# Patient Record
Sex: MALE | Race: BLACK OR AFRICAN AMERICAN | Employment: UNEMPLOYED | URBAN - METROPOLITAN AREA
[De-identification: names, ages, dates, MRNs, and addresses within clinical notes are randomized per-mention and may not be internally consistent; named-entity substitution may affect disease eponyms.]

---

## 2017-04-28 ENCOUNTER — HOSPITAL ENCOUNTER (EMERGENCY)
Age: 32
Discharge: HOME | End: 2017-04-28
Payer: MEDICARE

## 2017-04-28 VITALS
BODY MASS INDEX: 46.98 KG/M2 | SYSTOLIC BLOOD PRESSURE: 131 MMHG | HEART RATE: 102 BPM | TEMPERATURE: 98.6 F | DIASTOLIC BLOOD PRESSURE: 90 MMHG | RESPIRATION RATE: 18 BRPM | OXYGEN SATURATION: 98 % | HEIGHT: 68 IN | WEIGHT: 310 LBS

## 2017-04-28 DIAGNOSIS — G56.01 CARPAL TUNNEL SYNDROME OF RIGHT WRIST: Primary | ICD-10-CM

## 2017-04-28 PROCEDURE — 99282 EMERGENCY DEPT VISIT SF MDM: CPT

## 2017-04-28 RX ORDER — METHYLPREDNISOLONE 4 MG/1
TABLET ORAL
Qty: 1 KIT | Refills: 0 | Status: SHIPPED | OUTPATIENT
Start: 2017-04-28 | End: 2017-05-04

## 2017-04-28 ASSESSMENT — ENCOUNTER SYMPTOMS
VOMITING: 0
SHORTNESS OF BREATH: 0
BACK PAIN: 0
ABDOMINAL PAIN: 0
TROUBLE SWALLOWING: 0
SORE THROAT: 0
NAUSEA: 0
PHOTOPHOBIA: 0
COUGH: 0
DIARRHEA: 0

## 2017-06-28 ENCOUNTER — HOSPITAL ENCOUNTER (EMERGENCY)
Age: 32
Discharge: HOME | End: 2017-06-28
Payer: MEDICARE

## 2017-06-28 VITALS
SYSTOLIC BLOOD PRESSURE: 124 MMHG | RESPIRATION RATE: 18 BRPM | HEART RATE: 77 BPM | DIASTOLIC BLOOD PRESSURE: 73 MMHG | TEMPERATURE: 98.6 F | OXYGEN SATURATION: 99 %

## 2017-06-28 DIAGNOSIS — B36.9 FUNGAL SKIN INFECTION: ICD-10-CM

## 2017-06-28 DIAGNOSIS — L73.9 FOLLICULITIS: Primary | ICD-10-CM

## 2017-06-28 PROCEDURE — 99282 EMERGENCY DEPT VISIT SF MDM: CPT

## 2017-06-28 RX ORDER — CLOTRIMAZOLE 1 %
CREAM (GRAM) TOPICAL
Qty: 1 TUBE | Refills: 2 | Status: SHIPPED | OUTPATIENT
Start: 2017-06-28 | End: 2018-10-24

## 2017-06-28 RX ORDER — CLINDAMYCIN HYDROCHLORIDE 150 MG/1
150 CAPSULE ORAL 3 TIMES DAILY
Qty: 30 CAPSULE | Refills: 0 | Status: SHIPPED | OUTPATIENT
Start: 2017-06-28 | End: 2017-07-08

## 2017-06-28 ASSESSMENT — ENCOUNTER SYMPTOMS
EYES NEGATIVE: 1
ALLERGIC/IMMUNOLOGIC NEGATIVE: 1
GASTROINTESTINAL NEGATIVE: 1

## 2017-06-28 ASSESSMENT — PAIN DESCRIPTION - DESCRIPTORS: DESCRIPTORS: TIGHTNESS

## 2017-06-28 ASSESSMENT — PAIN DESCRIPTION - PAIN TYPE: TYPE: ACUTE PAIN

## 2017-06-28 ASSESSMENT — PAIN SCALES - GENERAL: PAINLEVEL_OUTOF10: 3

## 2017-06-28 ASSESSMENT — PAIN DESCRIPTION - LOCATION: LOCATION: ABDOMEN

## 2017-11-22 ENCOUNTER — HOSPITAL ENCOUNTER (INPATIENT)
Age: 32
LOS: 2 days | Discharge: HOME HEALTH | DRG: 420 | End: 2017-11-24
Attending: EMERGENCY MEDICINE | Admitting: INTERNAL MEDICINE
Payer: MEDICARE

## 2017-11-22 ENCOUNTER — APPOINTMENT (OUTPATIENT)
Dept: GENERAL RADIOLOGY | Age: 32
DRG: 420 | End: 2017-11-22
Payer: MEDICARE

## 2017-11-22 DIAGNOSIS — N48.1 BALANITIS: ICD-10-CM

## 2017-11-22 DIAGNOSIS — E11.9 DIABETES MELLITUS, NEW ONSET (HCC): Primary | ICD-10-CM

## 2017-11-22 DIAGNOSIS — R73.9 HYPERGLYCEMIA: ICD-10-CM

## 2017-11-22 LAB
ALBUMIN SERPL-MCNC: 4.8 G/DL (ref 3.9–4.9)
ALP BLD-CCNC: 92 U/L (ref 35–104)
ALT SERPL-CCNC: <5 U/L (ref 0–41)
ANION GAP SERPL CALCULATED.3IONS-SCNC: 14 MEQ/L (ref 7–13)
AST SERPL-CCNC: 22 U/L (ref 0–40)
BASOPHILS ABSOLUTE: 0 K/UL (ref 0–0.2)
BASOPHILS RELATIVE PERCENT: 0.4 %
BETA-HYDROXYBUTYRATE: 2.6 MG/DL (ref 0.2–2.8)
BILIRUB SERPL-MCNC: 0.6 MG/DL (ref 0–1.2)
BILIRUBIN URINE: NEGATIVE
BLOOD, URINE: NEGATIVE
BUN BLDV-MCNC: 19 MG/DL (ref 6–20)
CALCIUM SERPL-MCNC: 10.4 MG/DL (ref 8.6–10.2)
CHLORIDE BLD-SCNC: 88 MEQ/L (ref 98–107)
CLARITY: CLEAR
CO2: 24 MEQ/L (ref 22–29)
COLOR: YELLOW
CREAT SERPL-MCNC: 1.1 MG/DL (ref 0.7–1.2)
EOSINOPHILS ABSOLUTE: 0.1 K/UL (ref 0–0.7)
EOSINOPHILS RELATIVE PERCENT: 1.8 %
GFR AFRICAN AMERICAN: >60
GFR NON-AFRICAN AMERICAN: >60
GLOBULIN: 3.1 G/DL (ref 2.3–3.5)
GLUCOSE BLD-MCNC: 176 MG/DL (ref 60–115)
GLUCOSE BLD-MCNC: 200 MG/DL (ref 60–115)
GLUCOSE BLD-MCNC: 654 MG/DL (ref 74–109)
GLUCOSE BLD-MCNC: >600 MG/DL (ref 60–115)
GLUCOSE URINE: >=1000 MG/DL
HCT VFR BLD CALC: 43.7 % (ref 42–52)
HEMOGLOBIN: 15.8 G/DL (ref 14–18)
KETONES, URINE: NEGATIVE MG/DL
LEUKOCYTE ESTERASE, URINE: NEGATIVE
LYMPHOCYTES ABSOLUTE: 1.8 K/UL (ref 1–4.8)
LYMPHOCYTES RELATIVE PERCENT: 32.9 %
MCH RBC QN AUTO: 33.8 PG (ref 27–31.3)
MCHC RBC AUTO-ENTMCNC: 36.1 % (ref 33–37)
MCV RBC AUTO: 93.5 FL (ref 80–100)
MONOCYTES ABSOLUTE: 0.4 K/UL (ref 0.2–0.8)
MONOCYTES RELATIVE PERCENT: 6.9 %
NEUTROPHILS ABSOLUTE: 3.2 K/UL (ref 1.4–6.5)
NEUTROPHILS RELATIVE PERCENT: 58 %
NITRITE, URINE: NEGATIVE
PDW BLD-RTO: 12.6 % (ref 11.5–14.5)
PERFORMED ON: ABNORMAL
PH UA: 5.5 (ref 5–9)
PLATELET # BLD: 178 K/UL (ref 130–400)
POTASSIUM SERPL-SCNC: 5.6 MEQ/L (ref 3.5–5.1)
PROTEIN UA: NEGATIVE MG/DL
RBC # BLD: 4.67 M/UL (ref 4.7–6.1)
SODIUM BLD-SCNC: 126 MEQ/L (ref 132–144)
SPECIFIC GRAVITY UA: 1.03 (ref 1–1.03)
TOTAL PROTEIN: 7.9 G/DL (ref 6.4–8.1)
URINE REFLEX TO CULTURE: ABNORMAL
UROBILINOGEN, URINE: 0.2 E.U./DL
WBC # BLD: 5.6 K/UL (ref 4.8–10.8)

## 2017-11-22 PROCEDURE — 6360000002 HC RX W HCPCS

## 2017-11-22 PROCEDURE — 6370000000 HC RX 637 (ALT 250 FOR IP): Performed by: EMERGENCY MEDICINE

## 2017-11-22 PROCEDURE — 96375 TX/PRO/DX INJ NEW DRUG ADDON: CPT

## 2017-11-22 PROCEDURE — 36415 COLL VENOUS BLD VENIPUNCTURE: CPT

## 2017-11-22 PROCEDURE — 99285 EMERGENCY DEPT VISIT HI MDM: CPT

## 2017-11-22 PROCEDURE — 82803 BLOOD GASES ANY COMBINATION: CPT

## 2017-11-22 PROCEDURE — 82948 REAGENT STRIP/BLOOD GLUCOSE: CPT

## 2017-11-22 PROCEDURE — 82330 ASSAY OF CALCIUM: CPT

## 2017-11-22 PROCEDURE — 96374 THER/PROPH/DIAG INJ IV PUSH: CPT

## 2017-11-22 PROCEDURE — 81003 URINALYSIS AUTO W/O SCOPE: CPT

## 2017-11-22 PROCEDURE — 2580000003 HC RX 258: Performed by: NURSE PRACTITIONER

## 2017-11-22 PROCEDURE — 80053 COMPREHEN METABOLIC PANEL: CPT

## 2017-11-22 PROCEDURE — 1210000000 HC MED SURG R&B

## 2017-11-22 PROCEDURE — G0378 HOSPITAL OBSERVATION PER HR: HCPCS

## 2017-11-22 PROCEDURE — 2580000003 HC RX 258: Performed by: EMERGENCY MEDICINE

## 2017-11-22 PROCEDURE — 36600 WITHDRAWAL OF ARTERIAL BLOOD: CPT

## 2017-11-22 PROCEDURE — 82010 KETONE BODYS QUAN: CPT

## 2017-11-22 PROCEDURE — 71010 XR CHEST PORTABLE: CPT

## 2017-11-22 PROCEDURE — 6360000002 HC RX W HCPCS: Performed by: EMERGENCY MEDICINE

## 2017-11-22 PROCEDURE — 93005 ELECTROCARDIOGRAM TRACING: CPT

## 2017-11-22 PROCEDURE — 85025 COMPLETE CBC W/AUTO DIFF WBC: CPT

## 2017-11-22 PROCEDURE — 83605 ASSAY OF LACTIC ACID: CPT

## 2017-11-22 RX ORDER — ACETAMINOPHEN 325 MG/1
650 TABLET ORAL EVERY 4 HOURS PRN
Status: DISCONTINUED | OUTPATIENT
Start: 2017-11-22 | End: 2017-11-24 | Stop reason: HOSPADM

## 2017-11-22 RX ORDER — SODIUM CHLORIDE 0.9 % (FLUSH) 0.9 %
10 SYRINGE (ML) INJECTION PRN
Status: DISCONTINUED | OUTPATIENT
Start: 2017-11-22 | End: 2017-11-24 | Stop reason: HOSPADM

## 2017-11-22 RX ORDER — ONDANSETRON 2 MG/ML
4 INJECTION INTRAMUSCULAR; INTRAVENOUS ONCE
Status: COMPLETED | OUTPATIENT
Start: 2017-11-22 | End: 2017-11-22

## 2017-11-22 RX ORDER — ONDANSETRON 2 MG/ML
INJECTION INTRAMUSCULAR; INTRAVENOUS
Status: COMPLETED
Start: 2017-11-22 | End: 2017-11-22

## 2017-11-22 RX ORDER — SODIUM CHLORIDE 9 MG/ML
INJECTION, SOLUTION INTRAVENOUS CONTINUOUS
Status: DISCONTINUED | OUTPATIENT
Start: 2017-11-22 | End: 2017-11-24

## 2017-11-22 RX ORDER — NICOTINE POLACRILEX 4 MG
15 LOZENGE BUCCAL PRN
Status: DISCONTINUED | OUTPATIENT
Start: 2017-11-22 | End: 2017-11-24 | Stop reason: HOSPADM

## 2017-11-22 RX ORDER — DEXTROSE MONOHYDRATE 50 MG/ML
100 INJECTION, SOLUTION INTRAVENOUS PRN
Status: DISCONTINUED | OUTPATIENT
Start: 2017-11-22 | End: 2017-11-24 | Stop reason: HOSPADM

## 2017-11-22 RX ORDER — SODIUM CHLORIDE 0.9 % (FLUSH) 0.9 %
10 SYRINGE (ML) INJECTION EVERY 12 HOURS SCHEDULED
Status: DISCONTINUED | OUTPATIENT
Start: 2017-11-22 | End: 2017-11-24 | Stop reason: HOSPADM

## 2017-11-22 RX ORDER — ONDANSETRON 2 MG/ML
4 INJECTION INTRAMUSCULAR; INTRAVENOUS EVERY 6 HOURS PRN
Status: DISCONTINUED | OUTPATIENT
Start: 2017-11-22 | End: 2017-11-24 | Stop reason: HOSPADM

## 2017-11-22 RX ORDER — SODIUM CHLORIDE 0.9 % (FLUSH) 0.9 %
3 SYRINGE (ML) INJECTION EVERY 8 HOURS
Status: DISCONTINUED | OUTPATIENT
Start: 2017-11-22 | End: 2017-11-22 | Stop reason: ALTCHOICE

## 2017-11-22 RX ORDER — 0.9 % SODIUM CHLORIDE 0.9 %
1500 INTRAVENOUS SOLUTION INTRAVENOUS ONCE
Status: COMPLETED | OUTPATIENT
Start: 2017-11-22 | End: 2017-11-22

## 2017-11-22 RX ORDER — INSULIN GLARGINE 100 [IU]/ML
20 INJECTION, SOLUTION SUBCUTANEOUS NIGHTLY
Status: DISCONTINUED | OUTPATIENT
Start: 2017-11-22 | End: 2017-11-23

## 2017-11-22 RX ORDER — DEXTROSE MONOHYDRATE 25 G/50ML
12.5 INJECTION, SOLUTION INTRAVENOUS PRN
Status: DISCONTINUED | OUTPATIENT
Start: 2017-11-22 | End: 2017-11-24 | Stop reason: HOSPADM

## 2017-11-22 RX ADMIN — SODIUM CHLORIDE, PRESERVATIVE FREE 10 ML: 5 INJECTION INTRAVENOUS at 22:39

## 2017-11-22 RX ADMIN — SODIUM CHLORIDE 1500 ML: 9 INJECTION, SOLUTION INTRAVENOUS at 19:57

## 2017-11-22 RX ADMIN — SODIUM CHLORIDE: 9 INJECTION, SOLUTION INTRAVENOUS at 22:39

## 2017-11-22 RX ADMIN — ONDANSETRON 4 MG: 2 INJECTION INTRAMUSCULAR; INTRAVENOUS at 20:35

## 2017-11-22 RX ADMIN — ONDANSETRON: 2 INJECTION, SOLUTION INTRAMUSCULAR; INTRAVENOUS at 20:40

## 2017-11-22 RX ADMIN — HYDROMORPHONE HYDROCHLORIDE 0.5 MG: 1 INJECTION, SOLUTION INTRAMUSCULAR; INTRAVENOUS; SUBCUTANEOUS at 20:35

## 2017-11-22 RX ADMIN — WATER 1 G: 1 INJECTION INTRAMUSCULAR; INTRAVENOUS; SUBCUTANEOUS at 19:58

## 2017-11-22 RX ADMIN — SODIUM CHLORIDE 10 UNITS/HR: 9 INJECTION, SOLUTION INTRAVENOUS at 19:58

## 2017-11-22 RX ADMIN — INSULIN HUMAN 10 UNITS: 100 INJECTION, SOLUTION PARENTERAL at 19:57

## 2017-11-22 ASSESSMENT — PAIN SCALES - GENERAL: PAINLEVEL_OUTOF10: 10

## 2017-11-23 LAB
ANION GAP SERPL CALCULATED.3IONS-SCNC: 15 MEQ/L (ref 7–13)
BUN BLDV-MCNC: 17 MG/DL (ref 6–20)
CALCIUM SERPL-MCNC: 9.2 MG/DL (ref 8.6–10.2)
CHLORIDE BLD-SCNC: 93 MEQ/L (ref 98–107)
CO2: 24 MEQ/L (ref 22–29)
CREAT SERPL-MCNC: 1.05 MG/DL (ref 0.7–1.2)
GFR AFRICAN AMERICAN: >60
GFR NON-AFRICAN AMERICAN: >60
GLUCOSE BLD-MCNC: 260 MG/DL (ref 60–115)
GLUCOSE BLD-MCNC: 304 MG/DL (ref 60–115)
GLUCOSE BLD-MCNC: 305 MG/DL (ref 60–115)
GLUCOSE BLD-MCNC: 388 MG/DL (ref 74–109)
GLUCOSE BLD-MCNC: 416 MG/DL (ref 60–115)
GLUCOSE BLD-MCNC: 427 MG/DL (ref 60–115)
HBA1C MFR BLD: 12.6 % (ref 4.8–5.9)
HCT VFR BLD CALC: 43.3 % (ref 42–52)
HEMOGLOBIN: 14.7 G/DL (ref 14–18)
MCH RBC QN AUTO: 32.1 PG (ref 27–31.3)
MCHC RBC AUTO-ENTMCNC: 34.1 % (ref 33–37)
MCV RBC AUTO: 94.4 FL (ref 80–100)
PDW BLD-RTO: 13 % (ref 11.5–14.5)
PERFORMED ON: ABNORMAL
PLATELET # BLD: 177 K/UL (ref 130–400)
POTASSIUM SERPL-SCNC: 5.4 MEQ/L (ref 3.5–5.1)
RBC # BLD: 4.59 M/UL (ref 4.7–6.1)
SODIUM BLD-SCNC: 132 MEQ/L (ref 132–144)
WBC # BLD: 6.3 K/UL (ref 4.8–10.8)

## 2017-11-23 PROCEDURE — 80048 BASIC METABOLIC PNL TOTAL CA: CPT

## 2017-11-23 PROCEDURE — 85027 COMPLETE CBC AUTOMATED: CPT

## 2017-11-23 PROCEDURE — 6370000000 HC RX 637 (ALT 250 FOR IP): Performed by: NURSE PRACTITIONER

## 2017-11-23 PROCEDURE — 83036 HEMOGLOBIN GLYCOSYLATED A1C: CPT

## 2017-11-23 PROCEDURE — 6360000002 HC RX W HCPCS

## 2017-11-23 PROCEDURE — 6370000000 HC RX 637 (ALT 250 FOR IP): Performed by: INTERNAL MEDICINE

## 2017-11-23 PROCEDURE — 36415 COLL VENOUS BLD VENIPUNCTURE: CPT

## 2017-11-23 PROCEDURE — 1210000000 HC MED SURG R&B

## 2017-11-23 PROCEDURE — G0378 HOSPITAL OBSERVATION PER HR: HCPCS

## 2017-11-23 PROCEDURE — 2580000003 HC RX 258: Performed by: NURSE PRACTITIONER

## 2017-11-23 RX ORDER — INSULIN GLARGINE 100 [IU]/ML
40 INJECTION, SOLUTION SUBCUTANEOUS NIGHTLY
Status: DISCONTINUED | OUTPATIENT
Start: 2017-11-24 | End: 2017-11-24 | Stop reason: HOSPADM

## 2017-11-23 RX ADMIN — INSULIN GLARGINE 20 UNITS: 100 INJECTION, SOLUTION SUBCUTANEOUS at 00:21

## 2017-11-23 RX ADMIN — INSULIN GLARGINE 20 UNITS: 100 INJECTION, SOLUTION SUBCUTANEOUS at 21:18

## 2017-11-23 RX ADMIN — INSULIN LISPRO 6 UNITS: 100 INJECTION, SOLUTION INTRAVENOUS; SUBCUTANEOUS at 09:16

## 2017-11-23 RX ADMIN — SODIUM CHLORIDE: 9 INJECTION, SOLUTION INTRAVENOUS at 09:16

## 2017-11-23 RX ADMIN — ACETAMINOPHEN 650 MG: 325 TABLET, FILM COATED ORAL at 18:08

## 2017-11-23 RX ADMIN — HYDROMORPHONE HYDROCHLORIDE: 1 INJECTION, SOLUTION INTRAMUSCULAR; INTRAVENOUS; SUBCUTANEOUS at 00:40

## 2017-11-23 ASSESSMENT — PAIN SCALES - GENERAL: PAINLEVEL_OUTOF10: 5

## 2017-11-23 NOTE — PROGRESS NOTES
Patient assessment completed, admission complete, patient complains of no pain at this time and does not need anything else. Bed is in lowest position and call light is in reach.    Electronically signed by Anselmo Thomas RN on 11/22/2017 at 11:18 PM

## 2017-11-23 NOTE — H&P
Hospital Medicine History & Physical      PCP: No primary care provider on file. Date of Admission: 11/22/2017    Date of Service: Pt seen/examined on 11/22/17 and Admitted to Inpatient with expected LOS greater than two midnights due to medical therapy. Placed in Observation. Chief Complaint:  Blurred vision, increased thirst and urination. History Of Present Illness:      28 y.o. male who presented to [facility] with complaint of blurred vision, increased thirst and urination 3-4 weeks ago with worsening of symptoms over last 3 days. Patient denies having a history of diabetes or ever told his blood sugar was high. Patient states his father is a diabetic. He states that he feels like he can't get enough to drink, but is constantly in the bathroom. He feels fatigued even though he believes he is sleeping enough. Patient states having intermittent nausea, none at this time,but denies ever vomiting. Denies change in appetite, but states \"i know I don't eat health things\". Patient denies CP/SOB/fever/chills/N/V/D. Patient started on insulin gtt in ED and was on for 1 hour and 10 minutes after recheck glucose showed decrease from 654 down to 176. Gtt was discontinued and patient will be rechecked prior to floor transfer. Past Medical History:          Diagnosis Date    Asthma        Past Surgical History:      No past surgical history on file. Medications Prior to Admission:      Prior to Admission medications    Medication Sig Start Date End Date Taking? Authorizing Provider   clotrimazole (LOTRIMIN AF) 1 % cream Apply topically 2 times daily. 6/28/17   Aurea Whitt CNP       Allergies:  Review of patient's allergies indicates no known allergies. Social History:      The patient currently lives with father    TOBACCO:   reports that he has been smoking Cigarettes. He has been smoking about 0.50 packs per day. He does not have any smokeless tobacco history on file.   ETOH: reports that he drinks alcohol. Family History:       Reviewed in detail and negative for DM, CAD, Cancer, CVA. Positive as follows:    No family history on file. REVIEW OF SYSTEMS:   Pertinent positives as noted in the HPI. All other systems reviewed and negative. PHYSICAL EXAM:    BP (!) 126/107   Pulse 129   Temp 96.9 °F (36.1 °C) (Temporal)   Resp 20   Ht 5' 9\" (1.753 m)   Wt (!) 313 lb (142 kg)   SpO2 99%   BMI 46.22 kg/m²     General appearance:  No apparent distress, appears stated age and cooperative. HEENT:  Normal cephalic, atraumatic without obvious deformity. Pupils equal, round, and reactive to light. Extra ocular muscles intact. Conjunctivae/corneas clear. Neck: Supple, with full range of motion. No jugular venous distention. Trachea midline. Respiratory:  Normal respiratory effort. Clear to auscultation, bilaterally  Cardiovascular:  Regular rate and rhythm with normal S1/S2   Abdomen: Soft, non-tender, non-distended with normal bowel sounds. Musculoskeletal:  No clubbing, cyanosis or edema bilaterally. Full range of motion without deformity. Skin:  Diaphoretic,Skin color, texture, turgor normal.   Neurologic:  Neurovascularly intact without any focal sensory/motor deficits. Cranial nerves: II-XII intact, grossly non-focal.  Psychiatric:  Alert and oriented, thought content appropriate, normal insight  Capillary Refill: Brisk,< 3 seconds   Peripheral Pulses: +2 palpable, equal bilaterally       Labs:     Recent Labs      11/22/17 2023   WBC  5.6   HGB  15.8   HCT  43.7   PLT  178     Recent Labs      11/22/17 1930   NA  126*   K  5.6*   CL  88*   CO2  24   BUN  19   CREATININE  1.10   CALCIUM  10.4*     Recent Labs      11/22/17 1930   AST  22   ALT  <5   BILITOT  0.6   ALKPHOS  92     No results for input(s): INR in the last 72 hours. No results for input(s): Kristy Apa in the last 72 hours.     Urinalysis:      Lab Results   Component Value Date    NITRU

## 2017-11-23 NOTE — ED PROVIDER NOTES
[facility] ED  eMERGENCY dEPARTMENT eNCOUnter      Pt Name: Matilde Greco  MRN: [id number]  [place] 1985  Date of evaluation: 11/22/2017  Provider: William Chapa MD    [address] [place] [address]       Chief Complaint   Patient presents with    Loss of Vision     blurry vision started 3-4 weeks ago, rash in private area for a week, very thirsy, frequent urination         HISTORY OF PRESENT ILLNESS   (Location/Symptom, Timing/Onset, Context/Setting, Quality, Duration, Modifying Factors, Severity)  Note limiting factors. Matilde Greco is a 28 y.o. male who presents to the emergency department With polyuria polydipsia, feeling weak and blurry vision, 2 week duration and getting worse. His father is a diabetic. He thinks he may have diabetes. He also has soreness on the crown of the penis. This is underneath the foreskin. Cough, dysuria, frequency are not present. He is otherwise been fairly healthy. He is a smoker and never been diagnosed diabetic     HPI    Nursing Notes were reviewed. REVIEW OF SYSTEMS    (2-9 systems for level 4, 10 or more for level 5)     Review of Systems  Constitutional symptoms:  Positive fatigue, no fever, no chills. Skin symptoms:  Negative except as documented in HPI. ENMT symptoms:  Negative except as documented in HPI. Respiratory symptoms:  Negative except as documented in HPI. Cardiovascular symptoms:  Negative except as documented in HPI. Gastrointestinal symptoms: Negative except for documented as above in the HPI   Genitourinary symptoms:  Negative except as documented in HPI. Soreness in the corona as well  Musculoskeletal symptoms:  Negative except as documented in HPI. Neurologic symptoms:  Negative except as documented in HPI. Remainder of 10 systems, all negative except for mentioned above    Except as noted above the remainder of the review of systems was reviewed and negative.        PAST MEDICAL HISTORY     Past Medical History:   Diagnosis Date No              -Pain on palpation: No    ABD: -Distended: No           -Bruits: No           -Bowel sounds: Normal.           -Deep palpation: Non-tender           -Organomegaly palpable: No           -Abnormal masses: No    EXT: Gross appearance and use of all four extremities: Normal   Genital exam reveals foreskin intact, and soreness, redness, slight discharge underneath the foreskin. SKIN: -Good turgor warm and dry. -Apparent lesions or rashes: No    NEURO: -Patient: alert                -Oriented to: person, place and time. -Appearance and judgment: appropriate.                -Cranial Nerves: Normal.                -Speech: Normal            DIAGNOSTIC RESULTS     EKG: All EKG's are interpreted by the Emergency Department Physician who either signs or Co-signs this chart in the absence of a cardiologist.    EKG was revewed  and revealed normal sinus rhythm, rate is 70-85. no acute ST elevations,no flipped T waves , no Q waves. CT interval is normal.QRS duration is normal. Axes are normal. There are no PVCs    RADIOLOGY:   Non-plain film images such as CT, Ultrasound and MRI are read by the radiologist. Plain radiographic images are visualized and preliminarily interpreted by the emergency physician with the below findings:      Chest  x-ray fails to demonstrate acute effusion or infiltrate. There is no pneumothorax.     Interpretation per the Radiologist below, if available at the time of this note:    XR Chest Portable    (Results Pending)         ED BEDSIDE ULTRASOUND:   Performed by ED Physician - none    LABS:  Labs Reviewed   COMPREHENSIVE METABOLIC PANEL - Abnormal; Notable for the following:        Result Value    Sodium 126 (*)     Potassium 5.6 (*)     Chloride 88 (*)     Anion Gap 14 (*)     Glucose 654 (*)     Calcium 10.4 (*)     All other components within normal limits    Narrative:     CALL  [place]  [facility] tel. [phone],  Glucose results called to and read back by  Preethi, 11/22/2017 20:04, by  JUANJO   POCT GLUCOSE - Abnormal; Notable for the following:     POC Glucose >600 (*)     All other components within normal limits   URINE RT REFLEX TO CULTURE   BLOOD GAS, ARTERIAL   CBC WITH AUTO DIFFERENTIAL       All other labs were within normal range or not returned as of this dictation. EMERGENCY DEPARTMENT COURSE and DIFFERENTIAL DIAGNOSIS/MDM:   Vitals:    Vitals:    11/22/17 1902 11/22/17 1904   BP: 138/74    Pulse: 76    Resp: 20    Temp:  96.9 °F (36.1 °C)   TempSrc:  Temporal   SpO2: 99%    Weight: (!) 313 lb (142 kg)    Height: 5' 9\" (1.753 m)            MDM    CRITICAL CARE TIME   Total Critical Care time was 40  minutes, excluding separately reportable procedures. There was a high probability of clinically significant/life threatening deterioration in the patient's condition which required my urgent intervention. CONSULTS:  None    PROCEDURES:  Unless otherwise noted below, none     Procedures    FINAL IMPRESSION      1. Diabetes mellitus, new onset ([address]    2. Balanitis          DISPOSITION/PLAN   DISPOSITION Decision to Admit    PATIENT REFERRED TO:  No follow-up provider specified.     DISCHARGE MEDICATIONS:  New Prescriptions    No medications on file          (Please note that portions of this note were completed with a voice recognition program.  Efforts were made to edit the dictations but occasionally words are mis-transcribed.)    Hellen Gardner MD (electronically signed)  Attending Emergency Physician          Hellen Gardner MD  11/22/17 2036

## 2017-11-23 NOTE — FLOWSHEET NOTE
Pt awake and alert explained insulin and how the coverage scale is used. Pt verbalized understanding. States he feels better and that at home his mouth was very dry and scaly and thought he had a UTI so he was drinking a lot of water and cranberry-apple juice.

## 2017-11-23 NOTE — ED NOTES
Pt states he has been having symptoms for 3 weeks like blurry vision, extreme thirst, excessive urination, weakness.   Pt states he thinks he has diabetes and wants to be checked for it     Ba Dawson RN  11/22/17 9551

## 2017-11-23 NOTE — PLAN OF CARE
Problem: Serum Glucose Level - Abnormal:  Goal: Ability to maintain appropriate glucose levels will improve  Ability to maintain appropriate glucose levels will improve  Outcome: Ongoing

## 2017-11-23 NOTE — PROGRESS NOTES
bilaterally. Skin: Skin color, texture, turgor normal.  No rashes or lesions. Neuro: Non Focal. Intact and symmetric  Psychiatric: Alert and oriented, thought content appropriate, normal insight  Capillary Refill: Brisk,< 3 seconds   Peripheral Pulses: +2 palpable, equal bilaterally     Labs:   Recent Labs      11/22/17 2023 11/23/17   0555   WBC  5.6  6.3   HGB  15.8  14.7   HCT  43.7  43.3   PLT  178  177     Recent Labs      11/22/17 1930 11/23/17   0555   NA  126*  132   K  5.6*  5.4*   CL  88*  93*   CO2  24  24   BUN  19  17   CREATININE  1.10  1.05   CALCIUM  10.4*  9.2     Recent Labs      11/22/17 1930   AST  22   ALT  <5   BILITOT  0.6   ALKPHOS  92     No results for input(s): INR in the last 72 hours. No results for input(s): Kristy Apa in the last 72 hours. Urinalysis:    Lab Results   Component Value Date    NITRU Negative 11/22/2017    BLOODU Negative 11/22/2017    SPECGRAV 1.028 11/22/2017    GLUCOSEU >=1000 11/22/2017       Radiology:  XR Chest Portable   Final Result   NO ACUTE CHEST DISEASE        Assessment/Plan:  27 y/o M with a history of tobacco use and family history of DM and CAD who presents with hyperglycemia; found to have DMII with an HbA1c of 12.     #DMII with uncontrolled hyperglycemia     - Have started pt on 0.5U/Kg of daily insulin which is approximately 42U per day; 21U of Lantus and 7U preprandial with SSI is ordered; may need further titration by endocrinology     - Counseled pt extensively in regards to diet and lifestyle changes; discussed a low carb diet as well as potential complications of poorly controlled DM; pt grandmother had diabetic nephropathy (dialysis requiring), father with possible CAD     - Also ordered diabetic educator    #Nicotine abuse     - Counseled pt at length in regards to tobacco use and his risk of CAD from his new DM; pt states he will try to quit when he gets home; discussed methods of quitting    Active Hospital Problems Diagnosis Date Noted    Hyperglycemia [R73.9] 11/22/2017      Additional work up or/and treatment plan may be added today or then after based on clinical progression. I am managing a portion of pt care. Some medical issues are handled by other specialists. Additional work up and treatment should be done in out pt setting by pt PCP and other out pt providers. In addition to examining and evaluating pt, I spent additional time explaining care, normal and abnormal findings, and treatment plan. All of pt questions were answered. Counseling, diet and education were  provided. Case will be discussed with nursing staff when appropriate. Family will be updated if and when appropriate.       Diet: DIET CARB CONTROL; Carb Control: 3 carbs/meal (approximate 1500 kcals/day)    Code Status: Full Code      Electronically signed by Vickie Cardona MD on 11/23/2017 at 2:19 PM

## 2017-11-24 VITALS
SYSTOLIC BLOOD PRESSURE: 126 MMHG | TEMPERATURE: 97.9 F | HEART RATE: 76 BPM | DIASTOLIC BLOOD PRESSURE: 86 MMHG | WEIGHT: 313 LBS | OXYGEN SATURATION: 98 % | RESPIRATION RATE: 16 BRPM | BODY MASS INDEX: 46.36 KG/M2 | HEIGHT: 69 IN

## 2017-11-24 LAB
GLUCOSE BLD-MCNC: 321 MG/DL (ref 60–115)
GLUCOSE BLD-MCNC: 341 MG/DL (ref 60–115)
PERFORMED ON: ABNORMAL
PERFORMED ON: ABNORMAL

## 2017-11-24 PROCEDURE — 2580000003 HC RX 258: Performed by: NURSE PRACTITIONER

## 2017-11-24 PROCEDURE — 90656 IIV3 VACC NO PRSV 0.5 ML IM: CPT | Performed by: INTERNAL MEDICINE

## 2017-11-24 PROCEDURE — 84681 ASSAY OF C-PEPTIDE: CPT

## 2017-11-24 PROCEDURE — 6360000002 HC RX W HCPCS: Performed by: INTERNAL MEDICINE

## 2017-11-24 PROCEDURE — G0378 HOSPITAL OBSERVATION PER HR: HCPCS

## 2017-11-24 PROCEDURE — G0008 ADMIN INFLUENZA VIRUS VAC: HCPCS | Performed by: INTERNAL MEDICINE

## 2017-11-24 PROCEDURE — 6370000000 HC RX 637 (ALT 250 FOR IP): Performed by: PODIATRIST

## 2017-11-24 PROCEDURE — 99222 1ST HOSP IP/OBS MODERATE 55: CPT | Performed by: PHYSICIAN ASSISTANT

## 2017-11-24 RX ORDER — BLOOD-GLUCOSE METER
1 KIT MISCELLANEOUS DAILY
Qty: 1 KIT | Refills: 0 | Status: SHIPPED | OUTPATIENT
Start: 2017-11-24

## 2017-11-24 RX ORDER — PRENATAL VIT 91/IRON/FOLIC/DHA 28-975-200
COMBINATION PACKAGE (EA) ORAL 2 TIMES DAILY
Status: DISCONTINUED | OUTPATIENT
Start: 2017-11-24 | End: 2017-11-24 | Stop reason: HOSPADM

## 2017-11-24 RX ORDER — LANCETS 28 GAUGE
1 EACH MISCELLANEOUS 4 TIMES DAILY
Qty: 150 EACH | Refills: 3 | Status: SHIPPED | OUTPATIENT
Start: 2017-11-24

## 2017-11-24 RX ADMIN — TERBINAFINE HYDROCHLORIDE: 1 CREAM TOPICAL at 17:12

## 2017-11-24 RX ADMIN — A/SINGAPORE/GP1908/2015 IVR-180 (AN A/MICHIGAN/45/2015 (H1N1)PDM09-LIKE VIRUS, A/HONG KONG/4801/2014, NYMC X-263B (H3N2) (AN A/HONG KONG/4801/2014-LIKE VIRUS), AND B/BRISBANE/60/2008, WILD TYPE (A B/BRISBANE/60/2008-LIKE VIRUS) 0.5 ML: 15; 15; 15 INJECTION, SUSPENSION INTRAMUSCULAR at 17:13

## 2017-11-24 RX ADMIN — SODIUM CHLORIDE, PRESERVATIVE FREE 10 ML: 5 INJECTION INTRAVENOUS at 09:16

## 2017-11-24 ASSESSMENT — ENCOUNTER SYMPTOMS
SHORTNESS OF BREATH: 0
VOMITING: 0
BLURRED VISION: 0
SORE THROAT: 0
BACK PAIN: 0
NAUSEA: 0
DOUBLE VISION: 0
ABDOMINAL PAIN: 0
COUGH: 0
DIARRHEA: 0

## 2017-11-24 NOTE — FLOWSHEET NOTE
Patient  Up and about in room. Appetite good this morning. Instructed on how to draw and give insulin this morning with good demostration; will continue to do insulin teaching with patient. Shows positive outcome with diagnosis. Taking a shower now.

## 2017-11-24 NOTE — CONSULTS
PODIATRIC MEDICINE AND SURGERY  CONSULT HISTORY AND PHYSICAL      Consulting Service:  4w  Requesting Provider: Dr. Ramila Yuan regarding: onychomycosis/tinea pedis  Staff Doctor:  Dr. Luis Rodriguez:  Onychomycosis of nails 1,2 b/l  Tinea pedis b/l to plantar skin and interdigital      PLAN AND RECOMMENDATIONS[de-identified]  Patient examined and evaluated  Rx Lotrimin cream for tinea pedis. To be applied daily to b/l feet and interdigitally. Used until resolution of scale and signs of fungal infection  Discussed with patient that while athlete's foot is readily treatable, fungal nails can be notoriously hard to treat and will require 9+ months to see any appreciable difference if treatment is working. Patient to be discussed with staff, , who will provide final recommendations going forward. Podiatry to sign off at this time  Patient can follow up with Dr. Anais Santos if he wishes after discharge for continued fungal nail and athlete's foot followup. HPI: This very [address] old male with significant PMH of newly diagnosed diabetes seen today for fungal nails and athletes foot. Patient states that he has noticed that the nails have been getting dark and spreading from the big toes to the smaller toenails over the past few months. Patient states he used to not have such dry skin and thinks it started after the nails. Patient states that he would like to use topical treatments at this time. Past Medical History:   Diagnosis Date    Asthma        No past surgical history on file. No current facility-administered medications on file prior to encounter. Current Outpatient Prescriptions on File Prior to Encounter   Medication Sig Dispense Refill    clotrimazole (LOTRIMIN AF) 1 % cream Apply topically 2 times daily. 1 Tube 2       No Known Allergies    No family history on file.     Social History     Social History    Marital status:      Spouse name: N/A    Number of children: N/A    Years of education: N/A     Occupational History    Not on file. Social History Main Topics    Smoking status: Current Every Day Smoker     Packs/day: 0.50     Types: Cigarettes    Smokeless tobacco: Not on file    Alcohol use Yes      Comment: occasionally    Drug use: No    Sexual activity: Not on file     Other Topics Concern    Not on file     Social History Narrative    No narrative on file       Review of Systems  CONSTITUTIONAL: No fevers, chills, diaphoresis  HEENT: Denies epistaxis or tinnitus  EYES: No diplopia or blurry vision. CARDIOVASCULAR: No chest pain, dyspnea, palpitations, orthopnea, PND, ankle edema. PULM: No dyspnea, tachypnea, wheezing  GI: No dysphagia/odynophagia, constipation, diarrhea, changes in stool habits, hematochezia, melena. : No new urinary complaints, including dysuria, gross hematuria or pyuria. NEURO: No new balance problems, peripheral weakness/paresthesias or numbness of concern. MUSC-SKEL: denies pain in feet. See below  PSY: No concerns regarding depression, anxiety or panic. INTEGUMENTARY: denies open skin lesion. See below    OBJECTIVE:  /86   Pulse 76   Temp 97.9 °F (36.6 °C) (Oral)   Resp 16   Ht 5' 9\" (1.753 m)   Wt (!) 313 lb (142 kg)   SpO2 98%   BMI 46.22 kg/m²   Patient is alert and oriented x 3 in NAD.      Vascular:   Palpable Dorsalis Pedis and Palpable Posterior Tibial Pulses B/L   Capillary Fill time < 3 seconds to B/L digits  Skin temperature warm to warm tibial tuberosity to the digits B/L  Hair growth present to digits  mild edema, - varicosities     Neurological:   Epicritic sensation intact B/L  Protective sensation via monofilament testing intact B/L  Sharp/dull sensation intact to plantar foot B/L    Musculoskeletal/Orthopaedic:   Structural Deformities: decreased medial longitudinal arch   5/5 muscle strength Dorsiflexion, Plantarflexion, Inversion, Eversion B/L  ROM decreased pedal and ankle joints B/L.       Dermatological:   Skin appears well hydrated and supple with good temperature, texture, turgor dorsally, significant dryness and scale to the plantar foot b/l. Nails 1-2 B/L appear clinically mycotic with thickening and discoloration. Interspaces 1-4 B/L have minor scale and erythema  No open lesions, ulcerations, verruca appreciated B/L. LABS:   Lab Results   Component Value Date    WBC 6.3 11/23/2017    HGB 14.7 11/23/2017    HCT 43.3 11/23/2017    MCV 94.4 11/23/2017     11/23/2017     Lab Results   Component Value Date     11/23/2017    K 5.4 11/23/2017    CL 93 11/23/2017    CO2 24 11/23/2017    BUN 17 11/23/2017    CREATININE 1.05 11/23/2017    GLUCOSE 388 11/23/2017    CALCIUM 9.2 11/23/2017      Lab Results   Component Value Date    LABALBU 4.8 11/22/2017     No results found for: SEDRATE  No results found for: CRP  Lab Results   Component Value Date    LABA1C 12.6 (H) 11/23/2017     No results found for: EAG      Patient's case will be discussed with staff, who will provide final recommendations. Thank you for the consult.     Abdiaziz Hackett PGY2  Please first page Podiatry On Call, [phone]  November 24, 2017  12:52 PM

## 2017-11-24 NOTE — CARE COORDINATION
MET WITH PATIENT, FREEDOM OF CHOICE OFFERED. AGREES TO [facility], OUTPATIENT DIABETIC EDUCATION , CALLED DIETICIAN TO SEE PATIENT FOR EDUCATION AS WELL, PATIENT RECEPTIVE TO TEACHING AT THIS TIME AND WOULD APPRECIATE ALL THE HELP HE COULD GET. SPOKE WITH DEMETRIS MARTIN REQUESTING ORDERS FOR SUPPORT WHEN DC'D.  PATIENT GIVEN GLUCOMETER AND TEST STRIPS, NURSE MICHELLE AWARE OF NEED FOR EDUCATION BEFORE PATIENT IS DC'D TO HOME

## 2017-11-24 NOTE — CONSULTS
Endocrinology Consult      Nguyen Arroyo  1985  [id number]    Date of Admission:  2017  7:05 PM  Date of Consultation:  2017    Consultant: Saida Stanley PA-C  Supervising Physician: Bing Whitney MD  PCP:  No primary care provider on file. Reason for Consultation: Hyperglycemia    C/C:   Chief Complaint   Patient presents with    Loss of Vision     blurry vision started 3-4 weeks ago, rash in private area for a week, very thirsy, frequent urination       HPI  History of Present Illness: Nguyen Arroyo is a 50-year-old -American gentleman who began noticing blurred vision approximate 3-4 weeks ago, polydipsia and polyuria for the last 5-7 days, and he has had tingling and numbness in both of his big toes intermittently and weakness and fatigue for about the last 3 months. He has a strong family history of diabetes, father is type II and grandmother is  from complications of diabetes. He is obese, and does state that he does not have a healthy diet. He denies nausea, vomiting, diarrhea, abdominal pain. His glucose at admission was 654, his hemoglobin A1c is 12.6. His glycemic control has improved since admission and he states that he already feels improvement of the fatigue and weakness that he felt over the last few months. Had a long discussion and education with the patient concerning diabetes and long-term effects. Described him the basic pathophysiology, is a very well-educated hard-working man and is dedicated to making improvement lifestyle changes and managing his disease appropriately. Allergies: No Known Allergies    PMH: Patient has a past medical history of Asthma. PSH: Patient has no past surgical history on file. Social History: Patient reports that he has been smoking Cigarettes. He has been smoking about 0.50 packs per day. He does not have any smokeless tobacco history on file. He reports that he drinks alcohol.  He reports that he does not use drugs. Family History: Patientsfamily history is not on file. ROS:  Review of Systems   Constitutional: Positive for diaphoresis and malaise/fatigue. Negative for chills, fever and weight loss. HENT: Negative for congestion and sore throat. Eyes: Negative for blurred vision and double vision. Respiratory: Negative for cough and shortness of breath. Cardiovascular: Negative for chest pain and palpitations. Gastrointestinal: Negative for abdominal pain, diarrhea, nausea and vomiting. Genitourinary: Negative for dysuria, flank pain, frequency, hematuria and urgency. Musculoskeletal: Negative for back pain, myalgias and neck pain. Numbness and tingling both great toes   Skin: Negative for rash. Neurological: Positive for weakness. Negative for dizziness and headaches. Endo/Heme/Allergies: Positive for polydipsia. Negative for environmental allergies. Does not bruise/bleed easily. Polyuria   Psychiatric/Behavioral: Negative for depression.        Current Meds:   terbinafine (LAMISIL) 1 % cream BID   insulin lispro (HUMALOG) injection vial 0-12 Units TID WC   insulin lispro (HUMALOG) injection vial 0-6 Units Nightly   insulin glargine (LANTUS) injection vial 40 Units Nightly   insulin lispro (HUMALOG) injection vial 12 Units TID    sodium chloride flush 0.9 % injection 10 mL 2 times per day   sodium chloride flush 0.9 % injection 10 mL PRN   acetaminophen (TYLENOL) tablet 650 mg Q4H PRN   magnesium hydroxide (MILK OF MAGNESIA) 400 MG/5ML suspension 30 mL Daily PRN   ondansetron (ZOFRAN) injection 4 mg Q6H PRN   enoxaparin (LOVENOX) injection 40 mg Daily   glucose (GLUTOSE) 40 % oral gel 15 g PRN   dextrose 50 % solution 12.5 g PRN   glucagon (rDNA) injection 1 mg PRN   dextrose 5 % solution PRN   influenza type A&B vaccine (FLUVIRIN) injection 0.5 mL Once         Vitals:  /86   Pulse 76   Temp 97.9 °F (36.6 °C) (Oral)   Resp 16   Ht 5' 9\" (1.753 m)   Wt (!) 313 lb (142 kg)   SpO2 98%   BMI 46.22 kg/m²   I/O (24Hr): No intake or output data in the 24 hours ending 11/24/17 1408          Physical Exam   Constitutional: He is oriented to person, place, and time. He appears well-developed and well-nourished. HENT:   Head: Normocephalic and atraumatic. Eyes: Conjunctivae and EOM are normal.   Neck: Normal range of motion. Neck supple. Cardiovascular: Normal rate, regular rhythm and normal heart sounds. Pulmonary/Chest: Effort normal and breath sounds normal.   Abdominal: Soft. Bowel sounds are normal.   Musculoskeletal: Normal range of motion. Feet:    Neurological: He is alert and oriented to person, place, and time. Skin: Skin is warm and dry. Psychiatric: He has a normal mood and affect. Nursing note and vitals reviewed. Labs:  Recent Labs      11/23/17   0555   LABA1C  12.6*     Recent Labs      11/23/17   1539  11/23/17   1653  11/23/17   2108  11/24/17   0708  11/24/17   1126   POCGLU  305*  304*  260*  321*  341*     Hematology:  Recent Labs      11/23/17   0555   WBC  6.3   HGB  14.7   HCT  43.3   PLT  177     Chemistry:  Recent Labs      11/23/17   0555   NA  132   K  5.4*   CL  93*   CO2  24   GLUCOSE  388*   BUN  17   CREATININE  1.05   ANIONGAP  15*   LABGLOM  >60.0   GFRAA  >60.0   CALCIUM  9.2       Urinalysis: Recent Labs      11/22/17   1930   COLORU  Yellow   PHUR  5.5   CLARITYU  Clear   SPECGRAV  1.028   LEUKOCYTESUR  Negative   UROBILINOGEN  0.2   BILIRUBINUR  Negative   BLOODU  Negative        Urine Culture No results found for: [address] [place] [address]    Radiology: Xr Chest Portable    Result Date: 11/23/2017  EXAMINATION: XR CHEST PORTABLE CLINICAL HISTORY:  weak COMPARISONS: 8/30/2007 FINDINGS: Cardiac size and pulmonary vascularity are normal. The lungs are clear. There is no evidence of adenopathy. The bones are unremarkable. There is a polyarticular right hemidiaphragm. The ascending aorta and aortic arch have prominent contours.      NO ACUTE

## 2017-11-24 NOTE — DISCHARGE INSTR - DIET

## 2017-11-24 NOTE — FLOWSHEET NOTE
Patient had diabetic diet teaching and teaching in how to give own insulin and drawing it with good demonstration. Discharge instructions given with understanding. SL removed from left arm. Tolerated well. Ambulated out. Refused wheelchair.

## 2017-11-27 LAB
BASE EXCESS ARTERIAL: 1 (ref -3–3)
CALCIUM IONIZED: 1.16 MMOL/L (ref 1.12–1.32)
EKG ATRIAL RATE: 82 BPM
EKG P AXIS: 58 DEGREES
EKG P-R INTERVAL: 180 MS
EKG Q-T INTERVAL: 368 MS
EKG QRS DURATION: 118 MS
EKG QTC CALCULATION (BAZETT): 429 MS
EKG R AXIS: -43 DEGREES
EKG T AXIS: 26 DEGREES
EKG VENTRICULAR RATE: 82 BPM
GLUCOSE BLD-MCNC: >700 MG/DL (ref 60–115)
HCO3 ARTERIAL: 25.9 MMOL/L (ref 21–29)
LACTATE: 0.88 MMOL/L (ref 0.4–2)
O2 SAT, ARTERIAL: 91 % (ref 93–100)
PCO2 ARTERIAL: 45 MM HG (ref 35–45)
PERFORMED ON: ABNORMAL
PH ARTERIAL: 7.37 (ref 7.35–7.45)
PO2 ARTERIAL: 63 MM HG (ref 75–108)
POC SAMPLE TYPE: ABNORMAL
TCO2 ARTERIAL: 27 (ref 22–29)

## 2017-11-27 NOTE — DISCHARGE SUMMARY
UNIT/GM-% cream  Apply topically 2 times daily. Disposition:   If discharged to Home, [facility] that were indicated and/or required as been addressed and set up by Social Work. Condition at discharge: Pt was medically stable at the time of discharge. Activity: activity as tolerated    Total time taken for discharging this patient: 40 minutes. Greater than 70% of time was spent focused exclusively on this patient. Time was taken to review chart, discuss plans with consultants, reconciling medications, discussing plan answering questions with patient.      SignedKaylee Dancer  11/27/2017, 1:54 PM  ----------------------------------------------------------------------------------------------------------------------    Sarthak Oviedo

## 2017-11-28 LAB — C-PEPTIDE: 3.1 NG/ML (ref 0.8–3.5)

## 2017-11-28 RX ORDER — LANCETS 30 GAUGE
EACH MISCELLANEOUS
Qty: 150 EACH | Refills: 3 | Status: SHIPPED | OUTPATIENT
Start: 2017-11-28

## 2017-11-28 RX ORDER — GLUCOSAMINE HCL/CHONDROITIN SU 500-400 MG
CAPSULE ORAL
Qty: 150 STRIP | Refills: 3 | Status: ON HOLD | OUTPATIENT
Start: 2017-11-28 | End: 2021-12-14 | Stop reason: HOSPADM

## 2017-12-07 ENCOUNTER — OFFICE VISIT (OUTPATIENT)
Dept: SURGERY | Age: 32
End: 2017-12-07

## 2017-12-07 VITALS
BODY MASS INDEX: 45.77 KG/M2 | HEIGHT: 69 IN | DIASTOLIC BLOOD PRESSURE: 89 MMHG | SYSTOLIC BLOOD PRESSURE: 138 MMHG | HEART RATE: 77 BPM | WEIGHT: 309 LBS

## 2017-12-07 DIAGNOSIS — E66.01 MORBID OBESITY (HCC): ICD-10-CM

## 2017-12-07 DIAGNOSIS — B35.1 ONYCHOMYCOSIS: ICD-10-CM

## 2017-12-07 LAB — GLUCOSE BLD-MCNC: 184 MG/DL

## 2017-12-07 PROCEDURE — G8427 DOCREV CUR MEDS BY ELIG CLIN: HCPCS | Performed by: INTERNAL MEDICINE

## 2017-12-07 PROCEDURE — 3046F HEMOGLOBIN A1C LEVEL >9.0%: CPT | Performed by: INTERNAL MEDICINE

## 2017-12-07 PROCEDURE — G8484 FLU IMMUNIZE NO ADMIN: HCPCS | Performed by: INTERNAL MEDICINE

## 2017-12-07 PROCEDURE — 82962 GLUCOSE BLOOD TEST: CPT | Performed by: INTERNAL MEDICINE

## 2017-12-07 PROCEDURE — 4004F PT TOBACCO SCREEN RCVD TLK: CPT | Performed by: INTERNAL MEDICINE

## 2017-12-07 PROCEDURE — 99213 OFFICE O/P EST LOW 20 MIN: CPT | Performed by: INTERNAL MEDICINE

## 2017-12-07 PROCEDURE — G8417 CALC BMI ABV UP PARAM F/U: HCPCS | Performed by: INTERNAL MEDICINE

## 2017-12-07 PROCEDURE — 1111F DSCHRG MED/CURRENT MED MERGE: CPT | Performed by: INTERNAL MEDICINE

## 2017-12-08 RX ORDER — INSULIN GLARGINE 100 [IU]/ML
55 INJECTION, SOLUTION SUBCUTANEOUS NIGHTLY
Qty: 10 PEN | Refills: 3 | Status: SHIPPED | OUTPATIENT
Start: 2017-12-08 | End: 2018-10-24

## 2017-12-10 ASSESSMENT — ENCOUNTER SYMPTOMS: VISUAL CHANGE: 1

## 2017-12-10 NOTE — PROGRESS NOTES
Subjective:      Patient ID: Randi Rogers is a 28 y.o. male. Diabetes   He presents for his follow-up diabetic visit. He has type 2 diabetes mellitus. His disease course has been improving. Associated symptoms include polydipsia, polyuria and visual change. Risk factors for coronary artery disease include diabetes mellitus, obesity, male sex and sedentary lifestyle. Current diabetic treatment includes insulin injections (basaglar plus humalog ). He is compliant with treatment most of the time. He is currently taking insulin pre-breakfast, pre-lunch, pre-dinner and at bedtime. (Lab Results       Component                Value               Date                       LABA1C                   12.6 (H)            11/23/2017            Recent admission at [facility] for new onset diabetes   )     Obesity Body mass index is 45.63 kg/m². Patient Active Problem List   Diagnosis    Hyperglycemia    Uncontrolled type 2 diabetes mellitus with complication, with long-term current use of insulin ([facility] [address]     Social History     Social History    Marital status:      Spouse name: N/A    Number of children: N/A    Years of education: N/A     Occupational History    Not on file.      Social History Main Topics    Smoking status: Current Every Day Smoker     Packs/day: 0.50     Types: Cigarettes    Smokeless tobacco: Not on file    Alcohol use Yes      Comment: occasionally    Drug use: No    Sexual activity: Not on file     Other Topics Concern    Not on file     Social History Narrative    No narrative on file     No Known Allergies      Current Outpatient Prescriptions:     insulin lispro (HUMALOG KWIKPEN) 100 UNIT/ML pen, 20 units at each meals, Disp: 10 pen, Rfl: 3    Insulin Pen Needle 32G X 5 MM MISC, 4 Devices by Does not apply route 4 times daily, Disp: 150 each, Rfl: 3    Blood Glucose Monitoring Suppl MARCUS, Please give 1 meter Dx E10.65 (dispense covered brand), Disp: 1 Device, Rfl: 0    Glucose Blood (BLOOD GLUCOSE TEST STRIPS) STRP, Pt test 4x daily Dx E10.65, Disp: 150 strip, Rfl: 3    Lancets MISC, Pt test 4x daily Dx E10.65, Disp: 150 each, Rfl: 3    glucose monitoring kit (FREESTYLE) monitoring kit, 1 kit by Does not apply route daily, Disp: 1 kit, Rfl: 0    FREESTYLE LANCETS MISC, 1 each by Does not apply route 4 times daily, Disp: 150 each, Rfl: 3    glucose blood VI test strips (FREESTYLE LITE) strip, 1 each by In Vitro route 4 times daily As needed. , Disp: 150 each, Rfl: 3    nystatin-triamcinolone (MYCOLOG II) 855605-9.1 UNIT/GM-% cream, Apply topically 2 times daily. , Disp: 1 Tube, Rfl: 0    clotrimazole (LOTRIMIN AF) 1 % cream, Apply topically 2 times daily. , Disp: 1 Tube, Rfl: 2    BASAGLAR KWIKPEN 100 UNIT/ML injection pen, Inject 55 Units into the skin nightly, Disp: 10 pen, Rfl: 3      Review of Systems   Endocrine: Positive for polydipsia and polyuria. Vitals:    12/07/17 1020   BP: 138/89   Site: Right Arm   Position: Sitting   Cuff Size: Large Adult   Pulse: 77   Weight: (!) 309 lb (140.2 kg)   Height: 5' 9\" (1.753 m)       Objective:   Physical Exam   Constitutional: He appears well-developed and well-nourished. HENT:   Head: Normocephalic and atraumatic. Neck: Neck supple. Cardiovascular: Normal rate and normal heart sounds. Pulmonary/Chest: Effort normal and breath sounds normal.   Abdominal:   Obese    Musculoskeletal: Normal range of motion. Feet:    Neurological: He is alert. Skin: Skin is warm and dry. Psychiatric: He has a normal mood and affect.      Lab Results   Component Value Date     11/23/2017    K 5.4 (H) 11/23/2017    CL 93 (L) 11/23/2017    CO2 24 11/23/2017    BUN 17 11/23/2017    CREATININE 1.05 11/23/2017    GLUCOSE 184 12/07/2017    CALCIUM 9.2 11/23/2017    PROT 7.9 11/22/2017    LABALBU 4.8 11/22/2017    BILITOT 0.6 11/22/2017    ALKPHOS 92 11/22/2017    AST 22 11/22/2017    ALT <5 11/22/2017    LABGLOM >60.0 11/23/2017    GFRAA >60.0

## 2018-02-23 ENCOUNTER — HOSPITAL ENCOUNTER (EMERGENCY)
Age: 33
Discharge: HOME | End: 2018-02-23
Payer: MEDICARE

## 2018-02-23 VITALS
WEIGHT: 310 LBS | OXYGEN SATURATION: 97 % | TEMPERATURE: 97.9 F | HEIGHT: 69 IN | SYSTOLIC BLOOD PRESSURE: 148 MMHG | BODY MASS INDEX: 45.91 KG/M2 | RESPIRATION RATE: 18 BRPM | HEART RATE: 89 BPM | DIASTOLIC BLOOD PRESSURE: 101 MMHG

## 2018-02-23 DIAGNOSIS — K08.89 PAIN, DENTAL: Primary | ICD-10-CM

## 2018-02-23 PROCEDURE — 99282 EMERGENCY DEPT VISIT SF MDM: CPT

## 2018-02-23 RX ORDER — PENICILLIN V POTASSIUM 500 MG/1
500 TABLET ORAL 4 TIMES DAILY
Qty: 28 TABLET | Refills: 0 | Status: SHIPPED | OUTPATIENT
Start: 2018-02-23 | End: 2018-03-02

## 2018-02-23 RX ORDER — TRAMADOL HYDROCHLORIDE 50 MG/1
50 TABLET ORAL EVERY 8 HOURS PRN
Qty: 9 TABLET | Refills: 0 | Status: SHIPPED | OUTPATIENT
Start: 2018-02-23 | End: 2018-02-26

## 2018-02-23 ASSESSMENT — ENCOUNTER SYMPTOMS
ABDOMINAL PAIN: 0
DIARRHEA: 0
NAUSEA: 0
COUGH: 0
SHORTNESS OF BREATH: 0
VOMITING: 0

## 2018-02-23 ASSESSMENT — PAIN DESCRIPTION - PAIN TYPE: TYPE: ACUTE PAIN

## 2018-02-23 ASSESSMENT — PAIN SCALES - GENERAL: PAINLEVEL_OUTOF10: 7

## 2018-02-23 ASSESSMENT — PAIN DESCRIPTION - ORIENTATION: ORIENTATION: LEFT;UPPER

## 2018-02-23 ASSESSMENT — PAIN DESCRIPTION - DESCRIPTORS: DESCRIPTORS: ACHING;THROBBING

## 2018-02-23 ASSESSMENT — PAIN DESCRIPTION - FREQUENCY: FREQUENCY: CONTINUOUS

## 2018-02-23 ASSESSMENT — PAIN DESCRIPTION - LOCATION: LOCATION: TEETH

## 2018-02-23 NOTE — ED PROVIDER NOTES
[facility] ED  eMERGENCY dEPARTMENT eNCOUnter      Pt Name: Telly Carrera  MRN: [id number]  [place] 1985  Date of evaluation: 2/23/2018  Provider: Jenni Delarosa PA-C      HISTORY OF PRESENT ILLNESS    Telly Carrera is a 33 y.o. male who presents to the Emergency Department with Left upper dental pain. Patient states 2 days ago and he broke his tooth. Patient states pain is worse with eating and drinking. He has been taking Tylenol and Motrin with little to no relief. The pain radiates to his left ear. He denies any difficulty with swallowing or breathing and there is been no drooling. He does not have a dentist.  He denies any fevers or chills. He denies any abscess or drainage. REVIEW OF SYSTEMS       Review of Systems   Constitutional: Negative for chills, diaphoresis, fatigue and fever. HENT: Positive for dental problem. Negative for congestion. Respiratory: Negative for cough and shortness of breath. Cardiovascular: Negative for chest pain and palpitations. Gastrointestinal: Negative for abdominal pain, diarrhea, nausea and vomiting. Genitourinary: Negative for dysuria and flank pain. Skin: Negative for rash. Neurological: Negative for dizziness, light-headedness and headaches. PAST MEDICAL HISTORY     Past Medical History:   Diagnosis Date    Asthma     Uncontrolled type 2 diabetes mellitus with complication, with long-term current use of insulin ([address] 12/10/2017         SURGICAL HISTORY     History reviewed. No pertinent surgical history.       CURRENT MEDICATIONS       Discharge Medication List as of 2/23/2018  9:48 AM      CONTINUE these medications which have NOT CHANGED    Details   BASAGLAR KWIKPEN 100 UNIT/ML injection pen Inject 55 Units into the skin nightly, Disp-10 pen, R-3, DAWNormal      insulin lispro (HUMALOG KWIKPEN) 100 UNIT/ML pen 20 units at each meals, Disp-10 pen, R-3Normal      Insulin Pen Needle 32G X 5 MM MISC 4 TIMES DAILY Starting Thu 12/7/2017, Disp-150 each, R-3, Normal      Blood Glucose Monitoring Suppl MARCUS Disp-1 Device, R-0, NormalPlease give 1 meter Dx E10.65 (dispense covered brand)      !! Glucose Blood (BLOOD GLUCOSE TEST STRIPS) STRP Pt test 4x daily Dx E10.65, Disp-150 strip, R-3Normal      !! Lancets MISC Disp-150 each, R-3, NormalPt test 4x daily Dx E10.65      glucose monitoring kit (FREESTYLE) monitoring kit DAILY Starting Fri 11/24/2017, Disp-1 kit, R-0, Normal      !! FREESTYLE LANCETS MISC 4 TIMES DAILY Starting Fri 11/24/2017, Disp-150 each, R-3, Normal      !! glucose blood VI test strips (FREESTYLE LITE) strip 4 TIMES DAILY Starting Fri 11/24/2017, Disp-150 each, R-3, NormalAs needed. nystatin-triamcinolone (MYCOLOG II) 586898-3.1 UNIT/GM-% cream Apply topically 2 times daily. , Disp-1 Tube, R-0, Normal      clotrimazole (LOTRIMIN AF) 1 % cream Apply topically 2 times daily. , Disp-1 Tube, R-2, Print       !! - Potential duplicate medications found. Please discuss with provider. ALLERGIES     Review of patient's allergies indicates no known allergies. FAMILY HISTORY     History reviewed. No pertinent family history.        SOCIAL HISTORY       Social History     Social History    Marital status:      Spouse name: N/A    Number of children: N/A    Years of education: N/A     Social History Main Topics    Smoking status: Current Every Day Smoker     Packs/day: 0.50     Types: Cigarettes    Smokeless tobacco: Never Used    Alcohol use Yes      Comment: occasionally    Drug use: No    Sexual activity: Not Asked     Other Topics Concern    None     Social History Narrative    None       SCREENINGS             PHYSICAL EXAM    (up to 7 for level 4, 8 or more for level 5)     ED Triage Vitals [02/23/18 0933]   BP Temp Temp Source Pulse Resp SpO2 Height Weight   (!) 148/101 97.9 °F (36.6 °C) Oral 89 18 97 % 5' 9\" (1.753 m) (!) 310 lb (140.6 kg)       Physical Exam   Constitutional: He is oriented

## 2018-10-24 ENCOUNTER — HOSPITAL ENCOUNTER (INPATIENT)
Age: 33
LOS: 2 days | Discharge: HOME | DRG: 751 | End: 2018-10-26
Attending: EMERGENCY MEDICINE | Admitting: PSYCHIATRY & NEUROLOGY
Payer: MEDICARE

## 2018-10-24 DIAGNOSIS — F33.2 SEVERE EPISODE OF RECURRENT MAJOR DEPRESSIVE DISORDER, WITHOUT PSYCHOTIC FEATURES (HCC): Primary | ICD-10-CM

## 2018-10-24 PROBLEM — F33.9 MAJOR DEPRESSION, RECURRENT (HCC): Status: ACTIVE | Noted: 2018-10-24

## 2018-10-24 LAB
ACETAMINOPHEN LEVEL: <5 UG/ML (ref 10–30)
ALBUMIN SERPL-MCNC: 4.4 G/DL (ref 3.9–4.9)
ALP BLD-CCNC: 53 U/L (ref 35–104)
ALT SERPL-CCNC: 29 U/L (ref 0–41)
AMPHETAMINE SCREEN, URINE: ABNORMAL
ANION GAP SERPL CALCULATED.3IONS-SCNC: 11 MEQ/L (ref 7–13)
AST SERPL-CCNC: 27 U/L (ref 0–40)
BARBITURATE SCREEN URINE: ABNORMAL
BASOPHILS ABSOLUTE: 0 K/UL (ref 0–0.2)
BASOPHILS RELATIVE PERCENT: 0.6 %
BENZODIAZEPINE SCREEN, URINE: ABNORMAL
BILIRUB SERPL-MCNC: 0.5 MG/DL (ref 0–1.2)
BILIRUBIN URINE: NEGATIVE
BLOOD, URINE: NEGATIVE
BUN BLDV-MCNC: 11 MG/DL (ref 6–20)
CALCIUM SERPL-MCNC: 9.7 MG/DL (ref 8.6–10.2)
CANNABINOID SCREEN URINE: POSITIVE
CHLORIDE BLD-SCNC: 102 MEQ/L (ref 98–107)
CK MB: 5.7 NG/ML (ref 0–6.7)
CLARITY: CLEAR
CO2: 27 MEQ/L (ref 22–29)
COCAINE METABOLITE SCREEN URINE: ABNORMAL
COLOR: YELLOW
CREAT SERPL-MCNC: 0.88 MG/DL (ref 0.7–1.2)
CREATINE KINASE-MB INDEX: 0.7 % (ref 0–3.5)
EOSINOPHILS ABSOLUTE: 0.1 K/UL (ref 0–0.7)
EOSINOPHILS RELATIVE PERCENT: 2.9 %
ETHANOL PERCENT: NORMAL G/DL
ETHANOL: <10 MG/DL (ref 0–0.08)
GFR AFRICAN AMERICAN: >60
GFR NON-AFRICAN AMERICAN: >60
GLOBULIN: 3.3 G/DL (ref 2.3–3.5)
GLUCOSE BLD-MCNC: 158 MG/DL (ref 60–115)
GLUCOSE BLD-MCNC: 182 MG/DL (ref 74–109)
GLUCOSE URINE: NEGATIVE MG/DL
HCT VFR BLD CALC: 43.4 % (ref 42–52)
HEMOGLOBIN: 14.7 G/DL (ref 14–18)
KETONES, URINE: NEGATIVE MG/DL
LEUKOCYTE ESTERASE, URINE: NEGATIVE
LYMPHOCYTES ABSOLUTE: 1.5 K/UL (ref 1–4.8)
LYMPHOCYTES RELATIVE PERCENT: 37.4 %
Lab: ABNORMAL
MCH RBC QN AUTO: 32.3 PG (ref 27–31.3)
MCHC RBC AUTO-ENTMCNC: 33.8 % (ref 33–37)
MCV RBC AUTO: 95.7 FL (ref 80–100)
MONOCYTES ABSOLUTE: 0.4 K/UL (ref 0.2–0.8)
MONOCYTES RELATIVE PERCENT: 10.8 %
NEUTROPHILS ABSOLUTE: 2 K/UL (ref 1.4–6.5)
NEUTROPHILS RELATIVE PERCENT: 48.3 %
NITRITE, URINE: NEGATIVE
OPIATE SCREEN URINE: ABNORMAL
PDW BLD-RTO: 13.3 % (ref 11.5–14.5)
PERFORMED ON: ABNORMAL
PH UA: 5 (ref 5–9)
PHENCYCLIDINE SCREEN URINE: ABNORMAL
PLATELET # BLD: 235 K/UL (ref 130–400)
POTASSIUM SERPL-SCNC: 4.1 MEQ/L (ref 3.5–5.1)
PROTEIN UA: NEGATIVE MG/DL
RBC # BLD: 4.54 M/UL (ref 4.7–6.1)
SALICYLATE, SERUM: <0.3 MG/DL (ref 15–30)
SODIUM BLD-SCNC: 140 MEQ/L (ref 132–144)
SPECIFIC GRAVITY UA: 1.02 (ref 1–1.03)
TOTAL CK: 773 U/L (ref 0–190)
TOTAL PROTEIN: 7.7 G/DL (ref 6.4–8.1)
TSH SERPL DL<=0.05 MIU/L-ACNC: 1.51 UIU/ML (ref 0.27–4.2)
UROBILINOGEN, URINE: 0.2 E.U./DL
WBC # BLD: 4.1 K/UL (ref 4.8–10.8)

## 2018-10-24 PROCEDURE — 36415 COLL VENOUS BLD VENIPUNCTURE: CPT

## 2018-10-24 PROCEDURE — G0480 DRUG TEST DEF 1-7 CLASSES: HCPCS

## 2018-10-24 PROCEDURE — 6370000000 HC RX 637 (ALT 250 FOR IP): Performed by: EMERGENCY MEDICINE

## 2018-10-24 PROCEDURE — 82553 CREATINE MB FRACTION: CPT

## 2018-10-24 PROCEDURE — 84443 ASSAY THYROID STIM HORMONE: CPT

## 2018-10-24 PROCEDURE — 1240000000 HC EMOTIONAL WELLNESS R&B

## 2018-10-24 PROCEDURE — 80307 DRUG TEST PRSMV CHEM ANLYZR: CPT

## 2018-10-24 PROCEDURE — 82550 ASSAY OF CK (CPK): CPT

## 2018-10-24 PROCEDURE — 81003 URINALYSIS AUTO W/O SCOPE: CPT

## 2018-10-24 PROCEDURE — 80053 COMPREHEN METABOLIC PANEL: CPT

## 2018-10-24 PROCEDURE — 99285 EMERGENCY DEPT VISIT HI MDM: CPT

## 2018-10-24 PROCEDURE — 93005 ELECTROCARDIOGRAM TRACING: CPT

## 2018-10-24 PROCEDURE — 83036 HEMOGLOBIN GLYCOSYLATED A1C: CPT

## 2018-10-24 PROCEDURE — 6370000000 HC RX 637 (ALT 250 FOR IP): Performed by: PSYCHIATRY & NEUROLOGY

## 2018-10-24 PROCEDURE — 85025 COMPLETE CBC W/AUTO DIFF WBC: CPT

## 2018-10-24 RX ORDER — AMLODIPINE BESYLATE 10 MG/1
10 TABLET ORAL DAILY
Status: DISCONTINUED | OUTPATIENT
Start: 2018-10-24 | End: 2018-10-26 | Stop reason: HOSPADM

## 2018-10-24 RX ORDER — LORAZEPAM 1 MG/1
2 TABLET ORAL ONCE
Status: COMPLETED | OUTPATIENT
Start: 2018-10-24 | End: 2018-10-24

## 2018-10-24 RX ORDER — BENZTROPINE MESYLATE 1 MG/ML
2 INJECTION INTRAMUSCULAR; INTRAVENOUS 2 TIMES DAILY PRN
Status: DISCONTINUED | OUTPATIENT
Start: 2018-10-24 | End: 2018-10-26 | Stop reason: HOSPADM

## 2018-10-24 RX ORDER — ACETAMINOPHEN 325 MG/1
650 TABLET ORAL EVERY 4 HOURS PRN
Status: DISCONTINUED | OUTPATIENT
Start: 2018-10-24 | End: 2018-10-26 | Stop reason: HOSPADM

## 2018-10-24 RX ORDER — HYDROXYZINE PAMOATE 50 MG/1
50 CAPSULE ORAL EVERY 6 HOURS PRN
Status: DISCONTINUED | OUTPATIENT
Start: 2018-10-24 | End: 2018-10-26 | Stop reason: HOSPADM

## 2018-10-24 RX ORDER — MAGNESIUM HYDROXIDE/ALUMINUM HYDROXICE/SIMETHICONE 120; 1200; 1200 MG/30ML; MG/30ML; MG/30ML
30 SUSPENSION ORAL EVERY 6 HOURS PRN
Status: DISCONTINUED | OUTPATIENT
Start: 2018-10-24 | End: 2018-10-26 | Stop reason: HOSPADM

## 2018-10-24 RX ORDER — AMLODIPINE BESYLATE 10 MG/1
5 TABLET ORAL DAILY
Status: ON HOLD | COMMUNITY
End: 2021-12-14 | Stop reason: SDUPTHER

## 2018-10-24 RX ORDER — HYDROXYZINE HYDROCHLORIDE 50 MG/ML
50 INJECTION, SOLUTION INTRAMUSCULAR EVERY 6 HOURS PRN
Status: DISCONTINUED | OUTPATIENT
Start: 2018-10-24 | End: 2018-10-26 | Stop reason: HOSPADM

## 2018-10-24 RX ORDER — TRAZODONE HYDROCHLORIDE 50 MG/1
50 TABLET ORAL NIGHTLY PRN
Status: DISCONTINUED | OUTPATIENT
Start: 2018-10-24 | End: 2018-10-26 | Stop reason: HOSPADM

## 2018-10-24 RX ORDER — HALOPERIDOL 5 MG/ML
5 INJECTION INTRAMUSCULAR EVERY 6 HOURS PRN
Status: DISCONTINUED | OUTPATIENT
Start: 2018-10-24 | End: 2018-10-26 | Stop reason: HOSPADM

## 2018-10-24 RX ORDER — HALOPERIDOL 5 MG
5 TABLET ORAL EVERY 6 HOURS PRN
Status: DISCONTINUED | OUTPATIENT
Start: 2018-10-24 | End: 2018-10-26 | Stop reason: HOSPADM

## 2018-10-24 RX ADMIN — AMLODIPINE BESYLATE 10 MG: 10 TABLET ORAL at 20:40

## 2018-10-24 RX ADMIN — LORAZEPAM 2 MG: 1 TABLET ORAL at 16:07

## 2018-10-24 RX ADMIN — METFORMIN HYDROCHLORIDE 500 MG: 500 TABLET ORAL at 17:15

## 2018-10-24 ASSESSMENT — SLEEP AND FATIGUE QUESTIONNAIRES
DO YOU USE A SLEEP AID: NO
DIFFICULTY STAYING ASLEEP: YES
DIFFICULTY ARISING: NO
AVERAGE NUMBER OF SLEEP HOURS: 3
DO YOU HAVE DIFFICULTY SLEEPING: YES
SLEEP PATTERN: DIFFICULTY FALLING ASLEEP;DISTURBED/INTERRUPTED SLEEP;RESTLESSNESS;NIGHTMARES/TERRORS
RESTFUL SLEEP: NO
DIFFICULTY FALLING ASLEEP: YES

## 2018-10-24 ASSESSMENT — ENCOUNTER SYMPTOMS
VOMITING: 0
ABDOMINAL PAIN: 0
SHORTNESS OF BREATH: 0
FACIAL SWELLING: 0
COLOR CHANGE: 0
RHINORRHEA: 0
EYE DISCHARGE: 0

## 2018-10-24 ASSESSMENT — PATIENT HEALTH QUESTIONNAIRE - PHQ9: SUM OF ALL RESPONSES TO PHQ QUESTIONS 1-9: 14

## 2018-10-24 ASSESSMENT — PAIN SCALES - GENERAL: PAINLEVEL_OUTOF10: 3
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
## 2018-10-24 NOTE — ED PROVIDER NOTES
[facility] ED  eMERGENCY dEPARTMENT eNCOUnter      Pt Name: Emilee Russo  MRN: [id number]  [place] 1985  Date of evaluation: 10/24/2018  Provider: Hood Bar, [address] [place] [address]       Chief Complaint   Patient presents with    Mental Health Problem         HISTORY OF PRESENT ILLNESS   (Location/Symptom, Timing/Onset,Context/Setting, Quality, Duration, Modifying Factors, Severity)  Note limiting factors. Emilee Russo is a 33 y.o. male who presents to the emergency department With a chief complaint of suicidal ideas. He has plans, he has had previous attempts, and his brother was just successful with this. He feels like the world would be better off without him minutes. He is here on a pink slip from previous evaluation. HPI    NursingNotes were reviewed. REVIEW OF SYSTEMS    (2-9 systems for level 4, 10 or more for level 5)     Review of Systems   Constitutional: Negative for activity change, appetite change and fatigue. HENT: Negative for congestion, facial swelling and rhinorrhea. Eyes: Negative for discharge. Respiratory: Negative for shortness of breath. Cardiovascular: Negative for chest pain. Gastrointestinal: Negative for abdominal pain and vomiting. Endocrine: Negative for cold intolerance. Musculoskeletal: Negative for arthralgias and myalgias. Skin: Negative for color change. Allergic/Immunologic: Negative for environmental allergies. Neurological: Negative for dizziness and light-headedness. Hematological: Negative for adenopathy. Psychiatric/Behavioral: Positive for suicidal ideas. Negative for behavioral problems. Except as noted above the remainder of the review of systems was reviewed and negative.        PAST MEDICAL HISTORY     Past Medical History:   Diagnosis Date    Asthma     Hypertension     Major depression, recurrent ([facility] [address] 10/24/2018    Uncontrolled type 2 diabetes mellitus with complication, with long-term current use of HENT:   Head: Normocephalic and atraumatic. Eyes: Pupils are equal, round, and reactive to light. Conjunctivae and EOM are normal.   Neck: Normal range of motion. Neck supple. Cardiovascular: Normal rate. Pulmonary/Chest: Effort normal.   Abdominal: Soft. Bowel sounds are normal.   Musculoskeletal: Normal range of motion. Neurological: He is alert and oriented to person, place, and time. He has normal reflexes. Skin: Skin is warm and dry. DIAGNOSTIC RESULTS     EKG: All EKG's are interpreted by the Emergency Department Physician who either signs or Co-signsthis chart in the absence of a cardiologist.        RADIOLOGY:   Hugo Bolder such as CT, Ultrasound and MRI are read by the radiologist. Angela Trenton radiographic images are visualized and preliminarily interpreted by the emergency physician with the below findings:        Interpretation per the Radiologist below, if available at the time ofthis note:    No orders to display         ED BEDSIDE ULTRASOUND:   Performed by ED Physician - none    LABS:  Labs Reviewed   CBC WITH AUTO DIFFERENTIAL - Abnormal; Notable for the following:        Result Value    WBC 4.1 (*)     RBC 4.54 (*)     MCH 32.3 (*)     All other components within normal limits   CK - Abnormal; Notable for the following:      Total  (*)     All other components within normal limits   COMPREHENSIVE METABOLIC PANEL - Abnormal; Notable for the following:     Glucose 182 (*)     All other components within normal limits   URINE DRUG SCREEN - Abnormal; Notable for the following:     Cannabinoid Scrn, Ur POSITIVE (*)     All other components within normal limits   SALICYLATE LEVEL - Abnormal; Notable for the following:     Salicylate, Serum <6.2 (*)     All other components within normal limits   ACETAMINOPHEN LEVEL - Abnormal; Notable for the following:     Acetaminophen Level <5 (*)     All other components within normal limits   POCT GLUCOSE - Abnormal; Notable for the

## 2018-10-24 NOTE — ED NOTES
Perfect Serve returned from Dr. Beto Mercer & admit to [address] per Dr. Monique Jackman orders. One touch 158.      Azra Magdaleno RN  10/24/18 4759

## 2018-10-24 NOTE — ED NOTES
Dr Mason Fast to bedside for head to toe exam     Idalmis Oneil.  [facility]  10/24/18 8379

## 2018-10-25 PROBLEM — F33.2 SEVERE EPISODE OF RECURRENT MAJOR DEPRESSIVE DISORDER, WITHOUT PSYCHOTIC FEATURES (HCC): Status: ACTIVE | Noted: 2018-10-24

## 2018-10-25 LAB
GLUCOSE BLD-MCNC: 169 MG/DL (ref 60–115)
GLUCOSE BLD-MCNC: 251 MG/DL (ref 60–115)
HBA1C MFR BLD: 7.7 % (ref 4.8–5.9)
PERFORMED ON: ABNORMAL
PERFORMED ON: ABNORMAL

## 2018-10-25 PROCEDURE — 6370000000 HC RX 637 (ALT 250 FOR IP): Performed by: NURSE PRACTITIONER

## 2018-10-25 PROCEDURE — 99222 1ST HOSP IP/OBS MODERATE 55: CPT | Performed by: PSYCHIATRY & NEUROLOGY

## 2018-10-25 PROCEDURE — 1240000000 HC EMOTIONAL WELLNESS R&B

## 2018-10-25 PROCEDURE — 6370000000 HC RX 637 (ALT 250 FOR IP): Performed by: PSYCHIATRY & NEUROLOGY

## 2018-10-25 RX ORDER — NICOTINE POLACRILEX 4 MG
15 LOZENGE BUCCAL PRN
Status: DISCONTINUED | OUTPATIENT
Start: 2018-10-25 | End: 2018-10-26 | Stop reason: HOSPADM

## 2018-10-25 RX ORDER — DEXTROSE MONOHYDRATE 50 MG/ML
100 INJECTION, SOLUTION INTRAVENOUS PRN
Status: DISCONTINUED | OUTPATIENT
Start: 2018-10-25 | End: 2018-10-26 | Stop reason: HOSPADM

## 2018-10-25 RX ORDER — DEXTROSE MONOHYDRATE 25 G/50ML
12.5 INJECTION, SOLUTION INTRAVENOUS PRN
Status: DISCONTINUED | OUTPATIENT
Start: 2018-10-25 | End: 2018-10-26 | Stop reason: HOSPADM

## 2018-10-25 RX ADMIN — METFORMIN HYDROCHLORIDE 500 MG: 500 TABLET ORAL at 16:44

## 2018-10-25 RX ADMIN — INSULIN LISPRO 6 UNITS: 100 INJECTION, SOLUTION INTRAVENOUS; SUBCUTANEOUS at 17:20

## 2018-10-25 RX ADMIN — SERTRALINE HYDROCHLORIDE 50 MG: 50 TABLET ORAL at 12:26

## 2018-10-25 RX ADMIN — AMLODIPINE BESYLATE 10 MG: 10 TABLET ORAL at 08:57

## 2018-10-25 RX ADMIN — METFORMIN HYDROCHLORIDE 500 MG: 500 TABLET ORAL at 08:54

## 2018-10-25 ASSESSMENT — LIFESTYLE VARIABLES: HISTORY_ALCOHOL_USE: NO

## 2018-10-25 NOTE — PROGRESS NOTES
Pt. declined to attend the 0900 community meeting, despite staff encouragement. Electronically signed by Bruna Saunders, [address] on 10/25/2018 at 10:05 AM

## 2018-10-25 NOTE — CARE COORDINATION
Group Therapy Note    Date: 10/25/2018  Start Time: 1130  End Time:  4002  Number of Participants: 12    Type of Group: Psychotherapy    Wellness Binder Information  Module Name:  2767  Session Number:  4602    Patient's Goal:  Patient stated he was [facility]    Notes:  Patient stated that he wants to file a grievance.       Status After Intervention:  Unchanged    Participation Level: Interactive    Participation Quality: Appropriate      Speech:  normal      Thought Process/Content: Logical      Affective Functioning: Congruent      Mood: irritable      Level of consciousness:  Alert      Response to Learning: Able to verbalize current knowledge/experience      Endings: None Reported    Modes of Intervention: Support      Discipline Responsible: /Counselor   Electronically signed by EAGLE Solomon on 10/25/2018 at 2:48 PM      Signature:  Felipe Mcclure [facility]

## 2018-10-25 NOTE — PLAN OF CARE
Problem: Altered Mood, Depressive Behavior:  Goal: Able to verbalize acceptance of life and situations over which he or she has no control  Able to verbalize acceptance of life and situations over which he or she has no control   Outcome: Ongoing    Goal: Able to verbalize support systems  Able to verbalize support systems   Outcome: Ongoing    Goal: Participates in care planning  Participates in care planning   Outcome: Ongoing

## 2018-10-25 NOTE — H&P
History:  Prior Diagnosis:  Major depressive disorder; recurrent  Psychiatrist: no  Therapist:no  Hospitalization: no  Hx of Suicidal Attempts: no  Hx of violence:  no  ECT: no  Previous discontinued Psychiatric Med Trials: quit taking wellbutrin    Past Medical History:        Diagnosis Date    Asthma     Hypertension     Major depression, recurrent ([facility] [address] 10/24/2018    Uncontrolled type 2 diabetes mellitus with complication, with long-term current use of insulin ([facility] [address] 12/10/2017       Past Surgical History:    History reviewed. No pertinent surgical history. Allergies:   Shellfish-derived products    Family History  Family History   Problem Relation Age of Onset    Bipolar Disorder Mother     Bipolar Disorder Brother         comitted suicide         Social History:  Born and Raised: [place]   Describes Childhood:   foster care  Education: getting his GED  Employment: Employed part time  Relationships:   Children: 6  Current Support: romantic partner    Legal Hx: none  Access to weapons?:  No        REVIEW OF SYSTEMS:    ROS:  [x] All negative/unchanged except if checked.  Explain positive(checked items) below:  [] Constitutional  [] Eyes  [] Ear/Nose/Mouth/Throat  [] Respiratory  [] CV  [] GI  []   [] Musculoskeletal  [] Skin/Breast  [] Neurological  [] Endocrine  [] Heme/Lymph  [] Allergic/Immunologic    Explanation:       PHYSICAL EXAM:  Vitals:  BP (!) 146/95 Comment: nurse notified  Pulse 84   Temp 98 °F (36.7 °C) (Oral)   Resp 20   Ht 5' 9\" (1.753 m)   Wt (!) 315 lb (142.9 kg)   SpO2 97%   BMI 46.52 kg/m²      Neurologic Exam:   Muscle Strength & Tone: full ROM, normal  Gait: normal gait   Involuntary Movements: No    Mental Status Examination:    Level of consciousness:  within normal limits   Appearance:  ill-appearing  Behavior/Motor:  no abnormalities noted  Attitude toward examiner:  cooperative  Speech:  spontaneous and normal rate   Mood: anxious, depressed  Affect:  mood congruent  Thought processes:  linear and goal directed   Thought content:  Suicidal Ideation:  denies suicidal ideation  Delusions:  no evidence of delusions  Perceptual Disturbance:  denies any perceptual disturbance  Cognition:  oriented to person, place, and time   Concentration intact  Memory intact  Mini Mental Status 30/30  Insight fair   Judgement fair   Fund of Knowledge adequate      DIAGNOSIS:     (Axis I): Major depressive disorder; recurrent moderate episode   Generalized anxiety disorder       RISK ASSESSMENT:    SUICIDE: low   HOMICIDE: low  AGITATION/VIOLENCE: low  ELOPEMENT: low    LABS:  Recent Labs      10/24/18   1347   WBC  4.1*   HGB  14.7   PLT  235     Recent Labs      10/24/18   1346   NA  140   K  4.1   CL  102   CO2  27   BUN  11   CREATININE  0.88   GLUCOSE  182*     Recent Labs      10/24/18   1346   BILITOT  0.5   ALKPHOS  53   AST  27   ALT  29     Lab Results   Component Value Date    LABAMPH Neg 10/24/2018    BARBSCNU Neg 10/24/2018    LABBENZ Neg 10/24/2018    OPIATESCREENURINE Neg 10/24/2018    PHENCYCLIDINESCREENURINE Neg 10/24/2018    ETOH <10 10/24/2018     Lab Results   Component Value Date    TSH 1.510 10/24/2018     No results found for: LITHIUM  No results found for: VALPROATE, CBMZ  No results found for: LITHIUM, VALPROATE    Radiology  No results found. TREATMENT PLAN:    Risk Management:  close watch and suicide risk    Collateral Information:  Will obtain collateral information from the family or friends. Will obtain medical records as appropriate from out patient providers  Will consult the hospitalist for a physical exam to rule out any co-morbid physical condition. Home medication Reconcilled    New Medications started during this admission :    See orders    Prn Haldol 5mg and Vistaril 50mg q6hr for extreme agitation. Discussed with the patient risk, benefit, alternative and common side effects for the  proposed medication treatment.  Patient is

## 2018-10-26 VITALS
WEIGHT: 315 LBS | HEIGHT: 69 IN | OXYGEN SATURATION: 98 % | BODY MASS INDEX: 46.65 KG/M2 | RESPIRATION RATE: 20 BRPM | HEART RATE: 114 BPM | SYSTOLIC BLOOD PRESSURE: 167 MMHG | TEMPERATURE: 98 F | DIASTOLIC BLOOD PRESSURE: 113 MMHG

## 2018-10-26 LAB
EKG ATRIAL RATE: 73 BPM
EKG P AXIS: 63 DEGREES
EKG P-R INTERVAL: 184 MS
EKG Q-T INTERVAL: 398 MS
EKG QRS DURATION: 114 MS
EKG QTC CALCULATION (BAZETT): 438 MS
EKG R AXIS: -54 DEGREES
EKG T AXIS: 33 DEGREES
EKG VENTRICULAR RATE: 73 BPM
GLUCOSE BLD-MCNC: 206 MG/DL (ref 60–115)
PERFORMED ON: ABNORMAL

## 2018-10-26 PROCEDURE — 93010 ELECTROCARDIOGRAM REPORT: CPT | Performed by: INTERNAL MEDICINE

## 2018-10-26 PROCEDURE — 6370000000 HC RX 637 (ALT 250 FOR IP): Performed by: NURSE PRACTITIONER

## 2018-10-26 PROCEDURE — 93005 ELECTROCARDIOGRAM TRACING: CPT

## 2018-10-26 PROCEDURE — 6370000000 HC RX 637 (ALT 250 FOR IP): Performed by: PSYCHIATRY & NEUROLOGY

## 2018-10-26 PROCEDURE — 99239 HOSP IP/OBS DSCHRG MGMT >30: CPT | Performed by: PSYCHIATRY & NEUROLOGY

## 2018-10-26 RX ORDER — TRAZODONE HYDROCHLORIDE 50 MG/1
50 TABLET ORAL NIGHTLY PRN
Qty: 15 TABLET | Refills: 2 | Status: ON HOLD | OUTPATIENT
Start: 2018-10-26 | End: 2021-12-11

## 2018-10-26 RX ADMIN — METFORMIN HYDROCHLORIDE 500 MG: 500 TABLET ORAL at 08:42

## 2018-10-26 RX ADMIN — AMLODIPINE BESYLATE 10 MG: 10 TABLET ORAL at 08:42

## 2018-10-26 RX ADMIN — SERTRALINE HYDROCHLORIDE 50 MG: 50 TABLET ORAL at 08:42

## 2018-10-26 RX ADMIN — INSULIN LISPRO 4 UNITS: 100 INJECTION, SOLUTION INTRAVENOUS; SUBCUTANEOUS at 08:42

## 2018-10-26 NOTE — BH NOTE
Pt attended in [place] group at 2030. Pt had no goals.     Electronically signed by Sukhi Shaffer on 10/25/2018 at 11:16 PM

## 2018-10-26 NOTE — PROGRESS NOTES
Patient did not attend the 1000 skills group due to getting ready for discharged.  Electronically signed by Asif Rouse [address] on 10/26/2018 at 12:05 PM

## 2018-10-26 NOTE — DISCHARGE SUMMARY
the symptoms since admission. Mood better, with the score of 2/10 - bad  No AVH or paranoid thoughts  No Hopeless or worthless feeling  No active SI/HI  Appetite:  [x] Normal  [] Increased  [] Decreased    Sleep:       [x] Normal  [] Fair       [] Poor            Energy:    [x] Normal  [] Increased  [] Decreased     SI [] Present  [x] Absent  HI  []Present  [x] Absent   Aggression:  [] yes  [] no  Patient is [x] able  [] unable to CONTRACT FOR SAFETY   Medication side effects(SE):  [x] None(Psych. Meds.) [] Other      Mental Status Examination on discharge:    Level of consciousness:  within normal limits   Appearance:  well-appearing  Behavior/Motor:  no abnormalities noted  Attitude toward examiner:  attentive and good eye contact  Speech:  spontaneous, normal rate and normal volume   Mood: anxious  Affect:  mood congruent  Thought processes:  linear and goal directed   Thought content:  Suicidal Ideation:  denies suicidal ideation  Delusions:  no evidence of delusions  Perceptual Disturbance:  denies any perceptual disturbance  Cognition:  oriented to person, place, and time   Concentration intact  Memory intact  Insight good   Judgement fair   Fund of Knowledge adequate      ASSESSMENT:  Patient symptoms are:  [] Well controlled  [x] Improving  [] Worsening  [] No change      Diagnosis:  Principal Problem:    Severe episode of recurrent major depressive disorder, without psychotic features ([facility] [address]  Resolved Problems:    * No resolved hospital problems.  *      LABS:    Recent Labs      10/24/18   1347   WBC  4.1*   HGB  14.7   PLT  235     Recent Labs      10/24/18   1346   NA  140   K  4.1   CL  102   CO2  27   BUN  11   CREATININE  0.88   GLUCOSE  182*     Recent Labs      10/24/18   1346   BILITOT  0.5   ALKPHOS  53   AST  27   ALT  29     Lab Results   Component Value Date    LABAMPH Neg 10/24/2018    BARBSCNU Neg 10/24/2018    LABBENZ Neg 10/24/2018    OPIATESCREENURINE Neg 10/24/2018

## 2018-10-26 NOTE — PROGRESS NOTES
Pt. declined to attend the 0900 community meeting, despite staff encouragement.  Electronically signed by Rita Wnag on 10/26/2018 at 9:56 AM

## 2021-11-29 LAB — GLUCOSE BLD-MCNC: 349 MG/DL (ref 74–99)

## 2021-11-30 LAB
GLUCOSE BLD-MCNC: 275 MG/DL (ref 74–99)
GLUCOSE BLD-MCNC: 315 MG/DL (ref 74–99)
GLUCOSE BLD-MCNC: 357 MG/DL (ref 74–99)

## 2021-12-01 LAB
GLUCOSE BLD-MCNC: 218 MG/DL (ref 74–99)
GLUCOSE BLD-MCNC: 267 MG/DL (ref 74–99)
GLUCOSE BLD-MCNC: 343 MG/DL (ref 74–99)

## 2021-12-02 LAB
GLUCOSE BLD-MCNC: 245 MG/DL (ref 74–99)
GLUCOSE BLD-MCNC: 299 MG/DL (ref 74–99)

## 2021-12-03 LAB
GLUCOSE BLD-MCNC: 220 MG/DL (ref 74–99)
GLUCOSE BLD-MCNC: 309 MG/DL (ref 74–99)

## 2021-12-10 ENCOUNTER — HOSPITAL ENCOUNTER (INPATIENT)
Age: 36
LOS: 3 days | Discharge: HOME | DRG: 740 | End: 2021-12-14
Attending: EMERGENCY MEDICINE | Admitting: PSYCHIATRY & NEUROLOGY
Payer: MEDICARE

## 2021-12-10 DIAGNOSIS — F32.1 CURRENT MODERATE EPISODE OF MAJOR DEPRESSIVE DISORDER WITHOUT PRIOR EPISODE (HCC): Primary | ICD-10-CM

## 2021-12-10 LAB
ACETAMINOPHEN LEVEL: <5 UG/ML (ref 10–30)
ALBUMIN SERPL-MCNC: 3.9 G/DL (ref 3.5–4.6)
ALP BLD-CCNC: 63 U/L (ref 35–104)
ALT SERPL-CCNC: 30 U/L (ref 0–41)
AMPHETAMINE SCREEN, URINE: NORMAL
ANION GAP SERPL CALCULATED.3IONS-SCNC: 11 MEQ/L (ref 9–15)
AST SERPL-CCNC: 29 U/L (ref 0–40)
BARBITURATE SCREEN URINE: NORMAL
BASOPHILS ABSOLUTE: 0 K/UL (ref 0–0.2)
BASOPHILS RELATIVE PERCENT: 0.4 %
BENZODIAZEPINE SCREEN, URINE: NORMAL
BILIRUB SERPL-MCNC: 0.5 MG/DL (ref 0.2–0.7)
BILIRUBIN URINE: NEGATIVE
BLOOD, URINE: NEGATIVE
BUN BLDV-MCNC: 9 MG/DL (ref 6–20)
CALCIUM SERPL-MCNC: 8.8 MG/DL (ref 8.5–9.9)
CANNABINOID SCREEN URINE: NORMAL
CHLORIDE BLD-SCNC: 104 MEQ/L (ref 95–107)
CHOLESTEROL, TOTAL: 137 MG/DL (ref 0–199)
CK MB: 3.3 NG/ML (ref 0–6.7)
CLARITY: CLEAR
CO2: 24 MEQ/L (ref 20–31)
COCAINE METABOLITE SCREEN URINE: NORMAL
COLOR: YELLOW
CREAT SERPL-MCNC: 0.96 MG/DL (ref 0.7–1.2)
CREATINE KINASE-MB INDEX: 1 % (ref 0–3.5)
EOSINOPHILS ABSOLUTE: 0 K/UL (ref 0–0.7)
EOSINOPHILS RELATIVE PERCENT: 1.1 %
ETHANOL PERCENT: NORMAL G/DL
ETHANOL: <10 MG/DL (ref 0–0.08)
GFR AFRICAN AMERICAN: >60
GFR NON-AFRICAN AMERICAN: >60
GLOBULIN: 2.9 G/DL (ref 2.3–3.5)
GLUCOSE BLD-MCNC: 277 MG/DL (ref 70–99)
GLUCOSE URINE: >=1000 MG/DL
HCT VFR BLD CALC: 39 % (ref 42–52)
HDLC SERPL-MCNC: 30 MG/DL (ref 40–59)
HEMOGLOBIN: 12.9 G/DL (ref 14–18)
KETONES, URINE: NEGATIVE MG/DL
LDL CHOLESTEROL CALCULATED: 71 MG/DL (ref 0–129)
LEUKOCYTE ESTERASE, URINE: NEGATIVE
LYMPHOCYTES ABSOLUTE: 0.9 K/UL (ref 1–4.8)
LYMPHOCYTES RELATIVE PERCENT: 22.3 %
Lab: NORMAL
MCH RBC QN AUTO: 31.2 PG (ref 27–31.3)
MCHC RBC AUTO-ENTMCNC: 33 % (ref 33–37)
MCV RBC AUTO: 94.5 FL (ref 80–100)
METHADONE SCREEN, URINE: NORMAL
MONOCYTES ABSOLUTE: 0.3 K/UL (ref 0.2–0.8)
MONOCYTES RELATIVE PERCENT: 7.5 %
NEUTROPHILS ABSOLUTE: 2.8 K/UL (ref 1.4–6.5)
NEUTROPHILS RELATIVE PERCENT: 68.7 %
NITRITE, URINE: NEGATIVE
OPIATE SCREEN URINE: NORMAL
OXYCODONE URINE: NORMAL
PDW BLD-RTO: 13.4 % (ref 11.5–14.5)
PH UA: 5 (ref 5–9)
PHENCYCLIDINE SCREEN URINE: NORMAL
PLATELET # BLD: 221 K/UL (ref 130–400)
POTASSIUM SERPL-SCNC: 4.6 MEQ/L (ref 3.4–4.9)
PROPOXYPHENE SCREEN: NORMAL
PROTEIN UA: NEGATIVE MG/DL
RBC # BLD: 4.13 M/UL (ref 4.7–6.1)
SALICYLATE, SERUM: <0.3 MG/DL (ref 15–30)
SARS-COV-2, NAAT: NOT DETECTED
SODIUM BLD-SCNC: 139 MEQ/L (ref 135–144)
SPECIFIC GRAVITY UA: 1.01 (ref 1–1.03)
TOTAL CK: 329 U/L (ref 0–190)
TOTAL PROTEIN: 6.8 G/DL (ref 6.3–8)
TRIGL SERPL-MCNC: 182 MG/DL (ref 0–150)
TSH SERPL DL<=0.05 MIU/L-ACNC: 1.25 UIU/ML (ref 0.44–3.86)
URINE REFLEX TO CULTURE: ABNORMAL
UROBILINOGEN, URINE: 0.2 E.U./DL
WBC # BLD: 4 K/UL (ref 4.8–10.8)

## 2021-12-10 PROCEDURE — 0XQNXZZ REPAIR RIGHT INDEX FINGER, EXTERNAL APPROACH: ICD-10-PCS | Performed by: EMERGENCY MEDICINE

## 2021-12-10 PROCEDURE — 80143 DRUG ASSAY ACETAMINOPHEN: CPT

## 2021-12-10 PROCEDURE — 36415 COLL VENOUS BLD VENIPUNCTURE: CPT

## 2021-12-10 PROCEDURE — 85025 COMPLETE CBC W/AUTO DIFF WBC: CPT

## 2021-12-10 PROCEDURE — 82077 ASSAY SPEC XCP UR&BREATH IA: CPT

## 2021-12-10 PROCEDURE — 80053 COMPREHEN METABOLIC PANEL: CPT

## 2021-12-10 PROCEDURE — 82550 ASSAY OF CK (CPK): CPT

## 2021-12-10 PROCEDURE — 80307 DRUG TEST PRSMV CHEM ANLYZR: CPT

## 2021-12-10 PROCEDURE — 81003 URINALYSIS AUTO W/O SCOPE: CPT

## 2021-12-10 PROCEDURE — 87635 SARS-COV-2 COVID-19 AMP PRB: CPT

## 2021-12-10 PROCEDURE — 82553 CREATINE MB FRACTION: CPT

## 2021-12-10 PROCEDURE — 84443 ASSAY THYROID STIM HORMONE: CPT

## 2021-12-10 PROCEDURE — 99285 EMERGENCY DEPT VISIT HI MDM: CPT

## 2021-12-10 PROCEDURE — 80061 LIPID PANEL: CPT

## 2021-12-10 PROCEDURE — 80179 DRUG ASSAY SALICYLATE: CPT

## 2021-12-10 PROCEDURE — 93005 ELECTROCARDIOGRAM TRACING: CPT | Performed by: PSYCHIATRY & NEUROLOGY

## 2021-12-10 RX ORDER — VILAZODONE HYDROCHLORIDE 10 MG/1
10 TABLET ORAL DAILY
Status: ON HOLD | COMMUNITY
End: 2021-12-11

## 2021-12-10 ASSESSMENT — ENCOUNTER SYMPTOMS
BACK PAIN: 0
SORE THROAT: 0
ABDOMINAL PAIN: 0
NAUSEA: 0
DIARRHEA: 0
SHORTNESS OF BREATH: 0
COUGH: 0
VOMITING: 0

## 2021-12-10 NOTE — ED NOTES
Patient is pink sheeted by [employer] & per [facility] police has outstanding warrants in [place].      Miguelangel Cruz RN  12/10/21 2854

## 2021-12-10 NOTE — ED NOTES
Lab at bedside. Lab draw successful. Gen Aguilar Rad  12/10/21 9[address] [facility]  12/10/21 4435

## 2021-12-10 NOTE — ED PROVIDER NOTES
STRIPS) STRP    Pt test 4x daily Dx E10.65    GLUCOSE BLOOD VI TEST STRIPS (FREESTYLE LITE) STRIP    1 each by In Vitro route 4 times daily As needed. GLUCOSE MONITORING KIT (FREESTYLE) MONITORING KIT    1 kit by Does not apply route daily    INSULIN GLULISINE (APIDRA SC)    Inject 40 Units into the skin nightly     LANCETS MISC    Pt test 4x daily Dx E10.65    METFORMIN (GLUCOPHAGE) 500 MG TABLET    Take 850 mg by mouth daily (with breakfast)     SERTRALINE (ZOLOFT) 50 MG TABLET    Take 1 tablet by mouth daily    TRAZODONE (DESYREL) 50 MG TABLET    Take 1 tablet by mouth nightly as needed for Sleep    VILAZODONE HCL (VILAZODONE HCL) 10 MG TABS    Take 10 mg by mouth daily       ALLERGIES     Shellfish-derived products    FAMILY HISTORY       Family History   Problem Relation Age of Onset    Bipolar Disorder Mother     Bipolar Disorder Brother         comitted suicide          SOCIAL HISTORY       Social History     Socioeconomic History    Marital status:      Spouse name: Not on file    Number of children: Not on file    Years of education: Not on file    Highest education level: Not on file   Occupational History    Not on file   Tobacco Use    Smoking status: Current Every Day Smoker     Packs/day: 0.50     Types: Cigarettes    Smokeless tobacco: Never Used   Substance and Sexual Activity    Alcohol use: Yes     Comment: occasionally    Drug use: No    Sexual activity: Not on file   Other Topics Concern    Not on file   Social History Narrative    Not on file     Social Determinants of Health     Financial Resource Strain:     Difficulty of Paying Living Expenses: Not on file   Food Insecurity:     Worried About Running Out of Food in the Last Year: Not on file    Jayme of Food in the Last Year: Not on file   Transportation Needs:     Lack of Transportation (Medical): Not on file    Lack of Transportation (Non-Medical):  Not on file   Physical Activity:     Days of Exercise per Week: Not on file    Minutes of Exercise per Session: Not on file   Stress:     Feeling of Stress : Not on file   Social Connections:     Frequency of Communication with Friends and Family: Not on file    Frequency of Social Gatherings with Friends and Family: Not on file    Attends Scientologist Services: Not on file    Active Member of [address] [place] [address] [employer] or Organizations: Not on file    Attends Club or Organization Meetings: Not on file    Marital Status: Not on file   Intimate Partner Violence:     Fear of Current or Ex-Partner: Not on file    Emotionally Abused: Not on file    Physically Abused: Not on file    Sexually Abused: Not on file   Housing Stability:     Unable to Pay for Housing in the Last Year: Not on file    Number of [place] in the Last Year: Not on file    Unstable Housing in the Last Year: Not on file         PHYSICAL EXAM       ED Triage Vitals [12/10/21 1659]   BP Temp Temp Source Pulse Resp SpO2 Height Weight   (!) 116/91 97.9 °F (36.6 °C) Oral 98 20 96 % 5' 9\" (1.753 m) 300 lb (136.1 kg)       Physical Exam  Vitals and nursing note reviewed. Constitutional:       Appearance: He is well-developed. HENT:      Head: Normocephalic. Right Ear: External ear normal.      Left Ear: External ear normal.   Eyes:      Conjunctiva/sclera: Conjunctivae normal.      Pupils: Pupils are equal, round, and reactive to light. Cardiovascular:      Rate and Rhythm: Normal rate and regular rhythm. Heart sounds: Normal heart sounds. Pulmonary:      Effort: Pulmonary effort is normal.      Breath sounds: Normal breath sounds. Abdominal:      General: Bowel sounds are normal. There is no distension. Palpations: Abdomen is soft. Tenderness: There is no abdominal tenderness. Musculoskeletal:         General: Normal range of motion. Cervical back: Normal range of motion and neck supple.       Comments: 5 cm laceration noted on R index finger   Skin:     General: Skin is warm and dry.   Neurological:      Mental Status: He is alert and oriented to person, place, and time. Psychiatric:      Comments: Flat affect           MDM  29 yo male presents to the ED with depression, SI, possible OD. Pt is afebrile, hemodynamically stable. Sutures placed in pt's R 2nd digit. Laceration Repair Procedure Note    Indication: Laceration    Procedure: The patient was placed in the appropriate position and anesthesia around the laceration was obtained by infiltration using 1% Lidocaine without epinephrine. The area was then cleansed with betadine and draped in a sterile fashion. The laceration was closed with 4-0 Ethilon using interrupted sutures. There were no additional lacerations requiring repair. The wound area was then dressed with a sterile dressing. Total repaired wound length: 5 cm. Other Items: Suture count: 5    The patient tolerated the procedure well. Complications: None                  FINAL IMPRESSION      1.  Current moderate episode of major depressive disorder without prior episode [facility])          DISPOSITION/PLAN   DISPOSITION Decision To Admit 12/11/2021 03:04:04 AM        DISCHARGE MEDICATIONS:  [unfilled]         Cristian Ocampo MD(electronically signed)  Attending Emergency Physician            Cristian Ocampo MD  12/11/21 9529

## 2021-12-10 NOTE — ED NOTES
Patient resting on bed without s/s of distress. postioned and ready for suturing by Dr Neris Lozada. Emily Hwang [facility]  12/10/21 \[facility]\""  12/10/21 0720
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
## 2021-12-11 PROBLEM — F33.9 MAJOR DEPRESSION, RECURRENT (HCC): Status: ACTIVE | Noted: 2021-12-11

## 2021-12-11 LAB
GLUCOSE BLD-MCNC: 150 MG/DL (ref 60–115)
GLUCOSE BLD-MCNC: 196 MG/DL (ref 60–115)
GLUCOSE BLD-MCNC: 236 MG/DL (ref 60–115)
GLUCOSE BLD-MCNC: 260 MG/DL (ref 60–115)
GLUCOSE BLD-MCNC: 264 MG/DL (ref 60–115)
HBA1C MFR BLD: 14 % (ref 4.8–5.9)
PERFORMED ON: ABNORMAL

## 2021-12-11 PROCEDURE — 36415 COLL VENOUS BLD VENIPUNCTURE: CPT

## 2021-12-11 PROCEDURE — 6370000000 HC RX 637 (ALT 250 FOR IP): Performed by: NURSE PRACTITIONER

## 2021-12-11 PROCEDURE — 6370000000 HC RX 637 (ALT 250 FOR IP): Performed by: PSYCHIATRY & NEUROLOGY

## 2021-12-11 PROCEDURE — 1240000000 HC EMOTIONAL WELLNESS R&B

## 2021-12-11 PROCEDURE — 83036 HEMOGLOBIN GLYCOSYLATED A1C: CPT

## 2021-12-11 RX ORDER — ACETAMINOPHEN 325 MG/1
650 TABLET ORAL EVERY 4 HOURS PRN
Status: DISCONTINUED | OUTPATIENT
Start: 2021-12-11 | End: 2021-12-14 | Stop reason: HOSPADM

## 2021-12-11 RX ORDER — DEXTROSE MONOHYDRATE 50 MG/ML
100 INJECTION, SOLUTION INTRAVENOUS PRN
Status: DISCONTINUED | OUTPATIENT
Start: 2021-12-11 | End: 2021-12-14 | Stop reason: HOSPADM

## 2021-12-11 RX ORDER — HYDROXYZINE PAMOATE 50 MG/1
50 CAPSULE ORAL EVERY 6 HOURS PRN
Status: DISCONTINUED | OUTPATIENT
Start: 2021-12-11 | End: 2021-12-14 | Stop reason: HOSPADM

## 2021-12-11 RX ORDER — TRAZODONE HYDROCHLORIDE 50 MG/1
50 TABLET ORAL NIGHTLY PRN
Status: DISCONTINUED | OUTPATIENT
Start: 2021-12-11 | End: 2021-12-14 | Stop reason: HOSPADM

## 2021-12-11 RX ORDER — HALOPERIDOL 5 MG/ML
5 INJECTION INTRAMUSCULAR EVERY 6 HOURS PRN
Status: DISCONTINUED | OUTPATIENT
Start: 2021-12-11 | End: 2021-12-14 | Stop reason: HOSPADM

## 2021-12-11 RX ORDER — HALOPERIDOL 5 MG
5 TABLET ORAL EVERY 6 HOURS PRN
Status: DISCONTINUED | OUTPATIENT
Start: 2021-12-11 | End: 2021-12-14 | Stop reason: HOSPADM

## 2021-12-11 RX ORDER — HYDROXYZINE HYDROCHLORIDE 50 MG/ML
50 INJECTION, SOLUTION INTRAMUSCULAR EVERY 6 HOURS PRN
Status: DISCONTINUED | OUTPATIENT
Start: 2021-12-11 | End: 2021-12-14 | Stop reason: HOSPADM

## 2021-12-11 RX ORDER — INSULIN GLARGINE 100 [IU]/ML
60 INJECTION, SOLUTION SUBCUTANEOUS NIGHTLY
Status: ON HOLD | COMMUNITY
End: 2021-12-14 | Stop reason: HOSPADM

## 2021-12-11 RX ORDER — BENZTROPINE MESYLATE 1 MG/ML
2 INJECTION INTRAMUSCULAR; INTRAVENOUS 2 TIMES DAILY PRN
Status: DISCONTINUED | OUTPATIENT
Start: 2021-12-11 | End: 2021-12-14 | Stop reason: HOSPADM

## 2021-12-11 RX ORDER — INSULIN GLARGINE 100 [IU]/ML
0.25 INJECTION, SOLUTION SUBCUTANEOUS NIGHTLY
Status: DISCONTINUED | OUTPATIENT
Start: 2021-12-11 | End: 2021-12-13

## 2021-12-11 RX ORDER — DEXTROSE MONOHYDRATE 25 G/50ML
12.5 INJECTION, SOLUTION INTRAVENOUS PRN
Status: DISCONTINUED | OUTPATIENT
Start: 2021-12-11 | End: 2021-12-14 | Stop reason: HOSPADM

## 2021-12-11 RX ORDER — QUETIAPINE FUMARATE 50 MG/1
50 TABLET, FILM COATED ORAL NIGHTLY
COMMUNITY

## 2021-12-11 RX ORDER — VILAZODONE HYDROCHLORIDE 10 MG/1
10 TABLET ORAL DAILY
COMMUNITY

## 2021-12-11 RX ORDER — AMLODIPINE BESYLATE 10 MG/1
10 TABLET ORAL DAILY
Status: DISCONTINUED | OUTPATIENT
Start: 2021-12-11 | End: 2021-12-14 | Stop reason: HOSPADM

## 2021-12-11 RX ORDER — NICOTINE POLACRILEX 4 MG
15 LOZENGE BUCCAL PRN
Status: DISCONTINUED | OUTPATIENT
Start: 2021-12-11 | End: 2021-12-14 | Stop reason: HOSPADM

## 2021-12-11 RX ORDER — MAGNESIUM HYDROXIDE/ALUMINUM HYDROXICE/SIMETHICONE 120; 1200; 1200 MG/30ML; MG/30ML; MG/30ML
30 SUSPENSION ORAL PRN
Status: DISCONTINUED | OUTPATIENT
Start: 2021-12-11 | End: 2021-12-14 | Stop reason: HOSPADM

## 2021-12-11 RX ORDER — PRAZOSIN HYDROCHLORIDE 1 MG/1
1 CAPSULE ORAL NIGHTLY
COMMUNITY

## 2021-12-11 RX ADMIN — AMLODIPINE BESYLATE 10 MG: 10 TABLET ORAL at 09:18

## 2021-12-11 RX ADMIN — INSULIN LISPRO 2 UNITS: 100 INJECTION, SOLUTION INTRAVENOUS; SUBCUTANEOUS at 17:14

## 2021-12-11 RX ADMIN — INSULIN GLARGINE 34 UNITS: 100 INJECTION, SOLUTION SUBCUTANEOUS at 22:26

## 2021-12-11 RX ADMIN — INSULIN LISPRO 6 UNITS: 100 INJECTION, SOLUTION INTRAVENOUS; SUBCUTANEOUS at 12:18

## 2021-12-11 RX ADMIN — TRAZODONE HYDROCHLORIDE 50 MG: 50 TABLET ORAL at 23:52

## 2021-12-11 ASSESSMENT — SLEEP AND FATIGUE QUESTIONNAIRES
DO YOU HAVE DIFFICULTY SLEEPING: NO
DIFFICULTY FALLING ASLEEP: NO
DIFFICULTY STAYING ASLEEP: NO
DO YOU USE A SLEEP AID: YES
AVERAGE NUMBER OF SLEEP HOURS: 8
DIFFICULTY ARISING: NO
SLEEP PATTERN: NORMAL
RESTFUL SLEEP: YES

## 2021-12-11 ASSESSMENT — PATIENT HEALTH QUESTIONNAIRE - PHQ9: SUM OF ALL RESPONSES TO PHQ QUESTIONS 1-9: 9

## 2021-12-11 ASSESSMENT — LIFESTYLE VARIABLES: HISTORY_ALCOHOL_USE: NO
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
## 2021-12-11 NOTE — PROGRESS NOTES
Patient refused to complete admission assessment at this time, stating he is too tired and he will do it later.  Electronically signed by Bruna Benitez RN on 12/11/21 at 10:10 AM EST

## 2021-12-11 NOTE — PROGRESS NOTES
Pt. declined to attend the 0900 community meeting, despite staff encouragement.  Goal - \"Get my mental straight\" Electronically signed by EZRA Sewell on 12/11/2021 at 11:28 AM

## 2021-12-11 NOTE — PROGRESS NOTES
Pt to unit via w/c with staff/ security. Brief tour of unit given and oriented to assigned room. Pt cooperative with skin assessment and contraband check- complete with Jose Alfredo Means. Pt has Band-Aid on right index finger d/t self inflicted laceration with 5 sutures. No other areas of impairment noted, no contraband found. Pt provided with toiletries and PO nourishment. Given water pitcher. Pt denies further needs/ requests. Pt declines to complete admission assessment at this time. Denies current SI. Pt avoidant of eye contact. Sad/ flat affect. Denies the need to be here. Pt is calm/ cooperative/ polite. Pt quickly falls asleep.

## 2021-12-11 NOTE — PROGRESS NOTES
Patient did not attend group despite staff encouragement.   Electronically signed by Kimberly Armenta on 12/11/2021 at 5:24 PM

## 2021-12-11 NOTE — PROGRESS NOTES
Patient slept the majority of the afternoon but did get up before dinner. He is not forthcoming with much information but does state that he is not suicidal. When asked about anxiety and depression he said: I'm cool\"  Cooperative, minimal eye contact.  Calm
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
## 2021-12-11 NOTE — ED NOTES
Patient reviewed with Dr. Diana Cooney. Discussed events leading to admit, previous history, current assessment, labs, home medications and EKG. Received order admit to [facility] when patient becomes more alert.       Tracey Sidhu RN  12/10/21 5404

## 2021-12-11 NOTE — ED NOTES
Patient awake and alert. Provided a sandwich and gatorade. Reassessed at this time.       Tyshawn Marvin RN  12/11/21 9541

## 2021-12-11 NOTE — CONSULTS
Not on file   Other Topics Concern    Not on file   Social History Narrative    Not on file     Social Determinants of Health     Financial Resource Strain:     Difficulty of Paying Living Expenses: Not on file   Food Insecurity:     Worried About [address] in the Last Year: Not on file    Jayme of Food in the Last Year: Not on file   Transportation Needs:     Lack of Transportation (Medical): Not on file    Lack of Transportation (Non-Medical):  Not on file   Physical Activity:     Days of Exercise per Week: Not on file    Minutes of Exercise per Session: Not on file   Stress:     Feeling of Stress : Not on file   Social Connections:     Frequency of Communication with Friends and Family: Not on file    Frequency of Social Gatherings with Friends and Family: Not on file    Attends Advent Services: Not on file    Active Member of [address] [place] [address] or Organizations: Not on file    Attends Club or Organization Meetings: Not on file    Marital Status: Not on file   Intimate Partner Violence:     Fear of Current or Ex-Partner: Not on file    Emotionally Abused: Not on file    Physically Abused: Not on file    Sexually Abused: Not on file   Housing Stability:     Unable to Pay for Housing in the Last Year: Not on file    Number of [place] in the Last Year: Not on file    Unstable Housing in the Last Year: Not on file     MEDICATIONS:    Current Facility-Administered Medications   Medication Dose Route Frequency Provider Last Rate Last Admin    amLODIPine (NORVASC) tablet 10 mg  10 mg Oral Daily Gisel Dick MD        metFORMIN (GLUCOPHAGE) tablet 850 mg  850 mg Oral Daily with breakfast Gisel Dick MD        acetaminophen (TYLENOL) tablet 650 mg  650 mg Oral Q4H PRN Gisel Dick MD        magnesium hydroxide (MILK OF MAGNESIA) 400 MG/5ML suspension 30 mL  30 mL Oral Daily PRN Gisel Dick MD        aluminum & magnesium hydroxide-simethicone (MAALOX) 200-200-20 MG/5ML suspension 30 mL  30 mL Oral PRN Mariya Lassiter MD        haloperidol (HALDOL) tablet 5 mg  5 mg Oral Q6H PRN Caroline Ross MD        Or    haloperidol lactate (HALDOL) injection 5 mg  5 mg IntraMUSCular Q6H PRN Caroline Ross MD        benztropine mesylate (COGENTIN) injection 2 mg  2 mg IntraMUSCular BID PRN Mariya Lassiter MD        traZODone (DESYREL) tablet 50 mg  50 mg Oral Nightly PRN Caroline Ross MD        hydrOXYzine (VISTARIL) injection 50 mg  50 mg IntraMUSCular Q6H PRN Caroline Ross MD        Or    hydrOXYzine (VISTARIL) capsule 50 mg  50 mg Oral Q6H PRN Mariya Lassiter MD           ALLERGIES: Shellfish-derived products    REVIEW OF SYSTEM:   ROS as noted in HPI, 12 point ROS reviewed and otherwise negative. OBJECTIVE  PHYSICAL EXAM: /89   Pulse 86   Temp 97.9 °F (36.6 °C) (Oral)   Resp 18   Ht 5' 9\" (1.753 m)   Wt 300 lb (136.1 kg)   SpO2 97%   BMI 44.30 kg/m²   CONSTITUTIONAL:  awake, alert, cooperative, no apparent distress, and appears stated age  EYES: Pupils equal, round and reactive to light, conjunctiva normal  ENT:  Normocephalic, without obvious abnormality, atraumatic, sinuses nontender on palpation, external ears without lesions, oral pharynx with moist mucus membranes, tonsils without erythema or exudates, gums normal and good dentition. NECK:  Supple, symmetrical, trachea midline  LUNGS:  Clear to auscultation bilaterally, no crackles or wheezing  CARDIOVASCULAR: Regular rate and rhythm, normal S1 and S2  ABDOMEN:  Obese, normal bowel sounds, soft, non-distended, non-tender  MUSCULOSKELETAL:  There is no redness, warmth, or swelling of the joints. NEUROLOGIC:  Awake, alert, oriented to name, place and time. SKIN:  Warm and dry. Dressing to R 2nd digit intact  DATA:     Diagnostic tests reviewed for today's visit:    Most recent labs and imaging results reviewed.

## 2021-12-11 NOTE — PROGRESS NOTES
Pt. refused to attend the 1000 skills group, despite staff encouragement. Electronically signed by Raissa Hudson, [address] on 12/11/2021 at 11:46 AM

## 2021-12-11 NOTE — CARE COORDINATION
Brief Intervention and Referral to Treatment Summary    Patient was provided PHQ-9, AUDIT and DAST Screening:      PHQ-9 Score: 9  AUDIT Score:  0  DAST Score:  0    Patients substance use is considered     Low Risk/Healthy x  Moderate Risk  Harmful   Dependent    Patients depression is considered:     Minimal  Mild   Moderate  Moderately Severe x  Severe    Brief Education Was Provided    Patient was receptive  Patient was not receptive x      Brief Intervention Is Provided (Only for AUDIT or DAST)     Patient reports readiness to decrease and/or stop use and a plan was discussed   Patient denies readiness to decrease and/or stop use and a plan was not discussedx       Recommendations/Referrals for Brief and/or Specialized Treatment Provided to Patient   Patient did not disclose any sort of FERN tx issues and as a result there was no given information for FERN tx. Staff will follow up as needed.

## 2021-12-12 LAB
GLUCOSE BLD-MCNC: 178 MG/DL (ref 60–115)
GLUCOSE BLD-MCNC: 203 MG/DL (ref 60–115)
GLUCOSE BLD-MCNC: 280 MG/DL (ref 60–115)
GLUCOSE BLD-MCNC: 291 MG/DL (ref 60–115)
PERFORMED ON: ABNORMAL

## 2021-12-12 PROCEDURE — 6370000000 HC RX 637 (ALT 250 FOR IP): Performed by: NURSE PRACTITIONER

## 2021-12-12 PROCEDURE — 1240000000 HC EMOTIONAL WELLNESS R&B

## 2021-12-12 PROCEDURE — 6370000000 HC RX 637 (ALT 250 FOR IP): Performed by: PSYCHIATRY & NEUROLOGY

## 2021-12-12 RX ORDER — QUETIAPINE FUMARATE 50 MG/1
50 TABLET, FILM COATED ORAL NIGHTLY
Status: DISCONTINUED | OUTPATIENT
Start: 2021-12-12 | End: 2021-12-14 | Stop reason: HOSPADM

## 2021-12-12 RX ORDER — VILAZODONE HYDROCHLORIDE 10 MG/1
10 TABLET ORAL DAILY
Status: DISCONTINUED | OUTPATIENT
Start: 2021-12-12 | End: 2021-12-14 | Stop reason: HOSPADM

## 2021-12-12 RX ORDER — LISINOPRIL 10 MG/1
10 TABLET ORAL DAILY
Status: DISCONTINUED | OUTPATIENT
Start: 2021-12-12 | End: 2021-12-14 | Stop reason: HOSPADM

## 2021-12-12 RX ADMIN — AMLODIPINE BESYLATE 10 MG: 10 TABLET ORAL at 08:47

## 2021-12-12 RX ADMIN — INSULIN LISPRO 2 UNITS: 100 INJECTION, SOLUTION INTRAVENOUS; SUBCUTANEOUS at 08:47

## 2021-12-12 RX ADMIN — INSULIN GLARGINE 34 UNITS: 100 INJECTION, SOLUTION SUBCUTANEOUS at 20:32

## 2021-12-12 RX ADMIN — QUETIAPINE FUMARATE 50 MG: 50 TABLET ORAL at 22:04

## 2021-12-12 RX ADMIN — VILAZODONE HYDROCHLORIDE 10 MG: 10 TABLET ORAL at 19:03

## 2021-12-12 RX ADMIN — INSULIN LISPRO 6 UNITS: 100 INJECTION, SOLUTION INTRAVENOUS; SUBCUTANEOUS at 17:19

## 2021-12-12 RX ADMIN — LISINOPRIL 10 MG: 10 TABLET ORAL at 11:53

## 2021-12-12 RX ADMIN — INSULIN LISPRO 6 UNITS: 100 INJECTION, SOLUTION INTRAVENOUS; SUBCUTANEOUS at 12:33

## 2021-12-12 NOTE — GROUP NOTE
Group Therapy Note    Date: 12/11/2021    Group Start Time: 2050  Group End Time: 2115  Group Topic: Recreational    [facility]    Luz Wade        Group Therapy Note    Attendees: 8/19         Patient's Goal:  To play Pictionary with the group. Notes:  Patient played the game with peers. Patient was called out of group for a short while to get his blood sugar checked, but returned once this was completed.     Status After Intervention:  Unchanged    Participation Level: Interactive    Participation Quality: Appropriate and Attentive      Speech:  normal      Thought Process/Content: Logical      Affective Functioning: Flat      Mood: euthymic      Level of consciousness:  Alert and Attentive      Response to Learning: Progressing to goal      Endings: None Reported    Modes of Intervention: Activity      Discipline Responsible: [address]      Signature:  Luz Wade

## 2021-12-12 NOTE — H&P
History and physical    2021   Patient was seen and examined in person, Chart reviewed   Patient's case discussed with staff/team    Chief Complaint:/reason for admission:  Patient attempted to kill himself by holding a knife to his neck. Per wife he was talking to his  brother who  of suicide    HPI: Patient was admitted to [facility] beginning of December and was discharged a few days prior to this current hospitalization. Patient was discharged to his ex wife and his children. Per wife, he became aggressive grabbed a knife, held it to his neck, wife tried to take the knife and in the process patient cut his finger. Patients wife reports that he was talking to his  brother  He was supposed to have  Court appearance on Friday but due to the circumstances he did not show up and now he has a warrant [facility] PD to notify [id number] before discharge) he toyin endorse memories of childhood and the plight of he and his siblings  Of note: patient was very sleepy had to ask repeatedly to get the information    Previous psychiatric hx: Multiple admissions  Dx: MDD  Cocaine use disorder  Traumatic childhood      Substance use: cocaine yes  Alcohol yes  THC yes    Legal yes   Family psyche hx: yes mother depression  Brother committed suicide  Childhood social: grew up in [place]  He had 2 siblings  All three were raised in foster homes.  Finished high school  Very difficult relations with wife and they have children together and patient has 4 children from other relationships  Appetite:   [x] Normal/Unchanged  [] Increased  [] Decreased      Sleep:       [] Normal/Unchanged  [] Fair       [x] Poor              Energy:    [] Normal/Unchanged  [] Increased  [x] Decreased        SI [x] Present  [] Absent    HI  []Present  [x] Absent     Aggression:  [x] yes  [] no    Patient is [x] able  [] unable to CONTRACT FOR SAFETY     PAST MEDICAL/PSYCHIATRIC HISTORY:   Past Medical History: Diagnosis Date    Asthma     Hypertension     Major depression, recurrent ([facility] 10/24/2018    Uncontrolled type 2 diabetes mellitus with complication, with long-term current use of insulin ([facility] 12/10/2017       FAMILY/SOCIAL HISTORY:  Family History   Problem Relation Age of Onset    Bipolar Disorder Mother     Bipolar Disorder Brother         comitted suicide     Social History     Socioeconomic History    Marital status:      Spouse name: Brenda Hill    Number of children: 6    Years of education: Not on file    Highest education level: Not on file   Occupational History    Not on file   Tobacco Use    Smoking status: Current Every Day Smoker     Packs/day: 0.25     Years: 20.00     Pack years: 5.00     Types: Cigarettes    Smokeless tobacco: Never Used   Vaping Use    Vaping Use: Never used   Substance and Sexual Activity    Alcohol use: Yes     Comment: occasionally    Drug use: No     Comment: pt denies    Sexual activity: Not on file   Other Topics Concern    Not on file   Social History Narrative    Not on file     Social Determinants of Health     Financial Resource Strain:     Difficulty of Paying Living Expenses: Not on file   Food Insecurity:     Worried About Running Out of Food in the Last Year: Not on file    Jayme of Food in the Last Year: Not on file   Transportation Needs:     Lack of Transportation (Medical): Not on file    Lack of Transportation (Non-Medical):  Not on file   Physical Activity:     Days of Exercise per Week: Not on file    Minutes of Exercise per Session: Not on file   Stress:     Feeling of Stress : Not on file   Social Connections:     Frequency of Communication with Friends and Family: Not on file    Frequency of Social Gatherings with Friends and Family: Not on file    Attends Buddhist Services: Not on file    Active Member of Clubs or Organizations: Not on file    Attends Club or Organization Meetings: Not on file    Marital Status: Not on file   Intimate Partner Violence:     Fear of Current or Ex-Partner: Not on file    Emotionally Abused: Not on file    Physically Abused: Not on file    Sexually Abused: Not on file   Housing Stability:     Unable to Pay for Housing in the Last Year: Not on file    Number of [place] in the Last Year: Not on file    Unstable Housing in the Last Year: Not on file           ROS:  [x] All negative/unchanged except if checked.  Explain positive(checked items) below:  [] Constitutional  [] Eyes  [] Ear/Nose/Mouth/Throat  [] Respiratory  [] CV  [] GI  []   [] Musculoskeletal  [] Skin/Breast  [] Neurological  [] Endocrine  [] Heme/Lymph  [] Allergic/Immunologic    Explanation:     MEDICATIONS:    Current Facility-Administered Medications:     amLODIPine (NORVASC) tablet 10 mg, 10 mg, Oral, Daily, Caroline Ross MD, 10 mg at 12/11/21 0918    [Held by provider] metFORMIN (GLUCOPHAGE) tablet 850 mg, 850 mg, Oral, Daily with breakfast, Caroline Ross MD    acetaminophen (TYLENOL) tablet 650 mg, 650 mg, Oral, Q4H PRN, Luis Ross MD    magnesium hydroxide (MILK OF MAGNESIA) 400 MG/5ML suspension 30 mL, 30 mL, Oral, Daily PRN, Luis Ross MD    aluminum & magnesium hydroxide-simethicone (MAALOX) 200-200-20 MG/5ML suspension 30 mL, 30 mL, Oral, PRN, Caroline Ross MD    haloperidol (HALDOL) tablet 5 mg, 5 mg, Oral, Q6H PRN **OR** haloperidol lactate (HALDOL) injection 5 mg, 5 mg, IntraMUSCular, Q6H PRN, Luis Ross MD    benztropine mesylate (COGENTIN) injection 2 mg, 2 mg, IntraMUSCular, BID PRN, Luis Ross MD    traZODone (DESYREL) tablet 50 mg, 50 mg, Oral, Nightly PRN, Caroline Ross MD, 50 mg at 12/11/21 4582    hydrOXYzine (VISTARIL) injection 50 mg, 50 mg, IntraMUSCular, Q6H PRN **OR** hydrOXYzine (VISTARIL) capsule 50 mg, 50 mg, Oral, Q6H PRN, Caroline Ross MD    glucose (GLUTOSE) 40 % oral gel 15 g, 15 g, Oral, PRN, Kenneth McCurtain, APRN - NP    dextrose 50 % IV solution, 12.5 g, IntraVENous, PRN, Kenneth McCurtain, APRN - NP    glucagon (rDNA) injection 1 mg, 1 mg, IntraMUSCular, PRN, Kenneth McCurtain, APRN - NP    dextrose 5 % solution, 100 mL/hr, IntraVENous, PRN, Kenneth Riki, APRN - NP    insulin glargine (LANTUS) injection vial 34 Units, 0.25 Units/kg, SubCUTAneous, Nightly, Kenneth Riki, APRN - NP, 34 Units at 21 2226    insulin lispro (HUMALOG) injection vial 0-12 Units, 0-12 Units, SubCUTAneous, TID WC, Kenneth Riki, APRN - NP, 2 Units at 21 1714    insulin lispro (HUMALOG) injection vial 0-6 Units, 0-6 Units, SubCUTAneous, Nightly, Kenneth Riki, APRN - NP, 3 Units at 216      Examination:  BP (!) 134/91   Pulse 91   Temp 98.8 °F (37.1 °C)   Resp 16   Ht 5' 9\" (1.753 m)   Wt 300 lb (136.1 kg)   SpO2 98%   BMI 44.30 kg/m²   Gait - steadyyes  Medication side effects(SE): denies      . Mental Status Examination:  Level of consciousness:  Sleepy   Appearance:  ill-appearing  Behavior/Motor:  psychomotor retardation  Attitude toward examiner:  evasive, guarded and withdrawn  Speech:  slow, whispered, increased latency and low productivity  Mood:  Depressed/anxious  Affect:  mood congruent  Thought processes:  flight of ideas  Thought content:  Preoccupied with anxiety  Homocidal ideation denies  Suicidal Ideation:  active suicidal ideation  Delusions: none ellicited  Perceptual Disturbance: auditory hallucinations of  brother  Cognition:  Memory impaired immediate recall  Insight:  poor  Judgment:  poor  Impulse control poor  Protective factors: none  Stressors significant    ASSESSMENT[de-identified]  High risk of suicide when not medicated  Patient symptoms are:  [] Well controlled  [] Improving  [] Worsening  [] No change      Diagnosis: mdd recurrent severe w/psychotic features  Cocaine use disorder  PTSD  Active Problems:    Major depression, recurrent

## 2021-12-12 NOTE — PROGRESS NOTES
Behavioral Services  Medicare Certification Upon Admission    I certify that this patient's inpatient psychiatric hospital admission is medically necessary for:    [x] (1) Treatment which could reasonably be expected to improve this patient's condition,       [x] (2) Or for diagnostic study;     AND     [x](2) The inpatient psychiatric services are provided while the individual is under the care of a physician and are included in the individualized plan of care.     Estimated length of stay/service 3 day    Plan for post-hospital care [facility]    Electronically signed by Jossue tAwood MD on 12/12/2021 at 2:37 PM

## 2021-12-12 NOTE — GROUP NOTE
Group Therapy Note    Date: 12/12/2021    Group Start Time: 1000  Group End Time: 1100  Group Topic: Psychoeducation    [facility]    Enma Lopez        Group Therapy Note    Attendees: 14         Patient's Goal:  \"To go home\"    Notes:  Patient attended the 1000 skills group. Patient was calm, attentive and he work well on his project in group.     Status After Intervention:  Unchanged    Participation Level: Fairly well    Participation Quality: Appropriate      Speech:  normal      Thought Process/Content: Linear      Affective Functioning: Congruent      Mood: calm      Level of consciousness:  Alert      Response to Learning: Progressing to goal      Endings: None Reported    Modes of Intervention: Education, Socialization and Activity      Discipline Responsible: Psychoeducational Specialist      Signature:  Enma Lopez

## 2021-12-12 NOTE — PROGRESS NOTES
Pt visible on unit eating breakfast this morning. Isolative to self at table. Denies SI, HI, AVH, anxiety is 6/10, depression 3/10currently. Pt calm, cooperative, communicating with more expression but remains more flat. 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 Pt has five sutures to the R index finger. Encouraged pt to set short term as well as long term goals and have thoughts organized to discuss with Dr Shawna Wilson this morning. Pt reports having a desire to stop using elicit drugs and alcohol and hopes to become sober for his eight children. Has been attending groups and slept 6.5 hours. Currently visible in group.

## 2021-12-12 NOTE — PROGRESS NOTES
Pt out this afternoon and evening with peers. Denies SI, HI, AVH, anxiety 2/10, depression 5/10 at this time. Reports feeling better today after having a day of sleep. Encouraged pt. To attend groups. Attended all groups today. Currently in TV room with peers.

## 2021-12-12 NOTE — GROUP NOTE
Group Therapy Note    Date: 12/11/2021    Group Start Time: 2115  Group End Time: 2125  Group Topic: Wrap-Up    [facility]    Merly Leyva        Group Therapy Note    Attendees: 7/19         Patient's Goal:  \"to attend a group\"    Notes:  Patient reported meeting their goal for the day in hopes that he will be discharged tomorrow for his mother's birthday. Patient shared enjoying sleeping today, as well as being happy his blood sugar is lower than it has been \"ever. \"    Status After Intervention:  Unchanged    Participation Level: Interactive    Participation Quality: Appropriate, Attentive and Sharing      Speech:  pressured      Thought Process/Content: Logical      Affective Functioning: Flat      Mood: euthymic      Level of consciousness:  Alert and Attentive      Response to Learning: Progressing to goal      Endings: None Reported    Modes of Intervention: Support      Discipline Responsible: [employer]      Signature:  Merly Leyva

## 2021-12-12 NOTE — PROGRESS NOTES
Behavioral Services  Medicare Certification Upon Admission    I certify that this patient's inpatient psychiatric hospital admission is medically necessary for:    [x] (1) Treatment which could reasonably be expected to improve this patient's condition,       [x] (2) Or for diagnostic study;     AND     [x](2) The inpatient psychiatric services are provided while the individual is under the care of a physician and are included in the individualized plan of care.     Estimated length of stay/service 3 days    Plan for post-hospital care [facility] followup    Electronically signed by Surinder Sandoval MD on 12/12/2021 at 2:38 PM

## 2021-12-12 NOTE — PROGRESS NOTES
Morning Community Meeting Topics    Pallavi Herculeser attended the morning community meeting on 12/12/21. Topics discussed today     [x] Introduction   Day of the week and date   Mask distribution   Current mask requirements  [x]Teams   Explanation of  Green and Blue team criteria   Nurses assigned to each team for today   Explanation about green and blue paper  o Date  o Patient's Name  o Patient's Nurse  o Goals  [x] Visitation   Announce the visiting hours for the day   Announce which team is allowed to have visitors for the day   Review any updated Covid 19 requirements for visitors during visitation  o Vaccine Card or negative Covid test within 48 hours of visit  o State Identification   Patients are reminded to alert the  at least 1 hour before visitation   [x] Unit Orientation   Coffee use   Phone location and etiquette   Shower locations  [employer] and dryer location and process   Common area expectations   Staff rounds expectation  [x] Meals    Educate patient to the menu  o The patient is encouraged to fill out the menu to get preferences at mealtime  o The patient is educated that if they do not fill out the menu, they will get the standard tray  o The coffee pot is decaf, patient encouraged to order regular coffee from menu.    Educate patient to the meal process   Patient encouraged to eat snacks provided twice daily  o Snacks may stay in patient room     [x] Discharge Process   Discharge expectations   Fill out the survey after discharge   [x] Hygiene   Daily showers encouraged  o Showers availability discussed    Daily dressing encouraged  o Discussed wearing street clothing   Education provided on where to place linens and clothing  o Linens in the hamper  o personal clothing does not go into the linen hamper  [x] Group    Patient encouraged to attend group provided   Time of Group Meetings discussed   Gentle reminder that attendance is a Physician order  [x] Movement   Chair exercises completed   Stretching completed  Notes:Goal - \"To go home\"Electronically signed by EZRA Covington on 12/12/2021 at 1:59 PM

## 2021-12-12 NOTE — PROGRESS NOTES
[facility] Progress Note  Name: Jeanette Gale  Age: 30 y.o. Gender: male  CodeStatus: Full Code  Allergies: Shellfish-Derived Products    Chief Complaint:Mental Health Problem    Primary Care Provider: Keven Rainey MD  InpatientTreatment Team: Treatment Team: Attending Provider: Wen Bailey MD; Consulting Physician: Orquidea Springer APRN - NP; Registered Nurse: Екатерина Herman, RN; Consulting Physician: EDUARDA Solano; LPN: Aydee Gordon LPN; Tech: Daniela Lux; LPN: Jeremiah Singleton LPN; Registered Nurse: Sandip Thomas, RN; Registered Nurse: Cali Penaloza, RN; Registered Nurse: Poonam Ordaz RN  Admission Date: 12/10/2021      Subjective: BP elevated. Glucose improving with addition of sliding scale and lantus. Awaiting endo recommendations. Ambulatory in huff     Physical Exam  CONSTITUTIONAL:  awake, alert, cooperative, no apparent distress, and appears stated age  LUNGS:  Clear to auscultation bilaterally, no crackles or wheezing  CARDIOVASCULAR: Regular rate and rhythm, normal S1 and S2  ABDOMEN:  Obese, normal bowel sounds, soft, non-distended, non-tender  NEUROLOGIC:  Awake, alert, oriented to name, place and time. SKIN:  Warm and dry. Dressing to R 2nd digit intact    Vitals:    12/11/21 1630 12/11/21 2033 12/12/21 0836 12/12/21 0955   BP: (!) 134/91  (!) 144/100 123/89   Pulse: 91  89 78   Resp:       Temp:  98.8 °F (37.1 °C) 97.5 °F (36.4 °C) 97.3 °F (36.3 °C)   TempSrc:       SpO2: 98%  97% 96%   Weight:       Height:           Review of Systems  12 point ROS negative unless indicated below or in the subjective data  Past Medical History:   Diagnosis Date    Asthma     Hypertension     Major depression, recurrent ([facility] [address] 10/24/2018    Uncontrolled type 2 diabetes mellitus with complication, with long-term current use of insulin ([facility] [address] 12/10/2017     History reviewed. No pertinent surgical history.       Medications:  Reviewed  Prior to Admission medications    Medication Sig Start Date End Date Taking? Authorizing Provider   insulin 70-30 (HUMULIN 70/30) (70-30) 100 UNIT per ML injection vial Inject 40 Units into the skin 2 times daily (before meals)   Yes Historical Provider, MD   insulin glargine (LANTUS) 100 UNIT/ML injection vial Inject 60 Units into the skin nightly   Yes Historical Provider, MD   prazosin (MINIPRESS) 1 MG capsule Take 1 mg by mouth nightly   Yes Historical Provider, MD   QUEtiapine (SEROQUEL) 50 MG tablet Take 50 mg by mouth nightly   Yes Historical Provider, MD   vilazodone HCl (VILAZODONE HCL) 10 MG TABS Take 10 mg by mouth daily   Yes Historical Provider, MD   amLODIPine (NORVASC) 10 MG tablet Take 5 mg by mouth daily    Yes Historical Provider, MD   metFORMIN (GLUCOPHAGE) 500 MG tablet Take 850 mg by mouth daily (with breakfast)    Yes Historical Provider, MD   Glucose Blood (BLOOD GLUCOSE TEST STRIPS) STRP Pt test 4x daily Dx E10.65 11/28/17   Fernando Miller MD   Lancets MISC Pt test 4x daily Dx E10.65 11/28/17   Fernando Miller MD   glucose monitoring kit (FREESTYLE) monitoring kit 1 kit by Does not apply route daily 11/24/17   Fernando Miller MD   FREESTYLE LANCETS MISC 1 each by Does not apply route 4 times daily 11/24/17   Fernando Miller MD   glucose blood VI test strips (FREESTYLE LITE) strip 1 each by In Vitro route 4 times daily As needed.  11/24/17   Fernando Miller MD       Current Facility-Administered Medications   Medication Dose Route Frequency Provider Last Rate Last Admin    lisinopril (PRINIVIL;ZESTRIL) tablet 10 mg  10 mg Oral Daily JAY Olson - NP        amLODIPine (NORVASC) tablet 10 mg  10 mg Oral Daily Caroline Ross MD   10 mg at 12/12/21 0847    [Held by provider] metFORMIN (GLUCOPHAGE) tablet 850 mg  850 mg Oral Daily with breakfast Myriam Rajan MD        acetaminophen (TYLENOL) tablet 650 mg  650 mg Oral Q4H PRN Caroline Ross MD        magnesium hydroxide (MILK OF MAGNESIA) 400 MG/5ML suspension 30 mL  30 mL Oral Daily PRN Tomy Ross MD        aluminum & magnesium hydroxide-simethicone (MAALOX) 200-200-20 MG/5ML suspension 30 mL  30 mL Oral PRN Caroline Ross MD        haloperidol (HALDOL) tablet 5 mg  5 mg Oral Q6H PRN Caroline Ross MD        Or    haloperidol lactate (HALDOL) injection 5 mg  5 mg IntraMUSCular Q6H PRN Caroline Ross MD        benztropine mesylate (COGENTIN) injection 2 mg  2 mg IntraMUSCular BID PRN Serafin Schilder, MD        traZODone (DESYREL) tablet 50 mg  50 mg Oral Nightly PRN Serafin Schilder, MD   50 mg at 12/11/21 1052    hydrOXYzine (VISTARIL) injection 50 mg  50 mg IntraMUSCular Q6H PRN Caroline Ross MD        Or    hydrOXYzine (VISTARIL) capsule 50 mg  50 mg Oral Q6H PRN Caroline Ross MD        glucose (GLUTOSE) 40 % oral gel 15 g  15 g Oral PRN Cody Binder, APRN - NP        dextrose 50 % IV solution  12.5 g IntraVENous PRN Cody Binder, APRN - NP        glucagon (rDNA) injection 1 mg  1 mg IntraMUSCular PRN Cody Binder, APRN - NP        dextrose 5 % solution  100 mL/hr IntraVENous PRN Cody Binder, APRN - NP        insulin glargine (LANTUS) injection vial 34 Units  0.25 Units/kg SubCUTAneous Nightly Cody Binder, APRN - NP   34 Units at 12/11/21 2226    insulin lispro (HUMALOG) injection vial 0-12 Units  0-12 Units SubCUTAneous TID WC Cody Binder, APRN - NP   2 Units at 12/12/21 0847    insulin lispro (HUMALOG) injection vial 0-6 Units  0-6 Units SubCUTAneous Nightly Cody Binder, APRN - NP   3 Units at 12/11/21 2146        Infusion Medications:    dextrose       Scheduled Medications:    lisinopril  10 mg Oral Daily    amLODIPine  10 mg Oral Daily    [Held by provider] metFORMIN  850 mg Oral Daily with breakfast    insulin glargine  0.25 Units/kg SubCUTAneous Nightly    insulin lispro  0-12 Units SubCUTAneous TID WC    insulin lispro  0-6 Units SubCUTAneous Nightly PRN Meds: acetaminophen, magnesium hydroxide, aluminum & magnesium hydroxide-simethicone, haloperidol **OR** haloperidol lactate, benztropine mesylate, traZODone, hydrOXYzine **OR** hydrOXYzine, glucose, dextrose, glucagon (rDNA), dextrose    Labs:   Recent Labs     12/10/21  1821   WBC 4.0*   HGB 12.9*   HCT 39.0*        Recent Labs     12/10/21  1821      K 4.6      CO2 24   BUN 9   CREATININE 0.96   CALCIUM 8.8     Recent Labs     12/10/21  1821   AST 29   ALT 30   BILITOT 0.5   ALKPHOS 63     No results for input(s): INR in the last 72 hours. Recent Labs     12/10/21  1821   CKTOTAL 329*       Urinalysis:   Lab Results   Component Value Date    NITRU Negative 12/10/2021    BLOODU Negative 12/10/2021    SPECGRAV 1.010 12/10/2021    GLUCOSEU >=1000 12/10/2021       Radiology:   Most recent    Chest CT      WITH CONTRAST:No results found for this or any previous visit. WITHOUT CONTRAST: No results found for this or any previous visit. CXR      2-view: No results found for this or any previous visit. Portable: Results for orders placed during the hospital encounter of 11/22/17    XR Chest Portable    Narrative  EXAMINATION: XR CHEST PORTABLE    CLINICAL HISTORY:  weak    COMPARISONS: 8/30/2007    FINDINGS: Cardiac size and pulmonary vascularity are normal. The lungs are clear. There is no evidence of adenopathy. The bones are unremarkable. There is a polyarticular right hemidiaphragm. The ascending aorta and aortic arch have prominent contours. Impression  NO ACUTE CHEST DISEASE      Echo No results found for this or any previous visit.             Assessment/Plan:  30 y.o. male with PMH DM type II, HTN, asthma, depression, and anxiety presented with the following:     Major depression, recurrent ([address]  Patient admitted to behavorial health for evaluation and treatment      DMII with hyperglycemia: ISS, basal insulin, hypoglycemia protocol POCT Glucose TIDAC & QHS,

## 2021-12-12 NOTE — PROGRESS NOTES
Behavioral Services  Medicare Certification Upon Admission    I certify that this patient's inpatient psychiatric hospital admission is medically necessary for:    [x] (1) Treatment which could reasonably be expected to improve this patient's condition,       [x] (2) Or for diagnostic study;     AND     [x](2) The inpatient psychiatric services are provided while the individual is under the care of a physician and are included in the individualized plan of care.     Estimated length of stay/service 1 week    Plan for post-hospital care home    Electronically signed by Aleida Nava MD on 12/11/2021 1150pm

## 2021-12-12 NOTE — GROUP NOTE
Group Therapy Note    Date: 12/12/2021    Group Start Time: 1110  Group End Time: 1140  Group Topic: Group Therapy    [facility]    AME Briseno        Group Therapy Note    Attendees: 12         Patient's Goal:  To participate in a goal oriented group. Notes:  Patient stated his goal is to be discharged and finish his education. Status After Intervention:  Improved    Participation Level: Active Listener    Participation Quality: Appropriate      Speech:  normal      Thought Process/Content: Logical      Affective Functioning: Congruent      Mood: elevated      Level of consciousness:  Alert      Response to Learning: Able to verbalize current knowledge/experience      Endings: None Reported    Modes of Intervention: Education      Discipline Responsible: /Counselor      Signature:   AME Briseno

## 2021-12-13 ENCOUNTER — APPOINTMENT (OUTPATIENT)
Dept: ULTRASOUND IMAGING | Age: 36
DRG: 740 | End: 2021-12-13
Payer: MEDICARE

## 2021-12-13 PROBLEM — F14.90 COCAINE USE: Status: ACTIVE | Noted: 2021-12-13

## 2021-12-13 PROBLEM — F43.10 PTSD (POST-TRAUMATIC STRESS DISORDER): Status: ACTIVE | Noted: 2021-12-13

## 2021-12-13 LAB
EKG ATRIAL RATE: 72 BPM
EKG P AXIS: 46 DEGREES
EKG P-R INTERVAL: 182 MS
EKG Q-T INTERVAL: 410 MS
EKG QRS DURATION: 118 MS
EKG QTC CALCULATION (BAZETT): 426 MS
EKG R AXIS: -46 DEGREES
EKG T AXIS: -7 DEGREES
EKG VENTRICULAR RATE: 65 BPM
GLUCOSE BLD-MCNC: 164 MG/DL (ref 60–115)
GLUCOSE BLD-MCNC: 247 MG/DL (ref 60–115)
GLUCOSE BLD-MCNC: 269 MG/DL (ref 60–115)
GLUCOSE BLD-MCNC: 348 MG/DL (ref 60–115)
PERFORMED ON: ABNORMAL

## 2021-12-13 PROCEDURE — 6370000000 HC RX 637 (ALT 250 FOR IP): Performed by: NURSE PRACTITIONER

## 2021-12-13 PROCEDURE — 76536 US EXAM OF HEAD AND NECK: CPT

## 2021-12-13 PROCEDURE — 84403 ASSAY OF TOTAL TESTOSTERONE: CPT

## 2021-12-13 PROCEDURE — 1240000000 HC EMOTIONAL WELLNESS R&B

## 2021-12-13 PROCEDURE — 99222 1ST HOSP IP/OBS MODERATE 55: CPT | Performed by: INTERNAL MEDICINE

## 2021-12-13 PROCEDURE — 99232 SBSQ HOSP IP/OBS MODERATE 35: CPT | Performed by: PSYCHIATRY & NEUROLOGY

## 2021-12-13 PROCEDURE — 6370000000 HC RX 637 (ALT 250 FOR IP): Performed by: PSYCHIATRY & NEUROLOGY

## 2021-12-13 PROCEDURE — 93010 ELECTROCARDIOGRAM REPORT: CPT | Performed by: INTERNAL MEDICINE

## 2021-12-13 PROCEDURE — 84270 ASSAY OF SEX HORMONE GLOBUL: CPT

## 2021-12-13 PROCEDURE — 6370000000 HC RX 637 (ALT 250 FOR IP): Performed by: INTERNAL MEDICINE

## 2021-12-13 RX ORDER — INSULIN GLARGINE 100 [IU]/ML
40 INJECTION, SOLUTION SUBCUTANEOUS NIGHTLY
Status: DISCONTINUED | OUTPATIENT
Start: 2021-12-13 | End: 2021-12-14 | Stop reason: HOSPADM

## 2021-12-13 RX ADMIN — INSULIN LISPRO 2 UNITS: 100 INJECTION, SOLUTION INTRAVENOUS; SUBCUTANEOUS at 12:36

## 2021-12-13 RX ADMIN — QUETIAPINE FUMARATE 50 MG: 50 TABLET ORAL at 21:58

## 2021-12-13 RX ADMIN — AMLODIPINE BESYLATE 10 MG: 10 TABLET ORAL at 08:45

## 2021-12-13 RX ADMIN — INSULIN LISPRO 6 UNITS: 100 INJECTION, SOLUTION INTRAVENOUS; SUBCUTANEOUS at 08:45

## 2021-12-13 RX ADMIN — LISINOPRIL 10 MG: 10 TABLET ORAL at 08:45

## 2021-12-13 RX ADMIN — TRAZODONE HYDROCHLORIDE 50 MG: 50 TABLET ORAL at 22:34

## 2021-12-13 RX ADMIN — VILAZODONE HYDROCHLORIDE 10 MG: 10 TABLET ORAL at 08:45

## 2021-12-13 RX ADMIN — INSULIN LISPRO 4 UNITS: 100 INJECTION, SOLUTION INTRAVENOUS; SUBCUTANEOUS at 17:06

## 2021-12-13 RX ADMIN — INSULIN GLARGINE 40 UNITS: 100 INJECTION, SOLUTION SUBCUTANEOUS at 20:30

## 2021-12-13 NOTE — GROUP NOTE
Group Therapy Note    Date: 12/13/2021    Group Start Time: 1000  Group End Time: 1050  Group Topic: Psychoeducation    [facility]    CHRISTIANA Arnold LSW        Group Therapy Note    Attendees: 8         Patient's Goal:  To participate in a psychoeducational group therapy    Notes:  Patient is looking forward to receiving counseling services with Jarad Mayes after discharge     Status After Intervention:  Improved    Participation Level: Interactive    Participation Quality: Appropriate      Speech:  normal      Thought Process/Content: Logical      Affective Functioning: Congruent      Mood: calm      Level of consciousness:  Alert      Response to Learning: Able to verbalize current knowledge/experience      Endings: None Reported    Modes of Intervention: Education      Discipline Responsible: /Counselor      Signature:  CHRISTIANA Arnold LSW

## 2021-12-13 NOTE — PROGRESS NOTES
Patient did not attend wellness group despite encouragement by staff. Electronically signed by Susanne Hussein on 12/13/2021 at 2:44 PM

## 2021-12-13 NOTE — PROGRESS NOTES
Patient denies SI/HI or AVH. Patient is out social on the unit with peers. Patient polite and cooperative with care. Patient dep and anx 4/10. Patient appetite good. Patient is sleeping better. Patient has stitches on right index finger. Pt wants to remain free of illicit drugs and alcohol for his children.   Electronically signed by Li Frank LPN on 13/62/3053 at 1:11 PM

## 2021-12-13 NOTE — PROGRESS NOTES
Department of Psychiatry  Attending Progress Note 2021    CC/REASON FOR ADMIDSSION  Recent hospitalization/post discharge developed auditory hallucinations and attempted to kill himself by holding a knife to hi throat. Was saying he wanted to join his  brother    SUBJECTIVE:  Denies everything today  States wife is manipulative and toxic for him as he cannot get away from her. . States she started berating  Him in front of the children that he was a bad father and a no good  which just made him explode. Reported that he does not want to die, he has children to live for. He does admit that he needs hi depression to be under better control  Believes wife also needs help and she has not gotten it. OBJECTIVE: much more organized  eaisly conversing  Multitasking watching the game, soothing another patient and talking to me. Attending all groups today    Physical  VITALS:  /81   Pulse 72   Temp 98.6 °F (37 °C)   Resp 18   Ht 5' 9\" (1.753 m)   Wt 300 lb (136.1 kg)   SpO2 98%   BMI 44.30 kg/m²   CONSTITUTIONAL:  awake, alert, cooperative, no apparent distress, and appears stated age  . Mental Status Examination:  Level of consciousness:  within normal limits  Appearance:  ill-appearing  Behavior/Motor: normal  psychomotor retardation  Attitude toward examiner: pleasant   Speech: spontaneous  Mood: anxious  Affect:  mood congruent  Thought processes: within normal limits  Thought content:  Preoccupied with anxiety  Homocidal ideation denies  Suicidal Ideation: denies uicidal ideation  Delusions:  None elicited  Perceptual Disturbance:  denies any perceptual disturbance  Cognition:  Memory intact  Insight:  poor  Judgment:  poor  Impulse control poor  Protective factors:children  Data  Labs:    CBC with Differential:    Lab Results   Component Value Date    WBC 4.0 12/10/2021    RBC 4.13 12/10/2021    HGB 12.9 12/10/2021    HCT 39.0 12/10/2021     12/10/2021    MCV 94.5 12/10/2021 MCH 31.2 12/10/2021    MCHC 33.0 12/10/2021    RDW 13.4 12/10/2021    LYMPHOPCT 22.3 12/10/2021    MONOPCT 7.5 12/10/2021    BASOPCT 0.4 12/10/2021    MONOSABS 0.3 12/10/2021    LYMPHSABS 0.9 12/10/2021    EOSABS 0.0 12/10/2021    BASOSABS 0.0 12/10/2021     CMP:    Lab Results   Component Value Date     12/10/2021    K 4.6 12/10/2021     12/10/2021    CO2 24 12/10/2021    BUN 9 12/10/2021    CREATININE 0.96 12/10/2021    GFRAA >60.0 12/10/2021    LABGLOM >60.0 12/10/2021    GLUCOSE 277 12/10/2021    GLUCOSE 447 12/02/2021    PROT 6.8 12/10/2021    LABALBU 3.9 12/10/2021    CALCIUM 8.8 12/10/2021    BILITOT 0.5 12/10/2021    ALKPHOS 63 12/10/2021    AST 29 12/10/2021    ALT 30 12/10/2021     TSH:    Lab Results   Component Value Date    TSH 1.250 12/10/2021     VITAMIN B12: No components found for: B12  FOLATE:  No results found for: FOLATE  IRON:  No results found for: IRON    Medications  Current Facility-Administered Medications: lisinopril (PRINIVIL;ZESTRIL) tablet 10 mg, 10 mg, Oral, Daily  influenza quadrivalent split vaccine (FLUZONE;FLUARIX;FLULAVAL;AFLURIA) injection 0.5 mL, 0.5 mL, IntraMUSCular, Prior to discharge  vilazodone HCl (VIIBRYD) TABS 10 mg, 10 mg, Oral, Daily  QUEtiapine (SEROQUEL) tablet 50 mg, 50 mg, Oral, Nightly  amLODIPine (NORVASC) tablet 10 mg, 10 mg, Oral, Daily  [Held by provider] metFORMIN (GLUCOPHAGE) tablet 850 mg, 850 mg, Oral, Daily with breakfast  acetaminophen (TYLENOL) tablet 650 mg, 650 mg, Oral, Q4H PRN  magnesium hydroxide (MILK OF MAGNESIA) 400 MG/5ML suspension 30 mL, 30 mL, Oral, Daily PRN  aluminum & magnesium hydroxide-simethicone (MAALOX) 200-200-20 MG/5ML suspension 30 mL, 30 mL, Oral, PRN  haloperidol (HALDOL) tablet 5 mg, 5 mg, Oral, Q6H PRN **OR** haloperidol lactate (HALDOL) injection 5 mg, 5 mg, IntraMUSCular, Q6H PRN  benztropine mesylate (COGENTIN) injection 2 mg, 2 mg, IntraMUSCular, BID PRN  traZODone (DESYREL) tablet 50 mg, 50 mg, Oral, Nightly PRN  hydrOXYzine (VISTARIL) injection 50 mg, 50 mg, IntraMUSCular, Q6H PRN **OR** hydrOXYzine (VISTARIL) capsule 50 mg, 50 mg, Oral, Q6H PRN  glucose (GLUTOSE) 40 % oral gel 15 g, 15 g, Oral, PRN  dextrose 50 % IV solution, 12.5 g, IntraVENous, PRN  glucagon (rDNA) injection 1 mg, 1 mg, IntraMUSCular, PRN  dextrose 5 % solution, 100 mL/hr, IntraVENous, PRN  insulin glargine (LANTUS) injection vial 34 Units, 0.25 Units/kg, SubCUTAneous, Nightly  insulin lispro (HUMALOG) injection vial 0-12 Units, 0-12 Units, SubCUTAneous, TID WC  insulin lispro (HUMALOG) injection vial 0-6 Units, 0-6 Units, SubCUTAneous, Nightly    ASSESSMENT AND PLAN    Dx: MDD severe recurrent   PTSD  Cocaine use disorder    Plan: restart medications  seroquel for sleep  Collateral information  Needs drug rehab  . Geir Flores MD

## 2021-12-13 NOTE — GROUP NOTE
Group Therapy Note    Date: 12/12/2021    Group Start Time: 2130  Group End Time: 2140  Group Topic: Wrap-Up    [facility]        Group Therapy Note    Attendees: 9/22         Patient's Goal:  \"to go home\"    Notes:  Patient reported meeting their goal for the day. Patient reported enjoying his painting project this morning, and is looking forward to doing more work on it tomorrow.     Status After Intervention:  Unchanged    Participation Level: Interactive    Participation Quality: Appropriate, Attentive and Sharing      Speech:  normal      Thought Process/Content: Logical      Affective Functioning: Congruent      Mood: euthymic      Level of consciousness:  Alert and Attentive      Response to Learning: Progressing to goal      Endings: None Reported    Modes of Intervention: Support      Discipline Responsible: Soft [employer]      Signature:  Joslyn Lowery

## 2021-12-13 NOTE — PROGRESS NOTES
Patient did not attend group despite staff encouragement.   Electronically signed by Shirin Maher on 12/13/2021 at 5:35 PM

## 2021-12-13 NOTE — PROGRESS NOTES
Pt visible on unit more this afternoon. Seen eating meals and socializing with peers. Bright with peers but flat during interview. Reports that he wife lied on him. Denies every being suicidal. States, [employer] knows I have a history and that they would take me so she did this to me\". Reports understanding that he has to go to long-term after d/c. States, \"I just want to get this over with so I can start over\". Admits depression and anxiety that are \"managable\". Denies any SI, HI or AVH. Denies having any support as all his family has distanced themselves since his mothers passing. Feels conflicted because he knows he has to get away from his ex but in doing so means he wont get to see his kids \"for a while\". Plans to follow up with the [facility] after D/C and continue with medications as he believes they are helping. Currently in day room watching television with peers.

## 2021-12-13 NOTE — GROUP NOTE
Group Therapy Note    Date: 12/13/2021    Group Start Time: 0930  Group End Time: 0945  Group Topic: Community Meeting    [facility]        Group Therapy Note    Attendees: 18         Patient's Goal:  To get through the day    Notes:  Pt attended group meeting    Status After Intervention:  Unchanged    Participation Level:  Active Listener    Participation Quality: Appropriate and Sharing      Speech:  normal      Thought Process/Content: Logical      Affective Functioning: Congruent      Mood: euthymic      Level of consciousness:  Alert and Oriented x4      Response to Learning: Progressing to goal      Endings: None Reported    Modes of Intervention: Education      Discipline Responsible: [facility] [address] [facility] [employer]      Signature:  Kait Mensah

## 2021-12-13 NOTE — PROGRESS NOTES
[facility] FOLLOW-UP NOTE       12/13/2021     Patient was seen and examined in person, Chart reviewed   Patient's case discussed with staff/team    Chief Complaint: depression SI    Interim History:     Pt was arguing with his wife and he wanted to take his truck and leave  Pt was accused on threatening to take overdose  He has a cut on his finger while he grabbed the knife from her hand  She wanted to leave and he would not leave  Pt wanted to go and stay with brother  One brother committed suicide in 2018   since 2012- 6 kids. 16to 9year old  Pt is accusing his wife of sleeping with someone else     Patient has a history of cocaine abuse and was convicted recently with sentencing on 12/10 that he missed. He was scheduled to be sentenced to 1 year probation. There is an active warrant and [employer] Police to be notified upon discharge.     Appetite:   [x] Normal/Unchanged  [] Increased  [] Decreased      Sleep:       [] Normal/Unchanged  [x] Fair       [] Poor              Energy:    [] Normal/Unchanged  [] Increased  [x] Decreased        SI [] Present  [] Absent    HI  []Present  [] Absent     Aggression:  [] yes  [] no    Patient is [] able  [x] unable to CONTRACT FOR SAFETY     PAST MEDICAL/PSYCHIATRIC HISTORY:   Past Medical History:   Diagnosis Date    Asthma     Hypertension     Major depression, recurrent ([facility] 10/24/2018    Uncontrolled type 2 diabetes mellitus with complication, with long-term current use of insulin ([facility] 12/10/2017       FAMILY/SOCIAL HISTORY:  Family History   Problem Relation Age of Onset    Bipolar Disorder Mother     Bipolar Disorder Brother         comitted suicide     Social History     Socioeconomic History    Marital status:      Spouse name: Jose Ríos Number of children: 6    Years of education: Not on file    Highest education level: Not on file   Occupational History    Not on file   Tobacco Use    Smoking status: Current Every Day Smoker     Packs/day: 0.25     Years: 20.00     Pack years: 5.00     Types: Cigarettes    Smokeless tobacco: Never Used   Vaping Use    Vaping Use: Never used   Substance and Sexual Activity    Alcohol use: Yes     Comment: occasionally    Drug use: No     Comment: pt denies    Sexual activity: Not on file   Other Topics Concern    Not on file   Social History Narrative    Not on file     Social Determinants of Health     Financial Resource Strain:     Difficulty of Paying Living Expenses: Not on file   Food Insecurity:     Worried About Running Out of Food in the Last Year: Not on file    Jayme of Food in the Last Year: Not on file   Transportation Needs:     Lack of Transportation (Medical): Not on file    Lack of Transportation (Non-Medical): Not on file   Physical Activity:     Days of Exercise per Week: Not on file    Minutes of Exercise per Session: Not on file   Stress:     Feeling of Stress : Not on file   Social Connections:     Frequency of Communication with Friends and Family: Not on file    Frequency of Social Gatherings with Friends and Family: Not on file    Attends Advent Services: Not on file    Active Member of [address] [place] [address] or Organizations: Not on file    Attends Club or Organization Meetings: Not on file    Marital Status: Not on file   Intimate Partner Violence:     Fear of Current or Ex-Partner: Not on file    Emotionally Abused: Not on file    Physically Abused: Not on file    Sexually Abused: Not on file   Housing Stability:     Unable to Pay for Housing in the Last Year: Not on file    Number of [place] in the Last Year: Not on file    Unstable Housing in the Last Year: Not on file           ROS:  [x] All negative/unchanged except if checked.  Explain positive(checked items) below:  [] Constitutional  [] Eyes  [] Ear/Nose/Mouth/Throat  [] Respiratory  [] CV  [] GI  []   [] Musculoskeletal  [] Skin/Breast  [] Neurological  [] Endocrine  [] Heme/Lymph  [] Allergic/Immunologic    Explanation:     MEDICATIONS:    Current Facility-Administered Medications:     lisinopril (PRINIVIL;ZESTRIL) tablet 10 mg, 10 mg, Oral, Daily, JAY Cash NP, 10 mg at 12/13/21 0845    influenza quadrivalent split vaccine (FLUZONE;FLUARIX;FLULAVAL;AFLURIA) injection 0.5 mL, 0.5 mL, IntraMUSCular, Prior to discharge, Teodoro Morelos MD  [facility]  vilazodone HCl (VIIBRYD) TABS 10 mg, 10 mg, Oral, Daily, Michela Mckinney MD, 10 mg at 12/13/21 0845    QUEtiapine (SEROQUEL) tablet 50 mg, 50 mg, Oral, Nightly, Caroline Ross MD, 50 mg at 12/12/21 2204    amLODIPine (NORVASC) tablet 10 mg, 10 mg, Oral, Daily, Michela Mckinney MD, 10 mg at 12/13/21 0845    [Held by provider] metFORMIN (GLUCOPHAGE) tablet 850 mg, 850 mg, Oral, Daily with breakfast, Michela Mckinney MD    acetaminophen (TYLENOL) tablet 650 mg, 650 mg, Oral, Q4H PRN, Michela Mckinney MD    magnesium hydroxide (MILK OF MAGNESIA) 400 MG/5ML suspension 30 mL, 30 mL, Oral, Daily PRN, Michela Mckinney MD    aluminum & magnesium hydroxide-simethicone (MAALOX) 200-200-20 MG/5ML suspension 30 mL, 30 mL, Oral, PRN, Caroline Ross MD    haloperidol (HALDOL) tablet 5 mg, 5 mg, Oral, Q6H PRN **OR** haloperidol lactate (HALDOL) injection 5 mg, 5 mg, IntraMUSCular, Q6H PRN, Michela Mckinney MD    benztropine mesylate (COGENTIN) injection 2 mg, 2 mg, IntraMUSCular, BID PRN, Michela Mckinney MD    traZODone (DESYREL) tablet 50 mg, 50 mg, Oral, Nightly PRN, Michela Mckinney MD, 50 mg at 12/11/21 6732    hydrOXYzine (VISTARIL) injection 50 mg, 50 mg, IntraMUSCular, Q6H PRN **OR** hydrOXYzine (VISTARIL) capsule 50 mg, 50 mg, Oral, Q6H PRN, Caroline Ross MD    glucose (GLUTOSE) 40 % oral gel 15 g, 15 g, Oral, PRN, Tommy Melton, JAY - NP    dextrose 50 % IV solution, 12.5 g, IntraVENous, PRN, Tommy Melton, APRN - NP    glucagon (rDNA) injection 1 mg, 1 mg, IntraMUSCular, PRN, JAY Jerome NP    dextrose 5 % solution, 100 mL/hr, IntraVENous, PRN, JAY Jerome NP    insulin glargine (LANTUS) injection vial 34 Units, 0.25 Units/kg, SubCUTAneous, Nightly, JAY Jerome NP, 34 Units at 12/12/21 2032    insulin lispro (HUMALOG) injection vial 0-12 Units, 0-12 Units, SubCUTAneous, TID WC, JAY Jerome NP, 6 Units at 12/13/21 0845    insulin lispro (HUMALOG) injection vial 0-6 Units, 0-6 Units, SubCUTAneous, Nightly, JAY Jerome NP, 2 Units at 12/12/21 2034      Examination:  BP (!) 128/93 Comment: RN Notified  Pulse 82   Temp 97.3 °F (36.3 °C)   Resp 18   Ht 5' 9\" (1.753 m)   Wt 300 lb (136.1 kg)   SpO2 96%   BMI 44.30 kg/m²   Gait - steady  Medication side effects(SE): no    Mental Status Examination:    Level of consciousness:  within normal limits   Appearance:  fair grooming and fair hygiene  Behavior/Motor:  psychomotor retardation  Attitude toward examiner:  attentive  Speech:  slow   Mood: depressed  Affect:  mood congruent  Thought processes:  goal directed   Thought content:  Suicidal Ideation:  denies suicidal ideation  Cognition:  oriented to person, place, and time   Concentration distractible  Insight poor   Judgement poor     ASSESSMENT:   Patient symptoms are:  [] Well controlled  [] Improving  [] Worsening  [x] No change      Diagnosis:   Principal Problem:    Severe episode of recurrent major depressive disorder, without psychotic features ([facility]  Active Problems:    PTSD (post-traumatic stress disorder)    Cocaine use  Resolved Problems:    * No resolved hospital problems.  *      LABS:    Recent Labs     12/10/21  1821   WBC 4.0*   HGB 12.9*        Recent Labs     12/10/21  1821      K 4.6      CO2 24   BUN 9   CREATININE 0.96   GLUCOSE 277*     Recent Labs     12/10/21  1821   BILITOT 0.5   ALKPHOS 63   AST 29   ALT 30     Lab Results   Component Value Date    LABAMPH Neg 12/10/2021    BARBSCNU Neg 12/10/2021    LABBENZ Neg 12/10/2021    LABMETH Neg 12/10/2021    OPIATESCREENURINE Neg 12/10/2021    PHENCYCLIDINESCREENURINE Neg 12/10/2021    ETOH <10 12/10/2021     Lab Results   Component Value Date    TSH 1.250 12/10/2021     No results found for: LITHIUM  No results found for: VALPROATE, CBMZ      Treatment Plan:  Reviewed current Medications with the patient. Risks, benefits, side effects, drug-to-drug interactions and alternatives to treatment were discussed. Collateral information:   CD evaluation  Encourage patient to attend group and other milieu activities.   Discharge planning discussed with the patient and treatment team.    PSYCHOTHERAPY/COUNSELING:  [x] Therapeutic interview  [x] Supportive  [] CBT  [] Ongoing  [] Other    [x] Patient continues to need, on a daily basis, active treatment furnished directly by or requiring the supervision of inpatient psychiatric personnel      Anticipated Length of stay:            Electronically signed by Lisa Robledo MD on 12/13/2021 at 11:20 AM

## 2021-12-13 NOTE — CARE COORDINATION
Group Therapy Note    Date:12/13/2021  Start Time:1430  End Time:  1500    Number of Participants: 9    Type of Group: Cognitive Skills    Patient's Goal:  To participate in mood management group. Notes: Patient declined to attend psychoeducation group at 1430 despite encouragement by staff.      Discipline Responsible: /Counselor    [facility]

## 2021-12-13 NOTE — GROUP NOTE
Group Therapy Note    Date: 12/12/2021    Group Start Time: 2100  Group End Time: 2130  Group Topic: Recreational    [facility]W I    Patricia Swan        Group Therapy Note    Attendees: 9/22         Patient's Goal:  To play Matthew Brent Huggins with the group. Notes:  Patient played the game with peers after arriving late.     Status After Intervention:  Improved    Participation Level: Interactive    Participation Quality: Appropriate and Attentive      Speech:  normal      Thought Process/Content: Logical      Affective Functioning: Congruent      Mood: euthymic      Level of consciousness:  Alert and Attentive      Response to Learning: Progressing to goal      Endings: None Reported    Modes of Intervention: Activity      Discipline Responsible: [employer] - Biomolecular Determination      Signature:  Patricia Swan

## 2021-12-14 VITALS
HEART RATE: 90 BPM | DIASTOLIC BLOOD PRESSURE: 84 MMHG | OXYGEN SATURATION: 96 % | BODY MASS INDEX: 44.43 KG/M2 | RESPIRATION RATE: 18 BRPM | SYSTOLIC BLOOD PRESSURE: 120 MMHG | TEMPERATURE: 98.6 F | HEIGHT: 69 IN | WEIGHT: 300 LBS

## 2021-12-14 LAB
GLUCOSE BLD-MCNC: 117 MG/DL (ref 60–115)
GLUCOSE BLD-MCNC: 248 MG/DL (ref 60–115)
PERFORMED ON: ABNORMAL
PERFORMED ON: ABNORMAL

## 2021-12-14 PROCEDURE — 6370000000 HC RX 637 (ALT 250 FOR IP): Performed by: NURSE PRACTITIONER

## 2021-12-14 PROCEDURE — 6370000000 HC RX 637 (ALT 250 FOR IP): Performed by: INTERNAL MEDICINE

## 2021-12-14 PROCEDURE — 99232 SBSQ HOSP IP/OBS MODERATE 35: CPT | Performed by: INTERNAL MEDICINE

## 2021-12-14 PROCEDURE — 99239 HOSP IP/OBS DSCHRG MGMT >30: CPT | Performed by: PSYCHIATRY & NEUROLOGY

## 2021-12-14 PROCEDURE — 6370000000 HC RX 637 (ALT 250 FOR IP): Performed by: PSYCHIATRY & NEUROLOGY

## 2021-12-14 RX ORDER — LISINOPRIL 10 MG/1
10 TABLET ORAL DAILY
Qty: 30 TABLET | Refills: 3 | Status: SHIPPED | OUTPATIENT
Start: 2021-12-15

## 2021-12-14 RX ORDER — AMLODIPINE BESYLATE 10 MG/1
10 TABLET ORAL DAILY
Qty: 30 TABLET | Refills: 3 | Status: SHIPPED | OUTPATIENT
Start: 2021-12-14

## 2021-12-14 RX ORDER — INSULIN GLARGINE 100 [IU]/ML
40 INJECTION, SOLUTION SUBCUTANEOUS NIGHTLY
Qty: 10 ML | Refills: 3 | Status: SHIPPED | OUTPATIENT
Start: 2021-12-14

## 2021-12-14 RX ADMIN — VILAZODONE HYDROCHLORIDE 10 MG: 10 TABLET ORAL at 08:44

## 2021-12-14 RX ADMIN — AMLODIPINE BESYLATE 10 MG: 10 TABLET ORAL at 08:44

## 2021-12-14 RX ADMIN — METFORMIN HYDROCHLORIDE 850 MG: 850 TABLET ORAL at 08:44

## 2021-12-14 RX ADMIN — INSULIN LISPRO 4 UNITS: 100 INJECTION, SOLUTION INTRAVENOUS; SUBCUTANEOUS at 08:43

## 2021-12-14 RX ADMIN — LISINOPRIL 10 MG: 10 TABLET ORAL at 08:44

## 2021-12-14 NOTE — PROGRESS NOTES
Patient did not attend wellness group despite encouragement by staff. Electronically signed by Shira Neff on 12/14/2021 at 2:50 PM

## 2021-12-14 NOTE — DISCHARGE SUMMARY
DISCHARGE SUMMARY      Patient ID:  Lisa Christine  [id number]  26 y.o.  1985      Admit date: 12/10/2021    Discharge date and time: 2021    Admitting Physician: Gregg Welch MD     Discharge Physician: Dr Shiraz Cowart MD    Admission Diagnoses: Major depression, recurrent ([facility] [F33.9]  Current moderate episode of major depressive disorder without prior episode ([facility] [F32.1]    Admission Condition: poor    Discharged Condition: stable    Admission Circumstance:     Patient attempted to kill himself by holding a knife to his neck. Per wife he was talking to his  brother who  of suicide     HPI: Patient was admitted to [facility] beginning of December and was discharged a few days prior to this current hospitalization. Patient was discharged to his ex wife and his children. Per wife, he became aggressive grabbed a knife, held it to his neck, wife tried to take the knife and in the process patient cut his finger. Patients wife reports that he was talking to his  brother  He was supposed to have  Court appearance on Friday but due to the circumstances he did not show up and now he has a warrant [place] to notify [phone] [place] [employer] before discharge) he toyin endorse memories of childhood and the plight of he and his siblings  Of note: patient was very sleepy had to ask repeatedly to get the information     Previous psychiatric hx: Multiple admissions  Dx: MDD  Cocaine use disorder  Traumatic childhood        Substance use: cocaine yes  Alcohol yes  THC yes     Legal yes   Family psyche hx: yes mother depression  Brother committed suicide  Childhood social: grew up in [place]  He had 2 siblings  All three were raised in foster homes. Finished high school  Very difficult relations with wife and they have children together and patient has 4 children from other relationships  Appetite:   [x]? Normal/Unchanged  []? Increased  []? Decreased       Sleep:       []? Normal/Unchanged  []?  Ramesh Gu [x]? Poor                 Energy:    []? Normal/Unchanged  []? Increased  [x]? Decreased        SI [x]? Present  []? Absent     HI  []? Present  [x]? Absent      Aggression:  [x]? yes  []? no         PAST MEDICAL/PSYCHIATRIC HISTORY:   Past Medical History:   Diagnosis Date    Asthma     Hypertension     Major depression, recurrent ([facility] 10/24/2018    Uncontrolled type 2 diabetes mellitus with complication, with long-term current use of insulin ([facility] 12/10/2017       FAMILY/SOCIAL HISTORY:  Family History   Problem Relation Age of Onset    Bipolar Disorder Mother     Bipolar Disorder Brother         comitted suicide     Social History     Socioeconomic History    Marital status:      Spouse name: Zonia Corey    Number of children: 6    Years of education: Not on file    Highest education level: Not on file   Occupational History    Not on file   Tobacco Use    Smoking status: Current Every Day Smoker     Packs/day: 0.25     Years: 20.00     Pack years: 5.00     Types: Cigarettes    Smokeless tobacco: Never Used   Vaping Use    Vaping Use: Never used   Substance and Sexual Activity    Alcohol use: Yes     Comment: occasionally    Drug use: No     Comment: pt denies    Sexual activity: Not on file   Other Topics Concern    Not on file   Social History Narrative    Not on file     Social Determinants of Health     Financial Resource Strain:     Difficulty of Paying Living Expenses: Not on file   Food Insecurity:     Worried About Running Out of Food in the Last Year: Not on file    Jayme of Food in the Last Year: Not on file   Transportation Needs:     Lack of Transportation (Medical): Not on file    Lack of Transportation (Non-Medical):  Not on file   Physical Activity:     Days of Exercise per Week: Not on file    Minutes of Exercise per Session: Not on file   Stress:     Feeling of Stress : Not on file   Social Connections:     Frequency of Communication with Friends and Family: Not on file    Frequency of Social Gatherings with Friends and Family: Not on file    Attends Zoroastrian Services: Not on file    Active Member of Clubs or Organizations: Not on file    Attends Club or Organization Meetings: Not on file    Marital Status: Not on file   Intimate Partner Violence:     Fear of Current or Ex-Partner: Not on file    Emotionally Abused: Not on file    Physically Abused: Not on file    Sexually Abused: Not on file   Housing Stability:     Unable to Pay for Housing in the Last Year: Not on file    Number of [place] in the Last Year: Not on file    Unstable Housing in the Last Year: Not on file       MEDICATIONS:    Current Facility-Administered Medications:     insulin glargine (LANTUS) injection vial 40 Units, 40 Units, SubCUTAneous, Nightly, Kumar Cisneros MD, 40 Units at 12/13/21 2030    insulin lispro (HUMALOG) injection vial 10 Units, 10 Units, SubCUTAneous, TID , Kumar Cisneros MD, 10 Units at 12/14/21 0847    metFORMIN (GLUCOPHAGE) tablet 850 mg, 850 mg, Oral, BID , Drew Alcocer MD, 850 mg at 12/14/21 0844    lisinopril (PRINIVIL;ZESTRIL) tablet 10 mg, 10 mg, Oral, Daily, JAY Milton - COLTEN, 10 mg at 12/14/21 0844    influenza quadrivalent split vaccine (FLUZONE;FLUARIX;FLULAVAL;AFLURIA) injection 0.5 mL, 0.5 mL, IntraMUSCular, Prior to discharge, Stanton Peña MD    vilazodone HCl (VIIBRYD) TABS 10 mg, 10 mg, Oral, Daily, Caroline Ross MD, 10 mg at 12/14/21 0844    QUEtiapine (SEROQUEL) tablet 50 mg, 50 mg, Oral, Nightly, Caroline Ross MD, 50 mg at 12/13/21 2158    amLODIPine (NORVASC) tablet 10 mg, 10 mg, Oral, Daily, Caroline Ross MD, 10 mg at 12/14/21 0844    acetaminophen (TYLENOL) tablet 650 mg, 650 mg, Oral, Q4H PRN, Caroline Ross MD    magnesium hydroxide (MILK OF MAGNESIA) 400 MG/5ML suspension 30 mL, 30 mL, Oral, Daily PRN, Sera Ross MD    aluminum & magnesium hydroxide-simethicone (MAALOX) 660-947-98 MG/5ML suspension 30 mL, 30 mL, Oral, PRN, Serg Ross MD    haloperidol (HALDOL) tablet 5 mg, 5 mg, Oral, Q6H PRN **OR** haloperidol lactate (HALDOL) injection 5 mg, 5 mg, IntraMUSCular, Q6H PRN, Serg Ross MD    benztropine mesylate (COGENTIN) injection 2 mg, 2 mg, IntraMUSCular, BID PRN, Serg Ross MD    traZODone (DESYREL) tablet 50 mg, 50 mg, Oral, Nightly PRN, Serg Ross MD, 50 mg at 12/13/21 2234    hydrOXYzine (VISTARIL) injection 50 mg, 50 mg, IntraMUSCular, Q6H PRN **OR** hydrOXYzine (VISTARIL) capsule 50 mg, 50 mg, Oral, Q6H PRN, Caroline Ross MD    glucose (GLUTOSE) 40 % oral gel 15 g, 15 g, Oral, PRN, JAY Jerome NP    dextrose 50 % IV solution, 12.5 g, IntraVENous, PRN, JAY Jerome NP    glucagon (rDNA) injection 1 mg, 1 mg, IntraMUSCular, PRN, JAY Jerome NP    dextrose 5 % solution, 100 mL/hr, IntraVENous, PRN, JAY Jerome NP    insulin lispro (HUMALOG) injection vial 0-12 Units, 0-12 Units, SubCUTAneous, TID WC, JAY Jerome NP, 4 Units at 12/14/21 0843    insulin lispro (HUMALOG) injection vial 0-6 Units, 0-6 Units, SubCUTAneous, Nightly, JAY Jerome NP, 4 Units at 12/13/21 2029    Examination:  /84   Pulse 90   Temp 98.6 °F (37 °C)   Resp 18   Ht 5' 9\" (1.753 m)   Wt 300 lb (136.1 kg)   SpO2 96%   BMI 44.30 kg/m²   Gait - steady    HOSPITAL COURSE[de-identified]  Following admission to the hospital, patient had a complete physical exam and blood work up  Patient was monitored closely with suicide precaution  Patient was started on meds as listed below  Was encouraged to participate in group and other milieu activity  Patient started to feel better with this combination of treatment. Significant progress in the symptoms since admission.     Mood better, with the score of 2/10 - bad  No AVH or paranoid thoughts  No Hopeless or worthless feeling  No active SI/HI  Appetite:  [x] Normal  [] Increased  [] Decreased    Sleep:       [x] Normal  [] Fair       [] Poor            Energy:    [x] Normal  [] Increased  [] Decreased     SI [] Present  [x] Absent  HI  []Present  [x] Absent   Aggression:  [] yes  [] no  Patient is [x] able  [] unable to CONTRACT FOR SAFETY   Medication side effects(SE):  [x] None(Psych. Meds.) [] Other      Mental Status Examination on discharge:    Level of consciousness:  within normal limits   Appearance:  well-appearing  Behavior/Motor:  no abnormalities noted  Attitude toward examiner:  attentive and good eye contact  Speech:  spontaneous, normal rate and normal volume   Mood: euthymic  Affect:  mood congruent  Thought processes:  coherent   Thought content:  Suicidal Ideation:  denies suicidal ideation  Delusions:  no evidence of delusions  Perceptual Disturbance:  denies any perceptual disturbance  Cognition:  oriented to person, place, and time   Concentration intact  Memory intact  Insight good   Judgement fair   Fund of Knowledge adequate      ASSESSMENT:  Patient symptoms are:  [x] Well controlled  [x] Improving  [] Worsening  [] No change      Diagnosis:  Principal Problem:    Severe episode of recurrent major depressive disorder, without psychotic features (HCC)  Active Problems:    PTSD (post-traumatic stress disorder)    Cocaine use    Uncontrolled type 2 diabetes mellitus with hyperglycemia ([facility]  Resolved Problems:    * No resolved hospital problems. *      LABS:    No results for input(s): WBC, HGB, PLT in the last 72 hours. No results for input(s): NA, K, CL, CO2, BUN, CREATININE, GLUCOSE in the last 72 hours. No results for input(s): BILITOT, ALKPHOS, AST, ALT in the last 72 hours.   Lab Results   Component Value Date    LABAMPH Neg 12/10/2021    BARBSCNU Neg 12/10/2021    LABBENZ Neg 12/10/2021    LABMETH Neg 12/10/2021    OPIATESCREENURINE Neg 12/10/2021    PHENCYCLIDINESCREENURINE Neg 12/10/2021    ETOH <10 12/10/2021     Lab Results   Component Value Date    TSH 1.250 12/10/2021     No results found for: LITHIUM  No results found for: VALPROATE, CBMZ    RISK ASSESSMENT AT DISCHARGE: Low risk for suicide and homicide. Treatment Plan:  Reviewed current Medications with the patient. Education provided on the complaince with treatment. Risks, benefits, side effects, drug-to-drug interactions and alternatives to treatment were discussed. Encourage patient to attend outpatient follow up appointment and therapy. Patient was advised to call the outpatient provider, visit the nearest ED or call 911 if symptoms are not manageable. Patient's family member was contacted prior to the discharge. Medication List      CONTINUE taking these medications    * FreeStyle Lancets Misc  1 each by Does not apply route 4 times daily     * Lancets Misc  Pt test 4x daily Dx E10.65     glucose monitoring kit  1 kit by Does not apply route daily     metFORMIN 500 MG tablet  Commonly known as: GLUCOPHAGE     prazosin 1 MG capsule  Commonly known as: MINIPRESS     SEROquel 50 MG tablet  Generic drug: QUEtiapine     vilazodone hcl 10 MG Tabs  Generic drug: vilazodone HCl         * This list has 2 medication(s) that are the same as other medications prescribed for you. Read the directions carefully, and ask your doctor or other care provider to review them with you.             STOP taking these medications    blood glucose test strips     blood glucose test strips strip  Commonly known as: FREESTYLE LITE        ASK your doctor about these medications    amLODIPine 10 MG tablet  Commonly known as: NORVASC     HumuLIN 70/30 (70-30) 100 UNIT per ML injection vial  Generic drug: insulin 70-30     Lantus 100 UNIT/ML injection vial  Generic drug: insulin glargine              Reason for more than one antipsychotic:   [x] N/A  [] 3 failed monotherapy(drugs tried):  [] Cross over to a new antipsychotic  [] Taper to monotherapy from polypharmacy  [] Augmentation of Clozapine therapy due to treatment resistance to single therapy        TIME SPEND - 35 MINUTES TO COMPLETE THE EVALUATION, DISCHARGE SUMMARY, MEDICATION RECONCILIATION AND FOLLOW UP CARE     Signed:  Judy Raman MD  12/14/2021  9:34 AM

## 2021-12-14 NOTE — PROGRESS NOTES
Morning Community Meeting Topics    Warden Bar attended the morning community meeting on 12/14/21. Topics discussed today     [x] Introduction   Day of the week and date   Mask distribution   Current mask requirements  [x]Teams   Explanation of  Green and Blue team criteria   Nurses assigned to each team for today   Explanation about green and blue paper  o Date  o Patient's Name  o Patient's Nurse  o Goals  [x] Visitation   Announce the visiting hours for the day   Announce which team is allowed to have visitors for the day   Review any updated Covid 19 requirements for visitors during visitation  o Vaccine Card or negative Covid test within 48 hours of visit  o State Identification   Patients are reminded to alert the  at least 1 hour before visitation   [x] Unit Orientation   Coffee use   Phone location and etiquette   Shower locations  [employer] and dryer location and process   Common area expectations   Staff rounds expectation  [x] Meals    Educate patient to the menu  o The patient is encouraged to fill out the menu to get preferences at mealtime  o The patient is educated that if they do not fill out the menu, they will get the standard tray  o The coffee pot is decaf, patient encouraged to order regular coffee from menu.    Educate patient to the meal process   Patient encouraged to eat snacks provided twice daily  o Snacks may stay in patient room     [x] Discharge Process   Discharge expectations   Fill out the survey after discharge   [x] Hygiene   Daily showers encouraged  o Showers availability discussed    Daily dressing encouraged  o Discussed wearing street clothing   Education provided on where to place linens and clothing  o Linens in the hamper  o personal clothing does not go into the linen hamper  [x] Group    Patient encouraged to attend group provided   Time of Group Meetings discussed   Gentle reminder that attendance is a Physician order  [x] Movement   Chair exercises completed   Stretching completed  Notes:Goal - \"To have a good day\" Electronically signed by EZRA Gandara on 12/14/2021 at 12:20 PM

## 2021-12-14 NOTE — GROUP NOTE
Group Therapy Note    Date: 12/14/2021    Group Start Time: 1000  Group End Time: 1050  Group Topic: Psychoeducation    [facility]    Emile Machado        Group Therapy Note    Attendees: 16         Patient's Goal:  \"To have a good day\"    Notes:  Patient attended the 1000 skills group. Patient was attentive, had a brighter affect and he work actively on his task. Status After Intervention:  Improved    Participation Level:  Active Listener    Participation Quality: Appropriate      Speech:  normal      Thought Process/Content: Linear      Affective Functioning: Congruent      Mood: calm      Level of consciousness:  Alert      Response to Learning: Able to retain information      Endings: None Reported    Modes of Intervention: Education, Socialization and Activity      Discipline Responsible: Psychoeducational Specialist      Signature:  Emile Machado

## 2021-12-14 NOTE — PROGRESS NOTES
Pt up to nurse's station requesting trazodone. States he has a hx of seroquel not working and expressed desire to sleep tonight. Medicated with PRN trazodone per pt request. Remains in day room socializing with peers.

## 2021-12-14 NOTE — DISCHARGE INSTR - DIET

## 2021-12-14 NOTE — GROUP NOTE
Group Therapy Note    Date: 12/13/2021    Group Start Time: 1920  Group End Time: 2000  Group Topic: Recreational    [facility]    [place]        Group Therapy Note    Attendees: 13/18         Patient's Goal:  To participate in [facility]    Notes: Patient actively participated in group. Status After Intervention:  Improved    Participation Level:  Active Listener and Interactive    Participation Quality: Appropriate, Attentive and Supportive      Speech:  normal      Thought Process/Content: Logical      Affective Functioning: Congruent      Mood: elevated      Level of consciousness:  Alert and Attentive      Response to Learning: Progressing to goal      Endings: None Reported    Modes of Intervention: Activity      Discipline Responsible: [address] [facility] [employer]      Signature:  Christopher Floyd

## 2021-12-14 NOTE — CONSULTS
Meenu Hastings [address]                      [address], [address] [facility]                                  CONSULTATION    PATIENT NAME: [place] Service                        :        1985  MED REC NO:   [id number]                            ROOM:       C681  ACCOUNT NO:   [de-identified]                           ADMIT DATE: 12/10/2021  PROVIDER:     Martha Hitchcock MD    CONSULT DATE:  2021    ENDOCRINE CONSULTATION    REFERRING PROVIDER:  Alphonso Sloan NP    REASON FOR CONSULTATION:  Management of uncontrolled type 2 diabetes. CHIEF COMPLAINT AND HISTORY OF PRESENT ILLNESS:  The patient is a  73-year-old male with known history of diabetes for many years, has not  been following up with any physician for a while. Hemoglobin A1c was  14. Prior A1c is fluctuating between 7-14.6 range. Blood sugars have  been higher mostly postprandial between 2-300 range. The patient  currently on Lantus 34 at night, Humalog coverage. Also, has been on  metformin. The patient also obese, BMI of 44. Admitted to behavioral  unit for attempting to kill himself by suicidal attempt. PAST MEDICAL HISTORY:  Significant for type 2 diabetes, asthma,  hypertension, depression, obesity. PAST SURGICAL HISTORY:  Reviewed, noncontributory. FAMILY HISTORY:  Bipolar disorder. PERSONAL AND SOCIAL HISTORY:  Currently does smoke cigarettes and does  use alcohol. CURRENT MEDICATIONS:  Meds here include Lantus 34 at night, Humalog  coverage, Norvasc, metformin 850 mg daily which has been on hold,  Seroquel. ALLERGIES:  SHELLFISH. REVIEW OF SYSTEMS:  Other than suicidal ideation attempt, 14-point  review of systems was negative. PHYSICAL EXAMINATION:  GENERAL:  The patient is alert, awake, oriented x3, in no obvious  distress. VITAL SIGNS:  Blood pressure 127/91, pulse rate was 73, respiratory rate  was 18, temperature 97.3. HEENT:  Normocephalic, atraumatic.   Pupils equal, reactive to light. Oral mucosa was moist.  NECK:  Supple. Acanthosis nigricans was noted. Examination of neck  does reveal a goiter also. CHEST:  Lungs were clear to auscultation bilaterally. No wheezing or  crackles were heard. CARDIOVASCULAR:  Heart sounds were normal.  No murmurs or thrills were  present. ABDOMEN:  Soft, moderately obese. Bowel sounds were present. EXTREMITIES:  Lower extremities reveal flat feet with fungal infection,  bilateral toenails. SKIN:  Intact. MUSCULOSKELETAL:  No joint swelling. NEUROLOGIC:  Cranial nerves I through XII were intact. PSYCHIATRIC:  Depressed affect. LABORATORY DATA:  As above. ASSESSMENT:  Uncontrolled diabetes, goiter, gynecomastia, obesity,  suicidal ideation attempt, insulin resistance. PLAN:  Increase Lantus to 40 at night, Humalog 10 with each meals. Continue medium dose sliding scale. Increase metformin 850 mg twice a  day. We will also get thyroid ultrasound and testosterone level. The  patient also has history of sleep apnea. I would discuss with treatment  once he is discharged home. Continue other supportive measures as per  Psychiatry. Blood sugar goal 120-180 range. Long-term A1c goal of 7 or  lower. Total time spent was 55 minutes. Thank you for the consult.         Ebenezer Dash MD    D: 12/13/2021 23:08:08       T: 12/13/2021 23:10:39     [email or web address]  Job#: [id number]     Doc#: [id number]

## 2021-12-14 NOTE — PROGRESS NOTES
Patient did not attend group d/t being off unit.   Electronically signed by Glen Kenyon on 12/13/2021 at 10:06 PM

## 2021-12-14 NOTE — PROGRESS NOTES
Progress Note  Date:2021       [facility]  Patient Name:Cyrus Ford     YOB: 1985     Age:35 y.o. Chief complaint uncontrolled type 2 diabetes    Subjective    Subjective:  Symptoms:  Stable. Diet:  Adequate intake. Activity level: Returning to normal.       Review of Systems   HENT: Negative. Endocrine: Negative. Psychiatric/Behavioral: Positive for dysphoric mood. All other systems reviewed and are negative. Objective         Vitals Last 24 Hours:  TEMPERATURE:  Temp  Av.2 °F (36.8 °C)  Min: 97.8 °F (36.6 °C)  Max: 98.6 °F (37 °C)  RESPIRATIONS RANGE: Resp  Av  Min: 18  Max: 18  PULSE OXIMETRY RANGE: SpO2  Av.3 %  Min: 96 %  Max: 99 %  PULSE RANGE: Pulse  Av.3  Min: 73  Max: 90  BLOOD PRESSURE RANGE: Systolic (68ELK), EZJ:503 , Min:120 , VQM:883   ; Diastolic (96TTS), DCV:62, Min:84, Max:93    I/O (24Hr): No intake or output data in the 24 hours ending 21 1359  Objective:  General Appearance:  Comfortable. Vital signs: (most recent): Blood pressure 120/84, pulse 90, temperature 98.6 °F (37 °C), resp. rate 18, height 5' 9\" (1.753 m), weight 300 lb (136.1 kg), SpO2 96 %. Vital signs are normal.    HEENT: Normal HEENT exam.    Lungs:  Normal effort and normal respiratory rate. Heart: Normal rate. Abdomen: Abdomen is soft. Extremities: Normal range of motion. Neurological: Patient is alert and oriented to person, place and time. Skin:  No rash. Labs/Imaging/Diagnostics    Labs:  CBC:No results for input(s): WBC, RBC, HGB, HCT, MCV, RDW, PLT in the last 72 hours. CHEMISTRIES:No results for input(s): NA, K, CL, CO2, BUN, CREATININE, GLUCOSE, PHOS, MG in the last 72 hours. Invalid input(s): CA  PT/INR:No results for input(s): PROTIME, INR in the last 72 hours. APTT:No results for input(s): APTT in the last 72 hours.   LIVER PROFILE:No results for input(s): AST, ALT, BILIDIR, BILITOT, ALKPHOS in the last 72 hours.    Imaging Last 24 Hours:  US HEAD NECK SOFT TISSUE THYROID    Result Date: 12/14/2021  EXAMINATION: Thyroid sonogram CLINICAL HISTORY: Goiter FINDINGS: Images of the thyroid gland revealed the gland homogeneous in echotexture and normal in size and contour. No thyroid lesion identified. Right lobe measures 4.3 x 1.7 x 1.5 cm. Left lobe measures 4.6 x 1.7 x 1.5 cm. Isthmus is slightly prominent at 0.6 cm in thickness. No significant lymphadenopathy. UNREMARKABLE THYROID SONOGRAM    Assessment//Plan           Hospital Problems           Last Modified POA    * (Principal) Severe episode of recurrent major depressive disorder, without psychotic features ([facility] [address] 12/13/2021 Yes    PTSD (post-traumatic stress disorder) 12/13/2021 Yes    Cocaine use 12/13/2021 Yes    Uncontrolled type 2 diabetes mellitus with hyperglycemia ([facility] [address] 12/13/2021 Yes        Assessment:    Condition: In stable condition. Unchanged. (Uncontrolled type 2 diabetes  Morbid obesity  Insulin resistance  History of depression suicidal ideation  Substance abuse  Thyroid ultrasound was normal). Plan:   (Continue Lantus 40 units at bedtime Humalog 10 units with each meals plus Metformin 850 mg twice daily discharge patient from endocrine service follow-up in 2 weeks advised to have discussed about insulin pump therapy long-term A1c  Total time spent was 25 minutes).        Electronically signed by Manoj Delarosa MD on 12/14/21 at 1:59 PM EST

## 2021-12-17 LAB
SEX HORMONE BINDING GLOBULIN: 17 NMOL/L (ref 11–80)
TESTOSTERONE FREE PERCENT: 2.4 % (ref 1.6–2.9)
TESTOSTERONE FREE, CALC: 51 PG/ML (ref 47–244)
TESTOSTERONE TOTAL-MALE: 214 NG/DL (ref 300–1080)

## 2022-01-06 ENCOUNTER — HOSPITAL ENCOUNTER (EMERGENCY)
Age: 37
Discharge: HOME | End: 2022-01-06
Payer: MEDICARE

## 2022-01-06 VITALS
BODY MASS INDEX: 44.43 KG/M2 | DIASTOLIC BLOOD PRESSURE: 91 MMHG | TEMPERATURE: 98.2 F | OXYGEN SATURATION: 97 % | SYSTOLIC BLOOD PRESSURE: 123 MMHG | WEIGHT: 300 LBS | RESPIRATION RATE: 18 BRPM | HEART RATE: 106 BPM | HEIGHT: 69 IN

## 2022-01-06 DIAGNOSIS — L03.011 CELLULITIS OF FINGER OF RIGHT HAND: Primary | ICD-10-CM

## 2022-01-06 PROCEDURE — 96372 THER/PROPH/DIAG INJ SC/IM: CPT

## 2022-01-06 PROCEDURE — 99284 EMERGENCY DEPT VISIT MOD MDM: CPT

## 2022-01-06 PROCEDURE — 6360000002 HC RX W HCPCS: Performed by: STUDENT IN AN ORGANIZED HEALTH CARE EDUCATION/TRAINING PROGRAM

## 2022-01-06 PROCEDURE — 6370000000 HC RX 637 (ALT 250 FOR IP): Performed by: STUDENT IN AN ORGANIZED HEALTH CARE EDUCATION/TRAINING PROGRAM

## 2022-01-06 RX ORDER — AMOXICILLIN 500 MG/1
500 CAPSULE ORAL 2 TIMES DAILY
Qty: 20 CAPSULE | Refills: 0 | Status: SHIPPED | OUTPATIENT
Start: 2022-01-06 | End: 2022-01-16

## 2022-01-06 RX ORDER — DOXYCYCLINE HYCLATE 100 MG
100 TABLET ORAL 2 TIMES DAILY
Qty: 20 TABLET | Refills: 0 | Status: SHIPPED | OUTPATIENT
Start: 2022-01-06 | End: 2022-01-16

## 2022-01-06 RX ORDER — KETOROLAC TROMETHAMINE 30 MG/ML
30 INJECTION, SOLUTION INTRAMUSCULAR; INTRAVENOUS ONCE
Status: COMPLETED | OUTPATIENT
Start: 2022-01-06 | End: 2022-01-06

## 2022-01-06 RX ORDER — DOXYCYCLINE HYCLATE 100 MG/1
100 CAPSULE ORAL ONCE
Status: COMPLETED | OUTPATIENT
Start: 2022-01-06 | End: 2022-01-06

## 2022-01-06 RX ORDER — TRAMADOL HYDROCHLORIDE 50 MG/1
50 TABLET ORAL EVERY 6 HOURS PRN
Qty: 18 TABLET | Refills: 0 | Status: SHIPPED | OUTPATIENT
Start: 2022-01-06 | End: 2022-01-09

## 2022-01-06 RX ADMIN — KETOROLAC TROMETHAMINE 30 MG: 30 INJECTION, SOLUTION INTRAMUSCULAR at 13:46

## 2022-01-06 RX ADMIN — DOXYCYCLINE HYCLATE 100 MG: 100 CAPSULE ORAL at 13:46

## 2022-01-06 ASSESSMENT — PAIN DESCRIPTION - LOCATION
LOCATION: FINGER (COMMENT WHICH ONE)
LOCATION: FINGER (COMMENT WHICH ONE)

## 2022-01-06 ASSESSMENT — PAIN SCALES - GENERAL
PAINLEVEL_OUTOF10: 3
PAINLEVEL_OUTOF10: 8
PAINLEVEL_OUTOF10: 7

## 2022-01-06 ASSESSMENT — PAIN DESCRIPTION - PAIN TYPE
TYPE: ACUTE PAIN
TYPE: ACUTE PAIN

## 2022-01-06 ASSESSMENT — PAIN DESCRIPTION - PROGRESSION: CLINICAL_PROGRESSION: GRADUALLY IMPROVING

## 2022-01-06 ASSESSMENT — ENCOUNTER SYMPTOMS
SHORTNESS OF BREATH: 0
NAUSEA: 0
CHEST TIGHTNESS: 0
SORE THROAT: 0
EYE PAIN: 0
BACK PAIN: 0
DIARRHEA: 0

## 2022-01-06 ASSESSMENT — PAIN - FUNCTIONAL ASSESSMENT: PAIN_FUNCTIONAL_ASSESSMENT: 0-10

## 2022-01-06 ASSESSMENT — PAIN DESCRIPTION - FREQUENCY
FREQUENCY: CONTINUOUS
FREQUENCY: CONTINUOUS

## 2022-01-06 ASSESSMENT — PAIN DESCRIPTION - ONSET: ONSET: ON-GOING

## 2022-01-06 ASSESSMENT — PAIN DESCRIPTION - ORIENTATION
ORIENTATION: RIGHT
ORIENTATION: RIGHT

## 2022-01-06 ASSESSMENT — PAIN DESCRIPTION - DESCRIPTORS
DESCRIPTORS: THROBBING
DESCRIPTORS: THROBBING

## 2022-01-06 NOTE — Clinical Note
Natacha Vera was seen and treated in our emergency department on 1/6/2022. He may return to work on 01/07/2022. If you have any questions or concerns, please don't hesitate to call.       Denise Holloway PA-C

## 2022-01-06 NOTE — ED PROVIDER NOTES
[address] [facility] ED  eMERGENCYdEPARTMENT eNCOUnter      Pt Name: Marie Munoz  MRN: [id number]  Harvindergfjunior 1985  Date of evaluation: 1/6/2022  Provider:Herson Thayer PA-C    CHIEF COMPLAINT           HPI  Marie Munoz is a 29 y.o. male presents with right 2nd finger pain. Pt reports sudden onset, severe, sharp non radiating pain, in the pointer finger after cutting it with a knife accidentally one month ago. Pt states he received stiches and now has a finger infection. He denies fever, chills, n/v/d, CP and SOB. ROS  Review of Systems   Constitutional: Negative for chills, fatigue and fever. HENT: Negative for ear pain, hearing loss and sore throat. Eyes: Negative for pain and visual disturbance. Respiratory: Negative for chest tightness and shortness of breath. Cardiovascular: Negative for chest pain. Gastrointestinal: Negative for diarrhea and nausea. Endocrine: Negative for cold intolerance. Genitourinary: Negative for hematuria. Musculoskeletal: Negative for back pain. Skin: Positive for wound. Negative for rash. Neurological: Negative for dizziness and headaches. Psychiatric/Behavioral: Negative for behavioral problems and confusion. Except as noted above the remainder of the review of systems was reviewed and negative. PAST MEDICAL HISTORY     Past Medical History:   Diagnosis Date    Asthma     Hypertension     Major depression, recurrent ([facility] [address] 10/24/2018    Uncontrolled type 2 diabetes mellitus with complication, with long-term current use of insulin ([facility] [address] 12/10/2017         SURGICAL HISTORY     History reviewed. No pertinent surgical history.       [address]       Discharge Medication List as of 1/6/2022  1:56 PM      CONTINUE these medications which have NOT CHANGED    Details   insulin glargine (LANTUS) 100 UNIT/ML injection vial Inject 40 Units into the skin nightly, Disp-10 mL, R-3Normal      insulin lispro (HUMALOG) 100 UNIT/ML injection vial Inject 10 Units into the skin 3 times daily (with meals), Disp-10 mL, R-3Normal      metFORMIN (GLUCOPHAGE) 850 MG tablet Take 1 tablet by mouth 2 times daily (with meals), Disp-60 tablet, R-3Normal      lisinopril (PRINIVIL;ZESTRIL) 10 MG tablet Take 1 tablet by mouth daily, Disp-30 tablet, R-3Normal      amLODIPine (NORVASC) 10 MG tablet Take 1 tablet by mouth daily, Disp-30 tablet, R-3Normal      prazosin (MINIPRESS) 1 MG capsule Take 1 mg by mouth nightlyHistorical Med      QUEtiapine (SEROQUEL) 50 MG tablet Take 50 mg by mouth nightlyHistorical Med      vilazodone HCl (VILAZODONE HCL) 10 MG TABS Take 10 mg by mouth dailyHistorical Med      !! Lancets MISC Disp-150 each, R-3, NormalPt test 4x daily Dx E10.65      glucose monitoring kit (FREESTYLE) monitoring kit DAILY Starting Fri 11/24/2017, Disp-1 kit, R-0, Normal      !! FREESTYLE LANCETS MISC 4 TIMES DAILY Starting Fri 11/24/2017, Disp-150 each, R-3, Normal       !! - Potential duplicate medications found. Please discuss with provider.           ALLERGIES     Shellfish-derived products    FAMILY HISTORY       Family History   Problem Relation Age of Onset    Bipolar Disorder Mother     Bipolar Disorder Brother         comitted suicide          SOCIAL HISTORY       Social History     Socioeconomic History    Marital status:      Spouse name: Corrinne Hire    Number of children: 6    Years of education: None    Highest education level: None   Occupational History    None   Tobacco Use    Smoking status: Current Every Day Smoker     Packs/day: 0.25     Years: 20.00     Pack years: 5.00     Types: Cigarettes    Smokeless tobacco: Never Used   Vaping Use    Vaping Use: Never used   Substance and Sexual Activity    Alcohol use: Yes     Comment: occasionally    Drug use: No     Comment: pt denies    Sexual activity: None   Other Topics Concern    None   Social History Narrative    None     Social Determinants of Health Financial Resource Strain:     Difficulty of Paying Living Expenses: Not on file   Food Insecurity:     Worried About Running Out of Food in the Last Year: Not on file    Jayme of Food in the Last Year: Not on file   Transportation Needs:     Lack of Transportation (Medical): Not on file    Lack of Transportation (Non-Medical): Not on file   Physical Activity:     Days of Exercise per Week: Not on file    Minutes of Exercise per Session: Not on file   Stress:     Feeling of Stress : Not on file   Social Connections:     Frequency of Communication with Friends and Family: Not on file    Frequency of Social Gatherings with Friends and Family: Not on file    Attends Baptism Services: Not on file    Active Member of [address] [place] [address] [employer] or Organizations: Not on file    Attends Club or Organization Meetings: Not on file    Marital Status: Not on file   Intimate Partner Violence:     Fear of Current or Ex-Partner: Not on file    Emotionally Abused: Not on file    Physically Abused: Not on file    Sexually Abused: Not on file   Housing Stability:     Unable to Pay for Housing in the Last Year: Not on file    Number of [place] in the Last Year: Not on file    Unstable Housing in the Last Year: Not on file         PHYSICAL EXAM       ED Triage Vitals [01/06/22 1329]   BP Temp Temp Source Pulse Resp SpO2 Height Weight   (!) 123/91 98.2 °F (36.8 °C) Oral 106 18 97 % 5' 9\" (1.753 m) 300 lb (136.1 kg)       Physical Exam  Constitutional:       Appearance: Normal appearance. HENT:      Head: Normocephalic and atraumatic. Nose: Nose normal.      Mouth/Throat:      Mouth: Mucous membranes are moist.      Pharynx: No oropharyngeal exudate or posterior oropharyngeal erythema. Eyes:      Extraocular Movements: Extraocular movements intact. Conjunctiva/sclera: Conjunctivae normal.      Pupils: Pupils are equal, round, and reactive to light.    Cardiovascular:      Rate and Rhythm: Normal rate and regular rhythm. Heart sounds: Normal heart sounds. Pulmonary:      Effort: Pulmonary effort is normal.      Breath sounds: Normal breath sounds. No wheezing or rhonchi. Abdominal:      General: Bowel sounds are normal.      Palpations: Abdomen is soft. Tenderness: There is no abdominal tenderness. There is no guarding. Musculoskeletal:         General: No deformity. Normal range of motion. Arms:       Cervical back: Normal range of motion and neck supple. Skin:     General: Skin is warm and dry. Coloration: Skin is not jaundiced. Neurological:      General: No focal deficit present. Mental Status: He is alert and oriented to person, place, and time. Psychiatric:         Mood and Affect: Mood normal.         Behavior: Behavior normal.           MDM  This is a 29year old male presenting with finger cellulitis. Pt is afebrile and HD stable. Pt given PO doxy, IM toradol. Pt agreeable to trial of oral abx and antiinflammatories. He will follow up with PCP and return if symptoms change or worsen. Pt educated on cellulitis and given warning signs. FINAL IMPRESSION      1.  Cellulitis of finger of right hand          DISPOSITION/PLAN   DISPOSITION Decision To Discharge 01/06/2022 01:44:55 PM        DISCHARGE MEDICATIONS:  [unfilled]         Adrien Light PA-C(electronically signed)  Attending Emergency Physician          Adrien Light PA-C  01/06/22 1950

## 2022-01-06 NOTE — ED TRIAGE NOTES
Pt c/o right index finger redness, edema and pain after a recent suture removal, 2+ edema, redness, sensation and movement intact, ROM limited.

## 2023-03-01 PROBLEM — R00.8 VENTRICULAR BIGEMINY SEEN ON CARDIAC MONITOR: Status: ACTIVE | Noted: 2023-03-01

## 2023-03-01 PROBLEM — I42.8 NONISCHEMIC CARDIOMYOPATHY (MULTI): Status: ACTIVE | Noted: 2023-03-01

## 2023-03-01 PROBLEM — E66.01 MORBID OBESITY WITH BMI OF 45.0-49.9, ADULT (MULTI): Status: ACTIVE | Noted: 2023-03-01

## 2023-03-01 PROBLEM — R60.9 EDEMA: Status: ACTIVE | Noted: 2023-03-01

## 2023-03-01 PROBLEM — L02.11 NECK ABSCESS: Status: ACTIVE | Noted: 2023-03-01

## 2023-03-01 PROBLEM — F17.200 CURRENT EVERY DAY SMOKER: Status: ACTIVE | Noted: 2023-03-01

## 2023-03-01 PROBLEM — I50.20: Status: ACTIVE | Noted: 2023-03-01

## 2023-03-01 PROBLEM — I10 PRIMARY HYPERTENSION: Status: ACTIVE | Noted: 2023-03-01

## 2023-03-01 PROBLEM — I50.9 CHF (CONGESTIVE HEART FAILURE) (MULTI): Status: ACTIVE | Noted: 2023-03-01

## 2023-03-01 PROBLEM — E11.9 TYPE II DIABETES MELLITUS (MULTI): Status: ACTIVE | Noted: 2023-03-01

## 2023-03-01 PROBLEM — K61.1 PERIRECTAL ABSCESS: Status: ACTIVE | Noted: 2023-03-01

## 2023-03-01 PROBLEM — F31.9 BIPOLAR 1 DISORDER (MULTI): Status: ACTIVE | Noted: 2023-03-01

## 2023-03-01 RX ORDER — SPIRONOLACTONE 25 MG/1
1 TABLET ORAL DAILY
COMMUNITY
End: 2024-02-23 | Stop reason: HOSPADM

## 2023-03-01 RX ORDER — INSULIN LISPRO 100 [IU]/ML
8 INJECTION, SOLUTION INTRAVENOUS; SUBCUTANEOUS
COMMUNITY
End: 2024-02-23 | Stop reason: HOSPADM

## 2023-03-01 RX ORDER — DAPAGLIFLOZIN 10 MG/1
1 TABLET, FILM COATED ORAL DAILY
COMMUNITY
End: 2024-05-21 | Stop reason: SDUPTHER

## 2023-03-01 RX ORDER — TORSEMIDE 20 MG/1
1 TABLET ORAL DAILY
Status: ON HOLD | COMMUNITY
End: 2024-02-23 | Stop reason: SDUPTHER

## 2023-03-01 RX ORDER — SACUBITRIL AND VALSARTAN 24; 26 MG/1; MG/1
1 TABLET, FILM COATED ORAL 2 TIMES DAILY
Status: ON HOLD | COMMUNITY
End: 2024-02-23 | Stop reason: SDUPTHER

## 2023-03-01 RX ORDER — ATORVASTATIN CALCIUM 40 MG/1
1 TABLET, FILM COATED ORAL NIGHTLY
COMMUNITY

## 2023-03-01 RX ORDER — INSULIN GLARGINE 100 [IU]/ML
40 INJECTION, SOLUTION SUBCUTANEOUS DAILY
Status: ON HOLD | COMMUNITY
End: 2023-12-03 | Stop reason: ALTCHOICE

## 2023-03-01 RX ORDER — METFORMIN HYDROCHLORIDE 1000 MG/1
1 TABLET ORAL 2 TIMES DAILY
COMMUNITY

## 2023-03-01 RX ORDER — METOPROLOL SUCCINATE 25 MG/1
1 TABLET, EXTENDED RELEASE ORAL DAILY
COMMUNITY
Start: 2022-10-25 | End: 2024-02-23 | Stop reason: HOSPADM

## 2023-03-14 ENCOUNTER — APPOINTMENT (OUTPATIENT)
Dept: PRIMARY CARE | Facility: CLINIC | Age: 38
End: 2023-03-14
Payer: COMMERCIAL

## 2023-03-14 LAB
ALANINE AMINOTRANSFERASE (SGPT) (U/L) IN SER/PLAS: 13 U/L (ref 10–52)
ALBUMIN (G/DL) IN SER/PLAS: 4.4 G/DL (ref 3.4–5)
ALKALINE PHOSPHATASE (U/L) IN SER/PLAS: 65 U/L (ref 33–120)
ANION GAP IN SER/PLAS: 14 MMOL/L (ref 10–20)
ASPARTATE AMINOTRANSFERASE (SGOT) (U/L) IN SER/PLAS: 14 U/L (ref 9–39)
BILIRUBIN TOTAL (MG/DL) IN SER/PLAS: 1.3 MG/DL (ref 0–1.2)
CALCIUM (MG/DL) IN SER/PLAS: 9.7 MG/DL (ref 8.6–10.6)
CARBON DIOXIDE, TOTAL (MMOL/L) IN SER/PLAS: 26 MMOL/L (ref 21–32)
CHLORIDE (MMOL/L) IN SER/PLAS: 97 MMOL/L (ref 98–107)
CREATININE (MG/DL) IN SER/PLAS: 1.16 MG/DL (ref 0.5–1.3)
ERYTHROCYTE DISTRIBUTION WIDTH (RATIO) BY AUTOMATED COUNT: 11.8 % (ref 11.5–14.5)
ERYTHROCYTE MEAN CORPUSCULAR HEMOGLOBIN CONCENTRATION (G/DL) BY AUTOMATED: 33.9 G/DL (ref 32–36)
ERYTHROCYTE MEAN CORPUSCULAR VOLUME (FL) BY AUTOMATED COUNT: 94 FL (ref 80–100)
ERYTHROCYTES (10*6/UL) IN BLOOD BY AUTOMATED COUNT: 4.7 X10E12/L (ref 4.5–5.9)
FERRITIN (UG/LL) IN SER/PLAS: 394 UG/L (ref 20–300)
GFR MALE: 84 ML/MIN/1.73M2
GLUCOSE (MG/DL) IN SER/PLAS: 374 MG/DL (ref 74–99)
HEMATOCRIT (%) IN BLOOD BY AUTOMATED COUNT: 44 % (ref 41–52)
HEMOGLOBIN (G/DL) IN BLOOD: 14.9 G/DL (ref 13.5–17.5)
IRON (UG/DL) IN SER/PLAS: 97 UG/DL (ref 35–150)
IRON BINDING CAPACITY (UG/DL) IN SER/PLAS: 351 UG/DL (ref 240–445)
IRON SATURATION (%) IN SER/PLAS: 28 % (ref 25–45)
LEUKOCYTES (10*3/UL) IN BLOOD BY AUTOMATED COUNT: 6.4 X10E9/L (ref 4.4–11.3)
NATRIURETIC PEPTIDE B (PG/ML) IN SER/PLAS: 68 PG/ML (ref 0–99)
NRBC (PER 100 WBCS) BY AUTOMATED COUNT: 0 /100 WBC (ref 0–0)
PLATELETS (10*3/UL) IN BLOOD AUTOMATED COUNT: 213 X10E9/L (ref 150–450)
POTASSIUM (MMOL/L) IN SER/PLAS: 5.1 MMOL/L (ref 3.5–5.3)
PROTEIN TOTAL: 7.3 G/DL (ref 6.4–8.2)
SODIUM (MMOL/L) IN SER/PLAS: 132 MMOL/L (ref 136–145)
THYROTROPIN (MIU/L) IN SER/PLAS BY DETECTION LIMIT <= 0.05 MIU/L: 3.62 MIU/L (ref 0.44–3.98)
UREA NITROGEN (MG/DL) IN SER/PLAS: 15 MG/DL (ref 6–23)

## 2023-03-27 ENCOUNTER — NUTRITION (OUTPATIENT)
Dept: PRIMARY CARE | Facility: CLINIC | Age: 38
End: 2023-03-27
Payer: COMMERCIAL

## 2023-03-27 DIAGNOSIS — Z79.4 TYPE 2 DIABETES MELLITUS WITH DIABETIC NEUROPATHY, WITH LONG-TERM CURRENT USE OF INSULIN (MULTI): Primary | ICD-10-CM

## 2023-03-27 DIAGNOSIS — E11.40 TYPE 2 DIABETES MELLITUS WITH DIABETIC NEUROPATHY, WITH LONG-TERM CURRENT USE OF INSULIN (MULTI): Primary | ICD-10-CM

## 2023-03-27 PROCEDURE — 99211 OFF/OP EST MAY X REQ PHY/QHP: CPT | Performed by: FAMILY MEDICINE

## 2023-04-03 ENCOUNTER — OFFICE VISIT (OUTPATIENT)
Dept: PRIMARY CARE | Facility: CLINIC | Age: 38
End: 2023-04-03
Payer: COMMERCIAL

## 2023-04-03 VITALS
SYSTOLIC BLOOD PRESSURE: 119 MMHG | OXYGEN SATURATION: 97 % | WEIGHT: 303.6 LBS | HEART RATE: 105 BPM | HEIGHT: 69 IN | TEMPERATURE: 97.1 F | BODY MASS INDEX: 44.97 KG/M2 | DIASTOLIC BLOOD PRESSURE: 80 MMHG

## 2023-04-03 DIAGNOSIS — I50.20 ACC/AHA STAGE C SYSTOLIC CONGESTIVE HEART FAILURE (MULTI): ICD-10-CM

## 2023-04-03 DIAGNOSIS — F31.9 BIPOLAR 1 DISORDER (MULTI): ICD-10-CM

## 2023-04-03 DIAGNOSIS — E11.49 TYPE 2 DIABETES MELLITUS WITH OTHER NEUROLOGIC COMPLICATION, WITH LONG-TERM CURRENT USE OF INSULIN (MULTI): Primary | ICD-10-CM

## 2023-04-03 DIAGNOSIS — F17.200 CURRENT EVERY DAY SMOKER: ICD-10-CM

## 2023-04-03 DIAGNOSIS — Z79.4 TYPE 2 DIABETES MELLITUS WITH OTHER NEUROLOGIC COMPLICATION, WITH LONG-TERM CURRENT USE OF INSULIN (MULTI): Primary | ICD-10-CM

## 2023-04-03 DIAGNOSIS — E66.01 MORBID OBESITY WITH BMI OF 45.0-49.9, ADULT (MULTI): ICD-10-CM

## 2023-04-03 DIAGNOSIS — I42.8 NONISCHEMIC CARDIOMYOPATHY (MULTI): ICD-10-CM

## 2023-04-03 DIAGNOSIS — Z60.9: ICD-10-CM

## 2023-04-03 PROBLEM — F43.10 PTSD (POST-TRAUMATIC STRESS DISORDER): Status: ACTIVE | Noted: 2021-12-13

## 2023-04-03 PROBLEM — I63.512 ACUTE ISCHEMIC LEFT MCA STROKE (MULTI): Status: ACTIVE | Noted: 2023-04-03

## 2023-04-03 PROBLEM — F33.9 MAJOR DEPRESSION, RECURRENT (CMS-HCC): Status: ACTIVE | Noted: 2021-12-11

## 2023-04-03 PROBLEM — F14.90 COCAINE USE: Status: ACTIVE | Noted: 2021-12-13

## 2023-04-03 PROCEDURE — 3008F BODY MASS INDEX DOCD: CPT | Performed by: FAMILY MEDICINE

## 2023-04-03 PROCEDURE — 3074F SYST BP LT 130 MM HG: CPT | Performed by: FAMILY MEDICINE

## 2023-04-03 PROCEDURE — 99204 OFFICE O/P NEW MOD 45 MIN: CPT | Performed by: FAMILY MEDICINE

## 2023-04-03 PROCEDURE — 3079F DIAST BP 80-89 MM HG: CPT | Performed by: FAMILY MEDICINE

## 2023-04-03 RX ORDER — FLASH GLUCOSE SENSOR
KIT MISCELLANEOUS
Qty: 1 EACH | Refills: 0 | Status: SHIPPED | OUTPATIENT
Start: 2023-04-03 | End: 2024-06-10 | Stop reason: WASHOUT

## 2023-04-03 SDOH — SOCIAL STABILITY - SOCIAL INSECURITY: PROBLEM RELATED TO SOCIAL ENVIRONMENT, UNSPECIFIED: Z60.9

## 2023-04-03 ASSESSMENT — PAIN SCALES - GENERAL: PAINLEVEL: 0-NO PAIN

## 2023-04-03 NOTE — PROGRESS NOTES
"  Subjective   Chief complaint: Arthur Carranza is a 37 y.o. male who presents for Establish Care.    HPI:  Here to establish care, 37 yrs old with complex PMH including HTN, DM, systolic heart failure,recently discharged from the Hospital following a diagnosis of HF in October of 2022 and then a stroke in 12/2022.    Per discharge summary:    \"Mr Carranza is a 36 year old man with history of T2DM, newly dx HFrEF (EF 15-20%, cardiac cath with 0% blockage), HTN, depression, who was transferred from [place] due  to L MCA occlusion for possible thrombectomy. LKW 2:45PM 12/28/22. He had a nap and woke up with severe aphasia, R side weakness and R facial droop. NIHSS 7. CTA showed distal LM1/proximal M2 cut off. TNK was given 5PM and patient was transferred for  possible MT. On arrival, NIHSS was 3. Decision was made to defer MT. Patient is being admitted to [facility] for post TNK care. Patient stopped wearing a life vest for unclear reason 2 weeks ago.      By following morning, patient's NIHSS 1 for aphasia. PT and OT evaluated and recommended no needs. SLP consulted for aphasia and cognitive evaluation. Started on atorvastatin 40mg. Given moderate sliding scale insulin throughout hospitalization and BG  trended down from 400s to 200s. Consulted diabetic educator to help to increase compliance at home. Consulted heart failure team for optimization of heart failure care and recommendation on anticoagulation choice. Consulted EP due to recent discontinuation  of LifeVest and potential need for defibrillator. Otherwise uncomplicated hospital course, observed after initiating heart failure medications and discharged home in medically stable condition.       Patient reports that Blood sugar recording Run between does not check at home, has been high and has not been consistent with his medications, currently he is homeless and has been difficult for him to take his insulin.  He reports that he used to follow up in [place] but since he " "moved to [place] , he is trying to establish care here.    No recent episodes of hypoglycemia.  Medications have been well tolerated,No reportable side effects.Patient have been compliant with diet.  HTN/CHF:  stable,tolerating meds well,No side effects,Denies chest,ain,SOB,Palpitations,Headache and blurry vision.Patient also denies Leg swelling and KEY.    Ros:     Mild Shortness of breath , NO chest pain  NO Nausea vomiting and diarrhea  No fever or chills  No dysuria  No hearing or vision changes  No skin lesions.    Objective   /80 (BP Location: Left arm, Patient Position: Sitting, BP Cuff Size: Adult long)   Pulse 105   Temp 36.2 °C (97.1 °F) (Temporal)   Ht 1.753 m (5' 9\")   Wt (!) 138 kg (303 lb 9.6 oz)   SpO2 97%   BMI 44.83 kg/m²       General appearance: alert and oriented, in no acute distress  Eyes: conjunctivae/corneas clear. PERRL, EOM's intact.   Neck: no adenopathy, no carotid bruit, supple, symmetrical, trachea midline, and thyroid not enlarged, symmetric, no tenderness/mass/nodules  Lungs: clear to auscultation bilaterally  Chest wall: no tenderness  Heart: regular rate and rhythm, S1, S2 normal, no murmur, click, rub or gallop  Abdomen: soft, non-tender; bowel sounds normal; no masses, no organomegaly  Skin: Skin color, texture, turgor normal. No rashes or lesions  Neurologic: Motor and sensory Grossly normal        I have reviewed and reconciled the medication list with the patient today.   Current Outpatient Medications:     atorvastatin (Lipitor) 40 mg tablet, Take 1 tablet (40 mg) by mouth once daily at bedtime., Disp: , Rfl:     dapagliflozin (Farxiga) 10 mg, Take 1 tablet (10 mg) by mouth once daily., Disp: , Rfl:     insulin glargine (Lantus Solostar U-100 Insulin) 100 unit/mL (3 mL) pen, Inject 40 Units under the skin once daily. Take as directed per insulin instructions., Disp: , Rfl:     insulin lispro (HumaLOG) 100 unit/mL injection, Inject 8 Units under the skin in the " morning and 8 Units at noon and 8 Units in the evening. Inject before meals., Disp: , Rfl:     metFORMIN (Glucophage) 1,000 mg tablet, Take 1 tablet (1,000 mg) by mouth in the morning and 1 tablet (1,000 mg) before bedtime., Disp: , Rfl:     metoprolol succinate XL (Toprol-XL) 25 mg 24 hr tablet, Take 1 tablet (25 mg) by mouth once daily., Disp: , Rfl:     rivaroxaban (Xarelto) 20 mg tablet, Take 1 tablet (20 mg) by mouth once daily. Take with food., Disp: , Rfl:     sacubitriL-valsartan (Entresto) 24-26 mg tablet, Take 1 tablet by mouth in the morning and 1 tablet before bedtime., Disp: , Rfl:     spironolactone (Aldactone) 25 mg tablet, Take 1 tablet (25 mg) by mouth once daily., Disp: , Rfl:     torsemide (Demadex) 20 mg tablet, Take 1 tablet (20 mg) by mouth once daily., Disp: , Rfl:      Imaging:  Electrocardiogram 12 Lead    Result Date: 3/17/2023  Normal sinus rhythm Right atrial enlargement Left axis deviation Incomplete left bundle branch block Nonspecific T wave abnormality Prolonged QT interval or tu fusion, consider myocardial disease, electrolyte imbalance, or drug effects Abnormal ECG No previous ECGs available Confirmed by Jose Carlos Pena ([id number]) on 3/17/2023 11:41:12 AM       Labs reviewed:    Lab Results   Component Value Date    WBC 6.4 03/14/2023    HGB 14.9 03/14/2023    HCT 44.0 03/14/2023     03/14/2023    CHOL 162 12/28/2022    TRIG 401 (H) 12/28/2022    HDL 32.4 (A) 12/28/2022    ALT 13 03/14/2023    AST 14 03/14/2023     (L) 03/14/2023    K 5.1 03/14/2023    CL 97 (L) 03/14/2023    CREATININE 1.16 03/14/2023    BUN 15 03/14/2023    CO2 26 03/14/2023    TSH 3.62 03/14/2023    INR 1.0 12/28/2022    HGBA1C 12.9 (A) 12/28/2022         Assessment/Plan       Diagnoses and all orders for this visit:    Diagnoses and all orders for this visit:  Type 2 diabetes mellitus with other neurologic complication, with long-term current use of insulin (CMS/[facility])  -     FreeStyle Nadir sensor system  (FreeStyle Nadir 2 Sensor) kit; Use as instructed  ACC/AHA stage C systolic congestive heart failure (CMS/HCC)  Nonischemic cardiomyopathy (CMS/HCC)  Bipolar 1 disorder (CMS/HCC)  Morbid obesity with BMI of 45.0-49.9, adult (CMS/HCC)  Current every day smoker  -     Referral to Tobacco Cessation Counseling; Future  Social handicap  -     Referral to Social Work; Future      Diagnosis and Management discussed with the patient.  Patient agreeable with plan.  Patient advised to Return to clinic with new or unresolved symptoms.  Kevin Willingham MD    This note was partially generated using the Dragon Voice Recognition System and there may be some incorrect words or wording, spelling and /or spelling errors, or punctuation errors that were not corrected prior to committing the note to the medical record.

## 2023-04-07 ENCOUNTER — NUTRITION (OUTPATIENT)
Dept: PRIMARY CARE | Facility: CLINIC | Age: 38
End: 2023-04-07
Payer: COMMERCIAL

## 2023-04-07 DIAGNOSIS — E11.40 TYPE 2 DIABETES MELLITUS WITH DIABETIC NEUROPATHY, WITH LONG-TERM CURRENT USE OF INSULIN (MULTI): Primary | ICD-10-CM

## 2023-04-07 DIAGNOSIS — Z79.4 TYPE 2 DIABETES MELLITUS WITH DIABETIC NEUROPATHY, WITH LONG-TERM CURRENT USE OF INSULIN (MULTI): Primary | ICD-10-CM

## 2023-04-07 PROCEDURE — 99211 OFF/OP EST MAY X REQ PHY/QHP: CPT | Performed by: FAMILY MEDICINE

## 2023-04-10 NOTE — PROGRESS NOTES
"Assessment     Reason for Visit:  Arthur Carranza is a 37 y.o. male who presents for Nutrition Counseling (Pt reports DE/ED concern - feels worried that he uses food to cope with life/emotions and worries about over-eating often. T2DM and general weight-neutral HAES MNT also.)    Anthropometrics:            Food And Nutrient Intake:  Food and Nutrient History  Food and Nutrient History: Pt is currently applying for SNAP - at office during virtual visit. Food insecurity is large impact on eating habits, dietary variety and balance, binge eating behaviors. Pt feels like he has been over-eating since 19 yrs old. Was incarcerated at age 18, and since he was \"out on his own,\" he feels mental health has driven over-eating frequently. Used to see a counselor and found this helpful. Recently started seeing a counself q 2 weeks and is finding this helpful. Eats 2-4 meals/d per pt. Doesn't often have a breakfast, but will have lunch, dinner and snacks when able. Feels like he gets too hungry and is bored much of the day, so food fills this time/emotion.  Energy Intake: Fair 50-75 % (Depends on day and access to food)                                                              Food And Nutrient Administration:                        Factors:  Factors Affecting Access to Food and Food / Nutrition Related Supplies  Food / Nutrition Program Participation: Participation in governmnent programs, Participation in community programs                      Physical Activities:  Physical Activity  Physical Activity History: Used to go to the gym - enjoyed this. Considering resuming soon per pt.           Knowledge Beliefs Attitudes and Behavior                                       Nutrition Focused Physical Exam:           Energy Needs           Diagnosis      Nutrition Diagnosis  Patient has Nutrition Diagnosis: Yes  Nutrition Diagnosis 1: Disordered eating pattern  Related to (1): emotions, SoEDH, food insecurity  As Evidenced by (1): " restrict-binge cycle, eating to cope with emotions    Interventions/Recommendations   Nutrition Education  Nutrition Education Content: Content related nutrition education  Goals: Education on adequate and consistent eating pattern, examples of balanced meals and snacks, exploring what would be possible for him given what is available at home and what he can afford with SNAP. Discussed weight-neutral MNT, non-diet approach.  Nutrition Counseling  Strategies: Nutrition counseling based on motivational interviewing strategy, Nutrition counseling based on goal setting strategy  Goals: Explored his desire to menu and meal plan - discussed goals for BLD and 3 snacks - examples discussed and provided. Pt wants to work on eating q 3-4 hour consistently. Will explore more meal planning at next session when he has access to purchasing food through SNAP also. Discussed BGs and consistent carb intake, taking insulin consistently and correctly - will explore more at follow up.        There are no Patient Instructions on file for this visit.    Monitoring and Evaluation   Food/Nutrient Related History Monitoring  Monitoring and Evaluation Plan: Meal/snack pattern, Energy intake  Meal/Snack Pattern: Food variety  Knowledge/Beliefs/Attitudes  Monitoring and Evaluation Plan: Food and nutrition knowledge, Bingeing and purging behavior, Physical activity  Bingeing and purging behavior: Binge eating behavior  Biochemical Data, Medical Tests and Procedures  Monitoring and Evaluation Plan: Glucose/endocrine profile  Glucose/Endocrine Profile: Hemoglobin A1c (HgbA1c)        Time Spent  Time spent directly with patient, family or caregiver: 45 minutes  Documentation Time: 10 minutes        Readiness to Change : Good  Level of Understanding : Fair  Anticipated Compliant : Good

## 2023-04-17 NOTE — PROGRESS NOTES
Assessment     Reason for Visit:  Arthur Carranza is a 37 y.o. male who presents for Nutrition Counseling (T2DM, self-reported disordered eating concerns)    Anthropometrics:            Food And Nutrient Intake:  Food and Nutrient History  Food and Nutrient History: Pt reports eating 3-4 meals/d since last seen. Was approved for SNAP and waiting to get card in mail to go grocery shopping. Had never taken basal insulin before (very sporadically if he did). Recently decided to try this and has noticed significant improvement in BGs. He exercised this morning at the gym for 60 mins without eating beforehand but taking 20U of meal-time insulin - came home and BG was 72mg/dL - lowest he's seen in a long time. Treated BG with pb&j. Was taking 20U of humalog with meals and no basal insulin - now he's taking 40U basal and 8-10U meal-time insulin per prescribed. Continues to struggle with meal planning ideas - thinks of healthy eating in an extreme way - eating only chicken and salads/vegs. All/nothing thinking makes changes difficult to maintain.  Energy Intake: Good > 75 %                                                              Food And Nutrient Administration:                        Factors:                         Physical Activities:              Knowledge Beliefs Attitudes and Behavior                                       Nutrition Focused Physical Exam:           Energy Needs           Diagnosis      Nutrition Diagnosis  Patient has Nutrition Diagnosis: Yes  Nutrition Diagnosis 1: Food and nutrition related knowledge deficit  Related to (1): nutrition misinformation, DM misinformation  As Evidenced by (1): taking insulin without eating, skipping basal insulin in hx    Interventions/Recommendations   Nutrition Education  Nutrition Education Content: Content related nutrition education  Goals: Education on basal and meal-time insulin, how and why these are different, taking as prescribed. Discussed hypoglycemia s/s,  tx, prevention - agrees to use 4 oz regular juice when BG is low.  Nutrition Counseling  Strategies: Nutrition counseling based on motivational interviewing strategy, Nutrition counseling based on goal setting strategy  Goals: Explored his motivation to meal plan - discussed balanced meal ideas, breakfast options, dinner ideas. Discussed his concerns around stress eating - explored talking to counselor about other tools besides food. Validated emotional eating in setting of stress and food insecurity. Explored using a stress ball, fidget and breathing techniques that he's used in the past when he wants to eat out of stress/emotions.        There are no Patient Instructions on file for this visit.    Monitoring and Evaluation   Food/Nutrient Related History Monitoring  Monitoring and Evaluation Plan: Meal/snack pattern  Knowledge/Beliefs/Attitudes  Monitoring and Evaluation Plan: Beliefs and attitudes, Food and nutrition knowledge, Physical activity        Time Spent  Time spent directly with patient, family or caregiver: 45 minutes  Documentation Time: 10 minutes        Readiness to Change : Good  Level of Understanding : Good  Anticipated Compliant : Good

## 2023-04-20 ENCOUNTER — APPOINTMENT (OUTPATIENT)
Dept: LAB | Facility: LAB | Age: 38
End: 2023-04-20
Payer: COMMERCIAL

## 2023-04-20 LAB
CHOLESTEROL (MG/DL) IN SER/PLAS: 122 MG/DL (ref 0–199)
CHOLESTEROL IN HDL (MG/DL) IN SER/PLAS: 31.4 MG/DL
CHOLESTEROL/HDL RATIO: 3.9
ESTIMATED AVERAGE GLUCOSE FOR HBA1C: 283 MG/DL
HEMOGLOBIN A1C/HEMOGLOBIN TOTAL IN BLOOD: 11.5 %
LDL: 65 MG/DL (ref 0–99)
TRIGLYCERIDE (MG/DL) IN SER/PLAS: 128 MG/DL (ref 0–149)
VLDL: 26 MG/DL (ref 0–40)

## 2023-05-26 ENCOUNTER — NUTRITION (OUTPATIENT)
Dept: PRIMARY CARE | Facility: CLINIC | Age: 38
End: 2023-05-26
Payer: COMMERCIAL

## 2023-05-26 DIAGNOSIS — Z79.4 TYPE 2 DIABETES MELLITUS WITH DIABETIC NEUROPATHY, WITH LONG-TERM CURRENT USE OF INSULIN (MULTI): Primary | ICD-10-CM

## 2023-05-26 DIAGNOSIS — E11.40 TYPE 2 DIABETES MELLITUS WITH DIABETIC NEUROPATHY, WITH LONG-TERM CURRENT USE OF INSULIN (MULTI): Primary | ICD-10-CM

## 2023-05-26 PROCEDURE — 99211 OFF/OP EST MAY X REQ PHY/QHP: CPT | Performed by: FAMILY MEDICINE

## 2023-05-30 NOTE — PROGRESS NOTES
"Assessment     Reason for Visit:  Arthur Carranza is a 37 y.o. male who presents for Nutrition Counseling (T2DM, CHF, DE per pt, general healthy eating MNT)    Anthropometrics:            Food And Nutrient Intake:  Food and Nutrient History  Food and Nutrient History: Pt has been under significant stress recently. Notices that his anxiety is higher and this can impact \"emotional eating.\" Reports an increase in CHF symptoms: nausea, fatigue, swelling in legs, SOB, drowning feeling when trying to sleep laying down, sleeping in chair most nights, \"hard/distended\" belly. Utilizing SNAP for $281.00 per month and food bank. Looking into [employer] resource for additional meal delivery service. Applied this week. He struggles with preparing food ahead of time because he feels like he eats \"too much then.\" But if he doesn't make or prepare some part of meal ahead of time, he finds himself skipping meals and then gets too hungry when he does eat. Stopped taking basal insulin - when he was taking 40U nightly along with Trulicity weekly, he felt BGs were too low in  range and some <70mg/dL. Felt dizzy and fatigued more at that time. Is not SMBG daily right now.  Energy Intake: Fair 50-75 %  GI Symptoms: nausea  Sleep Duration/Quality: 5-6 hrs disrupted                                                              Food And Nutrient Administration:                        Factors:  Factors Affecting Access to Food and Food / Nutrition Related Supplies  Food / Nutrition Program Participation: Participation in governmnent programs, Participation in community programs                      Physical Activities:  Physical Activity  Physical Activity History: Tries to go to gym 2x/wk when he feels able.           Knowledge Beliefs Attitudes and Behavior                                       Nutrition Focused Physical Exam:           Energy Needs           Diagnosis      Nutrition Diagnosis  Patient has Nutrition Diagnosis: " "Yes  Nutrition Diagnosis 1: Disordered eating pattern  Related to (1): food accessibility and DE thoughts around \"having too much around\"  As Evidenced by (1): \"emotional eating,\" SoDoH as barrier to RAVES    Interventions/Recommendations   Nutrition Counseling  Strategies: Nutrition counseling based on motivational interviewing strategy, Nutrition counseling based on goal setting strategy  Goals: Discussed preparing chicken in crockpot ahead of time to make meals easier. Explored his hesitation to having more food made - worried about over-eating, but explored how eating consistently is better for BGs compared to going hours between meals, getting too hungry and feeling out of control when eating 2/2 ravenous hunger. Discussed low sodium turkey sandwich as an option for a meal that feels manageable with SNAP, food access, balancing BGs and lower sodium MNT for CHF.  Coordination of Nutrition Care by a Nutrition Professional  Goals: RDN communicated CHF symptoms to cardiologist and pt report of stopping basal insulin to endocrinologist        There are no Patient Instructions on file for this visit.    Monitoring and Evaluation   Knowledge/Beliefs/Attitudes  Monitoring and Evaluation Plan: Food and nutrition knowledge, Beliefs and attitudes, Bingeing and purging behavior, Physical activity  Bingeing and purging behavior: Binge eating behavior (subjective \"emotional eating\" to pt)  Biochemical Data, Medical Tests and Procedures  Monitoring and Evaluation Plan: Glucose/endocrine profile  Criteria: and CHF symptoms        Time Spent  Prep time on day of patient encounter: 5 minutes  Time spent directly with patient, family or caregiver: 40 minutes  Additional Time Spent on Patient Care Activities: 10 minutes  Documentation Time: 10 minutes  Other Time Spent: 0 minutes  Total: 65 minutes                          "

## 2023-10-12 DIAGNOSIS — Z00.6 RESEARCH STUDY PATIENT: Primary | ICD-10-CM

## 2023-12-02 ENCOUNTER — HOSPITAL ENCOUNTER (OUTPATIENT)
Facility: HOSPITAL | Age: 38
Setting detail: OBSERVATION
Discharge: HOME | End: 2023-12-03
Attending: EMERGENCY MEDICINE | Admitting: INTERNAL MEDICINE
Payer: COMMERCIAL

## 2023-12-02 DIAGNOSIS — R79.89 ELEVATED TROPONIN: ICD-10-CM

## 2023-12-02 DIAGNOSIS — I50.43 ACUTE ON CHRONIC COMBINED SYSTOLIC AND DIASTOLIC CHF (CONGESTIVE HEART FAILURE) (MULTI): ICD-10-CM

## 2023-12-02 DIAGNOSIS — I50.9 ACUTE ON CHRONIC CONGESTIVE HEART FAILURE, UNSPECIFIED HEART FAILURE TYPE (MULTI): Primary | ICD-10-CM

## 2023-12-02 LAB
BASOPHILS # BLD AUTO: 0.01 X10*3/UL (ref 0–0.1)
BASOPHILS NFR BLD AUTO: 0.2 %
EOSINOPHIL # BLD AUTO: 0.11 X10*3/UL (ref 0–0.7)
EOSINOPHIL NFR BLD AUTO: 1.9 %
ERYTHROCYTE [DISTWIDTH] IN BLOOD BY AUTOMATED COUNT: 12.8 % (ref 11.5–14.5)
HCT VFR BLD AUTO: 38.5 % (ref 41–52)
HGB BLD-MCNC: 13.1 G/DL (ref 13.5–17.5)
IMM GRANULOCYTES # BLD AUTO: 0.01 X10*3/UL (ref 0–0.7)
IMM GRANULOCYTES NFR BLD AUTO: 0.2 % (ref 0–0.9)
INR PPP: 1.1 (ref 0.9–1.1)
LYMPHOCYTES # BLD AUTO: 1.37 X10*3/UL (ref 1.2–4.8)
LYMPHOCYTES NFR BLD AUTO: 23.7 %
MCH RBC QN AUTO: 32 PG (ref 26–34)
MCHC RBC AUTO-ENTMCNC: 34 G/DL (ref 32–36)
MCV RBC AUTO: 94 FL (ref 80–100)
MONOCYTES # BLD AUTO: 0.57 X10*3/UL (ref 0.1–1)
MONOCYTES NFR BLD AUTO: 9.9 %
NEUTROPHILS # BLD AUTO: 3.71 X10*3/UL (ref 1.2–7.7)
NEUTROPHILS NFR BLD AUTO: 64.1 %
NRBC BLD-RTO: 0 /100 WBCS (ref 0–0)
PLATELET # BLD AUTO: 235 X10*3/UL (ref 150–450)
PROTHROMBIN TIME: 12.5 SECONDS (ref 9.8–12.8)
RBC # BLD AUTO: 4.09 X10*6/UL (ref 4.5–5.9)
WBC # BLD AUTO: 5.8 X10*3/UL (ref 4.4–11.3)

## 2023-12-02 PROCEDURE — 83880 ASSAY OF NATRIURETIC PEPTIDE: CPT | Performed by: PHYSICIAN ASSISTANT

## 2023-12-02 PROCEDURE — 84484 ASSAY OF TROPONIN QUANT: CPT | Performed by: PHYSICIAN ASSISTANT

## 2023-12-02 PROCEDURE — 99285 EMERGENCY DEPT VISIT HI MDM: CPT | Performed by: EMERGENCY MEDICINE

## 2023-12-02 PROCEDURE — 85025 COMPLETE CBC W/AUTO DIFF WBC: CPT | Performed by: PHYSICIAN ASSISTANT

## 2023-12-02 PROCEDURE — 93010 ELECTROCARDIOGRAM REPORT: CPT | Performed by: INTERNAL MEDICINE

## 2023-12-02 PROCEDURE — 36415 COLL VENOUS BLD VENIPUNCTURE: CPT | Performed by: PHYSICIAN ASSISTANT

## 2023-12-02 PROCEDURE — 80053 COMPREHEN METABOLIC PANEL: CPT | Performed by: PHYSICIAN ASSISTANT

## 2023-12-02 PROCEDURE — 80048 BASIC METABOLIC PNL TOTAL CA: CPT | Performed by: PHYSICIAN ASSISTANT

## 2023-12-02 PROCEDURE — 85610 PROTHROMBIN TIME: CPT | Performed by: PHYSICIAN ASSISTANT

## 2023-12-02 ASSESSMENT — COLUMBIA-SUICIDE SEVERITY RATING SCALE - C-SSRS
6. HAVE YOU EVER DONE ANYTHING, STARTED TO DO ANYTHING, OR PREPARED TO DO ANYTHING TO END YOUR LIFE?: NO
1. IN THE PAST MONTH, HAVE YOU WISHED YOU WERE DEAD OR WISHED YOU COULD GO TO SLEEP AND NOT WAKE UP?: NO
2. HAVE YOU ACTUALLY HAD ANY THOUGHTS OF KILLING YOURSELF?: NO

## 2023-12-02 ASSESSMENT — PAIN SCALES - GENERAL
PAINLEVEL_OUTOF10: 0 - NO PAIN
PAINLEVEL_OUTOF10: 0 - NO PAIN

## 2023-12-02 ASSESSMENT — LIFESTYLE VARIABLES
HAVE YOU EVER FELT YOU SHOULD CUT DOWN ON YOUR DRINKING: NO
HAVE PEOPLE ANNOYED YOU BY CRITICIZING YOUR DRINKING: NO
EVER HAD A DRINK FIRST THING IN THE MORNING TO STEADY YOUR NERVES TO GET RID OF A HANGOVER: NO
EVER FELT BAD OR GUILTY ABOUT YOUR DRINKING: NO

## 2023-12-02 ASSESSMENT — PAIN - FUNCTIONAL ASSESSMENT: PAIN_FUNCTIONAL_ASSESSMENT: 0-10

## 2023-12-03 ENCOUNTER — APPOINTMENT (OUTPATIENT)
Dept: RADIOLOGY | Facility: HOSPITAL | Age: 38
End: 2023-12-03
Payer: COMMERCIAL

## 2023-12-03 ENCOUNTER — APPOINTMENT (OUTPATIENT)
Dept: CARDIOLOGY | Facility: HOSPITAL | Age: 38
End: 2023-12-03
Payer: COMMERCIAL

## 2023-12-03 VITALS
HEART RATE: 94 BPM | OXYGEN SATURATION: 95 % | WEIGHT: 303.79 LBS | HEIGHT: 69 IN | DIASTOLIC BLOOD PRESSURE: 88 MMHG | RESPIRATION RATE: 16 BRPM | BODY MASS INDEX: 45 KG/M2 | SYSTOLIC BLOOD PRESSURE: 129 MMHG | TEMPERATURE: 98.2 F

## 2023-12-03 PROBLEM — I50.43 ACUTE ON CHRONIC COMBINED SYSTOLIC AND DIASTOLIC CHF (CONGESTIVE HEART FAILURE) (MULTI): Status: ACTIVE | Noted: 2023-12-03

## 2023-12-03 LAB
ALBUMIN SERPL BCP-MCNC: 4 G/DL (ref 3.4–5)
ALP SERPL-CCNC: 50 U/L (ref 33–120)
ALT SERPL W P-5'-P-CCNC: 48 U/L (ref 10–52)
ANION GAP SERPL CALC-SCNC: 14 MMOL/L (ref 10–20)
AST SERPL W P-5'-P-CCNC: 34 U/L (ref 9–39)
ATRIAL RATE: 104 BPM
BILIRUB DIRECT SERPL-MCNC: 0.2 MG/DL (ref 0–0.3)
BILIRUB SERPL-MCNC: 0.8 MG/DL (ref 0–1.2)
BNP SERPL-MCNC: 375 PG/ML (ref 0–99)
BUN SERPL-MCNC: 15 MG/DL (ref 6–23)
CALCIUM SERPL-MCNC: 8.9 MG/DL (ref 8.6–10.3)
CARDIAC TROPONIN I PNL SERPL HS: 40 NG/L (ref 0–20)
CARDIAC TROPONIN I PNL SERPL HS: 54 NG/L (ref 0–20)
CARDIAC TROPONIN I PNL SERPL HS: 55 NG/L (ref 0–20)
CHLORIDE SERPL-SCNC: 101 MMOL/L (ref 98–107)
CO2 SERPL-SCNC: 27 MMOL/L (ref 21–32)
CREAT SERPL-MCNC: 1.31 MG/DL (ref 0.5–1.3)
GFR SERPL CREATININE-BSD FRML MDRD: 72 ML/MIN/1.73M*2
GLUCOSE BLD MANUAL STRIP-MCNC: 213 MG/DL (ref 74–99)
GLUCOSE BLD MANUAL STRIP-MCNC: 219 MG/DL (ref 74–99)
GLUCOSE BLD MANUAL STRIP-MCNC: 260 MG/DL (ref 74–99)
GLUCOSE SERPL-MCNC: 207 MG/DL (ref 74–99)
HOLD SPECIMEN: NORMAL
HOLD SPECIMEN: NORMAL
P AXIS: 57 DEGREES
P OFFSET: 178 MS
P ONSET: 129 MS
POTASSIUM SERPL-SCNC: 4.1 MMOL/L (ref 3.5–5.3)
PR INTERVAL: 168 MS
PROT SERPL-MCNC: 6.7 G/DL (ref 6.4–8.2)
Q ONSET: 213 MS
QRS COUNT: 17 BEATS
QRS DURATION: 128 MS
QT INTERVAL: 400 MS
QTC CALCULATION(BAZETT): 526 MS
QTC FREDERICIA: 480 MS
R AXIS: -57 DEGREES
SODIUM SERPL-SCNC: 138 MMOL/L (ref 136–145)
T AXIS: 105 DEGREES
T OFFSET: 413 MS
VENTRICULAR RATE: 104 BPM

## 2023-12-03 PROCEDURE — 36415 COLL VENOUS BLD VENIPUNCTURE: CPT | Performed by: EMERGENCY MEDICINE

## 2023-12-03 PROCEDURE — 36415 COLL VENOUS BLD VENIPUNCTURE: CPT | Performed by: INTERNAL MEDICINE

## 2023-12-03 PROCEDURE — 2500000004 HC RX 250 GENERAL PHARMACY W/ HCPCS (ALT 636 FOR OP/ED): Performed by: INTERNAL MEDICINE

## 2023-12-03 PROCEDURE — G0378 HOSPITAL OBSERVATION PER HR: HCPCS

## 2023-12-03 PROCEDURE — 96376 TX/PRO/DX INJ SAME DRUG ADON: CPT

## 2023-12-03 PROCEDURE — 2500000001 HC RX 250 WO HCPCS SELF ADMINISTERED DRUGS (ALT 637 FOR MEDICARE OP): Performed by: PHYSICIAN ASSISTANT

## 2023-12-03 PROCEDURE — 2500000001 HC RX 250 WO HCPCS SELF ADMINISTERED DRUGS (ALT 637 FOR MEDICARE OP): Performed by: INTERNAL MEDICINE

## 2023-12-03 PROCEDURE — 99239 HOSP IP/OBS DSCHRG MGMT >30: CPT | Performed by: INTERNAL MEDICINE

## 2023-12-03 PROCEDURE — 99223 1ST HOSP IP/OBS HIGH 75: CPT | Performed by: INTERNAL MEDICINE

## 2023-12-03 PROCEDURE — 82947 ASSAY GLUCOSE BLOOD QUANT: CPT

## 2023-12-03 PROCEDURE — 2500000002 HC RX 250 W HCPCS SELF ADMINISTERED DRUGS (ALT 637 FOR MEDICARE OP, ALT 636 FOR OP/ED): Performed by: INTERNAL MEDICINE

## 2023-12-03 PROCEDURE — 71045 X-RAY EXAM CHEST 1 VIEW: CPT

## 2023-12-03 PROCEDURE — 84484 ASSAY OF TROPONIN QUANT: CPT | Performed by: INTERNAL MEDICINE

## 2023-12-03 PROCEDURE — 93005 ELECTROCARDIOGRAM TRACING: CPT

## 2023-12-03 PROCEDURE — 71045 X-RAY EXAM CHEST 1 VIEW: CPT | Performed by: RADIOLOGY

## 2023-12-03 PROCEDURE — 84484 ASSAY OF TROPONIN QUANT: CPT | Performed by: EMERGENCY MEDICINE

## 2023-12-03 PROCEDURE — 96374 THER/PROPH/DIAG INJ IV PUSH: CPT

## 2023-12-03 PROCEDURE — 2500000004 HC RX 250 GENERAL PHARMACY W/ HCPCS (ALT 636 FOR OP/ED): Performed by: PHYSICIAN ASSISTANT

## 2023-12-03 RX ORDER — FUROSEMIDE 10 MG/ML
40 INJECTION INTRAMUSCULAR; INTRAVENOUS EVERY 12 HOURS
Status: DISCONTINUED | OUTPATIENT
Start: 2023-12-03 | End: 2023-12-03 | Stop reason: HOSPADM

## 2023-12-03 RX ORDER — NAPROXEN SODIUM 220 MG/1
324 TABLET, FILM COATED ORAL ONCE
Status: COMPLETED | OUTPATIENT
Start: 2023-12-03 | End: 2023-12-03

## 2023-12-03 RX ORDER — PANTOPRAZOLE SODIUM 40 MG/1
40 TABLET, DELAYED RELEASE ORAL
Status: DISCONTINUED | OUTPATIENT
Start: 2023-12-03 | End: 2023-12-03 | Stop reason: HOSPADM

## 2023-12-03 RX ORDER — METOPROLOL SUCCINATE 25 MG/1
25 TABLET, EXTENDED RELEASE ORAL DAILY
Status: DISCONTINUED | OUTPATIENT
Start: 2023-12-03 | End: 2023-12-03 | Stop reason: HOSPADM

## 2023-12-03 RX ORDER — METFORMIN HYDROCHLORIDE 500 MG/1
1000 TABLET ORAL 2 TIMES DAILY
Status: DISCONTINUED | OUTPATIENT
Start: 2023-12-03 | End: 2023-12-03 | Stop reason: HOSPADM

## 2023-12-03 RX ORDER — TALC
3 POWDER (GRAM) TOPICAL NIGHTLY PRN
Status: DISCONTINUED | OUTPATIENT
Start: 2023-12-03 | End: 2023-12-03 | Stop reason: HOSPADM

## 2023-12-03 RX ORDER — POLYETHYLENE GLYCOL 3350 17 G/17G
17 POWDER, FOR SOLUTION ORAL DAILY PRN
Status: DISCONTINUED | OUTPATIENT
Start: 2023-12-03 | End: 2023-12-03 | Stop reason: HOSPADM

## 2023-12-03 RX ORDER — ACETAMINOPHEN 325 MG/1
650 TABLET ORAL EVERY 4 HOURS PRN
Status: DISCONTINUED | OUTPATIENT
Start: 2023-12-03 | End: 2023-12-03 | Stop reason: HOSPADM

## 2023-12-03 RX ORDER — ATORVASTATIN CALCIUM 20 MG/1
40 TABLET, FILM COATED ORAL NIGHTLY
Status: DISCONTINUED | OUTPATIENT
Start: 2023-12-03 | End: 2023-12-03 | Stop reason: HOSPADM

## 2023-12-03 RX ORDER — ONDANSETRON HYDROCHLORIDE 2 MG/ML
4 INJECTION, SOLUTION INTRAVENOUS EVERY 8 HOURS PRN
Status: DISCONTINUED | OUTPATIENT
Start: 2023-12-03 | End: 2023-12-03 | Stop reason: HOSPADM

## 2023-12-03 RX ORDER — SPIRONOLACTONE 25 MG/1
25 TABLET ORAL DAILY
Status: DISCONTINUED | OUTPATIENT
Start: 2023-12-03 | End: 2023-12-03 | Stop reason: HOSPADM

## 2023-12-03 RX ORDER — ACETAMINOPHEN 650 MG/1
650 SUPPOSITORY RECTAL EVERY 4 HOURS PRN
Status: DISCONTINUED | OUTPATIENT
Start: 2023-12-03 | End: 2023-12-03 | Stop reason: HOSPADM

## 2023-12-03 RX ORDER — DEXTROSE MONOHYDRATE 100 MG/ML
0.3 INJECTION, SOLUTION INTRAVENOUS ONCE AS NEEDED
Status: DISCONTINUED | OUTPATIENT
Start: 2023-12-03 | End: 2023-12-03 | Stop reason: HOSPADM

## 2023-12-03 RX ORDER — ACETAMINOPHEN 160 MG/5ML
650 SOLUTION ORAL EVERY 4 HOURS PRN
Status: DISCONTINUED | OUTPATIENT
Start: 2023-12-03 | End: 2023-12-03 | Stop reason: HOSPADM

## 2023-12-03 RX ORDER — FUROSEMIDE 10 MG/ML
40 INJECTION INTRAMUSCULAR; INTRAVENOUS ONCE
Status: COMPLETED | OUTPATIENT
Start: 2023-12-03 | End: 2023-12-03

## 2023-12-03 RX ORDER — ONDANSETRON 4 MG/1
4 TABLET, FILM COATED ORAL EVERY 8 HOURS PRN
Status: DISCONTINUED | OUTPATIENT
Start: 2023-12-03 | End: 2023-12-03 | Stop reason: HOSPADM

## 2023-12-03 RX ORDER — DEXTROSE 50 % IN WATER (D50W) INTRAVENOUS SYRINGE
25
Status: DISCONTINUED | OUTPATIENT
Start: 2023-12-03 | End: 2023-12-03 | Stop reason: HOSPADM

## 2023-12-03 RX ORDER — INSULIN LISPRO 100 [IU]/ML
0-15 INJECTION, SOLUTION INTRAVENOUS; SUBCUTANEOUS
Status: DISCONTINUED | OUTPATIENT
Start: 2023-12-03 | End: 2023-12-03 | Stop reason: HOSPADM

## 2023-12-03 RX ORDER — PANTOPRAZOLE SODIUM 40 MG/10ML
40 INJECTION, POWDER, LYOPHILIZED, FOR SOLUTION INTRAVENOUS
Status: DISCONTINUED | OUTPATIENT
Start: 2023-12-03 | End: 2023-12-03 | Stop reason: HOSPADM

## 2023-12-03 RX ADMIN — SACUBITRIL AND VALSARTAN 1 TABLET: 24; 26 TABLET, FILM COATED ORAL at 10:06

## 2023-12-03 RX ADMIN — METOPROLOL SUCCINATE 25 MG: 25 TABLET, EXTENDED RELEASE ORAL at 10:06

## 2023-12-03 RX ADMIN — FUROSEMIDE 40 MG: 10 INJECTION, SOLUTION INTRAMUSCULAR; INTRAVENOUS at 10:06

## 2023-12-03 RX ADMIN — ASPIRIN 324 MG: 81 TABLET, CHEWABLE ORAL at 01:18

## 2023-12-03 RX ADMIN — INSULIN LISPRO 9 UNITS: 100 INJECTION, SOLUTION INTRAVENOUS; SUBCUTANEOUS at 11:31

## 2023-12-03 RX ADMIN — ATORVASTATIN CALCIUM 40 MG: 20 TABLET, FILM COATED ORAL at 04:28

## 2023-12-03 RX ADMIN — PANTOPRAZOLE SODIUM 40 MG: 40 TABLET, DELAYED RELEASE ORAL at 10:06

## 2023-12-03 RX ADMIN — SPIRONOLACTONE 25 MG: 25 TABLET ORAL at 10:06

## 2023-12-03 RX ADMIN — METFORMIN HYDROCHLORIDE 1000 MG: 500 TABLET, FILM COATED ORAL at 10:06

## 2023-12-03 RX ADMIN — FUROSEMIDE 40 MG: 10 INJECTION, SOLUTION INTRAMUSCULAR; INTRAVENOUS at 01:18

## 2023-12-03 RX ADMIN — INSULIN LISPRO 6 UNITS: 100 INJECTION, SOLUTION INTRAVENOUS; SUBCUTANEOUS at 07:00

## 2023-12-03 SDOH — SOCIAL STABILITY: SOCIAL INSECURITY: DO YOU FEEL ANYONE HAS EXPLOITED OR TAKEN ADVANTAGE OF YOU FINANCIALLY OR OF YOUR PERSONAL PROPERTY?: NO

## 2023-12-03 SDOH — SOCIAL STABILITY: SOCIAL INSECURITY: DO YOU FEEL UNSAFE GOING BACK TO THE PLACE WHERE YOU ARE LIVING?: NO

## 2023-12-03 SDOH — SOCIAL STABILITY: SOCIAL INSECURITY: ARE YOU OR HAVE YOU BEEN THREATENED OR ABUSED PHYSICALLY, EMOTIONALLY, OR SEXUALLY BY ANYONE?: NO

## 2023-12-03 SDOH — SOCIAL STABILITY: SOCIAL INSECURITY: HAVE YOU HAD THOUGHTS OF HARMING ANYONE ELSE?: NO

## 2023-12-03 SDOH — SOCIAL STABILITY: SOCIAL INSECURITY: HAS ANYONE EVER THREATENED TO HURT YOUR FAMILY OR YOUR PETS?: NO

## 2023-12-03 SDOH — SOCIAL STABILITY: SOCIAL INSECURITY: DOES ANYONE TRY TO KEEP YOU FROM HAVING/CONTACTING OTHER FRIENDS OR DOING THINGS OUTSIDE YOUR HOME?: NO

## 2023-12-03 SDOH — SOCIAL STABILITY: SOCIAL INSECURITY: WERE YOU ABLE TO COMPLETE ALL THE BEHAVIORAL HEALTH SCREENINGS?: YES

## 2023-12-03 SDOH — SOCIAL STABILITY: SOCIAL INSECURITY: ARE THERE ANY APPARENT SIGNS OF INJURIES/BEHAVIORS THAT COULD BE RELATED TO ABUSE/NEGLECT?: NO

## 2023-12-03 SDOH — SOCIAL STABILITY: SOCIAL INSECURITY: ABUSE: ADULT

## 2023-12-03 ASSESSMENT — ACTIVITIES OF DAILY LIVING (ADL)
HEARING - RIGHT EAR: FUNCTIONAL
LACK_OF_TRANSPORTATION: NO
PATIENT'S MEMORY ADEQUATE TO SAFELY COMPLETE DAILY ACTIVITIES?: YES
LACK_OF_TRANSPORTATION: NO
WALKS IN HOME: INDEPENDENT
BATHING: INDEPENDENT
FEEDING YOURSELF: INDEPENDENT
TOILETING: INDEPENDENT
GROOMING: INDEPENDENT
DRESSING YOURSELF: INDEPENDENT
HEARING - LEFT EAR: FUNCTIONAL
JUDGMENT_ADEQUATE_SAFELY_COMPLETE_DAILY_ACTIVITIES: YES
ADEQUATE_TO_COMPLETE_ADL: YES

## 2023-12-03 ASSESSMENT — COGNITIVE AND FUNCTIONAL STATUS - GENERAL
DAILY ACTIVITIY SCORE: 24
MOBILITY SCORE: 24
DAILY ACTIVITIY SCORE: 24
MOBILITY SCORE: 24
PATIENT BASELINE BEDBOUND: NO

## 2023-12-03 ASSESSMENT — PAIN - FUNCTIONAL ASSESSMENT: PAIN_FUNCTIONAL_ASSESSMENT: 0-10

## 2023-12-03 ASSESSMENT — PATIENT HEALTH QUESTIONNAIRE - PHQ9
2. FEELING DOWN, DEPRESSED OR HOPELESS: NOT AT ALL
SUM OF ALL RESPONSES TO PHQ9 QUESTIONS 1 & 2: 0
1. LITTLE INTEREST OR PLEASURE IN DOING THINGS: NOT AT ALL

## 2023-12-03 ASSESSMENT — LIFESTYLE VARIABLES
HOW OFTEN DO YOU HAVE A DRINK CONTAINING ALCOHOL: NEVER
AUDIT-C TOTAL SCORE: 0
SKIP TO QUESTIONS 9-10: 1
AUDIT-C TOTAL SCORE: 0
HOW OFTEN DO YOU HAVE 6 OR MORE DRINKS ON ONE OCCASION: NEVER
HOW MANY STANDARD DRINKS CONTAINING ALCOHOL DO YOU HAVE ON A TYPICAL DAY: PATIENT DOES NOT DRINK

## 2023-12-03 ASSESSMENT — PAIN SCALES - GENERAL: PAINLEVEL_OUTOF10: 0 - NO PAIN

## 2023-12-03 NOTE — ED PROVIDER NOTES
HPI   Chief Complaint   Patient presents with    Shortness of Breath       This is a 37-year-old male who presents to the emergency room with chief complaint of shortness of breath that started the day before Thanksgiving.  The patient has a history of heart failure and states that he is visiting from [place] and has not had his torsemide since he has been out here.  He reports worsening dyspnea with minimal exertion.  He denies any fevers or chills, night sweats, hemoptysis, chest pain, abdominal pain, nausea vomiting or diarrhea.  He denies any other complaints.  I did review the patient's EMR which shows he has been hospitalized in the past for CHF.  He has a past medical history of CHF, morbid obesity, type 2 diabetes, nonischemic cardiomyopathy combined diastolic/systolic CHF previous CVA with no residual deficits, status post left MCA thrombectomy, multiple skin abscesses, he does smoke half pack of cigarettes a day, rarely drinks alcohol denies any drug use.  His last echocardiogram was done on 12/9/2022 which shows a left ventricular systolic function severely decreased with a 5 to 10% estimated EF, abnormal pattern for left ventricular diastolic filling, severe eccentric left ventricular hypertrophy, left atrium is mildly dilated, left ventricular cavity size is severely dilated and there was global hypokinesis of the left ventricle with minor regional variations      History provided by:  Patient and medical records   used: No                        [place] Coma Scale Score: 15                  Patient History   No past medical history on file.  Past Surgical History:   Procedure Laterality Date    MR HEAD ANGIO WO IV CONTRAST  12/29/2022    MR HEAD ANGIO WO IV CONTRAST 12/29/2022 DOCTOR OFFICE LEGACY    OTHER SURGICAL HISTORY  07/21/2022    Cyst excision    OTHER SURGICAL HISTORY  10/25/2022    Cardiac catheterization     Family History   Adopted: Yes     Social History     Tobacco  Use    Smoking status: Not on file    Smokeless tobacco: Not on file   Substance Use Topics    Alcohol use: Not on file    Drug use: Not on file       Physical Exam   ED Triage Vitals   Temp Heart Rate Resp BP   12/02/23 2325 12/02/23 2325 12/02/23 2325 12/02/23 2327   36.6 °C (97.9 °F) 102 20 141/89      SpO2 Temp Source Heart Rate Source Patient Position   12/02/23 2325 12/02/23 2325 -- --   97 % Temporal        BP Location FiO2 (%)     -- --             Physical Exam  Vitals reviewed.   Constitutional:       Appearance: Normal appearance. He is obese. He is ill-appearing.   HENT:      Head: Normocephalic and atraumatic.      Right Ear: Tympanic membrane, ear canal and external ear normal.      Left Ear: Tympanic membrane, ear canal and external ear normal.      Nose: Nose normal.      Mouth/Throat:      Mouth: Mucous membranes are moist.      Pharynx: Oropharynx is clear.   Eyes:      Extraocular Movements: Extraocular movements intact.      Conjunctiva/sclera: Conjunctivae normal.      Pupils: Pupils are equal, round, and reactive to light.   Cardiovascular:      Rate and Rhythm: Normal rate and regular rhythm.      Pulses: Normal pulses.      Heart sounds: Normal heart sounds.   Pulmonary:      Effort: Pulmonary effort is normal.      Breath sounds: Examination of the right-upper field reveals decreased breath sounds. Examination of the left-upper field reveals decreased breath sounds. Examination of the right-middle field reveals decreased breath sounds. Examination of the left-middle field reveals decreased breath sounds. Examination of the right-lower field reveals decreased breath sounds. Examination of the left-lower field reveals decreased breath sounds. Decreased breath sounds present. No wheezing, rhonchi or rales.   Abdominal:      General: Abdomen is flat. Bowel sounds are normal.      Palpations: Abdomen is soft. There is no mass.      Tenderness: There is no abdominal tenderness. There is no  guarding.   Musculoskeletal:         General: No swelling or tenderness. Normal range of motion.      Cervical back: Normal range of motion and neck supple.   Skin:     General: Skin is warm and dry.      Capillary Refill: Capillary refill takes less than 2 seconds.      Findings: No rash.   Neurological:      General: No focal deficit present.      Mental Status: He is alert and oriented to person, place, and time. Mental status is at baseline.   Psychiatric:         Mood and Affect: Mood normal.         Behavior: Behavior normal.         Thought Content: Thought content normal.         Judgment: Judgment normal.         ED Course & MDM   ED Course as of 12/03/23 0123   Sun Dec 03, 2023   0117 EKG interpreted by me at 2352 hrs. sinus tachycardia rate 104, left axis deviation with left atrial enlargement rate 104  QRS was 128  QTc was 526.  The patient CBC white count 5.8 hemoglobin 13.1 hematocrit 38.5, platelet count 235  PT/INR within normal limits, troponin was 40 metabolic glucose 207 creatinine 1.3 with a normal GFR,, hepatic function panel within normal limits.  Chest x-ray shows evidence of CHF and cardiomegaly with with interstitial edema consistent with CHF.  Patient was given 40 mg of Lasix IV push and 324 mg of aspirin p.o. [RS]      ED Course User Index  [RS] Delroy Mccray PA-C         Diagnoses as of 12/03/23 0123   Acute on chronic congestive heart failure, unspecified heart failure type (CMS/HCC)   Elevated troponin       Medical Decision Making  Temperature 36.6 pulse 102 respiration 20, blood pressure 141/89, pulse ox was 97% on room air    EKG interpreted by me at 2352 hrs. sinus tachycardia rate 104, left axis deviation with left atrial enlargement rate 104  QRS was 128  QTc was 526.  The patient CBC white count 5.8 hemoglobin 13.1 hematocrit 38.5, platelet count 235  PT/INR within normal limits, troponin was 40 metabolic glucose 207 creatinine 1.3 with  a normal GFR,, hepatic function panel within normal limits.  Chest x-ray shows evidence of CHF and cardiomegaly with with interstitial edema consistent with CHF.  Patient was given 40 mg of Lasix IV push and 324 mg of aspirin p.o.  Admitted to Dr. Malhotra        Procedure  Procedures     Delroy Mccray PA-C  12/03/23 0124

## 2023-12-03 NOTE — DISCHARGE SUMMARY
"  DISCHARGE DIAGNOSIS     Acute on chronic HFrEF  NICM  DVT on Xarelto    HOSPITAL COURSE AND DETAILS     37M with PMH of HFrEF (EF 5-10%), DM2, HTN, DVT on Xarelto, CKD, tobacco use disorder who presented with SOB, KEY, orthopnea worsening over the past week. He was visiting [place], and got stuck here for the past week without his home cardiac medications. His ordaz in ED showed acute on chronic CHF. He received IV diuresis with significant improvement. He was seen by cardiology here, no need for repeat TTE. Had mildly elevated, flat troponin elevation that was not suspicious for ACS, rather likely elevated d/t ADHF. He has all of his home cardiac medications at his home in [place], and will resume them as prescribed. No changes to meds were made. He was encouraged to quit smoking. He improved dramatically with few doses of IV diuretic. He was euvolemic appearing at discharge, on room air, HDS. He will follow up closely with his cardiologist, has upcoming outpt appt. Total time spent on discharge services 31 minutes.     DISCHARGE PHYSICAL EXAM     Last Recorded Vitals:  Vitals:    12/03/23 0343 12/03/23 0600 12/03/23 0750 12/03/23 1008   BP: (!) 144/97  129/88    Pulse: 100  94    Resp: 17  16    Temp: 36.5 °C (97.7 °F)  36.8 °C (98.2 °F)    TempSrc:       SpO2: 94%  (!) 89% 95%   Weight: 140 kg (308 lb) 138 kg (303 lb 12.7 oz)     Height: 1.753 m (5' 9.02\")          Physical Exam:  GEN: healthy appearing, appears stated age, NAD  HEENT: NCAT  NECK: supple, no masses  CV: RRR, no m/r/g, no LE edema  LUNGS: CTAB, no w/r/c  ABD: soft, NT, ND, morbidly obese  NEURO: A+Ox3, no FND  PSYCH: appropriate mood, affect      PERTINENT LABS AND IMAGING     Results for orders placed or performed during the hospital encounter of 12/02/23 (from the past 96 hour(s))   B-Type Natriuretic Peptide   Result Value Ref Range     (H) 0 - 99 pg/mL   Protime-INR   Result Value Ref Range    Protime 12.5 9.8 - 12.8 seconds    INR 1.1 " 0.9 - 1.1   CBC and Auto Differential   Result Value Ref Range    WBC 5.8 4.4 - 11.3 x10*3/uL    nRBC 0.0 0.0 - 0.0 /100 WBCs    RBC 4.09 (L) 4.50 - 5.90 x10*6/uL    Hemoglobin 13.1 (L) 13.5 - 17.5 g/dL    Hematocrit 38.5 (L) 41.0 - 52.0 %    MCV 94 80 - 100 fL    MCH 32.0 26.0 - 34.0 pg    MCHC 34.0 32.0 - 36.0 g/dL    RDW 12.8 11.5 - 14.5 %    Platelets 235 150 - 450 x10*3/uL    Neutrophils % 64.1 40.0 - 80.0 %    Immature Granulocytes %, Automated 0.2 0.0 - 0.9 %    Lymphocytes % 23.7 13.0 - 44.0 %    Monocytes % 9.9 2.0 - 10.0 %    Eosinophils % 1.9 0.0 - 6.0 %    Basophils % 0.2 0.0 - 2.0 %    Neutrophils Absolute 3.71 1.20 - 7.70 x10*3/uL    Immature Granulocytes Absolute, Automated 0.01 0.00 - 0.70 x10*3/uL    Lymphocytes Absolute 1.37 1.20 - 4.80 x10*3/uL    Monocytes Absolute 0.57 0.10 - 1.00 x10*3/uL    Eosinophils Absolute 0.11 0.00 - 0.70 x10*3/uL    Basophils Absolute 0.01 0.00 - 0.10 x10*3/uL   Basic metabolic panel   Result Value Ref Range    Glucose 207 (H) 74 - 99 mg/dL    Sodium 138 136 - 145 mmol/L    Potassium 4.1 3.5 - 5.3 mmol/L    Chloride 101 98 - 107 mmol/L    Bicarbonate 27 21 - 32 mmol/L    Anion Gap 14 10 - 20 mmol/L    Urea Nitrogen 15 6 - 23 mg/dL    Creatinine 1.31 (H) 0.50 - 1.30 mg/dL    eGFR 72 >60 mL/min/1.73m*2    Calcium 8.9 8.6 - 10.3 mg/dL   Hepatic function panel   Result Value Ref Range    Albumin 4.0 3.4 - 5.0 g/dL    Bilirubin, Total 0.8 0.0 - 1.2 mg/dL    Bilirubin, Direct 0.2 0.0 - 0.3 mg/dL    Alkaline Phosphatase 50 33 - 120 U/L    ALT 48 10 - 52 U/L    AST 34 9 - 39 U/L    Total Protein 6.7 6.4 - 8.2 g/dL   Troponin I, High Sensitivity   Result Value Ref Range    Troponin I, High Sensitivity 40 (H) 0 - 20 ng/L   Troponin I, High Sensitivity   Result Value Ref Range    Troponin I, High Sensitivity 54 (HH) 0 - 20 ng/L   POCT GLUCOSE   Result Value Ref Range    POCT Glucose 213 (H) 74 - 99 mg/dL   ECG 12 lead   Result Value Ref Range    Ventricular Rate 104 BPM    Atrial  Rate 104 BPM    MD Interval 168 ms    QRS Duration 128 ms    QT Interval 400 ms    QTC Calculation(Bazett) 526 ms    P Axis 57 degrees    R Axis -57 degrees    T Axis 105 degrees    QRS Count 17 beats    Q Onset 213 ms    P Onset 129 ms    P Offset 178 ms    T Offset 413 ms    QTC Fredericia 480 ms   POCT GLUCOSE   Result Value Ref Range    POCT Glucose 219 (H) 74 - 99 mg/dL   Troponin I, High Sensitivity   Result Value Ref Range    Troponin I, High Sensitivity 55 (HH) 0 - 20 ng/L   Lavender Top   Result Value Ref Range    Extra Tube Hold for add-ons.    SST TOP   Result Value Ref Range    Extra Tube Hold for add-ons.    POCT GLUCOSE   Result Value Ref Range    POCT Glucose 260 (H) 74 - 99 mg/dL        XR chest 1 view   Final Result   Cardiomegaly with interstitial edema. Please correlate for CHF.        MACRO:   None        Signed by: Lima Kern 12/3/2023 12:36 AM   Dictation workstation:   [id number]          No echocardiogram results found for the past 14 days    DISCHARGE MEDICATIONS        Your medication list        CONTINUE taking these medications        Instructions Last Dose Given Next Dose Due   atorvastatin 40 mg tablet  Commonly known as: Lipitor           dulaglutide 3 mg/0.5 mL pen injector           Entresto 24-26 mg tablet  Generic drug: sacubitriL-valsartan           Farxiga 10 mg  Generic drug: dapagliflozin propanediol           FreeStyle Nadir 2 Sensor kit  Generic drug: FreeStyle Nadir sensor system      Use as instructed       insulin lispro 100 unit/mL injection  Commonly known as: HumaLOG           metFORMIN 1,000 mg tablet  Commonly known as: Glucophage           metoprolol succinate XL 25 mg 24 hr tablet  Commonly known as: Toprol-XL           spironolactone 25 mg tablet  Commonly known as: Aldactone           torsemide 20 mg tablet  Commonly known as: Demadex           Xarelto 20 mg tablet  Generic drug: rivaroxaban                    OUTPATIENT FOLLOW-UP     Future Appointments    Date Time Provider Department Center   12/5/2023 10:30 AM Delroy Jurado MD [id number]

## 2023-12-03 NOTE — CONSULTS
Cardiology Consult Note      Date:   12/3/2023  Patient name:  Arthur Carranza  Date of admission:  12/2/2023 11:23 PM  MRN:   [id number]  YOB: 1985  Time of Consult:  10:41 AM    Consulting Cardiologist: Dr. Yon Yuan      Patient follows with Dr. Delroy Jurado  cardiology and Dr. Andrews        Admission Diagnosis:     Acute on chronic combined systolic and diastolic CHF (congestive heart failure) (CMS/HCC)      History of Present Illness:      Arthur Carranza is a 37 y.o. -American male with past medical history significant for dilated nonischemic cardiomyopathy ejection fraction 13%, chronic systolic congestive heart failure, centric left ventricular hypertrophy, cerebrovascular accident, hypertension, diabetes mellitus, anxiety, bipolar disorder, morbid obesity who presents with complaints of dyspnea.  Patient has been out of his cardiac medications for 1 week.  Over the past week he has had increasing dyspnea exertion and abdominal girth.  Denies lower extremity edema.  Denies chest pain, palpitations, nausea, vomiting, dizziness, near-syncope, [place] syncope.    Past surgical history:  Perirectal cyst resection    Social history:  Active tobacco use  Rare alcohol use    Family history:  Adopted    I have personally reviewed EKG and chest x-ray:  December 2 EKG: Sinus tachycardia, LVH with QRS widening and repolarization abnormality  December 3 chest x-ray: Cardiomegaly, pulmonary edema    April 20, 2023 cardiac MRI: Severely dilated left ventricle with severely depressed systolic function.  Ejection fraction 13%.  Diffuse fibrosis on delayed enhancement.  Severe eccentric left ventricular hypertrophy.    October 21, 2022 cardiac cath: Normal coronary arteries.    Assessment:  Acute on chronic systolic congestive heart failure  Medical noncompliance with medical therapy 1 week prior to hospitalization  Dilated nonischemic cardiomyopathy EF 13%  Eccentric left ventricular  hypertrophy  Hypertension  Diabetes mellitus  History of CVA  Bipolar  Anxiety  Morbid obesity    Recommendations:  No need for repeat echocardiogram.  IV Lasix  Strict I's and O's  Daily weights  Daily BMP  Advise compliance with medical therapy.    Continue:  atorvastatin, 40 mg, oral, Nightly  metoprolol succinate XL, 25 mg, oral, Daily  rivaroxaban, 20 mg, oral, Daily with evening meal  sacubitriL-valsartan, 1 tablet, oral, BID  spironolactone, 25 mg, oral, Daily    Ultimately patient should have ICD.  It does not appear that he had follow-up with either of his cardiologist since his cardiac MRI.              Allergies:     Allergies   Allergen Reactions    Shellfish Containing Products Unknown         Past Medical History:     No past medical history on file.    Past Surgical History:     Past Surgical History:   Procedure Laterality Date    MR HEAD ANGIO WO IV CONTRAST  12/29/2022    MR HEAD ANGIO WO IV CONTRAST 12/29/2022 DOCTOR OFF[facility]    OTHER SURGICAL HISTORY  07/21/2022    Cyst excision    OTHER SURGICAL HISTORY  10/25/2022    Cardiac catheterization       Family History:     Family History   Adopted: Yes       Social History:          CURRENT HOSPITAL MEDICATIONS    atorvastatin, 40 mg, oral, Nightly  furosemide, 40 mg, intravenous, q12h  insulin lispro, 0-15 Units, subcutaneous, TID with meals  metFORMIN, 1,000 mg, oral, BID  metoprolol succinate XL, 25 mg, oral, Daily  pantoprazole, 40 mg, oral, Daily before breakfast   Or  pantoprazole, 40 mg, intravenous, Daily before breakfast  rivaroxaban, 20 mg, oral, Daily with evening meal  sacubitriL-valsartan, 1 tablet, oral, BID  spironolactone, 25 mg, oral, Daily         Current Outpatient Medications   Medication Instructions    atorvastatin (Lipitor) 40 mg tablet 1 tablet, oral, Nightly    dapagliflozin (Farxiga) 10 mg 1 tablet, oral, Daily    dulaglutide 3 mg/0.5 mL pen injector 1 Application, subcutaneous, Weekly    FreeStyle Nadir sensor system  "(FreeStyle Nadir 2 Sensor) kit Use as instructed    insulin lispro (HUMALOG) 8 Units, subcutaneous, 3 times daily before meals    metFORMIN (Glucophage) 1,000 mg tablet 1 tablet, oral, 2 times daily    metoprolol succinate XL (Toprol-XL) 25 mg 24 hr tablet 1 tablet, oral, Daily    rivaroxaban (Xarelto) 20 mg tablet 1 tablet, oral, Daily, Take with food.    sacubitriL-valsartan (Entresto) 24-26 mg tablet 1 tablet, oral, 2 times daily    spironolactone (Aldactone) 25 mg tablet 1 tablet, oral, Daily    torsemide (Demadex) 20 mg tablet 1 tablet, oral, Daily        Review of Systems:      12 point review of systems was obtained in detail and is negative other than that detailed above.      Vital Signs:     Vitals:    12/03/23 0343 12/03/23 0600 12/03/23 0750 12/03/23 1008   BP: (!) 144/97  129/88    Pulse: 100  94    Resp: 17  16    Temp: 36.5 °C (97.7 °F)  36.8 °C (98.2 °F)    TempSrc:       SpO2: 94%  (!) 89% 95%   Weight: 140 kg (308 lb) 138 kg (303 lb 12.7 oz)     Height: 1.753 m (5' 9.02\")          Intake/Output Summary (Last 24 hours) at 12/3/2023 1041  Last data filed at 12/3/2023 0500  Gross per 24 hour   Intake --   Output 750 ml   Net -750 ml       Wt Readings from Last 4 Encounters:   12/03/23 138 kg (303 lb 12.7 oz)   07/20/23 138 kg (303 lb 5 oz)   07/19/23 137 kg (302 lb)   06/02/23 143 kg (315 lb)       Physical Examination:     GENERAL APPEARANCE: Well developed, well nourished, in no acute distress.  CHEST: Symmetric and non-tender.  INTEGUMENT: Skin warm and dry, without gross excoriationis or lesions.  HEENT: No gross abnormalities of conjunctiva, teeth, gums, oral mucosa  NECK: Supple, no JVD, no bruit. Thyroid not palpable. Carotid upstrokes normal.  NEURO/PSHCY: Alert and oriented x3; appropriate behavior and responses and responses, grossly normal cerebellar function with normal balance and coordination  LUNGS: Decreased breath sounds bilateral  HEART: Rate and rhythm regular with no evident " murmur; no gallop appreciated. There are no rubs, clicks or heaves. PMI nondisplaced.  ABDOMEN: Soft, nontender, no palpable hepatosplenomegaly, no mases, no bruits. Abdominal aorta not noted to be enlarged.  MUSCULOSKELETAL: Ambulatory with normal tandem gait.  EXTREMITIES: Warm with good color, no clubbing or cyanois. There is no edema noted.  PERIPHERAL VASCULAR: Pulses present and equally palpable; 2+ throughout. No femoral bruits.      Lab:     CBC:   Lab Results   Component Value Date    WBC 5.8 12/02/2023    RBC 4.09 (L) 12/02/2023    HGB 13.1 (L) 12/02/2023    HCT 38.5 (L) 12/02/2023     12/02/2023        CMP:    Lab Results   Component Value Date     12/02/2023    K 4.1 12/02/2023     12/02/2023    CO2 27 12/02/2023    BUN 15 12/02/2023    CREATININE 1.31 (H) 12/02/2023    GLUCOSE 207 (H) 12/02/2023    CALCIUM 8.9 12/02/2023         BNP:   Lab Results   Component Value Date     (H) 12/02/2023        PT/INR:    Lab Results   Component Value Date    PROTIME 12.5 12/02/2023    INR 1.1 12/02/2023       HgBA1c:    Lab Results   Component Value Date    HGBA1C 11.8 (A) 08/07/2023       BMP:  Lab Results   Component Value Date     12/02/2023    K 4.1 12/02/2023     12/02/2023    CO2 27 12/02/2023    BUN 15 12/02/2023    CREATININE 1.31 (H) 12/02/2023       CBC:  Lab Results   Component Value Date    WBC 5.8 12/02/2023    RBC 4.09 (L) 12/02/2023    HGB 13.1 (L) 12/02/2023    HCT 38.5 (L) 12/02/2023    MCV 94 12/02/2023    MCH 32.0 12/02/2023    MCHC 34.0 12/02/2023    RDW 12.8 12/02/2023     12/02/2023       Cardiac Enzymes:    Lab Results   Component Value Date    TROPHS 55 (HH) 12/03/2023    TROPHS 54 (HH) 12/03/2023    TROPHS 40 (H) 12/02/2023       Hepatic Function Panel:    Lab Results   Component Value Date    ALKPHOS 50 12/02/2023    ALT 48 12/02/2023    AST 34 12/02/2023    PROT 6.7 12/02/2023    BILITOT 0.8 12/02/2023    BILIDIR 0.2 12/02/2023       Diagnostic  Studies:     ECG 12 lead    Result Date: 12/3/2023  Sinus tachycardia Possible Left atrial enlargement Left axis deviation Left ventricular hypertrophy with QRS widening and repolarization abnormality Abnormal ECG When compared with ECG of 06-AUG-2023 20:41, No significant change was found Confirmed by Yon Yuan ([id number]) on 12/3/2023 8:43:46 AM    XR chest 1 view    Result Date: 12/3/2023  Interpreted By:  Lima Kern, STUDY: XR CHEST 1 VIEW;  12/3/2023 12:01 am   INDICATION: Signs/Symptoms:chf.   COMPARISON: 12/28/2022   ACCESSION NUMBER(S): [id number]   ORDERING CLINICIAN: MANI HE   FINDINGS:     CARDIOMEDIASTINAL SILHOUETTE: There is cardiomegaly. The pulmonary vessels are enlarged and indistinct. Findings of interstitial edema noted.   LUNGS: No pulmonary consolidation, significant pleural effusion or pneumothorax.   ABDOMEN: No remarkable upper abdominal findings.   BONES: No acute osseous abnormality.       Cardiomegaly with interstitial edema. Please correlate for CHF.   MACRO: None   Signed by: Lima Kern 12/3/2023 12:36 AM Dictation workstation:   [id number]      Radiology:     XR chest 1 view   Final Result   Cardiomegaly with interstitial edema. Please correlate for CHF.        MACRO:   None        Signed by: Lima Kern 12/3/2023 12:36 AM   Dictation workstation:   [id number]      Transthoracic Echo (TTE) Complete    (Results Pending)       Problem List:     Patient Active Problem List   Diagnosis    ACC/AHA stage C systolic congestive heart failure (CMS/HCC)    CHF (congestive heart failure) (CMS/HCC)    Current every day smoker    Type 2 diabetes mellitus with neurologic complication, with long-term current use of insulin (CMS/HCC)    Edema    Neck abscess    Nonischemic cardiomyopathy (CMS/HCC)    Perirectal abscess    Primary hypertension    Ventricular bigeminy seen on cardiac monitor    Bipolar 1 disorder (CMS/HCC)    Morbid obesity with BMI of 45.0-49.9, adult (CMS/HCC)     Acute ischemic left MCA stroke (CMS/HCC)    Cocaine use    Major depression, recurrent (CMS/HCC)    PTSD (post-traumatic stress disorder)    Acute on chronic combined systolic and diastolic CHF (congestive heart failure) (CMS/[facility])           Thank you for the opportunity to participate in the care of your patient.  Please do not hesitate to call if you have any questions.    Electronically signed by Yon Yuan DO, on 12/3/2023 at 10:41 AM

## 2023-12-03 NOTE — H&P
History Of Present Illness  Arthur Carranza is a 37 y.o. male presenting with shortness of breath that was worsening over the last week.  He reported that he is from [facility] but visiting his family in town and did not bring his medication with him.  He denied chest pain, fever, nausea, vomiting or diarrhea.  He feels his leg is getting more swelling, and now he is not able to lay flat due to shortness of breath, and has significant dyspnea with any exertion.  ER course: Patient was awake, alert, oriented, in no acute distress.  He was tachycardic up to 102, and mildly tachypneic.  He did not require oxygen in the ER.  His labs showed elevated proBNP 375, and marginally elevated troponin 40 that went up to 54 on the repeat.  There is no leukocytosis.  Chest x-ray reported cardiomegaly with interstitial edema correlate for CHF.  Patient was given Lasix in the ER and admitted to observation for further diuresis, and to secure medication before discharge.      Past Medical History  History of CHF ejection fraction about 10 to 15%  Diabetes mellitus  Hypertension  CKD  History of DVT on Xarelto  Prior stroke      Surgical History  He has a past surgical history that includes Other surgical history (07/21/2022); Other surgical history (10/25/2022); and MR angio head wo IV contrast (12/29/2022).     Social History  Active tobacco smoker, denies abnormal alcohol use or illicit drug use at this time.  However he has history of cocaine abuse and alcohol abuse    Family History  Family History   Adopted: Yes        Allergies  Shellfish containing products    Review of Systems  10/10 points review of system were conducted, negative except as above    Physical Exam  Constitutional:       Appearance: He is obese. He is ill-appearing. He is not diaphoretic.   HENT:      Head: Normocephalic and atraumatic.      Nose: Nose normal. No congestion or rhinorrhea.      Mouth/Throat:      Mouth: Mucous membranes are moist.   Eyes:     "  General: No scleral icterus.     Extraocular Movements: Extraocular movements intact.      Pupils: Pupils are equal, round, and reactive to light.   Cardiovascular:      Rate and Rhythm: Regular rhythm. Tachycardia present.      Pulses: Normal pulses.      Heart sounds: Normal heart sounds. No murmur heard.     No gallop.   Pulmonary:      Breath sounds: Rhonchi and rales present.   Abdominal:      General: Abdomen is flat. There is distension.      Palpations: Abdomen is soft.      Tenderness: There is no abdominal tenderness. There is no right CVA tenderness or left CVA tenderness.   Musculoskeletal:         General: Normal range of motion.      Cervical back: Normal range of motion and neck supple. No tenderness.      Right lower leg: Edema present.      Left lower leg: Edema present.   Skin:     General: Skin is warm and dry.      Findings: No rash.   Neurological:      General: No focal deficit present.      Mental Status: He is alert and oriented to person, place, and time. Mental status is at baseline.   Psychiatric:         Mood and Affect: Mood normal.          Last Recorded Vitals  Blood pressure (!) 137/93, pulse 100, temperature 36.6 °C (97.9 °F), temperature source Temporal, resp. rate 18, height 1.753 m (5' 9\"), weight 140 kg (308 lb), SpO2 100 %.    Relevant Results    Scheduled medications  atorvastatin, 40 mg, oral, Nightly  furosemide, 40 mg, intravenous, q12h  insulin lispro, 0-15 Units, subcutaneous, TID with meals  metFORMIN, 1,000 mg, oral, BID  metoprolol succinate XL, 25 mg, oral, Daily  pantoprazole, 40 mg, oral, Daily before breakfast   Or  pantoprazole, 40 mg, intravenous, Daily before breakfast  rivaroxaban, 20 mg, oral, Daily with evening meal  sacubitriL-valsartan, 1 tablet, oral, BID  spironolactone, 25 mg, oral, Daily      Continuous medications     PRN medications  PRN medications: acetaminophen **OR** acetaminophen **OR** acetaminophen, dextrose 10 % in water (D10W), dextrose, " glucagon, melatonin, ondansetron **OR** ondansetron, polyethylene glycol     No current facility-administered medications on file prior to encounter.     Current Outpatient Medications on File Prior to Encounter   Medication Sig Dispense Refill    atorvastatin (Lipitor) 40 mg tablet Take 1 tablet (40 mg) by mouth once daily at bedtime.      dapagliflozin (Farxiga) 10 mg Take 1 tablet (10 mg) by mouth once daily.      FreeStyle Nadir sensor system (FreeStyle Nadir 2 Sensor) kit Use as instructed 1 each 0    insulin glargine (Lantus Solostar U-100 Insulin) 100 unit/mL (3 mL) pen Inject 40 Units under the skin once daily. Take as directed per insulin instructions.      insulin lispro (HumaLOG) 100 unit/mL injection Inject 8 Units under the skin in the morning and 8 Units at noon and 8 Units in the evening. Inject before meals.      metFORMIN (Glucophage) 1,000 mg tablet Take 1 tablet (1,000 mg) by mouth in the morning and 1 tablet (1,000 mg) before bedtime.      metoprolol succinate XL (Toprol-XL) 25 mg 24 hr tablet Take 1 tablet (25 mg) by mouth once daily.      rivaroxaban (Xarelto) 20 mg tablet Take 1 tablet (20 mg) by mouth once daily. Take with food.      sacubitriL-valsartan (Entresto) 24-26 mg tablet Take 1 tablet by mouth in the morning and 1 tablet before bedtime.      spironolactone (Aldactone) 25 mg tablet Take 1 tablet (25 mg) by mouth once daily.      torsemide (Demadex) 20 mg tablet Take 1 tablet (20 mg) by mouth once daily.        Results for orders placed or performed during the hospital encounter of 12/02/23 (from the past 24 hour(s))   B-Type Natriuretic Peptide   Result Value Ref Range     (H) 0 - 99 pg/mL   Protime-INR   Result Value Ref Range    Protime 12.5 9.8 - 12.8 seconds    INR 1.1 0.9 - 1.1   CBC and Auto Differential   Result Value Ref Range    WBC 5.8 4.4 - 11.3 x10*3/uL    nRBC 0.0 0.0 - 0.0 /100 WBCs    RBC 4.09 (L) 4.50 - 5.90 x10*6/uL    Hemoglobin 13.1 (L) 13.5 - 17.5 g/dL     Hematocrit 38.5 (L) 41.0 - 52.0 %    MCV 94 80 - 100 fL    MCH 32.0 26.0 - 34.0 pg    MCHC 34.0 32.0 - 36.0 g/dL    RDW 12.8 11.5 - 14.5 %    Platelets 235 150 - 450 x10*3/uL    Neutrophils % 64.1 40.0 - 80.0 %    Immature Granulocytes %, Automated 0.2 0.0 - 0.9 %    Lymphocytes % 23.7 13.0 - 44.0 %    Monocytes % 9.9 2.0 - 10.0 %    Eosinophils % 1.9 0.0 - 6.0 %    Basophils % 0.2 0.0 - 2.0 %    Neutrophils Absolute 3.71 1.20 - 7.70 x10*3/uL    Immature Granulocytes Absolute, Automated 0.01 0.00 - 0.70 x10*3/uL    Lymphocytes Absolute 1.37 1.20 - 4.80 x10*3/uL    Monocytes Absolute 0.57 0.10 - 1.00 x10*3/uL    Eosinophils Absolute 0.11 0.00 - 0.70 x10*3/uL    Basophils Absolute 0.01 0.00 - 0.10 x10*3/uL   Basic metabolic panel   Result Value Ref Range    Glucose 207 (H) 74 - 99 mg/dL    Sodium 138 136 - 145 mmol/L    Potassium 4.1 3.5 - 5.3 mmol/L    Chloride 101 98 - 107 mmol/L    Bicarbonate 27 21 - 32 mmol/L    Anion Gap 14 10 - 20 mmol/L    Urea Nitrogen 15 6 - 23 mg/dL    Creatinine 1.31 (H) 0.50 - 1.30 mg/dL    eGFR 72 >60 mL/min/1.73m*2    Calcium 8.9 8.6 - 10.3 mg/dL   Hepatic function panel   Result Value Ref Range    Albumin 4.0 3.4 - 5.0 g/dL    Bilirubin, Total 0.8 0.0 - 1.2 mg/dL    Bilirubin, Direct 0.2 0.0 - 0.3 mg/dL    Alkaline Phosphatase 50 33 - 120 U/L    ALT 48 10 - 52 U/L    AST 34 9 - 39 U/L    Total Protein 6.7 6.4 - 8.2 g/dL   Troponin I, High Sensitivity   Result Value Ref Range    Troponin I, High Sensitivity 40 (H) 0 - 20 ng/L   Troponin I, High Sensitivity   Result Value Ref Range    Troponin I, High Sensitivity 54 (HH) 0 - 20 ng/L       XR chest 1 view    Result Date: 12/3/2023  Interpreted By:  Lima Kern, STUDY: XR CHEST 1 VIEW;  12/3/2023 12:01 am   INDICATION: Signs/Symptoms:chf.   COMPARISON: 12/28/2022   ACCESSION NUMBER(S): [id number]   ORDERING CLINICIAN: MANI HE   FINDINGS:     CARDIOMEDIASTINAL SILHOUETTE: There is cardiomegaly. The pulmonary vessels are  enlarged and indistinct. Findings of interstitial edema noted.   LUNGS: No pulmonary consolidation, significant pleural effusion or pneumothorax.   ABDOMEN: No remarkable upper abdominal findings.   BONES: No acute osseous abnormality.       Cardiomegaly with interstitial edema. Please correlate for CHF.   MACRO: None   Signed by: Lima Kern 12/3/2023 12:36 AM Dictation workstation:   [id number]         Assessment/Plan   Principal Problem:    Acute on chronic combined systolic and diastolic CHF (congestive heart failure) (CMS/[facility])  Active Problems:    Type 2 diabetes mellitus with neurologic complication, with long-term current use of insulin (CMS/[facility])    Nonischemic cardiomyopathy (CMS/[facility])    Primary hypertension    -CHF exacerbation secondary to medication noncompliance.  -Start patient on Lasix 40 mg twice daily.  -Fluid restriction to 1.5 to 2 L/day.  -Monitor serum electrolytes and kidney function while on diuretics  -Daily weight.  -Currently not on oxygen.  He does not use oxygen at home.  -Sliding scale coverage for diabetes  -Resume Xarelto  -Resume other home medication  -Patient is active tobacco smoker however he did not request nicotine patch      Carolyn Malhotra MD

## 2023-12-03 NOTE — CARE PLAN
The patient's goals for the shift include  can breathe better.    The clinical goals for the shift include ensure patient safety and safe and effective patient centered care    Over the shift, the patient did not make progress toward the following goals. Barriers to progression include the patient is a current smoker. Recommendations to address these barriers include Smoking cessation information.

## 2023-12-04 ENCOUNTER — PATIENT OUTREACH (OUTPATIENT)
Dept: CARDIOLOGY | Facility: CLINIC | Age: 38
End: 2023-12-04
Payer: COMMERCIAL

## 2023-12-04 PROBLEM — I69.920 APHASIA DUE TO LATE EFFECTS OF CEREBROVASCULAR DISEASE: Status: ACTIVE | Noted: 2023-12-04

## 2023-12-04 PROBLEM — K65.1 ABSCESS OF ABDOMINAL CAVITY (MULTI): Status: ACTIVE | Noted: 2023-08-07

## 2023-12-04 PROBLEM — E11.65 TYPE 2 DIABETES MELLITUS WITH HYPERGLYCEMIA (MULTI): Status: ACTIVE | Noted: 2023-12-04

## 2023-12-04 PROBLEM — F33.2 SEVERE EPISODE OF RECURRENT MAJOR DEPRESSIVE DISORDER, WITHOUT PSYCHOTIC FEATURES (MULTI): Status: ACTIVE | Noted: 2018-10-24

## 2023-12-04 RX ORDER — BUPROPION HYDROCHLORIDE 150 MG/1
150 TABLET ORAL DAILY
COMMUNITY
Start: 2023-10-24

## 2023-12-04 RX ORDER — SERTRALINE HYDROCHLORIDE 100 MG/1
100 TABLET, FILM COATED ORAL DAILY
COMMUNITY
Start: 2023-10-24

## 2023-12-04 RX ORDER — LANCETS 30 GAUGE
EACH MISCELLANEOUS
COMMUNITY
Start: 2023-04-19

## 2023-12-04 RX ORDER — INSULIN GLARGINE 100 [IU]/ML
INJECTION, SOLUTION SUBCUTANEOUS
Status: ON HOLD | COMMUNITY
End: 2024-02-01 | Stop reason: SDUPTHER

## 2023-12-04 RX ORDER — IBUPROFEN 200 MG
1 TABLET ORAL EVERY 24 HOURS
Status: ON HOLD | COMMUNITY
Start: 2023-04-25 | End: 2024-02-01 | Stop reason: ALTCHOICE

## 2023-12-04 RX ORDER — TRAZODONE HYDROCHLORIDE 50 MG/1
50 TABLET ORAL NIGHTLY
COMMUNITY
Start: 2023-10-24

## 2023-12-04 RX ORDER — CALCIUM CITRATE/VITAMIN D3 200MG-6.25
TABLET ORAL
COMMUNITY
Start: 2023-04-19

## 2023-12-04 RX ORDER — NICOTINE POLACRILEX 4 MG/1
LOZENGE ORAL
Status: ON HOLD | COMMUNITY
Start: 2023-04-27 | End: 2024-02-01 | Stop reason: ALTCHOICE

## 2023-12-04 RX ORDER — DULAGLUTIDE 4.5 MG/.5ML
4.5 INJECTION, SOLUTION SUBCUTANEOUS
Status: ON HOLD | COMMUNITY
Start: 2023-10-24 | End: 2024-02-23 | Stop reason: SDUPTHER

## 2023-12-04 NOTE — PROGRESS NOTES
Primary Care Physician: Kevin Willingham MD  Patient's Location: [facility] [id number]    Date of Visit: 12/05/2023 10:30 AM EST  Location of visit: [facility]   Type of Visit: Established    HPI / Summary:   Arthur Carranza is a very pleasant 37 y.o. male presenting for management of      Initial CV History:   Mr Carranza is a 37 year old man from [place], [place] who presents for management of NICM/HFrEF (EF 5-10%), initially diagnosed 10/2022. 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      Mr. Carranza was initially diagnosed with NICM during hospitalization at [facility] in October 2022 after presentation with SOB, weight gain. During hospitalization he had elevated BNP and echo with EF 15-20%. Coronary angiogram showed no CAD. He was started on BB, ARB and discharged with LifeVest. He followed up with Dr. Andrews as outpatient where he had not started his medications. He had multiple no-shows with Dr. Andrews and EP team. In December he was admitted with R sided weakness related to Cardioembolic stroke. He was given TNK with improvement and discharged on rivaroxaban. He followed up with Eugenia Lyman for post hospital visit and was referred for tobacco cessation, cardiac rehab, primary care, comprehensive weight loss, sleep clinic but these have not been scheduled.      Interval Hx: Denies fatigue, chest pain. No edema noted in BLE.   Denies headaches, dizziness, falls.  Patient reports palpitations; he is having anxiety and feels this leads to the palpitations. He has SOB, KEY, orthopnea, PND.      Hospitalizations/ED visits: 12/28/22-12/30/22 acute ischemic L MCA stroke. 10/20/22-10/22/22 CHF. 6/14/22/6/16/22 Perirectal abscess.      PMHx: DM II, HTN, HLD, Obesity, ?PHOEBE, Bipolar I  Disorder (Previous SI/HI 2021), Perirectal Abscess  PSHx: Denies.   Family Hx: Foster care, family hx unknown.   Social Hx: Current smoker. Hx of marijuana/cocaine- denies current use. Denies EtOH. 8 kids, 12 year old daughter w/?arrhythmias.        Interval history:       Today:  He is accompanied by:    He denies: dyspnea at rest, LE swelling, syncope, PND, orthopnea, chest pain, palpitation.    ROS: Full 10 pt review of symptoms of negative unless discussed above.     Medical Hx:    Surgical Hx:     Social Hx:     Family Hx:        Objective Problems:   Patient Active Problem List   Diagnosis    ACC/AHA stage C systolic congestive heart failure (CMS/HCC)    CHF (congestive heart failure) (CMS/HCC)    Current every day smoker    Type 2 diabetes mellitus with neurologic complication, with long-term current use of insulin (CMS/HCC)    Edema    Neck abscess    Nonischemic cardiomyopathy (CMS/HCC)    Perirectal abscess    Primary hypertension    Ventricular bigeminy seen on cardiac monitor    Bipolar 1 disorder (CMS/HCC)    Morbid obesity with BMI of 45.0-49.9, adult (CMS/HCC)    Acute ischemic left MCA stroke (CMS/HCC)    Cocaine use    Major depression, recurrent (CMS/HCC)    PTSD (post-traumatic stress disorder)    Acute on chronic combined systolic and diastolic CHF (congestive heart failure) (CMS/HCC)     Medical History:   No past medical history on file.  Surgical Hx:   Past Surgical History:   Procedure Laterality Date    MR HEAD ANGIO WO IV CONTRAST  12/29/2022    MR HEAD ANGIO WO IV CONTRAST 12/29/2022 DOCTOR OFFICE LEGACY    OTHER SURGICAL HISTORY  07/21/2022    Cyst excision    OTHER SURGICAL HISTORY  10/25/2022    Cardiac catheterization      Social Hx:      Family Hx:   Family History   Adopted: Yes     Allergies:   Allergies   Allergen Reactions    Shellfish Containing Products Unknown     Exam:       12/3/2023     7:50 AM 12/3/2023     6:00 AM 12/3/2023     3:43 AM 12/3/2023     3:07 AM 12/3/2023      "2:55 AM 12/3/2023     2:30 AM   Vitals   Systolic 129  144  137 170   Diastolic 88  97  93 116   Heart Rate 94  100  100 100   Temp 36.8 °C (98.2 °F)  36.5 °C (97.7 °F)      Resp 16  17  18 18   Height (in)   1.753 m (5' 9.02\")      Weight (lb)  303.79 308 303.79     BMI  44.84 kg/m2 45.46 kg/m2 44.86 kg/m2     BSA (m2)  2.59 m2 2.61 m2 2.59 m2       Wt Readings from Last 5 Encounters:   12/03/23 138 kg (303 lb 12.7 oz)   07/20/23 138 kg (303 lb 5 oz)   07/19/23 137 kg (302 lb)   06/02/23 143 kg (315 lb)   04/19/23 141 kg (311 lb)     GEN: Pleasant, well-appearing, no acute distress.  HEENT: JVP not elevated, no icterus. No HJR  CHEST: No wheeze, good air movement.  CV: Normal rate, regular rhythm. Normal S1, inspiratory split S2, no m/r/g  ABD: Soft, ND, NT  EXT: Warm, well perfused, No LE edema.   NEURO: Pleasant, Oriented to plan    Labs:   CMP:  Recent Labs     12/02/23  2342 08/08/23  0521 08/07/23  0114 07/09/23  0939 07/08/23  0620 10/22/22  0549 10/21/22  0528 10/20/22  1211 06/16/22  0706 06/15/22  0756    136 135* 136 136   < > 131* 135*   < > 140   K 4.1 3.9 3.7 3.9 4.1   < > 4.4 4.3   < > 3.9    101 105 101 102   < > 96* 101   < > 105   CO2 27 25 23 26 26   < > 25 24   < > 26   ANIONGAP 14 14 11 13 12   < > 14 14   < > 13   BUN 15 14 14 17 15   < > 22 14   < > 14   CREATININE 1.31* 1.44* 1.14 1.21 1.02   < > 1.34* 1.17   < > 1.18   EGFR 72  --   --   --   --   --   --   --   --   --    MG  --   --   --   --   --   --  2.20 1.50*  --  1.80    < > = values in this interval not displayed.     Recent Labs     12/02/23  2342 08/07/23  0114 07/09/23  0939 07/07/23  1648 03/14/23  1056 11/29/21  0443 11/07/20  1338   ALBUMIN 4.0 3.9 3.7 4.4 4.4   < > 4.6   ALKPHOS 50 51 56 70 65   < > 54   ALT 48 15 13 15 13   < > 33   AST 34 17 18 13 14   < > 34   BILITOT 0.8 0.7 0.7 0.8 1.3*   < > 0.7   LIPASE  --   --   --   --   --   --  29    < > = values in this interval not displayed.     CBC:  Recent Labs    " " 12/02/23  2342 08/08/23  0521 08/07/23  0114 07/08/23  0620 07/07/23  1648   WBC 5.8 4.8 5.9 5.7 6.9   HGB 13.1* 13.3* 12.7* 13.1* 14.3   HCT 38.5* 38.9* 37.8* 38.9* 41.6    211 212 211 234   MCV 94 93 95 95 94     COAG:   Recent Labs     12/02/23  2341 12/28/22  1755 12/28/22  1533 10/20/22  1211   INR 1.1 1.0 1.0 1.0   DDIMERVTE  --   --   --  696*     ABO: No results for input(s): \"ABO\" in the last 10098 hours.  HEME/ENDO:  Recent Labs     08/07/23  0114 04/20/23  1109 03/14/23  1056 12/28/22  1937 10/21/22  0528 06/15/22  0756 07/30/20  0754 07/29/20  1824   FERRITIN  --   --  394*  --   --   --   --   --    IRONSAT  --   --  28  --   --   --   --   --    TSH  --   --  3.62 3.69  --  3.96  --  1.06   HGBA1C 11.8* 11.5*  --  12.9* 13.0* 12.2*   < >  --     < > = values in this interval not displayed.      CARDIAC:   Recent Labs     12/03/23  0823 12/03/23  0128 12/02/23  2342 12/02/23  2341 03/14/23  1056 12/28/22  1937 12/28/22  1755 10/20/22  1319 10/20/22  1211   TROPHS 55* 54* 40*  --   --  62* 29   < > 32*   BNP  --   --   --  375* 68 213*  --   --  329*    < > = values in this interval not displayed.     Recent Labs     04/20/23  1109 12/28/22  1937 10/21/22  0528   CHOL 122 162 166   LDLF 65 - 85   HDL 31.4* 32.4* 40.0   TRIG 128 401* 207*     MICRO:   Recent Labs     06/15/22  0756 12/30/21  1241   CRP 6.52* 0.49     No results found for the last 90 days.    Notable Studies:   EKG:  Encounter Date: 12/02/23   ECG 12 lead   Result Value    Ventricular Rate 104    Atrial Rate 104    UT Interval 168    QRS Duration 128    QT Interval 400    QTC Calculation(Bazett) 526    P Axis 57    R Axis -57    T Axis 105    QRS Count 17    Q Onset 213    P Onset 129    P Offset 178    T Offset 413    QTC Fredericia 480    Narrative    Sinus tachycardia  Possible Left atrial enlargement  Left axis deviation  Left ventricular hypertrophy with QRS widening and repolarization abnormality  Abnormal ECG  When compared " with ECG of 06-AUG-2023 20:41,  No significant change was found  Confirmed by Yon Yuan ([id number]) on 12/3/2023 8:43:46 AM     Echocardiogram  12/29/2022   1. Left ventricular systolic function is severely decreased with a 5-10% estimated ejection fraction.  2. Spectral Doppler shows an abnormal pattern of left ventricular diastolic filling.  3. There is severe eccentric left ventricular hypertrophy.  4. The left atrium is moderately dilated.  5. Left ventricular cavity size is severely dilated.  6. There is global hypokinesis of the left ventricle with minor regional variations.  LVIDd:         6.70 cm    (3.9-5.9cm)      Adult Cath   10/21/2022     1. The entire Left Main: 0% stenosis.  2. Entire LAD Lesion: The percent stenosis is 0%.  3. Entire CX Lesion: The percent stenosis is 0%.  4. The entire RCA Lesion: The percent stenosis is 0%.    Cardiac MRI 4/20/2023  NICMP. Severe LV dilation and remodeling.  Diffuse LV fibrosis with elevated extracellular volume (ECV of 40%).   No evidence of  No evidence of amyloidosis or hemachromatosis.  No LV thrombus.     LEFT VENTRICLE: Quantitative LVEF 13 %. LV wall thickness is normal.   LV cavity is severely enlarged. LV systolic function  is severely decreased globally. There is no LV mass/thrombus.     VIABILITY: LV scar size is 5 %.     RIGHT VENTRICLE: RV cavity size is normal. RV systolic function is   normal. There is no RV mass/thrombus. There is no RV  fibro-fatty infiltration.    Current Outpatient Medications   Medication Instructions    atorvastatin (Lipitor) 40 mg tablet 1 tablet, oral, Nightly    dapagliflozin (Farxiga) 10 mg 1 tablet, oral, Daily    dulaglutide 3 mg/0.5 mL pen injector 1 Application, subcutaneous, Weekly    FreeStyle Nadir sensor system (FreeStyle Nadir 2 Sensor) kit Use as instructed    insulin lispro (HUMALOG) 8 Units, subcutaneous, 3 times daily before meals    metFORMIN (Glucophage) 1,000 mg tablet 1 tablet, oral, 2 times daily     metoprolol succinate XL (Toprol-XL) 25 mg 24 hr tablet 1 tablet, oral, Daily    rivaroxaban (Xarelto) 20 mg tablet 1 tablet, oral, Daily, Take with food.    sacubitriL-valsartan (Entresto) 24-26 mg tablet 1 tablet, oral, 2 times daily    spironolactone (Aldactone) 25 mg tablet 1 tablet, oral, Daily    torsemide (Demadex) 20 mg tablet 1 tablet, oral, Daily      Notable Therapies   Class  Agent    ARNI / ACE / ARB  sacubitriL-valsartan 24-26mg BID    Beta-Blocker  metoprolol succinate 25mg daily    MRA  spironolactone 25mg daily    SGLT2  dapagliflozin 10mg daily    Diuretic  torsemide 20mg daily    Hydralazine/ISDN      Anticoagulation  rivaroxaban 20mg daily    Anti-arrhythmic      Antiplatelet      Lipid-Lowering  atorvastatin 40mg    Other       Device(s)      Cardiac Rehab, if EF <35/MI/OHS         Problems Addressed:   # HFrEF / Chronic Systolic Heart Failure, last EF 13%%, dx'd 10/2022 with EF 5-10%,   # Non-ischemic Cardiomyopathy, ACC/AHA Stage C, NYHA II, Warm, Euvolemic. No ICD in place. Suspect underlying genetic origin given CM in daughter. He does not know family history.   # Hypertension  # Severe Obesity  # Tobacco use  # Type II DM  # Cardioembolic stroke s/p TNK 12/2022  - continue current medications, may be able to uptitrate Entresto based on BP today but he is not taking all of his medications.     - EP referral for ICD (discussed rationale and he is interested in ICD if indicated  - CV genetics evaluation  - Referral to Cardiac Rehab.   - Sleep medicine referral  - advised tobacco cessation  - counselled to avoid alcohol and cocaine (denies recent use)  - Followup with PCP, Endocrine, Stroke. Comprehensive weight loss     # Hx of Bipolar I Disorder: Out of Seroquel. Referral to Adult Psychiatry.      CV Risk Factors:  # Blood pressure: Last BP:  .  # Lipids: Last Tchol 122 / LDL 65 or No results found for requested labs within last 365 days. / HDL 31.4 / TRIG 128 (4/20/2023: 11:09 AM).  #  Gylcemia: Last A1c 11.8 (8/7/2023:  1:14 AM).  # Weight: Last BMI:  .  # Renal function: Last BUN/Cr (GFR): 15/1.31 (72), 12/2/2023: 11:42 PM.    Patient Instructions   If you have any questions or need cardiac medication refills, please call the Heart Failure office at [phone], option 6.   You may also contact the  Heart Failure Nursing team via email at [email or web address] (Please include your name and date of birth).      To reach Dr. Jurado's office please call [phone] / Fax: [phone].  To schedule an appointment call [phone].    - Continue current medications with the exception of:   --   - Labwork:   - Imaging/Procedures:   - Results: I will send a [email or web address] message with any notable results. You may also call the HF nursing team (contact details above) for your results.   - Referrals:   - Followup:      Orders:  No orders of the defined types were placed in this encounter.     Followup Appts:  Future Appointments   Date Time Provider Department Center   12/5/2023 10:30 AM Delroy Jurado MD [id number] Academic       ____________________________________________________________  Delroy Jurado MD  Section of Advanced Heart Failure and Cardiac Transplantation  Division of Cardiovascular Medicine  [facility]  [facility]

## 2023-12-05 ENCOUNTER — APPOINTMENT (OUTPATIENT)
Dept: CARDIOLOGY | Facility: HOSPITAL | Age: 38
End: 2023-12-05
Payer: COMMERCIAL

## 2023-12-12 ENCOUNTER — LAB (OUTPATIENT)
Dept: LAB | Facility: LAB | Age: 38
End: 2023-12-12
Payer: COMMERCIAL

## 2023-12-12 DIAGNOSIS — Z00.6 RESEARCH STUDY PATIENT: ICD-10-CM

## 2023-12-12 LAB
CHOLEST SERPL-MCNC: 125 MG/DL (ref 0–199)
CHOLESTEROL/HDL RATIO: 4.7
EST. AVERAGE GLUCOSE BLD GHB EST-MCNC: 263 MG/DL
HBA1C MFR BLD: 10.8 %
HDLC SERPL-MCNC: 26.6 MG/DL
LDLC SERPL CALC-MCNC: 50 MG/DL
NON HDL CHOLESTEROL: 98 MG/DL (ref 0–149)
TRIGL SERPL-MCNC: 241 MG/DL (ref 0–149)
VLDL: 48 MG/DL (ref 0–40)

## 2023-12-12 PROCEDURE — 36415 COLL VENOUS BLD VENIPUNCTURE: CPT

## 2023-12-12 PROCEDURE — 80061 LIPID PANEL: CPT

## 2023-12-12 PROCEDURE — 83036 HEMOGLOBIN GLYCOSYLATED A1C: CPT

## 2024-01-05 NOTE — PROGRESS NOTES
"Primary Care Physician: Kevin Willingham MD  Patient's Location: [facility] [address]    Date of Visit: 01/09/2024 10:30 AM EST  Location of visit: [facility]   Type of Visit: {Type of visit:04847::\"New\"}    HPI / Summary:   Arthur Carranza is a very pleasant 38 y.o. male from [place], [place] who presents for management of NICM/HFrEF (EF 5-10%), initially diagnosed 10/2022. 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        Initial CV History:   Mr. Carranza was initially diagnosed with NICM during hospitalization at [facility] in October 2022 after presentation with SOB, weight gain. During hospitalization he had elevated BNP and echo with EF 15-20%. Coronary angiogram showed no CAD. He was started on BB, ARB and discharged with LifeVest. He followed up with Dr. Andrews as outpatient where he had not started his medications. He had multiple no-shows with Dr. Andrews and EP team. In December he was admitted with R sided weakness related to Cardioembolic stroke. He was given TNK with improvement and discharged on rivaroxaban. He followed up with Eugenia Lyman for post hospital visit and was referred for tobacco cessation, cardiac rehab, primary care, comprehensive weight loss, sleep clinic but these have not been scheduled.       Hospitalizations/ED visits: 12/28/22-12/30/22 acute ischemic L MCA stroke. 10/20/22-10/22/22 CHF. 6/14/22/6/16/22 Perirectal abscess.     Interval history:       Today:  He is accompanied by:    He denies: dyspnea at rest, LE swelling, syncope, PND, orthopnea, chest pain, palpitation.    ROS: Full 10 pt review of symptoms of negative unless discussed above.     PMHx: DM II, HTN, HLD, Obesity, ?PHOEBE, Bipolar I Disorder (Previous SI/HI 2021), Perirectal " Abscess  PSHx: Denies.   Family Hx: Foster care, family hx unknown.   Social Hx: Current smoker. Hx of marijuana/cocaine- denies current use. Denies EtOH. 8 kids, 12 year old daughter w/?arrhythmias.        Objective Problems:   Patient Active Problem List   Diagnosis    ACC/AHA stage C systolic congestive heart failure (CMS/HCC)    CHF (congestive heart failure) (CMS/HCC)    Current every day smoker    Type 2 diabetes mellitus with neurologic complication, with long-term current use of insulin (CMS/HCC)    Edema    Neck abscess    Nonischemic cardiomyopathy (CMS/HCC)    Perirectal abscess    Primary hypertension    Ventricular bigeminy seen on cardiac monitor    Bipolar 1 disorder (CMS/HCC)    Morbid obesity with BMI of 45.0-49.9, adult (CMS/[facility])    Acute ischemic left MCA stroke (CMS/HCC)    Cocaine use    Major depression, recurrent (CMS/[facility])    PTSD (post-traumatic stress disorder)    Acute on chronic combined systolic and diastolic CHF (congestive heart failure) (CMS/HCC)    Type 2 diabetes mellitus with hyperglycemia (CMS/HCC)    Severe episode of recurrent major depressive disorder, without psychotic features (CMS/[facility])    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  Surgical Hx:   Past Surgical History:   Procedure Laterality Date    MR HEAD ANGIO WO IV CONTRAST  12/29/2022    MR HEAD ANGIO WO IV CONTRAST 12/29/2022 DOCTOR OFFICE LEGACY    OTHER SURGICAL HISTORY  07/21/2022    Cyst excision    OTHER SURGICAL HISTORY  10/25/2022    Cardiac catheterization      Social Hx:      Family Hx:   Family History   Adopted: Yes     Allergies:   Allergies   Allergen Reactions    Shellfish Containing Products Unknown     Exam:       12/3/2023     7:50 AM 12/3/2023     6:00 AM 12/3/2023     3:43 AM 12/3/2023     3:07 AM 12/3/2023     2:55 AM 12/3/2023     2:30 AM   Vitals   Systolic 129  144  137 170   Diastolic 88   "97  93 116   Heart Rate 94  100  100 100   Temp 36.8 °C (98.2 °F)  36.5 °C (97.7 °F)      Resp 16  17  18 18   Height (in)   1.753 m (5' 9.02\")      Weight (lb)  303.79 308 303.79     BMI  44.84 kg/m2 45.46 kg/m2 44.86 kg/m2     BSA (m2)  2.59 m2 2.61 m2 2.59 m2       Wt Readings from Last 5 Encounters:   12/03/23 138 kg (303 lb 12.7 oz)   07/20/23 138 kg (303 lb 5 oz)   07/19/23 137 kg (302 lb)   06/02/23 143 kg (315 lb)   04/19/23 141 kg (311 lb)     GEN: Pleasant, well-appearing, no acute distress.  HEENT: JVP not elevated, no icterus. No HJR  CHEST: No wheeze, good air movement.  CV: Normal rate, regular rhythm. Normal S1, inspiratory split S2, no m/r/g  ABD: Soft, ND, NT  EXT: Warm, well perfused, No LE edema.   NEURO: Pleasant, Oriented to plan    Labs:   CMP:  Recent Labs     12/02/23  2342 08/08/23  0521 08/07/23  0114 07/09/23  0939 07/08/23  0620 10/22/22  0549 10/21/22  0528 10/20/22  1211 06/16/22  0706 06/15/22  0756    136 135* 136 136   < > 131* 135*   < > 140   K 4.1 3.9 3.7 3.9 4.1   < > 4.4 4.3   < > 3.9    101 105 101 102   < > 96* 101   < > 105   CO2 27 25 23 26 26   < > 25 24   < > 26   ANIONGAP 14 14 11 13 12   < > 14 14   < > 13   BUN 15 14 14 17 15   < > 22 14   < > 14   CREATININE 1.31* 1.44* 1.14 1.21 1.02   < > 1.34* 1.17   < > 1.18   EGFR 72  --   --   --   --   --   --   --   --   --    MG  --   --   --   --   --   --  2.20 1.50*  --  1.80    < > = values in this interval not displayed.     Recent Labs     12/02/23  2342 08/07/23  0114 07/09/23  0939 07/07/23  1648 03/14/23  1056 11/29/21  0443 11/07/20  1338   ALBUMIN 4.0 3.9 3.7 4.4 4.4   < > 4.6   ALKPHOS 50 51 56 70 65   < > 54   ALT 48 15 13 15 13   < > 33   AST 34 17 18 13 14   < > 34   BILITOT 0.8 0.7 0.7 0.8 1.3*   < > 0.7   LIPASE  --   --   --   --   --   --  29    < > = values in this interval not displayed.     CBC:  Recent Labs     12/02/23  2342 08/08/23  0521 08/07/23  0114 07/08/23  0620 07/07/23  1648   WBC " "5.8 4.8 5.9 5.7 6.9   HGB 13.1* 13.3* 12.7* 13.1* 14.3   HCT 38.5* 38.9* 37.8* 38.9* 41.6    211 212 211 234   MCV 94 93 95 95 94     COAG:   Recent Labs     12/02/23  2341 12/28/22  1755 12/28/22  1533 10/20/22  1211   INR 1.1 1.0 1.0 1.0   DDIMERVTE  --   --   --  696*     ABO: No results for input(s): \"ABO\" in the last 07117 hours.  HEME/ENDO:  Recent Labs     12/12/23  1112 08/07/23  0114 04/20/23  1109 03/14/23  1056 12/28/22  1937 10/21/22  0528 06/15/22  0756 07/30/20  0754 07/29/20  1824   FERRITIN  --   --   --  394*  --   --   --   --   --    IRONSAT  --   --   --  28  --   --   --   --   --    TSH  --   --   --  3.62 3.69  --  3.96  --  1.06   HGBA1C 10.8* 11.8* 11.5*  --  12.9*   < > 12.2*   < >  --     < > = values in this interval not displayed.      CARDIAC:   Recent Labs     12/03/23  0823 12/03/23  0128 12/02/23  2342 12/02/23  2341 03/14/23  1056 12/28/22  1937 12/28/22  1755 10/20/22  1319 10/20/22  1211   TROPHS 55* 54* 40*  --   --  62* 29   < > 32*   BNP  --   --   --  375* 68 213*  --   --  329*    < > = values in this interval not displayed.     Recent Labs     12/12/23  1112 04/20/23  1109 12/28/22  1937 10/21/22  0528   CHOL 125 122 162 166   LDLF  --  65 - 85   LDLCALC 50  --   --   --    HDL 26.6 31.4* 32.4* 40.0   TRIG 241* 128 401* 207*     MICRO:   Recent Labs     06/15/22  0756 12/30/21  1241   CRP 6.52* 0.49     No results found for the last 90 days.    Notable Studies:   EKG:  Encounter Date: 12/02/23   ECG 12 lead   Result Value    Ventricular Rate 104    Atrial Rate 104    MT Interval 168    QRS Duration 128    QT Interval 400    QTC Calculation(Bazett) 526    P Axis 57    R Axis -57    T Axis 105    QRS Count 17    Q Onset 213    P Onset 129    P Offset 178    T Offset 413    QTC Fredericia 480    Narrative    Sinus tachycardia  Possible Left atrial enlargement  Left axis deviation  Left ventricular hypertrophy with QRS widening and repolarization abnormality  Abnormal " ECG  When compared with ECG of 06-AUG-2023 20:41,  No significant change was found  Confirmed by Yon Yuan ([id number]) on 12/3/2023 8:43:46 AM     Echocardiogram:   Stress Testing:   Cardiac Catheterization:   Cardiac Scoring:   AAA :   OTHER:     - EKG (12/22): ST LAD, PRWP, WRS 118ms.   - Echo (12/22): EF 5-10%, LVIDD 6.7, IVS/PW 1.0/1.4, RV normal dimenion, unable to see funciton. E' 4.   - LHC (10/22): Normal coronaries.     - Cardiac MRI 4/20/2023:   NICMP. Severe LV dilation and remodeling.  Diffuse LV fibrosis with elevated extracellular volume (ECV of 40%).   No evidence of  No evidence of amyloidosis or hemachromatosis.  No LV thrombus.     LEFT VENTRICLE: Quantitative LVEF 13 %. LV wall thickness is normal.   LV cavity is severely enlarged. LV systolic function  is severely decreased globally. There is no LV mass/thrombus.     VIABILITY: LV scar size is 5 %.     RIGHT VENTRICLE: RV cavity size is normal. RV systolic function is   normal. There is no RV mass/thrombus. There is no RV  fibro-fatty infiltration.     LV/RV SEPTUM: The ventricular septum is intact.     LA/RA SEPTUM: The atrial septum is intact.     LEFT ATRIUM: LA is mildly enlarged.     RIGHT ATRIUM: RA cavity size is normal.     PERICARDIUM: Pericardium is normal. There is a trivial pericardial   effusion.     PLEURAL EFFUSION: There is no pleural effusion.     AORTIC VALVE: Peak aortic valve velocity -130 cm/sec. Aortic   regurgitant volume 1 ml. Aortic regurgitant fraction 1 %.     MITRAL VALVE: Mitral valve leaflets are normal. There is no mitral   valve mass, thrombus, or vegetation. There is mild  mitral regurgitation.     TRICUSPID VALVE: Tricuspid valve leaflets are normal. The tricuspid   valve annulus is normal in size.     AORTIC ROOT: The aortic root is mildly dilated.       Echocardiogram     Narrative  [facility], [address] [place] [address]  Tel [phone] and Fax [phone]    TRANSTHORACIC  ECHOCARDIOGRAM REPORT      Patient Name:     JAZZ LEWIS      Reading Physician:   [id number] Prakash Mae MD  Study Date:       12/29/2022         Referring Physician: LAM DUPONT  MRN/PID:          [id number]           PCP:  Accession/Order#: [id number]          Department Location[facility]  YOB: 1985         Fellow:  Gender:           M                  Nurse:  Admit Date:       12/28/2022         Sonographer:         Susana Carlos [facility]  Admission Status: Inpatient -        Additional Staff:  Routine  Height:           177.80 cm          CC Report to:        Replaced by [facility]  Weight:           138.80 kg          Study Type:          Echocardiogram  BSA:              2.50 m2  Blood Pressure: 122 /80 mmHg    Diagnosis/ICD: I63.39-Cerebral infarction due to thrombosis of other cerebral  artery; I63.89-Other cerebral infarction  Indication:    Stroke  Procedure/CPT: Echo Complete w Full Doppler-00602    Patient History:  Diabetes:          Yes  Pertinent History: Dilated cardiomyopathy with severe LV enlargement and severe  LV dysfunction, 15-20% EF from previous echo.    Study Detail: The following Echo studies were performed: 2D, M-Mode, Doppler and  color flow. Technically challenging study due to body habitus.  Definity used as a contrast agent for endocardial border  definition and agitated saline used as a contrast agent for  intraseptal flow evaluation. Total contrast used for this  procedure was 2 mL via IV push.      PHYSICIAN INTERPRETATION:  Left Ventricle: The left ventricular systolic function is severely decreased, with an estimated ejection fraction of 5-10%. There is global hypokinesis of the left ventricle with minor regional variations. The left ventricular cavity size is severely dilated. There is severe eccentric left ventricular hypertrophy. Spectral Doppler shows an abnormal pattern of left ventricular diastolic filling. No thrombus seen.  Left Atrium: The left atrium  is moderately dilated.  Right Ventricle: The right ventricle is normal in size. Unable to determine right ventricular systolic function.  Right Atrium: The right atrium was not well visualized.  Aortic Valve: The aortic valve is trileaflet. There is trivial aortic valve regurgitation. The peak instantaneous gradient of the aortic valve is 3.0 mmHg.  Mitral Valve: The mitral valve is normal in structure. There is trace mitral valve regurgitation.  Tricuspid Valve: The tricuspid valve is structurally normal. There is trace tricuspid regurgitation.  Pulmonic Valve: The pulmonic valve is not well visualized. There is physiologic pulmonic valve regurgitation.  Pericardium: There is a trivial pericardial effusion.  Aorta: The aortic root is normal.  Systemic Veins: The inferior vena cava was not well visualized.  In comparison to the previous echocardiogram(s):      CONCLUSIONS:  1. Left ventricular systolic function is severely decreased with a 5-10% estimated ejection fraction.  2. Spectral Doppler shows an abnormal pattern of left ventricular diastolic filling.  3. There is severe eccentric left ventricular hypertrophy.  4. The left atrium is moderately dilated.  5. Left ventricular cavity size is severely dilated.  6. There is global hypokinesis of the left ventricle with minor regional variations.    QUANTITATIVE DATA SUMMARY:  2D MEASUREMENTS:  Normal Ranges:  Ao Root d:     2.65 cm    (2.0-3.7cm)  LAs:           3.90 cm    (2.7-4.0cm)  IVSd:          1.00 cm    (0.6-1.1cm)  LVPWd:         1.40 cm    (0.6-1.1cm)  LVIDd:         6.70 cm    (3.9-5.9cm)  LVIDs:         6.20 cm  LV Mass Index: 150.9 g/m2  LV % FS        7.5 %    LA VOLUME:  Normal Ranges:  LA Vol A4C:        63.0 ml    (22+/-6mL/m2)  LA Vol A2C:        115.4 ml  LA Vol BP:         87.6 ml  LA Vol Index A4C:  25.2ml/m2  LA Vol Index A2C:  46.1 ml/m2  LA Vol Index BP:   35.0 ml/m2  LA Area A4C:       21.0 cm2  LA Area A2C:       29.2 cm2  LA Major Axis  A4C: 6.0 cm  LA Major Axis A2C: 6.3 cm  LA Volume Index:   35.0 ml/m2    AORTA MEASUREMENTS:  Normal Ranges:  Ao Sinus, d: 3.56 cm (2.1-3.5cm)  Ao Arch:     3.17 cm (2.0-3.6cm)    LV SYSTOLIC FUNCTION BY 2D PLANIMETRY (MOD):  Normal Ranges:  EF-A4C View: 5.7 %  (>=55%)  EF-A2C View: 16.0 %  EF-Biplane:  6.9 %    LV DIASTOLIC FUNCTION:  Normal Ranges:  MV e'         0.04 m/s (>8.0)  MV lateral e' 0.03 m/s  MV medial e'  0.06 m/s    AORTIC VALVE:  Normal Ranges:  AoV Vmax:      0.86 m/s (<=1.7m/s)  AoV Peak PG:   3.0 mmHg (<20mmHg)  LVOT Max Jean Claude:  0.82 m/s (<=1.1m/s)  LVOT VTI:      11.80 cm  LVOT Diameter: 2.50 cm  (1.8-2.4cm)  AoV Area,Vmax: 4.66 cm2 (2.5-4.5cm2)    RIGHT VENTRICLE:  TAPSE: 25.0 mm    PULMONIC VALVE:  Normal Ranges:  PV Accel Time: 141 msec (>120ms)  PV Max Jean Claude:    0.8 m/s  (0.6-0.9m/s)  PV Max P.7 mmHg      [id number] Prakash Mae MD  Electronically signed on 2022 at 4:57:24 PM        *** Final ***    No results found for this or any previous visit from the past 1825 days.    Adult Cath     Narrative  [facility], Cath Lab  [address] [place] [address]    Cardiovascular Catheterization Report    Patient Name:     JAZZ Elaine          [id number] Manan LEWIS       Physician:          MD  Study Date:       10/21/2022   Verifying            Manan Anand  Physician:          MD  MRN/PID:          [id number]     Cardiologist:  Accession/Order#: [id number]    Referring           no family doctor  Physician:  YOB: 1985   Referring  Physician:  Gender:           M            Referring           [id number] Manan Anand  Physician:          MD      Study: Left Heart Catheterization      Indications:  JAZZ LEWIS is a 37 year old male who presents with hypertension, diabetes and tobacco Use - current. Cardiomyopathy and left ventricular dysfunction, with an asymptomatic chest pain assessment. Study performed as an  urgent cath procedure.    Medical History:  Stress test performed: No. CTA performed: NoBrittany Linh accessed: No. LVEF Assessed: No.    Procedure Description:  After infiltration with 2% Lidocaine, the right femoral artery was cannulated with a modified Seldinger technique. Subsequently a 5 Bulgarian sheath was placed in the right femoral artery. Selective coronary catheterization was performed using a 5 Fr catheter(s) exchanged over a guide wire to cannulate the coronary arteries. A JL 4 tip catheter was used for left coronary injections. A 3DRC tip catheter was used for right coronary injections.  Multiple injections of contrast were made into the left and right coronary arteries with angiograms recorded in multiple projections. After completion of the procedure, femoral artery angiography was performed. This demonstrated a common femoral artery puncture appropriate for closure. A Vascade 5F vascular closure Device was placed per protocol.    Coronary Angiography:  The coronary circulation is right dominant.    Left Main Coronary Artery:  There is 0% stenosis in the entire left main coronary artery.    Left Anterior Descending Coronary Artery Distribution:  There is 0stenosis in the entire left anterior descending artery.    Circumflex Coronary Artery Distribution:  There is 0stenosis in the the entire Circumflex artery.    Right Coronary Artery Distribution:    There is 0stenosis in the the entire Right Coronary Artery.    Hemo Personnel:  +--------------------------+-------------+  Name                      Duty           +--------------------------+-------------+  Manan Bustos MD    PROC MD 1  +--------------------------+-------------+  Rocco Camarena RN            PROC SCRUB 1  +--------------------------+-------------+  Gabriel Major         PROC SCRUB 2  +--------------------------+-------------+  Bianka Pedraza RN         PROC CIRC  1  +--------------------------+-------------+  Vicenta Estrada RN            PROC CIRC 2  +--------------------------+-------------+  Kaitlynn Haq RN        PROC RECORD 1  +--------------------------+-------------+      Sedation Time:  +------------------------+----------------------------------------+  Sedation Start/End TimesTime                                      +------------------------+----------------------------------------+  Start                   10/21/2022 15:10:28                       +------------------------+----------------------------------------+  Drugs                   Versed 1 mg IV per physician for sedatio  +------------------------+----------------------------------------+  End                     10/21/2022 15:24:43                       +------------------------+----------------------------------------+      Equipment Used:  +---------------------+--------------------------------------------------------+              Date/Time                                             Description  +---------------------+--------------------------------------------------------+  10/21/2022 2:34:47 PM  {Sheath} - 5F Alva Sheath w/ Wire - Qty: 1 Part #:                                                                           [id number]  +---------------------+--------------------------------------------------------+  10/21/2022 2:34:47 PM   {5 Fr Catheter} - 5F JL4 [employer] - Qty: 1 Part #: 35  +---------------------+--------------------------------------------------------+  10/21/2022 2:34:47 PM           {5 Fr Catheter} - 5F 3DRC - Qty: 1 Part #: 24  +---------------------+--------------------------------------------------------+  10/21/2022 2:34:54 PM                            {Not in Inventory} quickclot  +---------------------+--------------------------------------------------------+  10/21/2022 2:34:59 PM   {Closure Device} - 5F Vascade Closure Device - Qty:  1                                                               Each Part #: [id number]  +---------------------+--------------------------------------------------------+      Fluoroscopy Time:  +--------------------------+--------+  X-Ray Summary Fluoro Time:1.00 min  +--------------------------+--------+      +----------+--------+  Contrast: Dose:     +----------+--------+  Omnipaque:40.00 ml  +----------+--------+      Hemodynamic Pressures:    +----+---------------------+----------+-------------+--------------+---------+  Site      Date Time      Phase NameSystolic mmHgDiastolic mmHgMean mmHg  +----+---------------------+----------+-------------+--------------+---------+    AO10/21/2022 3:20:12 PM  AIR REST          106            91      100  +----+---------------------+----------+-------------+--------------+---------+    AO10/21/2022 3:23:06 PM  AIR REST          110            91       99  +----+---------------------+----------+-------------+--------------+---------+        Oxygen Saturation %:  +-----------+------------+  Sample SiteHB (g/100ml)  +-----------+------------+      SYS ART        14.3  +-----------+------------+      SYS WILLY        14.3  +-----------+------------+      PUL ART        14.3  +-----------+------------+      PUL WILLY        14.3  +-----------+------------+      Cardiac Cath Transition of Care Summary:  Post Procedure Diagnosis: Normal coronary arteries.  Findings:                 Severe LV dysfunction.  Blood Loss:               Estimated blood loss during the procedure was 0 mls.  Specimens Removed:        Number of specimen(s) removed: none.    ____________________________________________________________________________________  CONCLUSIONS:  1. The entire Left Main: 0% stenosis.  2. Entire LAD Lesion: The percent stenosis is 0%.  3. Entire CX Lesion: The percent stenosis is 0%.  4. The entire RCA Lesion: The percent stenosis is  0%.    ____________________________________________________________________________________  CPT Codes:  Coronary Angiography S&I only ([facility])([facility])-96853; Moderate Sedation Services initial 15 minutes patient >5 years-32550    ICD 10 Codes:  I42.0-Dilated cardiomyopathy; I50.21-Acute systolic (congestive) heart failure    [id number] Manan Anand MD  Performing Physician  Electronically signed by [id number] Anand MD on 10/21/2022 at  3:40:47 PM      cc Report to: no family doctor    cc Report to: [id number] Anand MD          *** Final ***    No results found for this or any previous visit from the past 3650 days.     No results found for this or any previous visit from the past 1825 days.    No results found for this or any previous visit from the past 1825 days.    No results found for this or any previous visit from the past 1825 days.      ASCVD Risk: The ASCVD Risk score (Joe DK, et al., 2019) failed to calculate for the following reasons:    The 2019 ASCVD risk score is only valid for ages 40 to 79    The patient has a prior MI or stroke diagnosis    Current Outpatient Medications   Medication Instructions    atorvastatin (Lipitor) 40 mg tablet 1 tablet, oral, Nightly    buPROPion XL (WELLBUTRIN XL) 150 mg, oral, Daily    dapagliflozin (Farxiga) 10 mg 1 tablet, oral, Daily    dulaglutide 3 mg/0.5 mL pen injector 1 Application, subcutaneous, Weekly    FreeStyle Nadir sensor system (FreeStyle Nadir 2 Sensor) kit Use as instructed    insulin glargine (Lantus Solostar U-100 Insulin) 100 unit/mL (3 mL) pen subcutaneous    insulin lispro (HUMALOG) 8 Units, subcutaneous, 3 times daily before meals    metFORMIN (Glucophage) 1,000 mg tablet 1 tablet, oral, 2 times daily    metoprolol succinate XL (Toprol-XL) 25 mg 24 hr tablet 1 tablet, oral, Daily    nicotine (Nicoderm CQ) 21 mg/24 hr patch 1 patch, transdermal, Every 24 hours    nicotine polacrilex (Commit) 4 mg lozenge     rivaroxaban  (Xarelto) 20 mg tablet 1 tablet, oral, Daily, Take with food.    sacubitriL-valsartan (Entresto) 24-26 mg tablet 1 tablet, oral, 2 times daily    sertraline (ZOLOFT) 100 mg, oral, Daily    spironolactone (Aldactone) 25 mg tablet 1 tablet, oral, Daily    torsemide (Demadex) 20 mg tablet 1 tablet, oral, Daily    traZODone (DESYREL) 50 mg, oral, Nightly    True Metrix Glucose Test Strip strip USE TO CHECK BLOOD SUGAR FOUR TIMES DAILY    Trulicity 4.5 mg, subcutaneous, Weekly    Ultra Thin Lancets 30 gauge misc TEST BLOOD SUGAR FOUR TIMES DAILY AS DIRECTED      Notable Therapies   Class  Agent    ARNI / ACE / ARB      Beta-Blocker      MRA      SGLT2      Diuretic      Hydralazine/ISDN      Anticoagulation      Anti-arrhythmic      Antiplatelet      Lipid-Lowering      Other       Device(s)      Cardiac Rehab, if EF <35/MI/OHS       Problems Addressed:   # HFrEF / Chronic Systolic Heart Failure, last EF %, dx'd ,   # Non-ischemic Cardiomyopathy, ACC/AHA Stage C, NYHA II, Warm, Euvolemic.     Notable Medications/Devices: Given 30 day supply 90 days ago  --- ACE/ARB/ARNI: Entresto 24-26mg BID  --- Beta-Blocker: Metoprolol XL 25mg daily  --- MRA: Beni 25mg daily  --- SGLT2: Farxiga 10  --- H/ISDN:   --- Diuretic: Torsemide 20   --- Anticoagulation: Rivaroxaban  --- Antiplatelet:  --- Statin: Atorvastatin 40  --- Device: none  --- Insulin, metformin     Problems:   # HFrEF / Chronic systolic heart failure, EF 5-10%, Diagnosed 10/2022  # Non-ischemic cardiomyopathy, Stage C, NYHA II, No ICD in place. Suspect underlying genetic origin given CM in daughter. He does not know family history.   # Hypertension  # Severe Obesity  # Tobacco use  # Type II DM  # Cardioembolic stroke s/p TNK 12/2022  - continue current medications, may be able to uptitrate Entresto based on BP today but he is not taking all of his medications.   - Plan Cardiac MRI  - If EF less than 35%, EP referral for ICD (discussed rationale and he is interested  in ICD if indicated  - CV genetics evaluation  - Referral to Cardiac Rehab.   - Sleep medicine referral  - advised tobacco cessation  - counselled to avoid alcohol and cocaine (denies recent use)  - Followup with PCP, Endocrine, Stroke. Comprehensive weight loss     # Hx of Bipolar I Disorder: Out of Seroquel. Referral to Adult Psychiatry.        CV Risk Factors:  # Blood pressure: Last BP:  .  # Lipids: Last Tchol 125 / LDL 65 or 50 / HDL 26.6 / TRIG 241 (12/12/2023: 11:12 AM).  # Gylcemia: Last A1c 10.8 (12/12/2023: 11:12 AM).  # Weight: Last BMI:  .  # Renal function: Last BUN/Cr (GFR): 15/1.31 (72), 12/2/2023: 11:42 PM.    Patient Instructions   If you have any questions or need cardiac medication refills, please call the Heart Failure office at [phone], option [phone].   You may also contact the  Heart Failure Nursing team via email at [email or web address] (Please include your name and date of birth).      To reach Dr. Jurado's office please call [phone] / Fax: [phone].  To schedule an appointment call [phone].    - Continue current medications with the exception of:   --   - Labwork:   - Imaging/Procedures:   - Results: I will send a yoonew message with any notable results. You may also call the HF nursing team (contact details above) for your results.   - Referrals:   - Followup:      Orders:  No orders of the defined types were placed in this encounter.     Followup Appts:  Future Appointments   Date Time Provider Department Center   1/9/2024 10:30 AM Delroy Jurado MD [id number] Academic       ____________________________________________________________  Delroy Jruado MD  Section of Advanced Heart Failure and Cardiac Transplantation  Division of Cardiovascular Medicine  [facility]  [facility]

## 2024-01-09 ENCOUNTER — APPOINTMENT (OUTPATIENT)
Dept: CARDIOLOGY | Facility: HOSPITAL | Age: 39
End: 2024-01-09
Payer: COMMERCIAL

## 2024-01-29 ENCOUNTER — APPOINTMENT (OUTPATIENT)
Dept: CARDIOLOGY | Facility: HOSPITAL | Age: 39
End: 2024-01-29
Payer: COMMERCIAL

## 2024-01-31 ENCOUNTER — APPOINTMENT (OUTPATIENT)
Dept: CARDIOLOGY | Facility: HOSPITAL | Age: 39
End: 2024-01-31
Payer: COMMERCIAL

## 2024-01-31 ENCOUNTER — APPOINTMENT (OUTPATIENT)
Dept: RADIOLOGY | Facility: HOSPITAL | Age: 39
End: 2024-01-31
Payer: COMMERCIAL

## 2024-01-31 ENCOUNTER — HOSPITAL ENCOUNTER (OUTPATIENT)
Facility: HOSPITAL | Age: 39
Setting detail: OBSERVATION
Discharge: HOME | End: 2024-02-01
Attending: STUDENT IN AN ORGANIZED HEALTH CARE EDUCATION/TRAINING PROGRAM | Admitting: STUDENT IN AN ORGANIZED HEALTH CARE EDUCATION/TRAINING PROGRAM
Payer: COMMERCIAL

## 2024-01-31 DIAGNOSIS — R41.0 CONFUSION: ICD-10-CM

## 2024-01-31 DIAGNOSIS — R73.9 HYPERGLYCEMIA: Primary | ICD-10-CM

## 2024-01-31 DIAGNOSIS — H53.8 BLURRED VISION: ICD-10-CM

## 2024-01-31 DIAGNOSIS — F19.10 SUBSTANCE ABUSE (MULTI): ICD-10-CM

## 2024-01-31 PROBLEM — E23.2: Status: ACTIVE | Noted: 2024-01-31

## 2024-01-31 PROBLEM — H91.90: Status: ACTIVE | Noted: 2024-01-31

## 2024-01-31 PROBLEM — E11.69: Status: ACTIVE | Noted: 2024-01-31

## 2024-01-31 PROBLEM — E11.39: Status: ACTIVE | Noted: 2024-01-31

## 2024-01-31 PROBLEM — H47.20: Status: ACTIVE | Noted: 2024-01-31

## 2024-01-31 PROBLEM — E11.65 HYPERGLYCEMIA DUE TO DIABETES MELLITUS (MULTI): Status: ACTIVE | Noted: 2024-01-31

## 2024-01-31 PROBLEM — I50.43 ACUTE ON CHRONIC COMBINED SYSTOLIC AND DIASTOLIC CHF (CONGESTIVE HEART FAILURE) (MULTI): Chronic | Status: ACTIVE | Noted: 2023-12-03

## 2024-01-31 LAB
ALBUMIN SERPL BCP-MCNC: 4 G/DL (ref 3.4–5)
ALP SERPL-CCNC: 83 U/L (ref 33–120)
ALT SERPL W P-5'-P-CCNC: 13 U/L (ref 10–52)
AMPHETAMINES UR QL SCN: ABNORMAL
ANION GAP BLDV CALCULATED.4IONS-SCNC: 7 MMOL/L (ref 10–25)
ANION GAP SERPL CALC-SCNC: 19 MMOL/L (ref 10–20)
APPEARANCE UR: CLEAR
AST SERPL W P-5'-P-CCNC: 18 U/L (ref 9–39)
B-OH-BUTYR SERPL-SCNC: 0.17 MMOL/L (ref 0.02–0.27)
BARBITURATES UR QL SCN: ABNORMAL
BASE EXCESS BLDV CALC-SCNC: 3.9 MMOL/L (ref -2–3)
BASOPHILS # BLD AUTO: 0.02 X10*3/UL (ref 0–0.1)
BASOPHILS NFR BLD AUTO: 0.4 %
BENZODIAZ UR QL SCN: ABNORMAL
BILIRUB SERPL-MCNC: 0.3 MG/DL (ref 0–1.2)
BILIRUB UR STRIP.AUTO-MCNC: NEGATIVE MG/DL
BNP SERPL-MCNC: 314 PG/ML (ref 0–99)
BODY TEMPERATURE: ABNORMAL
BUN SERPL-MCNC: 16 MG/DL (ref 6–23)
BZE UR QL SCN: ABNORMAL
CA-I BLDV-SCNC: 1.07 MMOL/L (ref 1.1–1.33)
CALCIUM SERPL-MCNC: 8.7 MG/DL (ref 8.6–10.3)
CANNABINOIDS UR QL SCN: ABNORMAL
CARDIAC TROPONIN I PNL SERPL HS: 99 NG/L (ref 0–20)
CHLORIDE BLDV-SCNC: 89 MMOL/L (ref 98–107)
CHLORIDE SERPL-SCNC: 88 MMOL/L (ref 98–107)
CO2 SERPL-SCNC: 20 MMOL/L (ref 21–32)
COLOR UR: COLORLESS
CREAT SERPL-MCNC: 1.3 MG/DL (ref 0.5–1.3)
CRITICAL CALL TIME: 2059
CRITICAL CALLED BY: ABNORMAL
CRITICAL CALLED TO: ABNORMAL
CRITICAL READ BACK: ABNORMAL
EGFRCR SERPLBLD CKD-EPI 2021: 73 ML/MIN/1.73M*2
EOSINOPHIL # BLD AUTO: 0.07 X10*3/UL (ref 0–0.7)
EOSINOPHIL NFR BLD AUTO: 1.4 %
ERYTHROCYTE [DISTWIDTH] IN BLOOD BY AUTOMATED COUNT: 12.2 % (ref 11.5–14.5)
ETHANOL SERPL-MCNC: <10 MG/DL
FENTANYL+NORFENTANYL UR QL SCN: ABNORMAL
GLUCOSE BLD MANUAL STRIP-MCNC: 424 MG/DL (ref 74–99)
GLUCOSE BLD MANUAL STRIP-MCNC: 450 MG/DL (ref 74–99)
GLUCOSE BLD MANUAL STRIP-MCNC: >600 MG/DL (ref 74–99)
GLUCOSE BLD MANUAL STRIP-MCNC: >600 MG/DL (ref 74–99)
GLUCOSE BLDV-MCNC: >685 MG/DL (ref 74–99)
GLUCOSE SERPL-MCNC: 833 MG/DL (ref 74–99)
GLUCOSE UR STRIP.AUTO-MCNC: ABNORMAL MG/DL
HCO3 BLDV-SCNC: 30.2 MMOL/L (ref 22–26)
HCT VFR BLD AUTO: 44.1 % (ref 41–52)
HCT VFR BLD EST: 42 % (ref 41–52)
HGB BLD-MCNC: 14.6 G/DL (ref 13.5–17.5)
HGB BLDV-MCNC: 14.1 G/DL (ref 13.5–17.5)
IMM GRANULOCYTES # BLD AUTO: 0.01 X10*3/UL (ref 0–0.7)
IMM GRANULOCYTES NFR BLD AUTO: 0.2 % (ref 0–0.9)
INHALED O2 CONCENTRATION: 21 %
KETONES UR STRIP.AUTO-MCNC: NEGATIVE MG/DL
LACTATE BLDV-SCNC: 2.9 MMOL/L (ref 0.4–2)
LEUKOCYTE ESTERASE UR QL STRIP.AUTO: NEGATIVE
LYMPHOCYTES # BLD AUTO: 1.4 X10*3/UL (ref 1.2–4.8)
LYMPHOCYTES NFR BLD AUTO: 28.4 %
MAGNESIUM SERPL-MCNC: 1.86 MG/DL (ref 1.6–2.4)
MCH RBC QN AUTO: 31.8 PG (ref 26–34)
MCHC RBC AUTO-ENTMCNC: 33.1 G/DL (ref 32–36)
MCV RBC AUTO: 96 FL (ref 80–100)
MONOCYTES # BLD AUTO: 0.41 X10*3/UL (ref 0.1–1)
MONOCYTES NFR BLD AUTO: 8.3 %
NEUTROPHILS # BLD AUTO: 3.02 X10*3/UL (ref 1.2–7.7)
NEUTROPHILS NFR BLD AUTO: 61.3 %
NITRITE UR QL STRIP.AUTO: NEGATIVE
NRBC BLD-RTO: 0 /100 WBCS (ref 0–0)
OPIATES UR QL SCN: ABNORMAL
OXYCODONE+OXYMORPHONE UR QL SCN: ABNORMAL
OXYHGB MFR BLDV: 59.2 % (ref 45–75)
PCO2 BLDV: 51 MM HG (ref 41–51)
PCP UR QL SCN: ABNORMAL
PH BLDV: 7.38 PH (ref 7.33–7.43)
PH UR STRIP.AUTO: 6 [PH]
PLATELET # BLD AUTO: 278 X10*3/UL (ref 150–450)
PO2 BLDV: 37 MM HG (ref 35–45)
POTASSIUM BLDV-SCNC: 5.3 MMOL/L (ref 3.5–5.3)
POTASSIUM SERPL-SCNC: 4.5 MMOL/L (ref 3.5–5.3)
PROT SERPL-MCNC: 7.7 G/DL (ref 6.4–8.2)
PROT UR STRIP.AUTO-MCNC: NEGATIVE MG/DL
RBC # BLD AUTO: 4.59 X10*6/UL (ref 4.5–5.9)
RBC # UR STRIP.AUTO: NEGATIVE /UL
SAO2 % BLDV: 60 % (ref 45–75)
SODIUM BLDV-SCNC: 121 MMOL/L (ref 136–145)
SODIUM SERPL-SCNC: 122 MMOL/L (ref 136–145)
SP GR UR STRIP.AUTO: 1.02
UROBILINOGEN UR STRIP.AUTO-MCNC: <2 MG/DL
WBC # BLD AUTO: 4.9 X10*3/UL (ref 4.4–11.3)

## 2024-01-31 PROCEDURE — 36415 COLL VENOUS BLD VENIPUNCTURE: CPT

## 2024-01-31 PROCEDURE — 85025 COMPLETE CBC W/AUTO DIFF WBC: CPT

## 2024-01-31 PROCEDURE — 99285 EMERGENCY DEPT VISIT HI MDM: CPT | Performed by: STUDENT IN AN ORGANIZED HEALTH CARE EDUCATION/TRAINING PROGRAM

## 2024-01-31 PROCEDURE — G0378 HOSPITAL OBSERVATION PER HR: HCPCS

## 2024-01-31 PROCEDURE — 96375 TX/PRO/DX INJ NEW DRUG ADDON: CPT | Mod: 59

## 2024-01-31 PROCEDURE — 81003 URINALYSIS AUTO W/O SCOPE: CPT

## 2024-01-31 PROCEDURE — 82010 KETONE BODYS QUAN: CPT

## 2024-01-31 PROCEDURE — 82947 ASSAY GLUCOSE BLOOD QUANT: CPT

## 2024-01-31 PROCEDURE — 71045 X-RAY EXAM CHEST 1 VIEW: CPT | Performed by: RADIOLOGY

## 2024-01-31 PROCEDURE — 83735 ASSAY OF MAGNESIUM: CPT

## 2024-01-31 PROCEDURE — 84132 ASSAY OF SERUM POTASSIUM: CPT

## 2024-01-31 PROCEDURE — 99223 1ST HOSP IP/OBS HIGH 75: CPT | Performed by: NURSE PRACTITIONER

## 2024-01-31 PROCEDURE — 96376 TX/PRO/DX INJ SAME DRUG ADON: CPT | Mod: 59

## 2024-01-31 PROCEDURE — 84484 ASSAY OF TROPONIN QUANT: CPT

## 2024-01-31 PROCEDURE — 83880 ASSAY OF NATRIURETIC PEPTIDE: CPT

## 2024-01-31 PROCEDURE — 96361 HYDRATE IV INFUSION ADD-ON: CPT | Mod: 59

## 2024-01-31 PROCEDURE — 71045 X-RAY EXAM CHEST 1 VIEW: CPT

## 2024-01-31 PROCEDURE — 70450 CT HEAD/BRAIN W/O DYE: CPT | Performed by: RADIOLOGY

## 2024-01-31 PROCEDURE — 80307 DRUG TEST PRSMV CHEM ANLYZR: CPT

## 2024-01-31 PROCEDURE — 85018 HEMOGLOBIN: CPT | Mod: 59

## 2024-01-31 PROCEDURE — 70450 CT HEAD/BRAIN W/O DYE: CPT

## 2024-01-31 PROCEDURE — 93005 ELECTROCARDIOGRAM TRACING: CPT

## 2024-01-31 PROCEDURE — 2500000004 HC RX 250 GENERAL PHARMACY W/ HCPCS (ALT 636 FOR OP/ED)

## 2024-01-31 PROCEDURE — 96374 THER/PROPH/DIAG INJ IV PUSH: CPT | Mod: 59

## 2024-01-31 PROCEDURE — 82077 ASSAY SPEC XCP UR&BREATH IA: CPT

## 2024-01-31 PROCEDURE — 2500000002 HC RX 250 W HCPCS SELF ADMINISTERED DRUGS (ALT 637 FOR MEDICARE OP, ALT 636 FOR OP/ED): Mod: MUE

## 2024-01-31 RX ORDER — ACETAMINOPHEN 160 MG/5ML
650 SOLUTION ORAL EVERY 4 HOURS PRN
Status: DISCONTINUED | OUTPATIENT
Start: 2024-01-31 | End: 2024-02-01 | Stop reason: HOSPADM

## 2024-01-31 RX ORDER — SODIUM CHLORIDE, SODIUM LACTATE, POTASSIUM CHLORIDE, CALCIUM CHLORIDE 600; 310; 30; 20 MG/100ML; MG/100ML; MG/100ML; MG/100ML
75 INJECTION, SOLUTION INTRAVENOUS CONTINUOUS
Status: DISCONTINUED | OUTPATIENT
Start: 2024-02-01 | End: 2024-02-01

## 2024-01-31 RX ORDER — DEXTROSE 50 % IN WATER (D50W) INTRAVENOUS SYRINGE
25
Status: DISCONTINUED | OUTPATIENT
Start: 2024-01-31 | End: 2024-02-01 | Stop reason: HOSPADM

## 2024-01-31 RX ORDER — IBUPROFEN 200 MG
1 TABLET ORAL DAILY
Status: DISCONTINUED | OUTPATIENT
Start: 2024-02-01 | End: 2024-02-01 | Stop reason: HOSPADM

## 2024-01-31 RX ORDER — PANTOPRAZOLE SODIUM 40 MG/10ML
40 INJECTION, POWDER, LYOPHILIZED, FOR SOLUTION INTRAVENOUS DAILY
Status: DISCONTINUED | OUTPATIENT
Start: 2024-02-01 | End: 2024-02-01 | Stop reason: HOSPADM

## 2024-01-31 RX ORDER — ONDANSETRON 4 MG/1
4 TABLET, FILM COATED ORAL EVERY 8 HOURS PRN
Status: DISCONTINUED | OUTPATIENT
Start: 2024-01-31 | End: 2024-02-01 | Stop reason: HOSPADM

## 2024-01-31 RX ORDER — FUROSEMIDE 10 MG/ML
20 INJECTION INTRAMUSCULAR; INTRAVENOUS ONCE
Status: COMPLETED | OUTPATIENT
Start: 2024-01-31 | End: 2024-01-31

## 2024-01-31 RX ORDER — TALC
3 POWDER (GRAM) TOPICAL DAILY PRN
Status: DISCONTINUED | OUTPATIENT
Start: 2024-01-31 | End: 2024-02-01 | Stop reason: HOSPADM

## 2024-01-31 RX ORDER — ATORVASTATIN CALCIUM 20 MG/1
40 TABLET, FILM COATED ORAL NIGHTLY
Status: DISCONTINUED | OUTPATIENT
Start: 2024-02-01 | End: 2024-02-01 | Stop reason: HOSPADM

## 2024-01-31 RX ORDER — ACETAMINOPHEN 325 MG/1
650 TABLET ORAL EVERY 4 HOURS PRN
Status: DISCONTINUED | OUTPATIENT
Start: 2024-01-31 | End: 2024-02-01 | Stop reason: HOSPADM

## 2024-01-31 RX ORDER — PANTOPRAZOLE SODIUM 40 MG/1
40 TABLET, DELAYED RELEASE ORAL DAILY
Status: DISCONTINUED | OUTPATIENT
Start: 2024-02-01 | End: 2024-02-01 | Stop reason: HOSPADM

## 2024-01-31 RX ORDER — INSULIN LISPRO 100 [IU]/ML
0-5 INJECTION, SOLUTION INTRAVENOUS; SUBCUTANEOUS
Status: DISCONTINUED | OUTPATIENT
Start: 2024-02-01 | End: 2024-02-01

## 2024-01-31 RX ORDER — ONDANSETRON HYDROCHLORIDE 2 MG/ML
4 INJECTION, SOLUTION INTRAVENOUS EVERY 8 HOURS PRN
Status: DISCONTINUED | OUTPATIENT
Start: 2024-01-31 | End: 2024-02-01 | Stop reason: HOSPADM

## 2024-01-31 RX ORDER — GUAIFENESIN 600 MG/1
600 TABLET, EXTENDED RELEASE ORAL EVERY 12 HOURS PRN
Status: DISCONTINUED | OUTPATIENT
Start: 2024-01-31 | End: 2024-02-01 | Stop reason: HOSPADM

## 2024-01-31 RX ORDER — ACETAMINOPHEN 650 MG/1
650 SUPPOSITORY RECTAL EVERY 4 HOURS PRN
Status: DISCONTINUED | OUTPATIENT
Start: 2024-01-31 | End: 2024-02-01 | Stop reason: HOSPADM

## 2024-01-31 RX ORDER — METOPROLOL SUCCINATE 25 MG/1
25 TABLET, EXTENDED RELEASE ORAL DAILY
Status: DISCONTINUED | OUTPATIENT
Start: 2024-02-01 | End: 2024-02-01 | Stop reason: HOSPADM

## 2024-01-31 RX ORDER — DEXTROSE MONOHYDRATE 100 MG/ML
0.3 INJECTION, SOLUTION INTRAVENOUS ONCE AS NEEDED
Status: DISCONTINUED | OUTPATIENT
Start: 2024-01-31 | End: 2024-02-01 | Stop reason: HOSPADM

## 2024-01-31 RX ADMIN — SODIUM CHLORIDE 1000 ML: 9 INJECTION, SOLUTION INTRAVENOUS at 20:09

## 2024-01-31 RX ADMIN — INSULIN HUMAN 5 UNITS: 100 INJECTION, SOLUTION PARENTERAL at 21:58

## 2024-01-31 RX ADMIN — INSULIN HUMAN 5 UNITS: 100 INJECTION, SOLUTION PARENTERAL at 21:22

## 2024-01-31 RX ADMIN — FUROSEMIDE 20 MG: 10 INJECTION, SOLUTION INTRAMUSCULAR; INTRAVENOUS at 21:57

## 2024-01-31 RX ADMIN — SODIUM CHLORIDE 1000 ML: 9 INJECTION, SOLUTION INTRAVENOUS at 21:59

## 2024-01-31 ASSESSMENT — PAIN - FUNCTIONAL ASSESSMENT: PAIN_FUNCTIONAL_ASSESSMENT: 0-10

## 2024-01-31 ASSESSMENT — COLUMBIA-SUICIDE SEVERITY RATING SCALE - C-SSRS
1. IN THE PAST MONTH, HAVE YOU WISHED YOU WERE DEAD OR WISHED YOU COULD GO TO SLEEP AND NOT WAKE UP?: NO
2. HAVE YOU ACTUALLY HAD ANY THOUGHTS OF KILLING YOURSELF?: NO
6. HAVE YOU EVER DONE ANYTHING, STARTED TO DO ANYTHING, OR PREPARED TO DO ANYTHING TO END YOUR LIFE?: NO

## 2024-01-31 ASSESSMENT — PAIN SCALES - GENERAL: PAINLEVEL_OUTOF10: 5 - MODERATE PAIN

## 2024-01-31 ASSESSMENT — LIFESTYLE VARIABLES
HAVE YOU EVER FELT YOU SHOULD CUT DOWN ON YOUR DRINKING: NO
EVER HAD A DRINK FIRST THING IN THE MORNING TO STEADY YOUR NERVES TO GET RID OF A HANGOVER: NO
EVER FELT BAD OR GUILTY ABOUT YOUR DRINKING: NO
HAVE PEOPLE ANNOYED YOU BY CRITICIZING YOUR DRINKING: NO

## 2024-01-31 ASSESSMENT — PAIN DESCRIPTION - LOCATION: LOCATION: LEG

## 2024-01-31 ASSESSMENT — PAIN DESCRIPTION - PAIN TYPE: TYPE: ACUTE PAIN

## 2024-02-01 ENCOUNTER — APPOINTMENT (OUTPATIENT)
Dept: CARDIOLOGY | Facility: HOSPITAL | Age: 39
End: 2024-02-01
Payer: COMMERCIAL

## 2024-02-01 VITALS
DIASTOLIC BLOOD PRESSURE: 59 MMHG | WEIGHT: 296.3 LBS | RESPIRATION RATE: 18 BRPM | TEMPERATURE: 98.1 F | OXYGEN SATURATION: 95 % | SYSTOLIC BLOOD PRESSURE: 102 MMHG | HEART RATE: 89 BPM | BODY MASS INDEX: 43.89 KG/M2 | HEIGHT: 69 IN

## 2024-02-01 LAB
ALBUMIN SERPL BCP-MCNC: 3.9 G/DL (ref 3.4–5)
ALP SERPL-CCNC: 71 U/L (ref 33–120)
ALT SERPL W P-5'-P-CCNC: 12 U/L (ref 10–52)
ANION GAP SERPL CALC-SCNC: 12 MMOL/L (ref 10–20)
ANION GAP SERPL CALC-SCNC: 14 MMOL/L (ref 10–20)
AST SERPL W P-5'-P-CCNC: 16 U/L (ref 9–39)
ATRIAL RATE: 86 BPM
ATRIAL RATE: 94 BPM
BASOPHILS # BLD AUTO: 0.01 X10*3/UL (ref 0–0.1)
BASOPHILS NFR BLD AUTO: 0.2 %
BILIRUB SERPL-MCNC: 0.3 MG/DL (ref 0–1.2)
BUN SERPL-MCNC: 14 MG/DL (ref 6–23)
BUN SERPL-MCNC: 16 MG/DL (ref 6–23)
CALCIUM SERPL-MCNC: 8.4 MG/DL (ref 8.6–10.3)
CALCIUM SERPL-MCNC: 9 MG/DL (ref 8.6–10.3)
CARDIAC TROPONIN I PNL SERPL HS: 177 NG/L (ref 0–20)
CHLORIDE SERPL-SCNC: 95 MMOL/L (ref 98–107)
CHLORIDE SERPL-SCNC: 96 MMOL/L (ref 98–107)
CO2 SERPL-SCNC: 25 MMOL/L (ref 21–32)
CO2 SERPL-SCNC: 26 MMOL/L (ref 21–32)
CREAT SERPL-MCNC: 1.05 MG/DL (ref 0.5–1.3)
CREAT SERPL-MCNC: 1.11 MG/DL (ref 0.5–1.3)
CRP SERPL-MCNC: 0.5 MG/DL
EGFRCR SERPLBLD CKD-EPI 2021: 88 ML/MIN/1.73M*2
EGFRCR SERPLBLD CKD-EPI 2021: >90 ML/MIN/1.73M*2
EOSINOPHIL # BLD AUTO: 0.08 X10*3/UL (ref 0–0.7)
EOSINOPHIL NFR BLD AUTO: 1.8 %
ERYTHROCYTE [DISTWIDTH] IN BLOOD BY AUTOMATED COUNT: 11.9 % (ref 11.5–14.5)
EST. AVERAGE GLUCOSE BLD GHB EST-MCNC: 392 MG/DL
GLUCOSE BLD MANUAL STRIP-MCNC: 291 MG/DL (ref 74–99)
GLUCOSE BLD MANUAL STRIP-MCNC: 361 MG/DL (ref 74–99)
GLUCOSE BLD MANUAL STRIP-MCNC: 371 MG/DL (ref 74–99)
GLUCOSE BLD MANUAL STRIP-MCNC: 381 MG/DL (ref 74–99)
GLUCOSE BLD MANUAL STRIP-MCNC: 390 MG/DL (ref 74–99)
GLUCOSE BLD MANUAL STRIP-MCNC: 424 MG/DL (ref 74–99)
GLUCOSE BLD MANUAL STRIP-MCNC: 492 MG/DL (ref 74–99)
GLUCOSE BLD MANUAL STRIP-MCNC: 553 MG/DL (ref 74–99)
GLUCOSE SERPL-MCNC: 372 MG/DL (ref 74–99)
GLUCOSE SERPL-MCNC: 516 MG/DL (ref 74–99)
HBA1C MFR BLD: 15.3 %
HCT VFR BLD AUTO: 40.6 % (ref 41–52)
HGB BLD-MCNC: 14 G/DL (ref 13.5–17.5)
HOLD SPECIMEN: NORMAL
IMM GRANULOCYTES # BLD AUTO: 0.01 X10*3/UL (ref 0–0.7)
IMM GRANULOCYTES NFR BLD AUTO: 0.2 % (ref 0–0.9)
LACTATE SERPL-SCNC: 1 MMOL/L (ref 0.4–2)
LYMPHOCYTES # BLD AUTO: 1.66 X10*3/UL (ref 1.2–4.8)
LYMPHOCYTES NFR BLD AUTO: 37.3 %
MAGNESIUM SERPL-MCNC: 1.75 MG/DL (ref 1.6–2.4)
MCH RBC QN AUTO: 31.7 PG (ref 26–34)
MCHC RBC AUTO-ENTMCNC: 34.5 G/DL (ref 32–36)
MCV RBC AUTO: 92 FL (ref 80–100)
MONOCYTES # BLD AUTO: 0.42 X10*3/UL (ref 0.1–1)
MONOCYTES NFR BLD AUTO: 9.4 %
NEUTROPHILS # BLD AUTO: 2.27 X10*3/UL (ref 1.2–7.7)
NEUTROPHILS NFR BLD AUTO: 51.1 %
NRBC BLD-RTO: 0 /100 WBCS (ref 0–0)
OSMOLALITY SERPL: 297 MOSM/KG (ref 280–300)
P AXIS: 59 DEGREES
P AXIS: 62 DEGREES
P OFFSET: 170 MS
P OFFSET: 175 MS
P ONSET: 117 MS
P ONSET: 124 MS
PLATELET # BLD AUTO: 304 X10*3/UL (ref 150–450)
POTASSIUM SERPL-SCNC: 4.1 MMOL/L (ref 3.5–5.3)
POTASSIUM SERPL-SCNC: 4.2 MMOL/L (ref 3.5–5.3)
POTASSIUM SERPL-SCNC: 4.7 MMOL/L (ref 3.5–5.3)
PR INTERVAL: 180 MS
PR INTERVAL: 194 MS
PROT SERPL-MCNC: 7.2 G/DL (ref 6.4–8.2)
Q ONSET: 214 MS
Q ONSET: 214 MS
QRS COUNT: 14 BEATS
QRS COUNT: 16 BEATS
QRS DURATION: 128 MS
QRS DURATION: 142 MS
QT INTERVAL: 420 MS
QT INTERVAL: 426 MS
QTC CALCULATION(BAZETT): 509 MS
QTC CALCULATION(BAZETT): 525 MS
QTC FREDERICIA: 480 MS
QTC FREDERICIA: 487 MS
R AXIS: -54 DEGREES
R AXIS: -57 DEGREES
RBC # BLD AUTO: 4.41 X10*6/UL (ref 4.5–5.9)
SODIUM SERPL-SCNC: 130 MMOL/L (ref 136–145)
SODIUM SERPL-SCNC: 130 MMOL/L (ref 136–145)
T AXIS: 101 DEGREES
T AXIS: 105 DEGREES
T OFFSET: 424 MS
T OFFSET: 427 MS
VENTRICULAR RATE: 86 BPM
VENTRICULAR RATE: 94 BPM
WBC # BLD AUTO: 4.5 X10*3/UL (ref 4.4–11.3)

## 2024-02-01 PROCEDURE — 2500000001 HC RX 250 WO HCPCS SELF ADMINISTERED DRUGS (ALT 637 FOR MEDICARE OP): Performed by: STUDENT IN AN ORGANIZED HEALTH CARE EDUCATION/TRAINING PROGRAM

## 2024-02-01 PROCEDURE — 2500000004 HC RX 250 GENERAL PHARMACY W/ HCPCS (ALT 636 FOR OP/ED): Performed by: STUDENT IN AN ORGANIZED HEALTH CARE EDUCATION/TRAINING PROGRAM

## 2024-02-01 PROCEDURE — 80048 BASIC METABOLIC PNL TOTAL CA: CPT | Mod: CCI | Performed by: NURSE PRACTITIONER

## 2024-02-01 PROCEDURE — 83735 ASSAY OF MAGNESIUM: CPT | Performed by: NURSE PRACTITIONER

## 2024-02-01 PROCEDURE — 2580000001 HC RX 258 IV SOLUTIONS: Performed by: NURSE PRACTITIONER

## 2024-02-01 PROCEDURE — 36415 COLL VENOUS BLD VENIPUNCTURE: CPT | Performed by: NURSE PRACTITIONER

## 2024-02-01 PROCEDURE — 83930 ASSAY OF BLOOD OSMOLALITY: CPT | Mod: ELYLAB | Performed by: NURSE PRACTITIONER

## 2024-02-01 PROCEDURE — 83036 HEMOGLOBIN GLYCOSYLATED A1C: CPT | Mod: ELYLAB | Performed by: NURSE PRACTITIONER

## 2024-02-01 PROCEDURE — 84484 ASSAY OF TROPONIN QUANT: CPT | Performed by: NURSE PRACTITIONER

## 2024-02-01 PROCEDURE — 84132 ASSAY OF SERUM POTASSIUM: CPT | Performed by: NURSE PRACTITIONER

## 2024-02-01 PROCEDURE — 93005 ELECTROCARDIOGRAM TRACING: CPT

## 2024-02-01 PROCEDURE — 99239 HOSP IP/OBS DSCHRG MGMT >30: CPT | Performed by: STUDENT IN AN ORGANIZED HEALTH CARE EDUCATION/TRAINING PROGRAM

## 2024-02-01 PROCEDURE — 82947 ASSAY GLUCOSE BLOOD QUANT: CPT

## 2024-02-01 PROCEDURE — 83605 ASSAY OF LACTIC ACID: CPT | Performed by: NURSE PRACTITIONER

## 2024-02-01 PROCEDURE — 2500000002 HC RX 250 W HCPCS SELF ADMINISTERED DRUGS (ALT 637 FOR MEDICARE OP, ALT 636 FOR OP/ED): Performed by: NURSE PRACTITIONER

## 2024-02-01 PROCEDURE — G0378 HOSPITAL OBSERVATION PER HR: HCPCS

## 2024-02-01 PROCEDURE — 93010 ELECTROCARDIOGRAM REPORT: CPT | Performed by: INTERNAL MEDICINE

## 2024-02-01 PROCEDURE — 2500000002 HC RX 250 W HCPCS SELF ADMINISTERED DRUGS (ALT 637 FOR MEDICARE OP, ALT 636 FOR OP/ED): Performed by: STUDENT IN AN ORGANIZED HEALTH CARE EDUCATION/TRAINING PROGRAM

## 2024-02-01 PROCEDURE — 86140 C-REACTIVE PROTEIN: CPT | Performed by: NURSE PRACTITIONER

## 2024-02-01 PROCEDURE — 2500000001 HC RX 250 WO HCPCS SELF ADMINISTERED DRUGS (ALT 637 FOR MEDICARE OP): Performed by: NURSE PRACTITIONER

## 2024-02-01 PROCEDURE — 85025 COMPLETE CBC W/AUTO DIFF WBC: CPT | Performed by: NURSE PRACTITIONER

## 2024-02-01 PROCEDURE — 2500000004 HC RX 250 GENERAL PHARMACY W/ HCPCS (ALT 636 FOR OP/ED): Performed by: NURSE PRACTITIONER

## 2024-02-01 RX ORDER — SODIUM CHLORIDE 450 MG/100ML
500 INJECTION, SOLUTION INTRAVENOUS ONCE
Status: COMPLETED | OUTPATIENT
Start: 2024-02-01 | End: 2024-02-01

## 2024-02-01 RX ORDER — INSULIN LISPRO 100 [IU]/ML
8 INJECTION, SOLUTION INTRAVENOUS; SUBCUTANEOUS ONCE
Status: COMPLETED | OUTPATIENT
Start: 2024-02-01 | End: 2024-02-01

## 2024-02-01 RX ORDER — INSULIN GLARGINE 100 [IU]/ML
30 INJECTION, SOLUTION SUBCUTANEOUS NIGHTLY
Status: DISCONTINUED | OUTPATIENT
Start: 2024-02-01 | End: 2024-02-01 | Stop reason: HOSPADM

## 2024-02-01 RX ORDER — SERTRALINE HYDROCHLORIDE 50 MG/1
100 TABLET, FILM COATED ORAL DAILY
Status: DISCONTINUED | OUTPATIENT
Start: 2024-02-01 | End: 2024-02-01 | Stop reason: HOSPADM

## 2024-02-01 RX ORDER — TRAZODONE HYDROCHLORIDE 50 MG/1
50 TABLET ORAL NIGHTLY
Status: DISCONTINUED | OUTPATIENT
Start: 2024-02-01 | End: 2024-02-01 | Stop reason: HOSPADM

## 2024-02-01 RX ORDER — INSULIN GLARGINE 100 [IU]/ML
20 INJECTION, SOLUTION SUBCUTANEOUS ONCE
Status: COMPLETED | OUTPATIENT
Start: 2024-02-01 | End: 2024-02-01

## 2024-02-01 RX ORDER — INSULIN LISPRO 100 [IU]/ML
8 INJECTION, SOLUTION INTRAVENOUS; SUBCUTANEOUS
Status: DISCONTINUED | OUTPATIENT
Start: 2024-02-01 | End: 2024-02-01 | Stop reason: HOSPADM

## 2024-02-01 RX ORDER — SODIUM CHLORIDE 9 MG/ML
75 INJECTION, SOLUTION INTRAVENOUS CONTINUOUS
Status: DISCONTINUED | OUTPATIENT
Start: 2024-02-01 | End: 2024-02-01 | Stop reason: HOSPADM

## 2024-02-01 RX ORDER — BUPROPION HYDROCHLORIDE 150 MG/1
150 TABLET ORAL DAILY
Status: DISCONTINUED | OUTPATIENT
Start: 2024-02-01 | End: 2024-02-01 | Stop reason: HOSPADM

## 2024-02-01 RX ORDER — INSULIN LISPRO 100 [IU]/ML
0-20 INJECTION, SOLUTION INTRAVENOUS; SUBCUTANEOUS
Status: DISCONTINUED | OUTPATIENT
Start: 2024-02-01 | End: 2024-02-01 | Stop reason: HOSPADM

## 2024-02-01 RX ORDER — INSULIN LISPRO 100 [IU]/ML
10 INJECTION, SOLUTION INTRAVENOUS; SUBCUTANEOUS ONCE
Status: DISCONTINUED | OUTPATIENT
Start: 2024-02-01 | End: 2024-02-01 | Stop reason: HOSPADM

## 2024-02-01 RX ADMIN — SERTRALINE HYDROCHLORIDE 100 MG: 50 TABLET, FILM COATED ORAL at 11:07

## 2024-02-01 RX ADMIN — PANTOPRAZOLE SODIUM 40 MG: 40 TABLET, DELAYED RELEASE ORAL at 06:47

## 2024-02-01 RX ADMIN — INSULIN GLARGINE 20 UNITS: 100 INJECTION, SOLUTION SUBCUTANEOUS at 02:22

## 2024-02-01 RX ADMIN — INSULIN LISPRO 8 UNITS: 100 INJECTION, SOLUTION INTRAVENOUS; SUBCUTANEOUS at 08:21

## 2024-02-01 RX ADMIN — SODIUM CHLORIDE 500 ML: 4.5 INJECTION, SOLUTION INTRAVENOUS at 01:51

## 2024-02-01 RX ADMIN — BUPROPION HYDROCHLORIDE 150 MG: 150 TABLET, EXTENDED RELEASE ORAL at 11:07

## 2024-02-01 RX ADMIN — SODIUM CHLORIDE, POTASSIUM CHLORIDE, SODIUM LACTATE AND CALCIUM CHLORIDE 75 ML/HR: 600; 310; 30; 20 INJECTION, SOLUTION INTRAVENOUS at 01:32

## 2024-02-01 RX ADMIN — ATORVASTATIN CALCIUM 40 MG: 20 TABLET, FILM COATED ORAL at 01:26

## 2024-02-01 RX ADMIN — INSULIN LISPRO 5 UNITS: 100 INJECTION, SOLUTION INTRAVENOUS; SUBCUTANEOUS at 06:47

## 2024-02-01 RX ADMIN — METOPROLOL SUCCINATE 25 MG: 25 TABLET, EXTENDED RELEASE ORAL at 08:28

## 2024-02-01 RX ADMIN — INSULIN LISPRO 8 UNITS: 100 INJECTION, SOLUTION INTRAVENOUS; SUBCUTANEOUS at 01:25

## 2024-02-01 RX ADMIN — INSULIN LISPRO 12 UNITS: 100 INJECTION, SOLUTION INTRAVENOUS; SUBCUTANEOUS at 11:10

## 2024-02-01 RX ADMIN — SACUBITRIL AND VALSARTAN 1 TABLET: 24; 26 TABLET, FILM COATED ORAL at 08:28

## 2024-02-01 RX ADMIN — INSULIN LISPRO 8 UNITS: 100 INJECTION, SOLUTION INTRAVENOUS; SUBCUTANEOUS at 11:07

## 2024-02-01 RX ADMIN — SACUBITRIL AND VALSARTAN 1 TABLET: 24; 26 TABLET, FILM COATED ORAL at 01:27

## 2024-02-01 RX ADMIN — INSULIN LISPRO 20 UNITS: 100 INJECTION, SOLUTION INTRAVENOUS; SUBCUTANEOUS at 10:01

## 2024-02-01 RX ADMIN — SODIUM CHLORIDE 75 ML/HR: 9 INJECTION, SOLUTION INTRAVENOUS at 03:03

## 2024-02-01 SDOH — SOCIAL STABILITY: SOCIAL INSECURITY: DO YOU FEEL ANYONE HAS EXPLOITED OR TAKEN ADVANTAGE OF YOU FINANCIALLY OR OF YOUR PERSONAL PROPERTY?: NO

## 2024-02-01 SDOH — SOCIAL STABILITY: SOCIAL INSECURITY: ARE THERE ANY APPARENT SIGNS OF INJURIES/BEHAVIORS THAT COULD BE RELATED TO ABUSE/NEGLECT?: NO

## 2024-02-01 SDOH — SOCIAL STABILITY: SOCIAL INSECURITY: HAS ANYONE EVER THREATENED TO HURT YOUR FAMILY OR YOUR PETS?: NO

## 2024-02-01 SDOH — SOCIAL STABILITY: SOCIAL INSECURITY: ARE YOU OR HAVE YOU BEEN THREATENED OR ABUSED PHYSICALLY, EMOTIONALLY, OR SEXUALLY BY ANYONE?: NO

## 2024-02-01 SDOH — SOCIAL STABILITY: SOCIAL INSECURITY: ABUSE: ADULT

## 2024-02-01 SDOH — SOCIAL STABILITY: SOCIAL INSECURITY: HAVE YOU HAD THOUGHTS OF HARMING ANYONE ELSE?: NO

## 2024-02-01 SDOH — SOCIAL STABILITY: SOCIAL INSECURITY: DO YOU FEEL UNSAFE GOING BACK TO THE PLACE WHERE YOU ARE LIVING?: NO

## 2024-02-01 SDOH — SOCIAL STABILITY: SOCIAL INSECURITY: DOES ANYONE TRY TO KEEP YOU FROM HAVING/CONTACTING OTHER FRIENDS OR DOING THINGS OUTSIDE YOUR HOME?: NO

## 2024-02-01 ASSESSMENT — PAIN SCALES - GENERAL: PAINLEVEL_OUTOF10: 0 - NO PAIN

## 2024-02-01 ASSESSMENT — COGNITIVE AND FUNCTIONAL STATUS - GENERAL
MOBILITY SCORE: 24
DAILY ACTIVITIY SCORE: 24

## 2024-02-01 ASSESSMENT — LIFESTYLE VARIABLES
HOW OFTEN DO YOU HAVE 6 OR MORE DRINKS ON ONE OCCASION: NEVER
HOW OFTEN DO YOU HAVE A DRINK CONTAINING ALCOHOL: 2-4 TIMES A MONTH
AUDIT-C TOTAL SCORE: 3
SKIP TO QUESTIONS 9-10: 0
HOW MANY STANDARD DRINKS CONTAINING ALCOHOL DO YOU HAVE ON A TYPICAL DAY: 3 OR 4
AUDIT-C TOTAL SCORE: 3

## 2024-02-01 ASSESSMENT — ACTIVITIES OF DAILY LIVING (ADL): LACK_OF_TRANSPORTATION: YES

## 2024-02-01 ASSESSMENT — PATIENT HEALTH QUESTIONNAIRE - PHQ9
2. FEELING DOWN, DEPRESSED OR HOPELESS: NEARLY EVERY DAY
1. LITTLE INTEREST OR PLEASURE IN DOING THINGS: NEARLY EVERY DAY
SUM OF ALL RESPONSES TO PHQ9 QUESTIONS 1 & 2: 6

## 2024-02-01 NOTE — CARE PLAN
The patient's goals for the shift include Wants to get some sleep    The clinical goals for the shift include Pt will stay safe and not fall during shift and pt's BG will stabilize    Over the shift, the patient did not make progress toward the following goals. Barriers to progression include continued high blood glucose. Recommendations to address these barriers include frequent accuchecks and insulin as needed.

## 2024-02-01 NOTE — ED PROVIDER NOTES
"HPI   Chief Complaint   Patient presents with    Hyperglycemia     Pt states hasn't been checking sugar or taking insulin since 1st week of January. Pt reading \"HI\" in triage. Blurred vision, fatigue       History provided by: Patient    Limitations to history: Patient is a poor historian    CC: Hyperglycemia    HPI: 38-year-old male presents emergency department to be hyperglycemia.  Patient is a type I diabetic and states he has not taken any of his medications in over a month.  States that his medications are on the other side of [place] he has been staying here over the last month.  He is not exactly sure what his regimen was however he does know that he used to take Trulicity once a month however the Trulicity dose was increased and he says that it made him feel very nauseous and sick.   he is not sure what other medications he was taking.  I checked his medication list which did show metformin 1000 mg twice a day, lispro 8 units 3 x 4 every meal, glargine potentially as needed, and dulaglutide  mg once a week.  He has no specific reason for coming into the emergency department today however he states that he feels weak and fatigued.  Also reports bilateral blurred vision that has had for several months now.  He denies total blackout of his vision and states it involves the entire eye and still can see somewhat through it.  He denies eye injury or pain with extraocular movements.  Also states he has been having more confusion especially with short-term memory.  He states \"I will member things that it just happened \".  He said that this been going on for the last few months as well.  Denies any head injury or syncopal episodes.  Denies numbness or tingling in extremities.  Denies chest pain, shortness of breath, cough, mopped assist.  He has a history of congestive heart failure and has not been taking his Lasix but he denies any worsening swelling in his extremities.  Denies history of arrhythmias, CAD, PE or " DVT.  Denies all GI and  symptoms.  Denies all other systemic symptoms.    ROS: Negative unless mentioned in HPI    Social Hx: Denies tobacco, alcohol or drug use    Medical Hx: Medical history sent in for congestive heart failure, diabetes.  Allergy to shellfish containing products.  Immunizations up-to-date.    Physical exam:    Constitutional: Patient is  obese and well-developed.  Sitting comfortably in the room and in no distress.  Oriented to person, place, time, and situation.    HEENT: Head is normocephalic, atraumatic. Patient's airway is patent.  Tympanic membranes are clear bilaterally.  Nasal mucosa clear.  Mouth with normal mucosa.  Throat is not erythematous and there are no oropharyngeal exudates, uvula is midline.  No obvious facial deformities.    Eyes: Clear bilaterally.  Pupils are equal round and reactive to light and accommodation.  Extraocular movements intact.      Cardiac: Regular rate, regular rhythm.  Heart sounds S1, S2.  No murmurs, rubs, or gallops.  PMI nondisplaced.  No JVD.    Respiratory: Regular respiratory rate and effort.  Breath sounds are clear and equal bilaterally, no adventitious lung sounds.  Patient is speaking in full sentences and is in no apparent respiratory distress. No use of accessory muscles.      Gastrointestinal: Abdomen is soft, nondistended, and nontender.  There are no obvious deformities.  No rebound tenderness or guarding.  Bowel sounds are normal active.    Genitourinary: No CVA or flank tenderness.    Musculoskeletal: No reproducible tenderness.  No obvious skin or bony deformities.  Patient has equal range of motion in all extremities and no strength deficiencies.  No muscle or joint tenderness. No back or neck tenderness.  Capillary refill less than 3 seconds.  Strong peripheral pulses.  No sensory deficits.    Neurological: Patient is alert and oriented.  No focal deficits.  5/5 strength in all extremities.  Cranial nerves II through XII intact. GCS15.      Skin: Skin is normal color for race and is warm, dry, and intact.  No evidence of trauma.  No lesions, rashes, bruising, jaundice, or masses.    Psych: Appropriate mood and affect.  No apparent risk to self or others.    Heme/lymph: No adenopathy, lymphadenopathy, or splenomegaly    Physical exam is otherwise negative unless stated above or in history of present illness.                            Ward Coma Scale Score: 15                  Patient History   No past medical history on file.  Past Surgical History:   Procedure Laterality Date    MR HEAD ANGIO WO IV CONTRAST  12/29/2022    MR HEAD ANGIO WO IV CONTRAST 12/29/2022 DOCTOR OFFICE LEGACY    OTHER SURGICAL HISTORY  07/21/2022    Cyst excision    OTHER SURGICAL HISTORY  10/25/2022    Cardiac catheterization     Family History   Adopted: Yes     Social History     Tobacco Use    Smoking status: Not on file    Smokeless tobacco: Not on file   Substance Use Topics    Alcohol use: Not on file    Drug use: Not on file       Physical Exam   ED Triage Vitals [01/31/24 1945]   Temperature Heart Rate Respirations BP   35.9 °C (96.6 °F) 89 18 144/81      Pulse Ox Temp Source Heart Rate Source Patient Position   100 % Temporal Monitor --      BP Location FiO2 (%)     -- --       Physical Exam    ED Course & MDM   Diagnoses as of 01/31/24 2247   Hyperglycemia   Confusion   Blurred vision   Substance abuse (CMS/HCC)     Patient updated on plan for lab testing, IV insertion, radiology imaging, and medications to be administered while in the ER (if indicated). Patient updated on expected wait times for testing and results. Patient provided my name and told to ask any staff member for questions or concerns if they should arise. Electronic medical record reviewed.     MDM    Upon initial assessment, patient was obese non-toxic appearing and in no apparent distress.     Patient presented to the emergency department with the chief complaint hyperglycemia, weakness and  fatigue, bilateral blurred vision, and confusion.  Patient's abdomen is soft, nontender, nondistended.  No neurological deficits or findings that would suggest the patient is experiencing stroke.  No muffled heart sound, JVD, murmur.  On arrival to the emergency department, vital signs were within normal limits    Will give the patient IV normal saline and get basic blood work, EKG and troponin, chest x-ray, head CT, magnesium, VBG, beta hydroxybutyrate, urinalysis, toxicology screen.    Patient's EKG is performed at 1953 interpreted by me.  Normal sinus rhythm with sinus arrhythmia 86 beats minute.  Patient has T wave inversion in lead aVL.  There is no ST elevation or depression.  No prolonged QT.  Patient's T wave version was seen in his previous EKGs as well as his left bundle branch block.    Patient's drug screen was positive for cocaine and fentanyl.  Urinalysis reveals glucose but no urinary tract infection.  CMP shows hyperglycemia 833 with pseudohyponatremia.  Bicarb is 20 and anion gap is 19, no findings on this with a VBG that would suggest DKA.  Ethanol level is within normal limits.  BNP is 314 however chest x-ray feels no pulmonary edema, pleural effusion, pneumonia.  Magnesium and CMP are within normal limits.  CT of the head reveals an old left frontal lobe infarct.  I spoke to the hospice nurse practitioner who is agreeable to admit the patient however would like his blood sugar closer to 400-500 before he is admitted to the floor. Troponin 99, will get a repeat.  Low suspicion for ACS, could be related to the patient's cocaine use.  Recommend giving the patient additional liter of fluids as well as IV Lasix in order to prevent him from becoming fluid overloaded given his ejection fraction from his congestive heart failure.  Recommended adding another 5 units of insulin added to the original 5 units.  Patient's blood sugar has responded well to this and his most recent is 450.    Hospitalist nurse  practitioner is agreeable to admit the patient to have endocrine evaluate the patient and get him started back on his regimen.  Patient is agreeable with this plan and he remained hemodynamically stable in the ER.    This note was dictated using a speech recognition program.  While an attempt was made at proof-reading to minimize errors, minor errors in transcription may be present    Medical Decision Making      Procedure  Procedures     Peter Roblero PA-C  01/31/24 1523       Peter Roblero PA-C  01/31/24 5060

## 2024-02-01 NOTE — H&P
Medical Group History and Physical    ASSESSMENT & PLAN:     Hyperglycemia 800s without evidence of HHNK/DKA  IDDM2  Pseudohyponatremia  Presenting complaint blurry vision x 1 month with generalized fatigue, last admission in December for acute on chronic heart failure with a EF of 15%, and medication noncompliance?  - compliance with insulin therapy unclear  - Corrected Na [140]  - Last A1c 10% in Dec.; recheck this admission  - IVF hydration = total given 2L  - total insulin given 10 units Regular   - BHB negative, no acidosis, anion gap closed  - Accu checks and SSI - lispro  - Diabetic diet/hypoglycemia protocol  - TCC consult to set up pt follow-up appointments and meds at time of DC.    HFrEF [15%]  Non-Ischemic cardiomyopathy  HTN  - resume home meds once reconciled; compliance is unclear  - Assess volume status daily, intermittent dosing of IV Lasix in addition to home meds.  - Home meds: Torsemide 20 daily, spironolactone 25 daily  -  on admission  - trend labs in AM  - Strict I/Os and daily weight    Polysubstance drug abuse  -Tox screen positive for fentanyl and cocaine    Hx CVA/DVT  - Xarelto cont    Tobacco Use Disorder  -Encourage smoking cessation  -Offer replacement    VTE Prophylaxis: defer- cont home xarelto    Dayanara Jmaes, APRN-CNP    Addendum:    glucose rising after arrival to nursing floor  stat lactate, K, trop, and osmo   check POC glucose q1hr - give 8 units Lispro now  bolus 0.45% NS 500mL; cont NS IVF infusion at 75mL/hr    HISTORY OF PRESENT ILLNESS:   Chief Complaint: Blurry vision x 1 month generalized fatigue    History Of Present Illness:    Arthur Carranza is a 38 y.o. male with a significant past medical history of NICM, HFrEF 15%, DM2, HTN, DVT on Xarelto, CKD, tobacco use, and noncompliance.  Presenting to [facility] with generalized complaints of blurry vision x 1 month and generalized fatigue.  Last discharged from hospital on 12-3-2023 for acute on chronic heart  "failure management and noncompliance.  Patient was diuresed with good response.  Patient is provided the same story again today as his prior admission \"I am from [place], staying here for now without access to home meds, does not know home medication regimen\" patient denies any GI/ complaints has no real issues just \"feels tired all the time \"no chest pain, dizziness, no shortness of breath, no fevers or chills.    Lab work significant for hyperglycemia glucose greater than 800 without evidence of acidosis, or HHNK. Normal BHB, IVF hydration required but cautiously due to HF. May need additional daily dose of diuretics while hospitalized. CXR negative for acute cardiopulmonary process, or pulmonary edema.  , no evidence of acute infection WBCs normal, UA negative.    VSS patient is now ready for admission under general medicine for the management of hyperglycemia with DM2    Review of systems: 10 point review of systems is otherwise negative except as mentioned above.    PAST HISTORIES:       Past Medical History:  Medical Problems       Problem List       * (Principal) 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 (CMS/[facility])    ACC/AHA stage C systolic congestive heart failure (CMS/HCC)    CHF (congestive heart failure) (CMS/[facility])    Current every day smoker    Type 2 diabetes mellitus with neurologic complication, with long-term current use of insulin (CMS/[facility])    Nonischemic cardiomyopathy (CMS/[facility])    Primary hypertension    Bipolar 1 disorder (CMS/[facility])    Morbid obesity with BMI of 45.0-49.9, adult (CMS/[facility])    Acute ischemic left MCA stroke (CMS/[facility])    Cocaine use    Major depression, recurrent (CMS/[facility])    PTSD (post-traumatic stress disorder)    Acute on chronic combined systolic and diastolic CHF (congestive heart failure) (CMS/[facility])    Type 2 diabetes mellitus with hyperglycemia (CMS/[facility])    Severe episode of recurrent major depressive disorder, without psychotic features " (CMS/HCC)    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           Past Surgical History:  Past Surgical History:   Procedure Laterality Date    MR HEAD ANGIO WO IV CONTRAST  12/29/2022    MR HEAD ANGIO WO IV CONTRAST 12/29/2022 DOCTOR OFFICE LEGACY    OTHER SURGICAL HISTORY  07/21/2022    Cyst excision    OTHER SURGICAL HISTORY  10/25/2022    Cardiac catheterization          Social History:  He reports that he has been smoking cigarettes. He does not have any smokeless tobacco history on file. No history on file for alcohol use and drug use.    Family History:  Family History   Adopted: Yes        Allergies:  Shellfish containing products    OBJECTIVE:       Last Recorded Vitals:  Vitals:    01/31/24 2032 01/31/24 2145 01/31/24 2206 01/31/24 2230   BP: 139/83 148/79 112/76 (!) 134/99   BP Location: Right arm      Patient Position: Lying      Pulse: 88 88 86 94   Resp: 18 18 18 18   Temp:       TempSrc:       SpO2: 98% 98% 99% 99%   Weight:       Height:           Last I/O:  No intake/output data recorded.    Physical Exam  Constitutional:       Appearance: Normal appearance. He is obese.   HENT:      Head: Normocephalic and atraumatic.      Nose: Nose normal.      Mouth/Throat:      Mouth: Mucous membranes are dry.      Pharynx: Oropharynx is clear.   Eyes:      Extraocular Movements: Extraocular movements intact.      Conjunctiva/sclera: Conjunctivae normal.      Pupils: Pupils are equal, round, and reactive to light.   Neck:      Comments: No JVD    Cardiovascular:      Rate and Rhythm: Normal rate and regular rhythm.      Pulses: Normal pulses.      Heart sounds: Normal heart sounds.   Pulmonary:      Effort: Pulmonary effort is normal.      Breath sounds: Normal breath sounds.   Abdominal:      General: Abdomen is flat. Bowel sounds are normal.      Palpations: Abdomen is soft.   Musculoskeletal:         General: Normal range of motion.      Cervical back: Normal range of motion and neck supple.    Skin:     General: Skin is warm and dry.      Capillary Refill: Capillary refill takes 2 to 3 seconds.   Neurological:      Mental Status: He is alert and oriented to person, place, and time. Mental status is at baseline.      Motor: Weakness present.   Psychiatric:         Mood and Affect: Mood normal.         Behavior: Behavior normal.         Thought Content: Thought content normal.         Judgment: Judgment normal.      Comments: BiPolar, PTSD, no in acute distress             Scheduled Medications    PRN Medications    Continuous Medications      Outpatient Medications:  Prior to Admission medications    Medication Sig Start Date End Date Taking? Authorizing Provider   atorvastatin (Lipitor) 40 mg tablet Take 1 tablet (40 mg) by mouth once daily at bedtime.    Historical Provider, MD   buPROPion XL (Wellbutrin XL) 150 mg 24 hr tablet Take 1 tablet (150 mg) by mouth once daily. 10/24/23   Historical Provider, MD   dapagliflozin (Farxiga) 10 mg Take 1 tablet (10 mg) by mouth once daily.    Historical Provider, MD   dulaglutide 3 mg/0.5 mL pen injector Inject 1 Application under the skin 1 (one) time per week.    Historical Provider, MD   FreeStyle Nadir sensor system (FreeStyle Nadir 2 Sensor) kit Use as instructed 4/3/23   Kevin Willingham MD   insulin glargine (Lantus Solostar U-100 Insulin) 100 unit/mL (3 mL) pen Inject under the skin.    Historical Provider, MD   insulin lispro (HumaLOG) 100 unit/mL injection Inject 0.08 mL (8 Units) under the skin 3 times a day before meals.    Historical Provider, MD   metFORMIN (Glucophage) 1,000 mg tablet Take 1 tablet (1,000 mg) by mouth 2 times a day.    Historical Provider, MD   metoprolol succinate XL (Toprol-XL) 25 mg 24 hr tablet Take 1 tablet (25 mg) by mouth once daily. 10/25/22   Historical Provider, MD   nicotine (Nicoderm CQ) 21 mg/24 hr patch Place 1 patch on the skin once every 24 hours. 4/25/23   Historical Provider, MD   nicotine polacrilex (Commit) 4 mg  lozenge  4/27/23   Historical Provider, MD   rivaroxaban (Xarelto) 20 mg tablet Take 1 tablet (20 mg) by mouth once daily. Take with food.    Historical Provider, MD   sacubitriL-valsartan (Entresto) 24-26 mg tablet Take 1 tablet by mouth 2 times a day.    Historical Provider, MD   sertraline (Zoloft) 100 mg tablet Take 1 tablet (100 mg) by mouth once daily. 10/24/23   Historical Provider, MD   spironolactone (Aldactone) 25 mg tablet Take 1 tablet (25 mg) by mouth once daily.    Historical Provider, MD   torsemide (Demadex) 20 mg tablet Take 1 tablet (20 mg) by mouth once daily.    Historical Provider, MD   traZODone (Desyrel) 50 mg tablet Take 1 tablet (50 mg) by mouth once daily at bedtime. 10/24/23   Historical Provider, MD   True Metrix Glucose Test Strip strip USE TO CHECK BLOOD SUGAR FOUR TIMES DAILY 4/19/23   Historical Provider, MD   Trulicity 4.5 mg/0.5 mL pen injector Inject 4.5 mg under the skin 1 (one) time per week. 10/24/23   Historical Provider, MD   Ultra Thin Lancets 30 gauge misc TEST BLOOD SUGAR FOUR TIMES DAILY AS DIRECTED 4/19/23   Historical Provider, MD       LABS AND IMAGING:     Labs:  Results for orders placed or performed during the hospital encounter of 01/31/24 (from the past 24 hour(s))   POCT GLUCOSE   Result Value Ref Range    POCT Glucose >600 (H) 74 - 99 mg/dL   CBC and Auto Differential   Result Value Ref Range    WBC 4.9 4.4 - 11.3 x10*3/uL    nRBC 0.0 0.0 - 0.0 /100 WBCs    RBC 4.59 4.50 - 5.90 x10*6/uL    Hemoglobin 14.6 13.5 - 17.5 g/dL    Hematocrit 44.1 41.0 - 52.0 %    MCV 96 80 - 100 fL    MCH 31.8 26.0 - 34.0 pg    MCHC 33.1 32.0 - 36.0 g/dL    RDW 12.2 11.5 - 14.5 %    Platelets 278 150 - 450 x10*3/uL    Neutrophils % 61.3 40.0 - 80.0 %    Immature Granulocytes %, Automated 0.2 0.0 - 0.9 %    Lymphocytes % 28.4 13.0 - 44.0 %    Monocytes % 8.3 2.0 - 10.0 %    Eosinophils % 1.4 0.0 - 6.0 %    Basophils % 0.4 0.0 - 2.0 %    Neutrophils Absolute 3.02 1.20 - 7.70 x10*3/uL     Immature Granulocytes Absolute, Automated 0.01 0.00 - 0.70 x10*3/uL    Lymphocytes Absolute 1.40 1.20 - 4.80 x10*3/uL    Monocytes Absolute 0.41 0.10 - 1.00 x10*3/uL    Eosinophils Absolute 0.07 0.00 - 0.70 x10*3/uL    Basophils Absolute 0.02 0.00 - 0.10 x10*3/uL   Magnesium   Result Value Ref Range    Magnesium 1.86 1.60 - 2.40 mg/dL   Comprehensive metabolic panel   Result Value Ref Range    Glucose 833 (HH) 74 - 99 mg/dL    Sodium 122 (L) 136 - 145 mmol/L    Potassium 4.5 3.5 - 5.3 mmol/L    Chloride 88 (L) 98 - 107 mmol/L    Bicarbonate 20 (L) 21 - 32 mmol/L    Anion Gap 19 10 - 20 mmol/L    Urea Nitrogen 16 6 - 23 mg/dL    Creatinine 1.30 0.50 - 1.30 mg/dL    eGFR 73 >60 mL/min/1.73m*2    Calcium 8.7 8.6 - 10.3 mg/dL    Albumin 4.0 3.4 - 5.0 g/dL    Alkaline Phosphatase 83 33 - 120 U/L    Total Protein 7.7 6.4 - 8.2 g/dL    AST 18 9 - 39 U/L    Bilirubin, Total 0.3 0.0 - 1.2 mg/dL    ALT 13 10 - 52 U/L   Troponin I, High Sensitivity   Result Value Ref Range    Troponin I, High Sensitivity 99 (HH) 0 - 20 ng/L   B-Type Natriuretic Peptide   Result Value Ref Range     (H) 0 - 99 pg/mL   Beta Hydroxybutyrate   Result Value Ref Range    Beta-Hydroxybutyrate 0.17 0.02 - 0.27 mmol/L   Ethanol   Result Value Ref Range    Alcohol <10 <=10 mg/dL   Blood Gas Venous Full Panel   Result Value Ref Range    POCT pH, Venous 7.38 7.33 - 7.43 pH    POCT pCO2, Venous 51 41 - 51 mm Hg    POCT pO2, Venous 37 35 - 45 mm Hg    POCT SO2, Venous 60 45 - 75 %    POCT Oxy Hemoglobin, Venous 59.2 45.0 - 75.0 %    POCT Hematocrit Calculated, Venous 42.0 41.0 - 52.0 %    POCT Sodium, Venous 121 (L) 136 - 145 mmol/L    POCT Potassium, Venous 5.3 3.5 - 5.3 mmol/L    POCT Chloride, Venous 89 (L) 98 - 107 mmol/L    POCT Ionized Calicum, Venous 1.07 (L) 1.10 - 1.33 mmol/L    POCT Glucose, Venous >685 (HH) 74 - 99 mg/dL    POCT Lactate, Venous 2.9 (H) 0.4 - 2.0 mmol/L    POCT Base Excess, Venous 3.9 (H) -2.0 - 3.0 mmol/L    POCT HCO3  Calculated, Venous 30.2 (H) 22.0 - 26.0 mmol/L    POCT Hemoglobin, Venous 14.1 13.5 - 17.5 g/dL    POCT Anion Gap, Venous 7.0 (L) 10.0 - 25.0 mmol/L    Patient Temperature      FiO2 21 %    Critical Called By SERGIO WALLS     Critical Called To DR SÁNCHEZ     Critical Call Time 2059.0000     Critical Read Back Y    Drug Screen, Urine   Result Value Ref Range    Amphetamine Screen, Urine Presumptive Negative Presumptive Negative    Barbiturate Screen, Urine Presumptive Negative Presumptive Negative    Benzodiazepines Screen, Urine Presumptive Negative Presumptive Negative    Cannabinoid Screen, Urine Presumptive Negative Presumptive Negative    Cocaine Metabolite Screen, Urine Presumptive Positive (A) Presumptive Negative    Fentanyl Screen, Urine Presumptive Positive (A) Presumptive Negative    Opiate Screen, Urine Presumptive Negative Presumptive Negative    Oxycodone Screen, Urine Presumptive Negative Presumptive Negative    PCP Screen, Urine Presumptive Negative Presumptive Negative   Urinalysis with Reflex Culture and Microscopic   Result Value Ref Range    Color, Urine Colorless (N) Straw, Yellow    Appearance, Urine Clear Clear    Specific Gravity, Urine 1.024 1.005 - 1.035    pH, Urine 6.0 5.0, 5.5, 6.0, 6.5, 7.0, 7.5, 8.0    Protein, Urine NEGATIVE NEGATIVE mg/dL    Glucose, Urine >=500 (3+) (A) NEGATIVE mg/dL    Blood, Urine NEGATIVE NEGATIVE    Ketones, Urine NEGATIVE NEGATIVE mg/dL    Bilirubin, Urine NEGATIVE NEGATIVE    Urobilinogen, Urine <2.0 <2.0 mg/dL    Nitrite, Urine NEGATIVE NEGATIVE    Leukocyte Esterase, Urine NEGATIVE NEGATIVE   POCT GLUCOSE   Result Value Ref Range    POCT Glucose >600 (H) 74 - 99 mg/dL   POCT GLUCOSE   Result Value Ref Range    POCT Glucose 450 (H) 74 - 99 mg/dL        Imaging:  XR chest 1 view   Final Result   No acute cardiopulmonary process.        MACRO:   None        Signed by: Lima Kern 1/31/2024 8:48 PM   Dictation workstation:   [id number]      CT head wo IV  contrast   Final Result   No acute intracranial abnormality.        Old left frontal lobe infarct.        MACRO:   None        Signed by: Lima Kern 1/31/2024 8:47 PM   Dictation workstation:   [id number]

## 2024-02-01 NOTE — DISCHARGE SUMMARY
[facility] Discharge Summary  DISCHARGE DIAGNOSIS     Uncontrolled type II IDDM with hyperglycemia  Nonischemic cardiomyopathy  Chronic systolic CHF, compensated  Hypertonic hyponatremia  Morbid obesity    HOSPITAL COURSE AND DETAILS     This is a 71 male with a significant medical history of uncontrolled type II IDDM, nonischemic with a EF of 15%, hypertension, personal history of CVA and DVT that presented from home with complaints of generalized fatigue and blurry vision.  Patient initially found to have a blood sugar in the 800s and was resuscitated with IV fluids and insulin which improved with blood sugars in the 400s.    On further examination, patient states that he has not taken his medications in approximately 1 month as he has been visiting on the side of [facility] and not been back to his home to  his medications due to transportation.  With an aggressive insulin regimen, patient's blood sugars came down to the 200s.  He remained compensated from a heart failure standpoint and did not receive diuresis.    Patient is being discharged home today and discussed at great length the importance of taking his medications.  No changes made to home medications at this time.  Hemoglobin A1c resulted at 15.3%.  He will need further changes to his home diabetes regimen once he sees PCP.    Total time spent on discharge: >30min      ---Of note, this documentation is completed using the Dragon Dictation system (voice recognition software). There may be spelling and/or grammatical errors that were not corrected prior to final submission.---    Michael Maier MD    DISCHARGE PHYSICAL EXAM     Last Recorded Vitals:  Vitals:    01/31/24 2354 02/01/24 0006 02/01/24 0741 02/01/24 1156   BP:  (!) 136/92 101/57 102/59   BP Location:   Right arm    Patient Position:   Lying    Pulse:  89 90 89   Resp:  18 18    Temp:  36 °C (96.8 °F) 36.5 °C (97.7 °F) 36.7 °C (98.1 °F)   TempSrc:   Temporal    SpO2:  98% 97% 95%  "  Weight: 134 kg (296 lb 4.8 oz)      Height: 1.753 m (5' 9.02\")        Physical Exam:  - General: Alert and oriented x3. No acute distress. Well-nourished  - HEENT: Normocephalic atraumatic.  Neck supple/range of motion intact.  EOMI  - Respiratory: No acute respiratory distress. Breath sounds clear to auscultation bilaterally.  - Cardiovascular: Regular rate and rhythm, normal S1/S2.  No murmurs  - Abdomen: Abdomen soft. Bowel sounds present. Nontender to deep palpation.    - Extremities: Bilateral upper and lower extremities with range of motion intact, strength 5/5.  - Psychiatric: Appropriate mood and behavior    DISCHARGE MEDICATIONS        Your medication list        CONTINUE taking these medications        Instructions Last Dose Given Next Dose Due   atorvastatin 40 mg tablet  Commonly known as: Lipitor           buPROPion  mg 24 hr tablet  Commonly known as: Wellbutrin XL           dulaglutide 3 mg/0.5 mL pen injector           Trulicity 4.5 mg/0.5 mL pen injector  Generic drug: dulaglutide           Entresto 24-26 mg tablet  Generic drug: sacubitriL-valsartan           Farxiga 10 mg  Generic drug: dapagliflozin propanediol           FreeStyle Nadir 2 Sensor kit  Generic drug: FreeStyle Nadir sensor system      Use as instructed       insulin lispro 100 unit/mL injection  Commonly known as: HumaLOG           insulin NPH (Isophane) 100 unit/mL (3 mL) injection  Commonly known as: HumuLIN N,NovoLIN N           metFORMIN 1,000 mg tablet  Commonly known as: Glucophage           metoprolol succinate XL 25 mg 24 hr tablet  Commonly known as: Toprol-XL           sertraline 100 mg tablet  Commonly known as: Zoloft           spironolactone 25 mg tablet  Commonly known as: Aldactone           torsemide 20 mg tablet  Commonly known as: Demadex           traZODone 50 mg tablet  Commonly known as: Desyrel           True Metrix Glucose Test Strip strip  Generic drug: blood sugar diagnostic           Ultra Thin " Lancets 30 gauge misc  Generic drug: lancets           Xarelto 20 mg tablet  Generic drug: rivaroxaban                  OUTPATIENT FOLLOW-UP     No future appointments.

## 2024-02-20 ENCOUNTER — HOSPITAL ENCOUNTER (INPATIENT)
Facility: HOSPITAL | Age: 39
LOS: 3 days | Discharge: HOME | End: 2024-02-23
Attending: EMERGENCY MEDICINE | Admitting: STUDENT IN AN ORGANIZED HEALTH CARE EDUCATION/TRAINING PROGRAM
Payer: COMMERCIAL

## 2024-02-20 ENCOUNTER — APPOINTMENT (OUTPATIENT)
Dept: CARDIOLOGY | Facility: HOSPITAL | Age: 39
End: 2024-02-20
Payer: COMMERCIAL

## 2024-02-20 ENCOUNTER — APPOINTMENT (OUTPATIENT)
Dept: RADIOLOGY | Facility: HOSPITAL | Age: 39
End: 2024-02-20
Payer: COMMERCIAL

## 2024-02-20 DIAGNOSIS — E11.39: ICD-10-CM

## 2024-02-20 DIAGNOSIS — E11.65 HYPERGLYCEMIA DUE TO DIABETES MELLITUS (MULTI): ICD-10-CM

## 2024-02-20 DIAGNOSIS — H91.90: ICD-10-CM

## 2024-02-20 DIAGNOSIS — E23.2: ICD-10-CM

## 2024-02-20 DIAGNOSIS — I50.43 ACUTE ON CHRONIC COMBINED SYSTOLIC AND DIASTOLIC CONGESTIVE HEART FAILURE (MULTI): ICD-10-CM

## 2024-02-20 DIAGNOSIS — I63.512 ACUTE ISCHEMIC LEFT MCA STROKE (MULTI): ICD-10-CM

## 2024-02-20 DIAGNOSIS — E11.69 TYPE 2 DIABETES MELLITUS WITH OTHER SPECIFIED COMPLICATION, WITHOUT LONG-TERM CURRENT USE OF INSULIN (MULTI): ICD-10-CM

## 2024-02-20 DIAGNOSIS — Z79.4 TYPE 2 DIABETES MELLITUS WITH HYPERGLYCEMIA, WITH LONG-TERM CURRENT USE OF INSULIN (MULTI): ICD-10-CM

## 2024-02-20 DIAGNOSIS — E11.69: ICD-10-CM

## 2024-02-20 DIAGNOSIS — I50.9 CONGESTIVE HEART FAILURE, UNSPECIFIED HF CHRONICITY, UNSPECIFIED HEART FAILURE TYPE (MULTI): Primary | ICD-10-CM

## 2024-02-20 DIAGNOSIS — E11.65 TYPE 2 DIABETES MELLITUS WITH HYPERGLYCEMIA, WITH LONG-TERM CURRENT USE OF INSULIN (MULTI): ICD-10-CM

## 2024-02-20 DIAGNOSIS — H47.20: ICD-10-CM

## 2024-02-20 LAB
ALBUMIN SERPL BCP-MCNC: 4.5 G/DL (ref 3.4–5)
ALP SERPL-CCNC: 71 U/L (ref 33–120)
ALT SERPL W P-5'-P-CCNC: 15 U/L (ref 10–52)
AMPHETAMINES UR QL SCN: ABNORMAL
ANION GAP SERPL CALC-SCNC: 16 MMOL/L (ref 10–20)
ANION GAP SERPL CALC-SCNC: 20 MMOL/L (ref 10–20)
AST SERPL W P-5'-P-CCNC: 17 U/L (ref 9–39)
BARBITURATES UR QL SCN: ABNORMAL
BASOPHILS # BLD AUTO: 0.02 X10*3/UL (ref 0–0.1)
BASOPHILS NFR BLD AUTO: 0.2 %
BENZODIAZ UR QL SCN: ABNORMAL
BILIRUB SERPL-MCNC: 0.9 MG/DL (ref 0–1.2)
BNP SERPL-MCNC: 259 PG/ML (ref 0–99)
BUN SERPL-MCNC: 25 MG/DL (ref 6–23)
BUN SERPL-MCNC: 26 MG/DL (ref 6–23)
BZE UR QL SCN: ABNORMAL
CALCIUM SERPL-MCNC: 9.3 MG/DL (ref 8.6–10.3)
CALCIUM SERPL-MCNC: 9.6 MG/DL (ref 8.6–10.3)
CANNABINOIDS UR QL SCN: ABNORMAL
CARDIAC TROPONIN I PNL SERPL HS: 65 NG/L (ref 0–20)
CHLORIDE SERPL-SCNC: 90 MMOL/L (ref 98–107)
CHLORIDE SERPL-SCNC: 93 MMOL/L (ref 98–107)
CO2 SERPL-SCNC: 21 MMOL/L (ref 21–32)
CO2 SERPL-SCNC: 28 MMOL/L (ref 21–32)
CREAT SERPL-MCNC: 1.47 MG/DL (ref 0.5–1.3)
CREAT SERPL-MCNC: 1.54 MG/DL (ref 0.5–1.3)
EGFRCR SERPLBLD CKD-EPI 2021: 59 ML/MIN/1.73M*2
EGFRCR SERPLBLD CKD-EPI 2021: 63 ML/MIN/1.73M*2
EOSINOPHIL # BLD AUTO: 0.04 X10*3/UL (ref 0–0.7)
EOSINOPHIL NFR BLD AUTO: 0.5 %
ERYTHROCYTE [DISTWIDTH] IN BLOOD BY AUTOMATED COUNT: 12.4 % (ref 11.5–14.5)
ERYTHROCYTE [DISTWIDTH] IN BLOOD BY AUTOMATED COUNT: 12.5 % (ref 11.5–14.5)
FENTANYL+NORFENTANYL UR QL SCN: ABNORMAL
GLUCOSE BLD MANUAL STRIP-MCNC: 203 MG/DL (ref 74–99)
GLUCOSE BLD MANUAL STRIP-MCNC: 300 MG/DL (ref 74–99)
GLUCOSE BLD MANUAL STRIP-MCNC: 327 MG/DL (ref 74–99)
GLUCOSE BLD MANUAL STRIP-MCNC: 335 MG/DL (ref 74–99)
GLUCOSE BLD MANUAL STRIP-MCNC: 381 MG/DL (ref 74–99)
GLUCOSE BLD MANUAL STRIP-MCNC: 402 MG/DL (ref 74–99)
GLUCOSE BLD MANUAL STRIP-MCNC: 409 MG/DL (ref 74–99)
GLUCOSE BLD MANUAL STRIP-MCNC: 441 MG/DL (ref 74–99)
GLUCOSE SERPL-MCNC: 409 MG/DL (ref 74–99)
GLUCOSE SERPL-MCNC: 412 MG/DL (ref 74–99)
HCT VFR BLD AUTO: 42.7 % (ref 41–52)
HCT VFR BLD AUTO: 44.3 % (ref 41–52)
HGB BLD-MCNC: 14.8 G/DL (ref 13.5–17.5)
HGB BLD-MCNC: 15.7 G/DL (ref 13.5–17.5)
IMM GRANULOCYTES # BLD AUTO: 0.02 X10*3/UL (ref 0–0.7)
IMM GRANULOCYTES NFR BLD AUTO: 0.2 % (ref 0–0.9)
LYMPHOCYTES # BLD AUTO: 1.33 X10*3/UL (ref 1.2–4.8)
LYMPHOCYTES NFR BLD AUTO: 15.9 %
MAGNESIUM SERPL-MCNC: 1.59 MG/DL (ref 1.6–2.4)
MCH RBC QN AUTO: 31.9 PG (ref 26–34)
MCH RBC QN AUTO: 32 PG (ref 26–34)
MCHC RBC AUTO-ENTMCNC: 34.7 G/DL (ref 32–36)
MCHC RBC AUTO-ENTMCNC: 35.4 G/DL (ref 32–36)
MCV RBC AUTO: 90 FL (ref 80–100)
MCV RBC AUTO: 92 FL (ref 80–100)
MONOCYTES # BLD AUTO: 0.8 X10*3/UL (ref 0.1–1)
MONOCYTES NFR BLD AUTO: 9.6 %
NEUTROPHILS # BLD AUTO: 6.14 X10*3/UL (ref 1.2–7.7)
NEUTROPHILS NFR BLD AUTO: 73.6 %
NRBC BLD-RTO: 0 /100 WBCS (ref 0–0)
NRBC BLD-RTO: 0 /100 WBCS (ref 0–0)
OPIATES UR QL SCN: ABNORMAL
OXYCODONE+OXYMORPHONE UR QL SCN: ABNORMAL
PCP UR QL SCN: ABNORMAL
PHOSPHATE SERPL-MCNC: 4 MG/DL (ref 2.5–4.9)
PLATELET # BLD AUTO: 218 X10*3/UL (ref 150–450)
PLATELET # BLD AUTO: 228 X10*3/UL (ref 150–450)
POTASSIUM SERPL-SCNC: 4.4 MMOL/L (ref 3.5–5.3)
POTASSIUM SERPL-SCNC: 4.5 MMOL/L (ref 3.5–5.3)
PROT SERPL-MCNC: 7.9 G/DL (ref 6.4–8.2)
RBC # BLD AUTO: 4.64 X10*6/UL (ref 4.5–5.9)
RBC # BLD AUTO: 4.91 X10*6/UL (ref 4.5–5.9)
SODIUM SERPL-SCNC: 129 MMOL/L (ref 136–145)
SODIUM SERPL-SCNC: 130 MMOL/L (ref 136–145)
WBC # BLD AUTO: 8.4 X10*3/UL (ref 4.4–11.3)
WBC # BLD AUTO: 9.8 X10*3/UL (ref 4.4–11.3)

## 2024-02-20 PROCEDURE — 96374 THER/PROPH/DIAG INJ IV PUSH: CPT

## 2024-02-20 PROCEDURE — 71045 X-RAY EXAM CHEST 1 VIEW: CPT | Performed by: STUDENT IN AN ORGANIZED HEALTH CARE EDUCATION/TRAINING PROGRAM

## 2024-02-20 PROCEDURE — 1200000002 HC GENERAL ROOM WITH TELEMETRY DAILY

## 2024-02-20 PROCEDURE — 2500000004 HC RX 250 GENERAL PHARMACY W/ HCPCS (ALT 636 FOR OP/ED): Performed by: INTERNAL MEDICINE

## 2024-02-20 PROCEDURE — 82947 ASSAY GLUCOSE BLOOD QUANT: CPT

## 2024-02-20 PROCEDURE — 80307 DRUG TEST PRSMV CHEM ANLYZR: CPT | Performed by: INTERNAL MEDICINE

## 2024-02-20 PROCEDURE — 99285 EMERGENCY DEPT VISIT HI MDM: CPT | Mod: 25

## 2024-02-20 PROCEDURE — 2500000004 HC RX 250 GENERAL PHARMACY W/ HCPCS (ALT 636 FOR OP/ED): Performed by: NURSE PRACTITIONER

## 2024-02-20 PROCEDURE — C9113 INJ PANTOPRAZOLE SODIUM, VIA: HCPCS | Performed by: INTERNAL MEDICINE

## 2024-02-20 PROCEDURE — 96375 TX/PRO/DX INJ NEW DRUG ADDON: CPT

## 2024-02-20 PROCEDURE — 99223 1ST HOSP IP/OBS HIGH 75: CPT | Performed by: INTERNAL MEDICINE

## 2024-02-20 PROCEDURE — 36415 COLL VENOUS BLD VENIPUNCTURE: CPT | Performed by: EMERGENCY MEDICINE

## 2024-02-20 PROCEDURE — 2500000004 HC RX 250 GENERAL PHARMACY W/ HCPCS (ALT 636 FOR OP/ED): Performed by: EMERGENCY MEDICINE

## 2024-02-20 PROCEDURE — 71045 X-RAY EXAM CHEST 1 VIEW: CPT

## 2024-02-20 PROCEDURE — 82374 ASSAY BLOOD CARBON DIOXIDE: CPT | Performed by: EMERGENCY MEDICINE

## 2024-02-20 PROCEDURE — 85027 COMPLETE CBC AUTOMATED: CPT | Performed by: INTERNAL MEDICINE

## 2024-02-20 PROCEDURE — 80048 BASIC METABOLIC PNL TOTAL CA: CPT | Mod: CCI | Performed by: INTERNAL MEDICINE

## 2024-02-20 PROCEDURE — 93010 ELECTROCARDIOGRAM REPORT: CPT | Performed by: INTERNAL MEDICINE

## 2024-02-20 PROCEDURE — 83880 ASSAY OF NATRIURETIC PEPTIDE: CPT | Performed by: EMERGENCY MEDICINE

## 2024-02-20 PROCEDURE — 85025 COMPLETE CBC W/AUTO DIFF WBC: CPT | Performed by: EMERGENCY MEDICINE

## 2024-02-20 PROCEDURE — 2500000001 HC RX 250 WO HCPCS SELF ADMINISTERED DRUGS (ALT 637 FOR MEDICARE OP): Performed by: INTERNAL MEDICINE

## 2024-02-20 PROCEDURE — 36415 COLL VENOUS BLD VENIPUNCTURE: CPT | Performed by: INTERNAL MEDICINE

## 2024-02-20 PROCEDURE — 93005 ELECTROCARDIOGRAM TRACING: CPT

## 2024-02-20 PROCEDURE — 2500000002 HC RX 250 W HCPCS SELF ADMINISTERED DRUGS (ALT 637 FOR MEDICARE OP, ALT 636 FOR OP/ED): Performed by: EMERGENCY MEDICINE

## 2024-02-20 PROCEDURE — 83735 ASSAY OF MAGNESIUM: CPT | Performed by: EMERGENCY MEDICINE

## 2024-02-20 PROCEDURE — 2500000002 HC RX 250 W HCPCS SELF ADMINISTERED DRUGS (ALT 637 FOR MEDICARE OP, ALT 636 FOR OP/ED): Performed by: STUDENT IN AN ORGANIZED HEALTH CARE EDUCATION/TRAINING PROGRAM

## 2024-02-20 PROCEDURE — 2500000002 HC RX 250 W HCPCS SELF ADMINISTERED DRUGS (ALT 637 FOR MEDICARE OP, ALT 636 FOR OP/ED): Performed by: INTERNAL MEDICINE

## 2024-02-20 PROCEDURE — 2500000001 HC RX 250 WO HCPCS SELF ADMINISTERED DRUGS (ALT 637 FOR MEDICARE OP): Performed by: NURSE PRACTITIONER

## 2024-02-20 PROCEDURE — 84100 ASSAY OF PHOSPHORUS: CPT | Performed by: EMERGENCY MEDICINE

## 2024-02-20 PROCEDURE — 84484 ASSAY OF TROPONIN QUANT: CPT | Performed by: INTERNAL MEDICINE

## 2024-02-20 RX ORDER — ENOXAPARIN SODIUM 100 MG/ML
40 INJECTION SUBCUTANEOUS EVERY 12 HOURS SCHEDULED
Status: DISCONTINUED | OUTPATIENT
Start: 2024-02-20 | End: 2024-02-20

## 2024-02-20 RX ORDER — POLYETHYLENE GLYCOL 3350 17 G/17G
17 POWDER, FOR SOLUTION ORAL DAILY PRN
Status: DISCONTINUED | OUTPATIENT
Start: 2024-02-20 | End: 2024-02-23 | Stop reason: HOSPADM

## 2024-02-20 RX ORDER — INSULIN GLARGINE 100 [IU]/ML
25 INJECTION, SOLUTION SUBCUTANEOUS NIGHTLY
Status: DISCONTINUED | OUTPATIENT
Start: 2024-02-20 | End: 2024-02-21

## 2024-02-20 RX ORDER — DEXTROSE 50 % IN WATER (D50W) INTRAVENOUS SYRINGE
25
Status: DISCONTINUED | OUTPATIENT
Start: 2024-02-20 | End: 2024-02-20 | Stop reason: SDUPTHER

## 2024-02-20 RX ORDER — ATORVASTATIN CALCIUM 20 MG/1
40 TABLET, FILM COATED ORAL NIGHTLY
Status: DISCONTINUED | OUTPATIENT
Start: 2024-02-20 | End: 2024-02-23 | Stop reason: HOSPADM

## 2024-02-20 RX ORDER — PANTOPRAZOLE SODIUM 40 MG/1
40 TABLET, DELAYED RELEASE ORAL DAILY
Status: DISCONTINUED | OUTPATIENT
Start: 2024-02-20 | End: 2024-02-23 | Stop reason: HOSPADM

## 2024-02-20 RX ORDER — ACETAMINOPHEN 650 MG/1
650 SUPPOSITORY RECTAL EVERY 4 HOURS PRN
Status: DISCONTINUED | OUTPATIENT
Start: 2024-02-20 | End: 2024-02-23 | Stop reason: HOSPADM

## 2024-02-20 RX ORDER — DEXTROSE MONOHYDRATE 100 MG/ML
0.3 INJECTION, SOLUTION INTRAVENOUS ONCE AS NEEDED
Status: DISCONTINUED | OUTPATIENT
Start: 2024-02-20 | End: 2024-02-20 | Stop reason: SDUPTHER

## 2024-02-20 RX ORDER — TALC
6 POWDER (GRAM) TOPICAL NIGHTLY PRN
Status: DISCONTINUED | OUTPATIENT
Start: 2024-02-20 | End: 2024-02-23 | Stop reason: HOSPADM

## 2024-02-20 RX ORDER — ACETAMINOPHEN 160 MG/5ML
650 SOLUTION ORAL EVERY 4 HOURS PRN
Status: DISCONTINUED | OUTPATIENT
Start: 2024-02-20 | End: 2024-02-23 | Stop reason: HOSPADM

## 2024-02-20 RX ORDER — INSULIN LISPRO 100 [IU]/ML
8 INJECTION, SOLUTION INTRAVENOUS; SUBCUTANEOUS
Status: DISCONTINUED | OUTPATIENT
Start: 2024-02-20 | End: 2024-02-22

## 2024-02-20 RX ORDER — DEXTROSE 50 % IN WATER (D50W) INTRAVENOUS SYRINGE
25
Status: DISCONTINUED | OUTPATIENT
Start: 2024-02-20 | End: 2024-02-23 | Stop reason: HOSPADM

## 2024-02-20 RX ORDER — METOLAZONE 5 MG/1
5 TABLET ORAL DAILY
Status: DISCONTINUED | OUTPATIENT
Start: 2024-02-20 | End: 2024-02-23 | Stop reason: HOSPADM

## 2024-02-20 RX ORDER — TRAZODONE HYDROCHLORIDE 50 MG/1
50 TABLET ORAL NIGHTLY
Status: DISCONTINUED | OUTPATIENT
Start: 2024-02-20 | End: 2024-02-23 | Stop reason: HOSPADM

## 2024-02-20 RX ORDER — MAGNESIUM SULFATE HEPTAHYDRATE 40 MG/ML
4 INJECTION, SOLUTION INTRAVENOUS ONCE
Status: COMPLETED | OUTPATIENT
Start: 2024-02-20 | End: 2024-02-20

## 2024-02-20 RX ORDER — FUROSEMIDE 10 MG/ML
40 INJECTION INTRAMUSCULAR; INTRAVENOUS EVERY 12 HOURS
Status: DISCONTINUED | OUTPATIENT
Start: 2024-02-20 | End: 2024-02-22

## 2024-02-20 RX ORDER — BUPROPION HYDROCHLORIDE 150 MG/1
150 TABLET ORAL DAILY
Status: DISCONTINUED | OUTPATIENT
Start: 2024-02-20 | End: 2024-02-23 | Stop reason: HOSPADM

## 2024-02-20 RX ORDER — CARVEDILOL 6.25 MG/1
6.25 TABLET ORAL 2 TIMES DAILY
Status: DISCONTINUED | OUTPATIENT
Start: 2024-02-20 | End: 2024-02-23 | Stop reason: HOSPADM

## 2024-02-20 RX ORDER — DEXTROSE MONOHYDRATE 100 MG/ML
0.3 INJECTION, SOLUTION INTRAVENOUS ONCE AS NEEDED
Status: DISCONTINUED | OUTPATIENT
Start: 2024-02-20 | End: 2024-02-23 | Stop reason: HOSPADM

## 2024-02-20 RX ORDER — INSULIN LISPRO 100 [IU]/ML
15 INJECTION, SOLUTION INTRAVENOUS; SUBCUTANEOUS ONCE
Status: COMPLETED | OUTPATIENT
Start: 2024-02-20 | End: 2024-02-20

## 2024-02-20 RX ORDER — FUROSEMIDE 10 MG/ML
40 INJECTION INTRAMUSCULAR; INTRAVENOUS ONCE
Status: COMPLETED | OUTPATIENT
Start: 2024-02-20 | End: 2024-02-20

## 2024-02-20 RX ORDER — SPIRONOLACTONE 25 MG/1
25 TABLET ORAL DAILY
Status: DISCONTINUED | OUTPATIENT
Start: 2024-02-20 | End: 2024-02-22

## 2024-02-20 RX ORDER — SERTRALINE HYDROCHLORIDE 50 MG/1
100 TABLET, FILM COATED ORAL DAILY
Status: DISCONTINUED | OUTPATIENT
Start: 2024-02-20 | End: 2024-02-23 | Stop reason: HOSPADM

## 2024-02-20 RX ORDER — INSULIN GLARGINE 100 [IU]/ML
15 INJECTION, SOLUTION SUBCUTANEOUS NIGHTLY
Status: DISCONTINUED | OUTPATIENT
Start: 2024-02-20 | End: 2024-02-20

## 2024-02-20 RX ORDER — ONDANSETRON HYDROCHLORIDE 2 MG/ML
4 INJECTION, SOLUTION INTRAVENOUS ONCE
Status: COMPLETED | OUTPATIENT
Start: 2024-02-20 | End: 2024-02-20

## 2024-02-20 RX ORDER — ONDANSETRON HYDROCHLORIDE 2 MG/ML
4 INJECTION, SOLUTION INTRAVENOUS EVERY 8 HOURS PRN
Status: DISCONTINUED | OUTPATIENT
Start: 2024-02-20 | End: 2024-02-23 | Stop reason: HOSPADM

## 2024-02-20 RX ORDER — ACETAMINOPHEN 325 MG/1
650 TABLET ORAL EVERY 4 HOURS PRN
Status: DISCONTINUED | OUTPATIENT
Start: 2024-02-20 | End: 2024-02-23 | Stop reason: HOSPADM

## 2024-02-20 RX ORDER — METOPROLOL SUCCINATE 25 MG/1
25 TABLET, EXTENDED RELEASE ORAL DAILY
Status: DISCONTINUED | OUTPATIENT
Start: 2024-02-20 | End: 2024-02-20

## 2024-02-20 RX ORDER — INSULIN LISPRO 100 [IU]/ML
0-15 INJECTION, SOLUTION INTRAVENOUS; SUBCUTANEOUS
Status: DISCONTINUED | OUTPATIENT
Start: 2024-02-20 | End: 2024-02-21

## 2024-02-20 RX ORDER — ONDANSETRON 4 MG/1
4 TABLET, FILM COATED ORAL EVERY 8 HOURS PRN
Status: DISCONTINUED | OUTPATIENT
Start: 2024-02-20 | End: 2024-02-23 | Stop reason: HOSPADM

## 2024-02-20 RX ORDER — PANTOPRAZOLE SODIUM 40 MG/10ML
40 INJECTION, POWDER, LYOPHILIZED, FOR SOLUTION INTRAVENOUS DAILY
Status: DISCONTINUED | OUTPATIENT
Start: 2024-02-20 | End: 2024-02-23 | Stop reason: HOSPADM

## 2024-02-20 RX ADMIN — METOPROLOL SUCCINATE 25 MG: 25 TABLET, EXTENDED RELEASE ORAL at 09:40

## 2024-02-20 RX ADMIN — FUROSEMIDE 40 MG: 10 INJECTION, SOLUTION INTRAMUSCULAR; INTRAVENOUS at 03:37

## 2024-02-20 RX ADMIN — INSULIN LISPRO 6 UNITS: 100 INJECTION, SOLUTION INTRAVENOUS; SUBCUTANEOUS at 16:47

## 2024-02-20 RX ADMIN — MAGNESIUM SULFATE HEPTAHYDRATE 4 G: 40 INJECTION, SOLUTION INTRAVENOUS at 16:47

## 2024-02-20 RX ADMIN — INSULIN LISPRO 15 UNITS: 100 INJECTION, SOLUTION INTRAVENOUS; SUBCUTANEOUS at 22:20

## 2024-02-20 RX ADMIN — INSULIN HUMAN 5 UNITS: 100 INJECTION, SOLUTION PARENTERAL at 01:52

## 2024-02-20 RX ADMIN — INSULIN GLARGINE 25 UNITS: 100 INJECTION, SOLUTION SUBCUTANEOUS at 21:53

## 2024-02-20 RX ADMIN — SACUBITRIL AND VALSARTAN 1 TABLET: 24; 26 TABLET, FILM COATED ORAL at 09:40

## 2024-02-20 RX ADMIN — INSULIN LISPRO 8 UNITS: 100 INJECTION, SOLUTION INTRAVENOUS; SUBCUTANEOUS at 12:57

## 2024-02-20 RX ADMIN — INSULIN LISPRO 9 UNITS: 100 INJECTION, SOLUTION INTRAVENOUS; SUBCUTANEOUS at 11:26

## 2024-02-20 RX ADMIN — FUROSEMIDE 40 MG: 10 INJECTION, SOLUTION INTRAMUSCULAR; INTRAVENOUS at 22:03

## 2024-02-20 RX ADMIN — INSULIN LISPRO 15 UNITS: 100 INJECTION, SOLUTION INTRAVENOUS; SUBCUTANEOUS at 07:28

## 2024-02-20 RX ADMIN — RIVAROXABAN 20 MG: 20 TABLET, FILM COATED ORAL at 22:03

## 2024-02-20 RX ADMIN — ATORVASTATIN CALCIUM 40 MG: 20 TABLET, FILM COATED ORAL at 22:04

## 2024-02-20 RX ADMIN — SERTRALINE HYDROCHLORIDE 100 MG: 50 TABLET, FILM COATED ORAL at 09:41

## 2024-02-20 RX ADMIN — CARVEDILOL 6.25 MG: 6.25 TABLET, FILM COATED ORAL at 22:04

## 2024-02-20 RX ADMIN — INSULIN GLARGINE 15 UNITS: 100 INJECTION, SOLUTION SUBCUTANEOUS at 05:58

## 2024-02-20 RX ADMIN — METOLAZONE 5 MG: 5 TABLET ORAL at 09:40

## 2024-02-20 RX ADMIN — BUPROPION HYDROCHLORIDE 150 MG: 150 TABLET, EXTENDED RELEASE ORAL at 09:40

## 2024-02-20 RX ADMIN — SPIRONOLACTONE 25 MG: 25 TABLET ORAL at 09:41

## 2024-02-20 RX ADMIN — PANTOPRAZOLE SODIUM 40 MG: 40 INJECTION, POWDER, FOR SOLUTION INTRAVENOUS at 05:58

## 2024-02-20 RX ADMIN — ACETAMINOPHEN 650 MG: 325 TABLET ORAL at 09:40

## 2024-02-20 RX ADMIN — FUROSEMIDE 40 MG: 10 INJECTION, SOLUTION INTRAMUSCULAR; INTRAVENOUS at 09:41

## 2024-02-20 RX ADMIN — ONDANSETRON 4 MG: 2 INJECTION INTRAMUSCULAR; INTRAVENOUS at 01:31

## 2024-02-20 RX ADMIN — INSULIN LISPRO 8 UNITS: 100 INJECTION, SOLUTION INTRAVENOUS; SUBCUTANEOUS at 16:47

## 2024-02-20 RX ADMIN — TRAZODONE HYDROCHLORIDE 50 MG: 50 TABLET ORAL at 22:03

## 2024-02-20 RX ADMIN — SACUBITRIL AND VALSARTAN 1 TABLET: 24; 26 TABLET, FILM COATED ORAL at 22:04

## 2024-02-20 SDOH — SOCIAL STABILITY: SOCIAL INSECURITY: DOES ANYONE TRY TO KEEP YOU FROM HAVING/CONTACTING OTHER FRIENDS OR DOING THINGS OUTSIDE YOUR HOME?: NO

## 2024-02-20 SDOH — SOCIAL STABILITY: SOCIAL INSECURITY: HAS ANYONE EVER THREATENED TO HURT YOUR FAMILY OR YOUR PETS?: NO

## 2024-02-20 SDOH — SOCIAL STABILITY: SOCIAL INSECURITY: DO YOU FEEL UNSAFE GOING BACK TO THE PLACE WHERE YOU ARE LIVING?: NO

## 2024-02-20 SDOH — SOCIAL STABILITY: SOCIAL INSECURITY: HAVE YOU HAD THOUGHTS OF HARMING ANYONE ELSE?: NO

## 2024-02-20 SDOH — SOCIAL STABILITY: SOCIAL INSECURITY: ARE YOU OR HAVE YOU BEEN THREATENED OR ABUSED PHYSICALLY, EMOTIONALLY, OR SEXUALLY BY ANYONE?: NO

## 2024-02-20 SDOH — SOCIAL STABILITY: SOCIAL INSECURITY: WERE YOU ABLE TO COMPLETE ALL THE BEHAVIORAL HEALTH SCREENINGS?: YES

## 2024-02-20 SDOH — SOCIAL STABILITY: SOCIAL INSECURITY: DO YOU FEEL ANYONE HAS EXPLOITED OR TAKEN ADVANTAGE OF YOU FINANCIALLY OR OF YOUR PERSONAL PROPERTY?: NO

## 2024-02-20 SDOH — SOCIAL STABILITY: SOCIAL INSECURITY: ARE THERE ANY APPARENT SIGNS OF INJURIES/BEHAVIORS THAT COULD BE RELATED TO ABUSE/NEGLECT?: NO

## 2024-02-20 SDOH — SOCIAL STABILITY: SOCIAL INSECURITY: ABUSE: ADULT

## 2024-02-20 ASSESSMENT — PAIN SCALES - GENERAL
PAINLEVEL_OUTOF10: 5 - MODERATE PAIN
PAINLEVEL_OUTOF10: 0 - NO PAIN
PAINLEVEL_OUTOF10: 7

## 2024-02-20 ASSESSMENT — ACTIVITIES OF DAILY LIVING (ADL)
LACK_OF_TRANSPORTATION: YES
HEARING - RIGHT EAR: FUNCTIONAL
WALKS IN HOME: INDEPENDENT
JUDGMENT_ADEQUATE_SAFELY_COMPLETE_DAILY_ACTIVITIES: YES
GROOMING: INDEPENDENT
HEARING - LEFT EAR: FUNCTIONAL
ADEQUATE_TO_COMPLETE_ADL: YES
FEEDING YOURSELF: INDEPENDENT
TOILETING: INDEPENDENT
PATIENT'S MEMORY ADEQUATE TO SAFELY COMPLETE DAILY ACTIVITIES?: YES
BATHING: INDEPENDENT
DRESSING YOURSELF: INDEPENDENT

## 2024-02-20 ASSESSMENT — COGNITIVE AND FUNCTIONAL STATUS - GENERAL
DAILY ACTIVITIY SCORE: 24
MOBILITY SCORE: 24
PATIENT BASELINE BEDBOUND: NO
DAILY ACTIVITIY SCORE: 24
MOBILITY SCORE: 24

## 2024-02-20 ASSESSMENT — PATIENT HEALTH QUESTIONNAIRE - PHQ9
SUM OF ALL RESPONSES TO PHQ9 QUESTIONS 1 & 2: 6
1. LITTLE INTEREST OR PLEASURE IN DOING THINGS: NEARLY EVERY DAY
2. FEELING DOWN, DEPRESSED OR HOPELESS: NEARLY EVERY DAY

## 2024-02-20 ASSESSMENT — PAIN DESCRIPTION - DESCRIPTORS: DESCRIPTORS: TIGHTNESS

## 2024-02-20 ASSESSMENT — LIFESTYLE VARIABLES
AUDIT-C TOTAL SCORE: 3
EVER HAD A DRINK FIRST THING IN THE MORNING TO STEADY YOUR NERVES TO GET RID OF A HANGOVER: NO
HOW OFTEN DO YOU HAVE 6 OR MORE DRINKS ON ONE OCCASION: NEVER
HAVE PEOPLE ANNOYED YOU BY CRITICIZING YOUR DRINKING: NO
HAVE YOU EVER FELT YOU SHOULD CUT DOWN ON YOUR DRINKING: NO
HOW MANY STANDARD DRINKS CONTAINING ALCOHOL DO YOU HAVE ON A TYPICAL DAY: 3 OR 4
HOW OFTEN DO YOU HAVE A DRINK CONTAINING ALCOHOL: 2-4 TIMES A MONTH
SKIP TO QUESTIONS 9-10: 0
EVER FELT BAD OR GUILTY ABOUT YOUR DRINKING: NO
AUDIT-C TOTAL SCORE: 3

## 2024-02-20 ASSESSMENT — PAIN DESCRIPTION - LOCATION: LOCATION: CHEST

## 2024-02-20 ASSESSMENT — PAIN - FUNCTIONAL ASSESSMENT
PAIN_FUNCTIONAL_ASSESSMENT: 0-10
PAIN_FUNCTIONAL_ASSESSMENT: 0-10

## 2024-02-20 ASSESSMENT — PAIN DESCRIPTION - PAIN TYPE: TYPE: ACUTE PAIN

## 2024-02-20 NOTE — PROGRESS NOTES
02/20/24 1622   Discharge Planning   Living Arrangements Family members   Support Systems Family members   Type of Residence Private residence   Who is requesting discharge planning? Provider   Patient expects to be discharged to: Home   Does the patient need discharge transport arranged? No     Met with pt who denies discharge needs. Reports he was visiting his children but still lives in [place]. Reports he has medications at home & denies problem with obtaining meds.

## 2024-02-20 NOTE — CARE PLAN
"The patient's goals for the shift include  \" breathe better    The clinical goals for the shift include  Pt will show minimal signs of swellng    Over the shift, the patient did not make progress toward the following goals. Barriers to progression include CHF. Recommendations to address these barriers include initiate poc.    "

## 2024-02-20 NOTE — SIGNIFICANT EVENT
38-year-old male patient with history of nonischemic cardiomyopathy, uncontrolled diabetes, history of cigarette smoking, history of cocaine use who presented with worsening shortness of breath  Mildly elevated proBNP, patient reports that he has been compliant with his meds  Insulin dose adjusted for better glycemic control  Cardiology consulted  Patient does not clinically look like he is fluid overloaded  Continue Lasix  Continue monitoring intake output  Monitor electrolytes and replace  Possible discharge over the next 24 hours after cardiology evaluation  DVT prophylaxis Xarelto

## 2024-02-20 NOTE — H&P
History Of Present Illness  Arthur Carranza is a 38 y.o. male presenting with shortness of breath.  Patient described dyspnea with exertion, orthopnea and paroxysmal nocturnal dyspnea.  Patient also reported was not able to urinate like usual despite using diuretics.  He feels chest congestion.  Denies runny nose, sore throat, sick contact, diarrhea.  He also described dry heaves.  Patient has history of congestive heart failure with severely reduced ejection fraction.  He does not have ICD or LifeVest.    ER course: Patient was awake, alert, oriented, looked ill and restless.  Patient was tachycardic , mildly tachypneic 21.  He was maintaining saturation on room air.  His labs shows mildly elevated proBNP 259 mild hypomagnesemia 1.5.  Patient does have mild acute kidney injury with creatinine 1.4 from baseline normal.  There is no leukocytosis.  Blood glucose level was 409 but does not meet any criteria for DKA.  Chest x-ray shows cardiomegaly but no clear evidence of pulmonary edema.  Patient was sitting in bed trying to avoid lying down to avoid worsening shortness of breath.  Patient is admitted for further workup and management.  Past Medical History  He has a past medical history of CHF (congestive heart failure) (CMS/[facility]ly [facility]), Diabetes mellitus (CMS/[facility]ly [facility]), Heart disease, Hypertension, and Substance abuse (CMS/[facility]ly [facility]).    Surgical History  He has a past surgical history that includes Other surgical history (07/21/2022); Other surgical history (10/25/2022); and MR angio head wo IV contrast (12/29/2022).     Social History  He reports that he has quit smoking. His smoking use included cigarettes. He does not have any smokeless tobacco history on file. He reports current alcohol use. Drug use questions deferred to the physician.    Family History  Family History   Adopted: Yes        Allergies  Shellfish containing products    Review of Systems  10/10 points review of system were conducted, negative except as  "above    Physical Exam  Constitutional:       General: He is not in acute distress.     Appearance: He is obese. He is ill-appearing. He is not toxic-appearing or diaphoretic.   HENT:      Head: Normocephalic and atraumatic.      Nose: Nose normal. No congestion or rhinorrhea.      Mouth/Throat:      Mouth: Mucous membranes are moist.      Pharynx: No oropharyngeal exudate or posterior oropharyngeal erythema.   Eyes:      General: No scleral icterus.     Extraocular Movements: Extraocular movements intact.      Pupils: Pupils are equal, round, and reactive to light.   Neck:      Vascular: No carotid bruit.   Cardiovascular:      Rate and Rhythm: Regular rhythm. Tachycardia present.      Heart sounds: No murmur heard.     No gallop.   Pulmonary:      Breath sounds: No stridor. Rhonchi and rales present. No wheezing.   Chest:      Chest wall: No tenderness.   Abdominal:      Tenderness: There is no abdominal tenderness. There is no right CVA tenderness, left CVA tenderness, guarding or rebound.      Hernia: No hernia is present.      Comments: Obese abdomen     Musculoskeletal:         General: No tenderness. Normal range of motion.      Cervical back: Normal range of motion. No rigidity or tenderness.      Right lower leg: Edema present.      Left lower leg: Edema present.      Comments: Trace bilateral leg edema   Lymphadenopathy:      Cervical: No cervical adenopathy.   Skin:     Findings: No bruising, erythema, lesion or rash.   Neurological:      General: No focal deficit present.      Mental Status: He is alert and oriented to person, place, and time. Mental status is at baseline.   Psychiatric:         Mood and Affect: Mood normal.         Behavior: Behavior normal.          Last Recorded Vitals  Blood pressure 137/84, pulse (!) 104, temperature 36.8 °C (98.2 °F), temperature source Temporal, resp. rate 18, height 1.753 m (5' 9\"), weight 136 kg (300 lb), SpO2 97 %.    Relevant Results    Scheduled " medications  atorvastatin, 40 mg, oral, Nightly  buPROPion XL, 150 mg, oral, Daily  furosemide, 40 mg, intravenous, q12h  insulin glargine, 15 Units, subcutaneous, Nightly  insulin lispro, 0-15 Units, subcutaneous, TID with meals  metOLazone, 5 mg, oral, Daily  metoprolol succinate XL, 25 mg, oral, Daily  pantoprazole, 40 mg, oral, Daily before breakfast   Or  pantoprazole, 40 mg, intravenous, Daily before breakfast  rivaroxaban, 20 mg, oral, Nightly  sacubitriL-valsartan, 1 tablet, oral, BID  sertraline, 100 mg, oral, Daily  spironolactone, 25 mg, oral, Daily  traZODone, 50 mg, oral, Nightly      Continuous medications     PRN medications  PRN medications: acetaminophen **OR** acetaminophen **OR** acetaminophen, dextrose 10 % in water (D10W), dextrose 10 % in water (D10W), dextrose 10 % in water (D10W), dextrose, dextrose, dextrose, glucagon, glucagon, glucagon, melatonin, ondansetron **OR** ondansetron, polyethylene glycol      Results for orders placed or performed during the hospital encounter of 02/20/24 (from the past 24 hour(s))   POCT GLUCOSE   Result Value Ref Range    POCT Glucose 409 (H) 74 - 99 mg/dL   CBC and Auto Differential   Result Value Ref Range    WBC 8.4 4.4 - 11.3 x10*3/uL    nRBC 0.0 0.0 - 0.0 /100 WBCs    RBC 4.91 4.50 - 5.90 x10*6/uL    Hemoglobin 15.7 13.5 - 17.5 g/dL    Hematocrit 44.3 41.0 - 52.0 %    MCV 90 80 - 100 fL    MCH 32.0 26.0 - 34.0 pg    MCHC 35.4 32.0 - 36.0 g/dL    RDW 12.4 11.5 - 14.5 %    Platelets 228 150 - 450 x10*3/uL    Neutrophils % 73.6 40.0 - 80.0 %    Immature Granulocytes %, Automated 0.2 0.0 - 0.9 %    Lymphocytes % 15.9 13.0 - 44.0 %    Monocytes % 9.6 2.0 - 10.0 %    Eosinophils % 0.5 0.0 - 6.0 %    Basophils % 0.2 0.0 - 2.0 %    Neutrophils Absolute 6.14 1.20 - 7.70 x10*3/uL    Immature Granulocytes Absolute, Automated 0.02 0.00 - 0.70 x10*3/uL    Lymphocytes Absolute 1.33 1.20 - 4.80 x10*3/uL    Monocytes Absolute 0.80 0.10 - 1.00 x10*3/uL    Eosinophils  Absolute 0.04 0.00 - 0.70 x10*3/uL    Basophils Absolute 0.02 0.00 - 0.10 x10*3/uL   Basic metabolic panel   Result Value Ref Range    Glucose 409 (H) 74 - 99 mg/dL    Sodium 129 (L) 136 - 145 mmol/L    Potassium 4.5 3.5 - 5.3 mmol/L    Chloride 93 (L) 98 - 107 mmol/L    Bicarbonate 21 21 - 32 mmol/L    Anion Gap 20 10 - 20 mmol/L    Urea Nitrogen 25 (H) 6 - 23 mg/dL    Creatinine 1.47 (H) 0.50 - 1.30 mg/dL    eGFR 63 >60 mL/min/1.73m*2    Calcium 9.6 8.6 - 10.3 mg/dL   Phosphorus   Result Value Ref Range    Phosphorus 4.0 2.5 - 4.9 mg/dL   Magnesium   Result Value Ref Range    Magnesium 1.59 (L) 1.60 - 2.40 mg/dL   B-Type Natriuretic Peptide   Result Value Ref Range     (H) 0 - 99 pg/mL   POCT GLUCOSE   Result Value Ref Range    POCT Glucose 327 (H) 74 - 99 mg/dL         XR chest 1 view    Result Date: 2/20/2024  Interpreted By:  Christian Villagomez, STUDY: XR CHEST 1 VIEW;  2/20/2024 1:40 am   INDICATION: Signs/Symptoms:sob.   COMPARISON: 01/31/2024   ACCESSION NUMBER(S): [id number]   ORDERING CLINICIAN: WADE ELIZABETH   FINDINGS:     Cardiomegaly. The pulmonary vasculature is within normal limits. No consolidation, pleural effusion or pneumothorax.         Cardiomegaly without evidence of acute disease in the chest.     MACRO: None.   Signed by: Christian Villagomez 2/20/2024 1:52 AM Dictation workstation:   [id number]    ECG 12 lead    Result Date: 2/1/2024  Normal sinus rhythm Possible Left atrial enlargement Left axis deviation Left ventricular hypertrophy with QRS widening and repolarization abnormality Abnormal ECG When compared with ECG of 31-JAN-2024 19:53, (unconfirmed) Left bundle branch block is no longer Present Confirmed by Ad Sheridan ([id number]) on 2/1/2024 11:05:19 AM    ECG 12 lead    Result Date: 2/1/2024  Normal sinus rhythm with sinus arrhythmia Possible Left atrial enlargement Left axis deviation Left bundle branch block Abnormal ECG When compared with ECG of 02-DEC-2023 23:25, Left  bundle branch block is now Present See ED provider note for full interpretation and clinical correlation Confirmed by Kari Ku ([id number]) on 2/1/2024 10:29:45 AM    XR chest 1 view    Result Date: 1/31/2024  Interpreted By:  Lima Kern, STUDY: XR CHEST 1 VIEW;  1/31/2024 8:26 pm   INDICATION: Signs/Symptoms:weakness.   COMPARISON: 12/02/2023   ACCESSION NUMBER(S): [id number]   ORDERING CLINICIAN: ALIYA PIERRE   FINDINGS:     CARDIOMEDIASTINAL SILHOUETTE: Cardiomediastinal silhouette is normal in size and configuration.   LUNGS: No pulmonary consolidation, pleural effusion or pneumothorax.   ABDOMEN: No remarkable upper abdominal findings.   BONES: No acute osseous abnormality.       No acute cardiopulmonary process.   MACRO: None   Signed by: Lima Kern 1/31/2024 8:48 PM Dictation workstation:   [id number]    CT head wo IV contrast    Result Date: 1/31/2024  Interpreted By:  Lima Kern, STUDY: CT HEAD WO IV CONTRAST;  1/31/2024 8:20 pm   INDICATION: Signs/Symptoms:confusion.   COMPARISON: 12/28/202 MRI brain 12/29/2022   ACCESSION NUMBER(S): [id number]   ORDERING CLINICIAN: ALIYA PIERRE   TECHNIQUE: Axial noncontrast CT images of the head.   FINDINGS: BRAIN PARENCHYMA: Encephalomalacia in the left frontal lobe, consistent with chronic infarct. Gray-white matter interfaces are otherwisepreserved. No mass effect or midline shift.   HEMORRHAGE: No acute intracranial hemorrhage. VENTRICLES and EXTRA-AXIAL SPACES: The ventricles and sulci are within normal limits in size for brain volume. No abnormal extraaxial fluid collection. EXTRACRANIAL SOFT TISSUES: Within normal limits. PARANASAL SINUSES/MASTOIDS: Small polyps or mucous retention cyst in the sphenoid sinuses. The visualized paranasal sinuses and mastoid air cells are aerated. CALVARIUM: No depressed skull fracture. No destructive osseous lesion.   OTHER FINDINGS: None.       No acute intracranial abnormality.   Old left frontal lobe  infarct.   MACRO: None   Signed by: Lima Kern 1/31/2024 8:47 PM Dictation workstation:   [id number]       Assessment/Plan   Principal Problem:    Congestive heart failure, unspecified HF chronicity, unspecified heart failure type (CMS/HCC)  Active Problems:    Nonischemic cardiomyopathy (CMS/HCC)    Major depression, recurrent (CMS/HCC)    Type 2 diabetes mellitus with hyperglycemia (CMS/HCC)    This patient is a 38-year-old male with severely reduced ejection fraction 15% nonischemic cardiomyopathy due to prior cocaine abuse.  He is former smoker recently quit.  He is diabetic with history of noncompliance.  Patient does not have ICD or LifeVest  Presented with orthopnea, dyspnea with any exertion, and paroxysmal nocturnal dyspnea  Has trace bilateral leg edema.  He is not following fluid restriction at home.  Patient reported poor urine output    -He is tachycardic in the ER.  -Will check drug screen.  -Will give Lasix with metolazone.  Had poor response to initial Lasix  -Cardiology consult for further recommendation.  -Monitor serum electrolytes and correct as indicated.  -Daily weight.  -Elevate head of bed to 35 degree as tolerated  -Sliding scale coverage with long-acting insulin for diabetes with hyperglycemia  -Check troponin.  -Continuous telemonitoring.  -Resume Xarelto.    Carolyn Malhotra MD

## 2024-02-20 NOTE — CONSULTS
Inpatient consult to Cardiology  Consult performed by: ARIAN Galaviz-CNP  Consult ordered by: Carolyn Malhotra MD  Reason for consult: CHF      Cardiology Consult Note      Date:   2/20/2024  Patient name:  Arthur Carranza  Date of admission:  2/20/2024  1:05 AM  MRN:   [id number]  YOB: 1985  Time of Consult:  3:01 PM    Consulting Cardiologist: ARIAN Lagunas, CNP  Primary Cardiologist:   DR Jurado     Referring Provider: Dr Chaney      Admission Diagnosis:     Congestive heart failure, unspecified HF chronicity, unspecified heart failure type (CMS/HCC)      History of Present Illness:      Arthur Carranza is a 38 y.o.  male patient who is being at the request of Dr. Hensley for inpatient consultation of CHF. He was admitted on 2/20/2024.  Previous [facility] and [facility] records have been reviewed in detail.    Patient with a history of nonischemic cardiomyopathy, chronic systolic heart failure, CVA, hypertension, diabetes, anxiety, bipolar, morbid obesity came into the emergency department complaining of shortness of breath that has progressively worsened over the past 1 week.  He states usually when he started to feel shortness of breath he takes a dose of torsemide start to urinate then he gets better but this time he was not able to urinate he is usually in [place] but he is out this area due to family reasons he states he came in through the emergency department they admitted him to the 11th floor and kindly asked us to get involved with his case.  So he is positive for shortness of breath, PND, fatigue.    Denies chest pain, fever, chills, orthopnea, claudication  Patient states that he is well aware of his low ejection fraction that he has been spoke to many times and he is refused the AICD    EKG sinus tachycardia no acute changes    Cardiac history  10/21/22  1. The entire Left Main: 0% stenosis.   2. Entire LAD Lesion: The percent stenosis is  0%.   3. Entire CX Lesion: The percent stenosis is 0%.   4. The entire RCA Lesion: The percent stenosis is 0%.      Allergies:     Allergies   Allergen Reactions    Shellfish Containing Products Unknown       Past Medical History:     Past Medical History:   Diagnosis Date    CHF (congestive heart failure) (CMS/[facility])     Diabetes mellitus (CMS/[facility])     Heart disease     Hypertension     Substance abuse (CMS/[facility])        Past Surgical History:     Past Surgical History:   Procedure Laterality Date    MR HEAD ANGIO WO IV CONTRAST  12/29/2022    MR HEAD ANGIO WO IV CONTRAST 12/29/2022 DOCTOR OFFICE LEGACY    OTHER SURGICAL HISTORY  07/21/2022    Cyst excision    OTHER SURGICAL HISTORY  10/25/2022    Cardiac catheterization       Family History:     Family History   Adopted: Yes       Social History:     Social History     Tobacco Use    Smoking status: Former     Types: Cigarettes   Vaping Use    Vaping Use: Unknown   Substance Use Topics    Alcohol use: Yes     Comment: occasionaly    Drug use: Defer       CURRENT INPATIENT MEDICATIONS    atorvastatin, 40 mg, oral, Nightly  buPROPion XL, 150 mg, oral, Daily  carvedilol, 6.25 mg, oral, BID  furosemide, 40 mg, intravenous, q12h  insulin glargine, 25 Units, subcutaneous, Nightly  insulin lispro, 0-15 Units, subcutaneous, TID with meals  insulin lispro, 8 Units, subcutaneous, TID with meals  metOLazone, 5 mg, oral, Daily  pantoprazole, 40 mg, oral, Daily   Or  pantoprazole, 40 mg, intravenous, Daily  rivaroxaban, 20 mg, oral, Nightly  sacubitriL-valsartan, 1 tablet, oral, BID  sertraline, 100 mg, oral, Daily  spironolactone, 25 mg, oral, Daily  traZODone, 50 mg, oral, Nightly         Current Outpatient Medications   Medication Instructions    atorvastatin (Lipitor) 40 mg tablet 1 tablet, oral, Nightly    buPROPion XL (WELLBUTRIN XL) 150 mg, oral, Daily    dapagliflozin (Farxiga) 10 mg 1 tablet, oral, Daily    dulaglutide 3 mg/0.5 mL pen injector 1 Application, subcutaneous,  Weekly    FreeStyle Nadir sensor system (FreeStyle Nadir 2 Sensor) kit Use as instructed    insulin lispro (HUMALOG) 8 Units, subcutaneous, 3 times daily before meals    insulin NPH (Isophane) (HUMULIN N,NOVOLIN N) 8 Units, subcutaneous, 3 times daily before meals    metFORMIN (Glucophage) 1,000 mg tablet 1 tablet, oral, 2 times daily    metoprolol succinate XL (Toprol-XL) 25 mg 24 hr tablet 1 tablet, oral, Daily    rivaroxaban (Xarelto) 20 mg tablet 1 tablet, oral, Daily, Take with food.    sacubitriL-valsartan (Entresto) 24-26 mg tablet 1 tablet, oral, 2 times daily    sertraline (ZOLOFT) 100 mg, oral, Daily    spironolactone (Aldactone) 25 mg tablet 1 tablet, oral, Daily    torsemide (Demadex) 20 mg tablet 1 tablet, oral, Daily    traZODone (DESYREL) 50 mg, oral, Nightly    True Metrix Glucose Test Strip strip USE TO CHECK BLOOD SUGAR FOUR TIMES DAILY    Trulicity 4.5 mg, subcutaneous, Weekly    Ultra Thin Lancets 30 gauge misc TEST BLOOD SUGAR FOUR TIMES DAILY AS DIRECTED        Review of Systems:      12 point review of systems was obtained in detail and is negative other than that detailed above.      Vital Signs:     Vitals:    02/20/24 0606 02/20/24 0703 02/20/24 0835 02/20/24 1154   BP: 102/66 100/75 124/82 (!) 128/94   BP Location: Right arm Right arm     Patient Position: Lying Lying  Sitting   Pulse: 100 (!) 101 99 98   Resp: 18 18 18 18   Temp:   36.1 °C (97 °F) 36.4 °C (97.5 °F)   TempSrc:       SpO2: 97% 96% 93% 96%   Weight:       Height:           Intake/Output Summary (Last 24 hours) at 2/20/2024 1501  Last data filed at 2/20/2024 0900  Gross per 24 hour   Intake --   Output 400 ml   Net -400 ml       Wt Readings from Last 4 Encounters:   02/20/24 136 kg (300 lb)   01/31/24 134 kg (296 lb 4.8 oz)   12/03/23 138 kg (303 lb 12.7 oz)   07/20/23 138 kg (303 lb 5 oz)       Physical Examination:     GENERAL APPEARANCE: Well developed, well nourished, in no acute distress.  CHEST: Symmetric and  non-tender.  INTEGUMENT: Skin warm and dry, without gross excoriationis or lesions.  HEENT: No gross abnormalities of conjunctiva, teeth, gums, oral mucosa  NECK: Supple, no JVD, no bruit. Thyroid not palpable. Carotid upstrokes normal.  NEURO/PSHCY: Alert and oriented x3; appropriate behavior and responses and responses, grossly normal cerebellar function with normal balance and coordination  LUNGS: scattered rales  HEART: Rate and rhythm regular with no evident murmur; no gallop appreciated. There are no rubs, clicks or heaves. PMI nondisplaced.  ABDOMEN: Soft, nontender, no palpable hepatosplenomegaly, no mases, no bruits. Abdominal aorta not noted to be enlarged.  MUSCULOSKELETAL: Ambulatory with normal tandem gait.  EXTREMITIES: Warm with good color, no clubbing or cyanois. Trace edema  PERIPHERAL VASCULAR: Pulses present and equally palpable; 2+ throughout. No femoral bruits.      Lab:     CBC:   Results from last 7 days   Lab Units 02/20/24  0528 02/20/24  0126   WBC AUTO x10*3/uL 9.8 8.4   RBC AUTO x10*6/uL 4.64 4.91   HEMOGLOBIN g/dL 14.8 15.7   HEMATOCRIT % 42.7 44.3   MCV fL 92 90   MCH pg 31.9 32.0   MCHC g/dL 34.7 35.4   RDW % 12.5 12.4   PLATELETS AUTO x10*3/uL 218 228     CMP:    Results from last 7 days   Lab Units 02/20/24  0528 02/20/24  0126   SODIUM mmol/L 130* 129*   POTASSIUM mmol/L 4.4 4.5   CHLORIDE mmol/L 90* 93*   CO2 mmol/L 28 21   BUN mg/dL 26* 25*   CREATININE mg/dL 1.54* 1.47*   GLUCOSE mg/dL 412* 409*   PROTEIN TOTAL g/dL 7.9  --    CALCIUM mg/dL 9.3 9.6   BILIRUBIN TOTAL mg/dL 0.9  --    ALK PHOS U/L 71  --    AST U/L 17  --    ALT U/L 15  --      BMP:    Results from last 7 days   Lab Units 02/20/24  0528 02/20/24  0126   SODIUM mmol/L 130* 129*   POTASSIUM mmol/L 4.4 4.5   CHLORIDE mmol/L 90* 93*   CO2 mmol/L 28 21   BUN mg/dL 26* 25*   CREATININE mg/dL 1.54* 1.47*   CALCIUM mg/dL 9.3 9.6   GLUCOSE mg/dL 412* 409*     Magnesium:  Results from last 7 days   Lab Units 02/20/24  0127    MAGNESIUM mg/dL 1.59*     Troponin:    Results from last 7 days   Lab Units 02/20/24  0126   TROPHS ng/L 65*     BNP:   Results from last 7 days   Lab Units 02/20/24  0126   BNP pg/mL 259*       Diagnostic Studies:     ECG 12 lead  Result Date: 2/20/2024    Sinus tachycardia Possible Left atrial enlargement Left axis deviation Left ventricular hypertrophy with QRS widening and repolarization abnormality Abnormal ECG When compared with ECG of 01-FEB-2024 06:43, T wave amplitude has increased in Inferior leads T wave inversion less evident in Lateral leads      Radiology:     XR chest 1 view   Final Result   Cardiomegaly without evidence of acute disease in the chest.             MACRO:   None.        Signed by: Christian Villagomez 2/20/2024 1:52 AM   Dictation workstation:   [id number]      Transthoracic Echo (TTE) Complete    (Results Pending)       Problem List:     Patient Active Problem List   Diagnosis    ACC/AHA stage C systolic congestive heart failure (CMS/HCC)    CHF (congestive heart failure) (CMS/HCC)    Current every day smoker    Type 2 diabetes mellitus with neurologic complication, with long-term current use of insulin (CMS/HCC)    Edema    Neck abscess    Nonischemic cardiomyopathy (CMS/HCC)    Perirectal abscess    Primary hypertension    Ventricular bigeminy seen on cardiac monitor    Bipolar 1 disorder (CMS/HCC)    Morbid obesity with BMI of 45.0-49.9, adult (CMS/HCC)    Acute ischemic left MCA stroke (CMS/HCC)    Cocaine use    Major depression, recurrent (CMS/HCC)    PTSD (post-traumatic stress disorder)    Acute on chronic combined systolic and diastolic CHF (congestive heart failure) (CMS/HCC)    Type 2 diabetes mellitus with hyperglycemia (CMS/HCC)    Severe episode of recurrent major depressive disorder, without psychotic features (CMS/HCC)    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    Abscess of abdominal cavity (CMS/HCC)    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 Darryl Orozco CNP  [facility]    Of note, this documentation is completed using the Dragon Dictation system (voice recognition software). There may be spelling and/or grammatical errors that were not corrected prior to final submission.    Electronically signed by ARIAN Galaviz-CNP, on 2/20/2024 at 3:01 PM     I have personally interviewed and examined the patient.   I have personally and independently reviewed labs and diagnostic testing.  I have personally verified the elements of the history and physical listed above and changes, if any, are noted.   I have personally reviewed the assessment and plan as documented by ANDRZEJ Caban, CNP and pradeep.    In summary, Mr. Arthur Carranza has a history of nonischemic cardiomyopathy.  He was admitted in October 2022 with worsening shortness of breath, weight gain, and abdominal distention. Chest x-ray showed mild pulmonary vascular congestion. An echocardiogram on September 21, 2022 showed severe LV dysfunction with an estimated LV ejection fraction 15 to 20%. There was trivial to 1+ mitral and tricuspid regurgitation. Estimated RVSP 21 mmHg. Cardiac catheterization completed on the same day showed normal coronary arteries. He was noted to have ventricular bigeminy on the monitor. He was placed on Toprol-XL 25 mg daily and valsartan 80 mg daily,  however he initially did not get these prescriptions filled.. He was seen by Dr. Duarte and placed on a LifeVest.  He was seen in the office October 25, 2022 and restarted on Toprol and valsartan.  He stopped wearing the LifeVest several weeks later.      He was lost to follow-up and presented with an acute embolic stroke on December 28, 2022.  He was treated with thrombolytic therapy and transferred from [facility] to [facility].  Echocardiogram December 29, 2022 showed an estimated LV ejection fraction 5 to 10% and severe eccentric LVH.  He was discharged on Toprol-XL, Entresto, Farxiga, spironolactone, torsemide, and Xarelto.  He was seen in follow-up by Dr. Jurado March 14, 2023 and scheduled for a cardiac MRI.  This was completed on April 20, 2023 and showed a calculated LV ejection fraction 13%.  No evidence of amyloidosis or hemochromatosis.  No LV thrombus.  RV ejection fraction was normal.  He was not compliant with follow-up and ran out of his medications.  He was seen in consultation by Dr. Yuan December 3, 2023 for HFrEF and restarted on GDMT.  Titration of medications has been difficult due to some issues with hypotension and bradycardia.  He has been traveling back and forth from [place] to [place] and sometimes does not have his medications with him.  He was hospitalized a few times earlier this year for uncontrolled diabetes mellitus.     He presented to the emergency department today with complaints of dry heaves and mild worsening of shortness of breath.  He noted some orthopnea and abdominal swelling.  Recently quit smoking.  He noted he did use cocaine last week.  He was noted to have stable vital signs with exception of sinus tachycardia at 114 bpm.  In light of orthopnea and elevated BNP he was admitted to telemetry.  He was given intravenous furosemide and metolazone.  The patient reports that he has been compliant with his medications of late.  He currently is lying in bed almost flat  and resting more comfortably.  He denies any chest discomfort.  Continue IV furosemide along with carvedilol, Entresto, and spironolactone.  He has insulin-dependent type 2 diabetes mellitus.  He was treated with Farxiga but only for short period of time.  Patient be monitored overnight.  Consult EP service to assess for ICD implant for primary prevention of sudden cardiac death.  Further recommendations to follow.

## 2024-02-20 NOTE — ED PROVIDER NOTES
HPI   Chief Complaint   Patient presents with    Shortness of Breath     SOB, vomiting, headache. Pt was seen a few weeks ago for high blood sugar and has CHF. Pt has been out of short acting insulin for two weeks.        Chief complaint shortness of breath elevated blood sugars  History of present illness 38-year-old -American male who has a history of congestive heart failure EF of 10% diabetes hypertension history of substance abuse.  He just came here from [place] to [place] and since last week has had dry heaves shortness of breath.  He also used cocaine this last week.  He is having abdominal swelling leg swelling is here with a blood pressure 116/90 temp 36 8 respirate 20 pulse 114 O2 saturation 98%                          Santiago Coma Scale Score: 15                     Patient History   Past Medical History:   Diagnosis Date    CHF (congestive heart failure) (CMS/[facility])     Diabetes mellitus (CMS/[facility])     Heart disease     Hypertension     Substance abuse (CMS/[facility])      Past Surgical History:   Procedure Laterality Date    MR HEAD ANGIO WO IV CONTRAST  12/29/2022    MR HEAD ANGIO WO IV CONTRAST 12/29/2022 DOCTOR OFFICE LEGACY    OTHER SURGICAL HISTORY  07/21/2022    Cyst excision    OTHER SURGICAL HISTORY  10/25/2022    Cardiac catheterization     Family History   Adopted: Yes     Social History     Tobacco Use    Smoking status: Former     Types: Cigarettes    Smokeless tobacco: Not on file   Vaping Use    Vaping Use: Unknown   Substance Use Topics    Alcohol use: Yes     Comment: occasionaly    Drug use: Defer       Physical Exam   ED Triage Vitals   Temp Pulse Resp BP   -- -- -- --      SpO2 Temp src Heart Rate Source Patient Position   -- -- -- --      BP Location FiO2 (%)     -- --       Physical Exam  Vitals and nursing note reviewed.   Constitutional:       General: He is in acute distress.      Appearance: He is obese. He is ill-appearing.   HENT:      Head: Normocephalic and atraumatic.       Mouth/Throat:      Mouth: Mucous membranes are moist.      Pharynx: Oropharynx is clear.   Eyes:      Extraocular Movements: Extraocular movements intact.      Pupils: Pupils are equal, round, and reactive to light.   Cardiovascular:      Rate and Rhythm: Normal rate and regular rhythm.   Pulmonary:      Effort: No respiratory distress.      Breath sounds: Examination of the right-lower field reveals decreased breath sounds and wheezing. Examination of the left-lower field reveals decreased breath sounds and wheezing. Decreased breath sounds and wheezing present.   Abdominal:      Palpations: Abdomen is soft.      Comments: Distended abdominal appearance   Musculoskeletal:         General: Normal range of motion.      Cervical back: Normal range of motion and neck supple.   Skin:     General: Skin is dry.   Neurological:      Mental Status: He is alert.         ED Course & MDM   Diagnoses as of 02/20/24 0430   Congestive heart failure, unspecified HF chronicity, unspecified heart failure type (CMS/HCC)   Type 2 diabetes mellitus with other specified complication, without long-term current use of insulin (CMS/[facility])       Medical Decision Making  Differential diagnosis  #1 CHF  #2 cocaine abuse with elevated heart rate  #3 esophageal reflux  #4 uncontrolled diabetes  #5 arrhythmia   #6 acute coronary syndrome  Considering the above differential diagnoses the following tests and treatments were considered in order with shared decision making chest x-ray cardiac monitor IV EKG drug screen CBC electrolytes BMP IV insulin IV Lasix      Amount and/or Complexity of Data Reviewed  Labs: ordered. Decision-making details documented in ED Course.     Details: BMP was 300 blood sugar was 400 troponin, magnesium 1 point 5 repeat 327 white count 8, sodium 129 potassium 4 5 chloride 93 bicarb 21 BUN 25 creatinine 1.4  Radiology: ordered and independent interpretation performed.     Details: Chest x-ray revealed slight pulmonary  congestion  ECG/medicine tests: ordered and independent interpretation performed.     Details: EKG revealed normal sinus rhythm with a rate of 113  Discussion of management or test interpretation with external provider(s): Case discussed with Dr. Malhotra    Risk  Decision regarding hospitalization.      Labs Reviewed   BASIC METABOLIC PANEL - Abnormal       Result Value    Glucose 409 (*)     Sodium 129 (*)     Potassium 4.5      Chloride 93 (*)     Bicarbonate 21      Anion Gap 20      Urea Nitrogen 25 (*)     Creatinine 1.47 (*)     eGFR 63      Calcium 9.6     MAGNESIUM - Abnormal    Magnesium 1.59 (*)    B-TYPE NATRIURETIC PEPTIDE - Abnormal     (*)     Narrative:        <100 pg/mL - Heart failure unlikely  100-299 pg/mL - Intermediate probability of acute heart                  failure exacerbation. Correlate with clinical                  context and patient history.    >=300 pg/mL - Heart Failure likely. Correlate with clinical                  context and patient history.    BNP testing is performed using different testing methodology at [facility] than at other [facility]. Direct result comparisons should only be made within the same method.      POCT GLUCOSE - Abnormal    POCT Glucose 409 (*)    POCT GLUCOSE - Abnormal    POCT Glucose 327 (*)    PHOSPHORUS - Normal    Phosphorus 4.0     CBC WITH AUTO DIFFERENTIAL    WBC 8.4      nRBC 0.0      RBC 4.91      Hemoglobin 15.7      Hematocrit 44.3      MCV 90      MCH 32.0      MCHC 35.4      RDW 12.4      Platelets 228      Neutrophils % 73.6      Immature Granulocytes %, Automated 0.2      Lymphocytes % 15.9      Monocytes % 9.6      Eosinophils % 0.5      Basophils % 0.2      Neutrophils Absolute 6.14      Immature Granulocytes Absolute, Automated 0.02      Lymphocytes Absolute 1.33      Monocytes Absolute 0.80      Eosinophils Absolute 0.04      Basophils Absolute 0.02          XR chest 1 view   Final Result   Cardiomegaly without  evidence of acute disease in the chest.             MACRO:   None.        Signed by: Christian Villagomez 2/20/2024 1:52 AM   Dictation workstation:   [id number]             Procedure  ECG 12 lead    Performed by: Lolita Johnson MD  Authorized by: Lolita Johnson MD    ECG interpreted by ED Physician in the absence of a cardiologist: no    Interpretation:     Interpretation: normal    Rate:     ECG rate:  113    ECG rate assessment: tachycardic    Rhythm:     Rhythm: sinus tachycardia    Ectopy:     Ectopy: none    QRS:     QRS axis:  Normal  ST segments:     ST segments:  Normal  T waves:     T waves: normal         Lolita Johnson MD  02/20/24 0430

## 2024-02-21 ENCOUNTER — APPOINTMENT (OUTPATIENT)
Dept: CARDIOLOGY | Facility: HOSPITAL | Age: 39
End: 2024-02-21
Payer: COMMERCIAL

## 2024-02-21 ENCOUNTER — APPOINTMENT (OUTPATIENT)
Dept: RADIOLOGY | Facility: HOSPITAL | Age: 39
End: 2024-02-21
Payer: COMMERCIAL

## 2024-02-21 LAB
ALBUMIN SERPL BCP-MCNC: 4.2 G/DL (ref 3.4–5)
ALP SERPL-CCNC: 74 U/L (ref 33–120)
ALT SERPL W P-5'-P-CCNC: 15 U/L (ref 10–52)
ANION GAP SERPL CALC-SCNC: 12 MMOL/L (ref 10–20)
ANION GAP SERPL CALC-SCNC: 13 MMOL/L (ref 10–20)
ANION GAP SERPL CALC-SCNC: 17 MMOL/L (ref 10–20)
AST SERPL W P-5'-P-CCNC: 18 U/L (ref 9–39)
BILIRUB SERPL-MCNC: 1.1 MG/DL (ref 0–1.2)
BUN SERPL-MCNC: 27 MG/DL (ref 6–23)
BUN SERPL-MCNC: 29 MG/DL (ref 6–23)
BUN SERPL-MCNC: 32 MG/DL (ref 6–23)
CALCIUM SERPL-MCNC: 8.9 MG/DL (ref 8.6–10.3)
CALCIUM SERPL-MCNC: 9.4 MG/DL (ref 8.6–10.3)
CALCIUM SERPL-MCNC: 9.6 MG/DL (ref 8.6–10.3)
CARDIAC TROPONIN I PNL SERPL HS: 67 NG/L (ref 0–20)
CHLORIDE SERPL-SCNC: 87 MMOL/L (ref 98–107)
CHLORIDE SERPL-SCNC: 91 MMOL/L (ref 98–107)
CHLORIDE SERPL-SCNC: 92 MMOL/L (ref 98–107)
CO2 SERPL-SCNC: 24 MMOL/L (ref 21–32)
CO2 SERPL-SCNC: 28 MMOL/L (ref 21–32)
CO2 SERPL-SCNC: 32 MMOL/L (ref 21–32)
CREAT SERPL-MCNC: 1.56 MG/DL (ref 0.5–1.3)
CREAT SERPL-MCNC: 1.75 MG/DL (ref 0.5–1.3)
CREAT SERPL-MCNC: 1.78 MG/DL (ref 0.5–1.3)
EGFRCR SERPLBLD CKD-EPI 2021: 50 ML/MIN/1.73M*2
EGFRCR SERPLBLD CKD-EPI 2021: 51 ML/MIN/1.73M*2
EGFRCR SERPLBLD CKD-EPI 2021: 58 ML/MIN/1.73M*2
ERYTHROCYTE [DISTWIDTH] IN BLOOD BY AUTOMATED COUNT: 12.4 % (ref 11.5–14.5)
ERYTHROCYTE [DISTWIDTH] IN BLOOD BY AUTOMATED COUNT: 12.4 % (ref 11.5–14.5)
GLUCOSE BLD MANUAL STRIP-MCNC: 129 MG/DL (ref 74–99)
GLUCOSE BLD MANUAL STRIP-MCNC: 131 MG/DL (ref 74–99)
GLUCOSE BLD MANUAL STRIP-MCNC: 276 MG/DL (ref 74–99)
GLUCOSE BLD MANUAL STRIP-MCNC: 323 MG/DL (ref 74–99)
GLUCOSE BLD MANUAL STRIP-MCNC: 433 MG/DL (ref 74–99)
GLUCOSE BLD MANUAL STRIP-MCNC: 439 MG/DL (ref 74–99)
GLUCOSE SERPL-MCNC: 312 MG/DL (ref 74–99)
GLUCOSE SERPL-MCNC: 471 MG/DL (ref 74–99)
GLUCOSE SERPL-MCNC: 92 MG/DL (ref 74–99)
HCT VFR BLD AUTO: 45.6 % (ref 41–52)
HCT VFR BLD AUTO: 46.1 % (ref 41–52)
HGB BLD-MCNC: 15.6 G/DL (ref 13.5–17.5)
HGB BLD-MCNC: 16.1 G/DL (ref 13.5–17.5)
HOLD SPECIMEN: NORMAL
MAGNESIUM SERPL-MCNC: 2.51 MG/DL (ref 1.6–2.4)
MCH RBC QN AUTO: 31.7 PG (ref 26–34)
MCH RBC QN AUTO: 31.8 PG (ref 26–34)
MCHC RBC AUTO-ENTMCNC: 34.2 G/DL (ref 32–36)
MCHC RBC AUTO-ENTMCNC: 34.9 G/DL (ref 32–36)
MCV RBC AUTO: 91 FL (ref 80–100)
MCV RBC AUTO: 93 FL (ref 80–100)
NRBC BLD-RTO: 0 /100 WBCS (ref 0–0)
NRBC BLD-RTO: 0 /100 WBCS (ref 0–0)
PLATELET # BLD AUTO: 246 X10*3/UL (ref 150–450)
PLATELET # BLD AUTO: 269 X10*3/UL (ref 150–450)
POTASSIUM SERPL-SCNC: 3.6 MMOL/L (ref 3.5–5.3)
POTASSIUM SERPL-SCNC: 4.6 MMOL/L (ref 3.5–5.3)
POTASSIUM SERPL-SCNC: 6.8 MMOL/L (ref 3.5–5.3)
PROT SERPL-MCNC: 7.8 G/DL (ref 6.4–8.2)
RBC # BLD AUTO: 4.92 X10*6/UL (ref 4.5–5.9)
RBC # BLD AUTO: 5.07 X10*6/UL (ref 4.5–5.9)
SODIUM SERPL-SCNC: 121 MMOL/L (ref 136–145)
SODIUM SERPL-SCNC: 126 MMOL/L (ref 136–145)
SODIUM SERPL-SCNC: 133 MMOL/L (ref 136–145)
WBC # BLD AUTO: 5.2 X10*3/UL (ref 4.4–11.3)
WBC # BLD AUTO: 6.7 X10*3/UL (ref 4.4–11.3)

## 2024-02-21 PROCEDURE — 99233 SBSQ HOSP IP/OBS HIGH 50: CPT | Performed by: INTERNAL MEDICINE

## 2024-02-21 PROCEDURE — 2500000002 HC RX 250 W HCPCS SELF ADMINISTERED DRUGS (ALT 637 FOR MEDICARE OP, ALT 636 FOR OP/ED): Performed by: STUDENT IN AN ORGANIZED HEALTH CARE EDUCATION/TRAINING PROGRAM

## 2024-02-21 PROCEDURE — 85027 COMPLETE CBC AUTOMATED: CPT | Performed by: STUDENT IN AN ORGANIZED HEALTH CARE EDUCATION/TRAINING PROGRAM

## 2024-02-21 PROCEDURE — 84484 ASSAY OF TROPONIN QUANT: CPT | Performed by: STUDENT IN AN ORGANIZED HEALTH CARE EDUCATION/TRAINING PROGRAM

## 2024-02-21 PROCEDURE — 1200000002 HC GENERAL ROOM WITH TELEMETRY DAILY

## 2024-02-21 PROCEDURE — 85027 COMPLETE CBC AUTOMATED: CPT | Performed by: INTERNAL MEDICINE

## 2024-02-21 PROCEDURE — 93005 ELECTROCARDIOGRAM TRACING: CPT

## 2024-02-21 PROCEDURE — 2500000004 HC RX 250 GENERAL PHARMACY W/ HCPCS (ALT 636 FOR OP/ED): Mod: JZ | Performed by: STUDENT IN AN ORGANIZED HEALTH CARE EDUCATION/TRAINING PROGRAM

## 2024-02-21 PROCEDURE — 93010 ELECTROCARDIOGRAM REPORT: CPT | Performed by: INTERNAL MEDICINE

## 2024-02-21 PROCEDURE — 83735 ASSAY OF MAGNESIUM: CPT | Performed by: INTERNAL MEDICINE

## 2024-02-21 PROCEDURE — 36415 COLL VENOUS BLD VENIPUNCTURE: CPT | Performed by: INTERNAL MEDICINE

## 2024-02-21 PROCEDURE — 93306 TTE W/DOPPLER COMPLETE: CPT | Performed by: INTERNAL MEDICINE

## 2024-02-21 PROCEDURE — 99232 SBSQ HOSP IP/OBS MODERATE 35: CPT | Performed by: STUDENT IN AN ORGANIZED HEALTH CARE EDUCATION/TRAINING PROGRAM

## 2024-02-21 PROCEDURE — 2500000001 HC RX 250 WO HCPCS SELF ADMINISTERED DRUGS (ALT 637 FOR MEDICARE OP): Performed by: INTERNAL MEDICINE

## 2024-02-21 PROCEDURE — 36415 COLL VENOUS BLD VENIPUNCTURE: CPT | Performed by: STUDENT IN AN ORGANIZED HEALTH CARE EDUCATION/TRAINING PROGRAM

## 2024-02-21 PROCEDURE — 2500000002 HC RX 250 W HCPCS SELF ADMINISTERED DRUGS (ALT 637 FOR MEDICARE OP, ALT 636 FOR OP/ED): Performed by: INTERNAL MEDICINE

## 2024-02-21 PROCEDURE — 93306 TTE W/DOPPLER COMPLETE: CPT

## 2024-02-21 PROCEDURE — 82947 ASSAY GLUCOSE BLOOD QUANT: CPT

## 2024-02-21 PROCEDURE — 71045 X-RAY EXAM CHEST 1 VIEW: CPT

## 2024-02-21 PROCEDURE — 84520 ASSAY OF UREA NITROGEN: CPT | Performed by: STUDENT IN AN ORGANIZED HEALTH CARE EDUCATION/TRAINING PROGRAM

## 2024-02-21 PROCEDURE — 71045 X-RAY EXAM CHEST 1 VIEW: CPT | Performed by: RADIOLOGY

## 2024-02-21 PROCEDURE — 2500000004 HC RX 250 GENERAL PHARMACY W/ HCPCS (ALT 636 FOR OP/ED): Performed by: INTERNAL MEDICINE

## 2024-02-21 PROCEDURE — 2500000004 HC RX 250 GENERAL PHARMACY W/ HCPCS (ALT 636 FOR OP/ED): Performed by: NURSE PRACTITIONER

## 2024-02-21 PROCEDURE — 84075 ASSAY ALKALINE PHOSPHATASE: CPT | Performed by: INTERNAL MEDICINE

## 2024-02-21 RX ORDER — INSULIN LISPRO 100 [IU]/ML
0-10 INJECTION, SOLUTION INTRAVENOUS; SUBCUTANEOUS
Status: DISCONTINUED | OUTPATIENT
Start: 2024-02-21 | End: 2024-02-22

## 2024-02-21 RX ORDER — INSULIN GLARGINE 100 [IU]/ML
20 INJECTION, SOLUTION SUBCUTANEOUS NIGHTLY
Status: DISCONTINUED | OUTPATIENT
Start: 2024-02-21 | End: 2024-02-22

## 2024-02-21 RX ORDER — FAMOTIDINE 10 MG/ML
20 INJECTION INTRAVENOUS ONCE
Status: COMPLETED | OUTPATIENT
Start: 2024-02-21 | End: 2024-02-21

## 2024-02-21 RX ORDER — DEXTROSE MONOHYDRATE 100 MG/ML
0.3 INJECTION, SOLUTION INTRAVENOUS ONCE AS NEEDED
Status: DISCONTINUED | OUTPATIENT
Start: 2024-02-21 | End: 2024-02-21 | Stop reason: SDUPTHER

## 2024-02-21 RX ORDER — MORPHINE SULFATE 2 MG/ML
2 INJECTION, SOLUTION INTRAMUSCULAR; INTRAVENOUS ONCE
Status: DISCONTINUED | OUTPATIENT
Start: 2024-02-21 | End: 2024-02-22

## 2024-02-21 RX ORDER — INSULIN LISPRO 100 [IU]/ML
10 INJECTION, SOLUTION INTRAVENOUS; SUBCUTANEOUS ONCE
Status: COMPLETED | OUTPATIENT
Start: 2024-02-21 | End: 2024-02-21

## 2024-02-21 RX ORDER — DEXTROSE 50 % IN WATER (D50W) INTRAVENOUS SYRINGE
25
Status: DISCONTINUED | OUTPATIENT
Start: 2024-02-21 | End: 2024-02-21 | Stop reason: SDUPTHER

## 2024-02-21 RX ORDER — MORPHINE SULFATE 2 MG/ML
INJECTION, SOLUTION INTRAMUSCULAR; INTRAVENOUS
Status: DISPENSED
Start: 2024-02-21 | End: 2024-02-21

## 2024-02-21 RX ORDER — CALCIUM GLUCONATE 20 MG/ML
2 INJECTION, SOLUTION INTRAVENOUS ONCE
Status: COMPLETED | OUTPATIENT
Start: 2024-02-21 | End: 2024-02-21

## 2024-02-21 RX ORDER — INSULIN LISPRO 100 [IU]/ML
10 INJECTION, SOLUTION INTRAVENOUS; SUBCUTANEOUS ONCE
Status: DISCONTINUED | OUTPATIENT
Start: 2024-02-21 | End: 2024-02-21

## 2024-02-21 RX ADMIN — SODIUM ZIRCONIUM CYCLOSILICATE 10 G: 10 POWDER, FOR SUSPENSION ORAL at 20:35

## 2024-02-21 RX ADMIN — FAMOTIDINE 20 MG: 10 INJECTION, SOLUTION INTRAVENOUS at 02:03

## 2024-02-21 RX ADMIN — CALCIUM GLUCONATE 2 G: 20 INJECTION, SOLUTION INTRAVENOUS at 11:45

## 2024-02-21 RX ADMIN — ATORVASTATIN CALCIUM 40 MG: 20 TABLET, FILM COATED ORAL at 20:35

## 2024-02-21 RX ADMIN — INSULIN LISPRO 6 UNITS: 100 INJECTION, SOLUTION INTRAVENOUS; SUBCUTANEOUS at 17:22

## 2024-02-21 RX ADMIN — ONDANSETRON 4 MG: 2 INJECTION INTRAMUSCULAR; INTRAVENOUS at 10:46

## 2024-02-21 RX ADMIN — RIVAROXABAN 20 MG: 20 TABLET, FILM COATED ORAL at 20:35

## 2024-02-21 RX ADMIN — ONDANSETRON 4 MG: 2 INJECTION INTRAMUSCULAR; INTRAVENOUS at 01:23

## 2024-02-21 RX ADMIN — SODIUM CHLORIDE 500 ML: 9 INJECTION, SOLUTION INTRAVENOUS at 02:04

## 2024-02-21 RX ADMIN — INSULIN GLARGINE 20 UNITS: 100 INJECTION, SOLUTION SUBCUTANEOUS at 20:34

## 2024-02-21 RX ADMIN — TRAZODONE HYDROCHLORIDE 50 MG: 50 TABLET ORAL at 21:00

## 2024-02-21 RX ADMIN — PANTOPRAZOLE SODIUM 40 MG: 40 TABLET, DELAYED RELEASE ORAL at 06:14

## 2024-02-21 RX ADMIN — INSULIN HUMAN 5 UNITS: 100 INJECTION, SOLUTION PARENTERAL at 11:48

## 2024-02-21 RX ADMIN — SODIUM ZIRCONIUM CYCLOSILICATE 10 G: 10 POWDER, FOR SUSPENSION ORAL at 12:01

## 2024-02-21 RX ADMIN — INSULIN LISPRO 10 UNITS: 100 INJECTION, SOLUTION INTRAVENOUS; SUBCUTANEOUS at 09:21

## 2024-02-21 RX ADMIN — PERFLUTREN 1.5 ML OF DILUTION: 6.52 INJECTION, SUSPENSION INTRAVENOUS at 14:56

## 2024-02-21 ASSESSMENT — COGNITIVE AND FUNCTIONAL STATUS - GENERAL
STANDING UP FROM CHAIR USING ARMS: A LITTLE
DRESSING REGULAR LOWER BODY CLOTHING: A LITTLE
CLIMB 3 TO 5 STEPS WITH RAILING: A LITTLE
HELP NEEDED FOR BATHING: A LITTLE
MOBILITY SCORE: 22
DRESSING REGULAR UPPER BODY CLOTHING: A LITTLE
MOBILITY SCORE: 24
DAILY ACTIVITIY SCORE: 24
DAILY ACTIVITIY SCORE: 21

## 2024-02-21 ASSESSMENT — PAIN SCALES - GENERAL
PAINLEVEL_OUTOF10: 2
PAINLEVEL_OUTOF10: 0 - NO PAIN
PAINLEVEL_OUTOF10: 0 - NO PAIN

## 2024-02-21 ASSESSMENT — PAIN - FUNCTIONAL ASSESSMENT
PAIN_FUNCTIONAL_ASSESSMENT: 0-10

## 2024-02-21 ASSESSMENT — ENCOUNTER SYMPTOMS
SHORTNESS OF BREATH: 1
NAUSEA: 1
WEAKNESS: 1
FATIGUE: 1

## 2024-02-21 NOTE — PROGRESS NOTES
"Daily progress note    Arthur Carranza is 38 y.o. male with past medical history of nonischemic cardiomyopathy, EF of 15%, chronic systolic heart failure, type 2 diabetes, uncontrolled, hypertension, substance use disorder, history of cocaine abuse presented to the ED with dyspnea.  Patient had mildly elevated proBNP was tachycardic was admitted for possible CHF exacerbation  Cardiology and electrophysiology consulted    Overnight patient had a vasovagal episode where he was hypotensive with systolic blood pressure in the 50s as well as blood glucose within the 90s for the patient was having symptoms of hypoglycemia  Hypoglycemia resolved and today hyperglycemic  Today rapid response was called as the patient was complaining of some chest pain chest tightness and blood pressure was borderline low    Subjective  Patient received morphine for chest pain which improved his symptoms  Reported feeling days even no nausea or vomiting  No syncope        Vital signs in last 24 hours:  Temp:  [35.8 °C (96.4 °F)-36.3 °C (97.3 °F)] 35.8 °C (96.4 °F)  Heart Rate:  [77-97] 97  Resp:  [18] 18  BP: ()/(32-76) 95/58  BP 95/58 (BP Location: Left arm)   Pulse 97   Temp 35.8 °C (96.4 °F) (Temporal)   Resp 18   Ht 1.753 m (5' 9\")   Wt 136 kg (300 lb)   SpO2 98%   BMI 44.30 kg/m²    Intake/Output last 3 shifts:  I/O last 3 completed shifts:  In: - (0 mL/kg)   Out: 400 (2.9 mL/kg) [Urine:400 (0.1 mL/kg/hr)]  Weight: 136.1 kg   Intake/Output this shift:  I/O this shift:  In: 100 [I.V.:100]  Out: -     Physical Exam  Constitutional:       General: He is not in acute distress.     Appearance: Normal appearance. He is not ill-appearing, toxic-appearing or diaphoretic.   HENT:      Head: Normocephalic and atraumatic.      Mouth/Throat:      Mouth: Mucous membranes are moist.      Pharynx: No oropharyngeal exudate.   Eyes:      Extraocular Movements: Extraocular movements intact.      Pupils: Pupils are equal, round, and reactive to " light.   Cardiovascular:      Rate and Rhythm: Normal rate and regular rhythm.      Heart sounds: No murmur heard.  Pulmonary:      Effort: Pulmonary effort is normal. No respiratory distress.      Breath sounds: Normal breath sounds. No wheezing.   Abdominal:      General: Abdomen is flat. Bowel sounds are normal. There is no distension.      Tenderness: There is no abdominal tenderness. There is no guarding or rebound.   Musculoskeletal:         General: No swelling.      Right lower leg: No edema.      Left lower leg: No edema.   Skin:     Findings: No lesion or rash.   Neurological:      General: No focal deficit present.      Mental Status: He is alert and oriented to person, place, and time.      Cranial Nerves: No cranial nerve deficit.      Motor: No weakness.   Psychiatric:         Mood and Affect: Mood normal.         Behavior: Behavior normal.         Current medication   Current Facility-Administered Medications   Medication Dose Route Frequency Provider Last Rate Last Admin    acetaminophen (Tylenol) tablet 650 mg  650 mg oral q4h PRN Carolyn Malhotra MD   650 mg at 02/20/24 0940    Or    acetaminophen (Tylenol) oral liquid 650 mg  650 mg nasogastric tube q4h PRN Carolyn Malhotra MD        Or    acetaminophen (Tylenol) suppository 650 mg  650 mg rectal q4h PRN Carolyn Malhotra MD        atorvastatin (Lipitor) tablet 40 mg  40 mg oral Nightly Carolyn Malhotra MD   40 mg at 02/20/24 2204    buPROPion XL (Wellbutrin XL) 24 hr tablet 150 mg  150 mg oral Daily Carolyn Malhotra MD   150 mg at 02/20/24 0940    [Held by provider] carvedilol (Coreg) tablet 6.25 mg  6.25 mg oral BID ARIAN Galaviz-CNP   6.25 mg at 02/20/24 2204    dextrose 10 % in water (D10W) infusion  0.3 g/kg/hr intravenous Once PRN Merry Chaney MD        dextrose 50 % injection 25 g  25 g intravenous q15 min PRN Merry Chaney MD        [Held by provider] furosemide (Lasix) injection 40 mg  40 mg intravenous q12h Carolyn Malhotra MD    40 mg at 02/20/24 2203    glucagon (Glucagen) injection 1 mg  1 mg intramuscular q15 min PRN Merry Chaney MD        insulin glargine (Lantus) injection 20 Units  20 Units subcutaneous Nightly Carolyn Malhotra MD        insulin lispro (HumaLOG) injection 0-10 Units  0-10 Units subcutaneous TID with meals Merry Chaney MD        [Held by provider] insulin lispro (HumaLOG) injection 8 Units  8 Units subcutaneous TID with meals Merry Chaney MD   8 Units at 02/20/24 1647    melatonin tablet 6 mg  6 mg oral Nightly PRN Carolyn Malhotra MD        [Held by provider] metOLazone (Zaroxolyn) tablet 5 mg  5 mg oral Daily Carolyn Malhotra MD   5 mg at 02/20/24 0940    morphine injection  - [employer] Override Pull             morphine injection 2 mg  2 mg intravenous Once Merry Chaney MD        ondansetron (Zofran) tablet 4 mg  4 mg oral q8h PRN Carolyn Malhotra MD        Or    ondansetron (Zofran) injection 4 mg  4 mg intravenous q8h PRN Carolyn Malhotra MD   4 mg at 02/21/24 1046    pantoprazole (ProtoNix) EC tablet 40 mg  40 mg oral Daily Carolyn Malhotra MD   40 mg at 02/21/24 0614    Or    pantoprazole (ProtoNix) injection 40 mg  40 mg intravenous Daily Carolyn Malhotra MD   40 mg at 02/20/24 0558    polyethylene glycol (Glycolax, Miralax) packet 17 g  17 g oral Daily PRN Carolyn Malhotra MD        rivaroxaban (Xarelto) tablet 20 mg  20 mg oral Nightly Carolyn Malhotra MD   20 mg at 02/20/24 2203    [Held by provider] sacubitriL-valsartan (Entresto) 24-26 mg per tablet 1 tablet  1 tablet oral BID Carolyn Malhotra MD   1 tablet at 02/20/24 2204    sertraline (Zoloft) tablet 100 mg  100 mg oral Daily Carolyn Malhotra MD   100 mg at 02/20/24 0941    sodium zirconium cyclosilicate (Lokelma) packet 10 g  10 g oral q8h Merry Chaney MD   10 g at 02/21/24 1201    [Held by provider] spironolactone (Aldactone) tablet 25 mg  25 mg oral Daily Carolyn Malhotra MD   25 mg at 02/20/24 0934    traZODone (Desyrel) tablet 50 mg  50 mg  oral Nightly Carolyn Malhotra MD   50 mg at 02/20/24 2203        Labs  Lab Results   Component Value Date    WBC 6.7 02/21/2024    HGB 16.1 02/21/2024    HCT 46.1 02/21/2024    MCV 91 02/21/2024     02/21/2024     Lab Results   Component Value Date    HGBA1C 15.3 (H) 02/01/2024     Lab Results   Component Value Date    GLUCOSE 471 (HH) 02/21/2024    CALCIUM 8.9 02/21/2024     (L) 02/21/2024    K 6.8 (HH) 02/21/2024    CO2 24 02/21/2024    CL 87 (L) 02/21/2024    BUN 32 (H) 02/21/2024    CREATININE 1.75 (H) 02/21/2024           Principal Problem:    Congestive heart failure, unspecified HF chronicity, unspecified heart failure type (CMS/[facility])  Active Problems:    Nonischemic cardiomyopathy (CMS/[facility])    Major depression, recurrent (CMS/[facility])    Type 2 diabetes mellitus with hyperglycemia (CMS/[facility])      Assessment and Plan   #Nonischemic cardiomyopathy EF 15%  #History of polysubstance abuse  #History of cocaine use last use about a week ago  #Former smoker  #Acute on chronic systolic heart failure  #MICHAELA  #Hyperkalemia  #Borderline low blood pressure    Continue telemetry monitoring  Hold off on Entresto, Aldactone, Lasix and sepsis time since blood pressure is borderline low also hyperkalemic  Will reinitiate medications as tolerated once blood pressure is better  Cardiology is on board  EP consulted      #Type 2 diabetes  Uncontrolled, hemoglobin A1c 15  Continue Lantus and lispro sliding scale  Blood glucose to 120 , patient was having hypoglycemic symptoms      #History of embolic stroke on Xarelto  #History of hypertension    Continue Xarelto  Hold off on antihypertensives      DVT prophylaxis on Xarelto     LOS: 1 day

## 2024-02-21 NOTE — SIGNIFICANT EVENT
Rapid response was called as the patient was noted to be dizzy, diaphoretic, reported chest t tightness, received morphine with improvement  Blood pressure borderline low, hyperglycemic, received insulin, medications held and diuretics were held  Troponin mildly elevated    Patient has nonischemic cardiomyopathy  Patient noted to have hyperkalemia with slight worsening of creatinine, worsening creatinine  Entresto and Aldactone on hold  Received calcium gluconate insulin and Bin  Will get a repeat later on  Continue telemetry monitoring  Continue insulin sliding scale coverage with long-acting insulin for hyperglycemia

## 2024-02-21 NOTE — NURSING NOTE
Rapid response code activated around 0900. Pt complained of chest pain while lying in bed. BP was 92/52, HR was 98, SpO2 was 99%.  Blood sugar was 433 at 0838 a one time dose of insulin lispro of 10 units was given per Dr. Chaney's order. An EKG was done. Pt. Was given 2mg of morphine slowly, to prevent a head rush that patient stated is common when he receives morphine, to help with the chest pain. Pt also complained of numbness and tingling in hands and feet. Pt was diaphoretic and a cool wash cloth was placed on his forehead and a portable fan was placed at the bedside.    Pt stated that he felt some relief after receiving the morphine. Pt was made comfortable in bed and 2L of oxygen was put on patient to assist with his comfort measures. Pt is resting in bed. Patient is currently on tele monitoring. Patient stayed alert and oriented throughout the episode.

## 2024-02-21 NOTE — CARE PLAN
The patient's goals for the shift include      The clinical goals for the shift include Pt will remain hemodynamically stable throughout shift.    Over the shift, the patient did not make progress toward the following goals. Barriers to progression include blood pressures and blood glucose outside of normal range. Recommendations to address these barriers include medications and monitoring.

## 2024-02-21 NOTE — SIGNIFICANT EVENT
Rapid response code was activated on this patient around 1 AM.  Apparently patient was trying to use the bathroom and became diaphoretic, lightheaded, and weak.  He was placed back in bed.  His blood pressure was 58/35.    Blood glucose level was 131.  Patient reported that his blood glucose level usually get 300 he gets symptoms of hypoglycemia once his blood glucose is that low.  Patient was given IV fluid bolus.  Blood pressure remained on the lower normal but the patient was feeling much better, awake, alert, and oriented.  We did feed the patient.  Labs were repeated without significant changes.  Blood glucose level on chemistry was 92.  Blood glucose level after feeding the patient was 129.  Will try to avoid dropping blood glucose level below 150.  EKG was obtained no evidence of new ischemic changes.  Similar to prior EKG.  -Patient is on telemonitoring  -Will continue to monitor.

## 2024-02-21 NOTE — CARE PLAN
The patient's goals for the shift include      The clinical goals for the shift include Pt will have no edema or sob      Problem: Pain  Goal: Takes deep breaths with improved pain control throughout the shift  Outcome: Progressing     Problem: Pain  Goal: Walks with improved pain control throughout the shift  Outcome: Progressing     Problem: Pain  Goal: Performs ADL's with improved pain control throughout shift  Outcome: Progressing     Problem: Pain  Goal: Free from opioid side effects throughout the shift  Outcome: Progressing     Problem: Pain  Goal: Free from acute confusion related to pain meds throughout the shift  Outcome: Progressing

## 2024-02-21 NOTE — PROGRESS NOTES
EP consulted for possible AICD placement. Pt was positive for Cocaine and Fentanyl on admission, SW consulted. DM educator also consulted for teaching d/t noncompliance.

## 2024-02-21 NOTE — PROGRESS NOTES
In summary,  Formerly [facility], [facility] Heart Progress Note           Rounding WARD/Cardiologist:  Kwame Orozco, APRN-SUHAIL, Dr. Kash Andrews  Primary Cardiologist:  Dr. Jurado     Date:  2/21/2024  Patient:  Arthur Carranza  YOB: 1985  MRN:  [id number]   Admit Date:  2/20/2024      SUBJECTIVE:    2/21/24  Patient states that today he felt like his blood pressure was low he was not feeling like himself they did call a doctor stat they gave him 250 cc of normal saline bolus he feels much better now  tele normal sinus rhythm with occasional PVC    2/20/24  Arthur Carranza is a 38 y.o.  male patient who is being at the request of Dr. Hensley for inpatient consultation of CHF. He was admitted on 2/20/2024.  Previous [facility] and [facility] records have been reviewed in detail.    Patient with a history of nonischemic cardiomyopathy, chronic systolic heart failure, CVA, hypertension, diabetes, anxiety, bipolar, morbid obesity came into the emergency department complaining of shortness of breath that has progressively worsened over the past 1 week.  He states usually when he started to feel shortness of breath he takes a dose of torsemide start to urinate then he gets better but this time he was not able to urinate he is usually in [place] but he is out this area due to family reasons he states he came in through the emergency department they admitted him to the 11th floor and kindly asked us to get involved with his case.  So he is positive for shortness of breath, PND, fatigue.     Denies chest pain, fever, chills, orthopnea, claudication  Patient states that he is well aware of his low ejection fraction that he has been spoke to many times and he is refused the AICD     EKG sinus tachycardia no acute changes     Cardiac history  10/21/22  1. The entire Left Main: 0% stenosis.   2. Entire LAD Lesion: The percent stenosis is 0%.   3. Entire CX Lesion: The percent stenosis is 0%.   4. The entire RCA [place]:  The percent stenosis is 0%.         VITALS:     Vitals:    02/21/24 0409 02/21/24 0539 02/21/24 0735 02/21/24 0855   BP: 104/63 111/72 116/76 95/58   BP Location:    Left arm   Pulse: 79  91 97   Resp:    18   Temp:  36.3 °C (97.3 °F) 35.9 °C (96.6 °F) 35.8 °C (96.4 °F)   TempSrc:    Temporal   SpO2: 95% 97% 98% 98%   Weight:       Height:         No intake or output data in the 24 hours ending 02/21/24 1122    Wt Readings from Last 4 Encounters:   02/20/24 136 kg (300 lb)   01/31/24 134 kg (296 lb 4.8 oz)   12/03/23 138 kg (303 lb 12.7 oz)   07/20/23 138 kg (303 lb 5 oz)       CURRENT HOSPITAL MEDICATIONS:   atorvastatin, 40 mg, oral, Nightly  buPROPion XL, 150 mg, oral, Daily  calcium gluconate in NS, 2 g, intravenous, Once  [Held by provider] carvedilol, 6.25 mg, oral, BID  [Held by provider] furosemide, 40 mg, intravenous, q12h  insulin glargine, 20 Units, subcutaneous, Nightly  insulin lispro, 0-10 Units, subcutaneous, TID with meals  [Held by provider] insulin lispro, 8 Units, subcutaneous, TID with meals  insulin regular, 5 Units, intravenous, Once  [Held by provider] metOLazone, 5 mg, oral, Daily  morphine, , ,   morphine, 2 mg, intravenous, Once  pantoprazole, 40 mg, oral, Daily   Or  pantoprazole, 40 mg, intravenous, Daily  rivaroxaban, 20 mg, oral, Nightly  [Held by provider] sacubitriL-valsartan, 1 tablet, oral, BID  sertraline, 100 mg, oral, Daily  sodium zirconium cyclosilicate, 10 g, oral, q8h  [Held by provider] spironolactone, 25 mg, oral, Daily  traZODone, 50 mg, oral, Nightly         Current Outpatient Medications   Medication Instructions    atorvastatin (Lipitor) 40 mg tablet 1 tablet, oral, Nightly    buPROPion XL (WELLBUTRIN XL) 150 mg, oral, Daily    dapagliflozin (Farxiga) 10 mg 1 tablet, oral, Daily    dulaglutide 3 mg/0.5 mL pen injector 1 Application, subcutaneous, Weekly    FreeStyle Nadir sensor system (FreeStyle Nadir 2 Sensor) kit Use as instructed    insulin lispro (HUMALOG) 8 Units,  subcutaneous, 3 times daily before meals    insulin NPH (Isophane) (HUMULIN N,NOVOLIN N) 8 Units, subcutaneous, 3 times daily before meals    metFORMIN (Glucophage) 1,000 mg tablet 1 tablet, oral, 2 times daily    metoprolol succinate XL (Toprol-XL) 25 mg 24 hr tablet 1 tablet, oral, Daily    rivaroxaban (Xarelto) 20 mg tablet 1 tablet, oral, Daily, Take with food.    sacubitriL-valsartan (Entresto) 24-26 mg tablet 1 tablet, oral, 2 times daily    sertraline (ZOLOFT) 100 mg, oral, Daily    spironolactone (Aldactone) 25 mg tablet 1 tablet, oral, Daily    torsemide (Demadex) 20 mg tablet 1 tablet, oral, Daily    traZODone (DESYREL) 50 mg, oral, Nightly    True Metrix Glucose Test Strip strip USE TO CHECK BLOOD SUGAR FOUR TIMES DAILY    Trulicity 4.5 mg, subcutaneous, Weekly    Ultra Thin Lancets 30 gauge misc TEST BLOOD SUGAR FOUR TIMES DAILY AS DIRECTED        PHYSICAL EXAMINATION:   GENERAL:  Well developed, well nourished, in no acute distress.  CHEST:  Symmetric and nontender.  NEURO/PSYCH:  Alert and oriented times three with approppriate behavior and responses.  NECK:  Supple, no JVD, no bruit.  LUNGS:  Clear to auscultation bilaterally, normal respiratory effort.  HEART:  Rate and rhythm regular with no evident murmur, no gallop appreciated.        There are no rubs, clicks or heaves.  EXTREMITIES:  Warm with good color, no clubbing or cyanosis.  There is no edema noted.  PERIPHERAL VASCULAR:  Pulses present and equally palpable; 2+ throughout.      LAB DATA:     CBC:   Results from last 7 days   Lab Units 02/21/24  0958 02/21/24  0134 02/20/24  0528   WBC AUTO x10*3/uL 6.7 5.2 9.8   RBC AUTO x10*6/uL 5.07 4.92 4.64   HEMOGLOBIN g/dL 16.1 15.6 14.8   HEMATOCRIT % 46.1 45.6 42.7   MCV fL 91 93 92   MCH pg 31.8 31.7 31.9   MCHC g/dL 34.9 34.2 34.7   RDW % 12.4 12.4 12.5   PLATELETS AUTO x10*3/uL 269 246 218     CMP:    Results from last 7 days   Lab Units 02/21/24  0958 02/21/24  0134 02/20/24  0528   SODIUM  mmol/L 121* 133* 130*   POTASSIUM mmol/L 6.8* 3.6 4.4   CHLORIDE mmol/L 87* 92* 90*   CO2 mmol/L 24 32 28   BUN mg/dL 32* 27* 26*   CREATININE mg/dL 1.75* 1.78* 1.54*   GLUCOSE mg/dL 471* 92 412*   PROTEIN TOTAL g/dL  --  7.8 7.9   CALCIUM mg/dL 8.9 9.6 9.3   BILIRUBIN TOTAL mg/dL  --  1.1 0.9   ALK PHOS U/L  --  74 71   AST U/L  --  18 17   ALT U/L  --  15 15     BMP:    Results from last 7 days   Lab Units 02/21/24  0958 02/21/24  0134 02/20/24  0528   SODIUM mmol/L 121* 133* 130*   POTASSIUM mmol/L 6.8* 3.6 4.4   CHLORIDE mmol/L 87* 92* 90*   CO2 mmol/L 24 32 28   BUN mg/dL 32* 27* 26*   CREATININE mg/dL 1.75* 1.78* 1.54*   CALCIUM mg/dL 8.9 9.6 9.3   GLUCOSE mg/dL 471* 92 412*     Magnesium:  Results from last 7 days   Lab Units 02/21/24  0134 02/20/24  0126   MAGNESIUM mg/dL 2.51* 1.59*     Troponin:    Results from last 7 days   Lab Units 02/21/24  0958 02/20/24  0126   TROPHS ng/L 67* 65*     BNP:   Results from last 7 days   Lab Units 02/20/24  0126   BNP pg/mL 259*     Lipid Panel:         DIAGNOSTIC TESTING:   @No results found for this or any previous visit.    ECG 12 Lead    Result Date: 2/21/2024  Normal sinus rhythm Possible Left atrial enlargement Left axis deviation Left bundle branch block Abnormal ECG When compared with ECG of 21-FEB-2024 07:25, (unconfirmed) Left bundle branch block is now Present    XR chest 1 view    Result Date: 2/21/2024  Interpreted By:  Pete Vigil, STUDY: XR CHEST 1 VIEW;  2/21/2024 9:34 am   INDICATION: Signs/Symptoms:worsening SOB.   COMPARISON: Portable chest, 20 February 2024   ACCESSION NUMBER(S): [id number]   ORDERING CLINICIAN: JEANNE ABEIBIE   TECHNIQUE: Single frontal view of the chest; Portable technique   FINDINGS: Enlarged cardiac silhouette is unchanged   No consolidative lung opacity   No edema / failure   No large pleural effusion or demonstrable pneumothorax       No interval change or acute disease in the chest   MACRO: None   Signed by: Pete Vigil  2/21/2024 9:41 AM Dictation workstation:   [id number]    ECG 12 Lead    Result Date: 2/21/2024  Normal sinus rhythm Possible Left atrial enlargement Left axis deviation Left ventricular hypertrophy with QRS widening and repolarization abnormality Abnormal ECG When compared with ECG of 21-FEB-2024 01:19, (unconfirmed) Premature ventricular complexes are no longer Present    ECG 12 lead    Result Date: 2/21/2024  Sinus rhythm with sinus arrhythmia with occasional Premature ventricular complexes Possible Left atrial enlargement Left axis deviation Left ventricular hypertrophy with QRS widening T wave abnormality, consider lateral ischemia Abnormal ECG When compared with ECG of 20-FEB-2024 01:05, (unconfirmed) Premature ventricular complexes are now Present T wave amplitude has decreased in Inferior leads T wave inversion more evident in Lateral leads    ECG 12 lead    Result Date: 2/20/2024  Sinus tachycardia Possible Left atrial enlargement Left axis deviation Left ventricular hypertrophy with QRS widening and repolarization abnormality Abnormal ECG When compared with ECG of 01-FEB-2024 06:43, T wave amplitude has increased in Inferior leads T wave inversion less evident in Lateral leads    XR chest 1 view    Result Date: 2/20/2024  Interpreted By:  Christian Villagomez, STUDY: XR CHEST 1 VIEW;  2/20/2024 1:40 am   INDICATION: Signs/Symptoms:sob.   COMPARISON: 01/31/2024   ACCESSION NUMBER(S): [id number]   ORDERING CLINICIAN: WADE ELIZABETH   FINDINGS:     Cardiomegaly. The pulmonary vasculature is within normal limits. No consolidation, pleural effusion or pneumothorax.         Cardiomegaly without evidence of acute disease in the chest.     MACRO: None.   Signed by: Christian Villagomez 2/20/2024 1:52 AM Dictation workstation:   [id number]       XR chest 1 view   Final Result   No interval change or acute disease in the chest        MACRO:   None        Signed by: Pete Vigil 2/21/2024 9:41 AM   Dictation  workstation:   [id number]      XR chest 1 view   Final Result   Cardiomegaly without evidence of acute disease in the chest.             MACRO:   None.        Signed by: Christian Villagomez 2/20/2024 1:52 AM   Dictation workstation:   [id number]      Transthoracic Echo (TTE) Complete    (Results Pending)       No echocardiogram results found for the past 14 days    RADIOLOGY:     XR chest 1 view   Final Result   No interval change or acute disease in the chest        MACRO:   None        Signed by: Pete Vigil 2/21/2024 9:41 AM   Dictation workstation:   [id number]      XR chest 1 view   Final Result   Cardiomegaly without evidence of acute disease in the chest.             MACRO:   None.        Signed by: Christian Villagomez 2/20/2024 1:52 AM   Dictation workstation:   [id number]      Transthoracic Echo (TTE) Complete    (Results Pending)       PROBLEM LIST     Patient Active Problem List   Diagnosis    ACC/AHA stage C systolic congestive heart failure (CMS/HCC)    CHF (congestive heart failure) (CMS/HCC)    Current every day smoker    Type 2 diabetes mellitus with neurologic complication, with long-term current use of insulin (CMS/HCC)    Edema    Neck abscess    Nonischemic cardiomyopathy (CMS/HCC)    Perirectal abscess    Primary hypertension    Ventricular bigeminy seen on cardiac monitor    Bipolar 1 disorder (CMS/HCC)    Morbid obesity with BMI of 45.0-49.9, adult (CMS/HCC)    Acute ischemic left MCA stroke (CMS/HCC)    Cocaine use    Major depression, recurrent (CMS/HCC)    PTSD (post-traumatic stress disorder)    Acute on chronic combined systolic and diastolic CHF (congestive heart failure) (CMS/HCC)    Type 2 diabetes mellitus with hyperglycemia (CMS/HCC)    Severe episode of recurrent major depressive disorder, without psychotic features (CMS/HCC)    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    Abscess of abdominal cavity (CMS/HCC)    Diabetes mellitus and insipidus with optic atrophy and  deafness (CMS/[facility])    Hyperglycemia due to diabetes mellitus (CMS/[facility])    Congestive heart failure, unspecified HF chronicity, unspecified heart failure type (CMS/[facility])       ASSESSMENT:   Nonischemic cardiomyopathy  Acute on chronic systolic heart failure NYHA class II  Hypertension  Hx CVA  Dm        PLAN:   Tele monitoring  Serial enzymes  2d echo  Lasix 40 mg IV every 12  Strict intake and output  Daily weights  Zaroxolyn 5 mg daily  Coreg 6.25 mg twice daily  Entresto 24/26 1 tab twice daily  Aldactone 25 mg daily  In summary, Mr. Arthur Carranza has a history of nonischemic cardiomyopathy.  He was admitted in October 2022 with worsening shortness of breath, weight gain, and abdominal distention. Chest x-ray showed mild pulmonary vascular congestion. An echocardiogram on September 21, 2022 showed severe LV dysfunction with an estimated LV ejection fraction 15 to 20%. There was trivial to 1+ mitral and tricuspid regurgitation. Estimated RVSP 21 mmHg. Cardiac catheterization completed on the same day showed normal coronary arteries. He was noted to have ventricular bigeminy on the monitor. He was placed on Toprol-XL 25 mg daily and valsartan 80 mg daily, however he initially did not get these prescriptions filled.. He was seen by Dr. Duarte and placed on a LifeVest.  He was seen in the office October 25, 2022 and restarted on Toprol and valsartan.  He stopped wearing the LifeVest several weeks later.       He was lost to follow-up and presented with an acute embolic stroke on December 28, 2022.  He was treated with thrombolytic therapy and [facility].  Echocardiogram December 29, 2022 showed an estimated LV ejection fraction 5 to 10% and severe eccentric LVH.  He was discharged on Toprol-XL, Entresto, Farxiga, spironolactone, torsemide, and Xarelto.  He was seen in follow-up by Dr. Jurado March 14, 2023 and scheduled for a cardiac MRI.  This was completed on April 20, 2023 and showed a  calculated LV ejection fraction 13%.  No evidence of amyloidosis or hemochromatosis.  No LV thrombus.  RV ejection fraction was normal.  He was not compliant with follow-up and ran out of his medications.  He was seen in consultation by Dr. Yuan December 3, 2023 for HFrEF and restarted on GDMT.  Titration of medications has been difficult due to some issues with hypotension and bradycardia.  He has been traveling back and forth from [place] to [place] and sometimes does not have his medications with him.  He was hospitalized a few times earlier this year for uncontrolled diabetes mellitus.      He presented to the emergency department today with complaints of dry heaves and mild worsening of shortness of breath.  He noted some orthopnea and abdominal swelling.  Recently quit smoking.  He noted he did use cocaine last week.  He was noted to have stable vital signs with exception of sinus tachycardia at 114 bpm.  In light of orthopnea and elevated BNP he was admitted to telemetry.  He was given intravenous furosemide and metolazone.  The patient reports that he has been compliant with his medications of late.  He currently is lying in bed almost flat and resting more comfortably.  He denies any chest discomfort.  Continue IV furosemide along with carvedilol, Entresto, and spironolactone.  He has insulin-dependent type 2 diabetes mellitus.  He was treated with Farxiga but only for short period of time.  Patient be monitored overnight.  Consult EP service to assess for ICD implant for primary prevention of sudden cardiac death.  Further recommendations to follow.    2/21/24  1.  Okay to transfer 8s  2.  Hold BP meds this AM  3. Consult EP  4. Knee high bismark hose  5. Check echo    Further recommendations per Dr Darryl Orozco [facility]    Of note, this documentation is completed using the Dragon Dictation system (voice recognition software). There may  be spelling and/or grammatical errors that were not corrected prior to final submission.    Please do not hesitate to call with questions.  Electronically signed by GEE Galaviz, on 2/21/2024 at 11:22 AM     I have personally interviewed and examined the patient.   I have personally and independently reviewed labs and diagnostic testing.  I have personally verified the elements of the history and physical listed above and changes, if any, are noted.   I have personally reviewed the assessment and plan as documented by ANDRZEJ Caban, CNP and concur.    Early this morning the patient had transient hypotension with systolic blood pressure as low as the 60s.  He was treated with 250 cc of IV normal saline with improvement.  He states he was not feeling well but currently is improved.  He denies chest pain or shortness of breath.  His vital signs are currently stable.  Oxygen saturation 98%.  Cardiac rhythm is regular.  Lungs are clear.  No significant peripheral edema.  He remains in normal sinus rhythm with occasional PVCs.  CBC is normal.  Sodium 126 with potassium 4.6.  BUN 29 creatinine 1.56.  EKG shows normal sinus rhythm with complete left bundle-branch block.  QRS duration 164 ms.  His Entresto, spironolactone, carvedilol, furosemide, and metolazone have been placed on hold.  He is anticoagulated with Xarelto.  It is unclear if he has been compliant with his medications at home.  Will gradually restart low-dose GDMT and titrate as tolerated.  He was previously noted to have persistent LV dysfunction despite use of Entresto, Aldactone, and beta-blocker.  He was previously prescribed Farxiga although he does not recall this.  EP recommendations are pending.  Patient states he is agreeable to ICD implant.  Possible consideration of biventricular device.  Further recommendations to follow.

## 2024-02-21 NOTE — CONSULTS
Inpatient consult to cardiology  Consult performed by: GEE Wu  Consult ordered by: GEE Galaviz  Reason for consult: Severe Cardiomyopathy/Possible ICD  Assessment/Recommendations: Patient was seen, chart reviewed.    Patient has a history of dilated NICM with an LVEF of 13% per cardiac MRI 4/2023, refused AICD in the past, chronic systolic heart failure, left ventricular hypertrophy, CVA, HTN, DM 2, anxiety, bipolar disorder, morbid obesity, cocaine abuse, noncompliance  Admitted for shortness of breath  Patient had heart failure decompensation  Medical therapy has been adjusted  Patient now agreeable with ICD implantation.  Long conversation with patient we will plan to follow.  Patient needs to be reevaluated my office for possible device implantation.  Patient had a recent use of cocaine a week ago.  Patient needs to be clean of illicit drugs prior and after the procedure.  Patient agrees with this.    Patient also does not want to use a LifeVest at this time.    We will set up an appointment my office in the next 4 to 6 weeks after discharge from the hospital.    Risk factor modification and lifestyle modification discussed with patient. Diet , exercise and hydration discussed with patient.    Please excuse any errors in grammar or translation related to this dictation.  Voice recognition software was utilized to prepare this document.          Electrophysiology Consult Note  Patient: Arthur Carranza  Unit/Bed: 818/818-A  YOB: 1985  MRN: [id number]  Acct: [id number]   Admitting Diagnosis: Type 2 diabetes mellitus with other specified complication, without long-term current use of insulin (CMS/[facility]) [E11.69]  Congestive heart failure, unspecified HF chronicity, unspecified heart failure type (CMS/HCC) [I50.9]  Date:  2/20/2024  Hospital Day: 1  Attending: Merry Chaney MD    Rounding Cardiologist:  GEE Wu, Aquiles Duarte MD     Complaint:  Chief  Complaint   Patient presents with    Shortness of Breath     SOB, vomiting, headache. Pt was seen a few weeks ago for high blood sugar and has CHF. Pt has been out of short acting insulin for two weeks.       History of Present Illness:  Patient is a 38-year-old male with past medical history significant for dilated NICM with an LVEF of 13% per cardiac MRI 4/2023, refused AICD in the past, chronic systolic heart failure, left ventricular hypertrophy, CVA, HTN, DM 2, anxiety, bipolar disorder, morbid obesity, cocaine abuse, noncompliance who [facility] with a chief complaint of progressively worsening shortness of breath with minimal exertion and while laying in bed over the past 1 week.  Patient states that he normally takes a dose of torsemide when he is feeling short of breath, however it did not help this time.  Patient also complains of PND and chest congestion as well as nausea he denies complaints of chest pain or palpitations.    ER course:  Patient was tachycardic , mildly tachypneic 21.  He was maintaining saturation on room air.  His labs show mildly elevated proBNP 259, mild hypomagnesemia 1.5.  Patient does have mild acute kidney injury with creatinine 1.4 from baseline normal.  There is no leukocytosis.  Blood glucose level was 409 but did not meet any criteria for DKA.  Chest x-ray shows cardiomegaly but no clear evidence of pulmonary edema.  Reportedly, patient was sitting in bed trying to avoid lying down to avoid worsening shortness of breath.  He was admitted for further workup and management.    Cardiology was consulted for heart failure management.  Electrophysiology service is consulted for severe cardiomyopathy/possible ICD.  Allergies:  Allergies   Allergen Reactions    Shellfish Containing Products Unknown      PMHx:  Past Medical History:   Diagnosis Date    CHF (congestive heart failure) (CMS/[facility])     Diabetes mellitus (CMS/[facility])     Heart  disease     Hypertension     Substance abuse (CMS/HCC)      PSHx:  Past Surgical History:   Procedure Laterality Date    MR HEAD ANGIO WO IV CONTRAST  12/29/2022    MR HEAD ANGIO WO IV CONTRAST 12/29/2022 DOCTOR OFFICE LEGACY    OTHER SURGICAL HISTORY  07/21/2022    Cyst excision    OTHER SURGICAL HISTORY  10/25/2022    Cardiac catheterization     Social Hx:  Remote tobacco use  Occasional alcohol use  Cocaine abuse    Family Hx:  Family History   Adopted: Yes     Review of Systems:   Review of Systems   Constitutional:  Positive for fatigue.   Respiratory:  Positive for shortness of breath.    Gastrointestinal:  Positive for nausea.   Neurological:  Positive for weakness.     Physical Examination:    Visit Vitals  BP 95/58 (BP Location: Left arm)   Pulse 97   Temp 35.8 °C (96.4 °F) (Temporal)   Resp 18      Physical Exam  Vitals reviewed.   Constitutional:       Appearance: Normal appearance. He is obese.   HENT:      Head: Normocephalic and atraumatic.      Nose: Nose normal.      Mouth/Throat:      Mouth: Mucous membranes are moist.      Pharynx: Oropharynx is clear.   Eyes:      Pupils: Pupils are equal, round, and reactive to light.   Cardiovascular:      Rate and Rhythm: Normal rate and regular rhythm.      Pulses: Normal pulses.      Heart sounds: Normal heart sounds.   Pulmonary:      Effort: Pulmonary effort is normal.      Breath sounds: Normal breath sounds.      Comments: Fine crackles in the bases  Abdominal:      General: Bowel sounds are normal.      Palpations: Abdomen is soft.   Musculoskeletal:         General: Normal range of motion.      Cervical back: Normal range of motion and neck supple.   Skin:     General: Skin is warm and dry.   Neurological:      General: No focal deficit present.      Mental Status: He is alert and oriented to person, place, and time.   Psychiatric:         Mood and Affect: Mood normal.         Behavior: Behavior normal.     LABS:  CBC:   Results from last 7 days   Lab  Units 02/21/24  0958 02/21/24  0134 02/20/24  0528   WBC AUTO x10*3/uL 6.7 5.2 9.8   RBC AUTO x10*6/uL 5.07 4.92 4.64   HEMOGLOBIN g/dL 16.1 15.6 14.8   HEMATOCRIT % 46.1 45.6 42.7   MCV fL 91 93 92   MCH pg 31.8 31.7 31.9   MCHC g/dL 34.9 34.2 34.7   RDW % 12.4 12.4 12.5   PLATELETS AUTO x10*3/uL 269 246 218     CMP:    Results from last 7 days   Lab Units 02/21/24  0958 02/21/24  0134 02/20/24  0528   SODIUM mmol/L 121* 133* 130*   POTASSIUM mmol/L 6.8* 3.6 4.4   CHLORIDE mmol/L 87* 92* 90*   CO2 mmol/L 24 32 28   BUN mg/dL 32* 27* 26*   CREATININE mg/dL 1.75* 1.78* 1.54*   GLUCOSE mg/dL 471* 92 412*   PROTEIN TOTAL g/dL  --  7.8 7.9   CALCIUM mg/dL 8.9 9.6 9.3   BILIRUBIN TOTAL mg/dL  --  1.1 0.9   ALK PHOS U/L  --  74 71   AST U/L  --  18 17   ALT U/L  --  15 15     Magnesium:  Results from last 7 days   Lab Units 02/21/24  0134 02/20/24  0126   MAGNESIUM mg/dL 2.51* 1.59*     Troponin:    Results from last 7 days   Lab Units 02/21/24  0958 02/20/24  0126   TROPHS ng/L 67* 65*     BNP:   Results from last 7 days   Lab Units 02/20/24  0126   BNP pg/mL 259*     Current Medications:    Current Facility-Administered Medications:     acetaminophen (Tylenol) tablet 650 mg, 650 mg, oral, q4h PRN, 650 mg at 02/20/24 0940 **OR** acetaminophen (Tylenol) oral liquid 650 mg, 650 mg, nasogastric tube, q4h PRN **OR** acetaminophen (Tylenol) suppository 650 mg, 650 mg, rectal, q4h PRN, Carolyn Malhotra MD    atorvastatin (Lipitor) tablet 40 mg, 40 mg, oral, Nightly, Carolyn Malhotra MD, 40 mg at 02/20/24 2204    buPROPion XL (Wellbutrin XL) 24 hr tablet 150 mg, 150 mg, oral, Daily, Carolyn Malhotra MD, 150 mg at 02/20/24 0940    [Held by provider] carvedilol (Coreg) tablet 6.25 mg, 6.25 mg, oral, BID, ARIAN Galaviz-CNP, 6.25 mg at 02/20/24 2204    dextrose 10 % in water (D10W) infusion, 0.3 g/kg/hr, intravenous, Once PRN, Merry Chaney MD    dextrose 50 % injection 25 g, 25 g, intravenous, q15 min PRN, Merry  MD Ritu    [Held by provider] furosemide (Lasix) injection 40 mg, 40 mg, intravenous, q12h, Carolyn Malhotra MD, 40 mg at 02/20/24 2203    glucagon (Glucagen) injection 1 mg, 1 mg, intramuscular, q15 min PRN, Merry Chaney MD    insulin glargine (Lantus) injection 20 Units, 20 Units, subcutaneous, Nightly, Carolyn Malhotra MD    insulin lispro (HumaLOG) injection 0-10 Units, 0-10 Units, subcutaneous, TID with meals, Merry Chaney MD    [Held by provider] insulin lispro (HumaLOG) injection 8 Units, 8 Units, subcutaneous, TID with meals, Merry Chaney MD, 8 Units at 02/20/24 1647    melatonin tablet 6 mg, 6 mg, oral, Nightly PRN, Carolyn Malhotra MD    [Held by provider] metOLazone (Zaroxolyn) tablet 5 mg, 5 mg, oral, Daily, Carolyn Malhotra MD, 5 mg at 02/20/24 0940    morphine injection  - Omnicell Override Pull, , , ,     morphine injection 2 mg, 2 mg, intravenous, Once, Merry Chaney MD    ondansetron (Zofran) tablet 4 mg, 4 mg, oral, q8h PRN **OR** ondansetron (Zofran) injection 4 mg, 4 mg, intravenous, q8h PRN, Carolyn Malhotra MD, 4 mg at 02/21/24 1046    pantoprazole (ProtoNix) EC tablet 40 mg, 40 mg, oral, Daily, 40 mg at 02/21/24 0614 **OR** pantoprazole (ProtoNix) injection 40 mg, 40 mg, intravenous, Daily, Carolyn Malhotra MD, 40 mg at 02/20/24 0558    polyethylene glycol (Glycolax, Miralax) packet 17 g, 17 g, oral, Daily PRN, Carolyn Malhotra MD    rivaroxaban (Xarelto) tablet 20 mg, 20 mg, oral, Nightly, Carolyn Malhotra MD, 20 mg at 02/20/24 2203    [Held by provider] sacubitriL-valsartan (Entresto) 24-26 mg per tablet 1 tablet, 1 tablet, oral, BID, Carolyn Malhotra MD, 1 tablet at 02/20/24 2204    sertraline (Zoloft) tablet 100 mg, 100 mg, oral, Daily, Carolyn Malhotra MD, 100 mg at 02/20/24 0941    sodium zirconium cyclosilicate (Lokelma) packet 10 g, 10 g, oral, q8h, Merry Chaney MD, 10 g at 02/21/24 1201    [Held by provider] spironolactone (Aldactone) tablet 25 mg, 25 mg, oral, Daily,  Carolyn Malhotra MD, 25 mg at 02/20/24 0941    traZODone (Desyrel) tablet 50 mg, 50 mg, oral, Nightly, Carolyn Malhotra MD, 50 mg at 02/20/24 2203    ECG 12 Lead  Result Date: 2/21/2024  Normal sinus rhythm Possible Left atrial enlargement Left axis deviation Left bundle branch block Abnormal ECG When compared with ECG of 21-FEB-2024 07:25, (unconfirmed) Left bundle branch block is now Present    XR chest 1 view  Result Date: 2/21/2024  No interval change or acute disease in the chest   MACRO: None   Signed by: Pete Vigil 2/21/2024 9:41 AM Dictation workstation:   [id number]    ECG 12 lead  Result Date: 2/21/2024  Sinus rhythm with sinus arrhythmia with occasional Premature ventricular complexes Possible Left atrial enlargement Left axis deviation Left ventricular hypertrophy with QRS widening T wave abnormality, consider lateral ischemia Abnormal ECG When compared with ECG of 20-FEB-2024 01:05, (unconfirmed) Premature ventricular complexes are now Present T wave amplitude has decreased in Inferior leads T wave inversion more evident in Lateral leads    ECG 12 lead  Result Date: 2/20/2024  Sinus tachycardia Possible Left atrial enlargement Left axis deviation Left ventricular hypertrophy with QRS widening and repolarization abnormality Abnormal ECG When compared with ECG of 01-FEB-2024 06:43, T wave amplitude has increased in Inferior leads T wave inversion less evident in Lateral leads    XR chest 1 view  Result Date: 2/20/2024  Cardiomegaly without evidence of acute disease in the chest.     MACRO: None.   Signed by: Christian Villagomez 2/20/2024 1:52 AM Dictation workstation:   [id number]     Encounter Date: 02/20/24   ECG 12 Lead   Result Value    Ventricular Rate 97    Atrial Rate 97    CO Interval 186    QRS Duration 164    QT Interval 432    QTC Calculation(Bazett) 548    P Axis 37    R Axis -72    T Axis 89    QRS Count 16    Q Onset 214    P Onset 121    P Offset 169    T Offset 430    QTC Fredericia 506     Narrative    Normal sinus rhythm  Possible Left atrial enlargement  Left axis deviation  Left bundle branch block  Abnormal ECG  When compared with ECG of 21-FEB-2024 07:25, (unconfirmed)  Left bundle branch block is now Present      Tele monitoring:  Sinus rhythm 70 to 90s    I/O:    Intake/Output Summary (Last 24 hours) at 2/21/2024 1349  Last data filed at 2/21/2024 1303  Gross per 24 hour   Intake 100 ml   Output --   Net 100 ml      Assessment/Plan:  Dyspnea  Dilated nonischemic cardiomyopathy with LVEF 13% per cardiac MRI 4/2023.  Refused AICD in the past.  Acute on Chronic systolic heart failure. On GDMT.  Cardiology following.  Benign essential hypertension.  Stable.  IDDM 2.  Uncontrolled.  Managed per primary service.  Remote embolic CVA  7.   Cocaine abuse.  Last use in the past week.  8.   Noncompliance     Patient has diagnosed dilated NICM with an LVEF of 15 to 20% dating back to October, 2022.  He was placed in a LifeVest at that time, however stopped wearing it shortly thereafter.  He has been noncompliant with his medications in the past as well as follow-up and continues to abuse cocaine on a regular basis.  He was evaluated in the hospital in December, 2023 by cardiologist, Dr. Yuan who reinitiated GDMT for heart failure.  Patient states that he has been compliant with his medications recently.    Further recommendations per Dr. Duarte.    Electronically signed by GEE Wu on 2/21/2024 at 1:49 PM

## 2024-02-22 ENCOUNTER — APPOINTMENT (OUTPATIENT)
Dept: CARDIOLOGY | Facility: HOSPITAL | Age: 39
End: 2024-02-22
Payer: COMMERCIAL

## 2024-02-22 LAB
ANION GAP SERPL CALC-SCNC: 12 MMOL/L (ref 10–20)
AORTIC VALVE MEAN GRADIENT: 2 MMHG
AORTIC VALVE PEAK VELOCITY: 0.77 M/S
ATRIAL RATE: 113 BPM
ATRIAL RATE: 79 BPM
ATRIAL RATE: 90 BPM
ATRIAL RATE: 91 BPM
ATRIAL RATE: 97 BPM
AV PEAK GRADIENT: 2.4 MMHG
AVA (PEAK VEL): 3.38 CM2
AVA (VTI): 2.84 CM2
BUN SERPL-MCNC: 30 MG/DL (ref 6–23)
CALCIUM SERPL-MCNC: 9.3 MG/DL (ref 8.6–10.3)
CHLORIDE SERPL-SCNC: 90 MMOL/L (ref 98–107)
CO2 SERPL-SCNC: 31 MMOL/L (ref 21–32)
CREAT SERPL-MCNC: 1.6 MG/DL (ref 0.5–1.3)
EGFRCR SERPLBLD CKD-EPI 2021: 57 ML/MIN/1.73M*2
EJECTION FRACTION APICAL 4 CHAMBER: 14.2
EJECTION FRACTION: 13 %
ERYTHROCYTE [DISTWIDTH] IN BLOOD BY AUTOMATED COUNT: 12.5 % (ref 11.5–14.5)
GLUCOSE BLD MANUAL STRIP-MCNC: 265 MG/DL (ref 74–99)
GLUCOSE BLD MANUAL STRIP-MCNC: 299 MG/DL (ref 74–99)
GLUCOSE BLD MANUAL STRIP-MCNC: 315 MG/DL (ref 74–99)
GLUCOSE BLD MANUAL STRIP-MCNC: 342 MG/DL (ref 74–99)
GLUCOSE SERPL-MCNC: 308 MG/DL (ref 74–99)
HCT VFR BLD AUTO: 42.5 % (ref 41–52)
HGB BLD-MCNC: 14.4 G/DL (ref 13.5–17.5)
LEFT ATRIUM VOLUME AREA LENGTH INDEX BSA: 24.5 ML/M2
LEFT VENTRICLE INTERNAL DIMENSION DIASTOLE: 6.83 CM (ref 3.5–6)
LEFT VENTRICULAR OUTFLOW TRACT DIAMETER: 2.2 CM
MCH RBC QN AUTO: 31.2 PG (ref 26–34)
MCHC RBC AUTO-ENTMCNC: 33.9 G/DL (ref 32–36)
MCV RBC AUTO: 92 FL (ref 80–100)
MITRAL VALVE E/A RATIO: 1.13
MITRAL VALVE E/E' RATIO: 14.31
NRBC BLD-RTO: 0 /100 WBCS (ref 0–0)
P AXIS: 37 DEGREES
P AXIS: 49 DEGREES
P AXIS: 58 DEGREES
P AXIS: 59 DEGREES
P AXIS: 70 DEGREES
P OFFSET: 152 MS
P OFFSET: 166 MS
P OFFSET: 169 MS
P OFFSET: 177 MS
P OFFSET: 178 MS
P ONSET: 112 MS
P ONSET: 121 MS
P ONSET: 121 MS
P ONSET: 130 MS
P ONSET: 97 MS
PLATELET # BLD AUTO: 228 X10*3/UL (ref 150–450)
POTASSIUM SERPL-SCNC: 4.1 MMOL/L (ref 3.5–5.3)
PR INTERVAL: 164 MS
PR INTERVAL: 186 MS
PR INTERVAL: 186 MS
PR INTERVAL: 190 MS
PR INTERVAL: 196 MS
Q ONSET: 192 MS
Q ONSET: 210 MS
Q ONSET: 212 MS
Q ONSET: 214 MS
Q ONSET: 214 MS
QRS COUNT: 13 BEATS
QRS COUNT: 15 BEATS
QRS COUNT: 15 BEATS
QRS COUNT: 16 BEATS
QRS COUNT: 19 BEATS
QRS DURATION: 134 MS
QRS DURATION: 134 MS
QRS DURATION: 136 MS
QRS DURATION: 142 MS
QRS DURATION: 164 MS
QT INTERVAL: 360 MS
QT INTERVAL: 424 MS
QT INTERVAL: 428 MS
QT INTERVAL: 432 MS
QT INTERVAL: 452 MS
QTC CALCULATION(BAZETT): 493 MS
QTC CALCULATION(BAZETT): 518 MS
QTC CALCULATION(BAZETT): 521 MS
QTC CALCULATION(BAZETT): 523 MS
QTC CALCULATION(BAZETT): 548 MS
QTC FREDERICIA: 444 MS
QTC FREDERICIA: 487 MS
QTC FREDERICIA: 490 MS
QTC FREDERICIA: 495 MS
QTC FREDERICIA: 506 MS
R AXIS: -53 DEGREES
R AXIS: -53 DEGREES
R AXIS: -59 DEGREES
R AXIS: -63 DEGREES
R AXIS: -72 DEGREES
RBC # BLD AUTO: 4.61 X10*6/UL (ref 4.5–5.9)
RIGHT VENTRICLE FREE WALL PEAK S': 10.5 CM/S
SODIUM SERPL-SCNC: 129 MMOL/L (ref 136–145)
T AXIS: 104 DEGREES
T AXIS: 114 DEGREES
T AXIS: 89 DEGREES
T AXIS: 89 DEGREES
T AXIS: 94 DEGREES
T OFFSET: 392 MS
T OFFSET: 404 MS
T OFFSET: 428 MS
T OFFSET: 430 MS
T OFFSET: 436 MS
TRICUSPID ANNULAR PLANE SYSTOLIC EXCURSION: 1.9 CM
VENTRICULAR RATE: 113 BPM
VENTRICULAR RATE: 79 BPM
VENTRICULAR RATE: 90 BPM
VENTRICULAR RATE: 91 BPM
VENTRICULAR RATE: 97 BPM
WBC # BLD AUTO: 5.6 X10*3/UL (ref 4.4–11.3)

## 2024-02-22 PROCEDURE — 99233 SBSQ HOSP IP/OBS HIGH 50: CPT | Performed by: INTERNAL MEDICINE

## 2024-02-22 PROCEDURE — 2500000002 HC RX 250 W HCPCS SELF ADMINISTERED DRUGS (ALT 637 FOR MEDICARE OP, ALT 636 FOR OP/ED): Performed by: INTERNAL MEDICINE

## 2024-02-22 PROCEDURE — 85027 COMPLETE CBC AUTOMATED: CPT | Performed by: STUDENT IN AN ORGANIZED HEALTH CARE EDUCATION/TRAINING PROGRAM

## 2024-02-22 PROCEDURE — 82947 ASSAY GLUCOSE BLOOD QUANT: CPT

## 2024-02-22 PROCEDURE — 99232 SBSQ HOSP IP/OBS MODERATE 35: CPT | Performed by: STUDENT IN AN ORGANIZED HEALTH CARE EDUCATION/TRAINING PROGRAM

## 2024-02-22 PROCEDURE — 2500000001 HC RX 250 WO HCPCS SELF ADMINISTERED DRUGS (ALT 637 FOR MEDICARE OP): Performed by: NURSE PRACTITIONER

## 2024-02-22 PROCEDURE — 99233 SBSQ HOSP IP/OBS HIGH 50: CPT | Performed by: NURSE PRACTITIONER

## 2024-02-22 PROCEDURE — 36415 COLL VENOUS BLD VENIPUNCTURE: CPT | Performed by: STUDENT IN AN ORGANIZED HEALTH CARE EDUCATION/TRAINING PROGRAM

## 2024-02-22 PROCEDURE — 1200000002 HC GENERAL ROOM WITH TELEMETRY DAILY

## 2024-02-22 PROCEDURE — 93005 ELECTROCARDIOGRAM TRACING: CPT

## 2024-02-22 PROCEDURE — 82374 ASSAY BLOOD CARBON DIOXIDE: CPT | Performed by: STUDENT IN AN ORGANIZED HEALTH CARE EDUCATION/TRAINING PROGRAM

## 2024-02-22 PROCEDURE — 2500000002 HC RX 250 W HCPCS SELF ADMINISTERED DRUGS (ALT 637 FOR MEDICARE OP, ALT 636 FOR OP/ED): Performed by: STUDENT IN AN ORGANIZED HEALTH CARE EDUCATION/TRAINING PROGRAM

## 2024-02-22 PROCEDURE — 99232 SBSQ HOSP IP/OBS MODERATE 35: CPT | Performed by: NURSE PRACTITIONER

## 2024-02-22 PROCEDURE — 2500000001 HC RX 250 WO HCPCS SELF ADMINISTERED DRUGS (ALT 637 FOR MEDICARE OP): Performed by: INTERNAL MEDICINE

## 2024-02-22 PROCEDURE — 93010 ELECTROCARDIOGRAM REPORT: CPT | Performed by: INTERNAL MEDICINE

## 2024-02-22 RX ORDER — INSULIN GLARGINE 100 [IU]/ML
30 INJECTION, SOLUTION SUBCUTANEOUS NIGHTLY
Status: DISCONTINUED | OUTPATIENT
Start: 2024-02-22 | End: 2024-02-22

## 2024-02-22 RX ORDER — INSULIN GLARGINE 100 [IU]/ML
40 INJECTION, SOLUTION SUBCUTANEOUS NIGHTLY
Status: DISCONTINUED | OUTPATIENT
Start: 2024-02-22 | End: 2024-02-23

## 2024-02-22 RX ORDER — INSULIN LISPRO 100 [IU]/ML
0-15 INJECTION, SOLUTION INTRAVENOUS; SUBCUTANEOUS 4 TIMES DAILY
Status: DISCONTINUED | OUTPATIENT
Start: 2024-02-22 | End: 2024-02-23 | Stop reason: HOSPADM

## 2024-02-22 RX ORDER — FUROSEMIDE 40 MG/1
40 TABLET ORAL DAILY
Status: DISCONTINUED | OUTPATIENT
Start: 2024-02-22 | End: 2024-02-23 | Stop reason: HOSPADM

## 2024-02-22 RX ORDER — SPIRONOLACTONE 25 MG/1
12.5 TABLET ORAL DAILY
Status: DISCONTINUED | OUTPATIENT
Start: 2024-02-23 | End: 2024-02-23 | Stop reason: HOSPADM

## 2024-02-22 RX ORDER — INSULIN LISPRO 100 [IU]/ML
12 INJECTION, SOLUTION INTRAVENOUS; SUBCUTANEOUS
Status: DISCONTINUED | OUTPATIENT
Start: 2024-02-23 | End: 2024-02-23

## 2024-02-22 RX ADMIN — RIVAROXABAN 20 MG: 20 TABLET, FILM COATED ORAL at 22:36

## 2024-02-22 RX ADMIN — CARVEDILOL 6.25 MG: 6.25 TABLET, FILM COATED ORAL at 22:27

## 2024-02-22 RX ADMIN — INSULIN LISPRO 8 UNITS: 100 INJECTION, SOLUTION INTRAVENOUS; SUBCUTANEOUS at 11:15

## 2024-02-22 RX ADMIN — BUPROPION HYDROCHLORIDE 150 MG: 150 TABLET, EXTENDED RELEASE ORAL at 08:16

## 2024-02-22 RX ADMIN — ATORVASTATIN CALCIUM 40 MG: 20 TABLET, FILM COATED ORAL at 22:28

## 2024-02-22 RX ADMIN — INSULIN LISPRO 6 UNITS: 100 INJECTION, SOLUTION INTRAVENOUS; SUBCUTANEOUS at 17:19

## 2024-02-22 RX ADMIN — INSULIN GLARGINE 40 UNITS: 100 INJECTION, SOLUTION SUBCUTANEOUS at 22:28

## 2024-02-22 RX ADMIN — INSULIN LISPRO 8 UNITS: 100 INJECTION, SOLUTION INTRAVENOUS; SUBCUTANEOUS at 08:16

## 2024-02-22 RX ADMIN — FUROSEMIDE 40 MG: 40 TABLET ORAL at 14:55

## 2024-02-22 RX ADMIN — SODIUM ZIRCONIUM CYCLOSILICATE 10 G: 10 POWDER, FOR SUSPENSION ORAL at 02:50

## 2024-02-22 RX ADMIN — TRAZODONE HYDROCHLORIDE 50 MG: 50 TABLET ORAL at 22:28

## 2024-02-22 RX ADMIN — INSULIN LISPRO 9 UNITS: 100 INJECTION, SOLUTION INTRAVENOUS; SUBCUTANEOUS at 22:28

## 2024-02-22 RX ADMIN — INSULIN LISPRO 8 UNITS: 100 INJECTION, SOLUTION INTRAVENOUS; SUBCUTANEOUS at 11:13

## 2024-02-22 RX ADMIN — SERTRALINE HYDROCHLORIDE 100 MG: 50 TABLET, FILM COATED ORAL at 08:16

## 2024-02-22 RX ADMIN — INSULIN LISPRO 8 UNITS: 100 INJECTION, SOLUTION INTRAVENOUS; SUBCUTANEOUS at 17:21

## 2024-02-22 RX ADMIN — PANTOPRAZOLE SODIUM 40 MG: 40 TABLET, DELAYED RELEASE ORAL at 05:04

## 2024-02-22 ASSESSMENT — PAIN - FUNCTIONAL ASSESSMENT: PAIN_FUNCTIONAL_ASSESSMENT: 0-10

## 2024-02-22 ASSESSMENT — COGNITIVE AND FUNCTIONAL STATUS - GENERAL
MOBILITY SCORE: 24
DAILY ACTIVITIY SCORE: 24

## 2024-02-22 ASSESSMENT — PAIN SCALES - GENERAL
PAINLEVEL_OUTOF10: 0 - NO PAIN
PAINLEVEL_OUTOF10: 0 - NO PAIN

## 2024-02-22 NOTE — DISCHARGE INSTRUCTIONS
HEART FAILURE EDUCATION:  1. Weigh yourself daily and record on your weight log.  2. If you gain more than 2 or 3 pounds overnight, call your cardiologist.  3. Follow a low sodium diet. No more than 2000 mg in one day, or more than 650 mg per meal.  4. Limit total fluids to no more than 8 cups (or 2 liters) per day - this includes all fluids (water, coffee, juice, milk, tea, etc.)  5. Monitor your blood pressure daily and record on your weight log.  6. Call to schedule your follow-up appointments when you get home if they were not already scheduled for you.  7. Keep your follow-up appointments! Bring your weight log with you so the doctors can see your weight trend and blood pressure readings.  8. Be sure to  any new prescriptions and take them as directed. If unsure of the medications, be sure to call your cardiologist.  9. Stay as active as you can tolerate.   10. If you notice subtle change of symptoms (slight increase in swelling, slight shortness of breath, a new intolerance to laying flat, a new cough), be sure to call your cardiologist.  11. If you have any questions or concerns or you have not heard back from the cardiologist, feel free to call Narda Camarena heart failure navigator at [phone].

## 2024-02-22 NOTE — PROGRESS NOTES
Met with patient reviewed HgbA1C trends current value 15.6. Up from 12.8 (12/23). Patient reports running out of Lantus 40 units and once weekly Trulicity. Patient reports that due to uncertainty of daily routines short acting insulin with meals is difficult for him to follow. Patient requesting once daily Lantus and once weekly Trulicity at discharge along with Freestyle Nadir 3 sensors. Will notify care team.

## 2024-02-22 NOTE — PROGRESS NOTES
[facility] Progress Note               Rounding Cardiologist:  Aquiles Duarte MD, MD   Primary Cardiologist: Dr. Aquiles Duarte    Date:  2/22/2024  Patient:  Arthur Carranza  YOB: 1985  MRN:  [id number]   Admit Date:  2/20/2024      SUBJECTIVE    Arthur Carrazna was seen and examined today at bedside. He denies any chest pain or shortness of breath.   Maintaining sinus rhythm on telemetry  No new events  VITALS     Vitals:    02/21/24 2200 02/21/24 2300 02/22/24 0108 02/22/24 0735   BP:   116/69 101/59   BP Location:   Right arm    Patient Position:   Sitting    Pulse: 90 86 79 88   Resp:   18    Temp:   35.9 °C (96.6 °F) 36.1 °C (97 °F)   TempSrc:   Temporal    SpO2:   96% 97%   Weight:       Height:           Intake/Output Summary (Last 24 hours) at 2/22/2024 1247  Last data filed at 2/21/2024 1303  Gross per 24 hour   Intake 100 ml   Output --   Net 100 ml       [unfilled]    PHYSICAL EXAM   PConstitutional:       General: He is not in acute distress.     Appearance: Normal appearance. He is not ill-appearing, toxic-appearing or diaphoretic.   HENT:      Head: Normocephalic and atraumatic.      Mouth/Throat:      Mouth: Mucous membranes are moist.      Pharynx: No oropharyngeal exudate.   Eyes:      Extraocular Movements: Extraocular movements intact.      Pupils: Pupils are equal, round, and reactive to light.   Cardiovascular:      Rate and Rhythm: Normal rate and regular rhythm.      Heart sounds: No murmur heard.  Pulmonary:      Effort: Pulmonary effort is normal. No respiratory distress.      Breath sounds: Normal breath sounds. No wheezing.   Abdominal:      General: Abdomen is flat. Bowel sounds are normal. There is no distension.      Tenderness: There is no abdominal tenderness. There is no guarding or rebound.   Musculoskeletal:         General: No swelling.      Right lower leg: No edema.      Left lower leg: No edema.   Skin:     Findings: No lesion or rash.   Neurological:      General:  "No focal deficit present.      Mental Status: He is alert and oriented to person, place, and time.      Cranial Nerves: No cranial nerve deficit.      Motor: No weakness.   Psychiatric:         Mood and Affect: Mood normal.         Behavior: Behavior normal.     DIAGNOSTIC RESULTS   EKG:     Telemetry: Sinus rhythm.      LAB DATA   BMP:  @LABRCNT(Na:3,K:3,CL:3,CO2:3,Bun:3,Creatinine:3,Glu:3, CA:3,LABGLOM)@    Cardiac Enzymes:  @LABRCNT(CKTOTAL:3,CKMB:3,CKMBINDEX:3,TROPONINI:3)@    CBC:   Lab Results   Component Value Date    WBC 5.6 02/22/2024    RBC 4.61 02/22/2024    HGB 14.4 02/22/2024    HCT 42.5 02/22/2024     02/22/2024       CMP:    Lab Results   Component Value Date     (L) 02/22/2024    K 4.1 02/22/2024    CL 90 (L) 02/22/2024    CO2 31 02/22/2024    BUN 30 (H) 02/22/2024    CREATININE 1.60 (H) 02/22/2024    GLUCOSE 308 (H) 02/22/2024    CALCIUM 9.3 02/22/2024       Hepatic Function Panel:    Lab Results   Component Value Date    ALKPHOS 74 02/21/2024    ALT 15 02/21/2024    AST 18 02/21/2024    PROT 7.8 02/21/2024    BILITOT 1.1 02/21/2024       Magnesium:    Lab Results   Component Value Date    MG 2.51 (H) 02/21/2024       PT/INR:  No results found for: \"PROTIME\", \"INR\"  @LABRCNT(inr:3)@     HgBA1c:  No components found for: \"LABA1C\"    Lipid Profile:  No results found for: \"CHLPL\", \"TRIG\", \"HDL\", \"LDLCALC\", \"LDLDIRECT\"    TSH:  No results found for: \"TSH\"    ABG:  No results found for: \"PH\"    PRO-BNP: No results found for: \"PROBNP\"       RADIOLOGY     XR chest 1 view   Final Result   No interval change or acute disease in the chest        MACRO:   None        Signed by: Pete Vigil 2/21/2024 9:41 AM   Dictation workstation:   [id number]      Transthoracic Echo (TTE) Complete   Final Result      XR chest 1 view   Final Result   Cardiomegaly without evidence of acute disease in the chest.             MACRO:   None.        Signed by: Christian Villagomez 2/20/2024 1:52 AM   Dictation " workstation:   [id number]          @[facility]@      CURRENT MEDICATIONS    atorvastatin, 40 mg, oral, Nightly  buPROPion XL, 150 mg, oral, Daily  [Held by provider] carvedilol, 6.25 mg, oral, BID  [Held by provider] furosemide, 40 mg, intravenous, q12h  insulin glargine, 30 Units, subcutaneous, Nightly  insulin lispro, 0-10 Units, subcutaneous, TID with meals  insulin lispro, 8 Units, subcutaneous, TID with meals  [Held by provider] metOLazone, 5 mg, oral, Daily  morphine, 2 mg, intravenous, Once  pantoprazole, 40 mg, oral, Daily   Or  pantoprazole, 40 mg, intravenous, Daily  rivaroxaban, 20 mg, oral, Nightly  [Held by provider] sacubitriL-valsartan, 1 tablet, oral, BID  sertraline, 100 mg, oral, Daily  [Held by provider] spironolactone, 25 mg, oral, Daily  traZODone, 50 mg, oral, Nightly             ASSESSMENT   Principal Problem:    Congestive heart failure, unspecified HF chronicity, unspecified heart failure type (CMS/[facility])  Active Problems:    Nonischemic cardiomyopathy (CMS/[facility])    Major depression, recurrent (CMS/[facility])    Type 2 diabetes mellitus with hyperglycemia (CMS/[facility])        Patient Active Problem List   Diagnosis    ACC/AHA stage C systolic congestive heart failure (CMS/[facility])    CHF (congestive heart failure) (CMS/[facility])    Current every day smoker    Type 2 diabetes mellitus with neurologic complication, with long-term current use of insulin (CMS/[facility])    Edema    Neck abscess    Nonischemic cardiomyopathy (CMS/[facility])    Perirectal abscess    Primary hypertension    Ventricular bigeminy seen on cardiac monitor    Bipolar 1 disorder (CMS/[facility])    Morbid obesity with BMI of 45.0-49.9, adult (CMS/[facility])    Acute ischemic left MCA stroke (CMS/[facility])    Cocaine use    Major depression, recurrent (CMS/[facility])    PTSD (post-traumatic stress disorder)    Acute on chronic combined systolic and diastolic CHF (congestive heart failure) (CMS/[facility])    Type 2 diabetes mellitus with hyperglycemia (CMS/[facility])    Severe episode of  recurrent major depressive disorder, without psychotic features (CMS/HCC)    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  Refused AICD in the past.  Acute on Chronic systolic heart failure. On GDMT.  Cardiology following.  Benign essential hypertension.  Stable.  IDDM 2.  Uncontrolled.  Managed per primary service.  Remote embolic CVA  7.   Cocaine abuse.  Last use in the past week.  8.   Noncompliance    PLAN     Continue with current medical therapy per primary cardiology team  No LifeVest at the time of discharge  Patient will be seen my office in next 4 to 6 weeks after discharge from the hospital for reevaluation for ICD.  Baseline EKG sometimes with presence of left bundle branch block.  He may benefit of biventricular ICD system 2.    Aquiles Duarte MD      Please do not hesitate to call with questions.  Electronically signed by Aquiles Duarte MD, FACC on 2/22/2024 at 12:47 PM

## 2024-02-22 NOTE — PROGRESS NOTES
Assessment/Plan     Diabetes Mellitus -2 uncontrolled  On Lantus 30 units every day and Lispro 6 units/meal and scale   Elevated glucoses   PLAN Increse Lantus 40 units every day   Increase Lispro and scale    Reason For Consult  Diabetes    History Of Present Illness  Arthur Carranza is a 38 y.o. male with  has a past medical history of CHF (congestive heart failure) (CMS/[facility]), Diabetes mellitus (CMS/[facility]), Heart disease, Hypertension, and Substance abuse (CMS/[facility]). presenting with uncontrolled diabetes.  Arthur Carranza is a 38 y.o. male presenting with shortness of breath.  Patient described dyspnea with exertion, orthopnea and paroxysmal nocturnal dyspnea.  Patient also reported was not able to urinate like usual despite using diuretics.  He feels chest congestion.  Denies runny nose, sore throat, sick contact, diarrhea.  He also described dry heaves.  Patient has history of congestive heart failure with severely reduced ejection fraction.  He does not have ICD or LifeVest.     ER course: Patient was awake, alert, oriented, looked ill and restless.  Patient was tachycardic , mildly tachypneic 21.  He was maintaining saturation on room air.  His labs shows mildly elevated proBNP 259 mild hypomagnesemia 1.5.  Patient does have mild acute kidney injury with creatinine 1.4 from baseline normal.  There is no leukocytosis.  Blood glucose level was 409 but does not meet any criteria for DKA.  Chest x-ray shows cardiomegaly but no clear evidence of pulmonary edema.  Patient was sitting in bed trying to avoid lying down to avoid worsening shortness of breath.  Patient is admitted for further workup and management.    FS glucose             Random glucose  312  308        Fasting glucose            Blood glucose            POCT glucose  439 323 315 342 265      Potassium    Potassium, Blood  4.6  4.1        Medication Dosing      insulin glargine,hum.rec.anlog SUBQ (Units)   20         insulin lispro SUBQ (Units)  10  6  24 14      insulin regular, human INJ (Units)  10              Past Medical History  He has a past medical history of CHF (congestive heart failure) (CMS/[facility]), Diabetes mellitus (CMS/[facility]), Heart disease, Hypertension, and Substance abuse (CMS/[facility]).    Surgical History  He has a past surgical history that includes Other surgical history (07/21/2022); Other surgical history (10/25/2022); and MR angio head wo IV contrast (12/29/2022).     Social History  He reports that he has quit smoking. His smoking use included cigarettes. He does not have any smokeless tobacco history on file. He reports current alcohol use. Drug use questions deferred to the physician.    Family History  Family History   Adopted: Yes     No current facility-administered medications on file prior to encounter.     Current Outpatient Medications on File Prior to Encounter   Medication Sig Dispense Refill    atorvastatin (Lipitor) 40 mg tablet Take 1 tablet (40 mg) by mouth once daily at bedtime.      buPROPion XL (Wellbutrin XL) 150 mg 24 hr tablet Take 1 tablet (150 mg) by mouth once daily.      dapagliflozin (Farxiga) 10 mg Take 1 tablet (10 mg) by mouth once daily.      dulaglutide 3 mg/0.5 mL pen injector Inject 1 Application under the skin 1 (one) time per week.      FreeStyle Nadir sensor system (FreeStyle Nadir 2 Sensor) kit Use as instructed 1 each 0    insulin lispro (HumaLOG) 100 unit/mL injection Inject 0.08 mL (8 Units) under the skin 3 times a day before meals.      insulin NPH, Isophane, (HumuLIN N,NovoLIN N) 100 unit/mL (3 mL) injection Inject 8 Units under the skin 3 times a day before meals.      metFORMIN (Glucophage) 1,000 mg tablet Take 1 tablet (1,000 mg) by mouth 2 times a day.      metoprolol succinate XL (Toprol-XL) 25 mg 24 hr tablet Take 1 tablet (25 mg) by mouth once daily.      rivaroxaban (Xarelto) 20 mg tablet Take 1 tablet (20 mg) by mouth once daily. Take with food.      sacubitriL-valsartan (Entresto) 24-26 mg  tablet Take 1 tablet by mouth 2 times a day.      sertraline (Zoloft) 100 mg tablet Take 1 tablet (100 mg) by mouth once daily.      spironolactone (Aldactone) 25 mg tablet Take 1 tablet (25 mg) by mouth once daily.      torsemide (Demadex) 20 mg tablet Take 1 tablet (20 mg) by mouth once daily.      traZODone (Desyrel) 50 mg tablet Take 1 tablet (50 mg) by mouth once daily at bedtime.      True Metrix Glucose Test Strip strip USE TO CHECK BLOOD SUGAR FOUR TIMES DAILY      Trulicity 4.5 mg/0.5 mL pen injector Inject 4.5 mg under the skin 1 (one) time per week.      Ultra Thin Lancets 30 gauge misc TEST BLOOD SUGAR FOUR TIMES DAILY AS DIRECTED       Current Facility-Administered Medications   Medication Dose Route Frequency Provider Last Rate Last Admin    acetaminophen (Tylenol) tablet 650 mg  650 mg oral q4h PRN Carolyn Malhotra MD   650 mg at 02/20/24 0940    Or    acetaminophen (Tylenol) oral liquid 650 mg  650 mg nasogastric tube q4h PRN Carolyn Malhorta MD        Or    acetaminophen (Tylenol) suppository 650 mg  650 mg rectal q4h PRN Carolyn Malhotra MD        atorvastatin (Lipitor) tablet 40 mg  40 mg oral Nightly Carolyn Malhotra MD   40 mg at 02/21/24 2035    buPROPion XL (Wellbutrin XL) 24 hr tablet 150 mg  150 mg oral Daily Carolyn Malhotra MD   150 mg at 02/22/24 0816    carvedilol (Coreg) tablet 6.25 mg  6.25 mg oral BID GEE Galaviz   6.25 mg at 02/20/24 2204    dextrose 10 % in water (D10W) infusion  0.3 g/kg/hr intravenous Once PRN Merry Chaney MD        dextrose 50 % injection 25 g  25 g intravenous q15 min PRN Merry Chaney MD        furosemide (Lasix) tablet 40 mg  40 mg oral Daily GEE Galaviz   40 mg at 02/22/24 1455    glucagon (Glucagen) injection 1 mg  1 mg intramuscular q15 min PRN Merry Chaney MD        insulin glargine (Lantus) injection 30 Units  30 Units subcutaneous Nightly Merry Chaney MD        insulin lispro (HumaLOG) injection 0-10 Units   0-10 Units subcutaneous TID with meals Merry Chaney MD   6 Units at 02/22/24 1719    insulin lispro (HumaLOG) injection 8 Units  8 Units subcutaneous TID with meals Merry Chaney MD   8 Units at 02/22/24 1721    melatonin tablet 6 mg  6 mg oral Nightly PRN Carolyn Malhotra MD        [Held by provider] metOLazone (Zaroxolyn) tablet 5 mg  5 mg oral Daily Carolyn Malhotra MD   5 mg at 02/20/24 0940    ondansetron (Zofran) tablet 4 mg  4 mg oral q8h PRN Carolyn Malhotra MD        Or    ondansetron (Zofran) injection 4 mg  4 mg intravenous q8h PRN Carolyn Malhotra MD   4 mg at 02/21/24 1046    pantoprazole (ProtoNix) EC tablet 40 mg  40 mg oral Daily Carolyn Malhotra MD   40 mg at 02/22/24 0504    Or    pantoprazole (ProtoNix) injection 40 mg  40 mg intravenous Daily Carolyn Malhotra MD   40 mg at 02/20/24 0558    polyethylene glycol (Glycolax, Miralax) packet 17 g  17 g oral Daily PRN Carolyn Malhotra MD        rivaroxaban (Xarelto) tablet 20 mg  20 mg oral Nightly Carolyn Malhotra MD   20 mg at 02/21/24 2035    [Held by provider] sacubitriL-valsartan (Entresto) 24-26 mg per tablet 1 tablet  1 tablet oral BID Carolyn Malhotra MD   1 tablet at 02/20/24 2204    sertraline (Zoloft) tablet 100 mg  100 mg oral Daily Carolyn Malhotra MD   100 mg at 02/22/24 0816    [START ON 2/23/2024] spironolactone (Aldactone) tablet 12.5 mg  12.5 mg oral Daily ARIAN Galaviz-CNP        traZODone (Desyrel) tablet 50 mg  50 mg oral Nightly Carolyn Malhotra MD   50 mg at 02/21/24 2100          Allergies  Shellfish containing products    Review of Systems  Per hpi    Past Medical History:   Diagnosis Date    CHF (congestive heart failure) (CMS/HCC)     Diabetes mellitus (CMS/HCC)     Heart disease     Hypertension     Substance abuse (CMS/HCC)        Past Surgical History:   Procedure Laterality Date    MR HEAD ANGIO WO IV CONTRAST  12/29/2022    MR HEAD ANGIO WO IV CONTRAST 12/29/2022 DOCTOR OFFICE LEGACY    OTHER SURGICAL HISTORY  07/21/2022  "   Cyst excision    OTHER SURGICAL HISTORY  10/25/2022    Cardiac catheterization       Social History     Socioeconomic History    Marital status:      Spouse name: Not on file    Number of children: Not on file    Years of education: Not on file    Highest education level: Not on file   Occupational History    Not on file   Tobacco Use    Smoking status: Former     Types: Cigarettes    Smokeless tobacco: Not on file   Vaping Use    Vaping Use: Unknown   Substance and Sexual Activity    Alcohol use: Yes     Comment: occasionaly    Drug use: Defer    Sexual activity: Defer   Other Topics Concern    Not on file   Social History Narrative    Not on file     Social Determinants of Health     Financial Resource Strain: Medium Risk (2/20/2024)    Overall Financial Resource Strain (CARDIA)     Difficulty of Paying Living Expenses: Somewhat hard   Food Insecurity: Not on file   Transportation Needs: Unmet Transportation Needs (2/20/2024)    PRAPARE - Transportation     Lack of Transportation (Medical): Yes     Lack of Transportation (Non-Medical): Yes   Physical Activity: Not on file   Stress: Not on file   Social Connections: Not on file   Intimate Partner Violence: Not on file   Housing Stability: High Risk (2/20/2024)    Housing Stability Vital Sign     Unable to Pay for Housing in the Last Year: Yes     Number of Places Lived in the Last Year: 2     Unstable Housing in the Last Year: No        Physical Exam   deferred  ROS, PMH, FH/SH, surgical history and allergies have been reviewed.    Last Recorded Vitals  Blood pressure (!) 152/97, pulse 92, temperature 36.2 °C (97.2 °F), resp. rate 18, height 1.753 m (5' 9\"), weight 136 kg (300 lb), SpO2 96 %.    Relevant Results  Results from last 7 days   Lab Units 02/22/24  1607 02/22/24  1044 02/22/24  0624 02/22/24  0533 02/21/24  2029 02/21/24  1550 02/21/24  1411 02/21/24  1150 02/21/24  0958 02/21/24  0532 02/21/24  0134 02/20/24  0555 02/20/24  0528   POCT GLUCOSE " mg/dL 265* 342* 315*  --  323* 276*  --    < >  --    < >  --    < >  --    GLUCOSE mg/dL  --   --   --  308*  --   --  312*  --  471*  --  92  --  412*    < > = values in this interval not displayed.     Lab Results   Component Value Date    HGBA1C 15.3 (H) 02/01/2024          Edilberto Guzman MD FACE  Office phone - [phone]  Fax - [phone]  Address: [address] [facility] [address]  Address: [address] [address] [address]  2/22/2024  6:58 PM

## 2024-02-22 NOTE — NURSING NOTE
Patient in ST [facility].  Upon entering room, patient screaming into phone, speaking with CPS regarding children.  Patient encouraged to take deep breaths.  CHASIDY Orozco CNP at bedside, aware.

## 2024-02-22 NOTE — PROGRESS NOTES
"Daily progress note     Arthur Carranza is 38 y.o. male with past medical history of nonischemic cardiomyopathy, EF of 15%, chronic systolic heart failure, type 2 diabetes, uncontrolled, hypertension, substance use disorder, history of cocaine abuse presented to the ED with dyspnea.  Patient had mildly elevated proBNP was tachycardic was admitted for possible CHF exacerbation  Cardiology and electrophysiology consulted  On 2/21 overnight patient had vasovagal episode where he was hypotensive with systolic blood pressure in the 50s as well as blood glucose within the 90s for the patient was having symptoms of hypoglycemia  Hypoglycemia resolved and today hyperglycemic  During the day of 2/21 rapid response was called as the patient was complaining of some chest pain chest tightness and blood pressure was borderline low     Subjective  Patient was seen and examined at bedside  Today he reports no chest pain or shortness of breath  Denies palpitation dizziness lightheadedness      Vital signs in last 24 hours:  Temp:  [35.1 °C (95.2 °F)-36.1 °C (97 °F)] 36.1 °C (97 °F)  Heart Rate:  [76-90] 88  Resp:  [18] 18  BP: (101-118)/(59-75) 101/59  /59   Pulse 88   Temp 36.1 °C (97 °F)   Resp 18   Ht 1.753 m (5' 9\")   Wt 136 kg (300 lb)   SpO2 97%   BMI 44.30 kg/m²    Intake/Output last 3 shifts:  I/O last 3 completed shifts:  In: 100 (0.7 mL/kg) [I.V.:100 (0.7 mL/kg)]  Out: - (0 mL/kg)   Weight: 136.1 kg   Intake/Output this shift:  No intake/output data recorded.    Physical Exam  Constitutional:       General: He is not in acute distress.     Appearance: Normal appearance. He is not ill-appearing, toxic-appearing or diaphoretic.   HENT:      Head: Normocephalic and atraumatic.      Mouth/Throat:      Mouth: Mucous membranes are moist.      Pharynx: No oropharyngeal exudate.   Eyes:      Extraocular Movements: Extraocular movements intact.      Pupils: Pupils are equal, round, and reactive to light. "   Cardiovascular:      Rate and Rhythm: Normal rate and regular rhythm.      Heart sounds: No murmur heard.  Pulmonary:      Effort: Pulmonary effort is normal. No respiratory distress.      Breath sounds: Normal breath sounds. No wheezing.   Abdominal:      General: Abdomen is flat. Bowel sounds are normal. There is no distension.      Tenderness: There is no abdominal tenderness. There is no guarding or rebound.   Musculoskeletal:         General: No swelling.      Right lower leg: No edema.      Left lower leg: No edema.   Skin:     Findings: No lesion or rash.   Neurological:      General: No focal deficit present.      Mental Status: He is alert and oriented to person, place, and time.      Cranial Nerves: No cranial nerve deficit.      Motor: No weakness.   Psychiatric:         Mood and Affect: Mood normal.         Behavior: Behavior normal.         Current medication   Current Facility-Administered Medications   Medication Dose Route Frequency Provider Last Rate Last Admin    acetaminophen (Tylenol) tablet 650 mg  650 mg oral q4h PRN Carolyn Malhotra MD   650 mg at 02/20/24 0940    Or    acetaminophen (Tylenol) oral liquid 650 mg  650 mg nasogastric tube q4h PRN Carolyn Malhotra MD        Or    acetaminophen (Tylenol) suppository 650 mg  650 mg rectal q4h PRN Carolyn Malhotra MD        atorvastatin (Lipitor) tablet 40 mg  40 mg oral Nightly Carolyn Malhotra MD   40 mg at 02/21/24 2035    buPROPion XL (Wellbutrin XL) 24 hr tablet 150 mg  150 mg oral Daily Carolyn Malhotra MD   150 mg at 02/22/24 0816    [Held by provider] carvedilol (Coreg) tablet 6.25 mg  6.25 mg oral BID GEE Galaviz   6.25 mg at 02/20/24 2204    dextrose 10 % in water (D10W) infusion  0.3 g/kg/hr intravenous Once PRN Merry Chaney MD        dextrose 50 % injection 25 g  25 g intravenous q15 min PRN Merry Chaney MD        [Held by provider] furosemide (Lasix) injection 40 mg  40 mg intravenous q12h Carolyn Malhotra MD   40 mg  at 02/20/24 2203    glucagon (Glucagen) injection 1 mg  1 mg intramuscular q15 min PRN Merry Chaney MD        insulin glargine (Lantus) injection 30 Units  30 Units subcutaneous Nightly Merry Chaney MD        insulin lispro (HumaLOG) injection 0-10 Units  0-10 Units subcutaneous TID with meals Merry Chaney MD   8 Units at 02/22/24 1115    insulin lispro (HumaLOG) injection 8 Units  8 Units subcutaneous TID with meals Merry Chaney MD   8 Units at 02/22/24 1113    melatonin tablet 6 mg  6 mg oral Nightly PRN Carolyn Malhotra MD        [Held by provider] metOLazone (Zaroxolyn) tablet 5 mg  5 mg oral Daily Carolyn Malhotra MD   5 mg at 02/20/24 0940    morphine injection 2 mg  2 mg intravenous Once Merry Chaney MD        ondansetron (Zofran) tablet 4 mg  4 mg oral q8h PRN Carolyn Malhotra MD        Or    ondansetron (Zofran) injection 4 mg  4 mg intravenous q8h PRN Carolyn Malhotra MD   4 mg at 02/21/24 1046    pantoprazole (ProtoNix) EC tablet 40 mg  40 mg oral Daily Carolyn Malhotra MD   40 mg at 02/22/24 0504    Or    pantoprazole (ProtoNix) injection 40 mg  40 mg intravenous Daily Carolyn Malhotra MD   40 mg at 02/20/24 0558    polyethylene glycol (Glycolax, Miralax) packet 17 g  17 g oral Daily PRN Carolyn Malhotra MD        rivaroxaban (Xarelto) tablet 20 mg  20 mg oral Nightly Carolyn Malhotra MD   20 mg at 02/21/24 2035    [Held by provider] sacubitriL-valsartan (Entresto) 24-26 mg per tablet 1 tablet  1 tablet oral BID Carolyn Malhotra MD   1 tablet at 02/20/24 2204    sertraline (Zoloft) tablet 100 mg  100 mg oral Daily Carolyn Malhotra MD   100 mg at 02/22/24 0816    [Held by provider] spironolactone (Aldactone) tablet 25 mg  25 mg oral Daily Carolyn Malhotra MD   25 mg at 02/20/24 0941    traZODone (Desyrel) tablet 50 mg  50 mg oral Nightly Carolyn Malhotra MD   50 mg at 02/21/24 2100        Labs  Lab Results   Component Value Date    WBC 5.6 02/22/2024    HGB 14.4 02/22/2024    HCT 42.5 02/22/2024     MCV 92 02/22/2024     02/22/2024     Lab Results   Component Value Date    HGBA1C 15.3 (H) 02/01/2024     Lab Results   Component Value Date    GLUCOSE 308 (H) 02/22/2024    CALCIUM 9.3 02/22/2024     (L) 02/22/2024    K 4.1 02/22/2024    CO2 31 02/22/2024    CL 90 (L) 02/22/2024    BUN 30 (H) 02/22/2024    CREATININE 1.60 (H) 02/22/2024           Principal Problem:    Congestive heart failure, unspecified HF chronicity, unspecified heart failure type (CMS/[facility])  Active Problems:    Nonischemic cardiomyopathy (CMS/[facility])    Major depression, recurrent (CMS/[facility])    Type 2 diabetes mellitus with hyperglycemia (CMS/[facility])      Assessment and Plan   #Nonischemic cardiomyopathy EF 15%  #History of polysubstance abuse  #History of cocaine use last use about a week ago  #Former smoker  #Acute on chronic systolic heart failure  #MICHAELA  #Hyperkalemia  #Borderline low blood pressure     Continue telemetry monitoring  Hold off on Entresto, Aldactone, Lasix and sepsis time since blood pressure is borderline low also hyperkalemic  Will reinitiate medications as tolerated once blood pressure is better  Cardiology is on board  EP consulted        #Type 2 diabetes  Uncontrolled, hemoglobin A1c 15  Continue Lantus and lispro sliding scale  Blood glucose to 120 , patient was having hypoglycemic symptoms        #History of embolic stroke on Xarelto  #History of hypertension     Continue Xarelto  Hold off on antihypertensives        DVT prophylaxis on Xarelto  2/22/2024  Patient was seen and examined at bedside  Blood pressure improved but still continues to be borderline low  Hyperglycemia noted a new insulin adjusted  Continue antihypertensives on hold and will slowly reinitiate as tolerated  EP consulted for evaluation for ICD placement  Cardiology is on board  Endocrine consulted for uncontrolled type 2 diabetes  Patient continuesHyperkalemia resolved after treatment yesterday   MICHAELA creatinine at 1.6 today,  Hyperglycemia and  sodium of 129 corrected sodium 132 for hyperglycemia    Discharge plan is home once medically stable     LOS: 2 days

## 2024-02-22 NOTE — PROGRESS NOTES
In summary,  [facility] Heart Progress Note           Rounding WARD/Cardiologist:  Kwame Orozco, ARIAN-SUHAIL, Dr. Kash Andrews  Primary Cardiologist:  Dr. Jurado     Date:  2/22/2024  Patient:  Arthur Carranza  YOB: 1985  MRN:  [id number]   Admit Date:  2/20/2024      SUBJECTIVE:    2/22/24  Patient is extremely irritated right now he does not want to talk to staff he is having family disagreement he denies having chest pain at this present time  Telemetry is normal sinus rhythm sinus tach with occasional PVC  EKG NSR no acute changes    2/21/24  Patient states that today he felt like his blood pressure was low he was not feeling like himself they did call a doctor stat they gave him 250 cc of normal saline bolus he feels much better now  tele normal sinus rhythm with occasional PVC    2/20/24  Arthur Carranza is a 38 y.o.  male patient who is being at the request of Dr. Hensley for inpatient consultation of CHF. He was admitted on 2/20/2024.  Previous [facility] and [facility] records have been reviewed in detail.    Patient with a history of nonischemic cardiomyopathy, chronic systolic heart failure, CVA, hypertension, diabetes, anxiety, bipolar, morbid obesity came into the emergency department complaining of shortness of breath that has progressively worsened over the past 1 week.  He states usually when he started to feel shortness of breath he takes a dose of torsemide start to urinate then he gets better but this time he was not able to urinate he is usually in [place] but he is out this area due to family reasons he states he came in through the emergency department they admitted him to the 11th floor and kindly asked us to get involved with his case.  So he is positive for shortness of breath, PND, fatigue.     Denies chest pain, fever, chills, orthopnea, claudication  Patient states that he is well aware of his low ejection fraction that he has been spoke to many times and he is  refused the AICD     EKG sinus tachycardia no acute changes     Cardiac history  10/21/22  1. The entire Left Main: 0% stenosis.   2. Entire LAD Lesion: The percent stenosis is 0%.   3. Entire CX Lesion: The percent stenosis is 0%.   4. The entire RCA Lesion: The percent stenosis is 0%.         VITALS:     Vitals:    02/22/24 0735 02/22/24 1355 02/22/24 1400 02/22/24 1404   BP: 101/59   (!) 152/97   BP Location:       Patient Position:       Pulse: 88 (!) 116 (!) 128 92   Resp:       Temp: 36.1 °C (97 °F)   36.2 °C (97.2 °F)   TempSrc:       SpO2: 97%   96%   Weight:       Height:         No intake or output data in the 24 hours ending 02/22/24 1422    Wt Readings from Last 4 Encounters:   02/20/24 136 kg (300 lb)   01/31/24 134 kg (296 lb 4.8 oz)   12/03/23 138 kg (303 lb 12.7 oz)   07/20/23 138 kg (303 lb 5 oz)       CURRENT HOSPITAL MEDICATIONS:   atorvastatin, 40 mg, oral, Nightly  buPROPion XL, 150 mg, oral, Daily  [Held by provider] carvedilol, 6.25 mg, oral, BID  [Held by provider] furosemide, 40 mg, intravenous, q12h  insulin glargine, 30 Units, subcutaneous, Nightly  insulin lispro, 0-10 Units, subcutaneous, TID with meals  insulin lispro, 8 Units, subcutaneous, TID with meals  [Held by provider] metOLazone, 5 mg, oral, Daily  pantoprazole, 40 mg, oral, Daily   Or  pantoprazole, 40 mg, intravenous, Daily  rivaroxaban, 20 mg, oral, Nightly  [Held by provider] sacubitriL-valsartan, 1 tablet, oral, BID  sertraline, 100 mg, oral, Daily  [Held by provider] spironolactone, 25 mg, oral, Daily  traZODone, 50 mg, oral, Nightly         Current Outpatient Medications   Medication Instructions    atorvastatin (Lipitor) 40 mg tablet 1 tablet, oral, Nightly    buPROPion XL (WELLBUTRIN XL) 150 mg, oral, Daily    dapagliflozin (Farxiga) 10 mg 1 tablet, oral, Daily    dulaglutide 3 mg/0.5 mL pen injector 1 Application, subcutaneous, Weekly    FreeStyle Nadir sensor system (FreeStyle Naidr 2 Sensor) kit Use as instructed     insulin lispro (HUMALOG) 8 Units, subcutaneous, 3 times daily before meals    insulin NPH (Isophane) (HUMULIN N,NOVOLIN N) 8 Units, subcutaneous, 3 times daily before meals    metFORMIN (Glucophage) 1,000 mg tablet 1 tablet, oral, 2 times daily    metoprolol succinate XL (Toprol-XL) 25 mg 24 hr tablet 1 tablet, oral, Daily    rivaroxaban (Xarelto) 20 mg tablet 1 tablet, oral, Daily, Take with food.    sacubitriL-valsartan (Entresto) 24-26 mg tablet 1 tablet, oral, 2 times daily    sertraline (ZOLOFT) 100 mg, oral, Daily    spironolactone (Aldactone) 25 mg tablet 1 tablet, oral, Daily    torsemide (Demadex) 20 mg tablet 1 tablet, oral, Daily    traZODone (DESYREL) 50 mg, oral, Nightly    True Metrix Glucose Test Strip strip USE TO CHECK BLOOD SUGAR FOUR TIMES DAILY    Trulicity 4.5 mg, subcutaneous, Weekly    Ultra Thin Lancets 30 gauge misc TEST BLOOD SUGAR FOUR TIMES DAILY AS DIRECTED        PHYSICAL EXAMINATION:   GENERAL:  Well developed, well nourished, in no acute distress.  CHEST:  Symmetric and nontender.  NEURO/PSYCH:  Alert and oriented times three with approppriate behavior and responses.  NECK:  Supple, no JVD, no bruit.  LUNGS:  Clear to auscultation bilaterally, normal respiratory effort.  HEART:  Rate and rhythm regular with no evident murmur, no gallop appreciated.        There are no rubs, clicks or heaves.  EXTREMITIES:  Warm with good color, no clubbing or cyanosis.  There is no edema noted.  PERIPHERAL VASCULAR:  Pulses present and equally palpable; 2+ throughout.      LAB DATA:     CBC:   Results from last 7 days   Lab Units 02/22/24  0532 02/21/24  0958 02/21/24  0134   WBC AUTO x10*3/uL 5.6 6.7 5.2   RBC AUTO x10*6/uL 4.61 5.07 4.92   HEMOGLOBIN g/dL 14.4 16.1 15.6   HEMATOCRIT % 42.5 46.1 45.6   MCV fL 92 91 93   MCH pg 31.2 31.8 31.7   MCHC g/dL 33.9 34.9 34.2   RDW % 12.5 12.4 12.4   PLATELETS AUTO x10*3/uL 228 791 246     CMP:    Results from last 7 days   Lab Units 02/22/24  0501  02/21/24  1411 02/21/24  0958 02/21/24  0134 02/20/24  0528   SODIUM mmol/L 129* 126* 121* 133* 130*   POTASSIUM mmol/L 4.1 4.6 6.8* 3.6 4.4   CHLORIDE mmol/L 90* 91* 87* 92* 90*   CO2 mmol/L 31 28 24 32 28   BUN mg/dL 30* 29* 32* 27* 26*   CREATININE mg/dL 1.60* 1.56* 1.75* 1.78* 1.54*   GLUCOSE mg/dL 308* 312* 471* 92 412*   PROTEIN TOTAL g/dL  --   --   --  7.8 7.9   CALCIUM mg/dL 9.3 9.4 8.9 9.6 9.3   BILIRUBIN TOTAL mg/dL  --   --   --  1.1 0.9   ALK PHOS U/L  --   --   --  74 71   AST U/L  --   --   --  18 17   ALT U/L  --   --   --  15 15     BMP:    Results from last 7 days   Lab Units 02/22/24  0533 02/21/24  1411 02/21/24  0958   SODIUM mmol/L 129* 126* 121*   POTASSIUM mmol/L 4.1 4.6 6.8*   CHLORIDE mmol/L 90* 91* 87*   CO2 mmol/L 31 28 24   BUN mg/dL 30* 29* 32*   CREATININE mg/dL 1.60* 1.56* 1.75*   CALCIUM mg/dL 9.3 9.4 8.9   GLUCOSE mg/dL 308* 312* 471*     Magnesium:  Results from last 7 days   Lab Units 02/21/24  0134 02/20/24  0126   MAGNESIUM mg/dL 2.51* 1.59*     Troponin:    Results from last 7 days   Lab Units 02/21/24  0958 02/20/24  0126   TROPHS ng/L 67* 65*     BNP:   Results from last 7 days   Lab Units 02/20/24  0126   BNP pg/mL 259*     Lipid Panel:         DIAGNOSTIC TESTING:   @No results found for this or any previous visit.    ECG 12 Lead    Result Date: 2/21/2024  Normal sinus rhythm Possible Left atrial enlargement Left axis deviation Left bundle branch block Abnormal ECG When compared with ECG of 21-FEB-2024 07:25, (unconfirmed) Left bundle branch block is now Present    XR chest 1 view    Result Date: 2/21/2024  Interpreted By:  Pete Vigil, STUDY: XR CHEST 1 VIEW;  2/21/2024 9:34 am   INDICATION: Signs/Symptoms:worsening SOB.   COMPARISON: Portable chest, 20 February 2024   ACCESSION NUMBER(S): [id number]   ORDERING CLINICIAN: JEANNE MCMILLAN   TECHNIQUE: Single frontal view of the chest; Portable technique   FINDINGS: Enlarged cardiac silhouette is unchanged   No  consolidative lung opacity   No edema / failure   No large pleural effusion or demonstrable pneumothorax       No interval change or acute disease in the chest   MACRO: None   Signed by: Pete Vigil 2/21/2024 9:41 AM Dictation workstation:   [id number]    ECG 12 Lead    Result Date: 2/21/2024  Normal sinus rhythm Possible Left atrial enlargement Left axis deviation Left ventricular hypertrophy with QRS widening and repolarization abnormality Abnormal ECG When compared with ECG of 21-FEB-2024 01:19, (unconfirmed) Premature ventricular complexes are no longer Present    ECG 12 lead    Result Date: 2/21/2024  Sinus rhythm with sinus arrhythmia with occasional Premature ventricular complexes Possible Left atrial enlargement Left axis deviation Left ventricular hypertrophy with QRS widening T wave abnormality, consider lateral ischemia Abnormal ECG When compared with ECG of 20-FEB-2024 01:05, (unconfirmed) Premature ventricular complexes are now Present T wave amplitude has decreased in Inferior leads T wave inversion more evident in Lateral leads    ECG 12 lead    Result Date: 2/20/2024  Sinus tachycardia Possible Left atrial enlargement Left axis deviation Left ventricular hypertrophy with QRS widening and repolarization abnormality Abnormal ECG When compared with ECG of 01-FEB-2024 06:43, T wave amplitude has increased in Inferior leads T wave inversion less evident in Lateral leads    XR chest 1 view    Result Date: 2/20/2024  Interpreted By:  Christian Villagomez, STUDY: XR CHEST 1 VIEW;  2/20/2024 1:40 am   INDICATION: Signs/Symptoms:sob.   COMPARISON: 01/31/2024   ACCESSION NUMBER(S): [id number]   ORDERING CLINICIAN: WADE ELIZABETH   FINDINGS:     Cardiomegaly. The pulmonary vasculature is within normal limits. No consolidation, pleural effusion or pneumothorax.         Cardiomegaly without evidence of acute disease in the chest.     MACRO: None.   Signed by: Christian Villagomez 2/20/2024 1:52 AM Dictation  workstation:   [id number]       XR chest 1 view   Final Result   No interval change or acute disease in the chest        MACRO:   None        Signed by: Pete Vigil 2/21/2024 9:41 AM   Dictation workstation:   [id number]      Transthoracic Echo (TTE) Complete   Final Result      XR chest 1 view   Final Result   Cardiomegaly without evidence of acute disease in the chest.             MACRO:   None.        Signed by: Christian Villagomez 2/20/2024 1:52 AM   Dictation workstation:   [id number]          Transthoracic Echo (TTE) Complete    Result Date: 2/22/2024          [place] [id number]  Tel [phone] Fax [phone] TRANSTHORACIC ECHOCARDIOGRAM REPORT  Patient Name:      JAZZ Quigley Physician:    [id number] Yon Gupta DO Study Date:        2/21/2024             Ordering Provider:    [id number] CAROLINA BRAND MRN/PID:           [id number]              Fellow: Accession#:        [id number]          Nurse:                Steven Pete RN Date of Birth/Age: 1985 / 38 years Sonographer:          Ibeth Escalona RDCS Gender:            M                     Additional Staff: Height:            175.26 cm             Admit Date:           2/20/2024 Weight:            136.08 kg             Admission Status:     Inpatient -                                                                Routine BSA / BMI:         2.45 m2 / 44.30 kg/m2 Department Location:  [address] [place] [address] Blood Pressure: 128 /94 mmHg Study Type:    TRANSTHORACIC ECHO (TTE) COMPLETE Diagnosis/ICD: Heart failure, unspecified-I50.9 Indication:    Congestive Heart Failure CPT Codes:     Echo Complete w Full [id number] Patient History: Diabetes:          Yes Pertinent History: HTN, Cardiomyopathy, CHF, CVA and LE  Edema. Study Detail: The following Echo studies were performed: 2D, M-Mode, color flow               and Doppler. Definity used as a contrast agent for endocardial               border definition. The patient was awake.  PHYSICIAN INTERPRETATION: Left Ventricle: Left ventricular systolic function is severely decreased, with an estimated ejection fraction of 10%. There is global hypokinesis of the left ventricle with minor regional variations. The left ventricular cavity size is moderately dilated. Spectral Doppler shows a pseudonormal pattern of left ventricular diastolic filling. Left Atrium: The left atrium is mild to moderately dilated. Right Ventricle: The right ventricle is normal in size. There is normal right ventricular global systolic function. Right Atrium: The right atrium is normal in size. Aortic Valve: The aortic valve appears structurally normal. The aortic valve appears tricuspid. There is no evidence of aortic valve stenosis. There is no evidence of aortic valve regurgitation. The peak instantaneous gradient of the aortic valve is 2.4 mmHg. The mean gradient of the aortic valve is 2.0 mmHg. Mitral Valve: The mitral valve is normal in structure. There is no evidence of mitral valve stenosis. There is normal mitral valve leaflet mobility. There is trace mitral valve regurgitation. Tricuspid Valve: The tricuspid valve is structurally normal. There is mild tricuspid regurgitation. Pulmonic Valve: The pulmonic valve is structurally normal. There is no indication of pulmonic valve regurgitation. Pericardium: There is no pericardial effusion noted. Aorta: The aortic root is normal. Pulmonary Artery: The pulmonary artery is not well visualized. Systemic Veins: The inferior vena cava appears to be of normal size. In comparison to the previous echocardiogram(s): The left ventricular function is worse. The left ventricular diastolic function is worse.  CONCLUSIONS:  1. Left ventricular systolic function is  severely decreased with a 10% estimated ejection fraction.  2. Spectral Doppler shows a pseudonormal pattern of left ventricular diastolic filling.  3. The left atrium is mild to moderately dilated.  4. Trace mitral valve regurgitation.  5. Mild tricuspid regurgitation is visualized.  6. Aortic valve stenosis is not present.  7. Left ventricular cavity size is moderately dilated.  8. The pulmonary artery is not well visualized.  9. There is global hypokinesis of the left ventricle with minor regional variations. QUANTITATIVE DATA SUMMARY: 2D MEASUREMENTS:                           Normal Ranges: Ao Root d:     3.50 cm    (2.0-3.7cm) LAs:           4.70 cm    (2.7-4.0cm) IVSd:          1.17 cm    (0.6-1.1cm) LVPWd:         1.04 cm    (0.6-1.1cm) LVIDd:         6.83 cm    (3.9-5.9cm) LVIDs:         6.54 cm LV Mass Index: 142.7 g/m2 LV % FS        4.2 % LA VOLUME:                               Normal Ranges: LA Vol A4C:        60.3 ml    (22+/-6mL/m2) LA Vol A2C:        58.9 ml LA Vol BP:         60.0 ml LA Vol Index A4C:  24.6ml/m2 LA Vol Index A2C:  24.0 ml/m2 LA Vol Index BP:   24.5 ml/m2 LA Area A4C:       20.8 cm2 LA Area A2C:       20.4 cm2 LA Major Axis A4C: 6.1 cm LA Major Axis A2C: 6.0 cm LA Volume Index:   23.1 ml/m2 RA VOLUME BY A/L METHOD:                               Normal Ranges: RA Vol A4C:        42.3 ml    (8.3-19.5ml) RA Vol Index A4C:  17.2 ml/m2 RA Area A4C:       16.2 cm2 RA Major Axis A4C: 5.3 cm AORTA MEASUREMENTS:                    Normal Ranges: Asc Ao, d: 3.30 cm (2.1-3.4cm) LV SYSTOLIC FUNCTION BY 2D PLANIMETRY (MOD):                     Normal Ranges: EF-A4C View: 14.2 % (>=55%) EF-A2C View: 13.9 % EF-Biplane:  12.5 % LV DIASTOLIC FUNCTION:                        Normal Ranges: MV Peak E:    0.55 m/s (0.7-1.2 m/s) MV Peak A:    0.49 m/s (0.42-0.7 m/s) E/A Ratio:    1.13     (1.0-2.2) MV e'         0.04 m/s (>8.0) MV lateral e' 0.04 m/s E/e' Ratio:   14.31    (<8.0) MITRAL VALVE:                  Normal Ranges: MV DT: 179 msec (150-240msec) AORTIC VALVE:                                   Normal Ranges: AoV Vmax:                0.77 m/s (<=1.7m/s) AoV Peak P.4 mmHg (<20mmHg) AoV Mean P.0 mmHg (1.7-11.5mmHg) LVOT Max Jean Claude:            0.68 m/s (<=1.1m/s) AoV VTI:                 14.60 cm (18-25cm) LVOT VTI:                10.90 cm LVOT Diameter:           2.20 cm  (1.8-2.4cm) AoV Area, VTI:           2.84 cm2 (2.5-5.5cm2) AoV Area,Vmax:           3.38 cm2 (2.5-4.5cm2) AoV Dimensionless Index: 0.75  RIGHT VENTRICLE: RV Basal 4.05 cm RV Mid   2.93 cm RV Major 7.7 cm TAPSE:   18.9 mm RV s'    0.10 m/s TRICUSPID VALVE/RVSP:                   Normal Ranges: IVC Diam: 1.77 cm PULMONIC VALVE:                         Normal Ranges: PV Accel Time: 116 msec (>120ms) PV Max Jean Claude:    0.5 m/s  (0.6-0.9m/s) PV Max P.2 mmHg  [id number] Yon Gupta DO Electronically signed on 2024 at 11:30:15 AM  ** Final **      RADIOLOGY:     XR chest 1 view   Final Result   No interval change or acute disease in the chest        MACRO:   None        Signed by: Pete Vigil 2024 9:41 AM   Dictation workstation:   [id number]      Transthoracic Echo (TTE) Complete   Final Result      XR chest 1 view   Final Result   Cardiomegaly without evidence of acute disease in the chest.             MACRO:   None.        Signed by: Christian Villagomez 2024 1:52 AM   Dictation workstation:   [id number]          PROBLEM LIST     Patient Active Problem List   Diagnosis    ACC/AHA stage C systolic congestive heart failure (CMS/HCC)    CHF (congestive heart failure) (CMS/HCC)    Current every day smoker    Type 2 diabetes mellitus with neurologic complication, with long-term current use of insulin (CMS/HCC)    Edema    Neck abscess    Nonischemic cardiomyopathy (CMS/HCC)    Perirectal abscess    Primary hypertension    Ventricular bigeminy seen on cardiac monitor    Bipolar 1 disorder (CMS/HCC)     Morbid obesity with BMI of 45.0-49.9, adult (CMS/[facility])    Acute ischemic left MCA stroke (CMS/[facility])    Cocaine use    Major depression, recurrent (CMS/[facility])    PTSD (post-traumatic stress disorder)    Acute on chronic combined systolic and diastolic CHF (congestive heart failure) (CMS/[facility])    Type 2 diabetes mellitus with hyperglycemia (CMS/[facility])    Severe episode of recurrent major depressive disorder, without psychotic features (CMS/[facility])    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    Abscess of abdominal cavity (CMS/[facility])    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 (CMS/[facility])    Hyperglycemia due to diabetes mellitus (CMS/[facility])    Congestive heart failure, unspecified HF chronicity, unspecified heart failure type (CMS/[facility])       ASSESSMENT:   Nonischemic cardiomyopathy  Acute on chronic systolic heart failure NYHA class II  Hypertension  Hx CVA  Dm        PLAN:   Tele monitoring  Serial enzymes  2d echo  Lasix 40 mg IV every 12  Strict intake and output  Daily weights  Zaroxolyn 5 mg daily  Coreg 6.25 mg twice daily  Entresto 24/26 1 tab twice daily  Aldactone 25 mg daily  In summary, Mr. Arthur Carranza has a history of nonischemic cardiomyopathy.  He was admitted in October 2022 with worsening shortness of breath, weight gain, and abdominal distention. Chest x-ray showed mild pulmonary vascular congestion. An echocardiogram on September 21, 2022 showed severe LV dysfunction with an estimated LV ejection fraction 15 to 20%. There was trivial to 1+ mitral and tricuspid regurgitation. Estimated RVSP 21 mmHg. Cardiac catheterization completed on the same day showed normal coronary arteries. He was noted to have ventricular bigeminy on the monitor. He was placed on Toprol-XL 25 mg daily and valsartan 80 mg daily, however he initially did not get these prescriptions filled.. He was seen by Dr. Duarte and placed on a LifeVest.  He was seen in the office October 25, 2022 and restarted on Toprol  and valsartan.  He stopped wearing the LifeVest several weeks later.       He was lost to follow-up and presented with an acute embolic stroke on December 28, 2022.  He was treated with thrombolytic therapy and transferred from [facility] to [facility].  Echocardiogram December 29, 2022 showed an estimated LV ejection fraction 5 to 10% and severe eccentric LVH.  He was discharged on Toprol-XL, Entresto, Farxiga, spironolactone, torsemide, and Xarelto.  He was seen in follow-up by Dr. Jurado March 14, 2023 and scheduled for a cardiac MRI.  This was completed on April 20, 2023 and showed a calculated LV ejection fraction 13%.  No evidence of amyloidosis or hemochromatosis.  No LV thrombus.  RV ejection fraction was normal.  He was not compliant with follow-up and ran out of his medications.  He was seen in consultation by Dr. Yuan December 3, 2023 for HFrEF and restarted on GDMT.  Titration of medications has been difficult due to some issues with hypotension and bradycardia.  He has been traveling back and forth from [place] to [place] and sometimes does not have his medications with him.  He was hospitalized a few times earlier this year for uncontrolled diabetes mellitus.      He presented to the emergency department today with complaints of dry heaves and mild worsening of shortness of breath.  He noted some orthopnea and abdominal swelling.  Recently quit smoking.  He noted he did use cocaine last week.  He was noted to have stable vital signs with exception of sinus tachycardia at 114 bpm.  In light of orthopnea and elevated BNP he was admitted to telemetry.  He was given intravenous furosemide and metolazone.  The patient reports that he has been compliant with his medications of late.  He currently is lying in bed almost flat and resting more comfortably.  He denies any chest discomfort.  Continue IV furosemide along with carvedilol, Entresto, and spironolactone.  He has insulin-dependent type 2  diabetes mellitus.  He was treated with Farxiga but only for short period of time.  Patient be monitored overnight.  Consult EP service to assess for ICD implant for primary prevention of sudden cardiac death.  Further recommendations to follow.    2/21/24  1.  Okay to transfer 8s  2.  Hold BP meds this AM  3. Consult EP  4. Knee high bismark hose  5. Check echo    Further recommendations per Dr Darryl Orozco CNP  [facility]    Of note, this documentation is completed using the Dragon Dictation system (voice recognition software). There may be spelling and/or grammatical errors that were not corrected prior to final submission.    Please do not hesitate to call with questions.  Electronically signed by ARIAN Galaviz-SUHAIL, on 2/22/2024 at 2:22 PM     I have personally interviewed and examined the patient.   I have personally and independently reviewed labs and diagnostic testing.  I have personally verified the elements of the history and physical listed above and changes, if any, are noted.   I have personally reviewed the assessment and plan as documented by ANDRZEJ Caban, CNP and concur.    Early this morning the patient had transient hypotension with systolic blood pressure as low as the 60s.  He was treated with 250 cc of IV normal saline with improvement.  He states he was not feeling well but currently is improved.  He denies chest pain or shortness of breath.  His vital signs are currently stable.  Oxygen saturation 98%.  Cardiac rhythm is regular.  Lungs are clear.  No significant peripheral edema.  He remains in normal sinus rhythm with occasional PVCs.  CBC is normal.  Sodium 126 with potassium 4.6.  BUN 29 creatinine 1.56.  EKG shows normal sinus rhythm with complete left bundle-branch block.  QRS duration 164 ms.  His Entresto, spironolactone, carvedilol, furosemide, and metolazone have been placed on hold.  He is anticoagulated  with Xarelto.  It is unclear if he has been compliant with his medications at home.  Will gradually restart low-dose GDMT and titrate as tolerated.  He was previously noted to have persistent LV dysfunction despite use of Entresto, Aldactone, and beta-blocker.  He was previously prescribed Farxiga although he does not recall this.  EP recommendations are pending.  Patient states he is agreeable to ICD implant.  Possible consideration of biventricular device.  Further recommendations to follow.    2/22/24   Tele  monitoring  EP input thank   The plan is the patient will see Dr. Duarte as an outpatient we will get BiV ICD as an outpatient  Keep magnesium greater than 2.0  Medical management at this time

## 2024-02-22 NOTE — NURSING NOTE
"CHF Clinical Nurse Navigator Documentation  Congestive Heart Failure disease education was performed by the Clinical Nurse Navigator with a good understanding: yes  CHF signs and symptoms discussed and when to call cardiologist?  yes  Living With Heart Failure Education booklet?  no  Controlling Heart Failure at Home Education? yes  CHF Education Teaching Tool? yes  GEM education modules assigned?  no  Home medication usage?  yes  Nutrition Education? Yes-low sodium   Fluid Restriction Education? yes  Daily Weight Education? Yes-daily weight log provided   Cardiovascular Rehab Referral ordered?  no  Follow-up with Cardiologist after discharge education? yes  Comments: Met with patient at bedside for heart failure education. Patient verbalized understanding. Patient lives with his mom in [place]. His cardiologist is Dr. Jurado in [place]. He often comes to this area to see his children who live out here. When he is out here he stays with one of his adult daughters. He does not drive. His mom brings him out here and also takes him to his follow up appointments. He said for about the last 6 months he has been non-compliant with his follow up appointments and medications. He said this was because he was \"stuck\" out here and could not get back to [place] for the appointments because his mom recently broke her leg and could not drive. He said he was non-compliant with his medications because many of them he did not have refills on because he had not been to the doctor. He states hat he should be going back to [place] next week because his mom should be able to drive again. Stressed to patient the importance of keeping and going to his follow up appointments and taking all medications as prescribed . Will use Meds to Beds for patient upon discharge to make sure he has everything he needs before he leaves. He said he quit smoking several months ago. He said the last time he did drugs was last moth, however, his " drug screen was positive for cocaine and fentanyl. Advised smoking and drug use cessation. No questions or concerns at this time. Provided my contact information should any questions or concerns arise.

## 2024-02-23 ENCOUNTER — APPOINTMENT (OUTPATIENT)
Dept: CARDIOLOGY | Facility: HOSPITAL | Age: 39
End: 2024-02-23
Payer: COMMERCIAL

## 2024-02-23 ENCOUNTER — PHARMACY VISIT (OUTPATIENT)
Dept: PHARMACY | Facility: CLINIC | Age: 39
End: 2024-02-23
Payer: MEDICAID

## 2024-02-23 VITALS
WEIGHT: 300 LBS | OXYGEN SATURATION: 96 % | SYSTOLIC BLOOD PRESSURE: 98 MMHG | HEIGHT: 69 IN | RESPIRATION RATE: 16 BRPM | TEMPERATURE: 96.8 F | HEART RATE: 89 BPM | DIASTOLIC BLOOD PRESSURE: 64 MMHG | BODY MASS INDEX: 44.43 KG/M2

## 2024-02-23 LAB
ANION GAP SERPL CALC-SCNC: 11 MMOL/L (ref 10–20)
BUN SERPL-MCNC: 29 MG/DL (ref 6–23)
CALCIUM SERPL-MCNC: 9 MG/DL (ref 8.6–10.3)
CHLORIDE SERPL-SCNC: 92 MMOL/L (ref 98–107)
CO2 SERPL-SCNC: 30 MMOL/L (ref 21–32)
CREAT SERPL-MCNC: 1.32 MG/DL (ref 0.5–1.3)
EGFRCR SERPLBLD CKD-EPI 2021: 71 ML/MIN/1.73M*2
ERYTHROCYTE [DISTWIDTH] IN BLOOD BY AUTOMATED COUNT: 12.1 % (ref 11.5–14.5)
GLUCOSE BLD MANUAL STRIP-MCNC: 290 MG/DL (ref 74–99)
GLUCOSE BLD MANUAL STRIP-MCNC: 293 MG/DL (ref 74–99)
GLUCOSE BLD MANUAL STRIP-MCNC: 322 MG/DL (ref 74–99)
GLUCOSE SERPL-MCNC: 348 MG/DL (ref 74–99)
HCT VFR BLD AUTO: 39.6 % (ref 41–52)
HGB BLD-MCNC: 13.6 G/DL (ref 13.5–17.5)
MCH RBC QN AUTO: 31.3 PG (ref 26–34)
MCHC RBC AUTO-ENTMCNC: 34.3 G/DL (ref 32–36)
MCV RBC AUTO: 91 FL (ref 80–100)
NRBC BLD-RTO: 0 /100 WBCS (ref 0–0)
PLATELET # BLD AUTO: 221 X10*3/UL (ref 150–450)
POTASSIUM SERPL-SCNC: 3.5 MMOL/L (ref 3.5–5.3)
RBC # BLD AUTO: 4.34 X10*6/UL (ref 4.5–5.9)
SODIUM SERPL-SCNC: 129 MMOL/L (ref 136–145)
WBC # BLD AUTO: 5 X10*3/UL (ref 4.4–11.3)

## 2024-02-23 PROCEDURE — 99239 HOSP IP/OBS DSCHRG MGMT >30: CPT | Performed by: STUDENT IN AN ORGANIZED HEALTH CARE EDUCATION/TRAINING PROGRAM

## 2024-02-23 PROCEDURE — 80048 BASIC METABOLIC PNL TOTAL CA: CPT | Performed by: STUDENT IN AN ORGANIZED HEALTH CARE EDUCATION/TRAINING PROGRAM

## 2024-02-23 PROCEDURE — 93010 ELECTROCARDIOGRAM REPORT: CPT | Performed by: INTERNAL MEDICINE

## 2024-02-23 PROCEDURE — 85027 COMPLETE CBC AUTOMATED: CPT | Performed by: STUDENT IN AN ORGANIZED HEALTH CARE EDUCATION/TRAINING PROGRAM

## 2024-02-23 PROCEDURE — 82947 ASSAY GLUCOSE BLOOD QUANT: CPT

## 2024-02-23 PROCEDURE — 2500000002 HC RX 250 W HCPCS SELF ADMINISTERED DRUGS (ALT 637 FOR MEDICARE OP, ALT 636 FOR OP/ED): Performed by: INTERNAL MEDICINE

## 2024-02-23 PROCEDURE — 2500000001 HC RX 250 WO HCPCS SELF ADMINISTERED DRUGS (ALT 637 FOR MEDICARE OP): Performed by: INTERNAL MEDICINE

## 2024-02-23 PROCEDURE — 2500000002 HC RX 250 W HCPCS SELF ADMINISTERED DRUGS (ALT 637 FOR MEDICARE OP, ALT 636 FOR OP/ED): Performed by: NURSE PRACTITIONER

## 2024-02-23 PROCEDURE — 2500000001 HC RX 250 WO HCPCS SELF ADMINISTERED DRUGS (ALT 637 FOR MEDICARE OP): Performed by: NURSE PRACTITIONER

## 2024-02-23 PROCEDURE — 93005 ELECTROCARDIOGRAM TRACING: CPT

## 2024-02-23 PROCEDURE — RXMED WILLOW AMBULATORY MEDICATION CHARGE

## 2024-02-23 PROCEDURE — 99233 SBSQ HOSP IP/OBS HIGH 50: CPT | Performed by: INTERNAL MEDICINE

## 2024-02-23 PROCEDURE — 36415 COLL VENOUS BLD VENIPUNCTURE: CPT | Performed by: STUDENT IN AN ORGANIZED HEALTH CARE EDUCATION/TRAINING PROGRAM

## 2024-02-23 RX ORDER — SACUBITRIL AND VALSARTAN 24; 26 MG/1; MG/1
1 TABLET, FILM COATED ORAL 2 TIMES DAILY
Qty: 60 TABLET | Refills: 11 | Status: SHIPPED | OUTPATIENT
Start: 2024-02-23 | End: 2024-05-21 | Stop reason: SDUPTHER

## 2024-02-23 RX ORDER — SPIRONOLACTONE 25 MG/1
12.5 TABLET ORAL DAILY
Qty: 15 TABLET | Refills: 11 | Status: SHIPPED | OUTPATIENT
Start: 2024-02-24 | End: 2025-02-23

## 2024-02-23 RX ORDER — INSULIN GLARGINE 100 [IU]/ML
55 INJECTION, SOLUTION SUBCUTANEOUS NIGHTLY
Qty: 15 ML | Refills: 0 | Status: SHIPPED | OUTPATIENT
Start: 2024-02-23 | End: 2024-05-21

## 2024-02-23 RX ORDER — INSULIN LISPRO 100 [IU]/ML
16 INJECTION, SOLUTION INTRAVENOUS; SUBCUTANEOUS
Qty: 15 ML | Refills: 0 | Status: SHIPPED | OUTPATIENT
Start: 2024-02-23

## 2024-02-23 RX ORDER — CARVEDILOL 6.25 MG/1
6.25 TABLET ORAL 2 TIMES DAILY
Qty: 60 TABLET | Refills: 11 | Status: SHIPPED | OUTPATIENT
Start: 2024-02-23 | End: 2024-05-21 | Stop reason: SDUPTHER

## 2024-02-23 RX ORDER — TORSEMIDE 20 MG/1
20 TABLET ORAL DAILY
Qty: 30 TABLET | Refills: 11 | Status: SHIPPED | OUTPATIENT
Start: 2024-02-23 | End: 2025-02-22

## 2024-02-23 RX ORDER — DULAGLUTIDE 1.5 MG/.5ML
1.5 INJECTION, SOLUTION SUBCUTANEOUS
Qty: 4 EACH | Refills: 1 | Status: SHIPPED | OUTPATIENT
Start: 2024-02-23 | End: 2024-06-10 | Stop reason: WASHOUT

## 2024-02-23 RX ORDER — PEN NEEDLE, DIABETIC 30 GX3/16"
NEEDLE, DISPOSABLE MISCELLANEOUS
Qty: 100 EACH | Refills: 11 | Status: SHIPPED | OUTPATIENT
Start: 2024-02-23

## 2024-02-23 RX ORDER — INSULIN GLARGINE 100 [IU]/ML
55 INJECTION, SOLUTION SUBCUTANEOUS NIGHTLY
Status: DISCONTINUED | OUTPATIENT
Start: 2024-02-23 | End: 2024-02-23 | Stop reason: HOSPADM

## 2024-02-23 RX ORDER — BLOOD-GLUCOSE SENSOR
EACH MISCELLANEOUS
Qty: 1 EACH | Refills: 0 | Status: SHIPPED | OUTPATIENT
Start: 2024-02-23 | End: 2024-06-10 | Stop reason: WASHOUT

## 2024-02-23 RX ORDER — DULAGLUTIDE 4.5 MG/.5ML
4.5 INJECTION, SOLUTION SUBCUTANEOUS
Qty: 4 PEN | Refills: 0 | Status: SHIPPED | OUTPATIENT
Start: 2024-02-23 | End: 2024-02-23 | Stop reason: HOSPADM

## 2024-02-23 RX ORDER — INSULIN LISPRO 100 [IU]/ML
16 INJECTION, SOLUTION INTRAVENOUS; SUBCUTANEOUS
Status: DISCONTINUED | OUTPATIENT
Start: 2024-02-23 | End: 2024-02-23 | Stop reason: HOSPADM

## 2024-02-23 RX ADMIN — BUPROPION HYDROCHLORIDE 150 MG: 150 TABLET, EXTENDED RELEASE ORAL at 09:26

## 2024-02-23 RX ADMIN — INSULIN LISPRO 16 UNITS: 100 INJECTION, SOLUTION INTRAVENOUS; SUBCUTANEOUS at 12:58

## 2024-02-23 RX ADMIN — FUROSEMIDE 40 MG: 40 TABLET ORAL at 09:23

## 2024-02-23 RX ADMIN — INSULIN LISPRO 12 UNITS: 100 INJECTION, SOLUTION INTRAVENOUS; SUBCUTANEOUS at 09:23

## 2024-02-23 RX ADMIN — CARVEDILOL 6.25 MG: 6.25 TABLET, FILM COATED ORAL at 09:24

## 2024-02-23 RX ADMIN — SPIRONOLACTONE 12.5 MG: 25 TABLET ORAL at 09:26

## 2024-02-23 RX ADMIN — INSULIN LISPRO 9 UNITS: 100 INJECTION, SOLUTION INTRAVENOUS; SUBCUTANEOUS at 12:57

## 2024-02-23 RX ADMIN — INSULIN LISPRO 12 UNITS: 100 INJECTION, SOLUTION INTRAVENOUS; SUBCUTANEOUS at 09:28

## 2024-02-23 RX ADMIN — SERTRALINE HYDROCHLORIDE 100 MG: 50 TABLET, FILM COATED ORAL at 09:25

## 2024-02-23 RX ADMIN — PANTOPRAZOLE SODIUM 40 MG: 40 TABLET, DELAYED RELEASE ORAL at 06:01

## 2024-02-23 NOTE — DISCHARGE SUMMARY
"Discharge Diagnosis  Congestive heart failure, unspecified HF chronicity, unspecified heart failure type (CMS/[facility])  1. Congestive heart failure, unspecified HF chronicity, unspecified heart failure type (CMS/[facility])    2. Type 2 diabetes mellitus with other specified complication, without long-term current use of insulin (CMS/[facility])    3. Acute on chronic combined systolic and diastolic congestive heart failure (CMS/[facility])    4. 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 Acute ischemic left MCA stroke (CMS/[facility])    6. Hyperglycemia due to diabetes mellitus (CMS/[facility])       Issues Requiring Follow-Up    Follow-up with PCP  Follow-up with cardiology  Follow-up with electrophysiology    Discharge Meds     Your medication list        START taking these medications        Instructions Last Dose Given Next Dose Due   carvedilol 6.25 mg tablet  Commonly known as: Coreg      Take 1 tablet (6.25 mg) by mouth 2 times a day.       FreeStyle Nadir 3 Sensor device  Generic drug: blood-glucose sensor      Use as directed to check blood sugar 3 to 4 times daily. Change every 14 days.       Lantus Solostar U-100 Insulin 100 unit/mL (3 mL) pen  Generic drug: insulin glargine      Inject 55 Units under the skin once daily at bedtime. Take as directed per insulin instructions.       pen needle, diabetic 31 gauge x 5/16\" needle      Use to inject 1-4 times daily as directed.              CHANGE how you take these medications        Instructions Last Dose Given Next Dose Due   insulin lispro 100 unit/mL injection  Commonly known as: HumaLOG  What changed:   how much to take  when to take this      Inject 16 Units under the skin 3 times a day with meals. Take as directed per insulin instructions.       spironolactone 25 mg tablet  Commonly known as: Aldactone  Start taking on: February 24, 2024  What changed: how much to take      Take 0.5 tablets (12.5 mg) by mouth once daily. Do not start before February 24, " 2024.       Trulicity 4.5 mg/0.5 mL pen injector  Generic drug: dulaglutide  What changed: Another medication with the same name was removed. Continue taking this medication, and follow the directions you see here.      Inject 1 pen (4.5 mg) under the skin 1 (one) time per week.              CONTINUE taking these medications        Instructions Last Dose Given Next Dose Due   atorvastatin 40 mg tablet  Commonly known as: Lipitor           buPROPion  mg 24 hr tablet  Commonly known as: Wellbutrin XL           Entresto 24-26 mg tablet  Generic drug: sacubitriL-valsartan      Take 1 tablet by mouth 2 times a day.       Farxiga 10 mg  Generic drug: dapagliflozin propanediol           FreeStyle Nadir 2 Sensor kit  Generic drug: flash glucose sensor kit      Use as instructed       metFORMIN 1,000 mg tablet  Commonly known as: Glucophage           sertraline 100 mg tablet  Commonly known as: Zoloft           torsemide 20 mg tablet  Commonly known as: Demadex      Take 1 tablet (20 mg) by mouth once daily.       traZODone 50 mg tablet  Commonly known as: Desyrel           True Metrix Glucose Test Strip strip  Generic drug: blood sugar diagnostic           Ultra Thin Lancets 30 gauge misc  Generic drug: lancets           Xarelto 20 mg tablet  Generic drug: rivaroxaban                  STOP taking these medications      insulin NPH (Isophane) 100 unit/mL (3 mL) injection  Commonly known as: HumuLIN N,NovoLIN N        metoprolol succinate XL 25 mg 24 hr tablet  Commonly known as: Toprol-XL                  Where to Get Your Medications        These medications were sent to [facility]  [address] [place] [address]      Hours: 9 AM to 5:30 PM Mon-Fri, 9 AM to 1 PM Saturday Phone: [phone]   carvedilol 6.25 mg tablet  Entresto 24-26 mg tablet  FreeStyle Nadir 3 Sensor device  insulin lispro 100 unit/mL injection  Lantus Solostar U-100 Insulin 100 unit/mL (3 mL) pen  pen needle, diabetic  "31 gauge x 5/16\" needle  spironolactone 25 mg tablet  torsemide 20 mg tablet  Trulicity 4.5 mg/0.5 mL pen injector         Test Results Pending At Discharge  Pending Labs       No current pending labs.            Hospital Course    Arthur Carranza is 38 y.o. male with past medical history of nonischemic cardiomyopathy, EF of 15%, chronic systolic heart failure, type 2 diabetes, uncontrolled, hypertension, substance use disorder, history of cocaine abuse presented to the ED with dyspnea.  Patient had mildly elevated proBNP was tachycardic was admitted for possible CHF exacerbation.  Cardiology was consulted.  U tox was positive for cocaine during this admission as well.  Medications were adjusted by cardiology.  Patient had episodes where he was hypotensive in the hospital and blood pressure medications were held.  Patient had persistent hyperglycemia, A1c was 15, endocrinology was consulted and insulin dose adjusted.  Discussed with the patient about the importance of adherence to his insulin regimen.  Patient had episode of MICHAELA and improved currently creatinine at 1.3.  No peripheral edema no shortness of breath or chest pain.  Cleared by cardiology for discharge today.  Patient to follow-up with electrophysiology for possible ICD implant in 6 weeks.  Discussed with the patient about the importance of abstaining from illicit drug/cocaine use.  Patient reported understanding and that he will stop using illicit drugs and be compliant with his cardiac meds.  Patient to follow-up in a week and to have BMP drawn in a week to make sure creatinine is improving.  Patient is going to be discharged home today with outpatient follow-up, discharge plan was discussed with the patient and he is in agreement.  Total discharge time spent 35 minutes.  Pertinent Physical Exam At Time of Discharge  Physical Exam  Constitutional:       General: He is not in acute distress.     Appearance: Normal appearance. He is obese. He is not " ill-appearing, toxic-appearing or diaphoretic.   HENT:      Head: Normocephalic and atraumatic.      Mouth/Throat:      Mouth: Mucous membranes are moist.      Pharynx: No oropharyngeal exudate.   Eyes:      Extraocular Movements: Extraocular movements intact.      Pupils: Pupils are equal, round, and reactive to light.   Cardiovascular:      Rate and Rhythm: Normal rate and regular rhythm.      Heart sounds: No murmur heard.  Pulmonary:      Effort: Pulmonary effort is normal. No respiratory distress.      Breath sounds: Normal breath sounds. No wheezing.   Abdominal:      General: Abdomen is flat. Bowel sounds are normal. There is no distension.      Tenderness: There is no abdominal tenderness. There is no guarding or rebound.   Musculoskeletal:         General: No swelling.      Right lower leg: No edema.      Left lower leg: No edema.   Skin:     Findings: No lesion or rash.   Neurological:      General: No focal deficit present.      Mental Status: He is alert and oriented to person, place, and time.      Cranial Nerves: No cranial nerve deficit.      Motor: No weakness.   Psychiatric:         Mood and Affect: Mood normal.         Behavior: Behavior normal.         Outpatient Follow-Up  Future Appointments   Date Time Provider Department Center   3/7/2024  2:15 PM ARIAN [facility] [id number] [place]   3/12/2024 11:30 AM Delroy Jurado MD [id number] [facility]   3/26/2024  2:30 PM Sumit Whitney MD [id number] [facility]   4/3/2024  2:45 PM Aquiles Duarte MD [id number] [facility]           Merry Chaney MD

## 2024-02-23 NOTE — PROGRESS NOTES
In summary,  [facility] Heart Progress Note           Rounding WARD/Cardiologist:  Kwame Orozco, ARIAN-SUHAIL, Dr. Kash Andrews  Primary Cardiologist:  Dr. Jurado     Date:  2/23/2024  Patient:  Arthur Carranza  YOB: 1985  MRN:  [id number]   Admit Date:  2/20/2024      SUBJECTIVE:    2/23/24  Patient is awake and alert and oriented.  He states he feels much better today plans to go home I discussed with him at length the plan well-informed answered all of his questions.  He states that he will go see Wilfrid Sanchez on March 13  normal sinus rhythm to sinus tach occasional PVC  EKG is normal sinus rhythm no acute changes    2/22/24  Patient is extremely irritated right now he does not want to talk to staff he is having family disagreement he denies having chest pain at this present time  Telemetry is normal sinus rhythm sinus tach with occasional PVC  EKG NSR no acute changes    2/21/24  Patient states that today he felt like his blood pressure was low he was not feeling like himself they did call a doctor stat they gave him 250 cc of normal saline bolus he feels much better now  tele normal sinus rhythm with occasional PVC    2/20/24  Arthur Carranza is a 38 y.o.  male patient who is being at the request of Dr. Hensley for inpatient consultation of CHF. He was admitted on 2/20/2024.  Previous [facility] and [facility] records have been reviewed in detail.    Patient with a history of nonischemic cardiomyopathy, chronic systolic heart failure, CVA, hypertension, diabetes, anxiety, bipolar, morbid obesity came into the emergency department complaining of shortness of breath that has progressively worsened over the past 1 week.  He states usually when he started to feel shortness of breath he takes a dose of torsemide start to urinate then he gets better but this time he was not able to urinate he is usually in [place] but he is out this area due to family reasons he states he came in through  the emergency department they admitted him to the 11th floor and kindly asked us to get involved with his case.  So he is positive for shortness of breath, PND, fatigue.     Denies chest pain, fever, chills, orthopnea, claudication  Patient states that he is well aware of his low ejection fraction that he has been spoke to many times and he is refused the AICD     EKG sinus tachycardia no acute changes     Cardiac history  10/21/22  1. The entire Left Main: 0% stenosis.   2. Entire LAD Lesion: The percent stenosis is 0%.   3. Entire CX Lesion: The percent stenosis is 0%.   4. The entire RCA Lesion: The percent stenosis is 0%.         VITALS:     Vitals:    02/22/24 1404 02/22/24 2114 02/23/24 0000 02/23/24 0739   BP: (!) 152/97 104/61 123/82 98/64   BP Location:  Right arm Right arm    Patient Position:  Lying Sitting    Pulse: 92 95 91 89   Resp:  18 18 16   Temp: 36.2 °C (97.2 °F) 36.8 °C (98.2 °F) 36.2 °C (97.2 °F) 36 °C (96.8 °F)   TempSrc:  Temporal Temporal    SpO2: 96% 96% 95% 96%   Weight:       Height:           Intake/Output Summary (Last 24 hours) at 2/23/2024 0952  Last data filed at 2/23/2024 0000  Gross per 24 hour   Intake 354 ml   Output --   Net 354 ml       Wt Readings from Last 4 Encounters:   02/20/24 136 kg (300 lb)   01/31/24 134 kg (296 lb 4.8 oz)   12/03/23 138 kg (303 lb 12.7 oz)   07/20/23 138 kg (303 lb 5 oz)       CURRENT HOSPITAL MEDICATIONS:   atorvastatin, 40 mg, oral, Nightly  buPROPion XL, 150 mg, oral, Daily  carvedilol, 6.25 mg, oral, BID  furosemide, 40 mg, oral, Daily  insulin glargine, 55 Units, subcutaneous, Nightly  insulin lispro, 0-15 Units, subcutaneous, 4x daily  insulin lispro, 16 Units, subcutaneous, TID with meals  [Held by provider] metOLazone, 5 mg, oral, Daily  pantoprazole, 40 mg, oral, Daily   Or  pantoprazole, 40 mg, intravenous, Daily  rivaroxaban, 20 mg, oral, Nightly  [Held by provider] sacubitriL-valsartan, 1 tablet, oral, BID  sertraline, 100 mg, oral,  Daily  spironolactone, 12.5 mg, oral, Daily  traZODone, 50 mg, oral, Nightly         Current Outpatient Medications   Medication Instructions    atorvastatin (Lipitor) 40 mg tablet 1 tablet, oral, Nightly    buPROPion XL (WELLBUTRIN XL) 150 mg, oral, Daily    dapagliflozin (Farxiga) 10 mg 1 tablet, oral, Daily    dulaglutide 3 mg/0.5 mL pen injector 1 Application, subcutaneous, Weekly    FreeStyle Nadir sensor system (FreeStyle Nadir 2 Sensor) kit Use as instructed    insulin lispro (HUMALOG) 8 Units, subcutaneous, 3 times daily before meals    insulin NPH (Isophane) (HUMULIN N,NOVOLIN N) 8 Units, subcutaneous, 3 times daily before meals    metFORMIN (Glucophage) 1,000 mg tablet 1 tablet, oral, 2 times daily    metoprolol succinate XL (Toprol-XL) 25 mg 24 hr tablet 1 tablet, oral, Daily    rivaroxaban (Xarelto) 20 mg tablet 1 tablet, oral, Daily, Take with food.    sacubitriL-valsartan (Entresto) 24-26 mg tablet 1 tablet, oral, 2 times daily    sertraline (ZOLOFT) 100 mg, oral, Daily    spironolactone (Aldactone) 25 mg tablet 1 tablet, oral, Daily    torsemide (Demadex) 20 mg tablet 1 tablet, oral, Daily    traZODone (DESYREL) 50 mg, oral, Nightly    True Metrix Glucose Test Strip strip USE TO CHECK BLOOD SUGAR FOUR TIMES DAILY    Trulicity 4.5 mg, subcutaneous, Weekly    Ultra Thin Lancets 30 gauge misc TEST BLOOD SUGAR FOUR TIMES DAILY AS DIRECTED        PHYSICAL EXAMINATION:   GENERAL:  Well developed, well nourished, in no acute distress.  CHEST:  Symmetric and nontender.  NEURO/PSYCH:  Alert and oriented times three with approppriate behavior and responses.  NECK:  Supple, no JVD, no bruit.  LUNGS:  Clear to auscultation bilaterally, normal respiratory effort.  HEART:  Rate and rhythm regular with no evident murmur, no gallop appreciated.        There are no rubs, clicks or heaves.  EXTREMITIES:  Warm with good color, no clubbing or cyanosis.  There is no edema noted.  PERIPHERAL VASCULAR:  Pulses present and  equally palpable; 2+ throughout.      LAB DATA:     CBC:   Results from last 7 days   Lab Units 02/23/24  0340 02/22/24  0532 02/21/24  0958   WBC AUTO x10*3/uL 5.0 5.6 6.7   RBC AUTO x10*6/uL 4.34* 4.61 5.07   HEMOGLOBIN g/dL 13.6 14.4 16.1   HEMATOCRIT % 39.6* 42.5 46.1   MCV fL 91 92 91   MCH pg 31.3 31.2 31.8   MCHC g/dL 34.3 33.9 34.9   RDW % 12.1 12.5 12.4   PLATELETS AUTO x10*3/uL 221 228 269     CMP:    Results from last 7 days   Lab Units 02/23/24  0340 02/22/24  0533 02/21/24  1411 02/21/24  0958 02/21/24  0134 02/20/24  0528   SODIUM mmol/L 129* 129* 126*   < > 133* 130*   POTASSIUM mmol/L 3.5 4.1 4.6   < > 3.6 4.4   CHLORIDE mmol/L 92* 90* 91*   < > 92* 90*   CO2 mmol/L 30 31 28   < > 32 28   BUN mg/dL 29* 30* 29*   < > 27* 26*   CREATININE mg/dL 1.32* 1.60* 1.56*   < > 1.78* 1.54*   GLUCOSE mg/dL 348* 308* 312*   < > 92 412*   PROTEIN TOTAL g/dL  --   --   --   --  7.8 7.9   CALCIUM mg/dL 9.0 9.3 9.4   < > 9.6 9.3   BILIRUBIN TOTAL mg/dL  --   --   --   --  1.1 0.9   ALK PHOS U/L  --   --   --   --  74 71   AST U/L  --   --   --   --  18 17   ALT U/L  --   --   --   --  15 15    < > = values in this interval not displayed.     BMP:    Results from last 7 days   Lab Units 02/23/24  0340 02/22/24  0533 02/21/24  1411   SODIUM mmol/L 129* 129* 126*   POTASSIUM mmol/L 3.5 4.1 4.6   CHLORIDE mmol/L 92* 90* 91*   CO2 mmol/L 30 31 28   BUN mg/dL 29* 30* 29*   CREATININE mg/dL 1.32* 1.60* 1.56*   CALCIUM mg/dL 9.0 9.3 9.4   GLUCOSE mg/dL 348* 308* 312*     Magnesium:  Results from last 7 days   Lab Units 02/21/24  0134 02/20/24  0126   MAGNESIUM mg/dL 2.51* 1.59*     Troponin:    Results from last 7 days   Lab Units 02/21/24  0958 02/20/24  0126   TROPHS ng/L 67* 65*     BNP:   Results from last 7 days   Lab Units 02/20/24  0126   BNP pg/mL 259*     Lipid Panel:         DIAGNOSTIC TESTING:   @No results found for this or any previous visit.    ECG 12 Lead    Result Date: 2/21/2024  Normal sinus rhythm Possible  Left atrial enlargement Left axis deviation Left bundle branch block Abnormal ECG When compared with ECG of 21-FEB-2024 07:25, (unconfirmed) Left bundle branch block is now Present    XR chest 1 view    Result Date: 2/21/2024  Interpreted By:  Pete Vigil, STUDY: XR CHEST 1 VIEW;  2/21/2024 9:34 am   INDICATION: Signs/Symptoms:worsening SOB.   COMPARISON: Portable chest, 20 February 2024   ACCESSION NUMBER(S): [id number]   ORDERING CLINICIAN: JEANNE MCMILLAN   TECHNIQUE: Single frontal view of the chest; Portable technique   FINDINGS: Enlarged cardiac silhouette is unchanged   No consolidative lung opacity   No edema / failure   No large pleural effusion or demonstrable pneumothorax       No interval change or acute disease in the chest   MACRO: None   Signed by: Pete Vigil 2/21/2024 9:41 AM Dictation workstation:   [id number]    ECG 12 Lead    Result Date: 2/21/2024  Normal sinus rhythm Possible Left atrial enlargement Left axis deviation Left ventricular hypertrophy with QRS widening and repolarization abnormality Abnormal ECG When compared with ECG of 21-FEB-2024 01:19, (unconfirmed) Premature ventricular complexes are no longer Present    ECG 12 lead    Result Date: 2/21/2024  Sinus rhythm with sinus arrhythmia with occasional Premature ventricular complexes Possible Left atrial enlargement Left axis deviation Left ventricular hypertrophy with QRS widening T wave abnormality, consider lateral ischemia Abnormal ECG When compared with ECG of 20-FEB-2024 01:05, (unconfirmed) Premature ventricular complexes are now Present T wave amplitude has decreased in Inferior leads T wave inversion more evident in Lateral leads    ECG 12 lead    Result Date: 2/20/2024  Sinus tachycardia Possible Left atrial enlargement Left axis deviation Left ventricular hypertrophy with QRS widening and repolarization abnormality Abnormal ECG When compared with ECG of 01-FEB-2024 06:43, T wave amplitude has increased in Inferior  leads T wave inversion less evident in Lateral leads    XR chest 1 view    Result Date: 2/20/2024  Interpreted By:  Christian Villagomez, STUDY: XR CHEST 1 VIEW;  2/20/2024 1:40 am   INDICATION: Signs/Symptoms:sob.   COMPARISON: 01/31/2024   ACCESSION NUMBER(S): [id number]   ORDERING CLINICIAN: WDAE ELIZABETH   FINDINGS:     Cardiomegaly. The pulmonary vasculature is within normal limits. No consolidation, pleural effusion or pneumothorax.         Cardiomegaly without evidence of acute disease in the chest.     MACRO: None.   Signed by: Christian Villagomez 2/20/2024 1:52 AM Dictation workstation:   [id number]       XR chest 1 view   Final Result   No interval change or acute disease in the chest        MACRO:   None        Signed by: Pete Vigil 2/21/2024 9:41 AM   Dictation workstation:   [id number]      Transthoracic Echo (TTE) Complete   Final Result      XR chest 1 view   Final Result   Cardiomegaly without evidence of acute disease in the chest.             MACRO:   None.        Signed by: Christian Villagomez 2/20/2024 1:52 AM   Dictation workstation:   [id number]          Transthoracic Echo (TTE) Complete    Result Date: 2/22/2024          [place] [address]  Tel [phone] Fax [phone] TRANSTHORACIC ECHOCARDIOGRAM REPORT  Patient Name:      JAZZ Quigley Physician:    [id number] Yon Gupta DO Study Date:        2/21/2024             Ordering Provider:    [id number] CAROLINA BRAND MRN/PID:           [id number]              Fellow: Accession#:        [id number]          Nurse:                Setven Pete RN Date of Birth/Age: 1985 / 38 years Sonographer:          Ibeth Escalona RDCS Gender:            M                     Additional Staff: Height:             175.26 cm             Admit Date:           2/20/2024 Weight:            136.08 kg             Admission Status:     Inpatient -                                                                Routine BSA / BMI:         2.45 m2 / 44.30 kg/m2 Department Location:  [address] [place] [address] Blood Pressure: 128 /94 mmHg Study Type:    TRANSTHORACIC ECHO (TTE) COMPLETE Diagnosis/ICD: Heart failure, unspecified-I50.9 Indication:    Congestive Heart Failure CPT Codes:     Echo Complete w Full [id number] Patient History: Diabetes:          Yes Pertinent History: HTN, Cardiomyopathy, CHF, CVA and LE Edema. Study Detail: The following Echo studies were performed: 2D, M-Mode, color flow               and Doppler. Definity used as a contrast agent for endocardial               border definition. The patient was awake.  PHYSICIAN INTERPRETATION: Left Ventricle: Left ventricular systolic function is severely decreased, with an estimated ejection fraction of 10%. There is global hypokinesis of the left ventricle with minor regional variations. The left ventricular cavity size is moderately dilated. Spectral Doppler shows a pseudonormal pattern of left ventricular diastolic filling. Left Atrium: The left atrium is mild to moderately dilated. Right Ventricle: The right ventricle is normal in size. There is normal right ventricular global systolic function. Right Atrium: The right atrium is normal in size. Aortic Valve: The aortic valve appears structurally normal. The aortic valve appears tricuspid. There is no evidence of aortic valve stenosis. There is no evidence of aortic valve regurgitation. The peak instantaneous gradient of the aortic valve is 2.4 mmHg. The mean gradient of the aortic valve is 2.0 mmHg. Mitral Valve: The mitral valve is normal in structure. There is no evidence of mitral valve stenosis. There is normal mitral valve leaflet mobility. There is trace mitral valve regurgitation. Tricuspid Valve: The tricuspid valve is  structurally normal. There is mild tricuspid regurgitation. Pulmonic Valve: The pulmonic valve is structurally normal. There is no indication of pulmonic valve regurgitation. Pericardium: There is no pericardial effusion noted. Aorta: The aortic root is normal. Pulmonary Artery: The pulmonary artery is not well visualized. Systemic Veins: The inferior vena cava appears to be of normal size. In comparison to the previous echocardiogram(s): The left ventricular function is worse. The left ventricular diastolic function is worse.  CONCLUSIONS:  1. Left ventricular systolic function is severely decreased with a 10% estimated ejection fraction.  2. Spectral Doppler shows a pseudonormal pattern of left ventricular diastolic filling.  3. The left atrium is mild to moderately dilated.  4. Trace mitral valve regurgitation.  5. Mild tricuspid regurgitation is visualized.  6. Aortic valve stenosis is not present.  7. Left ventricular cavity size is moderately dilated.  8. The pulmonary artery is not well visualized.  9. There is global hypokinesis of the left ventricle with minor regional variations. QUANTITATIVE DATA SUMMARY: 2D MEASUREMENTS:                           Normal Ranges: Ao Root d:     3.50 cm    (2.0-3.7cm) LAs:           4.70 cm    (2.7-4.0cm) IVSd:          1.17 cm    (0.6-1.1cm) LVPWd:         1.04 cm    (0.6-1.1cm) LVIDd:         6.83 cm    (3.9-5.9cm) LVIDs:         6.54 cm LV Mass Index: 142.7 g/m2 LV % FS        4.2 % LA VOLUME:                               Normal Ranges: LA Vol A4C:        60.3 ml    (22+/-6mL/m2) LA Vol A2C:        58.9 ml LA Vol BP:         60.0 ml LA Vol Index A4C:  24.6ml/m2 LA Vol Index A2C:  24.0 ml/m2 LA Vol Index BP:   24.5 ml/m2 LA Area A4C:       20.8 cm2 LA Area A2C:       20.4 cm2 LA Major Axis A4C: 6.1 cm LA Major Axis A2C: 6.0 cm LA Volume Index:   23.1 ml/m2 RA VOLUME BY A/L METHOD:                               Normal Ranges: RA Vol A4C:        42.3 ml    (8.3-19.5ml) RA  Vol Index A4C:  17.2 ml/m2 RA Area A4C:       16.2 cm2 RA Major Axis A4C: 5.3 cm AORTA MEASUREMENTS:                    Normal Ranges: Asc Ao, d: 3.30 cm (2.1-3.4cm) LV SYSTOLIC FUNCTION BY 2D PLANIMETRY (MOD):                     Normal Ranges: EF-A4C View: 14.2 % (>=55%) EF-A2C View: 13.9 % EF-Biplane:  12.5 % LV DIASTOLIC FUNCTION:                        Normal Ranges: MV Peak E:    0.55 m/s (0.7-1.2 m/s) MV Peak A:    0.49 m/s (0.42-0.7 m/s) E/A Ratio:    1.13     (1.0-2.2) MV e'         0.04 m/s (>8.0) MV lateral e' 0.04 m/s E/e' Ratio:   14.31    (<8.0) MITRAL VALVE:                 Normal Ranges: MV DT: 179 msec (150-240msec) AORTIC VALVE:                                   Normal Ranges: AoV Vmax:                0.77 m/s (<=1.7m/s) AoV Peak P.4 mmHg (<20mmHg) AoV Mean P.0 mmHg (1.7-11.5mmHg) LVOT Max Jean Claude:            0.68 m/s (<=1.1m/s) AoV VTI:                 14.60 cm (18-25cm) LVOT VTI:                10.90 cm LVOT Diameter:           2.20 cm  (1.8-2.4cm) AoV Area, VTI:           2.84 cm2 (2.5-5.5cm2) AoV Area,Vmax:           3.38 cm2 (2.5-4.5cm2) AoV Dimensionless Index: 0.75  RIGHT VENTRICLE: RV Basal 4.05 cm RV Mid   2.93 cm RV Major 7.7 cm TAPSE:   18.9 mm RV s'    0.10 m/s TRICUSPID VALVE/RVSP:                   Normal Ranges: IVC Diam: 1.77 cm PULMONIC VALVE:                         Normal Ranges: PV Accel Time: 116 msec (>120ms) PV Max Jean Claude:    0.5 m/s  (0.6-0.9m/s) PV Max P.2 mmHg  Emanuel Gupta DO Electronically signed on 2024 at 11:30:15 AM  ** Final **      RADIOLOGY:     XR chest 1 view   Final Result   No interval change or acute disease in the chest        MACRO:   None        Signed by: Pete Vigil 2024 9:41 AM   Dictation workstation:   [id number]      Transthoracic Echo (TTE) Complete   Final Result      XR chest 1 view   Final Result   Cardiomegaly without evidence of acute disease in the chest.             MACRO:   None.         Signed by: Christian Villagomez 2/20/2024 1:52 AM   Dictation workstation:   [id number]          PROBLEM LIST     Patient Active Problem List   Diagnosis    ACC/AHA stage C systolic congestive heart failure (CMS/HCC)    CHF (congestive heart failure) (CMS/HCC)    Current every day smoker    Type 2 diabetes mellitus with neurologic complication, with long-term current use of insulin (CMS/HCC)    Edema    Neck abscess    Nonischemic cardiomyopathy (CMS/HCC)    Perirectal abscess    Primary hypertension    Ventricular bigeminy seen on cardiac monitor    Bipolar 1 disorder (CMS/HCC)    Morbid obesity with BMI of 45.0-49.9, adult (CMS/HCC)    Acute ischemic left MCA stroke (CMS/HCC)    Cocaine use    Major depression, recurrent (CMS/HCC)    PTSD (post-traumatic stress disorder)    Acute on chronic combined systolic and diastolic CHF (congestive heart failure) (CMS/HCC)    Type 2 diabetes mellitus with hyperglycemia (CMS/HCC)    Severe episode of recurrent major depressive disorder, without psychotic features (CMS/HCC)    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    Abscess of abdominal cavity (CMS/HCC)    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 Arthur Carranza has a history of nonischemic cardiomyopathy.  He was admitted in October 2022 with worsening shortness of breath, weight gain, and abdominal distention. Chest x-ray showed mild pulmonary vascular congestion. An  echocardiogram on September 21, 2022 showed severe LV dysfunction with an estimated LV ejection fraction 15 to 20%. There was trivial to 1+ mitral and tricuspid regurgitation. Estimated RVSP 21 mmHg. Cardiac catheterization completed on the same day showed normal coronary arteries. He was noted to have ventricular bigeminy on the monitor. He was placed on Toprol-XL 25 mg daily and valsartan 80 mg daily, however he initially did not get these prescriptions filled.. He was seen by Dr. Duarte and placed on a LifeVest.  He was seen in the office October 25, 2022 and restarted on Toprol and valsartan.  He stopped wearing the LifeVest several weeks later.       He was lost to follow-up and presented with an acute embolic stroke on December 28, 2022.  He was treated with thrombolytic therapy and transferred from [facility] to [facility].  Echocardiogram December 29, 2022 showed an estimated LV ejection fraction 5 to 10% and severe eccentric LVH.  He was discharged on Toprol-XL, Entresto, Farxiga, spironolactone, torsemide, and Xarelto.  He was seen in follow-up by Dr. Jurado March 14, 2023 and scheduled for a cardiac MRI.  This was completed on April 20, 2023 and showed a calculated LV ejection fraction 13%.  No evidence of amyloidosis or hemochromatosis.  No LV thrombus.  RV ejection fraction was normal.  He was not compliant with follow-up and ran out of his medications.  He was seen in consultation by Dr. Yuan December 3, 2023 for HFrEF and restarted on GDMT.  Titration of medications has been difficult due to some issues with hypotension and bradycardia.  He has been traveling back and forth from [place] to [place] and sometimes does not have his medications with him.  He was hospitalized a few times earlier this year for uncontrolled diabetes mellitus.      He presented to the emergency department today with complaints of dry heaves and mild worsening of shortness of breath.  He noted some orthopnea and abdominal  swelling.  Recently quit smoking.  He noted he did use cocaine last week.  He was noted to have stable vital signs with exception of sinus tachycardia at 114 bpm.  In light of orthopnea and elevated BNP he was admitted to telemetry.  He was given intravenous furosemide and metolazone.  The patient reports that he has been compliant with his medications of late.  He currently is lying in bed almost flat and resting more comfortably.  He denies any chest discomfort.  Continue IV furosemide along with carvedilol, Entresto, and spironolactone.  He has insulin-dependent type 2 diabetes mellitus.  He was treated with Farxiga but only for short period of time.  Patient be monitored overnight.  Consult EP service to assess for ICD implant for primary prevention of sudden cardiac death.  Further recommendations to follow.    2/21/24  1.  Okay to transfer 8s  2.  Hold BP meds this AM  3. Consult EP  4. Knee high bismark hose  5. Check echo    Further recommendations per Dr Darryl Orozco CNP  [facility]    Of note, this documentation is completed using the Dragon Dictation system (voice recognition software). There may be spelling and/or grammatical errors that were not corrected prior to final submission.    Please do not hesitate to call with questions.  Electronically signed by ARIAN Galaviz-CNP, on 2/23/2024 at 9:52 AM     I have personally interviewed and examined the patient.   I have personally and independently reviewed labs and diagnostic testing.  I have personally verified the elements of the history and physical listed above and changes, if any, are noted.   I have personally reviewed the assessment and plan as documented by ANDRZEJ Caban, CNP and concur.    Early this morning the patient had transient hypotension with systolic blood pressure as low as the 60s.  He was treated with 250 cc of IV normal saline with improvement.  He states  he was not feeling well but currently is improved.  He denies chest pain or shortness of breath.  His vital signs are currently stable.  Oxygen saturation 98%.  Cardiac rhythm is regular.  Lungs are clear.  No significant peripheral edema.  He remains in normal sinus rhythm with occasional PVCs.  CBC is normal.  Sodium 126 with potassium 4.6.  BUN 29 creatinine 1.56.  EKG shows normal sinus rhythm with complete left bundle-branch block.  QRS duration 164 ms.  His Entresto, spironolactone, carvedilol, furosemide, and metolazone have been placed on hold.  He is anticoagulated with Xarelto.  It is unclear if he has been compliant with his medications at home.  Will gradually restart low-dose GDMT and titrate as tolerated.  He was previously noted to have persistent LV dysfunction despite use of Entresto, Aldactone, and beta-blocker.  He was previously prescribed Farxiga although he does not recall this.  EP recommendations are pending.  Patient states he is agreeable to ICD implant.  Possible consideration of biventricular device.  Further recommendations to follow.    2/22/24   Tele  monitoring  EP input thank   The plan is the patient will see Dr. Duarte as an outpatient we will get BiV ICD as an outpatient  Keep magnesium greater than 2.0  Medical management at this time    2/23/24  Spoke at length with patient greater than 30 minutes that we are going to discharge him home that he is going to take his medicines on a regular basis he is going to follow-up with 3/7/2023  Lipitor 40 daily  Coreg 6.25 mg p.o. twice daily  Demadex 20 mg daily  Xarelto 20 mg at night  Entresto 24/26 1 tab twice daily  Aldactone 12.5 mg daily  Check a BMP in 1 week    Further recommendations per Dr Andrews    I have personally interviewed and examined the patient.   I have personally and independently reviewed labs and diagnostic testing.  I have personally verified the elements of the history and physical listed above and changes, if any,  are noted.   I have personally reviewed the assessment and plan as documented by Kwame Orozco, ANDRZEJ, CNP and concur.    Patient states he feels much better today.  Denies chest pain or shortness.  He is lying completely flat in bed.  His vital signs are stable.  Cardiac rhythm is regular.  Lungs are clear.  No significant peripheral edema.  CBC and CMP normal with exception of sodium 129, BUN 29, and creatinine 1.32.  He remains in normal sinus rhythm.  EP recommendations noted.  Aim for probable biventricular ICD implant in approximately 6 weeks.  Discussed with patient the importance of compliance with his medications and follow-up.  He states he prefers to follow-up in [place] as opposed to going down to [facility].  Follow-up appointment in 1 week as scheduled.  He will also follow-up with Dr. Brody Wagoner.

## 2024-02-23 NOTE — PROGRESS NOTES
Received referral from, pieter  for substance abuse/cocaine use.  Met with pt.  Provided substance abuse packet, programs discussed.  He states he has been talking to a ministry in [place] Twice a week and has been helpful.  Discussion about effects on health from long term drug use.  Pt. Acknowledges understanding.  He was accepting of the info. Provided.

## 2024-02-23 NOTE — NURSING NOTE
Met with patient for education reinforcement. Discussed CHF, signs and symptoms, and when to call cardiologist. Reinforced the importance of following a Low Sodium Diet and monitoring daily weight (patient was provided with a scale), lower leg edema, and shortness of breath. Reviewed importance of taking prescribed medications after discharge. Patient was set up with Meds to Beds at discharge. Stressed the importance of taking all medications as prescribed. Reinforced importance of following up with cardiologist within a week of discharge. Patient made aware that follow up appointments have been scheduled with his cardiologist Dr. Jurado on 3/12 and with his PCP office on 3/26. Stressed the importance of keeping and going to these follow up appointments. He was reminded that he was enrolled in the Healthy at Home program. Reminded patient that they have my contact information should any questions or concerns arise.

## 2024-02-23 NOTE — PROGRESS NOTES
Endocrinology Progress Note  Patient: Arthur Carranza  Unit/Bed: 818/818-A  YOB: 1985  MRN: [id number]  Acct: [id number]   Admitting Diagnosis: Type 2 diabetes mellitus with other specified complication, without long-term current use of insulin (CMS/[facility]) [E11.69]  Congestive heart failure, unspecified HF chronicity, unspecified heart failure type (CMS/[facility]) [I50.9]  Date:  2/20/2024  Hospital Day: 3  Attending: Merry Chaney MD       Complaint:  Chief Complaint   Patient presents with    Shortness of Breath     SOB, vomiting, headache. Pt was seen a few weeks ago for high blood sugar and has CHF. Pt has been out of short acting insulin for two weeks.           Assessment     Patient Active Problem List   Diagnosis    ACC/AHA stage C systolic congestive heart failure (CMS/[facility])    CHF (congestive heart failure) (CMS/[facility])    Current every day smoker    Type 2 diabetes mellitus with neurologic complication, with long-term current use of insulin (CMS/[facility])    Edema    Neck abscess    Nonischemic cardiomyopathy (CMS/[facility])    Perirectal abscess    Primary hypertension    Ventricular bigeminy seen on cardiac monitor    Bipolar 1 disorder (CMS/[facility])    Morbid obesity with BMI of 45.0-49.9, adult (CMS/[facility])    Acute ischemic left MCA stroke (CMS/[facility])    Cocaine use    Major depression, recurrent (CMS/[facility])    PTSD (post-traumatic stress disorder)    Acute on chronic combined systolic and diastolic CHF (congestive heart failure) (CMS/[facility])    Type 2 diabetes mellitus with hyperglycemia (CMS/[facility])    Severe episode of recurrent major depressive disorder, without psychotic features (CMS/[facility])    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    Abscess of abdominal cavity (CMS/[facility])    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 (CMS/[facility])    Hyperglycemia due to diabetes mellitus (CMS/[facility])    Congestive heart failure, unspecified HF chronicity, unspecified heart failure type (CMS/[facility])           Plan:  Diabetes mellitus type II uncontrolled  Will increase his Lantus at present at 40 units daily to 55 units daily Humalog will have to be increased significantly with each meal and sliding scale to improve glycemic control        SUBJECTIVE    Glucoses are reviewed have improved but still running quite elevated in the high 200s and 300s patient is on Lantus 40 units daily and Humalog and sliding scale  FS glucose                 FS glucose      Random glucose     412   92 312  308   348   Random glucose     Fasting glucose                 Fasting glucose     Blood glucose                 Blood glucose     POCT glucose     402 335 441 129 439 323 315 342 299 293 322  POCT glucose     Potassium    Potassium, Blood     4.4   3.6 4.6  4.1   3.5   Potassium, Blood     Medication Dosing    insulin glargine,hum.rec.anlog SUBQ (Units)     15  25   20   40    insulin glargine,hum.rec.anlog SUBQ (Units)     insulin lispro SUBQ (Units)      32 29  10 6  24 23  24  insulin lispro SUBQ (Units)     insulin regular, human INJ (Units)     5    10                  VITALS      Vitals:    02/22/24 1404 02/22/24 2114 02/23/24 0000 02/23/24 0739   BP: (!) 152/97 104/61 123/82 98/64   BP Location:  Right arm Right arm    Patient Position:  Lying Sitting    Pulse: 92 95 91 89   Resp:  18 18 16   Temp: 36.2 °C (97.2 °F) 36.8 °C (98.2 °F) 36.2 °C (97.2 °F) 36 °C (96.8 °F)   TempSrc:  Temporal Temporal    SpO2: 96% 96% 95% 96%   Weight:       Height:           Intake/Output Summary (Last 24 hours) at 2/23/2024 0938  Last data filed at 2/23/2024 0000  Gross per 24 hour   Intake 354 ml   Output --   Net 354 ml      Wt Readings from Last 4 Encounters:   02/20/24 136 kg (300 lb)   01/31/24 134 kg (296 lb 4.8 oz)   12/03/23 138 kg (303 lb 12.7 oz)   07/20/23 138 kg (303 lb 5 oz)        Allergies:  Allergies   Allergen Reactions    Shellfish Containing Products Unknown        PHYSICAL EXAM   Physical Exam  Deferred    LABS    Magnesium:  Results from last 7 days   Lab Units 02/21/24  0134 02/20/24  0126   MAGNESIUM mg/dL 2.51* 1.59*     Lipid Panel:       Lab Review  Lab Results   Component Value Date    BILITOT 1.1 02/21/2024    CALCIUM 9.0 02/23/2024    CO2 30 02/23/2024    CL 92 (L) 02/23/2024    CREATININE 1.32 (H) 02/23/2024    GLUCOSE 348 (H) 02/23/2024    ALKPHOS 74 02/21/2024    K 3.5 02/23/2024    PROT 7.8 02/21/2024     (L) 02/23/2024    AST 18 02/21/2024    ALT 15 02/21/2024    BUN 29 (H) 02/23/2024    ANIONGAP 11 02/23/2024    MG 2.51 (H) 02/21/2024    PHOS 4.0 02/20/2024    ALBUMIN 4.2 02/21/2024    LIPASE 29 11/07/2020    GFRMALE 64 08/08/2023     Lab Results   Component Value Date    TRIG 241 (H) 12/12/2023    CHOL 125 12/12/2023    LDLCALC 50 12/12/2023    HDL 26.6 12/12/2023     Lab Results   Component Value Date    HGBA1C 15.3 (H) 02/01/2024    HGBA1C 10.8 (H) 12/12/2023    HGBA1C 11.8 (A) 08/07/2023     The ASCVD Risk score (Joe DK, et al., 2019) failed to calculate for the following reasons:    The 2019 ASCVD risk score is only valid for ages 40 to 79    The patient has a prior MI or stroke diagnosis   Lab Results   Component Value Date    HGBA1C 15.3 (H) 02/01/2024    HGBA1C 10.8 (H) 12/12/2023    HGBA1C 11.8 (A) 08/07/2023     (L) 02/23/2024    K 3.5 02/23/2024    CL 92 (L) 02/23/2024    CO2 30 02/23/2024    BUN 29 (H) 02/23/2024    CREATININE 1.32 (H) 02/23/2024    CALCIUM 9.0 02/23/2024    ALBUMIN 4.2 02/21/2024    PROT 7.8 02/21/2024    BILITOT 1.1 02/21/2024    ALKPHOS 74 02/21/2024    ALT 15 02/21/2024    AST 18 02/21/2024    GLUCOSE 348 (H) 02/23/2024    CHOL 125 12/12/2023    TRIG 241 (H) 12/12/2023    HDL 26.6 12/12/2023    BHYDRXBUT 0.17 01/31/2024        atorvastatin, 40 mg, oral, Nightly  buPROPion XL, 150 mg, oral, Daily  carvedilol, 6.25 mg, oral, BID  furosemide, 40 mg, oral, Daily  insulin glargine, 40 Units, subcutaneous, Nightly  insulin lispro, 0-15 Units, subcutaneous, 4x  daily  insulin lispro, 12 Units, subcutaneous, TID with meals  [Held by provider] metOLazone, 5 mg, oral, Daily  pantoprazole, 40 mg, oral, Daily   Or  pantoprazole, 40 mg, intravenous, Daily  rivaroxaban, 20 mg, oral, Nightly  [Held by provider] sacubitriL-valsartan, 1 tablet, oral, BID  sertraline, 100 mg, oral, Daily  spironolactone, 12.5 mg, oral, Daily  traZODone, 50 mg, oral, Nightly             Electronically signed by Yady Guzman MD on 2/23/2024 at 9:38 AM

## 2024-02-26 ENCOUNTER — PATIENT OUTREACH (OUTPATIENT)
Dept: PRIMARY CARE | Facility: CLINIC | Age: 39
End: 2024-02-26
Payer: COMMERCIAL

## 2024-02-26 ENCOUNTER — PATIENT OUTREACH (OUTPATIENT)
Dept: HOME HEALTH SERVICES | Age: 39
End: 2024-02-26
Payer: COMMERCIAL

## 2024-02-26 DIAGNOSIS — E11.69: ICD-10-CM

## 2024-02-26 DIAGNOSIS — E23.2: ICD-10-CM

## 2024-02-26 DIAGNOSIS — H91.90: ICD-10-CM

## 2024-02-26 DIAGNOSIS — H47.20: ICD-10-CM

## 2024-02-26 DIAGNOSIS — E11.69 TYPE 2 DIABETES MELLITUS WITH OTHER SPECIFIED COMPLICATION, WITHOUT LONG-TERM CURRENT USE OF INSULIN (MULTI): ICD-10-CM

## 2024-02-26 DIAGNOSIS — E11.39: ICD-10-CM

## 2024-02-26 DIAGNOSIS — E11.65 HYPERGLYCEMIA DUE TO DIABETES MELLITUS (MULTI): ICD-10-CM

## 2024-02-26 DIAGNOSIS — I63.512 ACUTE ISCHEMIC LEFT MCA STROKE (MULTI): ICD-10-CM

## 2024-02-26 DIAGNOSIS — I50.43 ACUTE ON CHRONIC COMBINED SYSTOLIC AND DIASTOLIC CONGESTIVE HEART FAILURE (MULTI): ICD-10-CM

## 2024-02-26 NOTE — PROGRESS NOTES
The third attempt to enroll the patient in the Healthy [facility] Home program ([facility]) has been made, with voicemail messages left for the patient. He was also notified by message that he will be removed from the call list if there is no call back by 2100 tonight, but he still has the option to contact us in the next few days to enroll if he chooses. Alternate , Freddy Carranza was contacted and will give the patient the message.     Patient's Address:   [address] [place] [address]  **  If this is not the address patient will receive services - alert team and address in EMR**       Patient Contacts:  Extended Emergency Contact Information  Primary Emergency Contact: FREDDY CARRANZA  Mobile Phone: [phone]  Relation: Spouse  Preferred language: English   needed? No                                Patient's Preferred Phone: [phone]  Patient's E-mail: [email or web address]

## 2024-02-26 NOTE — NURSING NOTE
Follow up phone call made by CHF Clinical Nurse Navigator. Spoke with patient who states he is doing well. He states that he has all his medications and has been taking all his medications. He is aware of his follow up appointments. He says he has access to his MyChart and all his appointments are in there. He says he has been in contact with the Healthy at Home program. He has no questions or concerns at this time. Reminded patient of my contact information should any questions or concerns arise.

## 2024-02-26 NOTE — PROGRESS NOTES
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 (CMS/HCC)    Acute ischemic left MCA stroke (CMS/HCC)    Hyperglycemia due to diabetes mellitus (CMS/HCC)      Admission Date:2/20/24  Discharge Date: 2/23/24    PCP Appointment Date:  Visit Type: Primary Care Established          Date: 3/26/2024                  Dept: [facility]                  Provider: Sumit Whitney                  Time: 2:30 PM    Specialist Appointment Date:   Visit Type: Cardiology Hospital Discharge          Date: 3/7/2024                  Dept: [facility]                  Provider: Kwame Orozco                  Time: 2:15 PM    Visit Type: Cardiology Established Patient Visit          Date: 3/12/2024                  Dept: [facility]                  Provider: Delroy Jurado                  Time: 11:30 AM    Healthy at Home  Referral placed - hand off staff message sent     Hospital Encounter and Summary: Linked     I left voicemail to introduce myself and the TCM program to Arthur Carranza. I encouraged patient to keep PCP & Cardio follow up appts.  I encouraged pt to return call of Healthy at Home program. I gave my contact information to return my call so we can go over discharge instructions and see if I can assist in anyway.    Meds to Beds at discharge      Low Sodium Diet and monitoring daily weight (patient was provided with a scale), lower leg edema, and shortness of breath.

## 2024-02-27 LAB
ATRIAL RATE: 83 BPM
P AXIS: 67 DEGREES
P OFFSET: 170 MS
P ONSET: 112 MS
PR INTERVAL: 206 MS
Q ONSET: 215 MS
QRS COUNT: 13 BEATS
QRS DURATION: 136 MS
QT INTERVAL: 440 MS
QTC CALCULATION(BAZETT): 517 MS
QTC FREDERICIA: 490 MS
R AXIS: -57 DEGREES
T AXIS: 84 DEGREES
T OFFSET: 435 MS
VENTRICULAR RATE: 83 BPM

## 2024-03-22 ENCOUNTER — PATIENT OUTREACH (OUTPATIENT)
Dept: PRIMARY CARE | Facility: CLINIC | Age: 39
End: 2024-03-22
Payer: COMMERCIAL

## 2024-03-22 NOTE — PROGRESS NOTES
Unable to reach patient for callback.   Dr Jurado & Cardio Kwame Orozco CNP appts are both marked as No Show.    Upcoming appt:   Appointment Information:      Visit Type: Primary Care Established          Date: 3/26/2024                  Dept: [facility]                  Provider: Sumit Whitney                  Time: 2:30 PM

## 2024-05-20 NOTE — PROGRESS NOTES
Primary Care Physician: Kevin Willingham MD  Patient's Location: [facility] [id number]    Date of Visit: 05/21/2024  8:30 AM EDT  Location of visit: [facility]   Type of Visit: Established  Date of initial visit: 3/14/2023 - with multiple no shows and cancellations    HPI / Summary:   Arthur Carranza is a very pleasant 38 y.o. male from [place], [place] who presents for management of NICM/HFrEF (EF 5-10%), initially diagnosed 10/2022. 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      Initial CV History: from 3/14/2023  Mr. Carranza was initially diagnosed with NICM during hospitalization at [facility] in October 2022 after presentation with SOB, weight gain. During hospitalization he had elevated BNP and echo with EF 15-20%. Coronary angiogram showed no CAD. He was started on BB, ARB and discharged with LifeVest. He followed up with Dr. Andrews as outpatient where he had not started his medications. He had multiple no-shows with Dr. Andrews and EP team. In December he was admitted with R sided weakness related to Cardioembolic stroke. He was given TNK with improvement and discharged on rivaroxaban. He followed up with Eugenia Lyman for post hospital visit and was referred for tobacco cessation, cardiac rehab, primary care, comprehensive weight loss, sleep clinic but these have not been scheduled.     I saw him on 3/14 but since that point he has cancelled/noshowed multiple appointments. Has had several HF hospitalizations.     Interval history:   Recent Hospitalizations: 2/20/24-2/23/24 CHF  He continues to have SOB since hospitalization. He is apologetic for no shows. A1c is out of control.    Today:  Accompanied by: Self  Social Hx: Quit smoking at the beginning of the year. Drinks  socially. Denies illicit drug use. Has used cocaine in the past.   Diet: None.   Exercise: Walks on the treadmill every other day for approximately 20 minutes.     Denies fatigue, chest pressure, palpitations. No edema noted in BLE.   Denies headaches, dizziness, lightheadedness, and falls.     He states he has chest pain approximately 3 times a week. He states it feels like spikes in his chest and lasts a second or two.  He reports a lot of SOB. It has been occurring at rest and with exertion. He reports orthopnea and PND.   He reports daily fatigue.     PMHx: DM II, HTN, HLD, Obesity, ?PHOEBE, Bipolar I Disorder (Previous SI/HI 2021), Perirectal Abscess  PSHx: Denies.   Family Hx: Foster care, family hx unknown.   Social Hx: Current smoker. Hx of marijuana/cocaine- denies current use. Denies EtOH. 8 kids, 12 year old daughter w/?arrhythmias.       Objective   Medical History:   He has a past medical history of CHF (congestive heart failure) (Multi), Diabetes mellitus (Multi), Heart disease, Hypertension, and Substance abuse (Multi).  Surgical Hx:   He has a past surgical history that includes Other surgical history (07/21/2022); Other surgical history (10/25/2022); and MR angio head wo IV contrast (12/29/2022).   Social Hx:   He reports that he has quit smoking. His smoking use included cigarettes. He does not have any smokeless tobacco history on file. He reports current alcohol use. Drug use questions deferred to the physician.  Family Hx:   His family history is not on file. He was adopted.   Allergies:   Allergies   Allergen Reactions    Shellfish Containing Products Unknown     Exam:       5/21/2024     8:44 AM 2/23/2024     7:39 AM 2/23/2024    12:00 AM 2/22/2024     9:14 PM 2/22/2024     2:04 PM 2/22/2024     2:00 PM   Vitals   Systolic 127 98 123 104 152    Diastolic 91 64 82 61 97    Heart Rate 100 89 91 95 92 128   Temp  36 °C (96.8 °F) 36.2 °C (97.2 °F) 36.8 °C (98.2 °F) 36.2 °C (97.2 °F)    Resp  16 18 18      Weight (lb) 296        BMI 43.71 kg/m2        BSA (m2) 2.55 m2        Visit Report Report          Wt Readings from Last 5 Encounters:   05/21/24 134 kg (296 lb)   02/20/24 136 kg (300 lb)   01/31/24 134 kg (296 lb 4.8 oz)   12/03/23 138 kg (303 lb 12.7 oz)   07/20/23 138 kg (303 lb 5 oz)     GEN: Pleasant, well-appearing, mild conversational dyspnea.   HEENT: JVP not elevated, no icterus. No HJR  CHEST: No wheeze, good air movement.  CV: Normal rate, regular rhythm.  no m/r/g  ABD: Soft, ND, NT, obese  EXT: Warm, well perfused, No LE edema.   NEURO: Pleasant, Oriented to plan    Labs:   CMP:  Recent Labs     02/23/24  0340 02/22/24  0533 02/21/24  1411 02/21/24  0958 02/21/24  0134 02/20/24  0528 02/20/24  0126   * 129* 126* 121* 133*   < > 129*   K 3.5 4.1 4.6 6.8* 3.6   < > 4.5   CL 92* 90* 91* 87* 92*   < > 93*   CO2 30 31 28 24 32   < > 21   ANIONGAP 11 12 12 17 13   < > 20   BUN 29* 30* 29* 32* 27*   < > 25*   CREATININE 1.32* 1.60* 1.56* 1.75* 1.78*   < > 1.47*   EGFR 71 57* 58* 51* 50*   < > 63   MG  --   --   --   --  2.51*  --  1.59*    < > = values in this interval not displayed.     Recent Labs     02/21/24  0134 02/20/24  0528 02/01/24  0544 11/29/21  0443 11/07/20  1338   ALBUMIN 4.2 4.5 3.9   < > 4.6   ALKPHOS 74 71 71   < > 54   ALT 15 15 12   < > 33   AST 18 17 16   < > 34   BILITOT 1.1 0.9 0.3   < > 0.7   LIPASE  --   --   --   --  29    < > = values in this interval not displayed.   CBC:  Recent Labs     02/23/24  0340 02/22/24  0532 02/21/24  0958 02/21/24  0134 02/20/24  0528   WBC 5.0 5.6 6.7 5.2 9.8   HGB 13.6 14.4 16.1 15.6 14.8   HCT 39.6* 42.5 46.1 45.6 42.7    228 269 246 218   MCV 91 92 91 93 92   HEME/ENDO:  Recent Labs     02/01/24  0544 12/12/23  1112 08/07/23  0114 04/20/23  1109 03/14/23  1056 12/28/22  1937 10/21/22  0528 06/15/22  0756   FERRITIN  --   --   --   --  394*  --   --   --    IRONSAT  --   --   --   --  28  --   --   --    TSH  --   --   --   --  3.62  3.69  --  3.96   FREET4  --   --   --   --   --   --   --  0.94   HGBA1C 15.3* 10.8* 11.8* 11.5*  --  12.9*   < > 12.2*    < > = values in this interval not displayed.    CARDIAC:   Recent Labs     02/21/24  0958 02/20/24  0126 02/01/24  0154 01/31/24 2014 12/03/23  0823 12/02/23  2342 12/02/23  2341 03/14/23  1056 12/28/22  1937   TROPHS 67* 65* 177* 99* 55*   < >  --   --  62*   BNP  --  259*  --  314*  --   --  375* 68 213*    < > = values in this interval not displayed.     Recent Labs     12/12/23  1112 04/20/23  1109 12/28/22  1937 10/21/22  0528   CHOL 125 122 162 166   LDLF  --  65 - 85   LDLCALC 50  --   --   --    HDL 26.6 31.4* 32.4* 40.0   TRIG 241* 128 401* 207*   MICRO:   Recent Labs     02/01/24  0000 06/15/22  0756 12/30/21  1241   CRP 0.50 6.52* 0.49   No results found for the last 90 days.        Notable Studies:   EKG:   Recent Labs     02/23/24  0716 02/22/24  0744 02/21/24  1019   VENTRATE 83 91 97   ATRRATE 83 91 97   QTCFRED 490 487 506   QRSDUR 136 142 164   QTCCALCB 517 521 548     Encounter Date: 02/20/24   ECG 12 Lead   Result Value    Ventricular Rate 83    Atrial Rate 83    DE Interval 206    QRS Duration 136    QT Interval 440    QTC Calculation(Bazett) 517    P Axis 67    R Axis -57    T Axis 84    QRS Count 13    Q Onset 215    P Onset 112    P Offset 170    T Offset 435    QTC Fredericia 490    Narrative    Normal sinus rhythm  Possible Left atrial enlargement  Left axis deviation  Left ventricular hypertrophy with QRS widening and repolarization abnormality  Abnormal ECG  When compared with ECG of 22-FEB-2024 07:21,  Left bundle branch block is no longer Present  Confirmed by Aquiles Duarte ([id number]) on 2/27/2024 2:22:33 PM     Echocardiogram:   Recent Labs     02/21/24  0249   EF 13   LVIDD 6.83   RVFRWALLPKSP 10.50   TAPSE 1.9   Transthoracic Echo (TTE) Complete With Contrast 02/21/2024  Left Ventricle: Left ventricular systolic function is severely decreased, with an estimated  ejection fraction of 10%. There is global hypokinesis of the left ventricle with minor regional variations. The left ventricular cavity size is moderately dilated. Spectral Doppler shows a pseudonormal pattern of left ventricular diastolic filling.  Left Atrium: The left atrium is mild to moderately dilated.  Right Ventricle: The right ventricle is normal in size. There is normal right ventricular global systolic function.  Right Atrium: The right atrium is normal in size.  Aortic Valve: The aortic valve appears structurally normal. The aortic valve appears tricuspid. There is no evidence of aortic valve stenosis.  There is no evidence of aortic valve regurgitation. The peak instantaneous gradient of the aortic valve is 2.4 mmHg. The mean gradient of the aortic valve is 2.0 mmHg.  Mitral Valve: The mitral valve is normal in structure. There is no evidence of mitral valve stenosis. There is normal mitral valve leaflet mobility. There is trace mitral valve regurgitation.  Tricuspid Valve: The tricuspid valve is structurally normal. There is mild tricuspid regurgitation.  Pulmonic Valve: The pulmonic valve is structurally normal. There is no indication of pulmonic valve regurgitation.  Pericardium: There is no pericardial effusion noted.  Aorta: The aortic root is normal.  Pulmonary Artery: The pulmonary artery is not well visualized.  Systemic Veins: The inferior vena cava appears to be of normal size.  In comparison to the previous echocardiogram(s): The left ventricular function is worse. The left ventricular diastolic function is worse.      CONCLUSIONS:  1. Left ventricular systolic function is severely decreased with a 10% estimated ejection fraction.  2. Spectral Doppler shows a pseudonormal pattern of left ventricular diastolic filling.  3. The left atrium is mild to moderately dilated.  4. Trace mitral valve regurgitation.  5. Mild tricuspid regurgitation is visualized.  6. Aortic valve stenosis is not  present.  7. Left ventricular cavity size is moderately dilated.  8. The pulmonary artery is not well visualized.  9. There is global hypokinesis of the left ventricle with minor regional variations.      Coronary Angiography:   Adult Cath 10/21/2022    Coronary Angiography:  The coronary circulation is right dominant.    Left Main Coronary Artery:  There is 0% stenosis in the entire left main coronary artery.    Left Anterior Descending Coronary Artery Distribution:  There is 0stenosis in the entire left anterior descending artery.    Circumflex Coronary Artery Distribution:  There is 0stenosis in the the entire Circumflex artery.    Right Coronary Artery Distribution:    There is 0stenosis in the the entire Right Coronary Artery.  Cardiac Cath Transition of Care Summary:  Post Procedure Diagnosis: Normal coronary arteries.  Findings:                 Severe LV dysfunction.  Blood Loss:               Estimated blood loss during the procedure was 0 mls.  Specimens Removed:        Number of specimen(s) removed: none.    ____________________________________________________________________________________  CONCLUSIONS:  1. The entire Left Main: 0% stenosis.  2. Entire LAD Lesion: The percent stenosis is 0%.  3. Entire CX Lesion: The percent stenosis is 0%.  4. The entire RCA Lesion: The percent stenosis is 0%.      Right Heart Cath: No results found for this or any previous visit from the past 1800 days.    Cardiac Scoring: No results found for this or any previous visit from the past 1800 days.    Cardiac MRI:   MR cardiac morphology and function w and wo IV contrast   2023-04-20 09:03:53  SUMMARY  =====================================================================  =====================================    NICMP. Severe LV dilation and remodeling.  Diffuse LV fibrosis with elevated extracellular volume (ECV of 40%).  No evidence of  No evidence of amyloidosis or hemachromatosis.  No LV thrombus.    LEFT VENTRICLE:  Quantitative LVEF 13 %. LV wall thickness is normal.  LV cavity is severely enlarged. LV systolic function  is severely decreased globally. There is no LV mass/thrombus.    VIABILITY: LV scar size is 5 %.    RIGHT VENTRICLE: RV cavity size is normal. RV systolic function is  normal. There is no RV mass/thrombus. There is no RV  fibro-fatty infiltration.    LV/RV SEPTUM: The ventricular septum is intact.    LA/RA SEPTUM: The atrial septum is intact.    LEFT ATRIUM: LA is mildly enlarged.    RIGHT ATRIUM: RA cavity size is normal.    PERICARDIUM: Pericardium is normal. There is a trivial pericardial  effusion.    PLEURAL EFFUSION: There is no pleural effusion.    AORTIC VALVE: Peak aortic valve velocity -130 cm/sec. Aortic  regurgitant volume 1 ml. Aortic regurgitant fraction 1 %.    MITRAL VALVE: Mitral valve leaflets are normal. There is no mitral  valve mass, thrombus, or vegetation. There is mild  mitral regurgitation.    TRICUSPID VALVE: Tricuspid valve leaflets are normal. The tricuspid  valve annulus is normal in size.    AORTIC ROOT: The aortic root is mildly dilated.      CORE EXAM  =====================================================================  =====================================    MEASUREMENTS  ---------------------------------------------------------------------  -------------------  VOLUMETRIC ANALYSIS  ----------------------------------------------  .------------------------------------------------------.  .      .          . LV   . Reference  . RV . Reference .  +------+----------+------+------------+----+-----------+  . EDV  . ml       .  440 .  (121-204) .    .           .  .      . ml/m\S\2    .  179 .  ()  .    .           .  . ESV  . ml       .  383 .  (33-78)   .    .           .  .      . ml/m\S\2    .  156 .  (18-39)   .    .           .  . CO   . L/min    . 5.40 .            .    .           .  .      . L/min/m\S\2 . 2.20 .            .    .           .  . MASS . g        .  328  .  (109-185) .    .           .  .      . g/m\S\2     .  133 .  (59-92)   .    .           .  . SV   . ml       .   57 .  ()  .    .           .  .      . ml/m\S\2    .   23 .  (43-67)   .    .           .  . EF   . %        .   13 .  (57-75)   .    .           .  '------+----------+------+------------+----+-----------'    CARDIAC OUTPUT HR:  95 bpm  LV DIMENSIONS  ----------------------------------------------  WALL THICKNESS - ANTEROSEPTAL:  1.0 cm  WALL THICKNESS - INFEROLATERAL:  1.0 cm  WALL THICKNESS - MAXIMUM:  1.1 cm  LV JOSE:  7.7 cm    LA DIMENSIONS (LV SYSTOLE)  ----------------------------------------------  AREA - 2 CHAMBER:  32 cm\S\2  LENGTH - 2 CHAMBER:  6.1 cm  AREA - 4 CHAMBER:  29 cm\S\2  LENGTH - 4 CHAMBER:  7 cm  VOLUME:  129 ml  VOLUME NORMALIZED:  52.6 ml/m\S\2    RA DIMENSIONS (RV SYSTOLE)  ----------------------------------------------  AREA - 4 CHAMBER:  19 cm\S\2  LENGTH - 4 CHAMBER:  5.9 cm    AORTIC ROOT DIMENSIONS  ----------------------------------------------  ANNULUS:  2.7 cm  SINUS OF VALSALVA:  3 cm    EXTRACELLULAR VOLUME MEASUREMENT  ----------------------------------------------  PRE-CONTRAST T1 MYOCARDIUM:  1090 msec  PRE-CONTRAST T1 LV CAVITY:  1458 msec  POST-CONTRAST T1 MYOCARDIUM:  310 msec  POST-CONTRAST T1 LV CAVITY:  257 msec  HEMATOCRIT:  44 %  ECV:  40 %    IRON QUANTIFICATION  ----------------------------------------------  MYOCARDIAL T2*:  24 msec  LIVER T2*:  17 msec      17 SEGMENT  ---------------------------------------------------------------------  -------------------  .---------------------------------------------------------------------  ---------------------------.  . Segments           . Wall Motion  . Hyperenhancement . Stress  Perfusion . Interpretation       .  +--------------------+--------------+------------------+--------------  ----+----------------------+  . Base Anterior      . Severe Hypo  . None             .  . Abnormal Wall Motion .  .  Base Anteroseptal  . Severe Hypo  . 1-25%            .  . Abnormal Wall Motion .  . Base Inferoseptal  . Severe Hypo  . 1-25%            .  . Abnormal Wall Motion .  . Base Inferior      . Severe Hypo  . None             .  . Abnormal Wall Motion .  . Base Inferolateral . Severe Hypo  . None             .  . Abnormal Wall Motion .  . Base Anterolateral . Severe Hypo  . 1-25%            .  . Abnormal Wall Motion .  . Mid Anterior       . Severe Hypo  . None             .  . Abnormal Wall Motion .  . Mid Anteroseptal   . Severe Hypo  . 1-25%            .  . Abnormal Wall Motion .  . Mid Inferoseptal   . Severe Hypo  . 1-25%            .  . Abnormal Wall Motion .  . Mid Inferior       . Severe Hypo  . None             .  . Abnormal Wall Motion .  . Mid Inferolateral  . Severe Hypo  . None             .  . Abnormal Wall Motion .  . Mid Anterolateral  . Severe Hypo  . None             .  . Abnormal Wall Motion .  . Apical Anterior    . Severe Hypo  . None             .  . Abnormal Wall Motion .  . Apical Septal      . Severe Hypo  . 1-25%            .  . Abnormal Wall Motion .  . Apical Inferior    . Severe Hypo  . None             .  . Abnormal Wall Motion .  . Apical Lateral     . Severe Hypo  . None             .  . Abnormal Wall Motion .  . [place]               . Severe Hypo  . None             .  . Abnormal Wall Motion .  +--------------------+--------------+------------------+--------------  ----+----------------------+  . RV Segments        . Wall Motion  . Hyperenhancement . Stress  Perfusion . Interpretation       .  +--------------------+--------------+------------------+--------------  ----+----------------------+  . RV Basal Anterior  . Normal/Hyper . None             .  . Normal               .  . RV Basal Inferior  . Normal/Hyper . None             .  . Normal               .  . RV Mid             . Normal/Hyper . None             .  . Normal               .  . RV Apical          . Normal/Hyper . None          "    .  . Normal               .  '--------------------+--------------+------------------+--------------  ----+----------------------'    FINDINGS  ----------------------------------------------  LV SCAR SIZE (17 SEGMENT):  5 %      Nuclear:No results found for this or any previous visit from the past 1800 days.    Metabolic Stress: No results found for this or any previous visit from the past 1800 days.        Current Outpatient Medications   Medication Instructions    atorvastatin (Lipitor) 40 mg tablet 1 tablet, oral, Nightly    buPROPion XL (WELLBUTRIN XL) 150 mg, oral, Daily    carvedilol (COREG) 6.25 mg, oral, 2 times daily    dapagliflozin propanediol (FARXIGA) 10 mg, oral, Daily    dulaglutide (Trulicity) 1.5 mg/0.5 mL pen injector injection Inject 1 pen (1.5 mg) under the skin 1 (one) time per week.    FreeStyle Nadir 3 Sensor device Use as directed to check blood sugar 3 to 4 times daily. Change every 14 days.    FreeStyle Nadir sensor system (FreeStyle Nadir 2 Sensor) kit Use as instructed    insulin lispro (HumaLOG) 100 unit/mL injection Inject 16 Units under the skin 3 times a day with meals. Take as directed per insulin instructions.    Lantus Solostar U-100 Insulin 55 Units, subcutaneous, Nightly, Take as directed per insulin instructions.    metFORMIN (Glucophage) 1,000 mg tablet 1 tablet, oral, 2 times daily    pen needle, diabetic 31 gauge x 5/16\" needle Use to inject 1-4 times daily as directed.    rivaroxaban (XARELTO) 20 mg, oral, Daily with evening meal, Take with food.    sacubitriL-valsartan (Entresto) 24-26 mg tablet 1 tablet, oral, 2 times daily    sertraline (ZOLOFT) 100 mg, oral, Daily    spironolactone (ALDACTONE) 12.5 mg, oral, Daily    torsemide (DEMADEX) 20 mg, oral, Daily    traZODone (DESYREL) 50 mg, oral, Nightly    True Metrix Glucose Test Strip strip USE TO CHECK BLOOD SUGAR FOUR TIMES DAILY    Ultra Thin Lancets 30 gauge misc TEST BLOOD SUGAR FOUR TIMES DAILY AS DIRECTED    "   Notable Therapies   Class  Agent SAFETY    ARNI / ACE / ARB  sacubitriL-valsartan 24-26mg BID Last BP: (!) 127/91, Last BUN/Cr (GFR): 29/1.32 (71), 2/23/2024:  3:40 AM.    Beta-Blocker  carvedilol 6.25mg BID Last HR: 100    MRA  spironolactone 12.5mg daily Last K: 2/23/2024: 3.5     SGLT2  dapagliflozin 10mg daily Last A1c 2/1/2024: 15.3     Diuretic  torsemide 20mg daily Last BNP: 2/20/2024: 259    Hydralazine/ISDN       Anticoagulation  rivaroxaban 20mg daily Last Hgb: 2/23/2024: 13.6    Anti-arrhythmic       Antiplatelet   Last Platelet: 2/23/2024: 221    Lipid-Lowering  atorvastatin 40mg daily Last Tchol 12/12/2023: 125 / LDL No results found for requested labs within last 365 days. or 50 / HDL 26.6 / TRIG 241    Other        Device(s)       Cardiac Rehab,   if EF <35/MI/OHS        Problems Addressed:   # HFrEF / Chronic systolic heart failure, EF 10% (2/21/24), diagnosed 10/2022 with EF 5-10%. No ICD in place. Referred to EP  # Non-ischemic cardiomyopathy, Stage C, NYHA III, No ICD in place. Suspect underlying genetic origin given CM in daughter. He does not know family history.   # Hypertension  # Severe Obesity  # Tobacco use, now quit: congratulated  # Type II Diabetes Mellitus: Last A1c (15.3): endocrinology referral.   # Cardioembolic stroke s/p TNK 12/2022  - continue current medications, Did not take medication this morning  -- safety labs  - EP referral for ICD (discussed rationale and he is interested)  - CV genetics evaluation  - CPET  - counselled to avoid alcohol and cocaine (denies recent use)  - return to clinic with plan for possible admission and expedited LVAD workup if decompensation and able to show clinic visit adherance. He reports he wants an LVAD     # Hx of Bipolar I Disorder:  Referral to Adult Psychiatry.          Patient Instructions   If you have any questions or need cardiac medication refills, please call the Heart Failure office at [phone], option [phone].   You may also contact  the  Heart Failure Nursing team via email at [email or web address] (Please include your name and date of birth).      To reach Dr. Jurado's office please call [phone] / Fax: [phone].  To schedule an appointment call [phone].    - Continue current medications  - Labwork: today  - Imaging/Procedures: cardiopulmonary exercise test  - Results: I will send a [facility] message with any notable results. You may also call the HF nursing team (contact details above) for your results.   - Referrals: endocrinology, psychiatry, electrophysiology  - Followup: 1 month     Orders:  Orders Placed This Encounter   Procedures    B-Type Natriuretic Peptide    CBC    Ferritin    Comprehensive Metabolic Panel    Hemoglobin A1C    Iron and TIBC    TSH with reflex to Free T4 if abnormal    Referral to Cardiac Electrophysiology    Referral to Psychiatry    Referral to Endocrinology    Referral to Primary Care    Referral to Cardiac Rehab    Cardiopulmonary (Metabolic) Stress Test      Followup Appts:  Future Appointments   Date Time Provider Department Center   6/10/2024  8:00 AM ARIAN Berg-CNP [id number]   7/2/2024  8:30 AM Delroy Jurado MD [id number]       Time Spent: I spent 40 minutes reviewing medical testing, obtaining medical history and counselling and educating on diagnosis and documenting clinical encounter.   ____________________________________________________________  Delroy Jurado MD  Section of Advanced Heart Failure and Cardiac Transplantation  Division of Cardiovascular Medicine  [facility]  [facility]

## 2024-05-21 ENCOUNTER — LAB (OUTPATIENT)
Dept: LAB | Facility: LAB | Age: 39
End: 2024-05-21
Payer: COMMERCIAL

## 2024-05-21 ENCOUNTER — OFFICE VISIT (OUTPATIENT)
Dept: CARDIOLOGY | Facility: HOSPITAL | Age: 39
End: 2024-05-21
Payer: COMMERCIAL

## 2024-05-21 VITALS
BODY MASS INDEX: 43.71 KG/M2 | HEART RATE: 100 BPM | SYSTOLIC BLOOD PRESSURE: 127 MMHG | OXYGEN SATURATION: 98 % | DIASTOLIC BLOOD PRESSURE: 91 MMHG | WEIGHT: 296 LBS

## 2024-05-21 DIAGNOSIS — I10 PRIMARY HYPERTENSION: ICD-10-CM

## 2024-05-21 DIAGNOSIS — I50.20 HFREF (HEART FAILURE WITH REDUCED EJECTION FRACTION) (MULTI): Primary | ICD-10-CM

## 2024-05-21 DIAGNOSIS — I50.43 ACUTE ON CHRONIC COMBINED SYSTOLIC AND DIASTOLIC CONGESTIVE HEART FAILURE (MULTI): ICD-10-CM

## 2024-05-21 DIAGNOSIS — F32.A DEPRESSION, UNSPECIFIED DEPRESSION TYPE: ICD-10-CM

## 2024-05-21 DIAGNOSIS — I50.20 HFREF (HEART FAILURE WITH REDUCED EJECTION FRACTION) (MULTI): ICD-10-CM

## 2024-05-21 LAB
ALBUMIN SERPL BCP-MCNC: 4.1 G/DL (ref 3.4–5)
ALP SERPL-CCNC: 59 U/L (ref 33–120)
ALT SERPL W P-5'-P-CCNC: 21 U/L (ref 10–52)
ANION GAP SERPL CALC-SCNC: 14 MMOL/L (ref 10–20)
AST SERPL W P-5'-P-CCNC: 24 U/L (ref 9–39)
BILIRUB SERPL-MCNC: 0.6 MG/DL (ref 0–1.2)
BNP SERPL-MCNC: 195 PG/ML (ref 0–99)
BUN SERPL-MCNC: 24 MG/DL (ref 6–23)
CALCIUM SERPL-MCNC: 9 MG/DL (ref 8.6–10.6)
CHLORIDE SERPL-SCNC: 99 MMOL/L (ref 98–107)
CO2 SERPL-SCNC: 25 MMOL/L (ref 21–32)
CREAT SERPL-MCNC: 1.43 MG/DL (ref 0.5–1.3)
EGFRCR SERPLBLD CKD-EPI 2021: 65 ML/MIN/1.73M*2
ERYTHROCYTE [DISTWIDTH] IN BLOOD BY AUTOMATED COUNT: 13.2 % (ref 11.5–14.5)
EST. AVERAGE GLUCOSE BLD GHB EST-MCNC: 346 MG/DL
FERRITIN SERPL-MCNC: 274 NG/ML (ref 20–300)
GLUCOSE SERPL-MCNC: 384 MG/DL (ref 74–99)
HBA1C MFR BLD: 13.7 %
HCT VFR BLD AUTO: 45.6 % (ref 41–52)
HGB BLD-MCNC: 14.8 G/DL (ref 13.5–17.5)
IRON SATN MFR SERPL: 24 % (ref 25–45)
IRON SERPL-MCNC: 92 UG/DL (ref 35–150)
MCH RBC QN AUTO: 32 PG (ref 26–34)
MCHC RBC AUTO-ENTMCNC: 32.5 G/DL (ref 32–36)
MCV RBC AUTO: 99 FL (ref 80–100)
NRBC BLD-RTO: 0 /100 WBCS (ref 0–0)
PLATELET # BLD AUTO: 242 X10*3/UL (ref 150–450)
POTASSIUM SERPL-SCNC: 4.9 MMOL/L (ref 3.5–5.3)
PROT SERPL-MCNC: 7.1 G/DL (ref 6.4–8.2)
RBC # BLD AUTO: 4.62 X10*6/UL (ref 4.5–5.9)
SODIUM SERPL-SCNC: 133 MMOL/L (ref 136–145)
TIBC SERPL-MCNC: 384 UG/DL (ref 240–445)
TSH SERPL-ACNC: 3.98 MIU/L (ref 0.44–3.98)
UIBC SERPL-MCNC: 292 UG/DL (ref 110–370)
WBC # BLD AUTO: 4.7 X10*3/UL (ref 4.4–11.3)

## 2024-05-21 PROCEDURE — 85027 COMPLETE CBC AUTOMATED: CPT

## 2024-05-21 PROCEDURE — 84443 ASSAY THYROID STIM HORMONE: CPT

## 2024-05-21 PROCEDURE — 82728 ASSAY OF FERRITIN: CPT

## 2024-05-21 PROCEDURE — 99215 OFFICE O/P EST HI 40 MIN: CPT | Performed by: STUDENT IN AN ORGANIZED HEALTH CARE EDUCATION/TRAINING PROGRAM

## 2024-05-21 PROCEDURE — 80053 COMPREHEN METABOLIC PANEL: CPT

## 2024-05-21 PROCEDURE — 83036 HEMOGLOBIN GLYCOSYLATED A1C: CPT

## 2024-05-21 PROCEDURE — 83550 IRON BINDING TEST: CPT

## 2024-05-21 PROCEDURE — 83540 ASSAY OF IRON: CPT

## 2024-05-21 PROCEDURE — 83880 ASSAY OF NATRIURETIC PEPTIDE: CPT

## 2024-05-21 RX ORDER — CARVEDILOL 6.25 MG/1
6.25 TABLET ORAL 2 TIMES DAILY
Qty: 60 TABLET | Refills: 11 | Status: SHIPPED | OUTPATIENT
Start: 2024-05-21 | End: 2024-05-28 | Stop reason: ALTCHOICE

## 2024-05-21 RX ORDER — DAPAGLIFLOZIN 10 MG/1
10 TABLET, FILM COATED ORAL DAILY
Qty: 90 TABLET | Refills: 3 | Status: SHIPPED | OUTPATIENT
Start: 2024-05-21 | End: 2025-05-21

## 2024-05-21 RX ORDER — SACUBITRIL AND VALSARTAN 24; 26 MG/1; MG/1
1 TABLET, FILM COATED ORAL 2 TIMES DAILY
Qty: 60 TABLET | Refills: 11 | Status: SHIPPED | OUTPATIENT
Start: 2024-05-21 | End: 2025-05-21

## 2024-05-21 ASSESSMENT — PAIN SCALES - GENERAL: PAINLEVEL: 0-NO PAIN

## 2024-05-21 ASSESSMENT — ENCOUNTER SYMPTOMS
DEPRESSION: 0
LOSS OF SENSATION IN FEET: 0
OCCASIONAL FEELINGS OF UNSTEADINESS: 0

## 2024-05-21 NOTE — PROGRESS NOTES
Recent Hospitalizations: 2/20/24-2/23/24 CHF  Accompanied by: Self  Social Hx: Quit smoking at the beginning of the year. Drinks socially. Denies illicit drug use. Has used cocaine in the past.   Diet: None.   Exercise: Walks on the treadmill every other day for approximately 20 minutes.    Denies fatigue, chest pressure, palpitations. No edema noted in BLE.   Denies headaches, dizziness, lightheadedness, and falls.    He states he has chest pain approximately 3 times a week. He states it feels like spikes in his chest and lasts a second or two.  He reports a lot of SOB. It has been occurring at rest and with exertion. He reports orthopnea and PND.   He reports daily fatigue.

## 2024-05-21 NOTE — PATIENT INSTRUCTIONS
If you have any questions or need cardiac medication refills, please call the Heart Failure office at [phone], option [phone].   You may also contact the  Heart Failure Nursing team via email at [email or web address] (Please include your name and date of birth).      To reach Dr. Jurado's office please call [phone] / Fax: [phone].  To schedule an appointment call [phone].    - Continue current medications  - Labwork: today  - Imaging/Procedures: cardiopulmonary exercise test  - Results: I will send a Zeolife message with any notable results. You may also call the HF nursing team (contact details above) for your results.   - Referrals: endocrinology, psychiatry, electrophysiology  - Followup: 1 month

## 2024-05-24 NOTE — PROGRESS NOTES
Cardiac Rehabilitation Initial Treatment Plan    Name: Arthur Carranza   Medical Record Number: [id number]  YOB: 1985   Age: 38 y.o.  Today’s Date: 5/28/2024   Primary Care Physician: Christopher D'Amico, DO   Referring Physician: Delroy Jurado MD   Program Location: [address]  Primary Diagnosis:   1. HFrEF (heart failure with reduced ejection fraction) (Multi)  Referral to Cardiac Rehab      2. Primary hypertension  Referral to Cardiac Rehab      3. Acute on chronic combined systolic and diastolic congestive heart failure (Multi)  Referral to Cardiac Rehab         Onset/Date of Diagnosis: 10/21/22   Session #: 0  AACVPR Risk Stratification: High   Falls Risk: Low    Psychosocial Initial Assessment:   Pre PHQ-9: 19  Pre PHQ-9: 19     Sent PH-Q 9 to MD if score > 20: Yes; Date sent: 5/28/2024    Pt reported/currently experiencing stress: Yes; Stress; Severity: marked, Depression; Severity: marked, and PTSD; Severity: moderate  Patient uses stress management skills: Yes , painting  History of:  Depression and PTSD  Currently seeing a mental health provider: Yes, Then provider name: unknown; patient states he would like to switch providers  Social Support: No  Quality of Life Survey: KCCQ (see Heart Failure Management below)  Learning Assessment:  Learning assessment/barriers: Emotional, Financial, and Motivation  Preferred learning method: Auditory, Visual, Reading handout, and Writing handout  Barriers: None  Comments:    Stages of Change: Preparation    Psychosocial Plan  Goal Status: Initial Assessment; goals not yet started  Psychosocial Goals: Demonstrating proper techniques for stress management, Maintain or lower PH-Q 9 score by discharge, and Identify strategies for managing depression    Psychosocial Interventions/Education:   To be done in Cardiac Rehab.    Nutrition Initial Assessment:    Diet Habit Survey: Picture Your Plate  Pre: Survey to be given during  "initial exercise session.  Post: To be done at discharge.    PYP reviewed with Dietician: Not at this time    Lipids Assessment  Hyperlipidemia: Yes   Lab Results   Component Value Date    CHOL 125 12/12/2023    HDL 26.6 12/12/2023    LDLCALC 50 12/12/2023    TRIG 241 (H) 12/12/2023     Current Dietary Guidelines: Low fat, Low sodium  Barriers to dietary change: no    Diabetes Assessment  Lab Results   Component Value Date    HGBA1C 13.7 (H) 05/21/2024      History of Diabetes: Yes   Pt monitors BS at home: Yes   Frequency: x1 /day  FBS range: 300 - 800  Hypoglycemic Episodes: Yes ; while admitted    Weight Management  Weight: 138 kg (304 lb)  Height: 175.3 cm (5' 9\")  BMI (Calculated): 44.87     Nutrition Plan  Goal Status: Initial Assessment; goals not yet started  Nutrition Goals: Lipid Goal: HDL>45, LDL <70, Total <180, Trigs <150, Improve Diet Habit Survey score by 5-10 points by discharge, Learn how to read and interpret nutrition labels prior to discharge, and Improve HgbA1C prior to discharge  Nutrition Interventions/Education:   To be done in Cardiac Rehab.    Exercise Initial Assessment:  Current Home Exercise:   Current Home Exercise: Current Home Exercise?: No  Mode: NA  Frequency: NA  Duration: NA     Exercise Prescription based on:   Cardiopulmonary Metabolic Stress  6/6/2024    [facility] Activity Status Index (DASI)  DASI Score: 26.45   MET Score: 6     Exercise Prescription  Frequency:  3 days/week  Mode: Treadmill, NuStep, and Recumbent Cycle  Duration: 27 total aerobic minutes  Intensity: RPE 12-16  Target HR:   TBD; THR calculated via CPET by program EP  MET Level: TBD  Patient wears supplemental O2: No   Modality Workload METs Duration (minutes)   1 Pre-Exercise      2 Session Warm Up   5 : 00   3 Treadmill 2.0 mph @ 0 % 2.6 9 : 00   4 NuStep 4000 3 load @70 potts 2.5 9 : 00   5 Recumbent Bike 3 load @ 50 rpms 2.3 9 : 00   6 Post-Exercise         Resistance Training:  After session #10  Home Exercise " Prescription given: To be given prior to discharge from program.    Exercise Plan  Goal Status: Initial Assessment; goals not yet started  Exercise Goals: Increase exercise MET level by 5-10% each week, Increase total exercise duration to 30-45 minutes, Obtain 150 minutes/week of moderate intensity aerobic exercise, and Establish a home exercise program before discharge    Exercise Interventions/Education:   To be done in Cardiac Rehab.    Other Core Components/Risk Factor Initial Assessment:  Medication adherence  Medication compliance:  Yes  Uses pill box/organizer: Yes   Carries medication list: Yes   Current Medications:   Medication Documentation Review Audit       Reviewed by Tara Solis RN (Registered Nurse) on 24 at 1013      Medication Order Taking? Sig Documenting Provider Last Dose Status   atorvastatin (Lipitor) 40 mg tablet [id number] Yes Take 1 tablet (40 mg) by mouth once daily at bedtime. Historical Provider, MD Taking Active   buPROPion XL (Wellbutrin XL) 150 mg 24 hr tablet [id number] Yes Take 1 tablet (150 mg) by mouth once daily. Historical Provider, MD Taking Active     Discontinued 24 0805   dapagliflozin propanediol (Farxiga) 10 mg [id number] Yes Take 1 tablet (10 mg) by mouth once daily. Delroy Jurado MD Taking Active   dulaglutide (Trulicity) 1.5 mg/0.5 mL pen injector injection [id number]  Inject 1 pen (1.5 mg) under the skin 1 (one) time per week.   Patient not taking: Reported on 2024    Merry Chaney MD   24 2359   FreeStyle Nadir 3 Sensor device [id number] No Use as directed to check blood sugar 3 to 4 times daily. Change every 14 days.   Patient not taking: Reported on 2024    Merry Chaney MD Not Taking Active   FreeStyle Nadir sensor system (FreeStyle Nadir 2 Sensor) kit [id number] No Use as instructed   Patient not taking: Reported on 2024    Kevin Willingham MD Not Taking Active   insulin glargine (Lantus Solostar U-100 Insulin) 100  "unit/mL (3 mL) pen [id number]  Inject 55 Units under the skin once daily at bedtime. Take as directed per insulin instructions. Merry Chaney MD   24 104   insulin lispro (HumaLOG) 100 unit/mL injection [id number] Yes Inject 16 Units under the skin 3 times a day with meals. Take as directed per insulin instructions. Merry Chaney MD Taking Active   metFORMIN (Glucophage) 1,000 mg tablet [id number] Yes Take 1 tablet (1,000 mg) by mouth 2 times a day. Historical Provider, MD Taking Active   pen needle, diabetic 31 gauge x 5/16\" needle [id number] Yes Use to inject 1-4 times daily as directed. Merry Chaney MD Taking Active   rivaroxaban (Xarelto) 20 mg tablet [id number] Yes Take 1 tablet (20 mg) by mouth once daily in the evening. Take with meals. Take with food. Delroy Jurado MD Taking Active   sacubitriL-valsartan (Entresto) 24-26 mg tablet [id number] Yes Take 1 tablet by mouth 2 times a day. Delroy Jurado MD Taking Active   sertraline (Zoloft) 100 mg tablet [id number] Yes Take 1 tablet (100 mg) by mouth once daily. Historical Provider, MD Taking Active   spironolactone (Aldactone) 25 mg tablet [id number] Yes Take 0.5 tablets (12.5 mg) by mouth once daily. Do not start before 2024. GEE Galaviz Taking Active   torsemide (Demadex) 20 mg tablet [id number] Yes Take 1 tablet (20 mg) by mouth once daily. GEE Galaviz Taking Active   traZODone (Desyrel) 50 mg tablet [id number] Yes Take 1 tablet (50 mg) by mouth once daily at bedtime. Historical Provider, MD Taking Active   True Metrix Glucose Test Strip strip [id number] Yes USE TO CHECK BLOOD SUGAR FOUR TIMES DAILY Historical Provider, MD Taking Active   Ultra Thin Lancets 30 gauge misc [id number] Yes TEST BLOOD SUGAR FOUR TIMES DAILY AS DIRECTED Historical Provider, MD Taking Active                   Blood Pressure Management  History of Hypertension: Yes   Medication Changes: No   Resting BP:  Visit " Vitals  /62   Pulse 100   Resp 22       Heart Failure Management  Hx of Heart Failure: Yes, Type (selection): HFrEF  Most recent EF: Echocardiogram - LVEF (%): 10     Onset of heart failure diagnosis: 03/2023  Last heart failure hospitalization: 02/21/2024 - 02/23/2024 CHF  Number of HF admissions per year: 5    Symptoms: Fatigue at rest, Fatigue with exertion, Shortness of breath, Orthopnea, and PND and chest pain that is sharp lasting 1-2 seconds  Is there a family Hx of HF:  unknown family history; foster care  Does patient obtain daily weight  Every other day    KCCQ survey: Pre: TBD  Post: TBD    Heart Failure Reassessment Heart Failure Symptom Management: Initial Treatment Plan. Will reassess in 30 days.    Heart Failure Goals: Able to verbalize signs and symptoms of fluid retention and when to contact MD, Adhere to proper fluid restrictions, Obtain daily weight and verbalize proper method of obtaining weights    Smoking/Tobacco Assessment  Social History     Tobacco Use    Smoking status: Former     Types: Cigarettes   Vaping Use    Vaping status: Unknown   Substance Use Topics    Alcohol use: Yes     Comment: occasionaly - 1-2 times a month    Drug use: Not Currently     Types: Codeine      Other Core Component Plan  Goal Status: Initial Assessment; goals not yet started  Other Core Component Goals: Medication compliance, Verbalize medication usage and drug actions by discharge, Achieve resting BP of < 130/80 by discharge, and tobacco cessation follow up (recent quit)    Other Core Component Interventions/Education:   To be done in Cardiac Rehab.    Individual Patient Goals:  Be able to start home exercise routine by discharge from program.   Lose 1-3 lbs while enrolled in Cardiac Rehab.   Goal Status: Initial Assessment; goals not yet started    Staff Comments:  Initial assessment done In Person at [facility]. Patient plans to attend initial session 6/12/2024 at 8:00 AM.  CPET scheduled for  06/06/2024 with Exercise Physiologist. ExRx to be written after pre-rehab testing has been completed.     Rehab Staff Signature: Tara Solis RN

## 2024-05-28 ENCOUNTER — CLINICAL SUPPORT (OUTPATIENT)
Dept: CARDIAC REHAB | Facility: HOSPITAL | Age: 39
End: 2024-05-28
Payer: COMMERCIAL

## 2024-05-28 ENCOUNTER — OFFICE VISIT (OUTPATIENT)
Dept: PRIMARY CARE | Facility: CLINIC | Age: 39
End: 2024-05-28
Payer: COMMERCIAL

## 2024-05-28 VITALS
HEART RATE: 100 BPM | HEIGHT: 69 IN | BODY MASS INDEX: 45.03 KG/M2 | OXYGEN SATURATION: 98 % | DIASTOLIC BLOOD PRESSURE: 62 MMHG | RESPIRATION RATE: 22 BRPM | SYSTOLIC BLOOD PRESSURE: 104 MMHG | WEIGHT: 304 LBS

## 2024-05-28 VITALS
DIASTOLIC BLOOD PRESSURE: 90 MMHG | SYSTOLIC BLOOD PRESSURE: 114 MMHG | BODY MASS INDEX: 44.43 KG/M2 | WEIGHT: 300 LBS | OXYGEN SATURATION: 96 % | HEIGHT: 69 IN | HEART RATE: 90 BPM

## 2024-05-28 DIAGNOSIS — Z00.00 WELLNESS EXAMINATION: ICD-10-CM

## 2024-05-28 DIAGNOSIS — I50.43 ACUTE ON CHRONIC COMBINED SYSTOLIC AND DIASTOLIC CONGESTIVE HEART FAILURE (MULTI): ICD-10-CM

## 2024-05-28 DIAGNOSIS — E11.65 TYPE 2 DIABETES MELLITUS WITH HYPERGLYCEMIA, WITH LONG-TERM CURRENT USE OF INSULIN (MULTI): ICD-10-CM

## 2024-05-28 DIAGNOSIS — I10 PRIMARY HYPERTENSION: ICD-10-CM

## 2024-05-28 DIAGNOSIS — J81.1 CHRONIC PULMONARY EDEMA (HHS-HCC): ICD-10-CM

## 2024-05-28 DIAGNOSIS — Z79.4 TYPE 2 DIABETES MELLITUS WITH HYPERGLYCEMIA, WITH LONG-TERM CURRENT USE OF INSULIN (MULTI): ICD-10-CM

## 2024-05-28 DIAGNOSIS — I50.20 HFREF (HEART FAILURE WITH REDUCED EJECTION FRACTION) (MULTI): ICD-10-CM

## 2024-05-28 DIAGNOSIS — E11.69 TYPE 2 DIABETES MELLITUS WITH OTHER SPECIFIED COMPLICATION, WITHOUT LONG-TERM CURRENT USE OF INSULIN (MULTI): Primary | ICD-10-CM

## 2024-05-28 DIAGNOSIS — I50.20 HFREF (HEART FAILURE WITH REDUCED EJECTION FRACTION) (MULTI): Primary | ICD-10-CM

## 2024-05-28 PROBLEM — M79.646 PAIN OF FINGER: Status: ACTIVE | Noted: 2024-05-28

## 2024-05-28 PROBLEM — R06.02 SHORTNESS OF BREATH: Status: ACTIVE | Noted: 2024-05-28

## 2024-05-28 PROBLEM — M86.9 OSTEOMYELITIS (MULTI): Status: ACTIVE | Noted: 2024-05-28

## 2024-05-28 PROBLEM — R00.2 PALPITATIONS: Status: ACTIVE | Noted: 2024-05-28

## 2024-05-28 PROBLEM — F31.30 BIPOLAR AFFECTIVE DISORDER, CURRENT EPISODE DEPRESSED (MULTI): Status: ACTIVE | Noted: 2024-05-28

## 2024-05-28 PROBLEM — R19.00 ABDOMINAL SWELLING: Status: ACTIVE | Noted: 2024-05-28

## 2024-05-28 ASSESSMENT — PATIENT HEALTH QUESTIONNAIRE - PHQ9
6. FEELING BAD ABOUT YOURSELF - OR THAT YOU ARE A FAILURE OR HAVE LET YOURSELF OR YOUR FAMILY DOWN: MORE THAN HALF THE DAYS
4. FEELING TIRED OR HAVING LITTLE ENERGY: NEARLY EVERY DAY
2. FEELING DOWN, DEPRESSED OR HOPELESS: NOT AT ALL
2. FEELING DOWN, DEPRESSED OR HOPELESS: MORE THAN HALF THE DAYS
7. TROUBLE CONCENTRATING ON THINGS, SUCH AS READING THE NEWSPAPER OR WATCHING TELEVISION: NEARLY EVERY DAY
1. LITTLE INTEREST OR PLEASURE IN DOING THINGS: SEVERAL DAYS
3. TROUBLE FALLING OR STAYING ASLEEP OR SLEEPING TOO MUCH: NEARLY EVERY DAY
5. POOR APPETITE OR OVEREATING: NEARLY EVERY DAY
8. MOVING OR SPEAKING SO SLOWLY THAT OTHER PEOPLE COULD HAVE NOTICED. OR THE OPPOSITE, BEING SO FIGETY OR RESTLESS THAT YOU HAVE BEEN MOVING AROUND A LOT MORE THAN USUAL: SEVERAL DAYS
9. THOUGHTS THAT YOU WOULD BE BETTER OFF DEAD, OR OF HURTING YOURSELF: SEVERAL DAYS
SUM OF ALL RESPONSES TO PHQ9 QUESTIONS 1 AND 2: 0
SUM OF ALL RESPONSES TO PHQ QUESTIONS 1-9: 19
SUM OF ALL RESPONSES TO PHQ QUESTIONS 1-9: 19
SUM OF ALL RESPONSES TO PHQ9 QUESTIONS 1 & 2: 3
1. LITTLE INTEREST OR PLEASURE IN DOING THINGS: NOT AT ALL

## 2024-05-28 ASSESSMENT — DUKE ACTIVITY SCORE INDEX (DASI)
CAN YOU DO HEAVY WORK AROUND THE HOUSE LIKE SCRUBBING FLOORS OR LIFTING AND MOVING HEAVY FURNITURE: NO
CAN YOU HAVE SEXUAL RELATIONS: YES
CAN YOU TAKE CARE OF YOURSELF (EAT, DRESS, BATHE, OR USE TOILET): YES
CAN YOU CLIMB A FLIGHT OF STAIRS OR WALK UP A HILL: NO
CAN YOU WALK INDOORS, SUCH AS AROUND YOUR HOUSE: YES
CAN YOU DO YARD WORK LIKE RAKING LEAVES, WEEDING OR PUSHING A MOWER: YES
CAN YOU WALK A BLOCK OR TWO ON LEVEL GROUND: NO
CAN YOU DO MODERATE WORK AROUND THE HOUSE LIKE VACUUMING, SWEEPING FLOORS OR CARRYING GROCERIES: YES
CAN YOU PARTICIPATE IN STRENOUS SPORTS LIKE SWIMMING, SINGLES TENNIS, FOOTBALL, BASKETBALL, OR SKIING: NO
CAN YOU RUN A SHORT DISTANCE: NO
DASI METS SCORE: 6
CAN YOU DO LIGHT WORK AROUND THE HOUSE LIKE DUSTING OR WASHING DISHES: YES
TOTAL_SCORE: 26.45
CAN YOU PARTICIPATE IN MODERATE RECREATIONAL ACTIVITIES LIKE GOLF, BOWLING, DANCING, DOUBLES TENNIS OR THROWING A BASEBALL OR FOOTBALL: YES

## 2024-05-28 ASSESSMENT — ENCOUNTER SYMPTOMS
DEPRESSION: 0
OCCASIONAL FEELINGS OF UNSTEADINESS: 0
LOSS OF SENSATION IN FEET: 0

## 2024-05-28 NOTE — PROGRESS NOTES
HPI: 38-year-old male presenting for establishment of care, follow-up on multiple chronic conditions, CPE.    CHF  Following with cardiology.  Mostly stable.  Last hospitalization, EF of 15%.  Is being admitted in a couple weeks for ICD placement.    HLD  Stable, tolerates regimen well.    DMII  A1c consistently above 10.  Reports lackluster compliance with medications.    Stroke  Had one about a year and a half ago.  No significant residual neurological deficits.    Depression  Stable, states that he follows with psychiatry.    SocHx:   - Smoking: About 20 pack years, has quit cigarettes, is currently smoking about 1-2 black and milds a day   -Illicit drugs: Routine cocaine use, reports that he is discontinued since last hospitalization    12 point ROS reviewed and negative other than as stated in HPI      General: Alert, oriented, pleasant, in no acute distress  HEENT:      Head: normocephalic, atraumatic;      eyes: EOMI, no scleral icterus;   Neck: soft, supple, non-tender, no masses appreciated  CV: Heart with regular rate and rhythm, normal S1/S2, no murmurs  Lungs: CTAB without wheezing, rhonchi or rales; good respiratory effort, no increased work of breathing  Abdomen: soft, non-tender, non-distended, no masses appreciated, limited by body habitus  Extremities: no edema, no cyanosis  Neuro: Cranial nerves grossly intact; alert and oriented, normal gait  Psych: Appropriate mood and affect    #HM  -Labs recent and reviewed  -Vaccines:       Flu: out of season      Shingrix: at 50      Pneumococcal: recommended, deferred      Tdap: 2018  -Lung cancer screening with low-dose CT: 20 pack years, now smoking about one or two milds  -Colonoscopy: at 45  -AAA screening: at 65    #CHF #Pulmonary edema  -Continue Entresto 24-26 mg daily, spironolactone 25 mg daily, torsemide 20 mg as needed  - Continue to follow with cardiology    #HLD  -Continue atorvastatin 40 mg daily    #DMII  -Last A1c 13.7  - Reports being on  Lantus, sometimes lispro, has run out of Trulicity, is taking metformin 1000 mg twice daily, Farxiga 10 mg daily  - Ophthalmology referral  - Pharmacy referral for medication optimization, CGM    #Stroke  -Unclear etiology  - No longer following with neurology  - On Xarelto 20 mg for suspected cardioembolic stroke etiology    #Depression  -On Wellbutrin and Zoloft, stable  - Following with psychiatry    #PHOEBE  - Has upcoming sleep medicine appointment    F/U 3 months, sooner if indicated    Chris D'Amico, DO

## 2024-05-28 NOTE — ADDENDUM NOTE
Addended by: D'AMICO, CHRISTOPHER M on: 5/28/2024 12:19 PM     Modules accepted: Level of Service

## 2024-06-03 PROBLEM — E11.65 TYPE 2 DIABETES MELLITUS WITH HYPERGLYCEMIA (MULTI): Status: RESOLVED | Noted: 2023-12-04 | Resolved: 2024-06-03

## 2024-06-03 PROBLEM — I50.9 CHF (CONGESTIVE HEART FAILURE) (MULTI): Status: RESOLVED | Noted: 2023-03-01 | Resolved: 2024-06-03

## 2024-06-03 PROBLEM — E11.65 HYPERGLYCEMIA DUE TO DIABETES MELLITUS (MULTI): Status: RESOLVED | Noted: 2024-01-31 | Resolved: 2024-06-03

## 2024-06-03 PROBLEM — I50.20: Status: RESOLVED | Noted: 2023-03-01 | Resolved: 2024-06-03

## 2024-06-03 NOTE — PROGRESS NOTES
[facility]  [facility]  [address]  [place] [address]  [phone]     [facility]  New Visit Note      Subjective   Patient ID: Arthur Carranza is a 38 y.o. male with past medical history significant for Morbid Obesity, Hypertension, Chronic Obstructive Pulmonary Disease, Congestive Heart Failure, diabetes, Post-traumatic stress disorder, and Sleep disorder breathing.    6/10/2024: The patient is here alone today for a comprehensive sleep medicine evaluation due to suspected sleep apnea, excessive daytime sleepiness/fatigue, and difficulty falling/ staying asleep. He is taking at least 1 hour to fall asleep and wake up around four times for dyspnea. POSITIVE for snoring, witnessed apnea, waking up gasping, choking for air, nasal congestion, mouth breathing, night sweats during sleep, waking up with racing heart, nocturnal cough, unrefreshing sleep, morning headache, morning dry mouth, and sleep inertia. He reported having heart issues, and his physician instructed him to come here to establish care. ESS: 6 CHRISTINA: 27  today.    HPI  Patient had been having these symptoms for the past 6 years. Patient never had sleep study done yet.       SLEEP STUDY HISTORY: (personally reviewed raw data such as interpretation report, data sheet, hypnogram, and titration table if available and applicable)  N/A    SLEEP-WAKE SCHEDULE  Bedtime:  on weekdays, 10 PM on weekends  Subjective sleep latency: 60-90 minutes  Problems falling asleep: Yes  Number of awakenings: 4 times per night spontaneously for unknown reasons  Falls back asleep in 20 minutes  Problems staying asleep: Yes  Final wake time: 7-8 AM on weekdays, same on weekends  Out of bed time: 10 AM on weekdays, same on weekends  Shift work: No  Naps: No  Average sleep duration (excluding naps): 4-6 hours    SLEEP ENVIRONMENT  Sleep location: Yes  Sleep status:  sleeps alone  Room is dark:  Yes  Room is quiet: Yes  Room is cool: Yes  Bed comfort: good    SLEEP HABITS:   Activities before bedtime: no  Activities in bed: no  Preferred sleep position: side    SLEEP ROS:  Night symptoms: POSITIVE for snoring, witnessed apnea, wake up gasping and/or choking for air, nasal congestion , mouth breathing, night sweats during sleep, waking up with racing heart, and nocturnal cough  Morning symptoms: POSITIVE for unrefreshing sleep, morning headache, morning dry mouth, and sleep inertia  Daytime symptoms POSITIVE for excessive daytime sleepiness, fatigue, trouble remembering things in daytime, trouble staying focused in daytime, irritability in daytime, and drowsy driving  Hypersomnia / narcolepsy symptoms: Patient denies symptoms of a hypersomnolence disorder such as sleep paralysis, sleep-related hallucinations, recurrent sleep attacks, automatic behaviors, and cataplexy.   RLS symptoms: Patient denies RLS symptoms.  Movements in sleep: Patient denies problematic movements in sleep such as seizures during sleep, frequent leg kicks / jerks while asleep, and sleep-related bruxism.  Parasomnia symptoms: Patient denies symptoms of parasomnia such as sleepwalking, sleeptalking, sleep-eating, acting out dreams, and nightmares.     WEIGHT: gained 20 lbs in 6 months     REVIEW OF SYSTEMS: All other systems have been reviewed and are negative.    PERTINENT SOCIAL HISTORY:  Occupation: paint  Smoking: Yes   ETOH: Yes   Marijuana: No   Caffeine: No  Sleep aids: No   Claustrophobia: No     PERTINENT PAST SURGICAL HISTORY:  non-contributory    PERTINENT FAMILY HISTORY:  Unknown. Patient is adopted.     Active Problems, Allergy List, Medication List, and PMH/PSH/FH/Social Hx have been reviewed and reconciled in chart. No significant changes unless documented in the pertinent chart section. Updates made when necessary.       Objective     Vitals:    06/10/24 0817   BP: 116/77   Pulse: 103   Temp:  36.2 °C (97.2 °F)   SpO2: 97%      Physical Exam  Constitutional:alert and oriented to time, place, and person  Sinus: - tenderness to palpation  Palate:  Narrow Mallampati 3, - Tongue scalloping, + macroglossia  Lungs: wheezing   Heart: Irregular rate and rhythm, no murmurs    Assessment/Plan   Arthur Carranza is a 38 y.o. male who is seen to evaluate for possible obstructive sleep apnea. The pathophysiology of sleep apnea, diagnostic testing (HST vs PSG), treatment options (PAP, oral appliance, surgery, hypoglossal nerve stimulator called Inspire), and supportive management (weight loss, positional therapy, smoking cessation, avoidance of alcohol and sedatives) were discussed with the patient in detail. Risk factors of sleep apnea as well as cardiometabolic and neurocognitive sequelae associated with untreated sleep apnea were also discussed. Lastly, patient was advised to avoid driving vehicle or operating heavy machinery when sleepy.     Arthur Carranza with the following problems:     # SLEEP DISORDERED BREATHING:  -This is likely sleep apnea based on the the history and physical examination.   -Arthur Polancos not yet had a sleep study.  -Instruct patient to complete an  in lab sleep study, and add EtCO2 to evaluate the risk of hypoventilation.  -We consider treatment as indicated when testing is complete.     # CHRONIC SLEEP ONSET/ SLEEP MAINTENANCE INSOMNIA:  -likely due to poor sleep hygiene, irregular sleep schedule, depression, anxiety, untreated sleep apnea.  -CHRISTINA: 27  -Sleep hygiene discuss in the clinic.    # DEPRESSION and ANXIETY:   -Arthur Cotter taking buPROPion XL (Wellbutrin XL) 150 mg , sertraline (Zoloft) 100 mg in the morning and doing well.  -Denies HI/SI     # HYPERTENSION/CHF:  -Blood pressure was controlled today. To control her blood pressure better, instruct the patient to take anti-hypertension medication at bedtime and a water pill in the waking time.  -Denies headache, palpitation, and  syncope in the clinic.  -Last Echo: Left ventricular systolic function is severely decreased, with an estimated ejection fraction of 10% on 2/21/24.  -Follows with PCP/ Cardiology     # MORBID OBESITY:  -with a BMI of 45.94. Arthur Carranza most recent Bicarbonate was 25  Bicarbonate   Date Value Ref Range Status   05/21/2024 25 21 - 32 mmol/L Final   -Encourage to have regular exercise to manage weight well.  -Refer to a nutritionist for weight control.  -Bariatric surgery candidate.    # NASAL CONGESTION:  -Instruct Arthur Welch use appropriate Flonase spray to ease congestion.    # XEROSTOMIA:  -Instruct Arthur Welch purchase the Biotene gel to ease the dry mouth symptom,     # TOBACCO ABUSE:Arthur Carranza is a current  1 cigar smoker for 0.5 years.  -Smoking Cessation given    RTC 2-3 weeks after sleep study       All of patient's questions were answered. He verbalizes understanding and agreement with my assessment and plan.

## 2024-06-06 ENCOUNTER — CLINICAL SUPPORT (OUTPATIENT)
Dept: CARDIAC REHAB | Facility: HOSPITAL | Age: 39
End: 2024-06-06
Payer: COMMERCIAL

## 2024-06-06 DIAGNOSIS — I50.22 CHRONIC SYSTOLIC (CONGESTIVE) HEART FAILURE (MULTI): ICD-10-CM

## 2024-06-06 DIAGNOSIS — I50.20 HFREF (HEART FAILURE WITH REDUCED EJECTION FRACTION) (MULTI): ICD-10-CM

## 2024-06-06 DIAGNOSIS — I50.43 ACUTE ON CHRONIC COMBINED SYSTOLIC AND DIASTOLIC CONGESTIVE HEART FAILURE (MULTI): ICD-10-CM

## 2024-06-06 DIAGNOSIS — I10 PRIMARY HYPERTENSION: ICD-10-CM

## 2024-06-06 PROCEDURE — 94621 CARDIOPULM EXERCISE TESTING: CPT

## 2024-06-07 VITALS
RESPIRATION RATE: 23 BRPM | BODY MASS INDEX: 44.51 KG/M2 | HEIGHT: 69 IN | OXYGEN SATURATION: 92 % | DIASTOLIC BLOOD PRESSURE: 82 MMHG | SYSTOLIC BLOOD PRESSURE: 124 MMHG | HEART RATE: 94 BPM | WEIGHT: 300.49 LBS

## 2024-06-07 NOTE — PROGRESS NOTES
Cardiopulmonary (Metabolic) Stress Test    PATIENT: Arthur Carranza  DATE: 2024  AGE/: 38 y.o./1985  REFERRING PHYSICIAN: Delroy Jurado MD    Cardiopulmonary Stress Test was completed today in Cardiopulmonary Stress Lab at [facility]. Correct procedure and correct patient verified verbally and with ID Band checked.    Vitals:    24 1445   BP: 124/82   Pulse: 94   Resp: 23   SpO2: 92%     Vitals:    24 1445   Weight: 136 kg (300 lb 7.8 oz)   Body mass index is 44.37 kg/m².    Please see complete testing results for order in [employer] reporting with final physician interpretation.  Patient has met discharge criteria and has been discharged to home.    Kasandra Jimenes MS, ACSM-CEP, AACVPR-CCRP

## 2024-06-10 ENCOUNTER — OFFICE VISIT (OUTPATIENT)
Dept: SLEEP MEDICINE | Facility: HOSPITAL | Age: 39
End: 2024-06-10
Payer: COMMERCIAL

## 2024-06-10 VITALS
SYSTOLIC BLOOD PRESSURE: 116 MMHG | TEMPERATURE: 97.2 F | HEART RATE: 103 BPM | BODY MASS INDEX: 45.04 KG/M2 | WEIGHT: 305 LBS | OXYGEN SATURATION: 97 % | DIASTOLIC BLOOD PRESSURE: 77 MMHG

## 2024-06-10 DIAGNOSIS — Z72.0 TOBACCO ABUSE: ICD-10-CM

## 2024-06-10 DIAGNOSIS — F32.A ANXIETY AND DEPRESSION: ICD-10-CM

## 2024-06-10 DIAGNOSIS — G47.30 SLEEP-RELATED BREATHING DISORDER: Primary | ICD-10-CM

## 2024-06-10 DIAGNOSIS — I10 HYPERTENSION, UNSPECIFIED TYPE: ICD-10-CM

## 2024-06-10 DIAGNOSIS — I50.9 CONGESTIVE HEART FAILURE, UNSPECIFIED HF CHRONICITY, UNSPECIFIED HEART FAILURE TYPE (MULTI): ICD-10-CM

## 2024-06-10 DIAGNOSIS — E66.01 MORBID OBESITY (MULTI): ICD-10-CM

## 2024-06-10 DIAGNOSIS — F41.9 ANXIETY AND DEPRESSION: ICD-10-CM

## 2024-06-10 DIAGNOSIS — G47.00 INSOMNIA, UNSPECIFIED TYPE: ICD-10-CM

## 2024-06-10 PROCEDURE — 99214 OFFICE O/P EST MOD 30 MIN: CPT

## 2024-06-10 SDOH — ECONOMIC STABILITY: FOOD INSECURITY: WITHIN THE PAST 12 MONTHS, YOU WORRIED THAT YOUR FOOD WOULD RUN OUT BEFORE YOU GOT MONEY TO BUY MORE.: NEVER TRUE

## 2024-06-10 SDOH — ECONOMIC STABILITY: FOOD INSECURITY: WITHIN THE PAST 12 MONTHS, THE FOOD YOU BOUGHT JUST DIDN'T LAST AND YOU DIDN'T HAVE MONEY TO GET MORE.: NEVER TRUE

## 2024-06-10 ASSESSMENT — SLEEP AND FATIGUE QUESTIONNAIRES
WORRIED_DISTRESSED_DUE_TO_SLEEP: VERY MUCH NOTICEABLE
HOW LIKELY ARE YOU TO NOD OFF OR FALL ASLEEP WHILE SITTING AND READING: SLIGHT CHANCE OF DOZING
HOW LIKELY ARE YOU TO NOD OFF OR FALL ASLEEP WHILE LYING DOWN TO REST IN THE AFTERNOON WHEN CIRCUMSTANCES PERMIT: WOULD NEVER DOZE
WAKING_TOO_EARLY: VERY SEVERE
ESS-CHAD TOTAL SCORE: 6
SITING INACTIVE IN A PUBLIC PLACE LIKE A CLASS ROOM OR A MOVIE THEATER: SLIGHT CHANCE OF DOZING
HOW LIKELY ARE YOU TO NOD OFF OR FALL ASLEEP WHILE SITTING QUIETLY AFTER LUNCH WITHOUT ALCOHOL: WOULD NEVER DOZE
DIFFICULTY_FALLING_ASLEEP: VERY SEVERE
HOW LIKELY ARE YOU TO NOD OFF OR FALL ASLEEP WHILE WATCHING TV: WOULD NEVER DOZE
HOW LIKELY ARE YOU TO NOD OFF OR FALL ASLEEP WHILE SITTING AND TALKING TO SOMEONE: WOULD NEVER DOZE
HOW LIKELY ARE YOU TO NOD OFF OR FALL ASLEEP IN A CAR, WHILE STOPPED FOR A FEW MINUTES IN TRAFFIC: SLIGHT CHANCE OF DOZING
DIFFICULTY_STAYING_ASLEEP: VERY SEVERE
SLEEP_PROBLEM_INTERFERES_DAILY_ACTIVITIES: MUCH
SATISFACTION_WITH_CURRENT_SLEEP_PATTERN: VERY DISSATISFIED
HOW LIKELY ARE YOU TO NOD OFF OR FALL ASLEEP WHEN YOU ARE A PASSENGER IN A CAR FOR AN HOUR WITHOUT A BREAK: HIGH CHANCE OF DOZING
SLEEP_PROBLEM_NOTICEABLE_TO_OTHERS: VERY MUCH NOTICEABLE

## 2024-06-10 ASSESSMENT — PATIENT HEALTH QUESTIONNAIRE - PHQ9
1. LITTLE INTEREST OR PLEASURE IN DOING THINGS: NOT AT ALL
SUM OF ALL RESPONSES TO PHQ9 QUESTIONS 1 & 2: 2
10. IF YOU CHECKED OFF ANY PROBLEMS, HOW DIFFICULT HAVE THESE PROBLEMS MADE IT FOR YOU TO DO YOUR WORK, TAKE CARE OF THINGS AT HOME, OR GET ALONG WITH OTHER PEOPLE: NOT DIFFICULT AT ALL
2. FEELING DOWN, DEPRESSED OR HOPELESS: MORE THAN HALF THE DAYS

## 2024-06-10 ASSESSMENT — LIFESTYLE VARIABLES
SKIP TO QUESTIONS 9-10: 0
HOW MANY STANDARD DRINKS CONTAINING ALCOHOL DO YOU HAVE ON A TYPICAL DAY: 3 OR 4
AUDIT-C TOTAL SCORE: 3
HOW OFTEN DO YOU HAVE SIX OR MORE DRINKS ON ONE OCCASION: NEVER
HOW OFTEN DO YOU HAVE A DRINK CONTAINING ALCOHOL: 2-4 TIMES A MONTH

## 2024-06-10 NOTE — PATIENT INSTRUCTIONS
[facility] Sleep Medicine  [facility]  [address]  [facility] [id number] [phone]       Thank you for coming to the Sleep Medicine Clinic today! Your sleep medicine provider today was: GEE Berg Below is a summary of your treatment plan, patient education, other important information, and our contact numbers.    Dear Arthur Carranza,    Thank you for visiting  Sleep Medicine Clinic !     1. We will proceed with an IN-CENTER sleep study to check your risk of sleep apnea. The lab will call you and schedule it for you.     2. Please do not drive when you are sleepy and  start practicing the sleep hygiene  as discussed in clinic today.     3. Please start practicing the appropriate nasal spray at night to ease your nasal congestion as discussed in clinic today, and using Biotene to ease your dry mouth if needed.    4. FOR QUESTIONS AND CONCERNS:   a) : To schedule, cancel, or reschedule SLEEP STUDY appointments, please call [phone]  b): Please call my office with issues or questions: [phone] (Nurs[place]); [phone] ([facility]); [phone] ([place])    If you have a CPAP or BiPAP machine at home, please bring the unit and all accessories including the power cord to your appointments unless I tell you otherwise. Please have knowledge of the DME company you worked with to receive your PAP device. If you have copies of any previous sleep testing completed outside of , please bring with you to clinic as well. This information will make our visits more productive.     If you are new to CPAP or BiPAP, please note the minimum usage insurance requires to continuing coverage for the equipment as noted by your DME company. Please discuss equipment issues (PAP unit, mask fit, humidification, etc.) with your DME company first.       In the event that you are running more than 10 minutes late to your appointment, I will kindly ask  you to reschedule. Thanks.      TREATMENT PLAN       Follow-up Appointment:   Follow-up in 2-3 weeks after sleep study.    PATIENT EDUCATION     OBSTRUCTIVE SLEEP APNEA (PHOEBE) is a sleep disorder where your upper airway muscles relax during sleep and the airway intermittently and repetitively narrows and collapses leading to partially blocked airway (hypopnea) or completely blocked airway (apnea) which, in turn, can disrupt breathing in sleep, lower oxygen levels while you sleep and cause night time wakings. Because both apnea and hypopnea may cause higher carbon dioxide or low oxygen levels, untreated PHOBEE can lead to heart arrhythmia, elevation of blood pressure, and make it harder for the body to consolidate memory and facilitate metabolism (leading to higher blood sugars at night). Frequent partial arousals occur during sleep resulting in sleep deprivation and daytime sleepiness. PHOEBE is associated with an increased risk of cardiovascular disease, stroke, hypertension, and insulin resistance. Moreover, untreated PHOEBE with excessive daytime sleepiness can increase the risk of motor vehicular accidents.    Below are conservative strategies for PHOEBE regardless of PHOEBE severity are:   Positional therapy - Avoid sleeping on your back.   Healthy diet and regular exercise to optimize weight is highly encouraged.   Avoid alcohol late in the evening and sedative-hypnotics as these substances can make sleep apnea worse.   Improve breathing through the nose with intranasal steroid spray, saline rinse, or antihistamines    Safety: Avoid driving vehicle and operating heavy equipment while sleepy. Drowsy driving may lead to life-threatening motor vehicle accidents. A person driving while sleepy is 5 times more likely to have an accident. If you feel sleepy, pull over and take a short power nap (sleep for less than 30 minutes). Otherwise, ask somebody to drive you.    Treatment options for sleep apnea include weight management,  positional therapy, Positive Airway Therapy (PAP) therapy, oral appliance therapy, hypoglossal nerve stimulator (Inspire) and select airway surgeries.    Sleep Testing for sleep apnea: The best and ideal way to check out if you have sleep apnea is to do an overnight sleep study in the sleep laboratory. Alternatively, a home sleep apnea test can also be done depending on your insurance and risk factors.     If you are having a home sleep apnea test, kindly allot 1 hour during pickup of the testing kit as you will have to complete paperwork and listen to the sleep technician for in-person on-the-spot demonstration and instructions on how to hook up the testing kit at home. Do the test for 1 day and start off with sleeping on your back. If you sleep on your side in the middle of night or you have always been a side or stomach-sleeper, it is ok as long as you have some time on your back and off-back.     If you are having an overnight in-lab sleep study, please make sure to bring toiletries, a comfy pillow, additional warm blankets, and any nighttime medications (include as-needed inhaler, pain pill, etc) that you may regularly take. Also, be sure to eat dinner before you arrive as we generally do not provide meals inside the sleep testing center. Lastly, in order to fall asleep faster in the sleep testing center, we advise patients to wake up 2 hours earlier on the morning of scheduled testing and avoid napping 2 days prior testing. Sometimes, your sleep provider may prescribe a sleep aid to be taken at lights out in the sleep testing center. If you are taking a sleep aid, consider having somebody pick you up after the sleep testing.    Overnight sleep studies may be scheduled on a weekday or weekend. We also perform daytime testing for shift workers on a case-by-case basis.    Once you have booked an appointment to do the sleep study, please contact my office for follow-up visit to discuss results.    On the other  hand, if you have any of the following, please consider calling the sleep testing center to RESCHEDULE your sleep study appointment:  If you tested positive for COVID within 10 days of your sleep study appointment.  If you were exposed to somebody who was confirmed for COVID within 10 days of your sleep study appointment and now you are having symptoms of possible COVID  If you have fever>100F or any acute symptoms that you think will lead to poor sleep during testing (e.g. new or worsening stuffy nose not relieved by steroid nasal spray)  If you have traveled domestically or internationally in the last month and now you are having symptoms of possible COVID    How to use nasal sprays properly: If you have nasal congestion or allergies, improving your breathing through the nose is critical for treating sleep apnea and improving your sleep. Hence, intranasal steroid spray like Flonase or Nasocort (generic name is Fluticasone) might help. In some patients with sleep apnea, using intranasal steroid spray allowed them to tolerate CPAP mask better.     Intranasal steroids are usually prescribed as 2 sprays per nostril 1 hour before bedtime. If you administer intranasal steroids too close to bedtime, it might not work as well.  If you have nasal congestion or allergies during day despite using Flonase at night, try adding Azelastine nasal spray 2 sprays per nostril in the morning. Alternatively, can consider adding over-the-counter non-sedating antihistamine medications.     However, if you have severe nasal congestion or allergies despite using both nasal sprays, consider seeing an allergy specialist to confirm which substance you are sensitive to and get definitive treatment which may be in the form of injections, oral pills, different kind of nose sprays, and/or a combination of everything said.     Below are instructions on how to use intranasal steroid spray properly:  Sit up or stand up with your face down.  Shake  the spray bottle and insert its tip in one nostril.  Point the spray tip towards your ear, and not towards the middle of your nose. Pointing the tip upward and in the middle of the nose can lead to discomfort and irritation of nasal mucosa which can lead to nose bleeding or coughing up tinges of blood.  Squeeze the spray bottle and administer the recommended amount of spray.   Don't sniff when you spray. Instead, remove the spray bottle and pinch your nose for 10 seconds.   Perform steps 1-6 on the other nostril.    You can also go to the following EDUCATION WEBSITES for further information:   American Academy of Sleep Medicine http://sleepeducation.org  National Sleep Foundation: https://sleepfoundation.org  American Sleep Apnea Association: https://www.sleepapnea.org (for patients with sleep apnea)  Narcolepsy Network: https://www.narcolepsynetwork.org (for patients with narcolepsy)  [employer]: https://www.The [facility] Center For Medical & Veterinary Scienceslepsy.org (for patients with narcolepsy)  Hypersomnia Foundation: https://www.hypersomniafoundation.org (for patients with idiopathic hypersomnia)  RLS foundation: https://www.rls.org (for patients with restless leg syndrome)    IMPORTANT INFORMATION     Call 911 for medical emergencies.  Our offices are generally open from Monday-Friday, 8 am - 5 pm.   There are no supporting services by either the sleep doctors or their staff on weekends and Holidays, or after 5 PM on weekdays.   If you need to get in touch with me, you may either call my office number or you can use ClickHome.  If a referral for a test, for CPAP, or for another specialist was made, and you have not heard about scheduling this within a week, please call scheduling at [phone] ([phone]).  If you are unable to make your appointment for clinic or an overnight study, kindly call the office or sleep testing center at least 48 hours in advance to cancel and reschedule.  If you are on CPAP, please bring your device's card and/or  the device to each clinic appointment.   In case of problems with PAP machine or mask interface, please contact your DME (Durable Medical Equipment) company first. DME is the company who provides you the machine and/or PAP supplies.       PRESCRIPTIONS     We require 7 days advanced notice for prescription refills. If we do not receive the request in this time, we cannot guarantee that your medication will be refilled in time.    IMPORTANT PHONE NUMBERS     Sleep Medicine Clinic Fax: [phone]  Appointments (for Pediatric Sleep Clinic): [phone] - option 1  Appointments (for Adult Sleep Clinic): [phone] - option 2  Appointments (For Sleep Studies): [phone] - option 3  Behavioral Sleep Medicine: [phone]  Sleep Surgery: [phone]  Nutrition Service: [phone]  Weight management clinics with endocrinology: [phone]  Bariatric Services: [phone] (includes weight loss medications and weight loss surgery)  [facility]: [phone] (offers holistic approaches to weight management)  ENT (Otolaryngology): [phone]  Headache Clinic (Neurology): [phone]  Neurology: [phone]  Psychiatry: [phone]  Pulmonary Function Testing (PFT) Center: [phone]  Pulmonary Medicine: [phone]  [employer] (DME): [phone]      OUR SLEEP TESTING LOCATIONS     Our team will contact you to schedule your sleep study, however, you can contact us as follow:  Main Phone Line (scheduling only): [phone]), option 3  Adult and Pediatric Locations  [facility] (6 years and older): [facility] - 4th floor ([address] [place] [address]) After hours line: [phone]  [facility] (Main campus: All ages): [facility], 6th floor. After hours line: [phone]  [facility] (5 years and older; younger considered on case-by-case basis): [facility]; [address],  "[address]. Scheduling  After hours line: [phone]   [place] (6 years and older): [address]; [address]; [address]   [place] (6 years and older): [address] [facility], [address]  After hours line: [phone]   [place] (13 years and older) in [place]: [address], 2nd floor  After hours line: [phone]   [place] (13 year and older): [address]  After hours line: [phone]     Adult Only Locations:   [place] (18 years and older): [address] [facility], 2nd floor   [place] (18 years and older): [address] [facility]; 4th floor  After hours line: [phone]   [place] (18 years and older) at [place]: [address] [facility]  After hours line: [phone]     CONTACTING YOUR SLEEP MEDICINE PROVIDER AND SLEEP TEAM      For issues with your machine or mask interface, please call your DME provider first. DME stands for durable medical company. DME is the company who provides you the machine and/or PAP supplies / accessories.   To schedule, cancel, or reschedule SLEEP STUDY APPOINTMENTS, please call the Main Phone Line at [phone] - option 3.   To schedule, cancel, or reschedule CLINIC APPOINTMENTS, you can do it in \"MyChart\", call [phone] to speak with my  (Jaki Lua), or call the Main Phone Line at [phone] - option 2  For CLINICAL QUESTIONS or MEDICATION REFILLS, please call direct line for Adult Sleep Nurses at [phone].   Lastly, you can also send a message directly to your provider through \"My Chart\", which is the email service through your  Records Account: [email or web address]       Here at [facility], we wish you a restful sleep!   "

## 2024-06-12 ENCOUNTER — CLINICAL SUPPORT (OUTPATIENT)
Dept: CARDIAC REHAB | Facility: HOSPITAL | Age: 39
End: 2024-06-12
Payer: COMMERCIAL

## 2024-06-12 DIAGNOSIS — I50.20 HFREF (HEART FAILURE WITH REDUCED EJECTION FRACTION) (MULTI): ICD-10-CM

## 2024-06-12 PROCEDURE — 93798 PHYS/QHP OP CAR RHAB W/ECG: CPT | Performed by: INTERNAL MEDICINE

## 2024-06-17 ENCOUNTER — APPOINTMENT (OUTPATIENT)
Dept: PHARMACY | Facility: HOSPITAL | Age: 39
End: 2024-06-17
Payer: COMMERCIAL

## 2024-06-17 DIAGNOSIS — Z79.4 TYPE 2 DIABETES MELLITUS WITH DIABETIC NEUROPATHY, WITH LONG-TERM CURRENT USE OF INSULIN (MULTI): Primary | ICD-10-CM

## 2024-06-17 DIAGNOSIS — E11.69 TYPE 2 DIABETES MELLITUS WITH OTHER SPECIFIED COMPLICATION, WITHOUT LONG-TERM CURRENT USE OF INSULIN (MULTI): ICD-10-CM

## 2024-06-17 DIAGNOSIS — E11.40 TYPE 2 DIABETES MELLITUS WITH DIABETIC NEUROPATHY, WITH LONG-TERM CURRENT USE OF INSULIN (MULTI): Primary | ICD-10-CM

## 2024-06-17 RX ORDER — DULAGLUTIDE 0.75 MG/.5ML
0.75 INJECTION, SOLUTION SUBCUTANEOUS
Qty: 2 ML | Refills: 1 | Status: SHIPPED | OUTPATIENT
Start: 2024-06-23

## 2024-06-17 RX ORDER — BLOOD-GLUCOSE,RECEIVER,CONT
EACH MISCELLANEOUS
Qty: 1 EACH | Refills: 0 | Status: SHIPPED | OUTPATIENT
Start: 2024-06-17

## 2024-06-17 RX ORDER — BLOOD-GLUCOSE SENSOR
EACH MISCELLANEOUS
Qty: 2 EACH | Refills: 11 | Status: SHIPPED | OUTPATIENT
Start: 2024-06-17

## 2024-06-17 NOTE — PROGRESS NOTES
Pharmacist Clinic: Diabetes Management  Arthur Carranza is a 38 y.o. male was referred to Clinical Pharmacy Team for diabetes management.     Referring Provider: Christopher D'Amico, DO     HISTORY OF PRESENT ILLNESS  Approximate Date of Diagnosis: patient reports he was diagnosed at 19; patient reports he has not been the best about taking his medications as prescribed in the past   Known complications due to diabetes included cardiovascular disease, chronic kidney disease, and obesity    Diet:   - smoothies: berries, banana, OJ, non-fat plain yogurt   - Dinner: steak and mushrooms, onions, peppers   - Drinks: water, juice, sometimes pop     LAB REVIEW   Glucose (mg/dL)   Date Value   05/21/2024 384 (H)   02/23/2024 348 (H)   02/22/2024 308 (H)     Hemoglobin A1C (%)   Date Value   05/21/2024 13.7 (H)   02/01/2024 15.3 (H)   12/12/2023 10.8 (H)     Bicarbonate (mmol/L)   Date Value   05/21/2024 25   02/23/2024 30   02/22/2024 31     Urea Nitrogen (mg/dL)   Date Value   05/21/2024 24 (H)   02/23/2024 29 (H)   02/22/2024 30 (H)     Creatinine (mg/dL)   Date Value   05/21/2024 1.43 (H)   02/23/2024 1.32 (H)   02/22/2024 1.60 (H)     Lab Results   Component Value Date    HGBA1C 13.7 (H) 05/21/2024    HGBA1C 15.3 (H) 02/01/2024    HGBA1C 10.8 (H) 12/12/2023     Lab Results   Component Value Date    CHOL 125 12/12/2023    CHOL 122 04/20/2023    CHOL 162 12/28/2022     Lab Results   Component Value Date    HDL 26.6 12/12/2023    HDL 31.4 (A) 04/20/2023    HDL 32.4 (A) 12/28/2022     Lab Results   Component Value Date    LDLCALC 50 12/12/2023     Lab Results   Component Value Date    TRIG 241 (H) 12/12/2023    TRIG 128 04/20/2023    TRIG 401 (H) 12/28/2022       DIABETES ASSESSMENT    CURRENT PHARMACOTHERAPY  - metformin 1000 mg by mouth twice daily   - humalog 12-18 (patient states he doesn't really use that much)   - lantus 55 units once daily   - farxiga 10 mg once daily      SECONDARY PREVENTION  - Statin? Yes  -  ACE-I/ARB? No  - Aspirin? No    HISTORICAL PHARMACOTHERAPY  - trulicity: patient got sick from 4.5 mg once weekly     SMBG MEASUREMENTS: patient is currently not testing    DISCUSSION:   Went over uncontrolled diabetes, currently your A1c is 13.7%, our goal is to get it closer to 7%   Patient admits he is not the best about taking his medications as prescribed   Counseled patient, stressed the importance of being adherent to your meds   Trulicity:   Counseled patient on starting back on 0.75 mg once weeky   Went over MOA, expectations, side effects, etc   Answered all questions and concerns    RECOMMENDATIONS/PLAN  1. Patients diabetes is poorly controlled with most recent A1c of 13.7 % (goal < 7 %).   - Continue all meds under the continuation of care with the referring provider and clinical pharmacy team.  - Initiate trulicity 0.75 mg once weekly.     2. Future considerations:   - uptitrate trulicity.     Clinical Pharmacist follow up: 1 month    Thank you,  Jaki Marcos, PharmD    Verbal consent to manage patient's drug therapy was obtained from the patient. They were informed they may decline to participate or withdraw from participation in pharmacy services at any time.

## 2024-06-19 PROBLEM — F14.10: Chronic | Status: ACTIVE | Noted: 2024-06-05

## 2024-06-21 ENCOUNTER — APPOINTMENT (OUTPATIENT)
Dept: CARDIAC REHAB | Facility: HOSPITAL | Age: 39
End: 2024-06-21
Payer: COMMERCIAL

## 2024-06-25 ENCOUNTER — APPOINTMENT (OUTPATIENT)
Dept: CARE COORDINATION | Facility: CLINIC | Age: 39
End: 2024-06-25
Payer: COMMERCIAL

## 2024-06-26 ENCOUNTER — TELEPHONE (OUTPATIENT)
Dept: CARDIAC REHAB | Facility: HOSPITAL | Age: 39
End: 2024-06-26
Payer: COMMERCIAL

## 2024-06-26 NOTE — TELEPHONE ENCOUNTER
Patient has no showed >3 scheduled sessions. Patient states he had car trouble and did not have rehab phone number to contact staff. Patient plans to attend Monday 7/1. Reviewed Compliance Contact with patient and he verbalized understanding.

## 2024-06-28 ENCOUNTER — APPOINTMENT (OUTPATIENT)
Dept: CARDIAC REHAB | Facility: HOSPITAL | Age: 39
End: 2024-06-28
Payer: COMMERCIAL

## 2024-07-01 ENCOUNTER — CLINICAL SUPPORT (OUTPATIENT)
Dept: CARDIAC REHAB | Facility: HOSPITAL | Age: 39
End: 2024-07-01
Payer: COMMERCIAL

## 2024-07-01 DIAGNOSIS — I50.20 HFREF (HEART FAILURE WITH REDUCED EJECTION FRACTION) (MULTI): ICD-10-CM

## 2024-07-01 PROCEDURE — 93798 PHYS/QHP OP CAR RHAB W/ECG: CPT | Performed by: INTERNAL MEDICINE

## 2024-07-01 NOTE — PROGRESS NOTES
Primary Care Physician: Christopher D'Amico, DO  Patient's Location: [facility] [address]    Date of Visit: 07/02/2024  8:30 AM EDT  Location of visit: [facility]   Type of Visit: Established  Date of initial visit: 3/14/2023 - with multiple no shows and cancellations    HPI / Summary:   Arthur Carranza is a very pleasant 38 y.o. male from [place], [place] who presents for management of NICM/HFrEF (EF 5-10%), initially diagnosed 10/2022. 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      Initial CV History: from 3/14/2023  Mr. Carranza was initially diagnosed with NICM during hospitalization at [facility] in October 2022 after presentation with SOB, weight gain. During hospitalization he had elevated BNP and echo with EF 15-20%. Coronary angiogram showed no CAD. He was started on BB, ARB and discharged with LifeVest. He followed up with Dr. Andrews as outpatient where he had not started his medications. He had multiple no-shows with Dr. Andrews and EP team. In December he was admitted with R sided weakness related to Cardioembolic stroke. He was given TNK with improvement and discharged on rivaroxaban. He followed up with Eugenia Lyman for post hospital visit and was referred for tobacco cessation, cardiac rehab, primary care, comprehensive weight loss, sleep clinic but these have not been scheduled.     I saw him on 3/14 but since that point he has cancelled/noshowed multiple appointments. Has had several HF hospitalizations.   Recent Hospitalizations: 2/20/24-2/23/24 CHF  He continues to have SOB since hospitalization. He was apologetic for no shows. A1c is out of control.    Interval history:   - seen by sleep medicine, primary care  - established with cardiac rehab. (Had 3 no shows  but re-established.     Today:  Accompanied by: Self  Social Hx: Almost quit smoking. Drinks socially. Denies illicit drug use. Has used cocaine in the past. Has mandatory drug testing due to CPS  Diet: None.   Exercise: Walks on the treadmill every other day for approximately 20 minutes.     Denies fatigue, chest pressure, palpitations. No edema noted in BLE.   Denies headaches, dizziness, lightheadedness, and falls.     He states he has chest pain approximately 3 times a week. He states it feels like spikes in his chest and lasts a second or two.  He reports a lot of SOB. It has been occurring at rest and with exertion. He reports orthopnea and PND.   He reports daily fatigue.     PMHx: DM II, HTN, HLD, Obesity, ?PHOEBE, Bipolar I Disorder (Previous SI/HI 2021), Perirectal Abscess  PSHx: Denies.   Family Hx: Foster care, family hx unknown.   Social Hx: Current smoker. Hx of marijuana/cocaine- denies current use. Denies EtOH. 8 kids, 12 year old daughter w/?arrhythmias.       Objective   Medical History:   He has a past medical history of CHF (congestive heart failure) (Multi), Diabetes mellitus (Multi), Heart disease, Hypertension, and Substance abuse (Multi).  Surgical Hx:   He has a past surgical history that includes Other surgical history (07/21/2022); Other surgical history (10/25/2022); and MR angio head wo IV contrast (12/29/2022).   Social Hx:   He reports that he has been smoking cigarettes. He does not have any smokeless tobacco history on file. He reports current alcohol use. He reports that he does not currently use drugs after having used the following drugs: Codeine.  Family Hx:   His family history is not on file. He was adopted.   Allergies:   Allergies   Allergen Reactions    Shellfish Containing Products Unknown     Exam:       7/2/2024     8:08 AM 7/2/2024     8:05 AM 6/10/2024     8:17 AM 6/6/2024     2:45 PM 5/28/2024    10:16 AM 5/28/2024     7:48 AM   Vitals   Systolic 159 147 116 124 104 114  "  Diastolic 120 93 77 82 62 90   Heart Rate  99 103 94 100 90   Temp   36.2 °C (97.2 °F)      Resp    23 22    Height (in)  1.753 m (5' 9\")  1.753 m (5' 9\") 1.753 m (5' 9\") 1.753 m (5' 9\")   Weight (lb)  301.6 305 300.49 304 300   BMI  44.54 kg/m2 45.04 kg/m2 44.37 kg/m2 44.89 kg/m2 44.3 kg/m2   BSA (m2)  2.58 m2 2.59 m2 2.57 m2 2.59 m2 2.57 m2   Visit Report Report Report Report  Report Report     Wt Readings from Last 5 Encounters:   07/02/24 137 kg (301 lb 9.6 oz)   06/10/24 138 kg (305 lb)   06/06/24 136 kg (300 lb 7.8 oz)   05/28/24 138 kg (304 lb)   05/28/24 136 kg (300 lb)     GEN: Pleasant, well-appearing, mild conversational dyspnea.   HEENT: JVP not elevated, no icterus. No HJR  CHEST: No wheeze, good air movement.  CV: Normal rate, regular rhythm.  no m/r/g  ABD: Soft, ND, NT, obese  EXT: Warm, well perfused, No LE edema.   NEURO: Pleasant, Oriented to plan    Labs:   CMP:  Recent Labs     05/21/24  0938 02/23/24  0340 02/22/24  0533 02/21/24  1411 02/21/24  0958 02/21/24  0134 02/20/24  0528 02/20/24  0126 02/01/24  0544 02/01/24  0000 01/31/24  2014 10/22/22  0549 10/21/22  0528 10/20/22  1211 06/16/22  0706 06/15/22  0756   * 129* 129* 126* 121* 133* 130* 129* 130*   < > 122*   < > 131* 135*   < > 140   K 4.9 3.5 4.1 4.6 6.8* 3.6 4.4 4.5 4.1   < > 4.5   < > 4.4 4.3   < > 3.9   CL 99 92* 90* 91* 87* 92* 90* 93* 96*   < > 88*   < > 96* 101   < > 105   CO2 25 30 31 28 24 32 28 21 26   < > 20*   < > 25 24   < > 26   ANIONGAP 14 11 12 12 17 13 16 20 12   < > 19   < > 14 14   < > 13   BUN 24* 29* 30* 29* 32* 27* 26* 25* 14   < > 16   < > 22 14   < > 14   CREATININE 1.43* 1.32* 1.60* 1.56* 1.75* 1.78* 1.54* 1.47* 1.05   < > 1.30   < > 1.34* 1.17   < > 1.18   EGFR 65 71 57* 58* 51* 50* 59* 63 >90   < > 73   < >  --   --   --   --    MG  --   --   --   --   --  2.51*  --  1.59* 1.75  --  1.86  --  2.20 1.50*  --  1.80    < > = values in this interval not displayed.     Recent Labs     05/21/24  0938 " "02/21/24  0134 02/20/24  0528 02/01/24  0544 01/31/24 2014 12/02/23  2342 08/07/23  0114 07/09/23  0939 11/29/21  0443 11/07/20  1338   ALBUMIN 4.1 4.2 4.5 3.9 4.0 4.0 3.9 3.7   < > 4.6   ALT 21 15 15 12 13 48 15 13   < > 33   AST 24 18 17 16 18 34 17 18   < > 34   BILITOT 0.6 1.1 0.9 0.3 0.3 0.8 0.7 0.7   < > 0.7   LIPASE  --   --   --   --   --   --   --   --   --  29    < > = values in this interval not displayed.     CBC:  Recent Labs     05/21/24  0938 02/23/24  0340 02/22/24  0532 02/21/24  0958 02/21/24  0134 02/20/24  0528 02/20/24  0126 02/01/24  0544   WBC 4.7 5.0 5.6 6.7 5.2 9.8 8.4 4.5   HGB 14.8 13.6 14.4 16.1 15.6 14.8 15.7 14.0   HCT 45.6 39.6* 42.5 46.1 45.6 42.7 44.3 40.6*    221 228 269 246 218 228 304   MCV 99 91 92 91 93 92 90 92     COAG:   Recent Labs     12/02/23  2341 12/28/22  1755 12/28/22  1533 10/20/22  1211 06/15/22  0756 11/23/20  1523   INR 1.1 1.0 1.0 1.0 1.0 1.1   DDIMERVTE  --   --   --  696*  --   --      ABO: No results for input(s): \"ABO\" in the last 45549 hours.  HEME/ENDO:   Recent Labs     05/21/24  0938 02/01/24  0544 12/12/23  1112 08/07/23  0114 04/20/23  1109 03/14/23  1056 12/28/22  1937 10/21/22  0528 06/15/22  0756   FERRITIN 274  --   --   --   --  394*  --   --   --    IRONSAT 24*  --   --   --   --  28  --   --   --    TSH 3.98  --   --   --   --  3.62 3.69  --  3.96   HGBA1C 13.7* 15.3* 10.8* 11.8*   < >  --  12.9*   < > 12.2*    < > = values in this interval not displayed.     CARDIAC:   Recent Labs     05/21/24  0938 02/21/24  0958 02/20/24  0126 02/01/24  0154 01/31/24 2014 12/03/23  0823 12/03/23  0128 12/02/23  2342 12/02/23  2341 03/14/23  1056 12/28/22  1937 10/20/22  1319 10/20/22  1211   TROPHS  --  67* 65* 177* 99* 55* 54* 40*  --   --  62*   < > 32*   *  --  259*  --  314*  --   --   --  375* 68 213*  --  329*    < > = values in this interval not displayed.     Recent Labs     12/12/23  1112 04/20/23  1109 12/28/22  1937 10/21/22  0528 "   CHOL 125 122 162 166   LDLF  --  65 - 85   LDLCALC 50  --   --   --    HDL 26.6 31.4* 32.4* 40.0   TRIG 241* 128 401* 207*     TOX:  Recent Labs     02/20/24  0509 01/31/24  2117 06/15/22  0925   AMPHETAMINE Presumptive Negative Presumptive Negative PRESUMPTIVE NEGATIVE   BARBSCRNUR Presumptive Negative Presumptive Negative PRESUMPTIVE NEGATIVE   BENZO Presumptive Negative Presumptive Negative  --    CANNABINOID Presumptive Negative Presumptive Negative PRESUMPTIVE NEGATIVE   COCAI Presumptive Positive* Presumptive Positive*  --    FENTANYL Presumptive Negative Presumptive Positive* PRESUMPTIVE NEGATIVE   OPIATE Presumptive Negative Presumptive Negative PRESUMPTIVE POSITIVE*   OXYCODONE Presumptive Negative Presumptive Negative PRESUMPTIVE NEGATIVE   PCP Presumptive Negative Presumptive Negative PRESUMPTIVE NEGATIVE     MICRO:   Recent Labs     02/01/24  0000 06/15/22  0756 12/30/21  1241   CRP 0.50 6.52* 0.49     No results found for the last 90 days.      Notable Studies:   EKG:   Recent Labs     02/23/24  0716 02/22/24  0744 02/21/24  1019   VENTRATE 83 91 97   ATRRATE 83 91 97   QTCFRED 490 487 506   QRSDUR 136 142 164   QTCCALCB 517 521 548     Encounter Date: 02/20/24   ECG 12 Lead   Result Value    Ventricular Rate 83    Atrial Rate 83    NC Interval 206    QRS Duration 136    QT Interval 440    QTC Calculation(Bazett) 517    P Axis 67    R Axis -57    T Axis 84    QRS Count 13    Q Onset 215    P Onset 112    P Offset 170    T Offset 435    QTC Fredericia 490    Narrative    Normal sinus rhythm  Possible Left atrial enlargement  Left axis deviation  Left ventricular hypertrophy with QRS widening and repolarization abnormality  Abnormal ECG  When compared with ECG of 22-FEB-2024 07:21,  Left bundle branch block is no longer Present  Confirmed by Aquiles Duarte ([id number]) on 2/27/2024 2:22:33 PM     EKG 7/2/24: ST at 91bpm, RACHEL, LBBB/IVCD QRS 142ms    Echocardiogram:   Recent Labs     02/21/24  0249   EF 13    LVIDD 6.83   RVFRWALLPKSP 10.50   TAPSE 1.9   Transthoracic Echo (TTE) Complete With Contrast 02/21/2024  Left Ventricle: Left ventricular systolic function is severely decreased, with an estimated ejection fraction of 10%. There is global hypokinesis of the left ventricle with minor regional variations. The left ventricular cavity size is moderately dilated. Spectral Doppler shows a pseudonormal pattern of left ventricular diastolic filling.  Left Atrium: The left atrium is mild to moderately dilated.  Right Ventricle: The right ventricle is normal in size. There is normal right ventricular global systolic function.  Right Atrium: The right atrium is normal in size.  Aortic Valve: The aortic valve appears structurally normal. The aortic valve appears tricuspid. There is no evidence of aortic valve stenosis.  There is no evidence of aortic valve regurgitation. The peak instantaneous gradient of the aortic valve is 2.4 mmHg. The mean gradient of the aortic valve is 2.0 mmHg.  Mitral Valve: The mitral valve is normal in structure. There is no evidence of mitral valve stenosis. There is normal mitral valve leaflet mobility. There is trace mitral valve regurgitation.  Tricuspid Valve: The tricuspid valve is structurally normal. There is mild tricuspid regurgitation.  Pulmonic Valve: The pulmonic valve is structurally normal. There is no indication of pulmonic valve regurgitation.  Pericardium: There is no pericardial effusion noted.  Aorta: The aortic root is normal.  Pulmonary Artery: The pulmonary artery is not well visualized.  Systemic Veins: The inferior vena cava appears to be of normal size.  In comparison to the previous echocardiogram(s): The left ventricular function is worse. The left ventricular diastolic function is worse.      CONCLUSIONS:  1. Left ventricular systolic function is severely decreased with a 10% estimated ejection fraction.  2. Spectral Doppler shows a pseudonormal pattern of left  ventricular diastolic filling.  3. The left atrium is mild to moderately dilated.  4. Trace mitral valve regurgitation.  5. Mild tricuspid regurgitation is visualized.  6. Aortic valve stenosis is not present.  7. Left ventricular cavity size is moderately dilated.  8. The pulmonary artery is not well visualized.  9. There is global hypokinesis of the left ventricle with minor regional variations.      Coronary Angiography:   Adult Cath 10/21/2022    Coronary Angiography:  The coronary circulation is right dominant.    Left Main Coronary Artery:  There is 0% stenosis in the entire left main coronary artery.    Left Anterior Descending Coronary Artery Distribution:  There is 0stenosis in the entire left anterior descending artery.    Circumflex Coronary Artery Distribution:  There is 0stenosis in the the entire Circumflex artery.    Right Coronary Artery Distribution:    There is 0stenosis in the the entire Right Coronary Artery.  Cardiac Cath Transition of Care Summary:  Post Procedure Diagnosis: Normal coronary arteries.  Findings:                 Severe LV dysfunction.  Blood Loss:               Estimated blood loss during the procedure was 0 mls.  Specimens Removed:        Number of specimen(s) removed: none.    ____________________________________________________________________________________  CONCLUSIONS:  1. The entire Left Main: 0% stenosis.  2. Entire LAD Lesion: The percent stenosis is 0%.  3. Entire CX Lesion: The percent stenosis is 0%.  4. The entire RCA Lesion: The percent stenosis is 0%.      Right Heart Cath: No results found for this or any previous visit from the past 1800 days.    Cardiac Scoring: No results found for this or any previous visit from the past 1800 days.    Cardiac MRI:   MR cardiac morphology and function w and wo IV contrast   2023-04-20 09:03:53  SUMMARY  =====================================================================  =====================================    NICMP. Severe  LV dilation and remodeling.  Diffuse LV fibrosis with elevated extracellular volume (ECV of 40%).  No evidence of  No evidence of amyloidosis or hemachromatosis.  No LV thrombus.    LEFT VENTRICLE: Quantitative LVEF 13 %. LV wall thickness is normal.  LV cavity is severely enlarged. LV systolic function  is severely decreased globally. There is no LV mass/thrombus.    VIABILITY: LV scar size is 5 %.    RIGHT VENTRICLE: RV cavity size is normal. RV systolic function is  normal. There is no RV mass/thrombus. There is no RV  fibro-fatty infiltration.    LV/RV SEPTUM: The ventricular septum is intact.    LA/RA SEPTUM: The atrial septum is intact.    LEFT ATRIUM: LA is mildly enlarged.    RIGHT ATRIUM: RA cavity size is normal.    PERICARDIUM: Pericardium is normal. There is a trivial pericardial  effusion.    PLEURAL EFFUSION: There is no pleural effusion.    AORTIC VALVE: Peak aortic valve velocity -130 cm/sec. Aortic  regurgitant volume 1 ml. Aortic regurgitant fraction 1 %.    MITRAL VALVE: Mitral valve leaflets are normal. There is no mitral  valve mass, thrombus, or vegetation. There is mild  mitral regurgitation.    TRICUSPID VALVE: Tricuspid valve leaflets are normal. The tricuspid  valve annulus is normal in size.    AORTIC ROOT: The aortic root is mildly dilated.      CORE EXAM  =====================================================================  =====================================    MEASUREMENTS  ---------------------------------------------------------------------  -------------------  VOLUMETRIC ANALYSIS  ----------------------------------------------  .------------------------------------------------------.  .      .          . LV   . Reference  . RV . Reference .  +------+----------+------+------------+----+-----------+  . EDV  . ml       .  440 .  (121-204) .    .           .  .      . ml/m\S\2    .  179 .  ()  .    .           .  . ESV  . ml       .  383 .  (33-78)   .    .           .  .       . ml/m\S\2    .  156 .  (18-39)   .    .           .  . CO   . L/min    . 5.40 .            .    .           .  .      . L/min/m\S\2 . 2.20 .            .    .           .  . MASS . g        .  328 .  (109-185) .    .           .  .      . g/m\S\2     .  133 .  (59-92)   .    .           .  . SV   . ml       .   57 .  ()  .    .           .  .      . ml/m\S\2    .   23 .  (43-67)   .    .           .  . EF   . %        .   13 .  (57-75)   .    .           .  '------+----------+------+------------+----+-----------'    CARDIAC OUTPUT HR:  95 bpm  LV DIMENSIONS  ----------------------------------------------  WALL THICKNESS - ANTEROSEPTAL:  1.0 cm  WALL THICKNESS - INFEROLATERAL:  1.0 cm  WALL THICKNESS - MAXIMUM:  1.1 cm  LV JOSE:  7.7 cm    LA DIMENSIONS (LV SYSTOLE)  ----------------------------------------------  AREA - 2 CHAMBER:  32 cm\S\2  LENGTH - 2 CHAMBER:  6.1 cm  AREA - 4 CHAMBER:  29 cm\S\2  LENGTH - 4 CHAMBER:  7 cm  VOLUME:  129 ml  VOLUME NORMALIZED:  52.6 ml/m\S\2    RA DIMENSIONS (RV SYSTOLE)  ----------------------------------------------  AREA - 4 CHAMBER:  19 cm\S\2  LENGTH - 4 CHAMBER:  5.9 cm    AORTIC ROOT DIMENSIONS  ----------------------------------------------  ANNULUS:  2.7 cm  SINUS OF VALSALVA:  3 cm    EXTRACELLULAR VOLUME MEASUREMENT  ----------------------------------------------  PRE-CONTRAST T1 MYOCARDIUM:  1090 msec  PRE-CONTRAST T1 LV CAVITY:  1458 msec  POST-CONTRAST T1 MYOCARDIUM:  310 msec  POST-CONTRAST T1 LV CAVITY:  257 msec  HEMATOCRIT:  44 %  ECV:  40 %    IRON QUANTIFICATION  ----------------------------------------------  MYOCARDIAL T2*:  24 msec  LIVER T2*:  17 msec      17 SEGMENT  ---------------------------------------------------------------------  -------------------  .---------------------------------------------------------------------  ---------------------------.  . Segments           . Wall Motion  . Hyperenhancement . Stress  Perfusion . Interpretation        .  +--------------------+--------------+------------------+--------------  ----+----------------------+  . Base Anterior      . Severe Hypo  . None             .  . Abnormal Wall Motion .  . Base Anteroseptal  . Severe Hypo  . 1-25%            .  . Abnormal Wall Motion .  . Base Inferoseptal  . Severe Hypo  . 1-25%            .  . Abnormal Wall Motion .  . Base Inferior      . Severe Hypo  . None             .  . Abnormal Wall Motion .  . Base Inferolateral . Severe Hypo  . None             .  . Abnormal Wall Motion .  . Base Anterolateral . Severe Hypo  . 1-25%            .  . Abnormal Wall Motion .  . Mid Anterior       . Severe Hypo  . None             .  . Abnormal Wall Motion .  . Mid Anteroseptal   . Severe Hypo  . 1-25%            .  . Abnormal Wall Motion .  . Mid Inferoseptal   . Severe Hypo  . 1-25%            .  . Abnormal Wall Motion .  . Mid Inferior       . Severe Hypo  . None             .  . Abnormal Wall Motion .  . Mid Inferolateral  . Severe Hypo  . None             .  . Abnormal Wall Motion .  . Mid Anterolateral  . Severe Hypo  . None             .  . Abnormal Wall Motion .  . Apical Anterior    . Severe Hypo  . None             .  . Abnormal Wall Motion .  . Apical Septal      . Severe Hypo  . 1-25%            .  . Abnormal Wall Motion .  . Apical Inferior    . Severe Hypo  . None             .  . Abnormal Wall Motion .  . Apical Lateral     . Severe Hypo  . None             .  . Abnormal Wall Motion .  . [place]               . Severe Hypo  . None             .  . Abnormal Wall Motion .  +--------------------+--------------+------------------+--------------  ----+----------------------+  . RV Segments        . Wall Motion  . Hyperenhancement . Stress  Perfusion . Interpretation       .  +--------------------+--------------+------------------+--------------  ----+----------------------+  . RV Basal Anterior  . Normal/Hyper . None             .  . Normal               .  . RV Basal Inferior  .  Normal/Hyper . None             .  . Normal               .  . RV Mid             . Normal/Hyper . None             .  . Normal               .  -. RV Apical          . Normal/Hyper . None             .  . Normal               .  '--------------------+--------------+------------------+--------------  ----+----------------------'    FINDINGS  ----------------------------------------------  LV SCAR SIZE (17 SEGMENT):  5 %      Nuclear:No results found for this or any previous visit from the past 1800 days.    Metabolic Stress:   (NON-INVASIVE)  +--------------------+----+-------------+----------+                      RestPeak Exercise% Achieved  +--------------------+----+-------------+----------+        HR (BPM)       94      132         72      +--------------------+----+-------------+----------+  Systolic BP         124 160                      +--------------------+----+-------------+----------+  Diastolic BP        82  90                       +--------------------+----+-------------+----------+  MAP (mmHg)          96  113                      +--------------------+----+-------------+----------+  VO2 (ml/Kg/min)     2.8 17.3         42          +--------------------+----+-------------+----------+  VO2 (ml/min)        375 2357         42          +--------------------+----+-------------+----------+  O2 Pulse (ml/beat)  4   18                       +--------------------+----+-------------+----------+  VE (L/min)          12  79           56          +--------------------+----+-------------+----------+  VE/VCO2             48  36                       +--------------------+----+-------------+----------+  VD/VT               0.330.20                     +--------------------+----+-------------+----------+  Resp. Rate (br/min) 23  43                       +--------------------+----+-------------+----------+  Tidal Volume (ml)   736 5008                      +--------------------+----+-------------+----------+  RER                 0.670.93                     +--------------------+----+-------------+----------+  End Tidal CO2 (mmHg)28  32                       +--------------------+----+-------------+----------+  Breathing [place]%      44%                      +--------------------+----+-------------+----------+  SaO2%               91  93                       +--------------------+----+-------------+----------+   1. The peak VO2 achieved was [17.3] ml/kg/min which is consistent with [place] functional class B (moderate exercise impairment). The peak VO2 achieved was 42% peak predicted based on age gender height nomogram.   2. There was a [submaximal] exercise effort with peak RER of 0.93at peak exercise. The peak heart rate was 132bpm which is 72% APMHR.   3. The ventilatory efficiency slope (VE/VCO2) was moderately abnormal (36) at peak exercise.   4. Exercise stress EKG is non-diagnostic for ischemia _ due to inability to reach target heart rate.   5. There was [blunted] augmentation of O2 pulse from 4ml/beat at rest to 18ml/beat at peak exercise indicating limited augmentation of cardiac output and LV stroke volume.   6. The pulmonary response to exercise shows normal breathing reserve at 44% and normal oxygenation during exercise.   7. The ventilatory threshold did not occur.   8. Overall, submaximal cardiopulmonary exercise stress test. Markedly impaired ventilatory efficiency (VE/VCO2~36) is a high risk marker in this patient with heart failure and reduced ejection fraction.      Current Outpatient Medications   Medication Instructions    atorvastatin (Lipitor) 40 mg tablet 1 tablet, oral, Nightly    blood-glucose meter,continuous (FreeStyle Nadir 3 Monument) misc Use as instructed to test blood glucose throughout the day    blood-glucose sensor (FreeStyle Nadir 3 Sensor) device Use as directed to test blood glucose throughout the day     "buPROPion XL (WELLBUTRIN XL) 150 mg, oral, Daily    dapagliflozin propanediol (FARXIGA) 10 mg, oral, Daily    insulin lispro (HumaLOG) 100 unit/mL injection Inject 16 Units under the skin 3 times a day with meals. Take as directed per insulin instructions.    Lantus Solostar U-100 Insulin 55 Units, subcutaneous, Nightly, Take as directed per insulin instructions.    metFORMIN (Glucophage) 1,000 mg tablet 1 tablet, oral, 2 times daily    pen needle, diabetic 31 gauge x 5/16\" needle Use to inject 1-4 times daily as directed.    rivaroxaban (XARELTO) 20 mg, oral, Daily with evening meal, Take with food.    sacubitriL-valsartan (Entresto) 24-26 mg tablet 1 tablet, oral, 2 times daily    sertraline (ZOLOFT) 100 mg, oral, Daily    spironolactone (ALDACTONE) 12.5 mg, oral, Daily    torsemide (DEMADEX) 20 mg, oral, Daily    traZODone (DESYREL) 50 mg, oral, Nightly    True Metrix Glucose Test Strip strip USE TO CHECK BLOOD SUGAR FOUR TIMES DAILY    Trulicity 0.75 mg, subcutaneous, Once Weekly    Ultra Thin Lancets 30 gauge misc TEST BLOOD SUGAR FOUR TIMES DAILY AS DIRECTED      Notable Therapies   Class  Agent SAFETY    ARNI / ACE / ARB  sacubitriL-valsartan 24-26mg BID -> 49-51mg twice dailydaily Last BP: (!) 159/120, Last BUN/Cr (GFR): 24/1.43 (65), 5/21/2024:  9:38 AM.    Beta-Blocker  carvedilol 6.25mg BID Last HR: 99    MRA  spironolactone 12.5mg daily Last K: 5/21/2024: 4.9     SGLT2  dapagliflozin 10mg daily Last A1c 5/21/2024: 13.7     Diuretic  torsemide 20mg daily Last BNP: 5/21/2024: 195    Hydralazine/ISDN       Anticoagulation  rivaroxaban 20mg daily Last Hgb: 5/21/2024: 14.8    Anti-arrhythmic       Antiplatelet   Last Platelet: 5/21/2024: 242    Lipid-Lowering  atorvastatin 40mg daily Last Tchol 12/12/2023: 125 / LDL No results found for requested labs within last 365 days. or 50 / HDL 26.6 / TRIG 241    Other        Device(s)       Cardiac Rehab,   if EF <35/MI/OHS  attending      Problems Addressed:   # " HFrEF / Chronic systolic heart failure, EF 10% (2/21/24), diagnosed 10/2022 with EF 5-10%. No ICD in place. Referred to EP  # Non-ischemic cardiomyopathy, Stage C, NYHA III, No ICD in place. Suspect underlying genetic origin given CM in daughter. He does not know family history.   # LBBB/IVCD (QRS 142ms): consideration for CRT-D  # Hypertension  # Severe Obesity  # Tobacco use,: 3 minutes spent in counselling on cessation  # Type II Diabetes Mellitus: Last A1c (13.7): endocrinology referral.   # Cardioembolic stroke s/p TNK 12/2022  - continue current medications  -- escalate ARNI  - EP referral for ICD (CRT-D) (discussed rationale and he is interested)  - CV genetics evaluation  - counselled to avoid alcohol and cocaine (denies recent use)  - plan elective RHC in July    # Hx of Bipolar I Disorder:  Referral to Adult Psychiatry.      Patient Instructions   If you have any questions or need cardiac medication refills, please call the Heart Failure office at [phone], option [phone].   You may also contact the  Heart Failure Nursing team via email at [email or web address] (Please include your name and date of birth).      To reach Dr. Jurado's office please call [phone] / Fax: [phone].  To schedule an appointment call [phone].    - Continue current medications  -- increase Entresto from 24-26mg twice daily to 49-51mg twice daily (you can take two 24-26mg tablets twice daily until you have the 49-51mg tablets  - Labwork: today  - Imaging/Procedures: right heart catheterization at [facility] in [place]. You will need to hold Rivaroxaban (Xarelto 48 hours beforehand)  - Results: I will send a DivvyCloud message with any notable results. You may also call the HF nursing team (contact details above) for your results.   - Referrals: electrophysiology  - Followup: 6 weeks     Orders:  No orders of the defined types were placed in this encounter.     Followup Appts:  Future Appointments    Date Time Provider Department [place]   7/2/2024  8:30 AM Delroy Jurado MD [id number] Academic   7/3/2024  8:00 AM CARDIAC REHAB [facility] [id number] CARDREHB ROOM [id number] Academic   7/5/2024  8:00 AM CARDIAC REHAB [facility] [id number] CARDREHB ROOM [id number] Academic   7/8/2024  8:00 AM CARDIAC REHAB [facility] [id number] CARDREHB ROOM [id number] Academic   7/10/2024  8:00 AM CARDIAC REHAB [facility] [id number] CARDREHB ROOM [id number] Academic   7/12/2024  8:00 AM CARDIAC REHAB [facility] [id number] CARDREHB ROOM [id number] Academic   7/15/2024  8:00 AM CARDIAC REHAB [facility] [id number] CARDREHB ROOM [id number] Academic   7/17/2024  8:00 AM CARDIAC REHAB [facility] [id number] CARDREHB ROOM [id number] Academic   7/17/2024 10:00 AM PHARMACY [facility] [id number] Academic   7/19/2024  8:00 AM CARDIAC REHAB [facility] [id number] CARDREHB ROOM [id number] Academic   7/22/2024  8:00 AM CARDIAC REHAB [facility] [id number] CARDREHB ROOM [id number] Academic   7/24/2024  8:00 AM CARDIAC REHAB [facility] [id number] CARDREHB ROOM [id number] Academic   7/26/2024  8:00 AM CARDIAC REHAB [facility] [id number] CARDREHB ROOM [id number] Academic   7/29/2024  8:00 AM CARDIAC REHAB [facility] [id number] CARDREHB ROOM [id number] Academic   7/31/2024  8:00 AM CARDIAC REHAB [facility] [id number] CARDREHB ROOM [id number] Academic   8/2/2024  8:00 AM CARDIAC REHAB [facility] [id number] CARDREHB ROOM [id number] Academic   8/5/2024  8:00 AM CARDIAC REHAB [facility] [id number] CARDREHB ROOM [id number] Academic   8/7/2024  8:00 AM CARDIAC REHAB [facility] [id number] CARDREHB ROOM [id number] Academic   8/9/2024  8:00 AM CARDIAC REHAB [facility] [id number] CARDREHB ROOM [id number] Academic   8/12/2024  8:00 AM CARDIAC REHAB [facility] [id number] CARDREHB ROOM [id number] Academic   8/14/2024  8:00 AM CARDIAC REHAB [facility] [id number] CARDREHB ROOM [id number] Academic   8/16/2024  8:00 AM CARDIAC REHAB [facility] [id number] CARDREHB ROOM [id number] Academic   8/19/2024  8:00 AM CARDIAC REHAB [facility] [id number] CARDREHB ROOM [id number] Academic   8/21/2024  8:00 AM CARDIAC REHAB [facility]  [id number] CARDREHB ROOM [id number] Academic   8/23/2024  8:00 AM CARDIAC REHAB [facility] CARDREHB ROOM [id number] Academic   8/26/2024  8:00 AM CARDIAC REHAB [facility] CARDREHB ROOM [id number] Academic   8/26/2024 11:40 AM Christopher D'Amico, DO [id number] [facility]   8/28/2024  8:00 AM CARDIAC REHAB [facility] CARDREHB ROOM [id number] Academic   8/30/2024  8:00 AM CARDIAC REHAB [facility] CARDREHB ROOM [id number] Academic   9/4/2024  8:00 AM CARDIAC REHAB [facility] CARDREHB ROOM [id number] Academic   9/6/2024  8:00 AM CARDIAC REHAB [facility] CARDREHB ROOM [id number] Academic   9/17/2024  7:45 PM SLEEP LAB [address]   10/2/2024  8:00 AM Brittney Stoll MD [id number] Academic       Time Spent: I spent 40 minutes reviewing medical testing, obtaining medical history and counselling and educating on diagnosis and documenting clinical encounter.   ____________________________________________________________  Delroy Jurado MD  Section of Advanced Heart Failure and Cardiac Transplantation  Division of Cardiovascular Medicine  [facility]

## 2024-07-02 ENCOUNTER — LAB (OUTPATIENT)
Dept: LAB | Facility: LAB | Age: 39
End: 2024-07-02
Payer: COMMERCIAL

## 2024-07-02 ENCOUNTER — TELEPHONE (OUTPATIENT)
Dept: CARDIOLOGY | Facility: HOSPITAL | Age: 39
End: 2024-07-02

## 2024-07-02 ENCOUNTER — OFFICE VISIT (OUTPATIENT)
Dept: CARDIOLOGY | Facility: HOSPITAL | Age: 39
End: 2024-07-02
Payer: COMMERCIAL

## 2024-07-02 VITALS
WEIGHT: 301.6 LBS | OXYGEN SATURATION: 94 % | SYSTOLIC BLOOD PRESSURE: 159 MMHG | DIASTOLIC BLOOD PRESSURE: 120 MMHG | HEIGHT: 69 IN | HEART RATE: 99 BPM | BODY MASS INDEX: 44.67 KG/M2

## 2024-07-02 DIAGNOSIS — I10 PRIMARY HYPERTENSION: ICD-10-CM

## 2024-07-02 DIAGNOSIS — I50.43 ACUTE ON CHRONIC COMBINED SYSTOLIC AND DIASTOLIC CONGESTIVE HEART FAILURE (MULTI): ICD-10-CM

## 2024-07-02 DIAGNOSIS — I50.20 HFREF (HEART FAILURE WITH REDUCED EJECTION FRACTION) (MULTI): ICD-10-CM

## 2024-07-02 LAB
ALBUMIN SERPL BCP-MCNC: 4.4 G/DL (ref 3.4–5)
ALP SERPL-CCNC: 66 U/L (ref 33–120)
ALT SERPL W P-5'-P-CCNC: 33 U/L (ref 10–52)
AMPHETAMINES UR QL SCN: ABNORMAL
ANION GAP SERPL CALC-SCNC: 16 MMOL/L (ref 10–20)
AST SERPL W P-5'-P-CCNC: 21 U/L (ref 9–39)
BARBITURATES UR QL SCN: ABNORMAL
BENZODIAZ UR QL SCN: ABNORMAL
BILIRUB SERPL-MCNC: 0.8 MG/DL (ref 0–1.2)
BNP SERPL-MCNC: 173 PG/ML (ref 0–99)
BUN SERPL-MCNC: 16 MG/DL (ref 6–23)
BZE UR QL SCN: ABNORMAL
CALCIUM SERPL-MCNC: 9.5 MG/DL (ref 8.6–10.6)
CANNABINOIDS UR QL SCN: ABNORMAL
CHLORIDE SERPL-SCNC: 91 MMOL/L (ref 98–107)
CO2 SERPL-SCNC: 29 MMOL/L (ref 21–32)
CREAT SERPL-MCNC: 1.39 MG/DL (ref 0.5–1.3)
EGFRCR SERPLBLD CKD-EPI 2021: 67 ML/MIN/1.73M*2
ERYTHROCYTE [DISTWIDTH] IN BLOOD BY AUTOMATED COUNT: 12.5 % (ref 11.5–14.5)
FENTANYL+NORFENTANYL UR QL SCN: ABNORMAL
GLUCOSE SERPL-MCNC: 479 MG/DL (ref 74–99)
HCT VFR BLD AUTO: 43.4 % (ref 41–52)
HGB BLD-MCNC: 14.8 G/DL (ref 13.5–17.5)
INR PPP: 1 (ref 0.9–1.1)
MCH RBC QN AUTO: 32.1 PG (ref 26–34)
MCHC RBC AUTO-ENTMCNC: 34.1 G/DL (ref 32–36)
MCV RBC AUTO: 94 FL (ref 80–100)
METHADONE UR QL SCN: ABNORMAL
NRBC BLD-RTO: 0 /100 WBCS (ref 0–0)
OPIATES UR QL SCN: ABNORMAL
OXYCODONE+OXYMORPHONE UR QL SCN: ABNORMAL
PCP UR QL SCN: ABNORMAL
PLATELET # BLD AUTO: 242 X10*3/UL (ref 150–450)
POTASSIUM SERPL-SCNC: 4.5 MMOL/L (ref 3.5–5.3)
PROT SERPL-MCNC: 7.5 G/DL (ref 6.4–8.2)
PROTHROMBIN TIME: 11.4 SECONDS (ref 9.8–12.8)
RBC # BLD AUTO: 4.61 X10*6/UL (ref 4.5–5.9)
SODIUM SERPL-SCNC: 131 MMOL/L (ref 136–145)
WBC # BLD AUTO: 4.5 X10*3/UL (ref 4.4–11.3)

## 2024-07-02 PROCEDURE — 80307 DRUG TEST PRSMV CHEM ANLYZR: CPT

## 2024-07-02 PROCEDURE — 80053 COMPREHEN METABOLIC PANEL: CPT

## 2024-07-02 PROCEDURE — 99215 OFFICE O/P EST HI 40 MIN: CPT | Mod: 25 | Performed by: STUDENT IN AN ORGANIZED HEALTH CARE EDUCATION/TRAINING PROGRAM

## 2024-07-02 PROCEDURE — 85610 PROTHROMBIN TIME: CPT

## 2024-07-02 PROCEDURE — 85027 COMPLETE CBC AUTOMATED: CPT

## 2024-07-02 PROCEDURE — 80353 DRUG SCREENING COCAINE: CPT

## 2024-07-02 PROCEDURE — 83880 ASSAY OF NATRIURETIC PEPTIDE: CPT

## 2024-07-02 PROCEDURE — 99406 BEHAV CHNG SMOKING 3-10 MIN: CPT | Performed by: STUDENT IN AN ORGANIZED HEALTH CARE EDUCATION/TRAINING PROGRAM

## 2024-07-02 RX ORDER — CARVEDILOL 6.25 MG/1
6.25 TABLET ORAL
Qty: 180 TABLET | Refills: 3 | Status: SHIPPED | OUTPATIENT
Start: 2024-07-02 | End: 2025-07-02

## 2024-07-02 ASSESSMENT — PAIN SCALES - GENERAL: PAINLEVEL: 0-NO PAIN

## 2024-07-02 NOTE — PATIENT INSTRUCTIONS
If you have any questions or need cardiac medication refills, please call the Heart Failure office at [phone], option [phone].   You may also contact the  Heart Failure Nursing team via email at [email or web address] (Please include your name and date of birth).      To reach Dr. Jurado's office please call [phone] / Fax: [phone].  To schedule an appointment call [phone].    - Continue current medications  -- increase Entresto from 24-26mg twice daily to 49-51mg twice daily (you can take two 24-26mg tablets twice daily until you have the 49-51mg tablets  - Labwork: today  - Imaging/Procedures: right heart catheterization at [facility] in [place]. You will need to hold Rivaroxaban (Xarelto 48 hours beforehand)  - Results: I will send a Powered message with any notable results. You may also call the HF nursing team (contact details above) for your results.   - Referrals: electrophysiology  - Followup: 6 weeks

## 2024-07-02 NOTE — TELEPHONE ENCOUNTER
Spoke with patient who agreed to come to [facility] for RHC on 7/18/24. Lab orders placed,  patient instructed to hold xarelto 2 days prior to test. Advised him to monitor for phone call from COVID testing team as COVID test is required prior to procedure. Agrees that a friend/family member will be able to transport him to and from procedure. Informed patient that we will send a mailing or email to his home or email address with pre procedure instructions and a map of how to get to the cath lab. Questions answered about procedure. Patient given my phone number [phone] to call with any questions/concerns prior to procedure.

## 2024-07-03 ENCOUNTER — CLINICAL SUPPORT (OUTPATIENT)
Dept: EMERGENCY MEDICINE | Facility: HOSPITAL | Age: 39
End: 2024-07-03
Payer: COMMERCIAL

## 2024-07-03 ENCOUNTER — HOSPITAL ENCOUNTER (OUTPATIENT)
Dept: CARDIOLOGY | Facility: HOSPITAL | Age: 39
Discharge: HOME | End: 2024-07-03
Payer: COMMERCIAL

## 2024-07-03 ENCOUNTER — CLINICAL SUPPORT (OUTPATIENT)
Dept: CARDIAC REHAB | Facility: HOSPITAL | Age: 39
End: 2024-07-03
Payer: COMMERCIAL

## 2024-07-03 ENCOUNTER — HOSPITAL ENCOUNTER (EMERGENCY)
Facility: HOSPITAL | Age: 39
Discharge: HOME | End: 2024-07-03
Payer: COMMERCIAL

## 2024-07-03 VITALS
BODY MASS INDEX: 44.58 KG/M2 | SYSTOLIC BLOOD PRESSURE: 137 MMHG | HEIGHT: 69 IN | RESPIRATION RATE: 16 BRPM | TEMPERATURE: 98.3 F | HEART RATE: 102 BPM | OXYGEN SATURATION: 96 % | DIASTOLIC BLOOD PRESSURE: 94 MMHG | WEIGHT: 301 LBS

## 2024-07-03 DIAGNOSIS — I50.20 HFREF (HEART FAILURE WITH REDUCED EJECTION FRACTION) (MULTI): ICD-10-CM

## 2024-07-03 LAB — GLUCOSE BLD MANUAL STRIP-MCNC: 549 MG/DL (ref 74–99)

## 2024-07-03 PROCEDURE — 82947 ASSAY GLUCOSE BLOOD QUANT: CPT

## 2024-07-03 PROCEDURE — 4500999001 HC ED NO CHARGE

## 2024-07-03 PROCEDURE — 93798 PHYS/QHP OP CAR RHAB W/ECG: CPT | Performed by: INTERNAL MEDICINE

## 2024-07-03 PROCEDURE — 93005 ELECTROCARDIOGRAM TRACING: CPT

## 2024-07-03 ASSESSMENT — PAIN - FUNCTIONAL ASSESSMENT: PAIN_FUNCTIONAL_ASSESSMENT: 0-10

## 2024-07-03 ASSESSMENT — PAIN SCALES - GENERAL: PAINLEVEL_OUTOF10: 0 - NO PAIN

## 2024-07-03 NOTE — PROGRESS NOTES
POCT Glucose testing done in rehab due to symptoms with exercise testing. Sugar read is HIGH - >600 mg/dL. Glucose testing in lab results as of 7/2/24 was 479 mg/dL.     Trulicity prescription just  on 7/2/2024 and he hasn't taken in >3 months per patient history. Per patient hasn't taken any medications for +3 months.  Pt transported to emergency department for evaluation.

## 2024-07-03 NOTE — ED TRIAGE NOTES
Pt was at cardiac rehab for his CHF and he was told his BG was high. Brought down to the ED. Denies SOB.     . Supposed to take insulin and metformin but hasn't been taking his meds due to stress in his life.

## 2024-07-03 NOTE — ED NOTES
Patient not found in lobby when called for a room in the ED. RN called the number patient had listed in chart. Patient stated he left and no longer wished to be seen     Jacquelyn Davis RN  07/03/24 1100

## 2024-07-04 LAB
ATRIAL RATE: 101 BPM
BZE UR-MCNC: 164 NG/ML
P AXIS: 60 DEGREES
P OFFSET: 170 MS
P ONSET: 119 MS
PR INTERVAL: 182 MS
Q ONSET: 210 MS
QRS COUNT: 16 BEATS
QRS DURATION: 144 MS
QT INTERVAL: 398 MS
QTC CALCULATION(BAZETT): 516 MS
QTC FREDERICIA: 473 MS
R AXIS: -61 DEGREES
T AXIS: 94 DEGREES
T OFFSET: 409 MS
VENTRICULAR RATE: 101 BPM

## 2024-07-05 ENCOUNTER — TELEPHONE (OUTPATIENT)
Dept: CARDIAC REHAB | Facility: HOSPITAL | Age: 39
End: 2024-07-05
Payer: COMMERCIAL

## 2024-07-05 ENCOUNTER — APPOINTMENT (OUTPATIENT)
Dept: CARDIAC REHAB | Facility: HOSPITAL | Age: 39
End: 2024-07-05
Payer: COMMERCIAL

## 2024-07-05 NOTE — TELEPHONE ENCOUNTER
PATIENT: Arthur Carranza  DATE: 7/5/2024    Called patient regarding scheduling Cardiopulmonary Rehab and previous session elevated glucose at [facility]. Per chart review, patient left ED yesterday with hyperglycemia without medication titration.  Our offices called to cancel upcoming appointment with cardiac rehab, per department policy (FBS <300 mg/dL), until patient could effectively give update on glycemic ranges within normal and safe limits to exercise.      Arthur agrees with calling staff regarding fasting blood glucose ranges as of 7/8/2024. Dr. Jurado is agreeable with this plan of care for Mr. Carranza and we will continue care plan in rehab based on glucose values. Plan to touch base with Arthur via telephone 7/8/2024 and work forward from his fasting glucose levels as appropriate.     Kasandra Jimenes MS, ACSM-CEP, AACVPR, CCRP

## 2024-07-08 ENCOUNTER — APPOINTMENT (OUTPATIENT)
Dept: CARDIAC REHAB | Facility: HOSPITAL | Age: 39
End: 2024-07-08
Payer: COMMERCIAL

## 2024-07-08 LAB
ATRIAL RATE: 91 BPM
P AXIS: 73 DEGREES
P OFFSET: 175 MS
P ONSET: 118 MS
PR INTERVAL: 190 MS
Q ONSET: 213 MS
QRS COUNT: 15 BEATS
QRS DURATION: 142 MS
QT INTERVAL: 428 MS
QTC CALCULATION(BAZETT): 526 MS
QTC FREDERICIA: 492 MS
R AXIS: -76 DEGREES
T AXIS: 62 DEGREES
T OFFSET: 427 MS
VENTRICULAR RATE: 91 BPM

## 2024-07-08 PROCEDURE — 93005 ELECTROCARDIOGRAM TRACING: CPT

## 2024-07-10 ENCOUNTER — APPOINTMENT (OUTPATIENT)
Dept: CARDIAC REHAB | Facility: HOSPITAL | Age: 39
End: 2024-07-10
Payer: COMMERCIAL

## 2024-07-12 ENCOUNTER — CLINICAL SUPPORT (OUTPATIENT)
Dept: CARDIAC REHAB | Facility: HOSPITAL | Age: 39
End: 2024-07-12
Payer: COMMERCIAL

## 2024-07-12 VITALS
OXYGEN SATURATION: 98 % | SYSTOLIC BLOOD PRESSURE: 128 MMHG | WEIGHT: 301 LBS | BODY MASS INDEX: 44.58 KG/M2 | DIASTOLIC BLOOD PRESSURE: 80 MMHG | HEART RATE: 103 BPM | HEIGHT: 69 IN

## 2024-07-12 NOTE — PROGRESS NOTES
Cardiac Rehabilitation 30 Day Reassessment    Name: Arthur Carranza   Medical Record Number: [id number]  YOB: 1985   Age: 38 y.o.  Today’s Date: 7/12/2024   Primary Care Physician: Christopher D'Amico, DO   Referring Physician: Delroy Jurado MD   Program Location: [address]  Primary Diagnosis:   1. HFrEF (heart failure with reduced ejection fraction) (Multi)  Referral to Cardiac Rehab      2. Primary hypertension  Referral to Cardiac Rehab      3. Acute on chronic combined systolic and diastolic congestive heart failure (Multi)  Referral to Cardiac Rehab         Onset/Date of Diagnosis: 10/21/22   Session #: 0  AACVPR Risk Stratification: High   Falls Risk: Low    Psychosocial Reassessment:    Pre PHQ-9: 19  No data recorded   Sent PH-Q 9 to MD if score > 20: Yes; Date sent: 5/28/2024    Pt reported/currently experiencing stress: Yes; Stress; Severity: marked, Depression; Severity: marked, and PTSD; Severity: moderate  Patient uses stress management skills: Yes , painting  History of:  Depression and PTSD  Currently seeing a mental health provider: Yes, Then provider name: unknown; patient states he would like to switch providers  Social Support: No  Quality of Life Survey: KCCQ (see Heart Failure Management below)  Learning Assessment:  Learning assessment/barriers: Emotional, Financial, and Motivation  Preferred learning method: Auditory, Visual, Reading handout, and Writing handout  Barriers: None  Comments: Consistency and motivation is the biggest barrier for patient     Stages of Change: Action    Psychosocial Plan  Goal Status: In progress  Psychosocial Goals: Demonstrating proper techniques for stress management, Maintain or lower PH-Q 9 score by discharge, and Identify strategies for managing depression    Psychosocial Interventions/Education:   7/12/24 Arthur's initial PHQ-9 score was elevated and staff RN spoke to patient regarding his emotional  stress  "burden. Patient has history of anxiety and depression; last saw a provider in  psychiatry as of 2022. Patient would benefit from re:seeing a behavioral health team member. Will inquire through patient referrals and referring MD.    Nutrition Reassessment:     Diet Habit Survey: Picture Your Plate  Pre:    Post: To be done at discharge.    PYP reviewed with Dietician: Not at this time    Lipids Assessment  Hyperlipidemia: Yes   Lab Results   Component Value Date    CHOL 125 12/12/2023    HDL 26.6 12/12/2023    LDLCALC 50 12/12/2023    TRIG 241 (H) 12/12/2023     Current Dietary Guidelines: Low fat, Low sodium  Barriers to dietary change: no    Diabetes Assessment  Lab Results   Component Value Date    HGBA1C 13.7 (H) 05/21/2024      History of Diabetes: Yes   Pt monitors BS at home:  Not currently; patient has Nadir and willing to try utilizing  Frequency: x1 /day  FBS range: 300 - 800  Hypoglycemic Episodes:  Yes; patient ED visit 7/3/2024 for Hyperglycemia. Has had past hypoglycemic episodes.    Weight Management  Weight: 137 kg (301 lb)  Height: 175.3 cm (5' 9\")  BMI (Calculated): 44.43    Nutrition Plan  Goal Status: In progress  Nutrition Goals: Lipid Goal: HDL>45, LDL <70, Total <180, Trigs <150, Improve Diet Habit Survey score by 5-10 points by discharge, Learn how to read and interpret nutrition labels prior to discharge, and Improve HgbA1C prior to discharge  Nutrition Interventions/Education:   7/12/2024    Patient has not yet returned diet survey for assessment due to lack of attendance and compliance. Staff would like patient to meet with dietitian when available to encourage best heart health diet options for patient. Patient has expressed the want to lose weight as well.    Exercise Reassessment:   Current Home Exercise:   Current Home Exercise: Current Home Exercise?: No  Mode: NA  Frequency: NA  Duration: NA     Exercise Prescription based on:   Cardiopulmonary Metabolic Stress  6/6/2024    Darrius " Activity Status Index (DASI)  DASI Score: 26.45   MET Score: 6     Exercise Prescription  Frequency:  3 days/week  Mode: Treadmill, NuStep, and Recumbent Cycle  Duration: 27 total aerobic minutes  Intensity: RPE 12-16  Target HR:   TBD; THR calculated via CPET by program EP  MET Level: TBD  Patient wears supplemental O2: No   Modality Workload METs Duration (minutes)   1 Pre-Exercise      2 Session Warm Up   5 : 00   3 Treadmill 2.0 mph @ 0 % 2.6 9 : 00   4 NuStep 4000 3 load @70 potts 2.5 9 : 00   5 Recumbent Bike 3 load @ 50 rpms 2.3 9 : 00   6 Post-Exercise         Resistance Training:  After session #10  Home Exercise Prescription given: To be given at session # 10    Exercise Plan  Goal Status: In progress  Exercise Goals: Increase exercise MET level by 5-10% each week, Increase total exercise duration to 30-45 minutes, Obtain 150 minutes/week of moderate intensity aerobic exercise, and Establish a home exercise program before discharge    Exercise Interventions/Education:   7/12/24 Patient has not attended enough rehabilitation sessions to make exercise prescription changes. Current workloads are appropriate based on patient's pre-rehabilitation cardiopulmonary metabolic stress test.    Other Core Components/Risk Factor Reassessment:   Medication adherence  Medication compliance: No, patient admits to not taking medications as prescribed.  Uses pill box/organizer:  Not at this time. Given to patient by rehab staff  Carries medication list:  Yes, patient has medication list in MyChart/phone  Current Medications:   Medication Documentation Review Audit       Reviewed by Adrianna Mcdowell CMA (Medical Assistant) on 07/02/24 at 0805      Medication Order Taking? Sig Documenting Provider Last Dose Status   atorvastatin (Lipitor) 40 mg tablet [id number] Yes Take 1 tablet (40 mg) by mouth once daily at bedtime. Historical Provider, MD Taking Active   blood-glucose meter,continuous (FreeStyle Nadir 3 [place]) misc [id number]  "Yes Use as instructed to test blood glucose throughout the day Christopher D'Amico, DO Taking Active   blood-glucose sensor (FreeStyle Nadir 3 Sensor) device [id number] Yes Use as directed to test blood glucose throughout the day Christopher D'Amico, DO Taking Active   buPROPion XL (Wellbutrin XL) 150 mg 24 hr tablet [id number] Yes Take 1 tablet (150 mg) by mouth once daily. Historical Provider, MD Taking Active   dapagliflozin propanediol (Farxiga) 10 mg [id number] Yes Take 1 tablet (10 mg) by mouth once daily. Delroy Jurado MD Taking Active   dulaglutide (Trulicity) 0.75 mg/0.5 mL pen injector [id number] Yes Inject 0.75 mg under the skin 1 (one) time per week. Christopher D'Amico,  Taking Active   insulin glargine (Lantus Solostar U-100 Insulin) 100 unit/mL (3 mL) pen [id number]  Inject 55 Units under the skin once daily at bedtime. Take as directed per insulin instructions. Merry Chaney MD   [id number] [phone]   insulin lispro (HumaLOG) 100 unit/mL injection [id number] Yes Inject 16 Units under the skin 3 times a day with meals. Take as directed per insulin instructions. Merry Chaney MD Taking Active   metFORMIN (Glucophage) 1,000 mg tablet [id number] Yes Take 1 tablet (1,000 mg) by mouth 2 times a day. Historical Provider, MD Taking Active   pen needle, diabetic 31 gauge x 5/16\" needle [id number] Yes Use to inject 1-4 times daily as directed. Merry Chaney MD Taking Active   rivaroxaban (Xarelto) 20 mg tablet [id number] Yes Take 1 tablet (20 mg) by mouth once daily in the evening. Take with meals. Take with food. Delroy Jurado MD Taking Active   sacubitriL-valsartan (Entresto) 24-26 mg tablet [id number] Yes Take 1 tablet by mouth 2 times a day. Delroy Jurado MD Taking Active   sertraline (Zoloft) 100 mg tablet [id number] Yes Take 1 tablet (100 mg) by mouth once daily. Historical Provider, MD Taking Active   spironolactone (Aldactone) 25 mg tablet [id number] Yes Take 0.5 tablets (12.5 " mg) by mouth once daily. Do not start before February 24, 2024. Kwame Orozco APRPRANAV-CNP Taking Active   torsemide (Demadex) 20 mg tablet [id number] Yes Take 1 tablet (20 mg) by mouth once daily. Kwame Orozco APRPRANAV-CNP Taking Active   traZODone (Desyrel) 50 mg tablet [id number] Yes Take 1 tablet (50 mg) by mouth once daily at bedtime. Historical Provider, MD Taking Active   True Metrix Glucose Test Strip strip [id number] Yes USE TO CHECK BLOOD SUGAR FOUR TIMES DAILY Historical Provider, MD Taking Active   Ultra Thin Lancets 30 gauge misc [id number] Yes TEST BLOOD SUGAR FOUR TIMES DAILY AS DIRECTED Historical Provider, MD Taking Active                   Blood Pressure Management  History of Hypertension: Yes   Medication Changes: No   Resting BP:  Visit Vitals  /80 (BP Location: Left arm, Patient Position: Sitting, BP Cuff Size: Large adult)   Pulse 103         Heart Failure Management  Hx of Heart Failure: Yes, Type (selection): HFrEF  Most recent EF: Echocardiogram - LVEF (%): 10     Onset of heart failure diagnosis: 03/2023  Last heart failure hospitalization: 02/21/2024 - 02/23/2024 CHF  Number of HF admissions per year: 5    Symptoms: Fatigue at rest, Fatigue with exertion, Shortness of breath, Orthopnea, and PND and chest pain that is sharp lasting 1-2 seconds  Is there a family Hx of HF:  unknown family history; foster care  Does patient obtain daily weight  Every other day    KCCQ survey: Pre: TBD  Post: TBD    Heart Failure Reassessment Heart Failure Symptom Management: Initial Treatment Plan. Will reassess in 30 days.    Heart Failure Goals: Able to verbalize signs and symptoms of fluid retention and when to contact MD, Adhere to proper fluid restrictions, Obtain daily weight and verbalize proper method of obtaining weights    Smoking/Tobacco Assessment  Social History     Tobacco Use    Smoking status: Every Day     Types: Cigarettes   Vaping Use    Vaping status: Unknown   Substance Use Topics     Alcohol use: Yes     Comment: occasionaly - 1-2 times a month    Drug use: Not Currently     Types: Codeine      Other Core Component Plan  Goal Status: In progress  Other Core Component Goals: Medication compliance, Verbalize medication usage and drug actions by discharge, Achieve resting BP of < 130/80 by discharge, and tobacco cessation follow up (recent quit)    Other Core Component Interventions/Education:   7/12/2024  Extensive education was given to patient on 7/3/24 regarding medication compliance and goals. Patient admitted to not taking his medications as prescribed on 7/3/2024 and for the last 2-3 months. Staff followed up ED visit with telephone call and can actively share that medications are prescribed, status picked up from pharmacy, and patient reports taking daily. Medication reconciliation suggested on a 30-day basis until patient has become adherent to prescribed medication.    Individual Patient Goals:  Be able to start home exercise routine by discharge from program.   Lose 1-3 lbs while enrolled in Cardiac Rehab.   Goal Status: In progress    Staff Comments:  Patient has not been compliant with attendance to rehabilitation since being initially enrolled and starting exercise on 06/12/2024. Patient has only attend 3 sessions in the last 30 days. Patient would benefit from re:seeing a behavioral health team member. Will inquire through patient referrals and referring MD. Patient has not attended enough rehabilitation sessions to make exercise prescription changes. Patient was sent to ED due to hyperglycemia after session end on 07/03/2024. Per patient, he hasn't taken any medications as prescribed for approximately 2 months. Staff followed up the ED visit with telephone call and can actively share that medications are prescribed, status picked up from pharmacy, and patient reports taking daily. Medication reconciliation suggested on a 30-day basis until patient has become adherent to  prescribed medication.    Rehab Staff Signature: Kasandra Jimenes MS, ACSM-CEP, AACVPR, CCRP

## 2024-07-13 ENCOUNTER — HOSPITAL ENCOUNTER (EMERGENCY)
Facility: HOSPITAL | Age: 39
Discharge: HOME | End: 2024-07-13
Payer: COMMERCIAL

## 2024-07-13 ENCOUNTER — APPOINTMENT (OUTPATIENT)
Dept: RADIOLOGY | Facility: HOSPITAL | Age: 39
End: 2024-07-13
Payer: COMMERCIAL

## 2024-07-13 VITALS
DIASTOLIC BLOOD PRESSURE: 84 MMHG | OXYGEN SATURATION: 96 % | SYSTOLIC BLOOD PRESSURE: 120 MMHG | HEART RATE: 96 BPM | BODY MASS INDEX: 44.43 KG/M2 | HEIGHT: 69 IN | WEIGHT: 300 LBS | RESPIRATION RATE: 16 BRPM | TEMPERATURE: 97 F

## 2024-07-13 DIAGNOSIS — L02.211 ABDOMINAL WALL ABSCESS: Primary | ICD-10-CM

## 2024-07-13 DIAGNOSIS — I50.20 HFREF (HEART FAILURE WITH REDUCED EJECTION FRACTION) (MULTI): Primary | ICD-10-CM

## 2024-07-13 LAB
ANION GAP SERPL CALC-SCNC: 11 MMOL/L (ref 10–20)
BASOPHILS # BLD AUTO: 0.01 X10*3/UL (ref 0–0.1)
BASOPHILS NFR BLD AUTO: 0.2 %
BUN SERPL-MCNC: 15 MG/DL (ref 6–23)
CALCIUM SERPL-MCNC: 8.6 MG/DL (ref 8.6–10.3)
CHLORIDE SERPL-SCNC: 102 MMOL/L (ref 98–107)
CO2 SERPL-SCNC: 23 MMOL/L (ref 21–32)
CREAT SERPL-MCNC: 1.01 MG/DL (ref 0.5–1.3)
EGFRCR SERPLBLD CKD-EPI 2021: >90 ML/MIN/1.73M*2
EOSINOPHIL # BLD AUTO: 0.06 X10*3/UL (ref 0–0.7)
EOSINOPHIL NFR BLD AUTO: 1 %
ERYTHROCYTE [DISTWIDTH] IN BLOOD BY AUTOMATED COUNT: 12.7 % (ref 11.5–14.5)
GLUCOSE SERPL-MCNC: 397 MG/DL (ref 74–99)
HCT VFR BLD AUTO: 37.1 % (ref 41–52)
HGB BLD-MCNC: 12.3 G/DL (ref 13.5–17.5)
IMM GRANULOCYTES # BLD AUTO: 0.02 X10*3/UL (ref 0–0.7)
IMM GRANULOCYTES NFR BLD AUTO: 0.3 % (ref 0–0.9)
LACTATE SERPL-SCNC: 1.6 MMOL/L (ref 0.4–2)
LYMPHOCYTES # BLD AUTO: 1.07 X10*3/UL (ref 1.2–4.8)
LYMPHOCYTES NFR BLD AUTO: 17.1 %
MCH RBC QN AUTO: 31.7 PG (ref 26–34)
MCHC RBC AUTO-ENTMCNC: 33.2 G/DL (ref 32–36)
MCV RBC AUTO: 96 FL (ref 80–100)
MONOCYTES # BLD AUTO: 0.56 X10*3/UL (ref 0.1–1)
MONOCYTES NFR BLD AUTO: 8.9 %
NEUTROPHILS # BLD AUTO: 4.55 X10*3/UL (ref 1.2–7.7)
NEUTROPHILS NFR BLD AUTO: 72.5 %
NRBC BLD-RTO: 0 /100 WBCS (ref 0–0)
PLATELET # BLD AUTO: 206 X10*3/UL (ref 150–450)
POTASSIUM SERPL-SCNC: 3.8 MMOL/L (ref 3.5–5.3)
RBC # BLD AUTO: 3.88 X10*6/UL (ref 4.5–5.9)
SODIUM SERPL-SCNC: 132 MMOL/L (ref 136–145)
WBC # BLD AUTO: 6.3 X10*3/UL (ref 4.4–11.3)

## 2024-07-13 PROCEDURE — 85025 COMPLETE CBC W/AUTO DIFF WBC: CPT | Performed by: PHYSICIAN ASSISTANT

## 2024-07-13 PROCEDURE — 36415 COLL VENOUS BLD VENIPUNCTURE: CPT | Performed by: PHYSICIAN ASSISTANT

## 2024-07-13 PROCEDURE — 96376 TX/PRO/DX INJ SAME DRUG ADON: CPT | Mod: 59

## 2024-07-13 PROCEDURE — 80048 BASIC METABOLIC PNL TOTAL CA: CPT | Performed by: PHYSICIAN ASSISTANT

## 2024-07-13 PROCEDURE — 74177 CT ABD & PELVIS W/CONTRAST: CPT | Mod: FOREIGN READ | Performed by: RADIOLOGY

## 2024-07-13 PROCEDURE — 2550000001 HC RX 255 CONTRASTS: Performed by: PHYSICIAN ASSISTANT

## 2024-07-13 PROCEDURE — 2500000004 HC RX 250 GENERAL PHARMACY W/ HCPCS (ALT 636 FOR OP/ED): Performed by: PHYSICIAN ASSISTANT

## 2024-07-13 PROCEDURE — 96375 TX/PRO/DX INJ NEW DRUG ADDON: CPT | Mod: 59

## 2024-07-13 PROCEDURE — 83605 ASSAY OF LACTIC ACID: CPT | Performed by: PHYSICIAN ASSISTANT

## 2024-07-13 PROCEDURE — 99284 EMERGENCY DEPT VISIT MOD MDM: CPT | Mod: 25

## 2024-07-13 PROCEDURE — 87185 SC STD ENZYME DETCJ PER NZM: CPT | Mod: ELYLAB | Performed by: PHYSICIAN ASSISTANT

## 2024-07-13 PROCEDURE — 10061 I&D ABSCESS COMP/MULTIPLE: CPT | Performed by: PHYSICIAN ASSISTANT

## 2024-07-13 PROCEDURE — 96374 THER/PROPH/DIAG INJ IV PUSH: CPT | Mod: 59

## 2024-07-13 PROCEDURE — 2500000002 HC RX 250 W HCPCS SELF ADMINISTERED DRUGS (ALT 637 FOR MEDICARE OP, ALT 636 FOR OP/ED): Performed by: PHYSICIAN ASSISTANT

## 2024-07-13 PROCEDURE — 74177 CT ABD & PELVIS W/CONTRAST: CPT

## 2024-07-13 PROCEDURE — 96361 HYDRATE IV INFUSION ADD-ON: CPT | Mod: 59

## 2024-07-13 RX ORDER — SULFAMETHOXAZOLE AND TRIMETHOPRIM 800; 160 MG/1; MG/1
1 TABLET ORAL ONCE
Status: COMPLETED | OUTPATIENT
Start: 2024-07-13 | End: 2024-07-13

## 2024-07-13 RX ORDER — MORPHINE SULFATE 4 MG/ML
4 INJECTION, SOLUTION INTRAMUSCULAR; INTRAVENOUS ONCE
Status: COMPLETED | OUTPATIENT
Start: 2024-07-13 | End: 2024-07-13

## 2024-07-13 RX ORDER — ONDANSETRON HYDROCHLORIDE 2 MG/ML
4 INJECTION, SOLUTION INTRAVENOUS ONCE
Status: COMPLETED | OUTPATIENT
Start: 2024-07-13 | End: 2024-07-13

## 2024-07-13 RX ORDER — OXYCODONE AND ACETAMINOPHEN 5; 325 MG/1; MG/1
1 TABLET ORAL EVERY 8 HOURS PRN
Qty: 3 TABLET | Refills: 0 | Status: SHIPPED | OUTPATIENT
Start: 2024-07-13 | End: 2024-07-18 | Stop reason: HOSPADM

## 2024-07-13 RX ORDER — SULFAMETHOXAZOLE AND TRIMETHOPRIM 800; 160 MG/1; MG/1
1 TABLET ORAL EVERY 12 HOURS
Qty: 20 TABLET | Refills: 0 | Status: ON HOLD | OUTPATIENT
Start: 2024-07-13 | End: 2024-07-23

## 2024-07-13 RX ADMIN — MORPHINE SULFATE 4 MG: 4 INJECTION, SOLUTION INTRAMUSCULAR; INTRAVENOUS at 12:44

## 2024-07-13 RX ADMIN — ONDANSETRON 4 MG: 2 INJECTION INTRAMUSCULAR; INTRAVENOUS at 12:44

## 2024-07-13 RX ADMIN — SODIUM CHLORIDE 1000 ML: 9 INJECTION, SOLUTION INTRAVENOUS at 12:45

## 2024-07-13 RX ADMIN — SULFAMETHOXAZOLE AND TRIMETHOPRIM 1 TABLET: 800; 160 TABLET ORAL at 16:05

## 2024-07-13 RX ADMIN — MORPHINE SULFATE 4 MG: 4 INJECTION, SOLUTION INTRAMUSCULAR; INTRAVENOUS at 15:38

## 2024-07-13 RX ADMIN — IOHEXOL 75 ML: 350 INJECTION, SOLUTION INTRAVENOUS at 14:17

## 2024-07-13 ASSESSMENT — PAIN SCALES - GENERAL
PAINLEVEL_OUTOF10: 8
PAINLEVEL_OUTOF10: 2
PAINLEVEL_OUTOF10: 5 - MODERATE PAIN
PAINLEVEL_OUTOF10: 6

## 2024-07-13 ASSESSMENT — LIFESTYLE VARIABLES
HAVE PEOPLE ANNOYED YOU BY CRITICIZING YOUR DRINKING: NO
EVER HAD A DRINK FIRST THING IN THE MORNING TO STEADY YOUR NERVES TO GET RID OF A HANGOVER: NO
TOTAL SCORE: 0
EVER FELT BAD OR GUILTY ABOUT YOUR DRINKING: NO
HAVE YOU EVER FELT YOU SHOULD CUT DOWN ON YOUR DRINKING: NO

## 2024-07-13 ASSESSMENT — PAIN - FUNCTIONAL ASSESSMENT
PAIN_FUNCTIONAL_ASSESSMENT: 0-10
PAIN_FUNCTIONAL_ASSESSMENT: 0-10

## 2024-07-13 ASSESSMENT — PAIN DESCRIPTION - PAIN TYPE: TYPE: ACUTE PAIN

## 2024-07-13 ASSESSMENT — PAIN DESCRIPTION - LOCATION: LOCATION: ABDOMEN

## 2024-07-13 NOTE — Clinical Note
Arthur Carranza was seen and treated in our emergency department on 7/13/2024.  He may return to work on 07/16/2024.       If you have any questions or concerns, please don't hesitate to call.      Kel Nelson PA-C

## 2024-07-14 LAB
GRAM STN SPEC: ABNORMAL
GRAM STN SPEC: ABNORMAL

## 2024-07-15 ENCOUNTER — TELEPHONE (OUTPATIENT)
Dept: CARDIAC REHAB | Facility: HOSPITAL | Age: 39
End: 2024-07-15
Payer: COMMERCIAL

## 2024-07-15 ENCOUNTER — APPOINTMENT (OUTPATIENT)
Dept: CARDIAC REHAB | Facility: HOSPITAL | Age: 39
End: 2024-07-15
Payer: COMMERCIAL

## 2024-07-15 NOTE — TELEPHONE ENCOUNTER
PATIENT: Arthur Carranza  DATE: 7/15/2024    Called patient regarding attending today at Cardiopulmonary Rehab at [facility]. Patient left voicemail reporting elevated blood glucose last night at home >300 mg/dL prior to bed. Patient then stated in voicemail he woke up this morning 7/15/2024 with glucose >500 mg/dL.    Returned call to patient at 8:15 AM and left voicemail return to patient. Patient did not answer, but was asked to call staff back later upon his convenience.      Kasandra Jimenes MS, ACSM-CEP, AACVPR, CCRP

## 2024-07-17 ENCOUNTER — APPOINTMENT (OUTPATIENT)
Dept: PHARMACY | Facility: HOSPITAL | Age: 39
End: 2024-07-17
Payer: COMMERCIAL

## 2024-07-17 ENCOUNTER — APPOINTMENT (OUTPATIENT)
Dept: CARDIAC REHAB | Facility: HOSPITAL | Age: 39
End: 2024-07-17
Payer: COMMERCIAL

## 2024-07-17 DIAGNOSIS — E11.40 TYPE 2 DIABETES MELLITUS WITH DIABETIC NEUROPATHY, WITH LONG-TERM CURRENT USE OF INSULIN (MULTI): ICD-10-CM

## 2024-07-17 DIAGNOSIS — Z79.4 TYPE 2 DIABETES MELLITUS WITH DIABETIC NEUROPATHY, WITH LONG-TERM CURRENT USE OF INSULIN (MULTI): ICD-10-CM

## 2024-07-17 LAB
B-LACTAMASE ORGANISM ISLT: POSITIVE
BACTERIA SPEC CULT: ABNORMAL
BACTERIA SPEC CULT: ABNORMAL
GRAM STN SPEC: ABNORMAL
GRAM STN SPEC: ABNORMAL

## 2024-07-17 RX ORDER — BLOOD-GLUCOSE SENSOR
EACH MISCELLANEOUS
Qty: 2 EACH | Refills: 11 | Status: ON HOLD | OUTPATIENT
Start: 2024-07-17

## 2024-07-17 RX ORDER — DULAGLUTIDE 1.5 MG/.5ML
1.5 INJECTION, SOLUTION SUBCUTANEOUS
Qty: 2 ML | Refills: 1 | Status: ON HOLD | OUTPATIENT
Start: 2024-07-21

## 2024-07-17 RX ORDER — ATORVASTATIN CALCIUM 40 MG/1
40 TABLET, FILM COATED ORAL NIGHTLY
Qty: 30 TABLET | Refills: 3 | Status: ON HOLD | OUTPATIENT
Start: 2024-07-17

## 2024-07-17 NOTE — PROGRESS NOTES
"Pharmacist Clinic: Diabetes Management  Arthur Carranza \"Arthur carranza\" is a 38 y.o. male was referred to Clinical Pharmacy Team for diabetes management.     Referring Provider: Christopher D'Amico, DO     HISTORY OF PRESENT ILLNESS  Approximate Date of Diagnosis: patient reports he was diagnosed at 19; patient reports he has not been the best about taking his medications as prescribed in the past   Known complications due to diabetes included cardiovascular disease, chronic kidney disease, and obesity    Diet:   - smoothies: berries, banana, OJ, non-fat plain yogurt   - Dinner: steak and mushrooms, onions, peppers   - Drinks: water, juice, sometimes pop     LAB REVIEW   Glucose (mg/dL)   Date Value   07/13/2024 397 (H)   07/02/2024 479 (HH)   05/21/2024 384 (H)     Hemoglobin A1C (%)   Date Value   05/21/2024 13.7 (H)   02/01/2024 15.3 (H)   12/12/2023 10.8 (H)     Bicarbonate (mmol/L)   Date Value   07/13/2024 23   07/02/2024 29   05/21/2024 25     Urea Nitrogen (mg/dL)   Date Value   07/13/2024 15   07/02/2024 16   05/21/2024 24 (H)     Creatinine (mg/dL)   Date Value   07/13/2024 1.01   07/02/2024 1.39 (H)   05/21/2024 1.43 (H)     Lab Results   Component Value Date    HGBA1C 13.7 (H) 05/21/2024    HGBA1C 15.3 (H) 02/01/2024    HGBA1C 10.8 (H) 12/12/2023     Lab Results   Component Value Date    CHOL 125 12/12/2023    CHOL 122 04/20/2023    CHOL 162 12/28/2022     Lab Results   Component Value Date    HDL 26.6 12/12/2023    HDL 31.4 (A) 04/20/2023    HDL 32.4 (A) 12/28/2022     Lab Results   Component Value Date    LDLCALC 50 12/12/2023     Lab Results   Component Value Date    TRIG 241 (H) 12/12/2023    TRIG 128 04/20/2023    TRIG 401 (H) 12/28/2022       DIABETES ASSESSMENT    CURRENT PHARMACOTHERAPY  - metformin 1000 mg by mouth twice daily   - humalog 12-18 (patient states he doesn't really use that much)   - lantus 55 units once daily   - farxiga 10 mg once daily    - trulicity 0.75 mg once weekly     SECONDARY " PREVENTION  - Statin? Yes  - ACE-I/ARB? No  - Aspirin? No    HISTORICAL PHARMACOTHERAPY  - trulicity: patient got sick from 4.5 mg once weekly     SMBG MEASUREMENTS: 300-400 range    DISCUSSION:   Went over uncontrolled diabetes, currently your A1c is 13.7%, our goal is to get it closer to 7%, we will use your blood glucose readings to help us get there  Patient admits he is not the best about taking his medications as prescribed but since last speaking he has worked hard to remember to take his medications  Trulicity:   Patient is tolerating current dose of Trulicity without issues, he denies any side effects at this time   Counseled on increasing the dose to 1.5 mg once weekly   Answered all patient questions and concerns  Lantus  Giving high blood glucose readings, we will increase you to 60 units under the skin once daily    RECOMMENDATIONS/PLAN  1. Patients diabetes is poorly controlled with most recent A1c of 13.7 % (goal < 7 %).   - Continue all meds under the continuation of care with the referring provider and clinical pharmacy team.  - Increase trulicity to 1.5 mg once weekly.   - Increase lantus to 60 units once daily.     2. Future considerations:   - uptitrate trulicity.     Clinical Pharmacist follow up: 1 month    Thank you,  Jaki Marcos, PharmD    Verbal consent to manage patient's drug therapy was obtained from the patient. They were informed they may decline to participate or withdraw from participation in pharmacy services at any time.

## 2024-07-18 ENCOUNTER — HOSPITAL ENCOUNTER (OUTPATIENT)
Facility: HOSPITAL | Age: 39
Setting detail: OUTPATIENT SURGERY
Discharge: HOSPICE | End: 2024-07-18
Attending: STUDENT IN AN ORGANIZED HEALTH CARE EDUCATION/TRAINING PROGRAM | Admitting: STUDENT IN AN ORGANIZED HEALTH CARE EDUCATION/TRAINING PROGRAM
Payer: COMMERCIAL

## 2024-07-18 ENCOUNTER — APPOINTMENT (OUTPATIENT)
Dept: RADIOLOGY | Facility: HOSPITAL | Age: 39
End: 2024-07-18
Payer: COMMERCIAL

## 2024-07-18 ENCOUNTER — APPOINTMENT (OUTPATIENT)
Dept: CARDIOLOGY | Facility: HOSPITAL | Age: 39
End: 2024-07-18
Payer: COMMERCIAL

## 2024-07-18 ENCOUNTER — HOSPITAL ENCOUNTER (INPATIENT)
Facility: HOSPITAL | Age: 39
End: 2024-07-18
Attending: STUDENT IN AN ORGANIZED HEALTH CARE EDUCATION/TRAINING PROGRAM | Admitting: STUDENT IN AN ORGANIZED HEALTH CARE EDUCATION/TRAINING PROGRAM
Payer: COMMERCIAL

## 2024-07-18 VITALS
OXYGEN SATURATION: 96 % | SYSTOLIC BLOOD PRESSURE: 116 MMHG | BODY MASS INDEX: 44.67 KG/M2 | TEMPERATURE: 95.9 F | WEIGHT: 301.59 LBS | HEIGHT: 69 IN | RESPIRATION RATE: 15 BRPM | DIASTOLIC BLOOD PRESSURE: 74 MMHG | HEART RATE: 98 BPM

## 2024-07-18 DIAGNOSIS — L02.211 ABDOMINAL WALL ABSCESS: ICD-10-CM

## 2024-07-18 DIAGNOSIS — I50.43 ACUTE ON CHRONIC COMBINED SYSTOLIC AND DIASTOLIC CONGESTIVE HEART FAILURE (MULTI): ICD-10-CM

## 2024-07-18 DIAGNOSIS — N18.30 CONTROLLED TYPE 2 DIABETES MELLITUS WITH STAGE 3 CHRONIC KIDNEY DISEASE, WITH LONG-TERM CURRENT USE OF INSULIN (MULTI): ICD-10-CM

## 2024-07-18 DIAGNOSIS — E11.40 TYPE 2 DIABETES MELLITUS WITH DIABETIC NEUROPATHY, WITH LONG-TERM CURRENT USE OF INSULIN (MULTI): ICD-10-CM

## 2024-07-18 DIAGNOSIS — I50.23 ACUTE ON CHRONIC SYSTOLIC (CONGESTIVE) HEART FAILURE (MULTI): ICD-10-CM

## 2024-07-18 DIAGNOSIS — I50.20 HFREF (HEART FAILURE WITH REDUCED EJECTION FRACTION) (MULTI): Primary | ICD-10-CM

## 2024-07-18 DIAGNOSIS — I50.20 HFREF (HEART FAILURE WITH REDUCED EJECTION FRACTION) (MULTI): ICD-10-CM

## 2024-07-18 DIAGNOSIS — I50.9 ACUTE DECOMPENSATED HEART FAILURE (MULTI): Primary | ICD-10-CM

## 2024-07-18 DIAGNOSIS — Z79.4 CONTROLLED TYPE 2 DIABETES MELLITUS WITH STAGE 3 CHRONIC KIDNEY DISEASE, WITH LONG-TERM CURRENT USE OF INSULIN (MULTI): ICD-10-CM

## 2024-07-18 DIAGNOSIS — R09.89 OTHER SPECIFIED SYMPTOMS AND SIGNS INVOLVING THE CIRCULATORY AND RESPIRATORY SYSTEMS: ICD-10-CM

## 2024-07-18 DIAGNOSIS — I10 PRIMARY HYPERTENSION: ICD-10-CM

## 2024-07-18 DIAGNOSIS — Z01.810 ENCOUNTER FOR PREPROCEDURAL CARDIOVASCULAR EXAMINATION: ICD-10-CM

## 2024-07-18 DIAGNOSIS — E11.22 CONTROLLED TYPE 2 DIABETES MELLITUS WITH STAGE 3 CHRONIC KIDNEY DISEASE, WITH LONG-TERM CURRENT USE OF INSULIN (MULTI): ICD-10-CM

## 2024-07-18 DIAGNOSIS — R57.0 CARDIOGENIC SHOCK (MULTI): ICD-10-CM

## 2024-07-18 DIAGNOSIS — Z01.818 ENCOUNTER FOR OTHER PREPROCEDURAL EXAMINATION: ICD-10-CM

## 2024-07-18 DIAGNOSIS — Z79.4 TYPE 2 DIABETES MELLITUS WITH DIABETIC NEUROPATHY, WITH LONG-TERM CURRENT USE OF INSULIN (MULTI): ICD-10-CM

## 2024-07-18 LAB
ALBUMIN SERPL BCP-MCNC: 3.8 G/DL (ref 3.4–5)
ALP SERPL-CCNC: 57 U/L (ref 33–120)
ALT SERPL W P-5'-P-CCNC: 17 U/L (ref 10–52)
AMPHETAMINES UR QL SCN: NORMAL
ANION GAP BLDMV CALCULATED.4IONS-SCNC: 10 MMO/L (ref 10–25)
ANION GAP SERPL CALC-SCNC: 14 MMOL/L (ref 10–20)
APTT PPP: 34 SECONDS (ref 27–38)
AST SERPL W P-5'-P-CCNC: 19 U/L (ref 9–39)
BARBITURATES UR QL SCN: NORMAL
BASE EXCESS BLDMV CALC-SCNC: 2 MMOL/L (ref -2–3)
BASE EXCESS BLDMV CALC-SCNC: 2 MMOL/L (ref -2–3)
BASOPHILS # BLD AUTO: 0.02 X10*3/UL (ref 0–0.1)
BASOPHILS NFR BLD AUTO: 0.3 %
BENZODIAZ UR QL SCN: NORMAL
BILIRUB SERPL-MCNC: 0.5 MG/DL (ref 0–1.2)
BNP SERPL-MCNC: 127 PG/ML (ref 0–99)
BODY TEMPERATURE: 37 DEGREES CELSIUS
BODY TEMPERATURE: 37 DEGREES CELSIUS
BUN SERPL-MCNC: 20 MG/DL (ref 6–23)
BZE UR QL SCN: NORMAL
CA-I BLDMV-SCNC: 1.17 MMOL/L (ref 1.1–1.33)
CALCIUM SERPL-MCNC: 8.9 MG/DL (ref 8.6–10.6)
CANNABINOIDS UR QL SCN: NORMAL
CARDIAC TROPONIN I PNL SERPL HS: 59 NG/L (ref 0–53)
CHLORIDE BLD-SCNC: 99 MMOL/L (ref 98–107)
CHLORIDE SERPL-SCNC: 100 MMOL/L (ref 98–107)
CO2 SERPL-SCNC: 25 MMOL/L (ref 21–32)
CREAT SERPL-MCNC: 1.16 MG/DL (ref 0.5–1.3)
EGFRCR SERPLBLD CKD-EPI 2021: 83 ML/MIN/1.73M*2
EOSINOPHIL # BLD AUTO: 0.1 X10*3/UL (ref 0–0.7)
EOSINOPHIL NFR BLD AUTO: 1.6 %
ERYTHROCYTE [DISTWIDTH] IN BLOOD BY AUTOMATED COUNT: 12.6 % (ref 11.5–14.5)
EST. AVERAGE GLUCOSE BLD GHB EST-MCNC: 335 MG/DL
FENTANYL+NORFENTANYL UR QL SCN: NORMAL
FERRITIN SERPL-MCNC: 234 NG/ML (ref 20–300)
FOLATE SERPL-MCNC: 11.4 NG/ML
GLUCOSE BLD MANUAL STRIP-MCNC: 190 MG/DL (ref 74–99)
GLUCOSE BLD MANUAL STRIP-MCNC: 210 MG/DL (ref 74–99)
GLUCOSE BLD MANUAL STRIP-MCNC: 213 MG/DL (ref 74–99)
GLUCOSE BLD MANUAL STRIP-MCNC: 260 MG/DL (ref 74–99)
GLUCOSE BLD-MCNC: 203 MG/DL (ref 74–99)
GLUCOSE SERPL-MCNC: 206 MG/DL (ref 74–99)
HBA1C MFR BLD: 13.3 %
HCO3 BLDMV-SCNC: 27.8 MMOL/L (ref 22–26)
HCO3 BLDMV-SCNC: 27.8 MMOL/L (ref 22–26)
HCT VFR BLD AUTO: 42.3 % (ref 41–52)
HCT VFR BLD EST: 41 % (ref 41–52)
HGB BLD-MCNC: 13.6 G/DL (ref 13.5–17.5)
HGB BLDMV-MCNC: 13.8 G/DL (ref 13.5–17.5)
IMM GRANULOCYTES # BLD AUTO: 0.02 X10*3/UL (ref 0–0.7)
IMM GRANULOCYTES NFR BLD AUTO: 0.3 % (ref 0–0.9)
INHALED O2 CONCENTRATION: 0 %
INHALED O2 CONCENTRATION: 0 %
INR PPP: 1.1 (ref 0.9–1.1)
IRON SATN MFR SERPL: 28 % (ref 25–45)
IRON SERPL-MCNC: 96 UG/DL (ref 35–150)
LACTATE BLDMV-SCNC: 0.7 MMOL/L (ref 0.4–2)
LACTATE SERPL-SCNC: 0.9 MMOL/L (ref 0.4–2)
LYMPHOCYTES # BLD AUTO: 1.44 X10*3/UL (ref 1.2–4.8)
LYMPHOCYTES NFR BLD AUTO: 23.4 %
MAGNESIUM SERPL-MCNC: 1.9 MG/DL (ref 1.6–2.4)
MCH RBC QN AUTO: 31.4 PG (ref 26–34)
MCHC RBC AUTO-ENTMCNC: 32.2 G/DL (ref 32–36)
MCV RBC AUTO: 98 FL (ref 80–100)
METHADONE UR QL SCN: NORMAL
MONOCYTES # BLD AUTO: 0.59 X10*3/UL (ref 0.1–1)
MONOCYTES NFR BLD AUTO: 9.6 %
NEUTROPHILS # BLD AUTO: 3.98 X10*3/UL (ref 1.2–7.7)
NEUTROPHILS NFR BLD AUTO: 64.8 %
NRBC BLD-RTO: 0 /100 WBCS (ref 0–0)
OPIATES UR QL SCN: NORMAL
OXYCODONE+OXYMORPHONE UR QL SCN: NORMAL
OXYHGB MFR BLDMV: 57.5 % (ref 45–75)
OXYHGB MFR BLDMV: 57.5 % (ref 45–75)
PCO2 BLDMV: 47 MM HG (ref 41–51)
PCO2 BLDMV: 47 MM HG (ref 41–51)
PCP UR QL SCN: NORMAL
PH BLDMV: 7.38 PH (ref 7.33–7.43)
PH BLDMV: 7.38 PH (ref 7.33–7.43)
PLATELET # BLD AUTO: 241 X10*3/UL (ref 150–450)
PO2 BLDMV: 33 MM HG (ref 35–45)
PO2 BLDMV: 33 MM HG (ref 35–45)
POTASSIUM BLDMV-SCNC: 4.2 MMOL/L (ref 3.5–5.3)
POTASSIUM SERPL-SCNC: 4.3 MMOL/L (ref 3.5–5.3)
PROT SERPL-MCNC: 6.5 G/DL (ref 6.4–8.2)
PROTHROMBIN TIME: 12.4 SECONDS (ref 9.8–12.8)
RBC # BLD AUTO: 4.33 X10*6/UL (ref 4.5–5.9)
SAO2 % BLDMV: 59 % (ref 45–75)
SAO2 % BLDMV: 59 % (ref 45–75)
SODIUM BLDMV-SCNC: 133 MMOL/L (ref 136–145)
SODIUM SERPL-SCNC: 135 MMOL/L (ref 136–145)
TIBC SERPL-MCNC: 340 UG/DL (ref 240–445)
TSH SERPL-ACNC: 2.29 MIU/L (ref 0.44–3.98)
UFH PPP CHRO-ACNC: 0.2 IU/ML
UIBC SERPL-MCNC: 244 UG/DL (ref 110–370)
WBC # BLD AUTO: 6.2 X10*3/UL (ref 4.4–11.3)

## 2024-07-18 PROCEDURE — 85520 HEPARIN ASSAY: CPT | Performed by: STUDENT IN AN ORGANIZED HEALTH CARE EDUCATION/TRAINING PROGRAM

## 2024-07-18 PROCEDURE — 2720000007 HC OR 272 NO HCPCS: Performed by: STUDENT IN AN ORGANIZED HEALTH CARE EDUCATION/TRAINING PROGRAM

## 2024-07-18 PROCEDURE — 93451 RIGHT HEART CATH: CPT | Performed by: STUDENT IN AN ORGANIZED HEALTH CARE EDUCATION/TRAINING PROGRAM

## 2024-07-18 PROCEDURE — 71045 X-RAY EXAM CHEST 1 VIEW: CPT

## 2024-07-18 PROCEDURE — C1894 INTRO/SHEATH, NON-LASER: HCPCS | Performed by: STUDENT IN AN ORGANIZED HEALTH CARE EDUCATION/TRAINING PROGRAM

## 2024-07-18 PROCEDURE — 4A1239Z MONITORING OF CARDIAC OUTPUT, PERCUTANEOUS APPROACH: ICD-10-PCS | Performed by: STUDENT IN AN ORGANIZED HEALTH CARE EDUCATION/TRAINING PROGRAM

## 2024-07-18 PROCEDURE — 85025 COMPLETE CBC W/AUTO DIFF WBC: CPT | Performed by: STUDENT IN AN ORGANIZED HEALTH CARE EDUCATION/TRAINING PROGRAM

## 2024-07-18 PROCEDURE — 99152 MOD SED SAME PHYS/QHP 5/>YRS: CPT | Performed by: STUDENT IN AN ORGANIZED HEALTH CARE EDUCATION/TRAINING PROGRAM

## 2024-07-18 PROCEDURE — 85610 PROTHROMBIN TIME: CPT | Performed by: STUDENT IN AN ORGANIZED HEALTH CARE EDUCATION/TRAINING PROGRAM

## 2024-07-18 PROCEDURE — C1751 CATH, INF, PER/CENT/MIDLINE: HCPCS | Performed by: STUDENT IN AN ORGANIZED HEALTH CARE EDUCATION/TRAINING PROGRAM

## 2024-07-18 PROCEDURE — 82947 ASSAY GLUCOSE BLOOD QUANT: CPT

## 2024-07-18 PROCEDURE — 83605 ASSAY OF LACTIC ACID: CPT | Performed by: STUDENT IN AN ORGANIZED HEALTH CARE EDUCATION/TRAINING PROGRAM

## 2024-07-18 PROCEDURE — 93005 ELECTROCARDIOGRAM TRACING: CPT

## 2024-07-18 PROCEDURE — 82805 BLOOD GASES W/O2 SATURATION: CPT | Performed by: STUDENT IN AN ORGANIZED HEALTH CARE EDUCATION/TRAINING PROGRAM

## 2024-07-18 PROCEDURE — 99153 MOD SED SAME PHYS/QHP EA: CPT | Performed by: STUDENT IN AN ORGANIZED HEALTH CARE EDUCATION/TRAINING PROGRAM

## 2024-07-18 PROCEDURE — 80307 DRUG TEST PRSMV CHEM ANLYZR: CPT | Performed by: STUDENT IN AN ORGANIZED HEALTH CARE EDUCATION/TRAINING PROGRAM

## 2024-07-18 PROCEDURE — 37799 UNLISTED PX VASCULAR SURGERY: CPT | Performed by: STUDENT IN AN ORGANIZED HEALTH CARE EDUCATION/TRAINING PROGRAM

## 2024-07-18 PROCEDURE — 7100000009 HC PHASE TWO TIME - INITIAL BASE CHARGE: Performed by: STUDENT IN AN ORGANIZED HEALTH CARE EDUCATION/TRAINING PROGRAM

## 2024-07-18 PROCEDURE — 83036 HEMOGLOBIN GLYCOSYLATED A1C: CPT | Performed by: STUDENT IN AN ORGANIZED HEALTH CARE EDUCATION/TRAINING PROGRAM

## 2024-07-18 PROCEDURE — 84132 ASSAY OF SERUM POTASSIUM: CPT | Performed by: STUDENT IN AN ORGANIZED HEALTH CARE EDUCATION/TRAINING PROGRAM

## 2024-07-18 PROCEDURE — 2500000004 HC RX 250 GENERAL PHARMACY W/ HCPCS (ALT 636 FOR OP/ED): Performed by: STUDENT IN AN ORGANIZED HEALTH CARE EDUCATION/TRAINING PROGRAM

## 2024-07-18 PROCEDURE — 83540 ASSAY OF IRON: CPT | Performed by: STUDENT IN AN ORGANIZED HEALTH CARE EDUCATION/TRAINING PROGRAM

## 2024-07-18 PROCEDURE — 2500000005 HC RX 250 GENERAL PHARMACY W/O HCPCS: Performed by: STUDENT IN AN ORGANIZED HEALTH CARE EDUCATION/TRAINING PROGRAM

## 2024-07-18 PROCEDURE — 2500000001 HC RX 250 WO HCPCS SELF ADMINISTERED DRUGS (ALT 637 FOR MEDICARE OP): Performed by: STUDENT IN AN ORGANIZED HEALTH CARE EDUCATION/TRAINING PROGRAM

## 2024-07-18 PROCEDURE — 83735 ASSAY OF MAGNESIUM: CPT | Performed by: STUDENT IN AN ORGANIZED HEALTH CARE EDUCATION/TRAINING PROGRAM

## 2024-07-18 PROCEDURE — 93010 ELECTROCARDIOGRAM REPORT: CPT | Performed by: INTERNAL MEDICINE

## 2024-07-18 PROCEDURE — 82728 ASSAY OF FERRITIN: CPT | Performed by: STUDENT IN AN ORGANIZED HEALTH CARE EDUCATION/TRAINING PROGRAM

## 2024-07-18 PROCEDURE — 2020000001 HC ICU ROOM DAILY

## 2024-07-18 PROCEDURE — 84484 ASSAY OF TROPONIN QUANT: CPT | Performed by: STUDENT IN AN ORGANIZED HEALTH CARE EDUCATION/TRAINING PROGRAM

## 2024-07-18 PROCEDURE — 71045 X-RAY EXAM CHEST 1 VIEW: CPT | Performed by: RADIOLOGY

## 2024-07-18 PROCEDURE — 7100000010 HC PHASE TWO TIME - EACH INCREMENTAL 1 MINUTE: Performed by: STUDENT IN AN ORGANIZED HEALTH CARE EDUCATION/TRAINING PROGRAM

## 2024-07-18 PROCEDURE — 2500000001 HC RX 250 WO HCPCS SELF ADMINISTERED DRUGS (ALT 637 FOR MEDICARE OP): Performed by: NURSE PRACTITIONER

## 2024-07-18 PROCEDURE — 84443 ASSAY THYROID STIM HORMONE: CPT | Performed by: STUDENT IN AN ORGANIZED HEALTH CARE EDUCATION/TRAINING PROGRAM

## 2024-07-18 PROCEDURE — 82746 ASSAY OF FOLIC ACID SERUM: CPT | Performed by: STUDENT IN AN ORGANIZED HEALTH CARE EDUCATION/TRAINING PROGRAM

## 2024-07-18 PROCEDURE — 2500000002 HC RX 250 W HCPCS SELF ADMINISTERED DRUGS (ALT 637 FOR MEDICARE OP, ALT 636 FOR OP/ED): Performed by: STUDENT IN AN ORGANIZED HEALTH CARE EDUCATION/TRAINING PROGRAM

## 2024-07-18 PROCEDURE — 80053 COMPREHEN METABOLIC PANEL: CPT | Performed by: STUDENT IN AN ORGANIZED HEALTH CARE EDUCATION/TRAINING PROGRAM

## 2024-07-18 PROCEDURE — 99222 1ST HOSP IP/OBS MODERATE 55: CPT | Performed by: NURSE PRACTITIONER

## 2024-07-18 PROCEDURE — 83880 ASSAY OF NATRIURETIC PEPTIDE: CPT | Performed by: STUDENT IN AN ORGANIZED HEALTH CARE EDUCATION/TRAINING PROGRAM

## 2024-07-18 RX ORDER — DEXTROSE 50 % IN WATER (D50W) INTRAVENOUS SYRINGE
12.5
Status: DISCONTINUED | OUTPATIENT
Start: 2024-07-18 | End: 2024-07-30 | Stop reason: HOSPADM

## 2024-07-18 RX ORDER — FENTANYL CITRATE 50 UG/ML
INJECTION, SOLUTION INTRAMUSCULAR; INTRAVENOUS AS NEEDED
Status: DISCONTINUED | OUTPATIENT
Start: 2024-07-18 | End: 2024-07-18 | Stop reason: HOSPADM

## 2024-07-18 RX ORDER — MIDAZOLAM HYDROCHLORIDE 1 MG/ML
INJECTION, SOLUTION INTRAMUSCULAR; INTRAVENOUS AS NEEDED
Status: DISCONTINUED | OUTPATIENT
Start: 2024-07-18 | End: 2024-07-18 | Stop reason: HOSPADM

## 2024-07-18 RX ORDER — POLYETHYLENE GLYCOL 3350 17 G/17G
17 POWDER, FOR SOLUTION ORAL DAILY
Status: DISCONTINUED | OUTPATIENT
Start: 2024-07-18 | End: 2024-07-30 | Stop reason: HOSPADM

## 2024-07-18 RX ORDER — SPIRONOLACTONE 25 MG/1
12.5 TABLET ORAL DAILY
Status: DISCONTINUED | OUTPATIENT
Start: 2024-07-18 | End: 2024-07-22

## 2024-07-18 RX ORDER — ATORVASTATIN CALCIUM 40 MG/1
40 TABLET, FILM COATED ORAL NIGHTLY
Status: DISCONTINUED | OUTPATIENT
Start: 2024-07-18 | End: 2024-07-30 | Stop reason: HOSPADM

## 2024-07-18 RX ORDER — LIDOCAINE HYDROCHLORIDE 20 MG/ML
INJECTION, SOLUTION INFILTRATION; PERINEURAL AS NEEDED
Status: DISCONTINUED | OUTPATIENT
Start: 2024-07-18 | End: 2024-07-18 | Stop reason: HOSPADM

## 2024-07-18 RX ORDER — ACETAMINOPHEN 650 MG/1
650 SUPPOSITORY RECTAL EVERY 6 HOURS PRN
Status: DISCONTINUED | OUTPATIENT
Start: 2024-07-18 | End: 2024-07-18 | Stop reason: HOSPADM

## 2024-07-18 RX ORDER — SODIUM CHLORIDE 9 MG/ML
50 INJECTION, SOLUTION INTRAVENOUS CONTINUOUS
Status: DISCONTINUED | OUTPATIENT
Start: 2024-07-18 | End: 2024-07-18

## 2024-07-18 RX ORDER — ACETAMINOPHEN 325 MG/1
650 TABLET ORAL EVERY 6 HOURS PRN
Status: DISCONTINUED | OUTPATIENT
Start: 2024-07-18 | End: 2024-07-18 | Stop reason: HOSPADM

## 2024-07-18 RX ORDER — DEXTROSE 50 % IN WATER (D50W) INTRAVENOUS SYRINGE
12.5
Status: DISCONTINUED | OUTPATIENT
Start: 2024-07-18 | End: 2024-07-18 | Stop reason: HOSPADM

## 2024-07-18 RX ORDER — TRAMADOL HYDROCHLORIDE 50 MG/1
50 TABLET ORAL ONCE
Status: COMPLETED | OUTPATIENT
Start: 2024-07-18 | End: 2024-07-18

## 2024-07-18 RX ORDER — INSULIN LISPRO 100 [IU]/ML
0-20 INJECTION, SOLUTION INTRAVENOUS; SUBCUTANEOUS
Status: DISCONTINUED | OUTPATIENT
Start: 2024-07-18 | End: 2024-07-22

## 2024-07-18 RX ORDER — DAPAGLIFLOZIN 10 MG/1
10 TABLET, FILM COATED ORAL DAILY
Status: DISCONTINUED | OUTPATIENT
Start: 2024-07-18 | End: 2024-07-30 | Stop reason: HOSPADM

## 2024-07-18 RX ORDER — SODIUM CHLORIDE, SODIUM LACTATE, POTASSIUM CHLORIDE, CALCIUM CHLORIDE 600; 310; 30; 20 MG/100ML; MG/100ML; MG/100ML; MG/100ML
10 INJECTION, SOLUTION INTRAVENOUS CONTINUOUS
Status: DISCONTINUED | OUTPATIENT
Start: 2024-07-18 | End: 2024-07-28

## 2024-07-18 RX ORDER — DEXTROSE 50 % IN WATER (D50W) INTRAVENOUS SYRINGE
25
Status: DISCONTINUED | OUTPATIENT
Start: 2024-07-18 | End: 2024-07-30 | Stop reason: HOSPADM

## 2024-07-18 RX ORDER — TRAZODONE HYDROCHLORIDE 50 MG/1
50 TABLET ORAL NIGHTLY PRN
Status: DISCONTINUED | OUTPATIENT
Start: 2024-07-18 | End: 2024-07-30 | Stop reason: HOSPADM

## 2024-07-18 RX ORDER — DEXTROSE 50 % IN WATER (D50W) INTRAVENOUS SYRINGE
25
Status: DISCONTINUED | OUTPATIENT
Start: 2024-07-18 | End: 2024-07-18 | Stop reason: HOSPADM

## 2024-07-18 RX ORDER — INSULIN LISPRO 100 [IU]/ML
16 INJECTION, SOLUTION INTRAVENOUS; SUBCUTANEOUS
Status: DISCONTINUED | OUTPATIENT
Start: 2024-07-18 | End: 2024-07-19

## 2024-07-18 RX ORDER — LIDOCAINE 560 MG/1
1 PATCH PERCUTANEOUS; TOPICAL; TRANSDERMAL DAILY
Status: DISCONTINUED | OUTPATIENT
Start: 2024-07-18 | End: 2024-07-20

## 2024-07-18 RX ORDER — FUROSEMIDE 10 MG/ML
40 INJECTION INTRAMUSCULAR; INTRAVENOUS EVERY 12 HOURS
Status: DISCONTINUED | OUTPATIENT
Start: 2024-07-18 | End: 2024-07-20

## 2024-07-18 RX ORDER — ACETAMINOPHEN 325 MG/1
650 TABLET ORAL EVERY 6 HOURS PRN
Status: DISCONTINUED | OUTPATIENT
Start: 2024-07-18 | End: 2024-07-30 | Stop reason: HOSPADM

## 2024-07-18 RX ORDER — HEPARIN SODIUM 10000 [USP'U]/100ML
0-4000 INJECTION, SOLUTION INTRAVENOUS CONTINUOUS
Status: DISCONTINUED | OUTPATIENT
Start: 2024-07-18 | End: 2024-07-28

## 2024-07-18 RX ORDER — INSULIN GLARGINE 100 [IU]/ML
55 INJECTION, SOLUTION SUBCUTANEOUS NIGHTLY
Status: DISCONTINUED | OUTPATIENT
Start: 2024-07-18 | End: 2024-07-30 | Stop reason: HOSPADM

## 2024-07-18 RX ORDER — SERTRALINE HYDROCHLORIDE 50 MG/1
100 TABLET, FILM COATED ORAL DAILY
Status: DISCONTINUED | OUTPATIENT
Start: 2024-07-18 | End: 2024-07-30 | Stop reason: HOSPADM

## 2024-07-18 RX ORDER — ACETAMINOPHEN 160 MG/5ML
650 SOLUTION ORAL EVERY 6 HOURS PRN
Status: DISCONTINUED | OUTPATIENT
Start: 2024-07-18 | End: 2024-07-18 | Stop reason: HOSPADM

## 2024-07-18 RX ORDER — SULFAMETHOXAZOLE AND TRIMETHOPRIM 800; 160 MG/1; MG/1
1 TABLET ORAL EVERY 12 HOURS SCHEDULED
Status: COMPLETED | OUTPATIENT
Start: 2024-07-18 | End: 2024-07-23

## 2024-07-18 RX ORDER — INSULIN GLARGINE 100 [IU]/ML
55 INJECTION, SOLUTION SUBCUTANEOUS EVERY 24 HOURS
Status: DISCONTINUED | OUTPATIENT
Start: 2024-07-18 | End: 2024-07-18

## 2024-07-18 SDOH — SOCIAL STABILITY: SOCIAL INSECURITY: ARE THERE ANY APPARENT SIGNS OF INJURIES/BEHAVIORS THAT COULD BE RELATED TO ABUSE/NEGLECT?: NO

## 2024-07-18 SDOH — SOCIAL STABILITY: SOCIAL INSECURITY: DO YOU FEEL ANYONE HAS EXPLOITED OR TAKEN ADVANTAGE OF YOU FINANCIALLY OR OF YOUR PERSONAL PROPERTY?: NO

## 2024-07-18 SDOH — SOCIAL STABILITY: SOCIAL INSECURITY: HAVE YOU HAD THOUGHTS OF HARMING ANYONE ELSE?: NO

## 2024-07-18 SDOH — SOCIAL STABILITY: SOCIAL INSECURITY: ARE YOU OR HAVE YOU BEEN THREATENED OR ABUSED PHYSICALLY, EMOTIONALLY, OR SEXUALLY BY ANYONE?: NO

## 2024-07-18 SDOH — SOCIAL STABILITY: SOCIAL INSECURITY: WERE YOU ABLE TO COMPLETE ALL THE BEHAVIORAL HEALTH SCREENINGS?: YES

## 2024-07-18 SDOH — SOCIAL STABILITY: SOCIAL INSECURITY: ABUSE: ADULT

## 2024-07-18 SDOH — SOCIAL STABILITY: SOCIAL INSECURITY: DO YOU FEEL UNSAFE GOING BACK TO THE PLACE WHERE YOU ARE LIVING?: NO

## 2024-07-18 SDOH — SOCIAL STABILITY: SOCIAL INSECURITY: HAS ANYONE EVER THREATENED TO HURT YOUR FAMILY OR YOUR PETS?: NO

## 2024-07-18 SDOH — SOCIAL STABILITY: SOCIAL INSECURITY: DOES ANYONE TRY TO KEEP YOU FROM HAVING/CONTACTING OTHER FRIENDS OR DOING THINGS OUTSIDE YOUR HOME?: NO

## 2024-07-18 ASSESSMENT — COLUMBIA-SUICIDE SEVERITY RATING SCALE - C-SSRS
6. HAVE YOU EVER DONE ANYTHING, STARTED TO DO ANYTHING, OR PREPARED TO DO ANYTHING TO END YOUR LIFE?: YES
6. HAVE YOU EVER DONE ANYTHING, STARTED TO DO ANYTHING, OR PREPARED TO DO ANYTHING TO END YOUR LIFE?: NO
1. IN THE PAST MONTH, HAVE YOU WISHED YOU WERE DEAD OR WISHED YOU COULD GO TO SLEEP AND NOT WAKE UP?: NO
6. HAVE YOU EVER DONE ANYTHING, STARTED TO DO ANYTHING, OR PREPARED TO DO ANYTHING TO END YOUR LIFE?: NO
2. HAVE YOU ACTUALLY HAD ANY THOUGHTS OF KILLING YOURSELF?: NO
1. IN THE PAST MONTH, HAVE YOU WISHED YOU WERE DEAD OR WISHED YOU COULD GO TO SLEEP AND NOT WAKE UP?: NO
2. HAVE YOU ACTUALLY HAD ANY THOUGHTS OF KILLING YOURSELF?: NO

## 2024-07-18 ASSESSMENT — COGNITIVE AND FUNCTIONAL STATUS - GENERAL
PERSONAL GROOMING: A LITTLE
DRESSING REGULAR LOWER BODY CLOTHING: A LITTLE
TOILETING: A LITTLE
WALKING IN HOSPITAL ROOM: A LITTLE
PATIENT BASELINE BEDBOUND: NO
STANDING UP FROM CHAIR USING ARMS: A LITTLE
HELP NEEDED FOR BATHING: A LITTLE
CLIMB 3 TO 5 STEPS WITH RAILING: A LITTLE
DAILY ACTIVITIY SCORE: 18
EATING MEALS: A LITTLE
DRESSING REGULAR UPPER BODY CLOTHING: A LITTLE
MOBILITY SCORE: 20
MOVING TO AND FROM BED TO CHAIR: A LITTLE

## 2024-07-18 ASSESSMENT — PAIN - FUNCTIONAL ASSESSMENT
PAIN_FUNCTIONAL_ASSESSMENT: 0-10

## 2024-07-18 ASSESSMENT — PAIN DESCRIPTION - DESCRIPTORS
DESCRIPTORS: HEAVINESS;PRESSURE;SHARP
DESCRIPTORS: HEAVINESS;PRESSURE;SHARP
DESCRIPTORS: PRESSURE;SHARP
DESCRIPTORS: HEAVINESS;PRESSURE;SHARP
DESCRIPTORS: DISCOMFORT
DESCRIPTORS: DISCOMFORT
DESCRIPTORS: HEAVINESS;PRESSURE;SHARP

## 2024-07-18 ASSESSMENT — PAIN DESCRIPTION - LOCATION: LOCATION: NECK

## 2024-07-18 ASSESSMENT — LIFESTYLE VARIABLES
AUDIT-C TOTAL SCORE: 6
HOW OFTEN DO YOU HAVE A DRINK CONTAINING ALCOHOL: 2-4 TIMES A MONTH
AUDIT-C TOTAL SCORE: 6
HOW OFTEN DO YOU HAVE 6 OR MORE DRINKS ON ONE OCCASION: MONTHLY
HOW MANY STANDARD DRINKS CONTAINING ALCOHOL DO YOU HAVE ON A TYPICAL DAY: 5 OR 6
SKIP TO QUESTIONS 9-10: 0

## 2024-07-18 ASSESSMENT — PAIN SCALES - GENERAL
PAINLEVEL_OUTOF10: 8
PAINLEVEL_OUTOF10: 7
PAINLEVEL_OUTOF10: 8
PAINLEVEL_OUTOF10: 8
PAINLEVEL_OUTOF10: 4
PAINLEVEL_OUTOF10: 6
PAINLEVEL_OUTOF10: 0 - NO PAIN
PAINLEVEL_OUTOF10: 6
PAINLEVEL_OUTOF10: 4
PAINLEVEL_OUTOF10: 6

## 2024-07-18 ASSESSMENT — ENCOUNTER SYMPTOMS
EYES NEGATIVE: 1
NEUROLOGICAL NEGATIVE: 1
FATIGUE: 1
PALPITATIONS: 0
GASTROINTESTINAL NEGATIVE: 1
CARDIOVASCULAR NEGATIVE: 1
GASTROINTESTINAL NEGATIVE: 1
EYES NEGATIVE: 1
ENDOCRINE NEGATIVE: 1
WHEEZING: 0
COUGH: 1
FATIGUE: 1
PSYCHIATRIC NEGATIVE: 1
ENDOCRINE NEGATIVE: 1
LIGHT-HEADEDNESS: 1
HEMATOLOGIC/LYMPHATIC NEGATIVE: 1
MUSCULOSKELETAL NEGATIVE: 1
CHEST TIGHTNESS: 0
HEMATOLOGIC/LYMPHATIC NEGATIVE: 1
SHORTNESS OF BREATH: 1
SHORTNESS OF BREATH: 1
PSYCHIATRIC NEGATIVE: 1
ALLERGIC/IMMUNOLOGIC NEGATIVE: 1
MUSCULOSKELETAL NEGATIVE: 1

## 2024-07-18 ASSESSMENT — ACTIVITIES OF DAILY LIVING (ADL)
GROOMING: NEEDS ASSISTANCE
HEARING - LEFT EAR: FUNCTIONAL
DRESSING YOURSELF: NEEDS ASSISTANCE
BATHING: NEEDS ASSISTANCE
WALKS IN HOME: INDEPENDENT
TOILETING: NEEDS ASSISTANCE
ADEQUATE_TO_COMPLETE_ADL: YES
PATIENT'S MEMORY ADEQUATE TO SAFELY COMPLETE DAILY ACTIVITIES?: YES
FEEDING YOURSELF: INDEPENDENT
HEARING - RIGHT EAR: FUNCTIONAL
JUDGMENT_ADEQUATE_SAFELY_COMPLETE_DAILY_ACTIVITIES: YES

## 2024-07-18 ASSESSMENT — PATIENT HEALTH QUESTIONNAIRE - PHQ9
SUM OF ALL RESPONSES TO PHQ9 QUESTIONS 1 & 2: 2
1. LITTLE INTEREST OR PLEASURE IN DOING THINGS: SEVERAL DAYS
2. FEELING DOWN, DEPRESSED OR HOPELESS: SEVERAL DAYS

## 2024-07-18 NOTE — H&P
History Of Present Illness  Arthur Carranza is a 38 y.o. male presenting with NICM, HFrEF; here for RHC with Dr. Jurado. PMHx significant for NICM/HFrEF (EF 5-10%), initially diagnosed 10/2022. He has a history of HTN, HLD, hypertriglyceridemia, Type II DM (poorly controlled A1c 12.9%, on insulin), severe obesity (BMI 44), cardioembolic stroke (12/2022) with limited residual symptoms, tobacco abuse, prior cocaine use, intermittent alcohol use, and daughter with cardiomyopathy       Last dose of Xarelto yesterday AM.   Past Medical History:  Past Medical History:   Diagnosis Date    CHF (congestive heart failure) (Multi)     Diabetes mellitus (Multi)     Heart disease     Hypertension     Substance abuse (Multi)         Past Surgical History:  Past Surgical History:   Procedure Laterality Date    MR HEAD ANGIO WO IV CONTRAST  12/29/2022    MR HEAD ANGIO WO IV CONTRAST 12/29/2022 DOCTOR OFFICE LEGACY    OTHER SURGICAL HISTORY  07/21/2022    Cyst excision    OTHER SURGICAL HISTORY  10/25/2022    Cardiac catheterization          Social History:   reports that he has been smoking cigarettes. He does not have any smokeless tobacco history on file. He reports current alcohol use. He reports that he does not currently use drugs after having used the following drugs: Codeine.     Family History:  Family History   Adopted: Yes        Allergies:  Allergies   Allergen Reactions    Shellfish Containing Products Unknown        Home Medications:  Current Outpatient Medications   Medication Instructions    atorvastatin (LIPITOR) 40 mg, oral, Nightly    blood-glucose meter,continuous (FreeStyle Nadir 3 Gonzales) misc Use as instructed to test blood glucose throughout the day    blood-glucose sensor (FreeStyle Nadir 3 Sensor) device Use as directed to test blood glucose throughout the day    buPROPion XL (WELLBUTRIN XL) 150 mg, oral, Daily    carvedilol (COREG) 6.25 mg, oral, 2 times daily (morning and late afternoon)     "dapagliflozin propanediol (FARXIGA) 10 mg, oral, Daily    insulin lispro (HumaLOG) 100 unit/mL injection Inject 16 Units under the skin 3 times a day with meals. Take as directed per insulin instructions.    Lantus Solostar U-100 Insulin 55 Units, subcutaneous, Nightly, Take as directed per insulin instructions.    metFORMIN (Glucophage) 1,000 mg tablet 1 tablet, oral, 2 times daily    pen needle, diabetic 31 gauge x 5/16\" needle Use to inject 1-4 times daily as directed.    rivaroxaban (XARELTO) 20 mg, oral, Daily with evening meal, Take with food.    sacubitriL-valsartan (Entresto) 49-51 mg tablet 1 tablet, oral, 2 times daily    sertraline (ZOLOFT) 100 mg, oral, Daily    spironolactone (ALDACTONE) 12.5 mg, oral, Daily    sulfamethoxazole-trimethoprim (Bactrim DS) 800-160 mg tablet 1 tablet, oral, Every 12 hours    torsemide (DEMADEX) 20 mg, oral, Daily    traZODone (DESYREL) 50 mg, oral, Nightly    True Metrix Glucose Test Strip strip USE TO CHECK BLOOD SUGAR FOUR TIMES DAILY    [START ON 7/21/2024] Trulicity 1.5 mg, subcutaneous, Once Weekly    Ultra Thin Lancets 30 gauge misc TEST BLOOD SUGAR FOUR TIMES DAILY AS DIRECTED       Inpatient Medications:  Scheduled medications   Medication Dose Route Frequency     PRN medications   Medication    dextrose    dextrose    fentaNYL PF    glucagon    glucagon    lidocaine    midazolam     Continuous Medications   Medication Dose Last Rate    sodium chloride 0.9%  50 mL/hr           Review of Systems   Constitutional:  Positive for fatigue.   HENT: Negative.     Eyes: Negative.    Respiratory:  Positive for shortness of breath (orthpnea, KEY).    Cardiovascular: Negative.    Gastrointestinal: Negative.    Endocrine: Negative.    Genitourinary: Negative.    Musculoskeletal: Negative.    Skin: Negative.    Allergic/Immunologic: Negative.    Neurological:  Positive for light-headedness.   Hematological: Negative.    Psychiatric/Behavioral: Negative.            Physical " "Exam    General:  Patient is awake, alert, and oriented.  Patient is in no acute distress.  HEENT:  Pupils equal and reactive.  Normocephalic.  Moist mucosa.    Neck:  +JVD  Cardiovascular:  Regular rate and rhythm.  Distant S1 and S2. No murmurs/rubs/gallops. Radial pulses 2+.   Pulmonary:  Clear to auscultation bilaterally.  Abdomen:  Soft. Non-tender.   Non-distended.  Positive bowel sounds.  Lower Extremities:  Pedal pulses 2+ No LE edema.  Neurologic:  Cranial nerves II-XII grossly intact.   No focal deficit.   Skin: Skin warm and dry, no lesions. Normal skin turgor.   Psychiatric: Normal affect.     Sedation Plan    ASA 3     Mallampati class: III.         Last Recorded Vitals  Blood pressure 118/74, pulse 93, temperature 35.5 °C (95.9 °F), temperature source Tympanic, resp. rate 16, height 1.753 m (5' 9.02\"), weight 137 kg (301 lb 9.4 oz), SpO2 98%.         Vitals from the Past 24 Hours  Heart Rate:  [93]   Temp:  [35.5 °C (95.9 °F)]   Resp:  [16]   BP: (118)/(74)   Height:  [175.3 cm (5' 9.02\")]   Weight:  [137 kg (301 lb 9.4 oz)]   SpO2:  [98 %]          Relevant Results    Labs    CBC:   Recent Labs     07/13/24  1242 07/02/24  0910 05/21/24  0938 02/23/24  0340 02/22/24  0532 02/21/24  0958   WBC 6.3 4.5 4.7 5.0 5.6 6.7   HGB 12.3* 14.8 14.8 13.6 14.4 16.1   HCT 37.1* 43.4 45.6 39.6* 42.5 46.1    242 242 221 228 269   MCV 96 94 99 91 92 91     BMP/CMP:   Recent Labs     07/13/24  1242 07/02/24  0910 05/21/24  0938 02/23/24  0340 02/22/24  0533 02/21/24  1411 02/21/24  0958 02/21/24  0134 02/20/24  0528 02/20/24  0126 02/01/24  0544 02/01/24  0000 01/31/24 2014   * 131* 133* 129* 129* 126*   < > 133* 130*   < > 130*   < > 122*   K 3.8 4.5 4.9 3.5 4.1 4.6   < > 3.6 4.4   < > 4.1   < > 4.5    91* 99 92* 90* 91*   < > 92* 90*   < > 96*   < > 88*   BUN 15 16 24* 29* 30* 29*   < > 27* 26*   < > 14   < > 16   CREATININE 1.01 1.39* 1.43* 1.32* 1.60* 1.56*   < > 1.78* 1.54*   < > 1.05   < > " "1.30   CO2 23 29 25 30 31 28   < > 32 28   < > 26   < > 20*   CALCIUM 8.6 9.5 9.0 9.0 9.3 9.4   < > 9.6 9.3   < > 9.0   < > 8.7   PROT  --  7.5 7.1  --   --   --   --  7.8 7.9  --  7.2  --  7.7   BILITOT  --  0.8 0.6  --   --   --   --  1.1 0.9  --  0.3  --  0.3   ALKPHOS  --  66 59  --   --   --   --  74 71  --  71  --  83   ALT  --  33 21  --   --   --   --  15 15  --  12  --  13   AST  --  21 24  --   --   --   --  18 17  --  16  --  18   GLUCOSE 397* 479* 384* 348* 308* 312*   < > 92 412*   < > 372*   < > 833*    < > = values in this interval not displayed.      Lipid Panel:   Recent Labs     12/12/23  1112 04/20/23  1109 12/28/22  1937 10/21/22  0528   CHOL 125 122 162 166   HDL 26.6 31.4* 32.4* 40.0   CHHDL 4.7 3.9 5.0 4.2   VLDL 48* 26 SEE COMMENT 41*   TRIG 241* 128 401* 207*   NHDL 98  --  130 126     Cardiac       No lab exists for component: \"CK\", \"CKMBP\"   Hemoglobin A1C:   Recent Labs     05/21/24  0938 02/01/24  0544 12/12/23  1112 08/07/23  0114 04/20/23  1109 12/28/22  1937 10/21/22  0528 06/15/22  0756 12/11/21  0933 11/29/21  0442 07/30/20  0754 05/02/19  0845   HGBA1C 13.7* 15.3* 10.8* 11.8* 11.5* 12.9* 13.0* 12.2* 14.0* 14.6* 8.5 8.0     TSH/ Free T4:   Recent Labs     05/21/24  0938 03/14/23  1056 12/28/22  1937 06/15/22  0756 07/29/20  1824 05/02/19  0845   TSH 3.98 3.62 3.69 3.96 1.06 0.86   FREET4  --   --   --  0.94  --   --      Iron:   Recent Labs     07/02/24  0910 05/21/24  0938 02/20/24  0126 01/31/24 2014 12/02/23  2341 03/14/23  1056 12/28/22  1937 10/20/22  1211   FERRITIN  --  274  --   --   --  394*  --   --    TIBC  --  384  --   --   --  351  --   --    IRONSAT  --  24*  --   --   --  28  --   --    * 195* 259* 314* 375* 68 213* 329*     Coag:     ABO: No results found for: \"ABO\"    Past Cardiology Tests (Last 3 Years):    EKG:  Recent Labs     07/08/24  2007 07/03/24  0934 02/23/24  0716   ATRRATE 91 101 83   VENTRATE 91 101 83   PRINT 190 182 206   QRSDUR 142 144 136 "   QTCFRED 492 473 490   QTCCALCB 526 516 517     Encounter Date: 07/02/24   ECG 12 lead (Clinic Performed)   Result Value    Ventricular Rate 91    Atrial Rate 91    CO Interval 190    QRS Duration 142    QT Interval 428    QTC Calculation(Bazett) 526    P Axis 73    R Axis -76    T Axis 62    QRS Count 15    Q Onset 213    P Onset 118    P Offset 175    T Offset 427    QTC Fredericia 492    Narrative    Normal sinus rhythm  Possible Left atrial enlargement  Left axis deviation  Nonspecific intraventricular block  Abnormal ECG  When compared with ECG of 23-FEB-2024 07:15,  T wave inversion no longer evident in Lateral leads     Echo:  Echocardiogram:   Transthoracic Echo (TTE) Complete With Contrast 02/21/2024    [place] [id number]  Tel [phone] Fax [phone]    TRANSTHORACIC ECHOCARDIOGRAM REPORT      Patient Name:      JAZZ LEWIS         Soraida Physician:    [id number] Yon Gupta DO  Study Date:        2/21/2024             Ordering Provider:    [id number] CAROLINA BRAND  MRN/PID:           [id number]              Fellow:  Accession#:        [id number]          Nurse:                Steven Pete RN  Date of Birth/Age: 1985 / 38 years Sonographer:          Ibeth Escalona RDCS  Gender:            M                     Additional Staff:  Height:            175.26 cm             Admit Date:           2/20/2024  Weight:            136.08 kg             Admission Status:     Inpatient -  Routine  BSA / BMI:         2.45 m2 / 44.30 kg/m2 Department Location:  [address] [place] [address]  Blood Pressure: 128 /94 mmHg    Study Type:    TRANSTHORACIC ECHO (TTE) COMPLETE  Diagnosis/ICD: Heart failure, unspecified-I50.9  Indication:    Congestive Heart Failure  CPT Codes:     Echo Complete w Full Doppler-85616    Patient History:  Diabetes:          Yes  Pertinent History: HTN, Cardiomyopathy, CHF, CVA and LE Edema.    Study Detail: The following Echo studies were  performed: 2D, M-Mode, color flow  and Doppler. Definity used as a contrast agent for endocardial  border definition. The patient was awake.      PHYSICIAN INTERPRETATION:  Left Ventricle: Left ventricular systolic function is severely decreased, with an estimated ejection fraction of 10%. There is global hypokinesis of the left ventricle with minor regional variations. The left ventricular cavity size is moderately dilated. Spectral Doppler shows a pseudonormal pattern of left ventricular diastolic filling.  Left Atrium: The left atrium is mild to moderately dilated.  Right Ventricle: The right ventricle is normal in size. There is normal right ventricular global systolic function.  Right Atrium: The right atrium is normal in size.  Aortic Valve: The aortic valve appears structurally normal. The aortic valve appears tricuspid. There is no evidence of aortic valve stenosis.  There is no evidence of aortic valve regurgitation. The peak instantaneous gradient of the aortic valve is 2.4 mmHg. The mean gradient of the aortic valve is 2.0 mmHg.  Mitral Valve: The mitral valve is normal in structure. There is no evidence of mitral valve stenosis. There is normal mitral valve leaflet mobility. There is trace mitral valve regurgitation.  Tricuspid Valve: The tricuspid valve is structurally normal. There is mild tricuspid regurgitation.  Pulmonic Valve: The pulmonic valve is structurally normal. There is no indication of pulmonic valve regurgitation.  Pericardium: There is no pericardial effusion noted.  Aorta: The aortic root is normal.  Pulmonary Artery: The pulmonary artery is not well visualized.  Systemic Veins: The inferior vena cava appears to be of normal size.  In comparison to the previous echocardiogram(s): The left ventricular function is worse. The left ventricular diastolic function is worse.      CONCLUSIONS:  1. Left ventricular systolic function is severely decreased with a 10% estimated ejection  fraction.  2. Spectral Doppler shows a pseudonormal pattern of left ventricular diastolic filling.  3. The left atrium is mild to moderately dilated.  4. Trace mitral valve regurgitation.  5. Mild tricuspid regurgitation is visualized.  6. Aortic valve stenosis is not present.  7. Left ventricular cavity size is moderately dilated.  8. The pulmonary artery is not well visualized.  9. There is global hypokinesis of the left ventricle with minor regional variations.    QUANTITATIVE DATA SUMMARY:  2D MEASUREMENTS:  Normal Ranges:  Ao Root d:     3.50 cm    (2.0-3.7cm)  LAs:           4.70 cm    (2.7-4.0cm)  IVSd:          1.17 cm    (0.6-1.1cm)  LVPWd:         1.04 cm    (0.6-1.1cm)  LVIDd:         6.83 cm    (3.9-5.9cm)  LVIDs:         6.54 cm  LV Mass Index: 142.7 g/m2  LV % FS        4.2 %    LA VOLUME:  Normal Ranges:  LA Vol A4C:        60.3 ml    (22+/-6mL/m2)  LA Vol A2C:        58.9 ml  LA Vol BP:         60.0 ml  LA Vol Index A4C:  24.6ml/m2  LA Vol Index A2C:  24.0 ml/m2  LA Vol Index BP:   24.5 ml/m2  LA Area A4C:       20.8 cm2  LA Area A2C:       20.4 cm2  LA Major Axis A4C: 6.1 cm  LA Major Axis A2C: 6.0 cm  LA Volume Index:   23.1 ml/m2    RA VOLUME BY A/L METHOD:  Normal Ranges:  RA Vol A4C:        42.3 ml    (8.3-19.5ml)  RA Vol Index A4C:  17.2 ml/m2  RA Area A4C:       16.2 cm2  RA Major Axis A4C: 5.3 cm    AORTA MEASUREMENTS:  Normal Ranges:  Asc Ao, d: 3.30 cm (2.1-3.4cm)    LV SYSTOLIC FUNCTION BY 2D PLANIMETRY (MOD):  Normal Ranges:  EF-A4C View: 14.2 % (>=55%)  EF-A2C View: 13.9 %  EF-Biplane:  12.5 %    LV DIASTOLIC FUNCTION:  Normal Ranges:  MV Peak E:    0.55 m/s (0.7-1.2 m/s)  MV Peak A:    0.49 m/s (0.42-0.7 m/s)  E/A Ratio:    1.13     (1.0-2.2)  MV e'         0.04 m/s (>8.0)  MV lateral e' 0.04 m/s  E/e' Ratio:   14.31    (<8.0)    MITRAL VALVE:  Normal Ranges:  MV DT: 179 msec (150-240msec)    AORTIC VALVE:  Normal Ranges:  AoV Vmax:                0.77 m/s (<=1.7m/s)  AoV Peak PG:              2.4 mmHg (<20mmHg)  AoV Mean P.0 mmHg (1.7-11.5mmHg)  LVOT Max Jean Claude:            0.68 m/s (<=1.1m/s)  AoV VTI:                 14.60 cm (18-25cm)  LVOT VTI:                10.90 cm  LVOT Diameter:           2.20 cm  (1.8-2.4cm)  AoV Area, VTI:           2.84 cm2 (2.5-5.5cm2)  AoV Area,Vmax:           3.38 cm2 (2.5-4.5cm2)  AoV Dimensionless Index: 0.75      RIGHT VENTRICLE:  RV Basal 4.05 cm  RV Mid   2.93 cm  RV Major 7.7 cm  TAPSE:   18.9 mm  RV s'    0.10 m/s    TRICUSPID VALVE/RVSP:  Normal Ranges:  IVC Diam: 1.77 cm    PULMONIC VALVE:  Normal Ranges:  PV Accel Time: 116 msec (>120ms)  PV Max Jean Claude:    0.5 m/s  (0.6-0.9m/s)  PV Max P.2 mmHg      [id number] Yon Gupta DO  Electronically signed on 2024 at 11:30:15 AM        ** Final **    Ejection Fractions:  LV EF   Date/Time Value Ref Range Status   2024 02:49 AM 13 %      Cath:  Coronary Angiography:   Adult Cath     [facility], Cath Lab  [address] [place] [address]    Cardiovascular Catheterization Report    Patient Name:     JAZZ Elaine          [id number] Manan LEWIS       Physician:          MD  Study Date:       10/21/2022   Verifying           [id number] Manan Anand  Physician:          MD  MRN/PID:          [id number]     Cardiologist:  Accession/Order#: [id number]    Referring           no family doctor  Physician:  YOB: 1985   Referring  Physician:  Gender:           M            Referring           [id number] Manan Anand  Physician:          MD      Study: Left Heart Catheterization      Indications:  JAZZ LEWIS is a 37 year old male who presents with hypertension, diabetes and tobacco Use - current. Cardiomyopathy and left ventricular dysfunction, with an asymptomatic chest pain assessment. Study performed as an urgent cath procedure.    Medical History:  Stress test performed: No. CTA performed: Yaritza Melton accessed: No. LVEF  Assessed: No.    Procedure Description:  After infiltration with 2% Lidocaine, the right femoral artery was cannulated with a modified Seldinger technique. Subsequently a 5 Cymraes sheath was placed in the right femoral artery. Selective coronary catheterization was performed using a 5 Fr catheter(s) exchanged over a guide wire to cannulate the coronary arteries. A JL 4 tip catheter was used for left coronary injections. A 3DRC tip catheter was used for right coronary injections.  Multiple injections of contrast were made into the left and right coronary arteries with angiograms recorded in multiple projections. After completion of the procedure, femoral artery angiography was performed. This demonstrated a common femoral artery puncture appropriate for closure. A Vascade 5F vascular closure Device was placed per protocol.    Coronary Angiography:  The coronary circulation is right dominant.    Left Main Coronary Artery:  There is 0% stenosis in the entire left main coronary artery.    Left Anterior Descending Coronary Artery Distribution:  There is 0stenosis in the entire left anterior descending artery.    Circumflex Coronary Artery Distribution:  There is 0stenosis in the the entire Circumflex artery.    Right Coronary Artery Distribution:    There is 0stenosis in the the entire Right Coronary Artery.    Hemo Personnel:  +--------------------------+-------------+  Name                      Duty           +--------------------------+-------------+  Manan Bustos MD, MD 1  +--------------------------+-------------+  Rocco Camarena RN            PROC SCRUB 1  +--------------------------+-------------+  Gabriel Major         PROC SCRUB 2  +--------------------------+-------------+  Bianka Pedraza RN         PROC CIRC 1  +--------------------------+-------------+  Vicenta Estrada RN            PROC CIRC 2  +--------------------------+-------------+  Kaitlynn Haq RN        PROC  RECORD 1  +--------------------------+-------------+      Sedation Time:  +------------------------+----------------------------------------+  Sedation Start/End TimesTime                                      +------------------------+----------------------------------------+  Start                   10/21/2022 15:10:28                       +------------------------+----------------------------------------+  Drugs                   Versed 1 mg IV per physician for sedatio  +------------------------+----------------------------------------+  End                     10/21/2022 15:24:43                       +------------------------+----------------------------------------+        Fluoroscopy Time:  +--------------------------+--------+  X-Ray Summary Fluoro Time:1.00 min  +--------------------------+--------+      +----------+--------+  Contrast: Dose:     +----------+--------+  Omnipaque:40.00 ml  +----------+--------+      Hemodynamic Pressures:    +----+---------------------+----------+-------------+--------------+---------+  Site      Date Time      Phase NameSystolic mmHgDiastolic mmHgMean mmHg  +----+---------------------+----------+-------------+--------------+---------+    AO10/21/2022 3:20:12 PM  AIR REST          106            91      100  +----+---------------------+----------+-------------+--------------+---------+    AO10/21/2022 3:23:06 PM  AIR REST          110            91       99  +----+---------------------+----------+-------------+--------------+---------+        Oxygen Saturation %:  +-----------+------------+  Sample SiteHB (g/100ml)  +-----------+------------+      SYS ART        14.3  +-----------+------------+      SYS WILLY        14.3  +-----------+------------+      PUL ART        14.3  +-----------+------------+      PUL WILLY        14.3  +-----------+------------+      Cardiac Cath Transition of Care Summary:  Post Procedure  Diagnosis: Normal coronary arteries.  Findings:                 Severe LV dysfunction.  Blood Loss:               Estimated blood loss during the procedure was 0 mls.  Specimens Removed:        Number of specimen(s) removed: none.    ____________________________________________________________________________________  CONCLUSIONS:  1. The entire Left Main: 0% stenosis.  2. Entire LAD Lesion: The percent stenosis is 0%.  3. Entire CX Lesion: The percent stenosis is 0%.  4. The entire RCA Lesion: The percent stenosis is 0%.    ____________________________________________________________________________________  CPT Codes:  Coronary Angiography S&I only ([facility])([facility])-76744; Moderate Sedation Services initial 15 minutes patient >5 years-59510    ICD 10 Codes:  I42.0-Dilated cardiomyopathy; I50.21-Acute systolic (congestive) heart failure    [id number] Manan Anand MD  Performing Physician  Electronically signed by [id number] Manan Anand MD on 10/21/2022 at  3:40:47 PM      cc Report to: no family doctor    cc Report to: [id number] Manan Anand MD              Right Heart Cath: No results found for this or any previous visit from the past 1800 days.    Stress Test:  Nuclear:No results found for this or any previous visit from the past 1800 days.    Metabolic Stress:   Cardiopulmonary (Metabolic) Stress Test 06/06/2024    [facility], [address] [place] [address]    Cardiopulmonary Exercise Report - (Non-Invasive)      Patient Name:      JAZZ LEWIS         Soraida Physician:   [id number] Avery Gottlieb MD  Study Date:        6/6/2024              Ordering Provider:   [id number] MANI SAINZ  MRN/PID:           [id number]              Exercise             Kasandra Fierro  Physiologist:        ANNA DEL ROSARIO  Accession#:        [id number]          Additional Staff:    Zuleika YATES  Date of Birth/Age: 1985 / 38 years Nurse:               .  Gender:            M                      Fellow:  Height:            175.26 cm             Admit Date:          2024  Weight:            136.30 kg             Admission Status:    Outpatient  BSA:               2.46 m2               Encounter#:          [id number]  BMI:               44.37 kg/m2    Study Type: CARDIOPULMONARY (METABOLIC) STRESS TEST    Diagnosis/ICD: Chronic systolic (congestive) heart failure (CHF)-I50.22;  Essential (primary) hypertension-I10; Acute on chronic combined  systolic (congestive) and diastolic (congestive) heart failure  (CHF)-I50.43  Indication:    heart failure  CPT Codes:     (21374) Pulmonary graded exercise test-41251    Patient ID Verified: Correct procedure and correct patient verified verbally and  with ID Band checked.  Medications:         The patient's prescribed medications are atorvastatin,  bupropion, Lantus insulin, Humulin insulin, glucophage,  rivaroxaban (Xarelto), sertralne, spironolactone, torsemide  and farxiga, trulicity, entresto, trazadone. The patient  took medications as prescribed.    Patient History: Congestive heart failure, cardiomyopathy, hypertension, hyperlipidemia, lipid therapy and family history of congestive heart failure.  Smoker:   Former.  Diabetes: Yes, managed with Insulin.  BMI:      Obese >30.      EXERCISE PROTOCOL  Protocol: Modified Lang Test Duration: 9:47      Measurements:  gas analysis by metabolic cart. ECG continuous recording. Serial blood pressure measurements by manual cuff inflation. Continuous oxygen saturation by forehead pulse oximeter.    Reason for Stopping: Dyspnea.    (NON-INVASIVE)  +--------------------+----+-------------+----------+                      RestPeak Exercise% Achieved  +--------------------+----+-------------+----------+        HR (BPM)       94      132         72      +--------------------+----+-------------+----------+  Systolic BP         124 160                       +--------------------+----+-------------+----------+  Diastolic BP        82  90                       +--------------------+----+-------------+----------+  MAP (mmHg)          96  113                      +--------------------+----+-------------+----------+  VO2 (ml/Kg/min)     2.8 17.3         42          +--------------------+----+-------------+----------+  VO2 (ml/min)        375 2357         42          +--------------------+----+-------------+----------+  O2 Pulse (ml/beat)  4   18                       +--------------------+----+-------------+----------+  VE (L/min)          12  79           56          +--------------------+----+-------------+----------+  VE/VCO2             48  36                       +--------------------+----+-------------+----------+  VD/VT               0.330.20                     +--------------------+----+-------------+----------+  Resp. Rate (br/min) 23  43                       +--------------------+----+-------------+----------+  Tidal Volume (ml)   538 1843                     +--------------------+----+-------------+----------+  RER                 [phone]                     +--------------------+----+-------------+----------+  End Tidal CO2 (mmHg)28  32                       +--------------------+----+-------------+----------+  Breathing [place]%      44%                      +--------------------+----+-------------+----------+  SaO2%               91  93                       +--------------------+----+-------------+----------+      Patient Performance:    Cardiac Response:  The patient developed dyspnea and leg fatigue during the stress exam. The symptoms resolved with rest. The peak heart rate achieved was 132 bpm, which was 72 % of the age predicted target heart rate of 183 bpm. There is a submaximal exercise effort as reflected in the peak exercise RER. Hypertensive blood pressure response to  exercise. There is blunted heart rate response to exercise consistent with beta-blocker therapy. The O2 pulse (VO2/HR) was 4 ml/beat at rest and increased to 18 ml/beat at peak exercise.    EKG:  Resting ECG showed normal sinus rhythm with rare premature ventricular contractions. Stress ECG showed sinus tachycardia.    Gas Exchange:  The peak VO2 achieved is 17.3 ml/Kg/min, which is 42% of the predicted maximum. Randolph functional class B (mild to moderate impairment). The perceived maximal exertion by RPE scale was 17--Very hard. The dyspnea rating at peak exercise was 10--Maximal. The patient's oxygen saturation was 91 at rest and 93 at peak exercise.    Ventilatory Response:  The ventilatory efficiency slope at peak exercise was 36. The patient's resting minute ventilation (VE) was 12 liters/minute and increased to 79 liters/minute at peak exercise which is 56% of predicted. The breathing reserve was 44%. The respiratory rate at rest was 23 breaths/minute and increased to 43 breaths/minute at peak exercise. The tidal volume increased from 538.00 ml per breath at rest to 1843.00 ml per breath at peak exercise. The VD/VT dead space estimate was 0.33 at rest and fell to 0.20 at peak exercise. The end-tidal CO2 measurement was 28 mL/Hg at rest and changed to 32 mL/Hg at peak exercise.      Impression:    1. The peak VO2 achieved was [17.3] ml/kg/min which is consistent with Randolph functional class B (moderate exercise impairment). The peak VO2 achieved was 42% peak predicted based on age gender height nomogram.  2. There was a [submaximal] exercise effort with peak RER of 0.93at peak exercise. The peak heart rate was 132bpm which is 72% APMHR.  3. The ventilatory efficiency slope (VE/VCO2) was moderately abnormal (36) at peak exercise.  4. Exercise stress EKG is non-diagnostic for ischemia _ due to inability to reach target heart rate.  5. There was [blunted] augmentation of O2 pulse from 4ml/beat at rest to 18ml/beat  at peak exercise indicating limited augmentation of cardiac output and LV stroke volume.  6. The pulmonary response to exercise shows normal breathing reserve at 44% and normal oxygenation during exercise.  7. The ventilatory threshold did not occur.  8. Overall, submaximal cardiopulmonary exercise stress test. Markedly impaired ventilatory efficiency (VE/VCO2~36) is a high risk marker in this patient with heart failure and reduced ejection fraction.    [id number] Avery Gottlieb MD  Electronically signed on 2024 at 11:12:38 AM          ** Final **    Cardiac Imaging:  Cardiac Scoring: No results found for this or any previous visit from the past 1800 days.    Cardiac MRI:   MR cardiac morphology and function w and wo IV contrast     Narrative  Interpreted By:  CROW TRAVIS MD  [facility]    CMR Report    MRN:                    [id number]  Name:             JAZZ CARRANZA  :                  1985  Scan Date:   2023 09:03:53    Electronically signed by Crow Travis  15:20:06    GENERAL INFORMATION  =====================================================================  =====================================    HEIGHT: 68.90 in    (175.00 cm)  WEIGHT: 302.03 lbs    (137.00 kgs)  BSA: 2.46 m\S\2  BASELINE HR: 95 BPM  SCAN LOCATION: [facility]  REFERRING PHYSICIAN: MANI SAINZ  ATTENDING PHYSICIAN: MANI SAINZ  TECHNOLOGIST: Karolina Simpson  ACCESSION NUMBER: [id number]  CPT CODES: 28277  ICD10 CODES: E66.01, [ , ]E11.9, [ , ]I50.20  PATIENT HISTORY: Mr Carranza is a 37 year old man from [place], [place] who  presents for management of NICM/HFrEF (EF 5-10%), initially diagnosed  10/2022. He has a history of HTN, HLD, hypertriglyceridemia, Type II  DM (poorly controlled A1c 12.9%, on insulin), severe obesity (BMI  44), cardioembolic stroke (2022) with limited residual symptoms,  tobacco abuse, prior cocaine use, intermittent alcohol use, and  daughter with cardiomyopathy.  \E\n\E\nMrBrittany Carranza was initially  diagnosed with NICM during hospitalization at [facility] in October 2022 after presentation with SOB, weight gain. During hospitalization  he had elevated BNP and echo with EF 15-20%. Coronary angiogram  showed no CAD. He was started on BB, ARB and discharged with  LifeVest. He followed up with Dr. Andrews as outpatient where he had  not started his medications. He had multiple no-shows with Dr. Andrews and EP team. In December he was admitted with R sided  weakness related to Cardioembolic stroke. He was given TNK with  improvement and discharged on rivaroxaban. He followed up with  Eugenia Lyman for post hospital visit and was referred for tobacco  cessation, cardiac rehab, primary care, comprehensive weight loss,  sleep clinic but these have not been scheduled. \E\n    SUMMARY  =====================================================================  =====================================    NICMP. Severe LV dilation and remodeling.  Diffuse LV fibrosis with elevated extracellular volume (ECV of 40%).  No evidence of  No evidence of amyloidosis or hemachromatosis.  No LV thrombus.    LEFT VENTRICLE: Quantitative LVEF 13 %. LV wall thickness is normal.  LV cavity is severely enlarged. LV systolic function  is severely decreased globally. There is no LV mass/thrombus.    VIABILITY: LV scar size is 5 %.    RIGHT VENTRICLE: RV cavity size is normal. RV systolic function is  normal. There is no RV mass/thrombus. There is no RV  fibro-fatty infiltration.    LV/RV SEPTUM: The ventricular septum is intact.    LA/RA SEPTUM: The atrial septum is intact.    LEFT ATRIUM: LA is mildly enlarged.    RIGHT ATRIUM: RA cavity size is normal.    PERICARDIUM: Pericardium is normal. There is a trivial pericardial  effusion.    PLEURAL EFFUSION: There is no pleural effusion.    AORTIC VALVE: Peak aortic valve velocity -130 cm/sec. Aortic  regurgitant volume 1 ml. Aortic regurgitant fraction 1  %.    MITRAL VALVE: Mitral valve leaflets are normal. There is no mitral  valve mass, thrombus, or vegetation. There is mild  mitral regurgitation.    TRICUSPID VALVE: Tricuspid valve leaflets are normal. The tricuspid  valve annulus is normal in size.    AORTIC ROOT: The aortic root is mildly dilated.      CORE EXAM  =====================================================================  =====================================    MEASUREMENTS  ---------------------------------------------------------------------  -------------------  VOLUMETRIC ANALYSIS  ----------------------------------------------  .------------------------------------------------------.  .      .          . LV   . Reference  . RV . Reference .  +------+----------+------+------------+----+-----------+  . EDV  . ml       .  440 .  (121-204) .    .           .  .      . ml/m\S\2    .  179 .  ()  .    .           .  . ESV  . ml       .  383 .  (33-78)   .    .           .  .      . ml/m\S\2    .  156 .  (18-39)   .    .           .  . CO   . L/min    . 5.40 .            .    .           .  .      . L/min/m\S\2 . 2.20 .            .    .           .  . MASS . g        .  328 .  (109-185) .    .           .  .      . g/m\S\2     .  133 .  (59-92)   .    .           .  . SV   . ml       .   57 .  ()  .    .           .  .      . ml/m\S\2    .   23 .  (43-67)   .    .           .  . EF   . %        .   13 .  (57-75)   .    .           .  '------+----------+------+------------+----+-----------'    CARDIAC OUTPUT HR:  95 bpm  LV DIMENSIONS  ----------------------------------------------  WALL THICKNESS - ANTEROSEPTAL:  1.0 cm  WALL THICKNESS - INFEROLATERAL:  1.0 cm  WALL THICKNESS - MAXIMUM:  1.1 cm  LV JOSE:  7.7 cm    LA DIMENSIONS (LV SYSTOLE)  ----------------------------------------------  AREA - 2 CHAMBER:  32 cm\S\2  LENGTH - 2 CHAMBER:  6.1 cm  AREA - 4 CHAMBER:  29 cm\S\2  LENGTH - 4 CHAMBER:  7 cm  VOLUME:  129 ml  VOLUME NORMALIZED:   52.6 ml/m\S\2    RA DIMENSIONS (RV SYSTOLE)  ----------------------------------------------  AREA - 4 CHAMBER:  19 cm\S\2  LENGTH - 4 CHAMBER:  5.9 cm    AORTIC ROOT DIMENSIONS  ----------------------------------------------  ANNULUS:  2.7 cm  SINUS OF VALSALVA:  3 cm    EXTRACELLULAR VOLUME MEASUREMENT  ----------------------------------------------  PRE-CONTRAST T1 MYOCARDIUM:  1090 msec  PRE-CONTRAST T1 LV CAVITY:  1458 msec  POST-CONTRAST T1 MYOCARDIUM:  310 msec  POST-CONTRAST T1 LV CAVITY:  257 msec  HEMATOCRIT:  44 %  ECV:  40 %    IRON QUANTIFICATION  ----------------------------------------------  MYOCARDIAL T2*:  24 msec  LIVER T2*:  17 msec      17 SEGMENT  ---------------------------------------------------------------------  -------------------  .---------------------------------------------------------------------  ---------------------------.  . Segments           . Wall Motion  . Hyperenhancement . Stress  Perfusion . Interpretation       .  +--------------------+--------------+------------------+--------------  ----+----------------------+  . Base Anterior      . Severe Hypo  . None             .  . Abnormal Wall Motion .  . Base Anteroseptal  . Severe Hypo  . 1-25%            .  . Abnormal Wall Motion .  . Base Inferoseptal  . Severe Hypo  . 1-25%            .  . Abnormal Wall Motion .  . Base Inferior      . Severe Hypo  . None             .  . Abnormal Wall Motion .  . Base Inferolateral . Severe Hypo  . None             .  . Abnormal Wall Motion .  . Base Anterolateral . Severe Hypo  . 1-25%            .  . Abnormal Wall Motion .  . Mid Anterior       . Severe Hypo  . None             .  . Abnormal Wall Motion .  . Mid Anteroseptal   . Severe Hypo  . 1-25%            .  . Abnormal Wall Motion .  . Mid Inferoseptal   . Severe Hypo  . 1-25%            .  . Abnormal Wall Motion .  . Mid Inferior       . Severe Hypo  . None             .  . Abnormal Wall Motion .  . Mid Inferolateral  . Severe Hypo   . None             .  . Abnormal Wall Motion .  . Mid Anterolateral  . Severe Hypo  . None             .  . Abnormal Wall Motion .  . Apical Anterior    . Severe Hypo  . None             .  . Abnormal Wall Motion .  . Apical Septal      . Severe Hypo  . 1-25%            .  . Abnormal Wall Motion .  . Apical Inferior    . Severe Hypo  . None             .  . Abnormal Wall Motion .  . Apical Lateral     . Severe Hypo  . None             .  . Abnormal Wall Motion .  . [place]               . Severe Hypo  . None             .  . Abnormal Wall Motion .  +--------------------+--------------+------------------+--------------  ----+----------------------+  . RV Segments        . Wall Motion  . Hyperenhancement . Stress  Perfusion . Interpretation       .  +--------------------+--------------+------------------+--------------  ----+----------------------+  . RV Basal Anterior  . Normal/Hyper . None             .  . Normal               .  . RV Basal Inferior  . Normal/Hyper . None             .  . Normal               .  . RV Mid             . Normal/Hyper . None             .  . Normal               .  . RV Apical          . Normal/Hyper . None             .  . Normal               .  '--------------------+--------------+------------------+--------------  ----+----------------------'    FINDINGS  ----------------------------------------------  LV SCAR SIZE (17 SEGMENT):  5 %      SCAN INFO  =====================================================================  =====================================    GENERAL  ---------------------------------------------------------------------  -------------------  SCANNER  ----------------------------------------------  :  [employer]  MODEL:  MAGNETOM Aera  PULSE SEQUENCES:  SSFP cine, 2D LGE segmented, 2D LGE  single-shot, Black-blood LGE (or Grey-blood),  Pre-contrast T1 mapping, Post-contrast T1 mapping, T2  mapping, First-pass perfusion without stress,  Phase  contrast imaging, HASTE morphology    CONTRAST AGENT  ----------------------------------------------  TYPE:  Dotarem  LOT NUMBER:  [id number]  EXPIRATION DATE:  2027-09-01 00:00:00  GD CONCENTRATION:  0.5 M  VOLUME ADMINISTERED:  40 ml  DOSAGE:  0.15 mmol/kg    SEDATION  ----------------------------------------------  SEDATION USED?:  No    SETUP  ----------------------------------------------  SCAN TYPE:  Clinical  PATIENT TYPE:  Outpatient  INCOMPLETE SCAN:  No  REASON(S) FOR SCAN:  nonischemic CM etiology      Report generated by [employer], a product of [employer]         Assessment/Plan  Assessment/Plan   Principal Problem:    HFrEF (heart failure with reduced ejection fraction) (Multi)  Active Problems:    Hypertension    Acute on chronic combined systolic and diastolic congestive heart failure (Multi)        #NICM  #HFrEF  -RHC with Dr. Jurado       Addendum:     RHC 7/18/24:   1. Access: RIJ 7Fr exchanged for 8.5F leave in [facility].   2. Hemo: RA 24, RVSP 63, PA 64/40 (48), W 41, /90 (101) mmHg.   3. CO/CI [Evelia]: 4.4 / 1.7.   4. CO/CI [TD]: 3.47 / 1.4.   5. SVR: 1775 cgs, PVR: 2.0 COLON.   6. Markedly elevated biventricular filling pressures with reduced cardiac index by direct and indirect measurements.    Given acute on chronic HFrEF with evidence of cardiogenic shock, will transfer to HFICU  with plan for leave in an & [facility] guided IV therapy.     NP discussed with Dr. Jurado regarding plan of care/ discharge plan      I spent 30 minutes in the professional and overall care of this patient.      Haily Valle, APRN-CNP

## 2024-07-18 NOTE — POST-PROCEDURE NOTE
Physician Transition of Care Summary  Invasive Cardiovascular Lab    Procedure Date: 7/18/2024  Attending:    * Delroy Jurado - Primary  Resident/Fellow/Other Assistant: Surgeons and Role:  * No surgeons found with a matching role *    Indications:   Pre-op Diagnosis      * HFrEF (heart failure with reduced ejection fraction) (Multi) [I50.20]     * Primary hypertension [I10]     * Acute on chronic combined systolic and diastolic congestive heart failure (Multi) [I50.43]    Post-procedure diagnosis:   Post-op Diagnosis     * HFrEF (heart failure with reduced ejection fraction) (Multi) [I50.20]     * Primary hypertension [I10]     * Acute on chronic combined systolic and diastolic congestive heart failure (Multi) [I50.43]    Procedure(s):   Right Heart Cath  03694 - SC RIGHT HEART CATH O2 SATURATION & CARDIAC OUTPUT        Procedure Findings:    1. Access: RIJ 7Fr exchanged for 8.5F leave in [facility].   2. Hemo: RA 24, RVSP 63, PA 64/40 (48), W 41, /90 (101) mmHg.   3. CO/CI [Evelia]: 4.4 / 1.7.   4. CO/CI [TD]: 3.47 / 1.4.   5. SVR: 1775 cgs, PVR: 2.0 COLON.   6. Markedly elevated biventricular filling pressures with reduced cardiac index by direct and indirect measurements.       Complications:   none    Stents/Implants:   Implants       No implant documentation for this case.            Anticoagulation/Antiplatelet Plan:   IV heparin while inpatient. Xarelto when swan removed    Estimated Blood Loss:   3 mL    Anesthesia: Moderate Sedation Anesthesia Staff: No anesthesia staff entered.    Any Specimen(s) Removed:   No specimens collected during this procedure.    Disposition:   Transfer to [facility]      Electronically signed by: Delroy Jurado MD, 7/18/2024 11:31 AM

## 2024-07-18 NOTE — H&P
[facility]  HFICU HISTORY AND PHYSICAL     Atrhur OMID Carranza/[id number]    Admit Date: 7/18/2024  Hospital Length of Stay: 0   ICU Length of Stay: 20m   Primary Service: advanced heart failure   Primary HF Cardiologist: Dr. Jurado   Referring: Dr. Jurado     HPI:   37 YO Male with hx of NICM/HFrEF (EF 5-10% on echo fev 2024), initially diagnosed 10/2022, HTN, HLD, hypertriglyceridemia, Type II DM (poorly controlled A1c 13.7%, on insulin), severe obesity (BMI 44), cardioembolic stroke (12/2022) with limited residual symptoms, intermittent tobacco abuse,cocaine use, alcohol use, history of non-compliance with medication and doctor appointments, who is transferred from [facility] after his elective RHC showed high right and left sided filling pressures and low CO/CI concerning for low output state/cardiogenic shock.     Patient saw Dr. Jurado on 07/02/2024 in his clinic and reported dyspnea both at rest and exertion, orthopnea and PND. His entresto was increased to 49/51 and he was planned to undergo RHC on 07/18 at [facility] with Dr. Jurado. He had an ER visit on 07/13/24 for abdominal wall abscess that was drained at bedside and patient was started on bactrim DS and discharged from ER.     Today at [facility], his elective RHC shows RA 24, RVSP 63, PA 64/40 (48), W 41, /90 (101) mmHg. 3. CO/CI [Evelia]: 4.4 / 1.7. 4. CO/CI [TD]: 3.47 / 1.4. 5. SVR: 1775 cgs, PVR: 2.0 COLON, /74. He is transferred to [facility] HFICU for further management.    On arrival, he reports that he has been having progressive worsening Dyspnea on rest and minimal exertion for months, also has orthopnea and PND. Denies CP, syncope, dizziness, LE edema or abd distention (has protuberant obese abdomen) . He has dry cough but denies fever, viral URI like symptoms, GI or  issues. He reports he has been taking torsemide 20 mg daily at home and does produce decent amount of urine afterwords. Reports good  appetite.     No other acute complaints reported.     Cardiac Tests:  EKG: ordered    07/03/24: NSR, left atrial enlargement. Left axis deviation     Chest Radiograph: ordered     Echocardiogram: 02/21/24:   CONCLUSIONS:   1. Left ventricular systolic function is severely decreased with a 10% estimated ejection fraction.   2. Spectral Doppler shows a pseudonormal pattern of left ventricular diastolic filling.   3. The left atrium is mild to moderately dilated.   4. Trace mitral valve regurgitation.   5. Mild tricuspid regurgitation is visualized.   6. Aortic valve stenosis is not present.   7. Left ventricular cavity size is moderately dilated.   8. The pulmonary artery is not well visualized.   9. There is global hypokinesis of the left ventricle with minor regional variations.    Cardiac Catheterization: 10/21/2022: no evidence of angiographically obstructive coronaries.      Metabolic Stress: 06/06/2024: submaximal cardiopulmonary exercise stress test. Markedly impaired ventilatory efficiency (VE/VCO2~36) is a high risk marker in this patient with heart failure and reduced ejection fraction.       Past Medical History:  Past Medical History:   Diagnosis Date    CHF (congestive heart failure) (Multi)     Diabetes mellitus (Multi)     Heart disease     Hypertension     Substance abuse (Multi)        Past Surgical History:  Past Surgical History:   Procedure Laterality Date    MR HEAD ANGIO WO IV CONTRAST  12/29/2022    MR HEAD ANGIO WO IV CONTRAST 12/29/2022 DOCTOR OFFICE LEGACY    OTHER SURGICAL HISTORY  07/21/2022    Cyst excision    OTHER SURGICAL HISTORY  10/25/2022    Cardiac catheterization       Family History:  Family History   Adopted: Yes       Social History:  Social History     Socioeconomic History    Marital status:      Spouse name: Not on file    Number of children: Not on file    Years of education: Not on file    Highest education level: Not on file   Occupational History    Not on file    Tobacco Use    Smoking status: Every Day     Types: Cigarettes    Smokeless tobacco: Not on file   Vaping Use    Vaping status: Unknown   Substance and Sexual Activity    Alcohol use: Yes     Comment: occasionaly - 1-2 times a month    Drug use: Not Currently     Types: Codeine    Sexual activity: Defer   Other Topics Concern    Not on file   Social History Narrative    Not on file     Social Determinants of Health     Financial Resource Strain: Not at Risk (2024)    Received from [facility]    Financial Resource Strain     Financial Resource Strain: 1   Food Insecurity: No Food Insecurity (6/10/2024)    Hunger Vital Sign     Worried About Running Out of Food in the Last Year: Never true     Ran Out of Food in the Last Year: Never true   Transportation Needs: Not at Risk (2024)    Received from [facility]    Transportation Needs     Transportation: 1   Physical Activity: Not on File (2021)    Received from [facility]    Physical Activity     Physical Activity: 0   Stress: Not at Risk (2024)    Received from [facility]    Stress     Stress: 1   Social Connections: Not at Risk (2024)    Received from [facility]    Social Connections     Social Connections and Isolation: 1   Intimate Partner Violence: Not on file   Housing Stability: Not at Risk (2024)    Received from [facility]    Housing Stability     Housin       Allergies:  Allergies   Allergen Reactions    Shellfish Containing Products Unknown       Prior to Admission Meds:  Medications Prior to Admission   Medication Sig Dispense Refill Last Dose    atorvastatin (Lipitor) 40 mg tablet TAKE 1 TABLET BY MOUTH AT BEDTIME 30 tablet 3     blood-glucose meter,continuous (FreeStyle Nadir 3 [place]) misc Use as instructed to test blood glucose throughout the day 1 each 0     blood-glucose sensor (FreeStyle Nadir 3 Sensor) device Use as directed to test blood glucose throughout the day 2 each 11     buPROPion XL (Wellbutrin XL)  "150 mg 24 hr tablet Take 1 tablet (150 mg) by mouth once daily.       carvedilol (Coreg) 6.25 mg tablet Take 1 tablet (6.25 mg) by mouth 2 times daily (morning and late afternoon). 180 tablet 3     dapagliflozin propanediol (Farxiga) 10 mg Take 1 tablet (10 mg) by mouth once daily. 90 tablet 3     [START ON 7/21/2024] dulaglutide (Trulicity) 1.5 mg/0.5 mL pen injector injection Inject 1.5 mg under the skin 1 (one) time per week. 2 mL 1     insulin glargine (Lantus Solostar U-100 Insulin) 100 unit/mL (3 mL) pen Inject 55 Units under the skin once daily at bedtime. Take as directed per insulin instructions. 15 mL 0     insulin lispro (HumaLOG) 100 unit/mL injection Inject 16 Units under the skin 3 times a day with meals. Take as directed per insulin instructions. 15 mL 0     metFORMIN (Glucophage) 1,000 mg tablet Take 1 tablet (1,000 mg) by mouth 2 times a day.       pen needle, diabetic 31 gauge x 5/16\" needle Use to inject 1-4 times daily as directed. 100 each 11     rivaroxaban (Xarelto) 20 mg tablet Take 1 tablet (20 mg) by mouth once daily in the evening. Take with meals. Take with food. 90 tablet 3     sacubitriL-valsartan (Entresto) 49-51 mg tablet Take 1 tablet by mouth 2 times a day. 180 tablet 3     sertraline (Zoloft) 100 mg tablet Take 1 tablet (100 mg) by mouth once daily.       spironolactone (Aldactone) 25 mg tablet Take 0.5 tablets (12.5 mg) by mouth once daily. Do not start before February 24, 2024. 15 tablet 11     sulfamethoxazole-trimethoprim (Bactrim DS) 800-160 mg tablet Take 1 tablet by mouth every 12 hours for 10 days. 20 tablet 0     torsemide (Demadex) 20 mg tablet Take 1 tablet (20 mg) by mouth once daily. 30 tablet 11     traZODone (Desyrel) 50 mg tablet Take 1 tablet (50 mg) by mouth once daily at bedtime.       True Metrix Glucose Test Strip strip USE TO CHECK BLOOD SUGAR FOUR TIMES DAILY       Ultra Thin Lancets 30 gauge misc TEST BLOOD SUGAR FOUR TIMES DAILY AS DIRECTED          Current " "Medications:  Infusions:  lactated Ringer's      Scheduled:  atorvastatin, 40 mg, Nightly  dapagliflozin propanediol, 10 mg, Daily  insulin glargine, 55 Units, Nightly  insulin lispro, 0-20 Units, TID  insulin lispro, 16 Units, TID  lidocaine, 1 patch, Daily  polyethylene glycol, 17 g, Daily  sertraline, 100 mg, Daily  spironolactone, 12.5 mg, Daily  sulfamethoxazole-trimethoprim, 1 tablet, q12h JAREK      PRN:  acetaminophen, 650 mg, q6h PRN  dextrose, 12.5 g, q15 min PRN  dextrose, 25 g, q15 min PRN  glucagon, 1 mg, q15 min PRN  glucagon, 1 mg, q15 min PRN  oxygen, , Continuous PRN - O2/gases  traZODone, 50 mg, Nightly PRN          Invasive Hemodynamics:    Most Recent Range Past 24hrs   BP (Art)   No data recorded   MAP(Art)   No data recorded   RA/CVP   No data recorded   PA 48/31 PAP  Min: 46/32  Max: 48/31   PA(mean) 38 mmHg PAP (Mean)  Min: 38 mmHg  Max: 38 mmHg   PCWP   No data recorded   CO   No data recorded   CI   No data recorded   Mixed Venous   No data recorded   SVR    No data recorded   PVR   No data recorded       PHYSICAL EXAM:   Visit Vitals  Pulse 90   Temp 36.3 °C (97.3 °F) (Temporal)   Resp 18   Ht 1.75 m (5' 8.9\")   SpO2 98%   BMI 44.67 kg/m²   Smoking Status Every Day   BSA 2.58 m²       Wt Readings from Last 5 Encounters:   07/18/24 137 kg (301 lb 9.4 oz)   07/13/24 136 kg (300 lb)   07/12/24 137 kg (301 lb)   07/02/24 137 kg (301 lb 9.6 oz)   06/10/24 138 kg (305 lb)       INTAKE/OUTPUT:  No intake/output data recorded.     Physical Exam  Constitutional:       General: He is not in acute distress.     Appearance: He is obese.   Cardiovascular:      Rate and Rhythm: Normal rate and regular rhythm.      Heart sounds: No murmur heard.  Pulmonary:      Effort: Pulmonary effort is normal.      Breath sounds: Normal breath sounds.   Abdominal:      Palpations: Abdomen is soft.   Musculoskeletal:      Right lower leg: No edema.      Left lower leg: No edema.   Neurological:      General: No focal " deficit present.      Mental Status: He is alert and oriented to person, place, and time. Mental status is at baseline.   Psychiatric:         Mood and Affect: Mood normal.         Behavior: Behavior normal.         Thought Content: Thought content normal.         Review of Systems   Constitutional:  Positive for fatigue.   HENT: Negative.     Eyes: Negative.    Respiratory:  Positive for cough and shortness of breath. Negative for chest tightness and wheezing.    Cardiovascular:  Negative for chest pain, palpitations and leg swelling.   Gastrointestinal: Negative.    Endocrine: Negative.    Genitourinary: Negative.    Musculoskeletal: Negative.    Neurological: Negative.    Hematological: Negative.    Psychiatric/Behavioral: Negative.         DATA:  CMP:  Recent Labs     07/18/24  1558 07/13/24  1242 07/02/24  0910 05/21/24  0938 02/23/24  0340 02/22/24  0533 02/21/24  1411 02/21/24  0958 02/21/24  0134 02/20/24  0528 02/20/24  0126 02/01/24  0544 02/01/24  0000 01/31/24  2014 10/22/22  0549 10/21/22  0528 10/20/22  1211 06/16/22  0706 06/15/22  0756   * 132* 131* 133* 129* 129* 126* 121* 133* 130* 129* 130*   < > 122*   < > 131* 135*   < > 140   K 4.3 3.8 4.5 4.9 3.5 4.1 4.6 6.8* 3.6 4.4 4.5 4.1   < > 4.5   < > 4.4 4.3   < > 3.9    102 91* 99 92* 90* 91* 87* 92* 90* 93* 96*   < > 88*   < > 96* 101   < > 105   CO2 25 23 29 25 30 31 28 24 32 28 21 26   < > 20*   < > 25 24   < > 26   ANIONGAP 14 11 16 14 11 12 12 17 13 16 20 12   < > 19   < > 14 14   < > 13   BUN 20 15 16 24* 29* 30* 29* 32* 27* 26* 25* 14   < > 16   < > 22 14   < > 14   CREATININE 1.16 1.01 1.39* 1.43* 1.32* 1.60* 1.56* 1.75* 1.78* 1.54* 1.47* 1.05   < > 1.30   < > 1.34* 1.17   < > 1.18   EGFR 83 >90 67 65 71 57* 58* 51* 50* 59* 63 >90   < > 73   < >  --   --   --   --    MG 1.90  --   --   --   --   --   --   --  2.51*  --  1.59* 1.75  --  1.86  --  2.20 1.50*  --  1.80    < > = values in this interval not displayed.     Recent Labs      "07/18/24  1558 07/02/24  0910 05/21/24  0938 02/21/24  0134 02/20/24  0528 02/01/24  0544 01/31/24  2014 12/02/23  2342 11/29/21  0443 11/07/20  1338   ALBUMIN 3.8 4.4 4.1 4.2 4.5 3.9 4.0 4.0   < > 4.6   ALT 17 33 21 15 15 12 13 48   < > 33   AST 19 21 24 18 17 16 18 34   < > 34   BILITOT 0.5 0.8 0.6 1.1 0.9 0.3 0.3 0.8   < > 0.7   LIPASE  --   --   --   --   --   --   --   --   --  29    < > = values in this interval not displayed.     CBC:  Recent Labs     07/18/24  1558 07/13/24  1242 07/02/24  0910 05/21/24  0938 02/23/24  0340 02/22/24  0532 02/21/24  0958 02/21/24  0134   WBC 6.2 6.3 4.5 4.7 5.0 5.6 6.7 5.2   HGB 13.6 12.3* 14.8 14.8 13.6 14.4 16.1 15.6   HCT 42.3 37.1* 43.4 45.6 39.6* 42.5 46.1 45.6    206 242 242 221 228 269 246   MCV 98 96 94 99 91 92 91 93     COAG:   Recent Labs     07/18/24  1558 07/02/24  0910 12/02/23  2341 12/28/22  1755 12/28/22  1533 10/20/22  1211 06/15/22  0756 11/23/20  1523   INR 1.1 1.0 1.1 1.0 1.0 1.0 1.0 1.1     ABO: No results for input(s): \"ABO\" in the last 36148 hours.  HEME/ENDO:  Recent Labs     07/18/24  1558 05/21/24  0938   FERRITIN 234 274   IRONSAT 28 24*   TSH 2.29 3.98   HGBA1C 13.3* 13.7*      CARDIAC:   Recent Labs     07/18/24  1558 07/02/24  0910 05/21/24  0938 02/21/24  0958 02/20/24  0126 02/01/24  0154 01/31/24 2014 12/03/23  0823 12/03/23  0128 12/02/23  2342 12/02/23  2341 03/14/23  1056 12/28/22  1937   TROPHS 59*  --   --  67* 65* 177* 99* 55* 54* 40*  --   --  62*   * 173* 195*  --  259*  --  314*  --   --   --  375* 68 213*       ASSESSMENT AND PLAN:     37 YO Male with hx of NICM/HFrEF (EF 5-10% on echo fev 2024), initially diagnosed 10/2022, HTN, HLD, hypertriglyceridemia, Type II DM (poorly controlled A1c 13.7%, on insulin), severe obesity (BMI 44), cardioembolic stroke (12/2022) with limited residual symptoms, intermittent tobacco abuse,cocaine use, alcohol use, history of non-compliance with medication and doctor appointments, who " is transferred from [facility] after his elective RHC showed high right and left sided filling pressures and low CO/CI concerning for low output state/cardiogenic shock.     Neuro/ Psych:  # Hx of cardioembolic stroke: (12/2022)  - no residual symptoms, AO x 3 on arrival  - Takes xarelto 20 mg at home   - holding xarelto inpatient, will start on heparin gtt  - Serial neuro assessments   - PO Tylenol PRN for pain  - PT/OT Consult, OOB to chair  - CAM ICU score every shift    # Anxiety/depression:  - continue home zoloft 100 mg daily  - mood normal on admission  - - Sleep/wake cycle normalization    # Physical Status  - Morbid Obesity  - no Age-related debility/bed confined       #Substance abuse  -Alcohol abuse: reports occasional binge drinking but denies withdrawal   -Tobacco use/Nicotine Dependence: occasional cigarette smoker, previous hx of heavy alcohol use     Cardiovascular:  # NICM HFrEF (EF 5-10%, TTE 02/2024)- combined systolic and diastolic dysnfuction  # Acute decompensated heart failure  # Low cardiac output state +/- cardiogenic shock   - diagnosed since 2022  - [facility] in 2022 with no evidence of angiographically obstructive CAD  - TTE 02/2024:  Left ventricular systolic function is severely decreased with a 10% estimated ejection fraction. Spectral Doppler shows a pseudonormal pattern of left ventricular diastolic filling. The left atrium is mild to moderately dilated. Mild MR & TR. Left ventricular cavity size is moderately dilated. There is global hypokinesis of the left ventricle with minor regional variations.  - presenting with preogressively worsening Dyspnea at rest and KEY, orthopnea, PND and generalized weakness over months. Elective RHC showed elevated R and L sided filing pressure and low cardiac output/index: RA 24, RVSP 63, PA 64/40 (48), W 41, /90 (101) mmHg. 3. CO/CI [Evelia]: 4.4 / 1.7. 4. CO/CI [TD]: 3.47 / 1.4. 5. SVR: 1775 cgs, PVR: 2.0 COLON, /74.  - Repeat SGC #s on  admission: /72 (82), PAP 56/43(47), CVP 8, SVR 1357, CI 1.76  - admit weight: ordered  - admit BNP: 127 (morbidly obese)  - ASA/Statin: cont lipitor 40 mg, not on ASA   - continue entresto > reduce dose 12/13 mg BID as BP slightly soft.   - BB, MRA, SGLT2i: continue spironolactone 12.5, farxiga 10 mg. Holding Coreg in setting of low output state.   - Diuretics: ordered lasix 40 mg IV Q12 hrs (takes torsemide 20 mg daily at home)  - Daily standing weights, 2gm sodium diet, 2L fluid restriction, strict I&Os    Pulmonary:   -No Hx of known pulmonary disease/Pulmonary Hypertension  -History of smoking: prn nicotine patches    -Monitor and maintain SpO2 > 92%  -CXR with pulmonary vascular congestion, bilateral pulm edema no consolidation.   -diuresis plan as above    GI:  - no known GI issues   - Bowel regimen: prn Miralax + senna      :  # No known renal issues   -Baseline BUN/Cr 15/1.1  -Admit BUN/Cr 20/1.1  -I/Os  -avoid hypotension and nephrotoxic agents    Heme:  #no known hematology issues   - Labs: CBC, TIBC, ferritin, serum Fe, folate, B12 - all normal    - CTM     Endo:  #DM type 2 (uncontrolled)   - hgbA1c : 13.3 (07 /2024)  - home regimen: Insulin lantus 55 U qpm, lispro 16 U TID means, Trulicity 1.5 q weekly, metformin 1000 mg BID  - Inpatient regimen: Lantus 55 qpm, Lispro 16 U TID meals, high dose sliding scale, holding metformin and Trulicity   - BGM AC & HS  - Consistent carbs diet    #Thyroid  -TSH:2.29       ID:  - Recent abdominal wall abscess  - Recently seen in ER for abd wall abscess 2.3 cm x 3.5 cm x 2 cm s/p I& D and started on bactrim DS 1 tab BID x 7 days   -afebrile, nontoxic   -no s/s infx  -trend temps q4h    PHYSICAL AND OCCUPATIONAL THERAPY: ordered     LINES:  PIVs   [place]-thuy       DVT: heparin gtt  CENTRAL LINE BUNDLE: [place] thuy  GLYCEMIC CONTROL: insulin + sliding scale ordered  BOWEL CARE: Miralax prn ordered  NUTRITION: Adult diet Cardiac; 70 gm fat; 2 - 3 grams  Sodium      EMERGENCY CONTACT: Extended Emergency Contact Information  Primary Emergency Contact: FREDDY LEWIS  Mobile Phone: [phone]  Relation: Spouse  Preferred language: English   needed? No  FAMILY UPDATE:  CODE STATUS: Full Code  DISPO:     Patient seen and assessed with MD Cara Walters MD, MS  Heart Failure Fellow,  [facility]

## 2024-07-18 NOTE — NURSING NOTE
Report called to Rinku VERNON for MICU #[id number].  Patient transported to [facility] ICU via the critical care transport team.  VSS on room air.  Patient at baseline mentation.  IV left in place.  8.5F swan in RIJ at 60cm with distal and Injectate ports to pressure bag.

## 2024-07-19 ENCOUNTER — APPOINTMENT (OUTPATIENT)
Dept: CARDIAC REHAB | Facility: HOSPITAL | Age: 39
End: 2024-07-19
Payer: COMMERCIAL

## 2024-07-19 ENCOUNTER — APPOINTMENT (OUTPATIENT)
Dept: CARDIOLOGY | Facility: HOSPITAL | Age: 39
End: 2024-07-19
Payer: COMMERCIAL

## 2024-07-19 ENCOUNTER — APPOINTMENT (OUTPATIENT)
Dept: RADIOLOGY | Facility: HOSPITAL | Age: 39
End: 2024-07-19
Payer: COMMERCIAL

## 2024-07-19 DIAGNOSIS — I42.8 NONISCHEMIC CARDIOMYOPATHY (MULTI): Primary | ICD-10-CM

## 2024-07-19 LAB
ALBUMIN SERPL BCP-MCNC: 3.7 G/DL (ref 3.4–5)
ANION GAP BLDMV CALCULATED.4IONS-SCNC: 13 MMO/L (ref 10–25)
ANION GAP BLDMV CALCULATED.4IONS-SCNC: 9 MMO/L (ref 10–25)
ANION GAP SERPL CALC-SCNC: 15 MMOL/L (ref 10–20)
AORTIC VALVE PEAK VELOCITY: 0.96 M/S
ATRIAL RATE: 91 BPM
AV PEAK GRADIENT: 3.7 MMHG
AVA (PEAK VEL): 3.1 CM2
BASE EXCESS BLDMV CALC-SCNC: 4.2 MMOL/L (ref -2–3)
BASE EXCESS BLDMV CALC-SCNC: 4.3 MMOL/L (ref -2–3)
BASE EXCESS BLDMV CALC-SCNC: 5 MMOL/L (ref -2–3)
BASE EXCESS BLDMV CALC-SCNC: 5.8 MMOL/L (ref -2–3)
BASE EXCESS BLDMV CALC-SCNC: 8.6 MMOL/L (ref -2–3)
BODY TEMPERATURE: 37 DEGREES CELSIUS
BUN SERPL-MCNC: 21 MG/DL (ref 6–23)
CA-I BLDMV-SCNC: 1.12 MMOL/L (ref 1.1–1.33)
CA-I BLDMV-SCNC: 1.19 MMOL/L (ref 1.1–1.33)
CALCIUM SERPL-MCNC: 8.7 MG/DL (ref 8.6–10.6)
CHLORIDE BLD-SCNC: 96 MMOL/L (ref 98–107)
CHLORIDE BLD-SCNC: 99 MMOL/L (ref 98–107)
CHLORIDE SERPL-SCNC: 96 MMOL/L (ref 98–107)
CO2 SERPL-SCNC: 29 MMOL/L (ref 21–32)
CREAT SERPL-MCNC: 1.31 MG/DL (ref 0.5–1.3)
EGFRCR SERPLBLD CKD-EPI 2021: 72 ML/MIN/1.73M*2
EJECTION FRACTION APICAL 4 CHAMBER: 24.5
EJECTION FRACTION: 10 %
GLUCOSE BLD MANUAL STRIP-MCNC: 153 MG/DL (ref 74–99)
GLUCOSE BLD MANUAL STRIP-MCNC: 170 MG/DL (ref 74–99)
GLUCOSE BLD MANUAL STRIP-MCNC: 206 MG/DL (ref 74–99)
GLUCOSE BLD MANUAL STRIP-MCNC: 220 MG/DL (ref 74–99)
GLUCOSE BLD MANUAL STRIP-MCNC: 242 MG/DL (ref 74–99)
GLUCOSE BLD MANUAL STRIP-MCNC: 252 MG/DL (ref 74–99)
GLUCOSE BLD MANUAL STRIP-MCNC: 405 MG/DL (ref 74–99)
GLUCOSE BLD-MCNC: 184 MG/DL (ref 74–99)
GLUCOSE BLD-MCNC: 272 MG/DL (ref 74–99)
GLUCOSE SERPL-MCNC: 249 MG/DL (ref 74–99)
HCO3 BLDMV-SCNC: 29.8 MMOL/L (ref 22–26)
HCO3 BLDMV-SCNC: 30.3 MMOL/L (ref 22–26)
HCO3 BLDMV-SCNC: 30.6 MMOL/L (ref 22–26)
HCO3 BLDMV-SCNC: 31 MMOL/L (ref 22–26)
HCO3 BLDMV-SCNC: 34.1 MMOL/L (ref 22–26)
HCT VFR BLD EST: 42 % (ref 41–52)
HCT VFR BLD EST: 43 % (ref 41–52)
HGB BLDMV-MCNC: 13.9 G/DL (ref 13.5–17.5)
HGB BLDMV-MCNC: 14.4 G/DL (ref 13.5–17.5)
INHALED O2 CONCENTRATION: 21 %
LACTATE BLDMV-SCNC: 0.7 MMOL/L (ref 0.4–2)
LACTATE BLDMV-SCNC: 1.3 MMOL/L (ref 0.4–2)
LEFT ATRIUM VOLUME AREA LENGTH INDEX BSA: 24.5 ML/M2
LEFT VENTRICLE INTERNAL DIMENSION DIASTOLE: 7.4 CM (ref 3.5–6)
LEFT VENTRICULAR OUTFLOW TRACT DIAMETER: 2.3 CM
MAGNESIUM SERPL-MCNC: 1.98 MG/DL (ref 1.6–2.4)
MITRAL VALVE E/A RATIO: 1.84
MITRAL VALVE E/E' RATIO: 13.76
OXYHGB MFR BLDMV: 53.9 % (ref 45–75)
OXYHGB MFR BLDMV: 56.4 % (ref 45–75)
OXYHGB MFR BLDMV: 56.9 % (ref 45–75)
OXYHGB MFR BLDMV: 57.4 % (ref 45–75)
OXYHGB MFR BLDMV: 57.9 % (ref 45–75)
P AXIS: 73 DEGREES
P OFFSET: 175 MS
P ONSET: 118 MS
PCO2 BLDMV: 44 MM HG (ref 41–51)
PCO2 BLDMV: 47 MM HG (ref 41–51)
PCO2 BLDMV: 49 MM HG (ref 41–51)
PCO2 BLDMV: 50 MM HG (ref 41–51)
PCO2 BLDMV: 50 MM HG (ref 41–51)
PH BLDMV: 7.39 PH (ref 7.33–7.43)
PH BLDMV: 7.4 PH (ref 7.33–7.43)
PH BLDMV: 7.41 PH (ref 7.33–7.43)
PH BLDMV: 7.45 PH (ref 7.33–7.43)
PH BLDMV: 7.45 PH (ref 7.33–7.43)
PHOSPHATE SERPL-MCNC: 3.8 MG/DL (ref 2.5–4.9)
PO2 BLDMV: 35 MM HG (ref 35–45)
PO2 BLDMV: 35 MM HG (ref 35–45)
PO2 BLDMV: 36 MM HG (ref 35–45)
PO2 BLDMV: 36 MM HG (ref 35–45)
PO2 BLDMV: 37 MM HG (ref 35–45)
POTASSIUM BLDMV-SCNC: 3.7 MMOL/L (ref 3.5–5.3)
POTASSIUM BLDMV-SCNC: 4.2 MMOL/L (ref 3.5–5.3)
POTASSIUM SERPL-SCNC: 4 MMOL/L (ref 3.5–5.3)
PR INTERVAL: 190 MS
Q ONSET: 213 MS
QRS COUNT: 15 BEATS
QRS DURATION: 142 MS
QT INTERVAL: 428 MS
QTC CALCULATION(BAZETT): 526 MS
QTC FREDERICIA: 492 MS
R AXIS: -76 DEGREES
RIGHT VENTRICLE FREE WALL PEAK S': 10.2 CM/S
SAO2 % BLDMV: 55 % (ref 45–75)
SAO2 % BLDMV: 58 % (ref 45–75)
SAO2 % BLDMV: 58 % (ref 45–75)
SAO2 % BLDMV: 59 % (ref 45–75)
SAO2 % BLDMV: 59 % (ref 45–75)
SODIUM BLDMV-SCNC: 135 MMOL/L (ref 136–145)
SODIUM BLDMV-SCNC: 135 MMOL/L (ref 136–145)
SODIUM SERPL-SCNC: 136 MMOL/L (ref 136–145)
T AXIS: 62 DEGREES
T OFFSET: 427 MS
TRICUSPID ANNULAR PLANE SYSTOLIC EXCURSION: 2.1 CM
UFH PPP CHRO-ACNC: 0.1 IU/ML
UFH PPP CHRO-ACNC: 0.2 IU/ML
UFH PPP CHRO-ACNC: 0.2 IU/ML
UFH PPP CHRO-ACNC: 0.3 IU/ML
UFH PPP CHRO-ACNC: 0.4 IU/ML
VENTRICULAR RATE: 91 BPM

## 2024-07-19 PROCEDURE — 99253 IP/OBS CNSLTJ NEW/EST LOW 45: CPT

## 2024-07-19 PROCEDURE — 2500000004 HC RX 250 GENERAL PHARMACY W/ HCPCS (ALT 636 FOR OP/ED): Performed by: NURSE PRACTITIONER

## 2024-07-19 PROCEDURE — 93306 TTE W/DOPPLER COMPLETE: CPT

## 2024-07-19 PROCEDURE — 2500000004 HC RX 250 GENERAL PHARMACY W/ HCPCS (ALT 636 FOR OP/ED): Performed by: STUDENT IN AN ORGANIZED HEALTH CARE EDUCATION/TRAINING PROGRAM

## 2024-07-19 PROCEDURE — 97161 PT EVAL LOW COMPLEX 20 MIN: CPT | Mod: GP

## 2024-07-19 PROCEDURE — 2500000002 HC RX 250 W HCPCS SELF ADMINISTERED DRUGS (ALT 637 FOR MEDICARE OP, ALT 636 FOR OP/ED): Performed by: STUDENT IN AN ORGANIZED HEALTH CARE EDUCATION/TRAINING PROGRAM

## 2024-07-19 PROCEDURE — 84132 ASSAY OF SERUM POTASSIUM: CPT | Performed by: STUDENT IN AN ORGANIZED HEALTH CARE EDUCATION/TRAINING PROGRAM

## 2024-07-19 PROCEDURE — 2020000001 HC ICU ROOM DAILY

## 2024-07-19 PROCEDURE — 85520 HEPARIN ASSAY: CPT | Performed by: STUDENT IN AN ORGANIZED HEALTH CARE EDUCATION/TRAINING PROGRAM

## 2024-07-19 PROCEDURE — 37799 UNLISTED PX VASCULAR SURGERY: CPT | Performed by: STUDENT IN AN ORGANIZED HEALTH CARE EDUCATION/TRAINING PROGRAM

## 2024-07-19 PROCEDURE — 71045 X-RAY EXAM CHEST 1 VIEW: CPT

## 2024-07-19 PROCEDURE — 2500000001 HC RX 250 WO HCPCS SELF ADMINISTERED DRUGS (ALT 637 FOR MEDICARE OP): Performed by: NURSE PRACTITIONER

## 2024-07-19 PROCEDURE — 93306 TTE W/DOPPLER COMPLETE: CPT | Performed by: INTERNAL MEDICINE

## 2024-07-19 PROCEDURE — 71045 X-RAY EXAM CHEST 1 VIEW: CPT | Performed by: RADIOLOGY

## 2024-07-19 PROCEDURE — 2500000004 HC RX 250 GENERAL PHARMACY W/ HCPCS (ALT 636 FOR OP/ED)

## 2024-07-19 PROCEDURE — 82805 BLOOD GASES W/O2 SATURATION: CPT | Performed by: STUDENT IN AN ORGANIZED HEALTH CARE EDUCATION/TRAINING PROGRAM

## 2024-07-19 PROCEDURE — 99291 CRITICAL CARE FIRST HOUR: CPT | Performed by: STUDENT IN AN ORGANIZED HEALTH CARE EDUCATION/TRAINING PROGRAM

## 2024-07-19 PROCEDURE — 83735 ASSAY OF MAGNESIUM: CPT | Performed by: STUDENT IN AN ORGANIZED HEALTH CARE EDUCATION/TRAINING PROGRAM

## 2024-07-19 PROCEDURE — 82947 ASSAY GLUCOSE BLOOD QUANT: CPT

## 2024-07-19 PROCEDURE — 2500000001 HC RX 250 WO HCPCS SELF ADMINISTERED DRUGS (ALT 637 FOR MEDICARE OP): Performed by: STUDENT IN AN ORGANIZED HEALTH CARE EDUCATION/TRAINING PROGRAM

## 2024-07-19 RX ORDER — FUROSEMIDE 10 MG/ML
40 INJECTION INTRAMUSCULAR; INTRAVENOUS ONCE
Status: COMPLETED | OUTPATIENT
Start: 2024-07-19 | End: 2024-07-19

## 2024-07-19 RX ORDER — MAGNESIUM SULFATE HEPTAHYDRATE 40 MG/ML
2 INJECTION, SOLUTION INTRAVENOUS ONCE
Status: COMPLETED | OUTPATIENT
Start: 2024-07-19 | End: 2024-07-19

## 2024-07-19 RX ORDER — INSULIN LISPRO 100 [IU]/ML
18 INJECTION, SOLUTION INTRAVENOUS; SUBCUTANEOUS
Status: DISCONTINUED | OUTPATIENT
Start: 2024-07-19 | End: 2024-07-22

## 2024-07-19 RX ORDER — MILRINONE LACTATE 0.2 MG/ML
0.12 INJECTION, SOLUTION INTRAVENOUS CONTINUOUS
Status: DISCONTINUED | OUTPATIENT
Start: 2024-07-19 | End: 2024-07-19

## 2024-07-19 RX ORDER — MILRINONE LACTATE 0.2 MG/ML
0.25 INJECTION, SOLUTION INTRAVENOUS CONTINUOUS
Status: DISCONTINUED | OUTPATIENT
Start: 2024-07-19 | End: 2024-07-27

## 2024-07-19 RX ORDER — FUROSEMIDE 10 MG/ML
INJECTION INTRAMUSCULAR; INTRAVENOUS
Status: COMPLETED
Start: 2024-07-19 | End: 2024-07-19

## 2024-07-19 RX ORDER — LANOLIN ALCOHOL/MO/W.PET/CERES
400 CREAM (GRAM) TOPICAL DAILY
Status: COMPLETED | OUTPATIENT
Start: 2024-07-19 | End: 2024-07-22

## 2024-07-19 SDOH — ECONOMIC STABILITY: INCOME INSECURITY: HOW HARD IS IT FOR YOU TO PAY FOR THE VERY BASICS LIKE FOOD, HOUSING, MEDICAL CARE, AND HEATING?: NOT VERY HARD

## 2024-07-19 SDOH — ECONOMIC STABILITY: INCOME INSECURITY: IN THE LAST 12 MONTHS, WAS THERE A TIME WHEN YOU WERE NOT ABLE TO PAY THE MORTGAGE OR RENT ON TIME?: NO

## 2024-07-19 SDOH — ECONOMIC STABILITY: INCOME INSECURITY: IN THE PAST 12 MONTHS, HAS THE ELECTRIC, GAS, OIL, OR WATER COMPANY THREATENED TO SHUT OFF SERVICE IN YOUR HOME?: NO

## 2024-07-19 SDOH — ECONOMIC STABILITY: FOOD INSECURITY: WITHIN THE PAST 12 MONTHS, YOU WORRIED THAT YOUR FOOD WOULD RUN OUT BEFORE YOU GOT MONEY TO BUY MORE.: NEVER TRUE

## 2024-07-19 SDOH — ECONOMIC STABILITY: HOUSING INSECURITY: AT ANY TIME IN THE PAST 12 MONTHS, WERE YOU HOMELESS OR LIVING IN A SHELTER (INCLUDING NOW)?: NO

## 2024-07-19 SDOH — ECONOMIC STABILITY: TRANSPORTATION INSECURITY
IN THE PAST 12 MONTHS, HAS LACK OF TRANSPORTATION KEPT YOU FROM MEETINGS, WORK, OR FROM GETTING THINGS NEEDED FOR DAILY LIVING?: NO

## 2024-07-19 SDOH — ECONOMIC STABILITY: FOOD INSECURITY: WITHIN THE PAST 12 MONTHS, THE FOOD YOU BOUGHT JUST DIDN'T LAST AND YOU DIDN'T HAVE MONEY TO GET MORE.: NEVER TRUE

## 2024-07-19 SDOH — HEALTH STABILITY: MENTAL HEALTH
HOW OFTEN DO YOU NEED TO HAVE SOMEONE HELP YOU WHEN YOU READ INSTRUCTIONS, PAMPHLETS, OR OTHER WRITTEN MATERIAL FROM YOUR DOCTOR OR PHARMACY?: NEVER

## 2024-07-19 SDOH — ECONOMIC STABILITY: TRANSPORTATION INSECURITY
IN THE PAST 12 MONTHS, HAS THE LACK OF TRANSPORTATION KEPT YOU FROM MEDICAL APPOINTMENTS OR FROM GETTING MEDICATIONS?: NO

## 2024-07-19 ASSESSMENT — ACTIVITIES OF DAILY LIVING (ADL): ADL_ASSISTANCE: INDEPENDENT

## 2024-07-19 ASSESSMENT — COGNITIVE AND FUNCTIONAL STATUS - GENERAL
WALKING IN HOSPITAL ROOM: A LITTLE
STANDING UP FROM CHAIR USING ARMS: A LITTLE
MOBILITY SCORE: 19
CLIMB 3 TO 5 STEPS WITH RAILING: A LOT
MOVING TO AND FROM BED TO CHAIR: A LITTLE

## 2024-07-19 ASSESSMENT — PAIN - FUNCTIONAL ASSESSMENT
PAIN_FUNCTIONAL_ASSESSMENT: 0-10

## 2024-07-19 ASSESSMENT — PAIN SCALES - GENERAL
PAINLEVEL_OUTOF10: 0 - NO PAIN
PAINLEVEL_OUTOF10: 0 - NO PAIN
PAINLEVEL_OUTOF10: 6
PAINLEVEL_OUTOF10: 0 - NO PAIN

## 2024-07-19 NOTE — PROGRESS NOTES
[facility]  HFICU PROGRESS NOTE    Arthur Carranza/[id number]    Admit Date: 7/18/2024  Hospital Length of Stay: 1   ICU Length of Stay: 23h   Primary Service: advanced heart failure   Primary HF Cardiologist: : Dr. Jurado   Referring: Dr. Jurado     INTERVAL EVENTS / PERTINENT ROS:   No events overnight. He was started on milrinone 0.125 last night as his swan numbers were showing lower CI (~ 1.7). Patient did not report any new symptoms last night. This morning, he feels better than yesterday in terms of SOB, orthopnea. He denies CP, dizziness, cough, GI or  issues. Per charting, he is net negative - 5L since yesterday after lasix 40 mg IV. Broached the topic of LVAD/OHT with the patient. Unfortunately, currently he is not a candidate for heart transplantation due to morbid obesity, uncontrolled diabetes, polysubstance abuse and issues with non-compliance. However, he could still be eligible for VAD (provided approved by therapeutics committee based on VAD evaluation). Patient at this time not open to discuss VAD as he is scared of the surgery and post-surgical VAD management. Will provide him with LVAD information so that he may make an informed decision.     Plan:  - Echo today  - Continue diuresis with Lasix 40 mg IV daily  - Continue milrinone 0.125 for now  - Endocrine consult for uncontrolled diabetes management.     MEDICATIONS  Infusions:  heparin, Last Rate: 1,900 Units/hr (07/19/24 1437)  lactated Ringer's, Last Rate: 10 mL/hr (07/19/24 0403)  milrinone, Last Rate: 0.125 mcg/kg/min (07/19/24 0400)      Scheduled:  atorvastatin, 40 mg, Nightly  dapagliflozin propanediol, 10 mg, Daily  [Held by provider] furosemide, 40 mg, q12h  heparin, 4,000 Units, Once  insulin glargine, 55 Units, Nightly  insulin lispro, 0-20 Units, TID  insulin lispro, 16 Units, TID  lidocaine, 1 patch, Daily  perflutren protein A microsphere, 0.5 mL, Once in imaging  polyethylene glycol, 17 g,  "Daily  sacubitriL-valsartan, 1 tablet, BID  sertraline, 100 mg, Daily  spironolactone, 12.5 mg, Daily  sulfamethoxazole-trimethoprim, 1 tablet, q12h JAREK      PRN:  acetaminophen, 650 mg, q6h PRN  dextrose, 12.5 g, q15 min PRN  dextrose, 25 g, q15 min PRN  glucagon, 1 mg, q15 min PRN  glucagon, 1 mg, q15 min PRN  heparin, 3,000-4,000 Units, q4h PRN  oxygen, , Continuous PRN - O2/gases  traZODone, 50 mg, Nightly PRN      Invasive Hemodynamics:    Most Recent Range Past 24hrs   BP (Art)   No data recorded   MAP(Art)   No data recorded   RA/CVP   No data recorded   PA 14/5 PAP  Min: 14/5  Max: 62/45   PA(mean) 9 mmHg PAP (Mean)  Min: 9 mmHg  Max: 52 mmHg   PCWP 14 mmHg PCWP (mmHg)  Min: 14 mmHg  Max: 14 mmHg   CO 4.7 L/min CO (L/min)  Min: 3.9 L/min  Max: 5.7 L/min   CI 1.9 L/min/m2 CI (L/min/m2)  Min: 1.6 L/min/m2  Max: 2.3 L/min/m2   Mixed Venous 58 % SVO2 (%)  Min: 55 %  Max: 59 %   SVR  1349 (dyne*sec)/cm5 SVR (dyne*sec)/cm5  Min: 1287 (dyne*sec)/cm5  Max: 1450 (dyne*sec)/cm5    (dyne*sec)/cm5 PVR (dyne*sec)/cm5  Min: 145 (dyne*sec)/cm5  Max: 166 (dyne*sec)/cm5       PHYSICAL EXAM:   Visit Vitals  /69   Pulse 94   Temp 35 °C (95 °F) (Temporal)   Resp 12   Ht 1.75 m (5' 8.9\")   Wt 137 kg (302 lb 0.5 oz)   SpO2 97%   BMI 44.73 kg/m²   Smoking Status Every Day   BSA 2.58 m²       Wt Readings from Last 5 Encounters:   07/19/24 137 kg (302 lb 0.5 oz)   07/18/24 137 kg (301 lb 9.4 oz)   07/13/24 136 kg (300 lb)   07/12/24 137 kg (301 lb)   07/02/24 137 kg (301 lb 9.6 oz)       INTAKE/OUTPUT:  I/O last 3 completed shifts:  In: 466.9 (3.4 mL/kg) [P.O.:240; I.V.:226.9 (1.7 mL/kg)]  Out: 5400 (39.4 mL/kg) [Urine:5400 (1.1 mL/kg/hr)]  Weight: 137 kg      Physical Exam  Constitutional:       General: He is not in acute distress.     Appearance: He is obese.   Eyes:      Extraocular Movements: Extraocular movements intact.      Conjunctiva/sclera: Conjunctivae normal.   Cardiovascular:      Rate and Rhythm: Normal " "rate and regular rhythm.   Pulmonary:      Effort: Pulmonary effort is normal. No respiratory distress.   Abdominal:      Palpations: Abdomen is soft.      Tenderness: There is no abdominal tenderness.   Musculoskeletal:      Right lower leg: No edema.      Left lower leg: No edema.   Neurological:      General: No focal deficit present.      Mental Status: He is alert and oriented to person, place, and time. Mental status is at baseline.   Psychiatric:         Mood and Affect: Mood normal.         Behavior: Behavior normal.         DATA:  CMP:  Results from last 7 days   Lab Units 07/19/24  0519 07/18/24  1558 07/13/24  1242   SODIUM mmol/L 136 135* 132*   POTASSIUM mmol/L 4.0 4.3 3.8   CHLORIDE mmol/L 96* 100 102   CO2 mmol/L 29 25 23   ANION GAP mmol/L 15 14 11   BUN mg/dL 21 20 15   CREATININE mg/dL 1.31* 1.16 1.01   EGFR mL/min/1.73m*2 72 83 >90   MAGNESIUM mg/dL 1.98 1.90  --    ALBUMIN g/dL 3.7 3.8  --    ALT U/L  --  17  --    AST U/L  --  19  --    BILIRUBIN TOTAL mg/dL  --  0.5  --      CBC:  Results from last 7 days   Lab Units 07/18/24  1558 07/13/24  1242   WBC AUTO x10*3/uL 6.2 6.3   HEMOGLOBIN g/dL 13.6 12.3*   HEMATOCRIT % 42.3 37.1*   PLATELETS AUTO x10*3/uL 241 206   MCV fL 98 96     COAG:   Results from last 7 days   Lab Units 07/18/24  1558   INR  1.1     ABO: No results found for: \"ABO\"  HEME/ENDO:  Results from last 7 days   Lab Units 07/18/24  1558   FERRITIN ng/mL 234   IRON SATURATION % 28   TSH mIU/L 2.29   HEMOGLOBIN A1C % 13.3*      CARDIAC:   Results from last 7 days   Lab Units 07/18/24  1558   TROPHSCMC ng/L 59*   BNP pg/mL 127*       ASSESSMENT AND PLAN:       39 YO Male with hx of NICM/HFrEF (EF 5-10% on echo fev 2024), initially diagnosed 10/2022, HTN, HLD, hypertriglyceridemia, Type II DM (poorly controlled A1c 13.7%, on insulin), severe obesity (BMI 44), 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 after his elective RHC showed high right and left sided filling pressures and low CO/CI concerning for low output state/cardiogenic shock.      Neuro/ Psych:  # Hx of cardioembolic stroke: (12/2022)  - no residual symptoms, AO x 3 on arrival  - Takes xarelto 20 mg at home   - holding xarelto inpatient, will start on heparin gtt  - Serial neuro assessments   - PO Tylenol PRN for pain  - PT/OT Consult, OOB to chair  - CAM ICU score every shift     # Anxiety/depression:  - continue home zoloft 100 mg daily  - mood normal on admission  - - Sleep/wake cycle normalization     # Physical Status  - Morbid Obesity  - no Age-related debility/bed confined        #Substance abuse  -Alcohol abuse: reports occasional binge drinking but denies withdrawal   -Tobacco use/Nicotine Dependence: occasional cigarette smoker, previous hx of heavy alcohol use      Cardiovascular:  # NICM HFrEF (EF 5-10%, TTE 02/2024)- combined systolic and diastolic dysnfuction  # Acute decompensated heart failure  # Low cardiac output state +/- cardiogenic shock   - diagnosed since 2022  - [facility] in 2022 with no evidence of angiographically obstructive CAD  - TTE 07/2024:  CONCLUSIONS:   1. Poorly visualized anatomical structures due to suboptimal image quality.   2. Left ventricular ejection fraction is severely decreased, by visual estimate at 10%.   3. Spectral Doppler shows a pseudonormal pattern of left ventricular diastolic filling.   4. Left ventricular cavity size is severely dilated (LVIDd 7.4 cm)   5. There is normal right ventricular global systolic function.    - He is presenting with preogressively worsening Dyspnea at rest and KEY, orthopnea, PND and generalized weakness over months. Elective RHC showed elevated R and L sided filing pressure and low cardiac output/index: RA 24, RVSP 63, PA 64/40 (48), W 41, /90 (101) mmHg. 3. CO/CI [Evelia]:  4.4 / 1.7. 4. CO/CI [TD]: 3.47 / 1.4. 5. SVR: 1775 cgs, PVR: 2.0 COLON, /74.  - Repeat SGC #s on admission: /72 (82), PAP 56/43(47), CVP 8, SVR 1357, CI 1.76  - admit weight: ordered  - admit BNP: 127 (low as morbidly obese)  - ASA/Statin: cont lipitor 40 mg, not on ASA   - continue entresto. Currently on 24/26 mg (dose increased from 12/13 last night due to slightly high SVR on serial hemodynamics via swan)   - BB, MRA, SGLT2i: continue spironolactone 12.5, farxiga 10 mg. Holding Coreg in setting of low output state.   - Diuretics: ordered lasix 40 mg IV Q12 hrs (takes torsemide 20 mg daily at home)  - Daily standing weights, 2gm sodium diet, 2L fluid restriction, strict I&Os     Pulmonary:   -No Hx of known pulmonary disease/Pulmonary Hypertension  -History of smoking: prn nicotine patches    -Monitor and maintain SpO2 > 92%  -CXR with pulmonary vascular congestion, bilateral pulm edema no consolidation.   -diuresis plan as above     GI:  - no known GI issues   - Bowel regimen: prn Miralax + senna      :  # No known renal issues   -Baseline BUN/Cr 15/1.1  -Admit BUN/Cr 20/1.1  -I/Os  -avoid hypotension and nephrotoxic agents     Heme:  #no known hematology issues   - Labs: CBC, TIBC, ferritin, serum Fe, folate, B12 - all normal    - CTM      Endo:  #DM type 2 (uncontrolled)   - hgbA1c : 13.3 (07 /2024)  - home regimen: Insulin lantus 55 U qpm, lispro 18 U TID means, Trulicity 1.5 q weekly, metformin 1000 mg BID  - Inpatient regimen: Lantus 55 qpm, Lispro 16 U TID meals, high dose sliding scale, holding metformin and Trulicity   - Endocrine consulted: recommend increasing Lispro 18 U TID.   - BGM AC & HS  - Consistent carbs diet     #Thyroid  -TSH:2.29         ID:  - Recent abdominal wall abscess  - Recently seen in ER for abd wall abscess 2.3 cm x 3.5 cm x 2 cm s/p I& D and started on bactrim DS 1 tab BID x 7 days   -afebrile, nontoxic   -no s/s infx  -trend temps q4h    PHYSICAL AND OCCUPATIONAL  THERAPY:    LINES:  PIV  [place]-thuy    DVT: heparin gtt  CENTRAL LINE BUNDLE: Marshal daley  GLYCEMIC CONTROL: insulin + sliding scale ordered  BOWEL CARE: Miralax prn ordered  NUTRITION: Adult diet Cardiac; 70 gm fat; 2 - 3 grams Sodium  NUTRITION: Adult diet Cardiac, Carb Controlled; 75 gram carb/meal, 45 gram Carb evening snack; 70 gm fat; 2 - 3 grams Sodium      EMERGENCY CONTACT: Extended Emergency Contact Information  Primary Emergency Contact: FREDDY LEWIS  Address: [address] [place] [address] [address]           [place] [id number] [facility]  Home Phone: [phone]  Mobile Phone: [phone]  Relation: Spouse  Preferred language: English   needed? No  FAMILY UPDATE:  CODE STATUS: Full Code  DISPO: pending clinical status    Patient seen and assessed with MD Cara Walters MD, MS  Heart Failure Fellow,  [facility]

## 2024-07-19 NOTE — CARE PLAN
Problem: Fall/Injury  Goal: Not fall by end of shift  Outcome: Progressing  Goal: Be free from injury by end of the shift  Outcome: Progressing  Goal: Verbalize understanding of personal risk factors for fall in the hospital  Outcome: Progressing  Goal: Verbalize understanding of risk factor reduction measures to prevent injury from fall in the home  Outcome: Progressing  Goal: Pace activities to prevent fatigue by end of the shift  Outcome: Progressing     Problem: Pain - Adult  Goal: Verbalizes/displays adequate comfort level or baseline comfort level  Outcome: Progressing     Problem: Safety - Adult  Goal: Free from fall injury  Outcome: Progressing     Problem: Discharge Planning  Goal: Discharge to home or other facility with appropriate resources  Outcome: Progressing     Problem: Chronic Conditions and Co-morbidities  Goal: Patient's chronic conditions and co-morbidity symptoms are monitored and maintained or improved  Outcome: Progressing

## 2024-07-19 NOTE — PROGRESS NOTES
Physical Therapy    Physical Therapy Evaluation    Patient Name: Arthur Carranza  MRN: [id number]  Today's Date: 7/19/2024   Time Calculation  Start Time: 1115  Stop Time: 1132  Time Calculation (min): 17 min    Assessment/Plan   PT Assessment  PT Assessment Results: Decreased strength, Decreased range of motion, Decreased endurance, Decreased mobility  Rehab Prognosis: Good  End of Session Communication: Bedside nurse  End of Session Patient Position: Bed, 2 rail up, Alarm off, caregiver present (Seated EOB)  IP OR SWING BED PT PLAN  Inpatient or Swing Bed: Inpatient  PT Plan  Treatment/Interventions: Transfer training, Bed mobility, Gait training, Balance training, Strengthening, Endurance training, Range of motion, Therapeutic exercise, Therapeutic activity  PT Plan: Ongoing PT  PT Frequency: 2 times per week  PT Discharge Recommendations: Other (comment) (Outpatient cardiac rehab)  PT Recommended Transfer Status: Contact guard  PT - OK to Discharge: Yes    Subjective     General Visit Information:  General  Reason for Referral: Transferred from [facility] s/p elective RHC where he showed high right and left sided filling pressures and low CO/CI concerning for low output state/cardiogenic shock. S/p right heart cath 7/18  Past Medical History Relevant to Rehab: NCIM/HFrEH, HTN, HLD, hypertriglyceridemia, T2DM, severe obesity, cardioembolic stroke with no residual symptoms, intermitent tobacco use, cocaine use, alcohol use, hx of noncompliance with medicines and dr appointments.  Family/Caregiver Present: Yes  Caregiver Feedback: Mother and wife present and left mid session. Both supportive of therapy.  Prior to Session Communication: Bedside nurse  Patient Position Received: Bed, 2 rail up, Alarm off, caregiver present (Seated EOB)  General Comment: Pt pleasant and cooperative to PT. Telemetry, swan-thuy catheter, heparin gtt, milirinone .125mcg    Home Living:  Home Living  Type of Home: Apartment  Lives With:  Parent(s) (Mother)  Home Adaptive Equipment: Walker rolling or standard (Has available but does not use)  Home Layout: One level  Home Access: Elevator    Prior Level of Function:  Prior Function Per Pt/Caregiver Report  Level of [place]: Independent with ADLs and functional transfers  Receives Help From: Parent(s), Family  ADL Assistance: Independent  Ambulatory Assistance: Independent (Denies falls, community ambulator, no assistive devices, (+) drives, able to walk long distances (around a grocery store) before taking a break)  Vocational: Part time employment ()  Leisure: Watching Aubrey shows    Precautions:  Precautions  Medical Precautions: Fall precautions, Cardiac precautions    Vital Signs:  Vital Signs  Heart Rate:  (PRE: 98 DURIN POST: 101)  Resp: 13  SpO2:  (PRE: 97 POST: 98)  BP: 96/58    Objective     Pain:  Pain Assessment  Pain Assessment: 0-10  0-10 (Numeric) Pain Score: 6 (Reported mid session during walk)  Pain Type: Acute pain  Pain Location: Hip (B hip pain, RN notified)    Cognition:  Cognition  Overall Cognitive Status: Within Functional Limits  Arousal/Alertness: Appropriate responses to stimuli  Orientation Level: Oriented X4  Following Commands: Follows all commands and directions without difficulty    General Assessments:   Activity Tolerance  Early Mobility/Exercise Safety Screen: Proceed with mobilization - No exclusion criteria met  Rate of Perceived Dyspnea (RPD): At rest 1/10    Sensation  Light Touch: Not tested    Strength  Strength Comments: BLE at least 3+/5 shown through functional activities  Strength  Strength Comments: BLE at least 3+/5 shown through functional activities    Static Sitting Balance  Static Sitting-Balance Support: Feet supported  Static Sitting-Level of Assistance: Close supervision  Dynamic Sitting Balance  Dynamic Sitting-Balance Support: Feet supported  Dynamic Sitting-Comments: Close supervision    Static Standing Balance  Static  Standing-Balance Support: No upper extremity supported  Static Standing-Level of Assistance: Contact guard  Dynamic Standing Balance  Dynamic Standing-Balance Support: No upper extremity supported  Dynamic Standing-Comments: CGA    Functional Assessments:  Transfers  Transfer: Yes  Transfer 1  Technique 1: Sit to stand, Stand to sit  Transfer Level of Assistance 1: Contact guard  Trials/Comments 1: x2    Ambulation/Gait Training  Ambulation/Gait Training Performed: Yes  Ambulation/Gait Training 1  Surface 1: Level tile  Device 1: No device  Assistance 1: Contact guard  Quality of Gait 1: Wide base of support (Lateral sway, slight ER of both legs)  Comments/Distance (ft) 1: 450ft. (RPD: 0/10; Noted stiffness and pain in legs toward end of walk. RN notified.)    Extremity/Trunk Assessments:  RLE   RLE : Within Functional Limits  LLE   LLE : Within Functional Limits    Outcome Measures:  [facility] Basic Mobility  Turning from your back to your side while in a flat bed without using bedrails: None  Moving from lying on your back to sitting on the side of a flat bed without using bedrails: None  Moving to and from bed to chair (including a wheelchair): A little  Standing up from a chair using your arms (e.g. wheelchair or bedside chair): A little  To walk in hospital room: A little  Climbing 3-5 steps with railing: A lot  Basic Mobility - Total Score: 19    Confusion Assessment Method-ICU (CAM-ICU)  Feature 1: Acute Onset or Fluctuating Course: Negative  Overall CAM-ICU: Negative    FSS-ICU  Ambulation: Walks >/ or equal to 150 feet with minimal assistance x1  Rolling: Unable to perform  Sitting: Supervision or set-up only  Transfer Sit-to-Stand: Minimal assistance (performs 75% or more of task)  Transfer Supine-to-Sit: Unable to perform  Total Score: 13    Early Mobility/Exercise Safety Screen: Proceed with mobilization - No exclusion criteria met  ICU Mobility Scale: Walking with assistance of 1 person [8]  E = Exercise  and Early Mobility  Early Mobility/Exercise Safety Screen: Proceed with mobilization - No exclusion criteria met  ICU Mobility Scale: Walking with assistance of 1 person    Encounter Problems       Encounter Problems (Active)       Balance       Pt will score >/=48 on the JEFF balance test for safe community ambulation       Start:  07/19/24    Expected End:  08/02/24               Mobility       Patient will ambulate >1000ft with no assistive device and supervision       Start:  07/19/24    Expected End:  08/02/24               PT Transfers       Patient will perform bed mobility independently       Start:  07/19/24    Expected End:  08/02/24            Patient will transfer sit to and from stand independently       Start:  07/19/24    Expected End:  08/02/24               Pain - Adult              Education Documentation  Handouts, taught by ANIKA Quintero at 7/19/2024 11:59 AM.  Learner: Patient  Readiness: Acceptance  Method: Explanation  Response: Verbalizes Understanding  Comment: RPD scale    Mobility Training, taught by ANIKA Quintero at 7/19/2024 11:59 AM.  Learner: Patient  Readiness: Acceptance  Method: Explanation  Response: Demonstrated Understanding    Education Comments  No comments found.

## 2024-07-19 NOTE — PROGRESS NOTES
Pharmacy Medication History Review    Arthur Carranza is a 38 y.o. male admitted for Acute decompensated heart failure (Multi). Pharmacy reviewed the patient's [id number] medications and allergies for accuracy.    The list below reflects the updated PTA list. Comments regarding how patient may be taking medications differently can be found in the Admit Orders Activity  Prior to Admission Medications   Prescriptions Last Dose Informant   True Metrix Glucose Test Strip strip  Self   Sig: USE TO CHECK BLOOD SUGAR FOUR TIMES DAILY   Ultra Thin Lancets 30 gauge misc  Self   Sig: TEST BLOOD SUGAR FOUR TIMES DAILY AS DIRECTED   atorvastatin (Lipitor) 40 mg tablet  Self   Sig: TAKE 1 TABLET BY MOUTH AT BEDTIME   blood-glucose meter,continuous (FreeStyle Nadir 3 Cutler) misc  Self   Sig: Use as instructed to test blood glucose throughout the day   blood-glucose sensor (FreeStyle Nadir 3 Sensor) device  Self   Sig: Use as directed to test blood glucose throughout the day   buPROPion XL (Wellbutrin XL) 150 mg 24 hr tablet  Self   Sig: Take 1 tablet (150 mg) by mouth once daily.   carvedilol (Coreg) 6.25 mg tablet  Self   Sig: Take 1 tablet (6.25 mg) by mouth 2 times daily (morning and late afternoon).   dapagliflozin propanediol (Farxiga) 10 mg  Self   Sig: Take 1 tablet (10 mg) by mouth once daily.   dulaglutide (Trulicity) 1.5 mg/0.5 mL pen injector injection  Self   Sig: Inject 1.5 mg under the skin 1 (one) time per week.   insulin glargine (Lantus Solostar U-100 Insulin) 100 unit/mL (3 mL) pen  Self   Sig: Inject 55 Units under the skin once daily at bedtime. Take as directed per insulin instructions.   Patient taking differently: Inject 60 Units under the skin once daily at bedtime. Take as directed per insulin instructions.   insulin lispro (HumaLOG) 100 unit/mL injection  Self   Sig: Inject 16 Units under the skin 3 times a day with meals. Take as directed per insulin instructions.   metFORMIN (Glucophage) 1,000  "mg tablet  Self   Sig: Take 1 tablet (1,000 mg) by mouth 2 times a day.   pen needle, diabetic 31 gauge x 5/16\" needle  Self   Sig: Use to inject 1-4 times daily as directed.   rivaroxaban (Xarelto) 20 mg tablet  Self   Sig: Take 1 tablet (20 mg) by mouth once daily in the evening. Take with meals. Take with food.   sacubitriL-valsartan (Entresto) 49-51 mg tablet  Self   Sig: Take 1 tablet by mouth 2 times a day.   sertraline (Zoloft) 100 mg tablet  Self   Sig: Take 1 tablet (100 mg) by mouth once daily.   spironolactone (Aldactone) 25 mg tablet  Self   Sig: Take 0.5 tablets (12.5 mg) by mouth once daily. Do not start before February 24, 2024.   sulfamethoxazole-trimethoprim (Bactrim DS) 800-160 mg tablet  Self   Sig: Take 1 tablet by mouth every 12 hours for 10 days.   torsemide (Demadex) 20 mg tablet  Self   Sig: Take 1 tablet (20 mg) by mouth once daily.   traZODone (Desyrel) 50 mg tablet  Self   Sig: Take 1 tablet (50 mg) by mouth as needed at bedtime for sleep.      Facility-Administered Medications: None        The list below reflects the updated allergy list. Please review each documented allergy for additional clarification and justification.  Allergies  Reviewed by Janna Wilkins RN on 7/18/2024        Severity Reactions Comments    Shellfish Containing Products Not Specified Unknown             Patient declines M2B at discharge. Pharmacy has been updated to [facility] on [id number] [facility].    Sources:    -patient interview  -outpatient pharmacy dispense history  -Care Everywhere medication lists  -OARRS  -diabetes office visit with pharmacist from 7/17  -cariology office visit 7/2 with Dr. Jurado    Below are additional concerns with the patient's PTA list:    Patient seems noncompliant with most of his medications.  The only medications filled recently/in date are atorvastatin, trulicity, and xarelto.  Entresto is only slightly past due with refill (last fill 5/22 x 30 day supply)    The " rest of the medications have last fill dates in October 2023 or Feb 2023 x 30 day suppllies    Elroy Fuentes PharmD  Transitions of Care Pharmacist  [facility]  Please reach out via Secure Chat for questions, or if no response call [phone] or [facility]

## 2024-07-19 NOTE — PROGRESS NOTES
"Patient is a 38-year-old gentleman with a medical history of nonischemic cardiomyopathy, poorly controlled type tumor diverticulitis, severe obesity, prior cardio embolic CVA, prior polysubstance use, intermittent alcohol use who presented after \"cold and wet\" hemodynamics noted on right heart catheterization.  Symptomatically he notes New York Heart Association class IV symptoms with dyspnea with minimal exertion.  Has some lower extremity edema.  Echocardiogram from today shows severe global hypokinesis of the left ventricle with an EF of 10%, left ventricular size appears to be normal; function is not well-visualized  PA catheter numbers this morning CVP 12, cardiac index 1.9    Plan:  -Overall his blood pressure appears to be relatively preserved; will attempt afterload reduction with Entresto; diuresis with Lasix and will uptitrate Entresto as tolerated  -In the long-term: He has features of stage D HFrEF, with a markedly dilated left ventricle, and a peak VO2 predicted that is less than 50% based on his age gender height nomogram.  He has also had multiple hospitalizations for congestive heart failure in the past year.  -I offered him an evaluation for LVAD.  Patient at this time seems to be extremely resistant.  He has numerous barriers including morbid obesity, recurrent infections, uncontrolled diabetes mellitus, spotty compliance.    This critically ill patient continues to be at-risk for clinically significant deterioration / failure due to the above mentioned dysfunctional, unstable organ systems.  I have personally identified and managed all complex critical care issues to prevent aforementioned clinical deterioration.  Critical care time is spent at bedside and/or the immediate area and has included, but is not limited to, the review of diagnostic tests, labs, radiographs, serial assessments of hemodynamics, respiratory status, ventilatory management, and family updates.  Time spent in procedures and " teaching are reported separately.    Critical care time: 40 minutes    Avery Gottlieb MD  Advanced Heart Failure/Transplant Cardiology  Cardio-Oncology  [facility]

## 2024-07-19 NOTE — PROGRESS NOTES
Social Work Transitional Care Note   - ICU TREATMENT PLAN: Patient was transferred from [facility] after his elective RHC showed high right and left sided filling pressures and low CO/CI concerning for low output state/cardiogenic shock.  - Payer: [facility]  -Support System: Spouse Era is listed as NOK.   - Planned Disposition: Pending medical outcome. Rehab recommends Low Intensity.  - Additional Information: SDOH and Social Work Discharge Planning assessments were completed with the patient. There were no SDOH problems identified at this time.  - Barriers to discharge: None at this time. SW will continue to follow.  MOSES PLASCENCIA

## 2024-07-19 NOTE — CONSULTS
Inpatient consult to endocrinology  Consult performed by: Jasmin Ascencio MD  Consult ordered by: Cara Toribio MD          Reason For Consult  Diabetes management     History Of Present Illness  Arthur Carranza is a 38 y.o. male presenting with T2DM in setting of ICU admission for cardiogenic shock. His A1c has been at least 13% as an outpatient and the patient has a known history of noncompliance. His home regimen that was started 7/17/24 was Lantus 60 qpm, lispro 16 TID with meals, Farxiga 10 mg daily, Trulicity 1.5 weekly, and metformin 1000 mg BD. When asked about this regimen, the patient states that he takes his morning medications and his nightly insulin dse, but often dose not take his lispro with meals because he is driving around and not near a refrigerator to store his insulin. Our team explained to him that he can use his insulin at room temperature when it is opened. He does not have issues in getting his medication from the pharmacy. Per the patient, he is not on any specific diet as an outpatient even though is A1c is not controlled. He states that healthy food is expensive for him.       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 (Multi)  2024-01: Hyperglycemia due to diabetes mellitus (Multi)  2023-12: Type 2 diabetes mellitus with hyperglycemia (Multi)  2023-03: Type 2 diabetes mellitus with neurologic complication, with   long-term current use of insulin (Multi)      History of DKA with Dates:   None      History where DKA was Reason for Admission in last year  N/A        Insulin administered via shot    Insulin Dose: 60 lantus qpm, 16 lispro with each meal       Endocrinology is being consulted for DM management     A1c 13.3%  DM Home meds- MDI / pump   Creatinine 1.1  Accuchecks/ CGM       Diet - 75 gram carb/meal  Tube feeding - No  Steroids - No      Past Medical History  He has a past medical history of CHF (congestive heart failure) (Multi), Diabetes mellitus (Multi), Heart disease, Hypertension, and Substance abuse (Multi).    Surgical History  He has a past surgical history that includes Other surgical history (07/21/2022); Other surgical history (10/25/2022); MR angio head wo IV contrast (12/29/2022); and Cardiac catheterization (N/A, 7/18/2024).     Social History  He reports that he has been smoking cigarettes. He does not have any smokeless tobacco history on file. He reports current alcohol use. He reports that he does not currently use drugs after having used the following drugs: Codeine.    Family History  Family History   Adopted: Yes        Allergies  Shellfish containing products    ROS, PMH, FH/SH, surgical history and allergies have been reviewed.    10 pt ROS negative except as mentioned above     Physical Exam  Constitutional:       Appearance: Obese.  HENT:      Head: Normocephalic and atraumatic.      Nose: Nose normal.      Mouth/Throat:      Mouth: Mucous membranes are moist.   Eyes:      Extraocular Movements: Extraocular movements intact.   Cardiovascular:      Rate and Rhythm: Normal rate. Regular rhythm. Distant S1 and S2. No murmurs auscultated.  Pulmonary:      Effort: Pulmonary effort is normal. No respiratory distress.   Abdominal:      General: Abdomen is flat.      Palpations: Abdomen is soft.   Skin:     General: Skin is warm and dry.   Neurological:      General: No focal deficit present.      Mental Status: He is alert.   Psychiatric:         Mood and Affect: Mood normal.        Last Recorded Vitals  Blood pressure 102/77, pulse 80,  "temperature 35.4 °C (95.7 °F), temperature source Temporal, resp. rate 12, height 1.75 m (5' 8.9\"), weight 137 kg (302 lb 0.5 oz), SpO2 94%.    Relevant Results  Current Facility-Administered Medications on File Prior to Encounter   Medication Dose Route Frequency Provider Last Rate Last Admin    [DISCONTINUED] acetaminophen (Tylenol) oral liquid 650 mg  650 mg nasogastric tube q6h PRN GEE Villanueva        [DISCONTINUED] acetaminophen (Tylenol) suppository 650 mg  650 mg rectal q6h PRN GEE Villanueva        [DISCONTINUED] acetaminophen (Tylenol) tablet 650 mg  650 mg oral q6h PRN GEE Villanueva   650 mg at 07/18/24 1220    [DISCONTINUED] dextrose 50 % injection 12.5 g  12.5 g intravenous q15 min PRN GEE Villanueva        [DISCONTINUED] dextrose 50 % injection 25 g  25 g intravenous q15 min PRN GEE Villanueva        [DISCONTINUED] fentaNYL PF (Sublimaze) injection    PRN Delroy Jurado MD   50 mcg at 07/18/24 1021    [DISCONTINUED] glucagon (Glucagen) injection 1 mg  1 mg intramuscular q15 min PRN GEE Villanueva        [DISCONTINUED] glucagon (Glucagen) injection 1 mg  1 mg intramuscular q15 min PRN GEE Villanueva        [DISCONTINUED] lidocaine (Xylocaine) 20 mg/mL (2 %) injection    PRN Delroy Jurado MD   5 mL at 07/18/24 1024    [DISCONTINUED] midazolam (Versed) injection    PRN Delroy Jurado MD   1 mg at 07/18/24 1021    [DISCONTINUED] oxygen (O2) therapy   inhalation Continuous - 02/gases GEE Villanueva         Current Outpatient Medications on File Prior to Encounter   Medication Sig Dispense Refill    atorvastatin (Lipitor) 40 mg tablet TAKE 1 TABLET BY MOUTH AT BEDTIME 30 tablet 3    blood-glucose meter,continuous (FreeStyle Nadir 3 [place]) [facility] [place] as instructed to test blood glucose throughout the day 1 each 0    blood-glucose sensor (FreeStyle Nadir 3 Sensor) device " "Use as directed to test blood glucose throughout the day 2 each 11    buPROPion XL (Wellbutrin XL) 150 mg 24 hr tablet Take 1 tablet (150 mg) by mouth once daily.      carvedilol (Coreg) 6.25 mg tablet Take 1 tablet (6.25 mg) by mouth 2 times daily (morning and late afternoon). 180 tablet 3    dapagliflozin propanediol (Farxiga) 10 mg Take 1 tablet (10 mg) by mouth once daily. 90 tablet 3    [START ON 7/21/2024] dulaglutide (Trulicity) 1.5 mg/0.5 mL pen injector injection Inject 1.5 mg under the skin 1 (one) time per week. 2 mL 1    insulin glargine (Lantus Solostar U-100 Insulin) 100 unit/mL (3 mL) pen Inject 55 Units under the skin once daily at bedtime. Take as directed per insulin instructions. 15 mL 0    insulin lispro (HumaLOG) 100 unit/mL injection Inject 16 Units under the skin 3 times a day with meals. Take as directed per insulin instructions. 15 mL 0    metFORMIN (Glucophage) 1,000 mg tablet Take 1 tablet (1,000 mg) by mouth 2 times a day.      pen needle, diabetic 31 gauge x 5/16\" needle Use to inject 1-4 times daily as directed. 100 each 11    rivaroxaban (Xarelto) 20 mg tablet Take 1 tablet (20 mg) by mouth once daily in the evening. Take with meals. Take with food. 90 tablet 3    sacubitriL-valsartan (Entresto) 49-51 mg tablet Take 1 tablet by mouth 2 times a day. 180 tablet 3    sertraline (Zoloft) 100 mg tablet Take 1 tablet (100 mg) by mouth once daily.      spironolactone (Aldactone) 25 mg tablet Take 0.5 tablets (12.5 mg) by mouth once daily. Do not start before February 24, 2024. 15 tablet 11    sulfamethoxazole-trimethoprim (Bactrim DS) 800-160 mg tablet Take 1 tablet by mouth every 12 hours for 10 days. 20 tablet 0    torsemide (Demadex) 20 mg tablet Take 1 tablet (20 mg) by mouth once daily. 30 tablet 11    traZODone (Desyrel) 50 mg tablet Take 1 tablet (50 mg) by mouth once daily at bedtime.      True Metrix Glucose Test Strip strip USE TO CHECK BLOOD SUGAR FOUR TIMES DAILY      Ultra Thin " Lancets 30 gauge misc TEST BLOOD SUGAR FOUR TIMES DAILY AS DIRECTED      [DISCONTINUED] atorvastatin (Lipitor) 40 mg tablet Take 1 tablet (40 mg) by mouth once daily at bedtime.      [DISCONTINUED] blood-glucose sensor (FreeStyle Nadir 3 Sensor) device Use as directed to test blood glucose throughout the day 2 each 11    [DISCONTINUED] dulaglutide (Trulicity) 0.75 mg/0.5 mL pen injector Inject 0.75 mg under the skin 1 (one) time per week. 2 mL 1    [DISCONTINUED] oxyCODONE-acetaminophen (Percocet) 5-325 mg tablet Take 1 tablet by mouth every 8 hours if needed for severe pain (7 - 10) for up to 1 day. 3 tablet 0      If you would like to pull in Medications, type .meds     If you would like to pull in Lab results for the last 24 hours, type .[id number]    If you would like to pull in specific Lab results, type .ll     If you would like to pull in Imaging results, type .[id number] :[id number]}    Lab Results   Component Value Date    POCGLU 206 (H) 07/19/2024    POCGLU 220 (H) 07/19/2024    POCGLU 252 (H) 07/19/2024    POCGLU 405 (H) 07/19/2024    POCGLU 213 (H) 07/18/2024    GLUCOSE 249 (H) 07/19/2024    GLUCOSE 206 (H) 07/18/2024    GLUCOSE 397 (H) 07/13/2024    GLUCOSE 479 (HH) 07/02/2024    GLUCOSE 384 (H) 05/21/2024       Lab Results   Component Value Date    CREATININE 1.31 (H) 07/19/2024    BUN 21 07/19/2024     07/19/2024    K 4.0 07/19/2024    CL 96 (L) 07/19/2024    CO2 29 07/19/2024        Lab Results   Component Value Date    HGBA1C 13.3 (H) 07/18/2024        Results for orders placed or performed during the hospital encounter of 07/18/24 (from the past 24 hour(s))   POCT GLUCOSE   Result Value Ref Range    POCT Glucose 190 (H) 74 - 99 mg/dL   CBC and Auto Differential   Result Value Ref Range    WBC 6.2 4.4 - 11.3 x10*3/uL    nRBC 0.0 0.0 - 0.0 /100 WBCs    RBC 4.33 (L) 4.50 - 5.90 x10*6/uL    Hemoglobin 13.6 13.5 - 17.5 g/dL    Hematocrit 42.3 41.0 - 52.0 %    MCV 98 80 - 100 fL    MCH 31.4 26.0 - 34.0 pg     MCHC 32.2 32.0 - 36.0 g/dL    RDW 12.6 11.5 - 14.5 %    Platelets 241 150 - 450 x10*3/uL    Neutrophils % 64.8 40.0 - 80.0 %    Immature Granulocytes %, Automated 0.3 0.0 - 0.9 %    Lymphocytes % 23.4 13.0 - 44.0 %    Monocytes % 9.6 2.0 - 10.0 %    Eosinophils % 1.6 0.0 - 6.0 %    Basophils % 0.3 0.0 - 2.0 %    Neutrophils Absolute 3.98 1.20 - 7.70 x10*3/uL    Immature Granulocytes Absolute, Automated 0.02 0.00 - 0.70 x10*3/uL    Lymphocytes Absolute 1.44 1.20 - 4.80 x10*3/uL    Monocytes Absolute 0.59 0.10 - 1.00 x10*3/uL    Eosinophils Absolute 0.10 0.00 - 0.70 x10*3/uL    Basophils Absolute 0.02 0.00 - 0.10 x10*3/uL   Comprehensive metabolic panel   Result Value Ref Range    Glucose 206 (H) 74 - 99 mg/dL    Sodium 135 (L) 136 - 145 mmol/L    Potassium 4.3 3.5 - 5.3 mmol/L    Chloride 100 98 - 107 mmol/L    Bicarbonate 25 21 - 32 mmol/L    Anion Gap 14 10 - 20 mmol/L    Urea Nitrogen 20 6 - 23 mg/dL    Creatinine 1.16 0.50 - 1.30 mg/dL    eGFR 83 >60 mL/min/1.73m*2    Calcium 8.9 8.6 - 10.6 mg/dL    Albumin 3.8 3.4 - 5.0 g/dL    Alkaline Phosphatase 57 33 - 120 U/L    Total Protein 6.5 6.4 - 8.2 g/dL    AST 19 9 - 39 U/L    Bilirubin, Total 0.5 0.0 - 1.2 mg/dL    ALT 17 10 - 52 U/L   B-type natriuretic peptide   Result Value Ref Range     (H) 0 - 99 pg/mL   Troponin I, High Sensitivity   Result Value Ref Range    Troponin I, High Sensitivity (CMC) 59 (H) 0 - 53 ng/L   Lactate   Result Value Ref Range    Lactate 0.9 0.4 - 2.0 mmol/L   Hemoglobin A1c   Result Value Ref Range    Hemoglobin A1C 13.3 (H) see below %    Estimated Average Glucose 335 Not Established mg/dL   Folate   Result Value Ref Range    Folate, Serum 11.4 >5.0 ng/mL   Ferritin   Result Value Ref Range    Ferritin 234 20 - 300 ng/mL   Iron and TIBC   Result Value Ref Range    Iron 96 35 - 150 ug/dL    UIBC 244 110 - 370 ug/dL    TIBC 340 240 - 445 ug/dL    % Saturation 28 25 - 45 %   TSH with reflex to Free T4 if abnormal   Result Value Ref  Range    Thyroid Stimulating Hormone 2.29 0.44 - 3.98 mIU/L   Magnesium   Result Value Ref Range    Magnesium 1.90 1.60 - 2.40 mg/dL   Coagulation Screen   Result Value Ref Range    Protime 12.4 9.8 - 12.8 seconds    INR 1.1 0.9 - 1.1    aPTT 34 27 - 38 seconds   Blood Gas Mixed Venous Full Panel   Result Value Ref Range    POCT pH, Mixed 7.38 7.33 - 7.43 pH    POCT pCO2, Mixed 47 41 - 51 mm Hg    POCT pO2, Mixed 33 (L) 35 - 45 mm Hg    POCT SO2, Mixed 59 45 - 75 %    POCT Oxy Hemoglobin, Mixed 57.5 45.0 - 75.0 %    POCT Hematocrit Calculated, Mixed 41.0 41.0 - 52.0 %    POCT Sodium, Mixed 133 (L) 136 - 145 mmol/L    POCT Potassium, Mixed 4.2 3.5 - 5.3 mmol/L    POCT Chloride, Mixed 99 98 - 107 mmol/L    POCT Ionized Calcium, Mixed 1.17 1.10 - 1.33 mmol/L    POCT Glucose, Mixed 203 (H) 74 - 99 mg/dL    POCT Lactate, Mixed 0.7 0.4 - 2.0 mmol/L    POCT Base Excess, Mixed 2.0 -2.0 - 3.0 mmol/L    POCT HCO3 Calculated, Mixed 27.8 (H) 22.0 - 26.0 mmol/L    POCT Hemoglobin, Mixed 13.8 13.5 - 17.5 g/dL    POCT Anion Gap, Mixed 10 10 - 25 mmo/L    Patient Temperature 37.0 degrees Celsius    FiO2 0 %   Blood Gas Mixed Venous   Result Value Ref Range    POCT pH, Mixed 7.38 7.33 - 7.43 pH    POCT pCO2, Mixed 47 41 - 51 mm Hg    POCT pO2, Mixed 33 (L) 35 - 45 mm Hg    POCT SO2, Mixed 59 45 - 75 %    POCT Oxy Hemoglobin, Mixed 57.5 45.0 - 75.0 %    POCT Base Excess, Mixed 2.0 -2.0 - 3.0 mmol/L    POCT HCO3 Calculated, Mixed 27.8 (H) 22.0 - 26.0 mmol/L    Patient Temperature 37.0 degrees Celsius    FiO2 0 %   Heparin Assay, UFH   Result Value Ref Range    Heparin Unfractionated 0.2 See Comment Below for Therapeutic Ranges IU/mL   Drug Screen, Urine   Result Value Ref Range    Amphetamine Screen, Urine Presumptive Negative Presumptive Negative    Barbiturate Screen, Urine Presumptive Negative Presumptive Negative    Benzodiazepines Screen, Urine Presumptive Negative Presumptive Negative    Cannabinoid Screen, Urine Presumptive  Negative Presumptive Negative    Cocaine Metabolite Screen, Urine Presumptive Negative Presumptive Negative    Fentanyl Screen, Urine Presumptive Negative Presumptive Negative    Opiate Screen, Urine Presumptive Negative Presumptive Negative    Oxycodone Screen, Urine Presumptive Negative Presumptive Negative    PCP Screen, Urine Presumptive Negative Presumptive Negative    Methadone Screen, Urine Presumptive Negative Presumptive Negative   POCT GLUCOSE   Result Value Ref Range    POCT Glucose 210 (H) 74 - 99 mg/dL   POCT GLUCOSE   Result Value Ref Range    POCT Glucose 213 (H) 74 - 99 mg/dL   Blood Gas Mixed Venous Full Panel   Result Value Ref Range    POCT pH, Mixed 7.40 7.33 - 7.43 pH    POCT pCO2, Mixed 50 41 - 51 mm Hg    POCT pO2, Mixed 35 35 - 45 mm Hg    POCT SO2, Mixed 59 45 - 75 %    POCT Oxy Hemoglobin, Mixed 57.9 45.0 - 75.0 %    POCT Hematocrit Calculated, Mixed 42.0 41.0 - 52.0 %    POCT Sodium, Mixed 135 (L) 136 - 145 mmol/L    POCT Potassium, Mixed 3.7 3.5 - 5.3 mmol/L    POCT Chloride, Mixed 99 98 - 107 mmol/L    POCT Ionized Calcium, Mixed 1.12 1.10 - 1.33 mmol/L    POCT Glucose, Mixed 184 (H) 74 - 99 mg/dL    POCT Lactate, Mixed 1.3 0.4 - 2.0 mmol/L    POCT Base Excess, Mixed 5.0 (H) -2.0 - 3.0 mmol/L    POCT HCO3 Calculated, Mixed 31.0 (H) 22.0 - 26.0 mmol/L    POCT Hemoglobin, Mixed 13.9 13.5 - 17.5 g/dL    POCT Anion Gap, Mixed 9 (L) 10 - 25 mmo/L    Patient Temperature 37.0 degrees Celsius    FiO2 21 %   Heparin Assay, UFH   Result Value Ref Range    Heparin Unfractionated 0.2 See Comment Below for Therapeutic Ranges IU/mL   POCT GLUCOSE   Result Value Ref Range    POCT Glucose 405 (H) 74 - 99 mg/dL   POCT GLUCOSE   Result Value Ref Range    POCT Glucose 252 (H) 74 - 99 mg/dL   Renal function panel   Result Value Ref Range    Glucose 249 (H) 74 - 99 mg/dL    Sodium 136 136 - 145 mmol/L    Potassium 4.0 3.5 - 5.3 mmol/L    Chloride 96 (L) 98 - 107 mmol/L    Bicarbonate 29 21 - 32 mmol/L     Anion Gap 15 10 - 20 mmol/L    Urea Nitrogen 21 6 - 23 mg/dL    Creatinine 1.31 (H) 0.50 - 1.30 mg/dL    eGFR 72 >60 mL/min/1.73m*2    Calcium 8.7 8.6 - 10.6 mg/dL    Phosphorus 3.8 2.5 - 4.9 mg/dL    Albumin 3.7 3.4 - 5.0 g/dL   Magnesium   Result Value Ref Range    Magnesium 1.98 1.60 - 2.40 mg/dL   Blood Gas Mixed Venous Full Panel   Result Value Ref Range    POCT pH, Mixed 7.39 7.33 - 7.43 pH    POCT pCO2, Mixed 50 41 - 51 mm Hg    POCT pO2, Mixed 36 35 - 45 mm Hg    POCT SO2, Mixed 55 45 - 75 %    POCT Oxy Hemoglobin, Mixed 53.9 45.0 - 75.0 %    POCT Hematocrit Calculated, Mixed 43.0 41.0 - 52.0 %    POCT Sodium, Mixed 135 (L) 136 - 145 mmol/L    POCT Potassium, Mixed 4.2 3.5 - 5.3 mmol/L    POCT Chloride, Mixed 96 (L) 98 - 107 mmol/L    POCT Ionized Calcium, Mixed 1.19 1.10 - 1.33 mmol/L    POCT Glucose, Mixed 272 (H) 74 - 99 mg/dL    POCT Lactate, Mixed 0.7 0.4 - 2.0 mmol/L    POCT Base Excess, Mixed 4.2 (H) -2.0 - 3.0 mmol/L    POCT HCO3 Calculated, Mixed 30.3 (H) 22.0 - 26.0 mmol/L    POCT Hemoglobin, Mixed 14.4 13.5 - 17.5 g/dL    POCT Anion Gap, Mixed 13 10 - 25 mmo/L    Patient Temperature 37.0 degrees Celsius    FiO2 21 %   Heparin Assay, UFH   Result Value Ref Range    Heparin Unfractionated 0.2 See Comment Below for Therapeutic Ranges IU/mL   Blood Gas Mixed Venous   Result Value Ref Range    POCT pH, Mixed 7.41 7.33 - 7.43 pH    POCT pCO2, Mixed 47 41 - 51 mm Hg    POCT pO2, Mixed 37 35 - 45 mm Hg    POCT SO2, Mixed 58 45 - 75 %    POCT Oxy Hemoglobin, Mixed 56.4 45.0 - 75.0 %    POCT Base Excess, Mixed 4.3 (H) -2.0 - 3.0 mmol/L    POCT HCO3 Calculated, Mixed 29.8 (H) 22.0 - 26.0 mmol/L    Patient Temperature 37.0 degrees Celsius    FiO2 21 %   POCT GLUCOSE   Result Value Ref Range    POCT Glucose 220 (H) 74 - 99 mg/dL   Heparin Assay, UFH   Result Value Ref Range    Heparin Unfractionated 0.1 See Comment Below for Therapeutic Ranges IU/mL   POCT GLUCOSE   Result Value Ref Range    POCT Glucose 206  (H) 74 - 99 mg/dL   Transthoracic Echo (TTE) Complete   Result Value Ref Range    AV pk jesu 0.96 m/s    LVOT diam 2.30 cm    MV avg E/e' ratio 13.76     MV E/A ratio 1.84     LA vol index A/L 24.5 ml/m2    Tricuspid annular plane systolic excursion 2.1 cm    LV EF 10 %    RV free wall pk S' 10.20 cm/s    LVIDd 7.40 cm    Aortic Valve Area by Continuity of Peak Velocity 3.10 cm2    AV pk grad 3.7 mmHg    LV A4C EF 24.5    Blood Gas Mixed Venous   Result Value Ref Range    POCT pH, Mixed 7.45 (H) 7.33 - 7.43 pH    POCT pCO2, Mixed 49 41 - 51 mm Hg    POCT pO2, Mixed 35 35 - 45 mm Hg    POCT SO2, Mixed 58 45 - 75 %    POCT Oxy Hemoglobin, Mixed 56.9 45.0 - 75.0 %    POCT Base Excess, Mixed 8.6 (H) -2.0 - 3.0 mmol/L    POCT HCO3 Calculated, Mixed 34.1 (H) 22.0 - 26.0 mmol/L    Patient Temperature 37.0 degrees Celsius    FiO2 21 %          Transthoracic Echo (TTE) Complete    Result Date: 7/19/2024   [facility] [address] [place] [address]                Tel [phone] and Fax [phone] TRANSTHORACIC ECHOCARDIOGRAM REPORT  Patient Name:      JAZZ Quigley Physician:    [id number] Diana Cheng MD Study Date:        7/19/2024            Ordering Provider:    [id number] LAUREN KOCH MRN/PID:           [id number]             Fellow: Accession#:        [id number]         Nurse: Date of Birth/Age: 1985 / 38      Sonographer:          David huerta                                      RDCS, RCS, FASE,                                                               ACS Gender:            M                    Additional Staff: Height:            175.26 cm            Admit Date:           7/18/2024 Weight:            136.99 kg            Admission Status: BSA / BMI:         2.46 m2 / 44.60      Encounter#:           [id number]                    kg/m2 Blood Pressure:    97/70 mmHg            Department Location:  [address] Study Type:    TRANSTHORACIC ECHO (TTE) COMPLETE Diagnosis/ICD: 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 Study Detail: The following Echo studies were performed: 2D, M-Mode, Doppler and               color flow. Technically challenging study due to poor acoustic               windows. Definity used as a contrast agent for endocardial border               definition. Total contrast used for this procedure was 2.0 mL via               IV push.  PHYSICIAN INTERPRETATION: Left Ventricle: Left ventricular ejection fraction is severely decreased, by visual estimate at 10%. The left ventricular cavity size is severely dilated. Spectral Doppler shows a pseudonormal pattern of left ventricular diastolic filling. Left Atrium: The left atrium is normal in size. Right Ventricle: The right ventricle is normal in size. There is normal right ventricular global systolic function. Right Atrium: The right atrium is normal in size. Aortic Valve: The aortic valve is trileaflet. There is trivial aortic valve regurgitation. The peak instantaneous gradient of the aortic valve is 3.7 mmHg. Mitral Valve: The mitral valve is normal in structure. There is mild mitral valve regurgitation. Tricuspid Valve: The tricuspid valve is structurally normal. There is trace tricuspid regurgitation. Pulmonic Valve: The pulmonic valve is structurally normal. There is physiologic pulmonic valve regurgitation. Pericardium: There is no pericardial effusion noted. Aorta: The aortic root is normal. There is no dilatation of the aortic root. Systemic Veins: The inferior vena cava appears to be of normal size. There is less than 50% IVC collapse with  inspiration. In comparison to the previous echocardiogram(s): Compared with study dated 2/21/2024, no significant change.  CONCLUSIONS:  1. Poorly visualized anatomical structures due to suboptimal image quality.  2. Left ventricular ejection fraction is severely decreased, by visual estimate at 10%.  3. Spectral Doppler shows a pseudonormal pattern of left ventricular diastolic filling.  4. Left ventricular cavity size is severely dilated.  5. There is normal right ventricular global systolic function. QUANTITATIVE DATA SUMMARY: 2D MEASUREMENTS:                           Normal Ranges: Ao Root d:     3.50 cm    (2.0-3.7cm) LAs:           4.30 cm    (2.7-4.0cm) IVSd:          0.90 cm    (0.6-1.1cm) LVPWd:         0.80 cm    (0.6-1.1cm) LVIDd:         7.40 cm    (3.9-5.9cm) LVIDs:         7.20 cm LV Mass Index: 118.0 g/m2 LV % FS        2.7 % LA VOLUME:                               Normal Ranges: LA Vol A4C:        51.0 ml    (22+/-6mL/m2) LA Vol A2C:        70.9 ml LA Vol BP:         60.3 ml LA Vol Index A4C:  20.7ml/m2 LA Vol Index A2C:  28.8 ml/m2 LA Vol Index BP:   24.5 ml/m2 LA Area A4C:       19.7 cm2 LA Area A2C:       23.3 cm2 LA Major Axis A4C: 6.5 cm LA Major Axis A2C: 6.5 cm LA Volume Index:   23.5 ml/m2 RA VOLUME BY A/L METHOD:                       Normal Ranges: RA Area A4C: 17.1 cm2 AORTA MEASUREMENTS:                    Normal Ranges: Asc Ao, d: 2.90 cm (2.1-3.4cm) LV SYSTOLIC FUNCTION BY 2D PLANIMETRY (MOD):                      Normal Ranges: EF-A4C View:    25 % (>=55%) EF-A2C View:    23 % EF-Biplane:     26 % EF-Visual:      10 % LV EF Reported: 10 % LV DIASTOLIC FUNCTION:                              Normal Ranges: MV Peak E:        0.75 m/s   (0.7-1.2 m/s) MV Peak A:        0.41 m/s   (0.42-0.7 m/s) E/A Ratio:        1.84       (1.0-2.2) MV e'             0.054 m/s  (>8.0) MV lateral e'     0.05 m/s MV medial e'      0.06 m/s E/e' Ratio:       13.76      (<8.0) PulmV Sys Jean Claude:    36.50 cm/s  PulmV Nair Jean Claude:   48.10 cm/s PulmV S/D Jean Claude:    0.80 PulmV A Revs Jean Claude: 26.10 cm/s MITRAL VALVE:                 Normal Ranges: MV DT: 169 msec (150-240msec) AORTIC VALVE:                         Normal Ranges: AoV Vmax:      0.96 m/s (<=1.7m/s) AoV Peak PG:   3.7 mmHg (<20mmHg) LVOT Max Jean Claude:  0.72 m/s (<=1.1m/s) LVOT VTI:      11.00 cm LVOT Diameter: 2.30 cm  (1.8-2.4cm) AoV Area,Vmax: 3.10 cm2 (2.5-4.5cm2)  RIGHT VENTRICLE: TAPSE: 20.8 mm RV s'  0.10 m/s PULMONIC VALVE:                         Normal Ranges: PV Accel Time: 140 msec (>120ms) PV Max Jean Claude:    0.9 m/s  (0.6-0.9m/s) PV Max PG:     3.0 mmHg Pulmonary Veins: PulmV A Revs Jean Claude: 26.10 cm/s PulmV Nair Jean Claude:   48.10 cm/s PulmV S/D Jean Claude:    0.80 PulmV Sys Jean Claude:    36.50 cm/s  [id number] Diana Cheng MD Electronically signed on 7/19/2024 at 1:16:53 PM  ** Final **     ECG 12 lead (Clinic Performed)    Result Date: 7/19/2024  Normal sinus rhythm Possible Left atrial enlargement Left axis deviation Nonspecific intraventricular block Abnormal ECG When compared with ECG of 23-FEB-2024 07:15, T wave inversion no longer evident in Lateral leads Confirmed by Jose Carlos Pena ([id number]) on 7/19/2024 8:23:46 AM    XR chest 1 view    Result Date: 7/19/2024  Interpreted By:  Juan Patel and Sheng Max STUDY: XR CHEST 1 VIEW;  7/18/2024 5:11 pm   INDICATION: Signs/Symptoms:new admission, SGC.   COMPARISON: Chest radiograph dated 02/21/2024, CT abdomen pelvis dated 07/13/2024   ACCESSION NUMBER(S): [id number]   ORDERING CLINICIAN: ALMA LLANES   FINDINGS: AP radiograph of the chest:   LINES AND DEVICES: Right internal jugular approach [place] catheter tip projects over the right pulmonary artery.   CARDIOMEDIASTINAL SILHOUETTE: Stable enlargement of the cardiomediastinal silhouette.   LUNGS: Increased bibasilar airspace opacities and increased interstitial prominence bilaterally. Small left and trace right pleural effusions. No focal consolidation or pneumothorax.   ABDOMEN: No  remarkable upper abdominal findings.   BONES: No acute osseous abnormality.       1. Increased bibasilar airspace opacities with increased interstitial prominence bilaterally. In the setting of enlarged cardiomediastinal silhouette without leukocytosis, these findings are most consistent with pulmonary edema/atelectasis and less likely pneumonia. 2. Right internal jugular approach [place]-Jerome catheter tip projects over the right pulmonary artery.     I personally reviewed the images/study and I agree with the findings as stated by Dr. Maicol Lo. This study was interpreted at [place], [place].   MACRO: None   Signed by: Juan Patel 7/19/2024 8:09 AM Dictation workstation:   [id number]    Cardiac Catheterization Procedure    Result Date: 7/18/2024   [facility], Cath Lab, [address] [place] [address] Cardiovascular Catheterization Report Patient Name:      JAZZ LEWIS         Performing Physician:  Mirna Sainz MD Study Date:        7/18/2024             Verifying Physician:   Mirna Sainz MD MRN/PID:           [id number]              Cardiologist/Co-Scrub: Accession#:        [id number]          Ordering Provider:     Mirna SAINZ Date of Birth/Age: 1985 / 38 years Cardiologist: Gender:            M                     Fellow: Encounter#:        [id number]            Surgeon:  Study: Right Heart Cath  Indications:  Procedure Description: After infiltration of local anesthetic, the right internal jugular vein was identified with two-dimensional ultrasound. Under direct ultrasound visualization, the right internal jugular vein was cannulated with a micropuncture technique. A 8.5 [place] sheath was placed in the vein.  Post-procedure, the venous sheath was pulled and pressure was applied to the site.  Hemo Personnel: +---------------------+---------+ Name                 Duty      +---------------------+---------+ Delroy Jurado MD 1 +---------------------+---------+  Hemodynamic Pressures:  +---------+-------+-------+-------+--------+-------+          RA mmHgRV mmHgPA mmHgPCW mmHgAo mmHg +---------+-------+-------+-------+--------+-------+ Systolic        63     64             123     +---------+-------+-------+-------+--------+-------+ Diastolic              40             90      +---------+-------+-------+-------+--------+-------+ Mean     24            48     41      101     +---------+-------+-------+-------+--------+-------+ +----+----------+---------+-------------+-------------+---+----+-------+-------+ SiteDate Time   Phase  Systolic mmHg  Diastolic  ED MeanA-Wave V-Wave                  Name                    mmHg     mmHmmHg mmHg   mmHg                                                     g                    +----+----------+---------+-------------+-------------+---+----+-------+-------+   AO 7/18/2024 AIR REST          123           90    101                     10:20:05                                                                     AM                                                         +----+----------+---------+-------------+-------------+---+----+-------+-------+   RA 7/18/2024 AIR REST                               19     24     22       10:32:15                                                                     AM                                                         +----+----------+---------+-------------+-------------+---+----+-------+-------+   RA 7/18/2024 AIR REST                               17     24     21       10:32:22                                                                      AM                                                         +----+----------+---------+-------------+-------------+---+----+-------+-------+   RA 7/18/2024 AIR REST                               15     20     18       10:32:34                                                                     AM                                                         +----+----------+---------+-------------+-------------+---+----+-------+-------+   RA 7/18/2024 AIR REST                               15     18     17       10:32:44                                                                     AM                                                         +----+----------+---------+-------------+-------------+---+----+-------+-------+   RV 7/18/2024 AIR REST           56           17 21                         10:33:15                                                                     AM                                                         +----+----------+---------+-------------+-------------+---+----+-------+-------+   RV 7/18/2024 AIR REST           57           13 21                         10:33:26                                                                     AM                                                         +----+----------+---------+-------------+-------------+---+----+-------+-------+   PW 7/18/2024 AIR REST                               29     31     30       10:34:13                                                                     AM                                                         +----+----------+---------+-------------+-------------+---+----+-------+-------+   PW 7/18/2024 AIR REST                               34     36     37       10:34:20                                                                      AM                                                         +----+----------+---------+-------------+-------------+---+----+-------+-------+   PW 7/18/2024 AIR REST                               38     40     44       10:34:26                                                                     AM                                                         +----+----------+---------+-------------+-------------+---+----+-------+-------+   PA 7/18/2024 AIR REST           70           13     33                     10:35:00                                                                     AM                                                         +----+----------+---------+-------------+-------------+---+----+-------+-------+   PA 7/18/2024 AIR REST           60           14     33                     10:35:08                                                                     AM                                                         +----+----------+---------+-------------+-------------+---+----+-------+-------+   PA 7/18/2024 AIR REST           58           10     32                     10:35:17                                                                     AM                                                         +----+----------+---------+-------------+-------------+---+----+-------+-------+   PA 7/18/2024 AIR REST           58            7     30                     10:35:27                                                                     AM                                                         +----+----------+---------+-------------+-------------+---+----+-------+-------+   PA 7/18/2024 AIR REST           55            6     26                     10:35:38                                                                      AM                                                         +----+----------+---------+-------------+-------------+---+----+-------+-------+   PA 7/18/2024 AIR REST           56           28     44                     10:36:07                                                                     AM                                                         +----+----------+---------+-------------+-------------+---+----+-------+-------+   PA 7/18/2024 AIR REST           54           29     41                     10:36:33                                                                     AM                                                         +----+----------+---------+-------------+-------------+---+----+-------+-------+  Oxygen Saturation %: +-----------+----------+------------+ Sample SiteO2 Sat (%)HB (g/100ml) +-----------+----------+------------+          FA        98        12.3 +-----------+----------+------------+          PA        55        12.3 +-----------+----------+------------+          FA        98        12.3 +-----------+----------+------------+          PA        55        12.3 +-----------+----------+------------+  Cardiac Outputs: +----------------+---------------+--------+--------------+-------------+-------+        Thermo CO      Thermo CI  Thermo       ILDA CO      ILDA CIFICK SV          (l/min)     (l/min/m2)      SV       (l/min)   (l/min/m2)        +----------------+---------------+--------+--------------+-------------+-------+              3.5            1.4    35.4           4.5          1.8   46.3 +----------------+---------------+--------+--------------+-------------+-------+  Vascular Resistance Calculated Values (Wood Units): +-----+---+----+-------+----+----+---+----+----+----+-------+ PhasePVRPVRIPVR/SVRSVR SVRITPRTPRITVR  TVRITPR/TVR +-----+---+----+-------+----+----+---+----+----+----+-------+ 0    1.22.8 0      [id number]       +-----+---+----+-------+----+----+---+----+----+----+-------+  Complications: No in-lab complications observed.  Cardiac Cath Post Procedure Notes: Post Procedure Diagnosis: I50.23. Blood Loss:               Estimated blood loss during the procedure was 5cc mls. Specimens Removed:        Number of specimen(s) removed: none. ____________________________________________________________________________________ CONCLUSIONS:  1. Access: RIJ 7Fr exchanged for 8.5F leave in [facility].  2. Hemo: RA 24, RVSP 63, PA 64/40 (48), W 41, /90 (101) mmHg.  3. CO/CI [Evelia]: 4.4 / 1.7.  4. CO/CI [TD]: 3.47 / 1.4.  5. SVR: 1775 cgs, PVR: 2.0 COLON.  6. Markedly elevated biventricular filling pressures with reduced cardiac index by direct and indirect measurements. ICD 10 Codes: Acute on chronic systolic (congestive) heart failure (CHF)-I50.23  [id number] Delroy Jurado MD Performing Physician Electronically signed by [id number] Delroy Jurado MD on 7/18/2024 at 11:23:16 AM  ** Final **     CT abdomen pelvis w IV contrast    Result Date: 7/13/2024  STUDY: CT Abdomen and Pelvis with IV Contrast; 7/13/2024 2:21 PM INDICATION: Right lower abdominal abscess. COMPARISON: 10/20/2022 CT abdomen and pelvis. ACCESSION NUMBER(S): [id number] ORDERING CLINICIAN: RAYMUNDO ALMANZA TECHNIQUE: CT of the abdomen and pelvis was performed.  Contiguous axial images were obtained at 3 mm slice thickness through the abdomen and pelvis. Coronal and sagittal reconstructions at 3 mm slice thickness were performed.  Omnipaque 350 75 mL was administered intravenously.  FINDINGS: LOWER CHEST: No cardiomegaly.  No pericardial effusion.  Lung bases are clear.  ABDOMEN:  LIVER: No hepatomegaly.  Smooth surface contour.  There is fatty filtration of the liver.  BILE DUCTS: No intrahepatic or extrahepatic biliary ductal dilatation.   GALLBLADDER: The gallbladder is present without gallstones. STOMACH: No abnormalities identified.  PANCREAS: No masses or ductal dilatation.  SPLEEN: No splenomegaly or focal splenic lesion.  ADRENAL GLANDS: No thickening or nodules.  KIDNEYS AND URETERS: Kidneys are normal in size and location.  No renal or ureteral calculi.  PELVIS:  BLADDER: No abnormalities identified.  REPRODUCTIVE ORGANS: No abnormalities identified.  BOWEL: Appendix is normal.  Colonic diverticulosis without findings of diverticulitis.  VESSELS: No abnormalities identified.  Abdominal aorta is normal in caliber.  PERITONEUM/RETROPERITONEUM/LYMPH NODES: No free fluid.  No pneumoperitoneum. Few mild prominent lymph nodes in the inguinal regions likely reactive  ABDOMINAL WALL: There is diffuse skin thickening of the anterior pelvic wall on the right.  There is associated small abscess laterally in the anterior abdominal wall on image 145 measuring 2.3 cm x 3.5 cm x 2 cm. SOFT TISSUES: No abnormalities identified.  BONES: No acute fracture or aggressive osseous lesion.    1. Diffuse skin thickening of the anterior pelvic wall on the right compatible with cellulitis. There is associated small abscess laterally in the anterior abdominal wall measuring 2.3 cm x 3.5 cm x 2 cm. 2. Hepatic steatosis. 3. Colonic diverticulosis without findings of diverticulitis. Signed by Derrick Welsh MD    ECG 12 lead    Result Date: 7/4/2024  See ED provider note for full interpretation and clinical correlation Confirmed by Eran Velasco ([id number]) on 7/4/2024 1:44:45 AM   99}       Assessment/Plan   Principal Problem:    Acute decompensated heart failure (Multi)       Arthur Carranza is a 38 y.o. male presenting with uncontrolled T2DM in the setting of ICU admission for cardiogenic shock/HFrEF (EF 5-10%). His A1c has been above 13% even before his hospitalization, which is consistent with past notes showing noncompliance with diabetes management. Since he is in the  ICU we want to avoid hypoglycemia, even if we are a bit permissive with hyperglycemia. His 55 lantus he is currently on as an inpatient seems to be controlling his glucose overnight. We need to increase his lispro with meals. He also has milrinone in D5, which can also make him hyperglycemic. His Cr is not elevated enough from baseline to warrant lower insulin doses.      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 (Multi)  2024-01: Hyperglycemia due to diabetes mellitus (Multi)  2023-12: Type 2 diabetes mellitus with hyperglycemia (Multi)  2023-03: Type 2 diabetes mellitus with neurologic complication, with   long-term current use of insulin (Multi)                   RECOMMENDATIONS:    #Hyperglycemia 2/2 poorly controlled diabetes, insufficient insulin administration, and stress state  -Goal -180  -Continue 55 units lantus nightly  -Increase lispro to 18 units with each meal scheduled  -Change sliding scale to scale 3      -Please contact/[facility] if/when pt's diet changes or if TF/D5 are started, and 1-2 days prior to anticipated discharge for optimal planning and transition to home regimen    Endocrine will continue to follow.  Plan communicated with primary team.  Please [facility] (8AM-5PM) or page [phone] with any questions or concerns.  Patient seen, discussed, and examined with Dr. Love who agrees with the management plan.     Jasmin Ascencio MD

## 2024-07-20 ENCOUNTER — APPOINTMENT (OUTPATIENT)
Dept: RADIOLOGY | Facility: HOSPITAL | Age: 39
End: 2024-07-20
Payer: COMMERCIAL

## 2024-07-20 LAB
ALBUMIN SERPL BCP-MCNC: 4 G/DL (ref 3.4–5)
ANION GAP SERPL CALC-SCNC: 15 MMOL/L (ref 10–20)
BASE EXCESS BLDMV CALC-SCNC: 1 MMOL/L (ref -2–3)
BASE EXCESS BLDMV CALC-SCNC: 3.2 MMOL/L (ref -2–3)
BASE EXCESS BLDMV CALC-SCNC: 3.4 MMOL/L (ref -2–3)
BASE EXCESS BLDMV CALC-SCNC: 5.3 MMOL/L (ref -2–3)
BASE EXCESS BLDMV CALC-SCNC: 6 MMOL/L (ref -2–3)
BODY TEMPERATURE: 37 DEGREES CELSIUS
BUN SERPL-MCNC: 21 MG/DL (ref 6–23)
CALCIUM SERPL-MCNC: 9.5 MG/DL (ref 8.6–10.6)
CHLORIDE SERPL-SCNC: 96 MMOL/L (ref 98–107)
CO2 SERPL-SCNC: 29 MMOL/L (ref 21–32)
CREAT SERPL-MCNC: 1.39 MG/DL (ref 0.5–1.3)
EGFRCR SERPLBLD CKD-EPI 2021: 67 ML/MIN/1.73M*2
ERYTHROCYTE [DISTWIDTH] IN BLOOD BY AUTOMATED COUNT: 12.4 % (ref 11.5–14.5)
GLUCOSE BLD MANUAL STRIP-MCNC: 157 MG/DL (ref 74–99)
GLUCOSE BLD MANUAL STRIP-MCNC: 160 MG/DL (ref 74–99)
GLUCOSE BLD MANUAL STRIP-MCNC: 176 MG/DL (ref 74–99)
GLUCOSE BLD MANUAL STRIP-MCNC: 192 MG/DL (ref 74–99)
GLUCOSE BLD MANUAL STRIP-MCNC: 223 MG/DL (ref 74–99)
GLUCOSE SERPL-MCNC: 172 MG/DL (ref 74–99)
HCO3 BLDMV-SCNC: 26 MMOL/L (ref 22–26)
HCO3 BLDMV-SCNC: 28.5 MMOL/L (ref 22–26)
HCO3 BLDMV-SCNC: 28.5 MMOL/L (ref 22–26)
HCO3 BLDMV-SCNC: 30.5 MMOL/L (ref 22–26)
HCO3 BLDMV-SCNC: 30.6 MMOL/L (ref 22–26)
HCT VFR BLD AUTO: 45 % (ref 41–52)
HGB BLD-MCNC: 14.9 G/DL (ref 13.5–17.5)
INHALED O2 CONCENTRATION: 21 %
MAGNESIUM SERPL-MCNC: 2.48 MG/DL (ref 1.6–2.4)
MCH RBC QN AUTO: 31.6 PG (ref 26–34)
MCHC RBC AUTO-ENTMCNC: 33.1 G/DL (ref 32–36)
MCV RBC AUTO: 95 FL (ref 80–100)
NRBC BLD-RTO: 0 /100 WBCS (ref 0–0)
OXYHGB MFR BLDMV: 60.6 % (ref 45–75)
OXYHGB MFR BLDMV: 60.9 % (ref 45–75)
OXYHGB MFR BLDMV: 61 % (ref 45–75)
OXYHGB MFR BLDMV: 62.4 % (ref 45–75)
OXYHGB MFR BLDMV: 63.2 % (ref 45–75)
PCO2 BLDMV: 42 MM HG (ref 41–51)
PCO2 BLDMV: 43 MM HG (ref 41–51)
PCO2 BLDMV: 44 MM HG (ref 41–51)
PCO2 BLDMV: 45 MM HG (ref 41–51)
PCO2 BLDMV: 46 MM HG (ref 41–51)
PH BLDMV: 7.4 PH (ref 7.33–7.43)
PH BLDMV: 7.41 PH (ref 7.33–7.43)
PH BLDMV: 7.42 PH (ref 7.33–7.43)
PH BLDMV: 7.43 PH (ref 7.33–7.43)
PH BLDMV: 7.46 PH (ref 7.33–7.43)
PHOSPHATE SERPL-MCNC: 4.2 MG/DL (ref 2.5–4.9)
PLATELET # BLD AUTO: 232 X10*3/UL (ref 150–450)
PO2 BLDMV: 37 MM HG (ref 35–45)
PO2 BLDMV: 38 MM HG (ref 35–45)
PO2 BLDMV: 39 MM HG (ref 35–45)
PO2 BLDMV: 39 MM HG (ref 35–45)
PO2 BLDMV: 40 MM HG (ref 35–45)
POTASSIUM SERPL-SCNC: 4.3 MMOL/L (ref 3.5–5.3)
RBC # BLD AUTO: 4.72 X10*6/UL (ref 4.5–5.9)
SAO2 % BLDMV: 62 % (ref 45–75)
SAO2 % BLDMV: 64 % (ref 45–75)
SAO2 % BLDMV: 65 % (ref 45–75)
SODIUM SERPL-SCNC: 136 MMOL/L (ref 136–145)
UFH PPP CHRO-ACNC: 0.3 IU/ML
WBC # BLD AUTO: 6.4 X10*3/UL (ref 4.4–11.3)

## 2024-07-20 PROCEDURE — 2500000001 HC RX 250 WO HCPCS SELF ADMINISTERED DRUGS (ALT 637 FOR MEDICARE OP): Performed by: STUDENT IN AN ORGANIZED HEALTH CARE EDUCATION/TRAINING PROGRAM

## 2024-07-20 PROCEDURE — 2500000002 HC RX 250 W HCPCS SELF ADMINISTERED DRUGS (ALT 637 FOR MEDICARE OP, ALT 636 FOR OP/ED): Performed by: STUDENT IN AN ORGANIZED HEALTH CARE EDUCATION/TRAINING PROGRAM

## 2024-07-20 PROCEDURE — 99291 CRITICAL CARE FIRST HOUR: CPT | Performed by: STUDENT IN AN ORGANIZED HEALTH CARE EDUCATION/TRAINING PROGRAM

## 2024-07-20 PROCEDURE — 85520 HEPARIN ASSAY: CPT | Performed by: STUDENT IN AN ORGANIZED HEALTH CARE EDUCATION/TRAINING PROGRAM

## 2024-07-20 PROCEDURE — 2500000001 HC RX 250 WO HCPCS SELF ADMINISTERED DRUGS (ALT 637 FOR MEDICARE OP): Performed by: NURSE PRACTITIONER

## 2024-07-20 PROCEDURE — 37799 UNLISTED PX VASCULAR SURGERY: CPT | Performed by: STUDENT IN AN ORGANIZED HEALTH CARE EDUCATION/TRAINING PROGRAM

## 2024-07-20 PROCEDURE — 82805 BLOOD GASES W/O2 SATURATION: CPT | Performed by: STUDENT IN AN ORGANIZED HEALTH CARE EDUCATION/TRAINING PROGRAM

## 2024-07-20 PROCEDURE — 2020000001 HC ICU ROOM DAILY

## 2024-07-20 PROCEDURE — 71045 X-RAY EXAM CHEST 1 VIEW: CPT | Performed by: RADIOLOGY

## 2024-07-20 PROCEDURE — 83735 ASSAY OF MAGNESIUM: CPT | Performed by: STUDENT IN AN ORGANIZED HEALTH CARE EDUCATION/TRAINING PROGRAM

## 2024-07-20 PROCEDURE — 80069 RENAL FUNCTION PANEL: CPT | Performed by: STUDENT IN AN ORGANIZED HEALTH CARE EDUCATION/TRAINING PROGRAM

## 2024-07-20 PROCEDURE — 71045 X-RAY EXAM CHEST 1 VIEW: CPT

## 2024-07-20 PROCEDURE — 2500000004 HC RX 250 GENERAL PHARMACY W/ HCPCS (ALT 636 FOR OP/ED): Performed by: STUDENT IN AN ORGANIZED HEALTH CARE EDUCATION/TRAINING PROGRAM

## 2024-07-20 PROCEDURE — 82947 ASSAY GLUCOSE BLOOD QUANT: CPT

## 2024-07-20 PROCEDURE — 2500000004 HC RX 250 GENERAL PHARMACY W/ HCPCS (ALT 636 FOR OP/ED): Performed by: NURSE PRACTITIONER

## 2024-07-20 PROCEDURE — 85025 COMPLETE CBC W/AUTO DIFF WBC: CPT | Performed by: STUDENT IN AN ORGANIZED HEALTH CARE EDUCATION/TRAINING PROGRAM

## 2024-07-20 RX ORDER — BUPROPION HYDROCHLORIDE 150 MG/1
150 TABLET ORAL DAILY
Status: DISCONTINUED | OUTPATIENT
Start: 2024-07-20 | End: 2024-07-30 | Stop reason: HOSPADM

## 2024-07-20 ASSESSMENT — COGNITIVE AND FUNCTIONAL STATUS - GENERAL
CLIMB 3 TO 5 STEPS WITH RAILING: A LITTLE
MOVING TO AND FROM BED TO CHAIR: A LITTLE
TOILETING: A LITTLE
PERSONAL GROOMING: A LITTLE
WALKING IN HOSPITAL ROOM: A LITTLE
DRESSING REGULAR LOWER BODY CLOTHING: A LITTLE
DAILY ACTIVITIY SCORE: 19
MOBILITY SCORE: 20
HELP NEEDED FOR BATHING: A LITTLE
STANDING UP FROM CHAIR USING ARMS: A LITTLE
DRESSING REGULAR UPPER BODY CLOTHING: A LITTLE

## 2024-07-20 ASSESSMENT — PAIN SCALES - GENERAL
PAINLEVEL_OUTOF10: 0 - NO PAIN

## 2024-07-20 ASSESSMENT — PAIN - FUNCTIONAL ASSESSMENT
PAIN_FUNCTIONAL_ASSESSMENT: 0-10

## 2024-07-20 ASSESSMENT — ACTIVITIES OF DAILY LIVING (ADL): LACK_OF_TRANSPORTATION: NO

## 2024-07-20 NOTE — PROGRESS NOTES
[facility]  HFICU PROGRESS NOTE    Arthur Carranza/[id number]    Admit Date: 7/18/2024  Hospital Length of Stay: 2   ICU Length of Stay: 1d 20h   Primary Service: HFICU  Primary HF Cardiologist: Dr. Jurado   Referring: Dr. Jurado     INTERVAL EVENTS / PERTINENT ROS:   O/N milrinone was increased to 0.25 mcg/kg/min given continued low CI. Patient received 40 mg IV lasix yesterday with 5.1 L of UOP. This morning his CVP is 3. MAPs remain 70-80. No acute complaints. Wife present at bedside.    Plan:  - Increase entresto 49-51 mg BID   - C/w milrinone 0.25 mcg/kg/min  - Hemodynamics q6    MEDICATIONS  Infusions:  heparin, Last Rate: 1,900 Units/hr (07/20/24 1244)  lactated Ringer's, Last Rate: 10 mL/hr (07/20/24 1244)  milrinone, Last Rate: 0.25 mcg/kg/min (07/20/24 1305)      Scheduled:  atorvastatin, 40 mg, Nightly  dapagliflozin propanediol, 10 mg, Daily  heparin, 4,000 Units, Once  insulin glargine, 55 Units, Nightly  insulin lispro, 0-20 Units, TID  insulin lispro, 18 Units, TID  lidocaine, 1 patch, Daily  magnesium oxide, 400 mg, Daily  perflutren protein A microsphere, 0.5 mL, Once in imaging  polyethylene glycol, 17 g, Daily  sacubitriL-valsartan, 1 tablet, BID  sertraline, 100 mg, Daily  spironolactone, 12.5 mg, Daily  sulfamethoxazole-trimethoprim, 1 tablet, q12h JAREK      PRN:  acetaminophen, 650 mg, q6h PRN  dextrose, 12.5 g, q15 min PRN  dextrose, 25 g, q15 min PRN  glucagon, 1 mg, q15 min PRN  glucagon, 1 mg, q15 min PRN  heparin, 3,000-4,000 Units, q4h PRN  oxygen, , Continuous PRN - O2/gases  traZODone, 50 mg, Nightly PRN      Invasive Hemodynamics:    Most Recent Range Past 24hrs   BP (Art)   No data recorded   MAP(Art)   No data recorded   RA/CVP   No data recorded   PA 18/11 PAP  Min: 5/-1  Max: 42/17   PA(mean) 14 mmHg PAP (Mean)  Min: -5 mmHg  Max: 135 mmHg   PCWP 14 mmHg No data recorded   CO 5.7 L/min CO (L/min)  Min: 3.9 L/min  Max: 5.7 L/min   CI 2.3 L/min/m2 CI  "(L/min/m2)  Min: 1.6 L/min/m2  Max: 2.3 L/min/m2   Mixed Venous 64 % SVO2 (%)  Min: 58 %  Max: 64 %   SVR  982 (dyne*sec)/cm5 SVR (dyne*sec)/cm5  Min: 982 (dyne*sec)/cm5  Max: 1439 (dyne*sec)/cm5    (dyne*sec)/cm5 No data recorded     PHYSICAL EXAM:   Visit Vitals  /67   Pulse 99   Temp 35.1 °C (95.2 °F) (Temporal)   Resp 15   Ht 1.75 m (5' 8.9\")   Wt 137 kg (300 lb 14.9 oz)   SpO2 90%   BMI 44.57 kg/m²   Smoking Status Every Day   BSA 2.58 m²     Wt Readings from Last 5 Encounters:   07/20/24 137 kg (300 lb 14.9 oz)   07/18/24 137 kg (301 lb 9.4 oz)   07/13/24 136 kg (300 lb)   07/12/24 137 kg (301 lb)   07/02/24 137 kg (301 lb 9.6 oz)     INTAKE/OUTPUT:  I/O last 3 completed shifts:  In: 2843.9 (20.8 mL/kg) [P.O.:1680; I.V.:1163.9 (8.5 mL/kg)]  Out: 9825 (71.7 mL/kg) [Urine:9825 (2 mL/kg/hr)]  Weight: 137 kg      Physical Exam  Constitutional:       General: He is not in acute distress.     Appearance: He is obese.   Eyes:      Extraocular Movements: Extraocular movements intact.      Conjunctiva/sclera: Conjunctivae normal.   Neck:      Vascular: No JVD.      Comments: RIJ SGC + Cordis CDI   Cardiovascular:      Rate and Rhythm: Normal rate and regular rhythm.      Pulses: Normal pulses.      Heart sounds: Normal heart sounds.   Pulmonary:      Effort: Pulmonary effort is normal. No respiratory distress.      Breath sounds: Normal breath sounds. No wheezing.   Abdominal:      General: Bowel sounds are normal.      Palpations: Abdomen is soft.      Tenderness: There is no abdominal tenderness.   Musculoskeletal:      Cervical back: Full passive range of motion without pain.      Right lower leg: No edema.      Left lower leg: No edema.   Neurological:      General: No focal deficit present.      Mental Status: He is alert and oriented to person, place, and time. Mental status is at baseline.   Psychiatric:         Mood and Affect: Mood normal.         Behavior: Behavior normal. Behavior is " "cooperative.       DATA:  CMP:  Results from last 7 days   Lab Units 07/20/24  0601 07/19/24  0519 07/18/24  1558   SODIUM mmol/L 136 136 135*   POTASSIUM mmol/L 4.3 4.0 4.3   CHLORIDE mmol/L 96* 96* 100   CO2 mmol/L 29 29 25   ANION GAP mmol/L 15 15 14   BUN mg/dL 21 21 20   CREATININE mg/dL 1.39* 1.31* 1.16   EGFR mL/min/1.73m*2 67 72 83   MAGNESIUM mg/dL 2.48* 1.98 1.90   ALBUMIN g/dL 4.0 3.7 3.8   ALT U/L  --   --  17   AST U/L  --   --  19   BILIRUBIN TOTAL mg/dL  --   --  0.5     CBC:  Results from last 7 days   Lab Units 07/20/24  0601 07/18/24  1558   WBC AUTO x10*3/uL 6.4 6.2   HEMOGLOBIN g/dL 14.9 13.6   HEMATOCRIT % 45.0 42.3   PLATELETS AUTO x10*3/uL 232 241   MCV fL 95 98     COAG:   Results from last 7 days   Lab Units 07/18/24  1558   INR  1.1     ABO: No results found for: \"ABO\"  HEME/ENDO:  Results from last 7 days   Lab Units 07/18/24  1558   FERRITIN ng/mL 234   IRON SATURATION % 28   TSH mIU/L 2.29   HEMOGLOBIN A1C % 13.3*      CARDIAC:   Results from last 7 days   Lab Units 07/18/24  1558   TROPHSCMC ng/L 59*   BNP pg/mL 127*     ASSESSMENT AND PLAN:   Arthur Carranza is a 37 y/o M with PMHx of NICM/HFrEF (EF 5-10%) initially diagnosed 10/2022, HTN, HLD, hypertriglyceridemia, Type II DM (poorly controlled A1c 13.7%, on insulin), severe obesity (BMI 44), 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 Opening SGC #s in HFICU were /72 (82), PAP 56/43 (47), CVP 8, SVR 1357, CO/CI 4.36/1.76. On arrival to HFICU he was started on milrinone and GDMT, as well as diuresed. Patient would like to have some LVAD education prior to signing consent for advanced therapies " workup.     Neuro:  #Hx of cardioembolic stroke (12/2022)  #Anxiety  #Depression  - Holding home xarelto, will start on heparin gtt  - C/w home zoloft 100 mg daily   - C/w home wellbutrin  mg daily  - C/w home trazodone 50 mg at night   - Serial neuro assessments   - PO Tylenol PRN for pain  - PT/OT Consult, OOB to chair  - CAM ICU score every shift  - Sleep/wake cycle normalization     #Physical Status  - Morbid Obesity, BMI 44  - Lives at home with wife and 8 kids      #Substance abuse  - Alcohol abuse: reports occasional binge drinking  - Tobacco use/Nicotine Dependence: occasional cigarette smoker     Cardiovascular:  #NICM/HFrEF  #Acute on Chronic Decompensated Heart Failure  #Low cardiac output state  #HLD  LHC (10/21/2022): with no evidence of angiographically obstructive CAD  TTE (7/19/24): poorly visualized anatomical structures, LVEF severely decreased ~10%, LV severely dilated, LVIDd 7.40 cm  RHC (7/18/24): RA 24, RVSP 63, PA 64/40 (48), W 41, /90 (101) mmHg. 3. CO/CI [Rachel]: 4.4 / 1.7. 4. CO/CI [TD]: 3.47 / 1.4. 5. SVR: 1775 cgs, PVR: 2.0 COLON, /74.  Opening SGC # (7/18): /72 (82), PAP 56/43 (47), CVP 8, SVR 1357, CO/CI 4.36/1.76  Daily SGC #s (7/20): .62 (83), PAP 17/10 (12), CVP 4, SVR 1253, CO/CI (rachel) 5.04/2.05, SVO2 62% on milrinone 0.25 mcg/kg/min, entresto 24-26 mg BID, farxiga 10 mg daily, spironolactone 12.5 mg daily, coreg 6.25 mg BID   Home medications: lipitor 40 mg daily, spironolactone 12.5 mg daily, torsemide 20 mg daily, farxiga 10 mg daily, entresto 49-51 mg BID   - C/w lipitor 40 mg daily   - Increase entresto to 49-51 mg BID   - C/w spironolactone 12.5 mg daily   - C/w farxiga 10 mg daily   - Holdig home coreg 2/2 low output state   - Assess daily diuresis   - C/w milrinone 0.25 mcg/kg/min   - Hemodynamics q6  - Will need further LVAD education 7/22 prior to signing any consent for LVAD workup   - Daily standing weights, 2gm sodium diet, 2L fluid  restriction, strict I&Os     Pulmonary:   #No active issues   - Monitor and maintain SpO2 > 92%     GI:  #No active issues   - Bowel regimen: miralax daily   - C/w cardiac, carb controlled diet      :  #No active issues   Baseline sCr normal   - I/Os  - Avoid hypotension and nephrotoxic agents     Heme:  #No active issues   Labs (7/18): iron 96, TIBC 340, %sat 28, folate 11.4, ferritin 234  - CTM      Endo:  #DM type 2 (uncontrolled)   HgbA1c (7/18/24): 13.3   Home regimen: lantus 55 U qpm, lispro 18 U TID means, trulicity 1.5 q weekly, metformin 1000 mg BID  - C/w lantus 55 units nightly, lispro 18 units with meals, SS4  - Endocrine following, appreciate assistance      #Thyroid  TSH (7/18): 2.29  - CTM       ID:  #Recent abdominal wall abscess  - Recently seen in ER 7/13 for abdominal wall abscess 2.3 cm x 3.5 cm x 2 cm s/p I&D   - C/w course of bactrim DS 1 tab BID x 10 days (7/13-7/23)  - Trend temps q4h    PHYSICAL AND OCCUPATIONAL THERAPY:ordered     LINES:  PIV  RIJ SGC + Cordis (7/18-*)    DVT: heparin gtt  CENTRAL LINE BUNDLE: ordered  GLYCEMIC CONTROL:lantus 55 units nightly, lispro 18 units with meals, SS4  BOWEL CARE: Miralax prn  NUTRITION: Adult diet Cardiac, Carb Controlled; 75 gram carb/meal, 45 gram Carb evening snack; 70 gm fat; 2 - 3 grams Sodium    EMERGENCY CONTACT: Extended Emergency Contact Information  Primary Emergency Contact: FREDDY LEWIS  Address: [address] [place] [address] [address]           [place] [id number] [facility]  Home Phone: [phone]  Mobile Phone: [phone]  Relation: Spouse  Preferred language: English   needed? No  FAMILY UPDATE:Updated wife at bedside 7/20  CODE STATUS: Full Code  DISPO:Remain in HFICU    Patient seen and assessed with Dr. Bull PETERS personally spent 45 minutes of critical care time directly and personally managing the patient exclusive of separately billable procedures     Kelli Luong PA-C

## 2024-07-20 NOTE — CARE PLAN
The patient's goals for the shift include      The clinical goals for the shift include continue to optimize cardiac numbers; working on diabetes teaching    Over the shift, the patient did not make progress toward the following goals. Barriers to progression include . Recommendations to address these barriers include .      Problem: Fall/Injury  Goal: Not fall by end of shift  Outcome: Progressing  Goal: Be free from injury by end of the shift  Outcome: Progressing  Goal: Verbalize understanding of personal risk factors for fall in the hospital  Outcome: Progressing  Goal: Verbalize understanding of risk factor reduction measures to prevent injury from fall in the home  Outcome: Progressing  Goal: Pace activities to prevent fatigue by end of the shift  Outcome: Progressing     Problem: Pain - Adult  Goal: Verbalizes/displays adequate comfort level or baseline comfort level  Outcome: Progressing     Problem: Safety - Adult  Goal: Free from fall injury  Outcome: Progressing     Problem: Discharge Planning  Goal: Discharge to home or other facility with appropriate resources  Outcome: Progressing     Problem: Chronic Conditions and Co-morbidities  Goal: Patient's chronic conditions and co-morbidity symptoms are monitored and maintained or improved  Outcome: Progressing

## 2024-07-20 NOTE — PROGRESS NOTES
"Patient is a 38-year-old gentleman with a medical history of nonischemic cardiomyopathy, poorly controlled type tumor diverticulitis, severe obesity, prior cardio embolic CVA, prior polysubstance use, intermittent alcohol use who presented after \"cold and wet\" hemodynamics noted on right heart catheterization.  Symptomatically he notes New York Heart Association class IV symptoms with dyspnea with minimal exertion.  Has some lower extremity edema.  Echocardiogram from today shows severe global hypokinesis of the left ventricle with an EF of 10%, right ventricular size appears to be normal; right ventricular function is not well-visualized  PA catheter numbers this morning CVP 6, cardiac index 2.3 (on milrinone 0.25)    Plan:  -Overall his blood pressure appears to be relatively preserved; will attempt afterload reduction with Entresto; keep milrinone on for now  -In the long-term: He has features of stage D HFrEF, with a markedly dilated left ventricle, and a peak VO2 predicted that is less than 50% based on his age gender height nomogram.  He has also had multiple hospitalizations for congestive heart failure in the past year.  -Discussed evaluation for LVAD.  Patient at this time seems to be somewhat resistant.  He has numerous barriers including morbid obesity, recurrent infections, uncontrolled diabetes mellitus, spotty compliance.  Willing to talk to the team Monday morning for education.    This critically ill patient continues to be at-risk for clinically significant deterioration / failure due to the above mentioned dysfunctional, unstable organ systems.  I have personally identified and managed all complex critical care issues to prevent aforementioned clinical deterioration.  Critical care time is spent at bedside and/or the immediate area and has included, but is not limited to, the review of diagnostic tests, labs, radiographs, serial assessments of hemodynamics, respiratory status, ventilatory " management, and family updates.  Time spent in procedures and teaching are reported separately.    Critical care time: 40 minutes    Avery Gottlieb MD  Advanced Heart Failure/Transplant Cardiology  Cardio-Oncology  [facility]

## 2024-07-21 ENCOUNTER — APPOINTMENT (OUTPATIENT)
Dept: RADIOLOGY | Facility: HOSPITAL | Age: 39
End: 2024-07-21
Payer: COMMERCIAL

## 2024-07-21 VITALS
HEART RATE: 111 BPM | RESPIRATION RATE: 18 BRPM | DIASTOLIC BLOOD PRESSURE: 65 MMHG | SYSTOLIC BLOOD PRESSURE: 116 MMHG | OXYGEN SATURATION: 98 % | WEIGHT: 299.83 LBS | TEMPERATURE: 97 F | HEIGHT: 69 IN | BODY MASS INDEX: 44.41 KG/M2

## 2024-07-21 LAB
ALBUMIN SERPL BCP-MCNC: 3.7 G/DL (ref 3.4–5)
ANION GAP SERPL CALC-SCNC: 18 MMOL/L (ref 10–20)
BASE EXCESS BLDMV CALC-SCNC: 1 MMOL/L (ref -2–3)
BASE EXCESS BLDMV CALC-SCNC: 1.3 MMOL/L (ref -2–3)
BASE EXCESS BLDMV CALC-SCNC: 2 MMOL/L (ref -2–3)
BASE EXCESS BLDMV CALC-SCNC: 2.2 MMOL/L (ref -2–3)
BASOPHILS # BLD AUTO: 0.03 X10*3/UL (ref 0–0.1)
BASOPHILS NFR BLD AUTO: 0.5 %
BODY TEMPERATURE: 37 DEGREES CELSIUS
BUN SERPL-MCNC: 20 MG/DL (ref 6–23)
CALCIUM SERPL-MCNC: 8.8 MG/DL (ref 8.6–10.6)
CHLORIDE SERPL-SCNC: 98 MMOL/L (ref 98–107)
CO2 SERPL-SCNC: 23 MMOL/L (ref 21–32)
CREAT SERPL-MCNC: 1.57 MG/DL (ref 0.5–1.3)
EGFRCR SERPLBLD CKD-EPI 2021: 58 ML/MIN/1.73M*2
EOSINOPHIL # BLD AUTO: 0.1 X10*3/UL (ref 0–0.7)
EOSINOPHIL NFR BLD AUTO: 1.5 %
ERYTHROCYTE [DISTWIDTH] IN BLOOD BY AUTOMATED COUNT: 12.4 % (ref 11.5–14.5)
GLUCOSE BLD MANUAL STRIP-MCNC: 136 MG/DL (ref 74–99)
GLUCOSE BLD MANUAL STRIP-MCNC: 149 MG/DL (ref 74–99)
GLUCOSE BLD MANUAL STRIP-MCNC: 159 MG/DL (ref 74–99)
GLUCOSE BLD MANUAL STRIP-MCNC: 176 MG/DL (ref 74–99)
GLUCOSE SERPL-MCNC: 164 MG/DL (ref 74–99)
HCO3 BLDMV-SCNC: 26.6 MMOL/L (ref 22–26)
HCO3 BLDMV-SCNC: 27.2 MMOL/L (ref 22–26)
HCO3 BLDMV-SCNC: 27.3 MMOL/L (ref 22–26)
HCO3 BLDMV-SCNC: 27.8 MMOL/L (ref 22–26)
HCT VFR BLD AUTO: 45 % (ref 41–52)
HGB BLD-MCNC: 14.9 G/DL (ref 13.5–17.5)
IMM GRANULOCYTES # BLD AUTO: 0.01 X10*3/UL (ref 0–0.7)
IMM GRANULOCYTES NFR BLD AUTO: 0.2 % (ref 0–0.9)
INHALED O2 CONCENTRATION: 21 %
INHALED O2 CONCENTRATION: 28 %
LYMPHOCYTES # BLD AUTO: 1.42 X10*3/UL (ref 1.2–4.8)
LYMPHOCYTES NFR BLD AUTO: 21.6 %
MAGNESIUM SERPL-MCNC: 2.29 MG/DL (ref 1.6–2.4)
MCH RBC QN AUTO: 31.6 PG (ref 26–34)
MCHC RBC AUTO-ENTMCNC: 33.1 G/DL (ref 32–36)
MCV RBC AUTO: 95 FL (ref 80–100)
MONOCYTES # BLD AUTO: 0.71 X10*3/UL (ref 0.1–1)
MONOCYTES NFR BLD AUTO: 10.8 %
NEUTROPHILS # BLD AUTO: 4.3 X10*3/UL (ref 1.2–7.7)
NEUTROPHILS NFR BLD AUTO: 65.4 %
NRBC BLD-RTO: 0 /100 WBCS (ref 0–0)
OXYHGB MFR BLDMV: 59.4 % (ref 45–75)
OXYHGB MFR BLDMV: 62.7 % (ref 45–75)
OXYHGB MFR BLDMV: 62.8 % (ref 45–75)
OXYHGB MFR BLDMV: 65.3 % (ref 45–75)
PCO2 BLDMV: 44 MM HG (ref 41–51)
PCO2 BLDMV: 45 MM HG (ref 41–51)
PCO2 BLDMV: 46 MM HG (ref 41–51)
PCO2 BLDMV: 47 MM HG (ref 41–51)
PH BLDMV: 7.37 PH (ref 7.33–7.43)
PH BLDMV: 7.38 PH (ref 7.33–7.43)
PH BLDMV: 7.39 PH (ref 7.33–7.43)
PH BLDMV: 7.4 PH (ref 7.33–7.43)
PHOSPHATE SERPL-MCNC: 4.8 MG/DL (ref 2.5–4.9)
PLATELET # BLD AUTO: 232 X10*3/UL (ref 150–450)
PO2 BLDMV: 37 MM HG (ref 35–45)
PO2 BLDMV: 37 MM HG (ref 35–45)
PO2 BLDMV: 41 MM HG (ref 35–45)
PO2 BLDMV: 42 MM HG (ref 35–45)
POTASSIUM SERPL-SCNC: 4.2 MMOL/L (ref 3.5–5.3)
RBC # BLD AUTO: 4.72 X10*6/UL (ref 4.5–5.9)
SAO2 % BLDMV: 61 % (ref 45–75)
SAO2 % BLDMV: 64 % (ref 45–75)
SAO2 % BLDMV: 64 % (ref 45–75)
SAO2 % BLDMV: 67 % (ref 45–75)
SODIUM SERPL-SCNC: 135 MMOL/L (ref 136–145)
UFH PPP CHRO-ACNC: 0.2 IU/ML
UFH PPP CHRO-ACNC: 0.3 IU/ML
UFH PPP CHRO-ACNC: 0.4 IU/ML
UFH PPP CHRO-ACNC: 0.8 IU/ML
WBC # BLD AUTO: 6.4 X10*3/UL (ref 4.4–11.3)

## 2024-07-21 PROCEDURE — 2500000001 HC RX 250 WO HCPCS SELF ADMINISTERED DRUGS (ALT 637 FOR MEDICARE OP): Performed by: STUDENT IN AN ORGANIZED HEALTH CARE EDUCATION/TRAINING PROGRAM

## 2024-07-21 PROCEDURE — 99291 CRITICAL CARE FIRST HOUR: CPT | Performed by: STUDENT IN AN ORGANIZED HEALTH CARE EDUCATION/TRAINING PROGRAM

## 2024-07-21 PROCEDURE — 82810 BLOOD GASES O2 SAT ONLY: CPT | Performed by: STUDENT IN AN ORGANIZED HEALTH CARE EDUCATION/TRAINING PROGRAM

## 2024-07-21 PROCEDURE — 82805 BLOOD GASES W/O2 SATURATION: CPT | Performed by: STUDENT IN AN ORGANIZED HEALTH CARE EDUCATION/TRAINING PROGRAM

## 2024-07-21 PROCEDURE — 2500000004 HC RX 250 GENERAL PHARMACY W/ HCPCS (ALT 636 FOR OP/ED): Performed by: STUDENT IN AN ORGANIZED HEALTH CARE EDUCATION/TRAINING PROGRAM

## 2024-07-21 PROCEDURE — 71045 X-RAY EXAM CHEST 1 VIEW: CPT | Performed by: RADIOLOGY

## 2024-07-21 PROCEDURE — 71045 X-RAY EXAM CHEST 1 VIEW: CPT

## 2024-07-21 PROCEDURE — 80069 RENAL FUNCTION PANEL: CPT | Performed by: STUDENT IN AN ORGANIZED HEALTH CARE EDUCATION/TRAINING PROGRAM

## 2024-07-21 PROCEDURE — 2500000001 HC RX 250 WO HCPCS SELF ADMINISTERED DRUGS (ALT 637 FOR MEDICARE OP): Performed by: NURSE PRACTITIONER

## 2024-07-21 PROCEDURE — 2500000004 HC RX 250 GENERAL PHARMACY W/ HCPCS (ALT 636 FOR OP/ED): Performed by: NURSE PRACTITIONER

## 2024-07-21 PROCEDURE — 37799 UNLISTED PX VASCULAR SURGERY: CPT | Performed by: STUDENT IN AN ORGANIZED HEALTH CARE EDUCATION/TRAINING PROGRAM

## 2024-07-21 PROCEDURE — 2500000002 HC RX 250 W HCPCS SELF ADMINISTERED DRUGS (ALT 637 FOR MEDICARE OP, ALT 636 FOR OP/ED): Performed by: STUDENT IN AN ORGANIZED HEALTH CARE EDUCATION/TRAINING PROGRAM

## 2024-07-21 PROCEDURE — 85027 COMPLETE CBC AUTOMATED: CPT | Performed by: STUDENT IN AN ORGANIZED HEALTH CARE EDUCATION/TRAINING PROGRAM

## 2024-07-21 PROCEDURE — 83735 ASSAY OF MAGNESIUM: CPT | Performed by: STUDENT IN AN ORGANIZED HEALTH CARE EDUCATION/TRAINING PROGRAM

## 2024-07-21 PROCEDURE — 85520 HEPARIN ASSAY: CPT | Performed by: STUDENT IN AN ORGANIZED HEALTH CARE EDUCATION/TRAINING PROGRAM

## 2024-07-21 PROCEDURE — 82947 ASSAY GLUCOSE BLOOD QUANT: CPT

## 2024-07-21 PROCEDURE — 2020000001 HC ICU ROOM DAILY

## 2024-07-21 RX ORDER — HYDRALAZINE HYDROCHLORIDE 10 MG/1
10 TABLET, FILM COATED ORAL 3 TIMES DAILY
Status: DISCONTINUED | OUTPATIENT
Start: 2024-07-21 | End: 2024-07-21

## 2024-07-21 RX ORDER — HYDRALAZINE HYDROCHLORIDE 25 MG/1
25 TABLET, FILM COATED ORAL 3 TIMES DAILY
Status: DISCONTINUED | OUTPATIENT
Start: 2024-07-21 | End: 2024-07-22

## 2024-07-21 ASSESSMENT — COGNITIVE AND FUNCTIONAL STATUS - GENERAL
MOVING TO AND FROM BED TO CHAIR: A LITTLE
DAILY ACTIVITIY SCORE: 22
CLIMB 3 TO 5 STEPS WITH RAILING: A LITTLE
DRESSING REGULAR LOWER BODY CLOTHING: A LITTLE
TOILETING: A LITTLE
MOBILITY SCORE: 20
STANDING UP FROM CHAIR USING ARMS: A LITTLE
WALKING IN HOSPITAL ROOM: A LITTLE

## 2024-07-21 ASSESSMENT — PAIN - FUNCTIONAL ASSESSMENT
PAIN_FUNCTIONAL_ASSESSMENT: 0-10

## 2024-07-21 ASSESSMENT — PAIN SCALES - GENERAL
PAINLEVEL_OUTOF10: 0 - NO PAIN

## 2024-07-21 NOTE — PROGRESS NOTES
[facility]  HFICU PROGRESS NOTE    Arthur Carranza/[id number]    Admit Date: 7/18/2024  Hospital Length of Stay: 3   ICU Length of Stay: 2d 21h   Primary Service: HFICU  Primary HF Cardiologist: Dr. Jurado   Referring: Dr. Jurado     INTERVAL EVENTS / PERTINENT ROS:   O/N patient continue to have borderline indices with elevated SVR, however noted to be hypotensive in the early am hours. Holding off on diuresis for a CVP of 4 this morning. Patient has no acute complaints, no CP or SOB . Wife present at bedside.    Plan:  - c/w entresto 49-51 mg BID   - C/w milrinone 0.25 mcg/kg/min  - add hydralazine 10mg TID  - Hemodynamics q6  - LVAD education tomorrow     MEDICATIONS  Infusions:  heparin, Last Rate: 2,000 Units/hr (07/21/24 1253)  lactated Ringer's, Last Rate: 10 mL/hr (07/21/24 0600)  milrinone, Last Rate: 0.25 mcg/kg/min (07/21/24 0638)      Scheduled:  atorvastatin, 40 mg, Nightly  buPROPion XL, 150 mg, Daily  dapagliflozin propanediol, 10 mg, Daily  heparin, 4,000 Units, Once  hydrALAZINE, 10 mg, TID  insulin glargine, 55 Units, Nightly  insulin lispro, 0-20 Units, TID  insulin lispro, 18 Units, TID  magnesium oxide, 400 mg, Daily  perflutren protein A microsphere, 0.5 mL, Once in imaging  polyethylene glycol, 17 g, Daily  sacubitriL-valsartan, 1 tablet, BID  sertraline, 100 mg, Daily  spironolactone, 12.5 mg, Daily  sulfamethoxazole-trimethoprim, 1 tablet, q12h JAREK      PRN:  acetaminophen, 650 mg, q6h PRN  dextrose, 12.5 g, q15 min PRN  dextrose, 25 g, q15 min PRN  glucagon, 1 mg, q15 min PRN  glucagon, 1 mg, q15 min PRN  heparin, 3,000-4,000 Units, q4h PRN  oxygen, , Continuous PRN - O2/gases  traZODone, 50 mg, Nightly PRN      Invasive Hemodynamics:    Most Recent Range Past 24hrs   BP (Art)   No data recorded   MAP(Art)   No data recorded   RA/CVP   No data recorded   PA 24/11 PAP  Min: 10/5  Max: 34/21   PA(mean) 17 mmHg PAP (Mean)  Min: 7 mmHg  Max: 32 mmHg   PCWP 7  "mmHg PCWP (mmHg)  Min: 7 mmHg  Max: 7 mmHg   CO 4.5 L/min CO (L/min)  Min: 4.5 L/min  Max: 5.3 L/min   CI 1.8 L/min/m2 CI (L/min/m2)  Min: 1.8 L/min/m2  Max: 2.2 L/min/m2   Mixed Venous 64 % SVO2 (%)  Min: 62 %  Max: 65 %   SVR  1709 (dyne*sec)/cm5 SVR (dyne*sec)/cm5  Min: 1237 (dyne*sec)/cm5  Max: 1709 (dyne*sec)/cm5    (dyne*sec)/cm5 No data recorded     PHYSICAL EXAM:   Visit Vitals  /76   Pulse 84   Temp 35.1 °C (95.2 °F) (Temporal)   Resp 16   Ht 1.75 m (5' 8.9\")   Wt 136 kg (299 lb 13.2 oz)   SpO2 100%   BMI 44.41 kg/m²   Smoking Status Every Day   BSA 2.57 m²     Wt Readings from Last 5 Encounters:   07/21/24 136 kg (299 lb 13.2 oz)   07/18/24 137 kg (301 lb 9.4 oz)   07/13/24 136 kg (300 lb)   07/12/24 137 kg (301 lb)   07/02/24 137 kg (301 lb 9.6 oz)     INTAKE/OUTPUT:  I/O last 3 completed shifts:  In: 2991.8 (22 mL/kg) [P.O.:1410; I.V.:1581.8 (11.6 mL/kg)]  Out: 3400 (25 mL/kg) [Urine:3400 (0.7 mL/kg/hr)]  Weight: 136 kg      Physical Exam  Constitutional:       General: He is not in acute distress.     Appearance: He is obese.   Eyes:      Extraocular Movements: Extraocular movements intact.      Conjunctiva/sclera: Conjunctivae normal.   Neck:      Vascular: No JVD.      Comments: RIJ SGC + Cordis CDI   Cardiovascular:      Rate and Rhythm: Normal rate and regular rhythm.      Pulses: Normal pulses.      Heart sounds: Normal heart sounds.   Pulmonary:      Effort: Pulmonary effort is normal. No respiratory distress.      Breath sounds: Normal breath sounds. No wheezing.   Abdominal:      General: Bowel sounds are normal.      Palpations: Abdomen is soft.      Tenderness: There is no abdominal tenderness.   Musculoskeletal:      Cervical back: Full passive range of motion without pain.      Right lower leg: No edema.      Left lower leg: No edema.   Neurological:      General: No focal deficit present.      Mental Status: He is alert and oriented to person, place, and time. Mental status is " "at baseline.   Psychiatric:         Mood and Affect: Mood normal.         Behavior: Behavior normal. Behavior is cooperative.       DATA:  CMP:  Results from last 7 days   Lab Units 07/21/24  0515 07/20/24  0601 07/19/24  0519 07/18/24  1558   SODIUM mmol/L 135* 136 136 135*   POTASSIUM mmol/L 4.2 4.3 4.0 4.3   CHLORIDE mmol/L 98 96* 96* 100   CO2 mmol/L 23 29 29 25   ANION GAP mmol/L 18 15 15 14   BUN mg/dL 20 21 21 20   CREATININE mg/dL 1.57* 1.39* 1.31* 1.16   EGFR mL/min/1.73m*2 58* 67 72 83   MAGNESIUM mg/dL 2.29 2.48* 1.98 1.90   ALBUMIN g/dL 3.7 4.0 3.7 3.8   ALT U/L  --   --   --  17   AST U/L  --   --   --  19   BILIRUBIN TOTAL mg/dL  --   --   --  0.5     CBC:  Results from last 7 days   Lab Units 07/20/24  0601 07/18/24  1558   WBC AUTO x10*3/uL 6.4  6.4 6.2   HEMOGLOBIN g/dL 14.9  14.9 13.6   HEMATOCRIT % 45.0  45.0 42.3   PLATELETS AUTO x10*3/uL 232  232 241   MCV fL 95  95 98     COAG:   Results from last 7 days   Lab Units 07/18/24  1558   INR  1.1     ABO: No results found for: \"ABO\"  HEME/ENDO:  Results from last 7 days   Lab Units 07/18/24  1558   FERRITIN ng/mL 234   IRON SATURATION % 28   TSH mIU/L 2.29   HEMOGLOBIN A1C % 13.3*      CARDIAC:   Results from last 7 days   Lab Units 07/18/24  1558   TROPHSCMC ng/L 59*   BNP pg/mL 127*     ASSESSMENT AND PLAN:   Arthur Carranza is a 39 y/o M with PMHx of NICM/HFrEF (EF 5-10%) initially diagnosed 10/2022, HTN, HLD, hypertriglyceridemia, Type II DM (poorly controlled A1c 13.7%, on insulin), severe obesity (BMI 44), 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 Opening SGC #s in HFICU were /72 (82), PAP 56/43 (47), " CVP 8, SVR 1357, CO/CI 4.36/1.76. On arrival to HFICU he was started on milrinone and GDMT, as well as diuresed. Patient would like to have some LVAD education prior to signing consent for advanced therapies workup.     Neuro:  #Hx of cardioembolic stroke (12/2022)  #Anxiety  #Depression  - Holding home xarelto, will start on heparin gtt  - C/w home zoloft 100 mg daily   - C/w home wellbutrin  mg daily  - C/w home trazodone 50 mg at night   - Serial neuro assessments   - PO Tylenol PRN for pain  - PT/OT Consult, OOB to chair  - CAM ICU score every shift  - Sleep/wake cycle normalization     #Physical Status  - Morbid Obesity, BMI 44  - Lives at home with wife and 8 kids      #Substance abuse  - Alcohol abuse: reports occasional binge drinking  - Tobacco use/Nicotine Dependence: occasional cigarette smoker     Cardiovascular:  #NICM/HFrEF  #Acute on Chronic Decompensated Heart Failure  #Low cardiac output state  #HLD  LHC (10/21/2022): with no evidence of angiographically obstructive CAD  TTE (7/19/24): poorly visualized anatomical structures, LVEF severely decreased ~10%, LV severely dilated, LVIDd 7.40 cm  RHC (7/18/24): RA 24, RVSP 63, PA 64/40 (48), W 41, /90 (101) mmHg. 3. CO/CI [Evelia]: 4.4 / 1.7. 4. CO/CI [TD]: 3.47 / 1.4. 5. SVR: 1775 cgs, PVR: 2.0 COLON, /74.  Opening SGC # (7/18): /72 (82), PAP 56/43 (47), CVP 8, SVR 1357, CO/CI 4.36/1.76  Daily SGC #s (7/21):/73(88), CVP 4, PAP 34/24 (26), W 7, Evelia: Co/Ci 4.6/1.9, Thermo:  4.8/2.0, SVR 1460, SVO2 64 % on Milrinone 0. 25 MCG/KG/min, Entresto 49/51 mg BID, jose carlos 12.5mg daily   Home medications: lipitor 40 mg daily, spironolactone 12.5 mg daily, torsemide 20 mg daily, farxiga 10 mg daily, entresto 49-51 mg BID   - C/w lipitor 40 mg daily   - c/w entresto to 49-51 mg BID   - Hydralazine 10mg TID added to regimen for gentler   - C/w spironolactone 12.5 mg daily   - C/w farxiga 10 mg daily   - Holdig home coreg 2/2 low output state   -  Assess daily diuresis   - C/w milrinone 0.25 mcg/kg/min   - Hemodynamics q6  - Will need further LVAD education 7/22 prior to signing any consent for LVAD workup   - Daily standing weights, 2gm sodium diet, 2L fluid restriction, strict I&Os     Pulmonary:   #No active issues   - Monitor and maintain SpO2 > 92%     GI:  #No active issues   - Bowel regimen: miralax daily   - C/w cardiac, carb controlled diet      :  #No active issues   Baseline sCr normal   - I/Os  - Avoid hypotension and nephrotoxic agents     Heme:  #No active issues   Labs (7/18): iron 96, TIBC 340, %sat 28, folate 11.4, ferritin 234  - CTM      Endo:  #DM type 2 (uncontrolled)   HgbA1c (7/18/24): 13.3   Home regimen: lantus 55 U qpm, lispro 18 U TID means, trulicity 1.5 q weekly, metformin 1000 mg BID  - C/w lantus 55 units nightly, lispro 18 units with meals, SS4  - Endocrine following, appreciate assistance      #Thyroid  TSH (7/18): 2.29  - CTM       ID:  #Recent abdominal wall abscess  - Recently seen in ER 7/13 for abdominal wall abscess 2.3 cm x 3.5 cm x 2 cm s/p I&D   - C/w course of bactrim DS 1 tab BID x 10 days (7/13-7/23)  - Trend temps q4h    PHYSICAL AND OCCUPATIONAL THERAPY: ordered     LINES:  PIV  RIJ SGC + Cordis (7/18-*)    DVT: heparin gtt  CENTRAL LINE BUNDLE: ordered  GLYCEMIC CONTROL:lantus 55 units nightly, lispro 18 units with meals, SS4  BOWEL CARE: Miralax prn  NUTRITION: Adult diet Cardiac, Carb Controlled; 75 gram carb/meal, 45 gram Carb evening snack; 70 gm fat; 2 - 3 grams Sodium    EMERGENCY CONTACT: Extended Emergency Contact Information  Primary Emergency Contact: FREDDY LEWIS  Address: [address] [place] [address] [address]           [place] [address] [facility]  Home Phone: [phone]  Mobile Phone: [phone]  Relation: Spouse  Preferred language: English   needed? No  FAMILY UPDATE:Updated patient and wife at bedside 7/21  CODE STATUS: Full Code  DISPO:Remain in HFICU    Patient seen and  assessed with Dr. Bull PETERS personally spent 45 minutes of critical care time directly and personally managing the patient exclusive of separately billable procedures     Klarissa Youngblood, ARIAN-CNP

## 2024-07-21 NOTE — PROGRESS NOTES
"Patient is a 38-year-old gentleman with a medical history of nonischemic cardiomyopathy, poorly controlled type tumor diverticulitis, severe obesity, prior cardio embolic CVA, prior polysubstance use, intermittent alcohol use who presented after \"cold and wet\" hemodynamics noted on right heart catheterization.   Symptomatically he notes New York Heart Association class IV symptoms with dyspnea with minimal exertion.  Has some lower extremity edema.  Echocardiogram from today shows severe global hypokinesis of the left ventricle with an EF of 10%, right ventricular size appears to be normal; right ventricular function is not well-visualized  PA catheter numbers this morning CVP 7, cardiac index 1.9 (on milrinone 0.25)    Plan:  -He is now on Entresto 49-51 mg twice daily; I am hesitant about further up titration given a limited room on blood pressure.  Will try hydralazine 10 mg 3 times daily rather than further uptitrate Entresto.  -In the long-term: He has features of stage D HFrEF, with a markedly dilated left ventricle, and a peak VO2 predicted that is less than 50% based on his age gender height nomogram.  He has also had multiple hospitalizations for congestive heart failure in the past year.  -Discussed evaluation for LVAD. He has numerous barriers including morbid obesity, recurrent infections, uncontrolled diabetes mellitus, spotty compliance.  Willing to talk to the team Monday morning for education.  -Of note, he does not have an ICD.    This critically ill patient continues to be at-risk for clinically significant deterioration / failure due to the above mentioned dysfunctional, unstable organ systems.  I have personally identified and managed all complex critical care issues to prevent aforementioned clinical deterioration.  Critical care time is spent at bedside and/or the immediate area and has included, but is not limited to, the review of diagnostic tests, labs, radiographs, serial assessments of " hemodynamics, respiratory status, ventilatory management, and family updates.  Time spent in procedures and teaching are reported separately.    Critical care time: 40 minutes    Avery Gottlieb MD  Advanced Heart Failure/Transplant Cardiology  Cardio-Oncology  [facility]

## 2024-07-21 NOTE — CARE PLAN
The patient's goals for the shift include      The clinical goals for the shift include continue to work on afterload; monitor cardiac numbers    Over the shift, the patient did not make progress toward the following goals. Barriers to progression include . Recommendations to address these barriers include .      Problem: Fall/Injury  Goal: Not fall by end of shift  Outcome: Progressing  Goal: Be free from injury by end of the shift  Outcome: Progressing  Goal: Verbalize understanding of personal risk factors for fall in the hospital  Outcome: Progressing  Goal: Verbalize understanding of risk factor reduction measures to prevent injury from fall in the home  Outcome: Progressing  Goal: Pace activities to prevent fatigue by end of the shift  Outcome: Progressing     Problem: Pain - Adult  Goal: Verbalizes/displays adequate comfort level or baseline comfort level  Outcome: Progressing     Problem: Safety - Adult  Goal: Free from fall injury  Outcome: Progressing     Problem: Discharge Planning  Goal: Discharge to home or other facility with appropriate resources  Outcome: Progressing     Problem: Chronic Conditions and Co-morbidities  Goal: Patient's chronic conditions and co-morbidity symptoms are monitored and maintained or improved  Outcome: Progressing

## 2024-07-22 ENCOUNTER — APPOINTMENT (OUTPATIENT)
Dept: CARDIAC REHAB | Facility: HOSPITAL | Age: 39
End: 2024-07-22
Payer: COMMERCIAL

## 2024-07-22 ENCOUNTER — APPOINTMENT (OUTPATIENT)
Dept: RADIOLOGY | Facility: HOSPITAL | Age: 39
End: 2024-07-22
Payer: COMMERCIAL

## 2024-07-22 ENCOUNTER — DOCUMENTATION (OUTPATIENT)
Dept: TRANSPLANT | Facility: HOSPITAL | Age: 39
End: 2024-07-22
Payer: COMMERCIAL

## 2024-07-22 DIAGNOSIS — I42.8 NONISCHEMIC CARDIOMYOPATHY (MULTI): ICD-10-CM

## 2024-07-22 DIAGNOSIS — I50.20 HFREF (HEART FAILURE WITH REDUCED EJECTION FRACTION) (MULTI): ICD-10-CM

## 2024-07-22 PROBLEM — R57.0 CARDIOGENIC SHOCK (MULTI): Status: ACTIVE | Noted: 2024-07-22

## 2024-07-22 LAB
ABO GROUP (TYPE) IN BLOOD: NORMAL
ALBUMIN SERPL BCP-MCNC: 3.6 G/DL (ref 3.4–5)
ALBUMIN SERPL BCP-MCNC: 4 G/DL (ref 3.4–5)
ALP SERPL-CCNC: 58 U/L (ref 33–120)
ALT SERPL W P-5'-P-CCNC: 16 U/L (ref 10–52)
ANION GAP SERPL CALC-SCNC: 17 MMOL/L (ref 10–20)
ANION GAP SERPL CALC-SCNC: 22 MMOL/L (ref 10–20)
APPEARANCE UR: CLEAR
AST SERPL W P-5'-P-CCNC: 25 U/L (ref 9–39)
BACTERIA #/AREA URNS AUTO: ABNORMAL /HPF
BASE EXCESS BLDMV CALC-SCNC: -0.6 MMOL/L (ref -2–3)
BASE EXCESS BLDMV CALC-SCNC: -1.1 MMOL/L (ref -2–3)
BASE EXCESS BLDMV CALC-SCNC: 0.2 MMOL/L (ref -2–3)
BASE EXCESS BLDMV CALC-SCNC: 2 MMOL/L (ref -2–3)
BASOPHILS # BLD AUTO: 0.02 X10*3/UL (ref 0–0.1)
BASOPHILS NFR BLD AUTO: 0.3 %
BILIRUB SERPL-MCNC: 0.5 MG/DL (ref 0–1.2)
BILIRUB UR STRIP.AUTO-MCNC: NEGATIVE MG/DL
BNP SERPL-MCNC: 107 PG/ML (ref 0–99)
BODY TEMPERATURE: 37 DEGREES CELSIUS
BUN SERPL-MCNC: 24 MG/DL (ref 6–23)
BUN SERPL-MCNC: 26 MG/DL (ref 6–23)
CALCIUM SERPL-MCNC: 8.4 MG/DL (ref 8.6–10.6)
CALCIUM SERPL-MCNC: 9.2 MG/DL (ref 8.6–10.6)
CARDIOLIPIN IGA SERPL-ACNC: <0.5 APL U/ML
CARDIOLIPIN IGG SER IA-ACNC: <1.6 GPL U/ML
CARDIOLIPIN IGM SER IA-ACNC: 0.6 MPL U/ML
CHLORIDE SERPL-SCNC: 96 MMOL/L (ref 98–107)
CHLORIDE SERPL-SCNC: 97 MMOL/L (ref 98–107)
CO2 SERPL-SCNC: 23 MMOL/L (ref 21–32)
CO2 SERPL-SCNC: 25 MMOL/L (ref 21–32)
COLOR UR: ABNORMAL
CREAT SERPL-MCNC: 1.41 MG/DL (ref 0.5–1.3)
CREAT SERPL-MCNC: 1.75 MG/DL (ref 0.5–1.3)
EGFRCR SERPLBLD CKD-EPI 2021: 51 ML/MIN/1.73M*2
EGFRCR SERPLBLD CKD-EPI 2021: 66 ML/MIN/1.73M*2
EOSINOPHIL # BLD AUTO: 0.15 X10*3/UL (ref 0–0.7)
EOSINOPHIL NFR BLD AUTO: 2.6 %
ERYTHROCYTE [DISTWIDTH] IN BLOOD BY AUTOMATED COUNT: 12.2 % (ref 11.5–14.5)
ERYTHROCYTE [DISTWIDTH] IN BLOOD BY AUTOMATED COUNT: 12.7 % (ref 11.5–14.5)
GLUCOSE BLD MANUAL STRIP-MCNC: 110 MG/DL (ref 74–99)
GLUCOSE BLD MANUAL STRIP-MCNC: 115 MG/DL (ref 74–99)
GLUCOSE BLD MANUAL STRIP-MCNC: 178 MG/DL (ref 74–99)
GLUCOSE BLD MANUAL STRIP-MCNC: 190 MG/DL (ref 74–99)
GLUCOSE SERPL-MCNC: 155 MG/DL (ref 74–99)
GLUCOSE SERPL-MCNC: 169 MG/DL (ref 74–99)
GLUCOSE UR STRIP.AUTO-MCNC: ABNORMAL MG/DL
HAV AB SER QL IA: REACTIVE
HBV CORE AB SER QL: NONREACTIVE
HBV SURFACE AB SER-ACNC: 292.3 MIU/ML
HCO3 BLDMV-SCNC: 24.8 MMOL/L (ref 22–26)
HCO3 BLDMV-SCNC: 24.9 MMOL/L (ref 22–26)
HCO3 BLDMV-SCNC: 26.9 MMOL/L (ref 22–26)
HCO3 BLDMV-SCNC: 27.3 MMOL/L (ref 22–26)
HCT VFR BLD AUTO: 39.4 % (ref 41–52)
HCT VFR BLD AUTO: 41 % (ref 41–52)
HGB BLD-MCNC: 13.2 G/DL (ref 13.5–17.5)
HGB BLD-MCNC: 14.3 G/DL (ref 13.5–17.5)
HIV 1+2 AB+HIV1 P24 AG SERPL QL IA: NONREACTIVE
IMM GRANULOCYTES # BLD AUTO: 0.03 X10*3/UL (ref 0–0.7)
IMM GRANULOCYTES NFR BLD AUTO: 0.5 % (ref 0–0.9)
INHALED O2 CONCENTRATION: 21 %
INHALED O2 CONCENTRATION: 28 %
KETONES UR STRIP.AUTO-MCNC: NEGATIVE MG/DL
LDH SERPL L TO P-CCNC: 208 U/L (ref 84–246)
LEUKOCYTE ESTERASE UR QL STRIP.AUTO: NEGATIVE
LYMPHOCYTES # BLD AUTO: 1.14 X10*3/UL (ref 1.2–4.8)
LYMPHOCYTES NFR BLD AUTO: 19.4 %
MAGNESIUM SERPL-MCNC: 2.23 MG/DL (ref 1.6–2.4)
MAGNESIUM SERPL-MCNC: 2.34 MG/DL (ref 1.6–2.4)
MCH RBC QN AUTO: 31.1 PG (ref 26–34)
MCH RBC QN AUTO: 31.8 PG (ref 26–34)
MCHC RBC AUTO-ENTMCNC: 33.5 G/DL (ref 32–36)
MCHC RBC AUTO-ENTMCNC: 34.9 G/DL (ref 32–36)
MCV RBC AUTO: 91 FL (ref 80–100)
MCV RBC AUTO: 93 FL (ref 80–100)
MONOCYTES # BLD AUTO: 0.82 X10*3/UL (ref 0.1–1)
MONOCYTES NFR BLD AUTO: 14 %
NEUTROPHILS # BLD AUTO: 3.71 X10*3/UL (ref 1.2–7.7)
NEUTROPHILS NFR BLD AUTO: 63.2 %
NITRITE UR QL STRIP.AUTO: NEGATIVE
NRBC BLD-RTO: 0 /100 WBCS (ref 0–0)
NRBC BLD-RTO: 0 /100 WBCS (ref 0–0)
OXYHGB MFR BLDMV: 60.2 % (ref 45–75)
OXYHGB MFR BLDMV: 61.1 % (ref 45–75)
OXYHGB MFR BLDMV: 62.7 % (ref 45–75)
OXYHGB MFR BLDMV: 64.5 % (ref 45–75)
PCO2 BLDMV: 43 MM HG (ref 41–51)
PCO2 BLDMV: 44 MM HG (ref 41–51)
PCO2 BLDMV: 45 MM HG (ref 41–51)
PCO2 BLDMV: 51 MM HG (ref 41–51)
PH BLDMV: 7.33 PH (ref 7.33–7.43)
PH BLDMV: 7.35 PH (ref 7.33–7.43)
PH BLDMV: 7.37 PH (ref 7.33–7.43)
PH BLDMV: 7.4 PH (ref 7.33–7.43)
PH UR STRIP.AUTO: 6.5 [PH]
PHOSPHATE SERPL-MCNC: 4.8 MG/DL (ref 2.5–4.9)
PLATELET # BLD AUTO: 193 X10*3/UL (ref 150–450)
PLATELET # BLD AUTO: 196 X10*3/UL (ref 150–450)
PO2 BLDMV: 37 MM HG (ref 35–45)
PO2 BLDMV: 39 MM HG (ref 35–45)
PO2 BLDMV: 41 MM HG (ref 35–45)
PO2 BLDMV: 42 MM HG (ref 35–45)
POTASSIUM SERPL-SCNC: 4.4 MMOL/L (ref 3.5–5.3)
POTASSIUM SERPL-SCNC: 4.6 MMOL/L (ref 3.5–5.3)
PREALB SERPL-MCNC: 21.3 MG/DL (ref 18–40)
PROT SERPL-MCNC: 7 G/DL (ref 6.4–8.2)
PROT UR STRIP.AUTO-MCNC: ABNORMAL MG/DL
PSA SERPL-MCNC: 0.35 NG/ML
RBC # BLD AUTO: 4.25 X10*6/UL (ref 4.5–5.9)
RBC # BLD AUTO: 4.49 X10*6/UL (ref 4.5–5.9)
RBC # UR STRIP.AUTO: NEGATIVE /UL
RBC #/AREA URNS AUTO: ABNORMAL /HPF
RH FACTOR (ANTIGEN D): NORMAL
SAO2 % BLDMV: 61 % (ref 45–75)
SAO2 % BLDMV: 61 % (ref 45–75)
SAO2 % BLDMV: 64 % (ref 45–75)
SAO2 % BLDMV: 66 % (ref 45–75)
SODIUM SERPL-SCNC: 134 MMOL/L (ref 136–145)
SODIUM SERPL-SCNC: 137 MMOL/L (ref 136–145)
SP GR UR STRIP.AUTO: 1.02
T3 SERPL-MCNC: 83 NG/DL (ref 60–200)
T4 SERPL-MCNC: 5.6 UG/DL (ref 4.5–11.1)
TSH SERPL-ACNC: 1.73 MIU/L (ref 0.44–3.98)
UFH PPP CHRO-ACNC: 0.3 IU/ML
UROBILINOGEN UR STRIP.AUTO-MCNC: NORMAL MG/DL
WBC # BLD AUTO: 5.9 X10*3/UL (ref 4.4–11.3)
WBC # BLD AUTO: 7.3 X10*3/UL (ref 4.4–11.3)
WBC #/AREA URNS AUTO: ABNORMAL /HPF

## 2024-07-22 PROCEDURE — 71045 X-RAY EXAM CHEST 1 VIEW: CPT

## 2024-07-22 PROCEDURE — 86901 BLOOD TYPING SEROLOGIC RH(D): CPT | Performed by: STUDENT IN AN ORGANIZED HEALTH CARE EDUCATION/TRAINING PROGRAM

## 2024-07-22 PROCEDURE — 84436 ASSAY OF TOTAL THYROXINE: CPT | Performed by: STUDENT IN AN ORGANIZED HEALTH CARE EDUCATION/TRAINING PROGRAM

## 2024-07-22 PROCEDURE — 2500000001 HC RX 250 WO HCPCS SELF ADMINISTERED DRUGS (ALT 637 FOR MEDICARE OP): Performed by: NURSE PRACTITIONER

## 2024-07-22 PROCEDURE — 82947 ASSAY GLUCOSE BLOOD QUANT: CPT

## 2024-07-22 PROCEDURE — 80069 RENAL FUNCTION PANEL: CPT | Performed by: STUDENT IN AN ORGANIZED HEALTH CARE EDUCATION/TRAINING PROGRAM

## 2024-07-22 PROCEDURE — 97530 THERAPEUTIC ACTIVITIES: CPT | Mod: GP

## 2024-07-22 PROCEDURE — 2020000001 HC ICU ROOM DAILY

## 2024-07-22 PROCEDURE — 71046 X-RAY EXAM CHEST 2 VIEWS: CPT | Performed by: RADIOLOGY

## 2024-07-22 PROCEDURE — 83615 LACTATE (LD) (LDH) ENZYME: CPT | Performed by: STUDENT IN AN ORGANIZED HEALTH CARE EDUCATION/TRAINING PROGRAM

## 2024-07-22 PROCEDURE — 84153 ASSAY OF PSA TOTAL: CPT | Performed by: STUDENT IN AN ORGANIZED HEALTH CARE EDUCATION/TRAINING PROGRAM

## 2024-07-22 PROCEDURE — 85520 HEPARIN ASSAY: CPT | Performed by: STUDENT IN AN ORGANIZED HEALTH CARE EDUCATION/TRAINING PROGRAM

## 2024-07-22 PROCEDURE — 83880 ASSAY OF NATRIURETIC PEPTIDE: CPT | Performed by: STUDENT IN AN ORGANIZED HEALTH CARE EDUCATION/TRAINING PROGRAM

## 2024-07-22 PROCEDURE — 2500000004 HC RX 250 GENERAL PHARMACY W/ HCPCS (ALT 636 FOR OP/ED): Performed by: NURSE PRACTITIONER

## 2024-07-22 PROCEDURE — 70355 PANORAMIC X-RAY OF JAWS: CPT

## 2024-07-22 PROCEDURE — 71046 X-RAY EXAM CHEST 2 VIEWS: CPT

## 2024-07-22 PROCEDURE — 81001 URINALYSIS AUTO W/SCOPE: CPT | Performed by: STUDENT IN AN ORGANIZED HEALTH CARE EDUCATION/TRAINING PROGRAM

## 2024-07-22 PROCEDURE — 2500000004 HC RX 250 GENERAL PHARMACY W/ HCPCS (ALT 636 FOR OP/ED): Performed by: STUDENT IN AN ORGANIZED HEALTH CARE EDUCATION/TRAINING PROGRAM

## 2024-07-22 PROCEDURE — 2500000001 HC RX 250 WO HCPCS SELF ADMINISTERED DRUGS (ALT 637 FOR MEDICARE OP)

## 2024-07-22 PROCEDURE — 85025 COMPLETE CBC W/AUTO DIFF WBC: CPT | Performed by: STUDENT IN AN ORGANIZED HEALTH CARE EDUCATION/TRAINING PROGRAM

## 2024-07-22 PROCEDURE — 86704 HEP B CORE ANTIBODY TOTAL: CPT | Performed by: STUDENT IN AN ORGANIZED HEALTH CARE EDUCATION/TRAINING PROGRAM

## 2024-07-22 PROCEDURE — 86147 CARDIOLIPIN ANTIBODY EA IG: CPT | Performed by: STUDENT IN AN ORGANIZED HEALTH CARE EDUCATION/TRAINING PROGRAM

## 2024-07-22 PROCEDURE — 2500000001 HC RX 250 WO HCPCS SELF ADMINISTERED DRUGS (ALT 637 FOR MEDICARE OP): Performed by: STUDENT IN AN ORGANIZED HEALTH CARE EDUCATION/TRAINING PROGRAM

## 2024-07-22 PROCEDURE — 87389 HIV-1 AG W/HIV-1&-2 AB AG IA: CPT | Performed by: STUDENT IN AN ORGANIZED HEALTH CARE EDUCATION/TRAINING PROGRAM

## 2024-07-22 PROCEDURE — 84443 ASSAY THYROID STIM HORMONE: CPT | Performed by: STUDENT IN AN ORGANIZED HEALTH CARE EDUCATION/TRAINING PROGRAM

## 2024-07-22 PROCEDURE — 83735 ASSAY OF MAGNESIUM: CPT | Performed by: STUDENT IN AN ORGANIZED HEALTH CARE EDUCATION/TRAINING PROGRAM

## 2024-07-22 PROCEDURE — 82805 BLOOD GASES W/O2 SATURATION: CPT | Performed by: STUDENT IN AN ORGANIZED HEALTH CARE EDUCATION/TRAINING PROGRAM

## 2024-07-22 PROCEDURE — 84134 ASSAY OF PREALBUMIN: CPT | Performed by: STUDENT IN AN ORGANIZED HEALTH CARE EDUCATION/TRAINING PROGRAM

## 2024-07-22 PROCEDURE — 87522 HEPATITIS C REVRS TRNSCRPJ: CPT | Performed by: STUDENT IN AN ORGANIZED HEALTH CARE EDUCATION/TRAINING PROGRAM

## 2024-07-22 PROCEDURE — 86706 HEP B SURFACE ANTIBODY: CPT | Performed by: STUDENT IN AN ORGANIZED HEALTH CARE EDUCATION/TRAINING PROGRAM

## 2024-07-22 PROCEDURE — 97165 OT EVAL LOW COMPLEX 30 MIN: CPT | Mod: GO

## 2024-07-22 PROCEDURE — 80323 ALKALOIDS NOS: CPT | Performed by: STUDENT IN AN ORGANIZED HEALTH CARE EDUCATION/TRAINING PROGRAM

## 2024-07-22 PROCEDURE — 99232 SBSQ HOSP IP/OBS MODERATE 35: CPT

## 2024-07-22 PROCEDURE — 37799 UNLISTED PX VASCULAR SURGERY: CPT | Performed by: STUDENT IN AN ORGANIZED HEALTH CARE EDUCATION/TRAINING PROGRAM

## 2024-07-22 PROCEDURE — 84480 ASSAY TRIIODOTHYRONINE (T3): CPT | Performed by: STUDENT IN AN ORGANIZED HEALTH CARE EDUCATION/TRAINING PROGRAM

## 2024-07-22 PROCEDURE — 2500000002 HC RX 250 W HCPCS SELF ADMINISTERED DRUGS (ALT 637 FOR MEDICARE OP, ALT 636 FOR OP/ED): Performed by: STUDENT IN AN ORGANIZED HEALTH CARE EDUCATION/TRAINING PROGRAM

## 2024-07-22 PROCEDURE — 99291 CRITICAL CARE FIRST HOUR: CPT | Performed by: STUDENT IN AN ORGANIZED HEALTH CARE EDUCATION/TRAINING PROGRAM

## 2024-07-22 PROCEDURE — 94660 CPAP INITIATION&MGMT: CPT

## 2024-07-22 PROCEDURE — 71045 X-RAY EXAM CHEST 1 VIEW: CPT | Performed by: RADIOLOGY

## 2024-07-22 PROCEDURE — 86708 HEPATITIS A ANTIBODY: CPT | Performed by: STUDENT IN AN ORGANIZED HEALTH CARE EDUCATION/TRAINING PROGRAM

## 2024-07-22 PROCEDURE — 86780 TREPONEMA PALLIDUM: CPT

## 2024-07-22 PROCEDURE — 80307 DRUG TEST PRSMV CHEM ANLYZR: CPT | Performed by: STUDENT IN AN ORGANIZED HEALTH CARE EDUCATION/TRAINING PROGRAM

## 2024-07-22 RX ORDER — HYDRALAZINE HYDROCHLORIDE 25 MG/1
25 TABLET, FILM COATED ORAL ONCE
Status: COMPLETED | OUTPATIENT
Start: 2024-07-22 | End: 2024-07-22

## 2024-07-22 RX ORDER — HYDRALAZINE HYDROCHLORIDE 50 MG/1
50 TABLET, FILM COATED ORAL 3 TIMES DAILY
Status: DISCONTINUED | OUTPATIENT
Start: 2024-07-22 | End: 2024-07-22

## 2024-07-22 RX ORDER — INSULIN LISPRO 100 [IU]/ML
0-10 INJECTION, SOLUTION INTRAVENOUS; SUBCUTANEOUS
Status: DISCONTINUED | OUTPATIENT
Start: 2024-07-22 | End: 2024-07-24

## 2024-07-22 RX ORDER — SPIRONOLACTONE 25 MG/1
25 TABLET ORAL DAILY
Status: DISCONTINUED | OUTPATIENT
Start: 2024-07-23 | End: 2024-07-30 | Stop reason: HOSPADM

## 2024-07-22 RX ORDER — INSULIN LISPRO 100 [IU]/ML
20 INJECTION, SOLUTION INTRAVENOUS; SUBCUTANEOUS
Status: DISCONTINUED | OUTPATIENT
Start: 2024-07-23 | End: 2024-07-24

## 2024-07-22 RX ORDER — HYDRALAZINE HYDROCHLORIDE 25 MG/1
25 TABLET, FILM COATED ORAL 3 TIMES DAILY
Status: DISCONTINUED | OUTPATIENT
Start: 2024-07-22 | End: 2024-07-23

## 2024-07-22 RX ORDER — FUROSEMIDE 10 MG/ML
40 INJECTION INTRAMUSCULAR; INTRAVENOUS 2 TIMES DAILY
Status: DISCONTINUED | OUTPATIENT
Start: 2024-07-22 | End: 2024-07-24

## 2024-07-22 ASSESSMENT — COGNITIVE AND FUNCTIONAL STATUS - GENERAL
MOBILITY SCORE: 21
DAILY ACTIVITIY SCORE: 24
MOBILITY SCORE: 23
CLIMB 3 TO 5 STEPS WITH RAILING: A LITTLE
HELP NEEDED FOR BATHING: A LITTLE
WALKING IN HOSPITAL ROOM: A LITTLE
MOVING TO AND FROM BED TO CHAIR: A LITTLE
STANDING UP FROM CHAIR USING ARMS: A LITTLE
DAILY ACTIVITIY SCORE: 23

## 2024-07-22 ASSESSMENT — PAIN - FUNCTIONAL ASSESSMENT
PAIN_FUNCTIONAL_ASSESSMENT: 0-10

## 2024-07-22 ASSESSMENT — ACTIVITIES OF DAILY LIVING (ADL)
BATHING_ASSISTANCE: STAND BY
ADL_ASSISTANCE: INDEPENDENT

## 2024-07-22 ASSESSMENT — PAIN SCALES - GENERAL
PAINLEVEL_OUTOF10: 0 - NO PAIN
PAINLEVEL_OUTOF10: 0 - NO PAIN
PAINLEVEL_OUTOF10: 2
PAINLEVEL_OUTOF10: 0 - NO PAIN
PAINLEVEL_OUTOF10: 0 - NO PAIN
PAINLEVEL_OUTOF10: 4
PAINLEVEL_OUTOF10: 0 - NO PAIN
PAINLEVEL_OUTOF10: 0 - NO PAIN

## 2024-07-22 NOTE — PROGRESS NOTES
"Arthur Carranza is a 38 y.o. male on day 4 of admission presenting with Cardiogenic shock (Multi).    Subjective   No acute events overnight. The patient was seen from the hallway as the transplant team was seeing him this morning and we did not want to interrupt their conversation about LVAD.        Objective     Physical Exam  Constitutional:       Appearance: Normal appearance.   HENT:      Head: Normocephalic and atraumatic.      Nose: Nose normal.      Mouth/Throat:      Mouth: Mucous membranes are moist.   Eyes:      Extraocular Movements: Extraocular movements intact.       Physical Exam    Last Recorded Vitals  Blood pressure 130/78, pulse 106, temperature 36 °C (96.8 °F), resp. rate 14, height 1.75 m (5' 8.9\"), weight 136 kg (299 lb 13.2 oz), SpO2 99%.    Intake/Output last 3 Shifts:  I/O last 3 completed shifts:  In: 3352.2 (24.6 mL/kg) [P.O.:1885; I.V.:1467.2 (10.8 mL/kg)]  Out: 2950 (21.7 mL/kg) [Urine:2950 (0.6 mL/kg/hr)]  Weight: 136 kg     Relevant Results                         Relevant Results    Current Facility-Administered Medications:     acetaminophen (Tylenol) tablet 650 mg, 650 mg, oral, q6h PRN, Kelli Luong PA-C, 650 mg at 07/20/24 1927    atorvastatin (Lipitor) tablet 40 mg, 40 mg, oral, Nightly, Cara Toribio MD, 40 mg at 07/21/24 2034    buPROPion XL (Wellbutrin XL) 24 hr tablet 150 mg, 150 mg, oral, Daily, Kelli Luong PA-C, 150 mg at 07/22/24 0855    dapagliflozin propanediol (Farxiga) tablet 10 mg, 10 mg, oral, Daily, Cara Toribio MD, 10 mg at 07/22/24 0856    dextrose 50 % injection 12.5 g, 12.5 g, intravenous, q15 min PRN, Cara Toribio MD    dextrose 50 % injection 25 g, 25 g, intravenous, q15 min PRN, Cara Toribio MD    furosemide (Lasix) injection 40 mg, 40 mg, intravenous, BID, Kanwal oFley, APRN-CNP, 40 mg at 07/22/24 1031    glucagon (Glucagen) injection 1 mg, 1 mg, intramuscular, q15 min PRN, Cara Toribio MD    glucagon (Glucagen) injection 1 mg, 1 " mg, intramuscular, q15 min PRN, Cara Toribio MD    heparin 25,000 Units in dextrose 5% 250 mL (100 Units/mL) infusion (premix), 0-4,000 Units/hr, intravenous, Continuous, Cara Toribio MD, Last Rate: 20 mL/hr at 07/22/24 1231, 2,000 Units/hr at 07/22/24 1231    heparin bolus from bag 3,000-4,000 Units, 3,000-4,000 Units, intravenous, q4h PRN, Cara Toribio MD, 3,000 Units at 07/21/24 1301    heparin bolus from bag 4,000 Units, 4,000 Units, intravenous, Once, Cara Toribio MD    hydrALAZINE (Apresoline) tablet 50 mg, 50 mg, oral, TID, Zandra Reyes, APRN-CNP, 50 mg at 07/22/24 1521    insulin glargine (Lantus) injection 55 Units, 55 Units, subcutaneous, Nightly, Cara Toribio MD, 55 Units at 07/21/24 2035    insulin lispro (HumaLOG) injection 0-20 Units, 0-20 Units, subcutaneous, TID, Cara Toribio MD, 4 Units at 07/22/24 0857    insulin lispro (HumaLOG) injection 18 Units, 18 Units, subcutaneous, TID, Cara Toribio MD, 18 Units at 07/22/24 1212    lactated Ringer's infusion, 10 mL/hr, intravenous, Continuous, Kelli Luong PA-C, Last Rate: 10 mL/hr at 07/22/24 1521, 10 mL/hr at 07/22/24 1521    milrinone (Primacor) 20 mg in dextrose 5% 100 mL (0.2 mg/mL) infusion (premix), 0.25 mcg/kg/min, intravenous, Continuous, Kelli Luong PA-C, Last Rate: 10.5 mL/hr at 07/22/24 1231, 0.25 mcg/kg/min at 07/22/24 1231    oxygen (O2) therapy, , inhalation, Continuous PRN - O2/gases, Kelli Luong PA-C    perflutren protein A microsphere (Optison) injection 0.5 mL, 0.5 mL, intravenous, Once in imaging, Zandra Reyes, APRN-CNP    polyethylene glycol (Glycolax, Miralax) packet 17 g, 17 g, oral, Daily, Kelli Luong PA-C, 17 g at 07/19/24 0820    sacubitriL-valsartan (Entresto) 49-51 mg per tablet 1 tablet, 1 tablet, oral, BID, Kelli Luong PA-C, 1 tablet at 07/22/24 0855    sertraline (Zoloft) tablet 100 mg, 100 mg, oral, Daily, Cara Toribio MD, 100 mg at 07/22/24 0855    [START ON  7/23/2024] spironolactone (Aldactone) tablet 25 mg, 25 mg, oral, Daily, Tong Stanley APRN-CNP    sulfamethoxazole-trimethoprim (Bactrim DS) 800-160 mg per tablet 1 tablet, 1 tablet, oral, q12h JAREK, Kelli Luong PA-C, 1 tablet at 07/22/24 0856    traZODone (Desyrel) tablet 50 mg, 50 mg, oral, Nightly PRN, Cara Toribio MD, 50 mg at 07/21/24 2035       Lab Results   Component Value Date    POCGLU 115 (H) 07/22/2024    POCGLU 178 (H) 07/22/2024    POCGLU 159 (H) 07/21/2024    POCGLU 176 (H) 07/21/2024    POCGLU 149 (H) 07/21/2024    GLUCOSE 169 (H) 07/22/2024    GLUCOSE 164 (H) 07/21/2024    GLUCOSE 172 (H) 07/20/2024    GLUCOSE 249 (H) 07/19/2024    GLUCOSE 206 (H) 07/18/2024       Results for orders placed or performed during the hospital encounter of 07/18/24 (from the past 24 hour(s))   POCT GLUCOSE   Result Value Ref Range    POCT Glucose 176 (H) 74 - 99 mg/dL   Blood Gas Mixed Venous   Result Value Ref Range    POCT pH, Mixed 7.39 7.33 - 7.43 pH    POCT pCO2, Mixed 46 41 - 51 mm Hg    POCT pO2, Mixed 42 35 - 45 mm Hg    POCT SO2, Mixed 67 45 - 75 %    POCT Oxy Hemoglobin, Mixed 65.3 45.0 - 75.0 %    POCT Base Excess, Mixed 2.2 -2.0 - 3.0 mmol/L    POCT HCO3 Calculated, Mixed 27.8 (H) 22.0 - 26.0 mmol/L    Patient Temperature 37.0 degrees Celsius    FiO2 21 %   Heparin Assay, UFH   Result Value Ref Range    Heparin Unfractionated 0.4 See Comment Below for Therapeutic Ranges IU/mL   POCT GLUCOSE   Result Value Ref Range    POCT Glucose 159 (H) 74 - 99 mg/dL   Heparin Assay, UFH   Result Value Ref Range    Heparin Unfractionated 0.3 See Comment Below for Therapeutic Ranges IU/mL   CBC   Result Value Ref Range    WBC 7.3 4.4 - 11.3 x10*3/uL    nRBC 0.0 0.0 - 0.0 /100 WBCs    RBC 4.25 (L) 4.50 - 5.90 x10*6/uL    Hemoglobin 13.2 (L) 13.5 - 17.5 g/dL    Hematocrit 39.4 (L) 41.0 - 52.0 %    MCV 93 80 - 100 fL    MCH 31.1 26.0 - 34.0 pg    MCHC 33.5 32.0 - 36.0 g/dL    RDW 12.2 11.5 - 14.5 %    Platelets  196 150 - 450 x10*3/uL   Blood Gas Mixed Venous   Result Value Ref Range    POCT pH, Mixed 7.37 7.33 - 7.43 pH    POCT pCO2, Mixed 47 41 - 51 mm Hg    POCT pO2, Mixed 37 35 - 45 mm Hg    POCT SO2, Mixed 61 45 - 75 %    POCT Oxy Hemoglobin, Mixed 59.4 45.0 - 75.0 %    POCT Base Excess, Mixed 1.3 -2.0 - 3.0 mmol/L    POCT HCO3 Calculated, Mixed 27.2 (H) 22.0 - 26.0 mmol/L    Patient Temperature 37.0 degrees Celsius    FiO2 28 %   Renal function panel   Result Value Ref Range    Glucose 169 (H) 74 - 99 mg/dL    Sodium 137 136 - 145 mmol/L    Potassium 4.6 3.5 - 5.3 mmol/L    Chloride 97 (L) 98 - 107 mmol/L    Bicarbonate 23 21 - 32 mmol/L    Anion Gap 22 (H) 10 - 20 mmol/L    Urea Nitrogen 24 (H) 6 - 23 mg/dL    Creatinine 1.41 (H) 0.50 - 1.30 mg/dL    eGFR 66 >60 mL/min/1.73m*2    Calcium 8.4 (L) 8.6 - 10.6 mg/dL    Phosphorus 4.8 2.5 - 4.9 mg/dL    Albumin 3.6 3.4 - 5.0 g/dL   Magnesium   Result Value Ref Range    Magnesium 2.34 1.60 - 2.40 mg/dL   Heparin Assay, UFH   Result Value Ref Range    Heparin Unfractionated 0.3 See Comment Below for Therapeutic Ranges IU/mL   Blood Gas Mixed Venous   Result Value Ref Range    POCT pH, Mixed 7.33 7.33 - 7.43 pH    POCT pCO2, Mixed 51 41 - 51 mm Hg    POCT pO2, Mixed 41 35 - 45 mm Hg    POCT SO2, Mixed 64 45 - 75 %    POCT Oxy Hemoglobin, Mixed 62.7 45.0 - 75.0 %    POCT Base Excess, Mixed 0.2 -2.0 - 3.0 mmol/L    POCT HCO3 Calculated, Mixed 26.9 (H) 22.0 - 26.0 mmol/L    Patient Temperature 37.0 degrees Celsius    FiO2 28 %   POCT GLUCOSE   Result Value Ref Range    POCT Glucose 178 (H) 74 - 99 mg/dL   POCT GLUCOSE   Result Value Ref Range    POCT Glucose 115 (H) 74 - 99 mg/dL   Blood Gas Mixed Venous   Result Value Ref Range    POCT pH, Mixed 7.40 7.33 - 7.43 pH    POCT pCO2, Mixed 44 41 - 51 mm Hg    POCT pO2, Mixed 39 35 - 45 mm Hg    POCT SO2, Mixed 61 45 - 75 %    POCT Oxy Hemoglobin, Mixed 61.1 45.0 - 75.0 %    POCT Base Excess, Mixed 2.0 -2.0 - 3.0 mmol/L    POCT HCO3  Calculated, Mixed 27.3 (H) 22.0 - 26.0 mmol/L    Patient Temperature 37.0 degrees Celsius    FiO2 21 %          XR chest 1 view    Result Date: 7/22/2024  Interpreted By:  Delroy Rojas, STUDY: XR CHEST 1 VIEW; 7/22/2024 8:19 am   INDICATION: Signs/Symptoms:swan in Situ.   COMPARISON: 07/21/2024.   ACCESSION NUMBER(S): [id number]   ORDERING CLINICIAN: YAMILA ROSENTHAL   FINDINGS:     CARDIOMEDIASTINAL SILHOUETTE: Patient is status post median sternotomy. [place] catheter overlies the right main pulmonary artery.   LUNGS: Slight interval worsening in perihilar and basilar edema/atelectasis. Correlate with fluid status.   ABDOMEN: No remarkable upper abdominal findings.   BONES: No acute osseous changes.       1.  [place] catheter overlies the right main pulmonary artery. 2. Question developing right basilar edema/infiltrate and follow-up after diuresis recommended.     Signed by: Delroy Rojas 7/22/2024 2:05 PM Dictation workstation:   [id number]    XR chest 1 view    Result Date: 7/22/2024  Interpreted By:  Delroy Rojas, STUDY: XR CHEST 1 VIEW; 7/21/2024 3:00 pm   INDICATION: Signs/Symptoms:SGC.   COMPARISON: 07/20/2024   ACCESSION NUMBER(S): [id number]   ORDERING CLINICIAN: ALMA LLANES   FINDINGS:     CARDIOMEDIASTINAL SILHOUETTE: Cardiomegaly versus pericardial effusion. [place] catheter overlies the right main pulmonary artery.   LUNGS: Lungs are clear of focal airspace disease or edema. No pneumothorax.   ABDOMEN: No remarkable upper abdominal findings.   BONES: No acute osseous changes.       1.  [place] catheter overlies the right main pulmonary artery.     Signed by: Delroy Rojas 7/22/2024 8:39 AM Dictation workstation:   [id number]    XR chest 1 view    Result Date: 7/20/2024  Interpreted By:  Brian Shannon, STUDY: XR CHEST 1 VIEW; 7/20/2024 9:12 am   INDICATION: Signs/Symptoms:[place] position.   COMPARISON: 07/19/2024   ACCESSION NUMBER(S): [id number]   ORDERING  CLINICIAN: JULIO C JARAMILLO   FINDINGS: Right IJ [place] catheter is in place with the tip projecting over the right main pulmonary artery.   Cardiac silhouette size is stable.   Mild perihilar edema with no focal infiltrate, pleural effusion or pneumothorax.   No acute osseous abnormality.       1. [place] catheter as described above. 2. Mild perihilar congestion/edema, unchanged.       Signed by: Brian Lucas 7/20/2024 11:00 AM Dictation workstation:   [id number]    XR chest 1 view    Result Date: 7/19/2024  Interpreted By:  Brian Shannon, STUDY: XR CHEST 1 VIEW; 7/19/2024 7:39 am   INDICATION: Signs/Symptoms:SGC placement.   COMPARISON: 07/18/2024   ACCESSION NUMBER(S): [id number]   ORDERING CLINICIAN: JULIO C JARAMILLO   FINDINGS: Right IJ [place] catheter is in place with the tip projecting over right main pulmonary artery.   The cardiac silhouette size is stable.   Increased interstitial markings of the lungs with slight worsening of the aeration of the lungs. No sizable pleural effusion or pneumothorax.   No acute osseous change.       1. Slight interval worsening of aeration of the lungs with persistent interstitial edema. 2. [place] catheter as described above.       Signed by: Brian Lucas 7/19/2024 9:24 PM Dictation workstation:   [id number]    Transthoracic Echo (TTE) Complete    Result Date: 7/19/2024   [facility], [address] [place] [address]                Tel [phone] and Fax [phone] TRANSTHORACIC ECHOCARDIOGRAM REPORT  Patient Name:      JAZZ Quigley Physician:    [id number] Diana Cheng MD Study Date:        7/19/2024            Ordering Provider:    [id number] LAUREN KOCH MRN/PID:           [id number]             Fellow: Accession#:        [id number]         Nurse: Date of Birth/Age: 1985 / 38      Sonographer:          David Arteaga                     years                                      RDCS, RCS, FASE,                                                               ACS Gender:            M                    Additional Staff: Height:            175.26 cm            Admit Date:           7/18/2024 Weight:            136.99 kg            Admission Status: BSA / BMI:         2.46 m2 / 44.60      Encounter#:           [id number]                    kg/m2 Blood Pressure:    97/70 mmHg           Department Location:  [address] Study Type:    TRANSTHORACIC ECHO (TTE) COMPLETE Diagnosis/ICD: 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 Study Detail: The following Echo studies were performed: 2D, M-Mode, Doppler and               color flow. Technically challenging study due to poor acoustic               windows. Definity used as a contrast agent for endocardial border               definition. Total contrast used for this procedure was 2.0 mL via               IV push.  PHYSICIAN INTERPRETATION: Left Ventricle: Left ventricular ejection fraction is severely decreased, by visual estimate at 10%. The left ventricular cavity size is severely dilated. Spectral Doppler shows a pseudonormal pattern of left ventricular diastolic filling. Left Atrium: The left atrium is normal in size. Right Ventricle: The right ventricle is normal in size. There is normal right ventricular global systolic function. Right Atrium: The right atrium is normal in size. Aortic Valve: The aortic valve is trileaflet. There is trivial aortic valve regurgitation. The peak instantaneous gradient of the aortic valve is 3.7 mmHg. Mitral Valve: The mitral valve is normal in structure. There is mild mitral valve regurgitation.  Tricuspid Valve: The tricuspid valve is structurally normal. There is trace tricuspid regurgitation. Pulmonic Valve: The pulmonic valve is structurally normal. There is physiologic pulmonic valve regurgitation. Pericardium: There is no pericardial effusion noted. Aorta: The aortic root is normal. There is no dilatation of the aortic root. Systemic Veins: The inferior vena cava appears to be of normal size. There is less than 50% IVC collapse with inspiration. In comparison to the previous echocardiogram(s): Compared with study dated 2/21/2024, no significant change.  CONCLUSIONS:  1. Poorly visualized anatomical structures due to suboptimal image quality.  2. Left ventricular ejection fraction is severely decreased, by visual estimate at 10%.  3. Spectral Doppler shows a pseudonormal pattern of left ventricular diastolic filling.  4. Left ventricular cavity size is severely dilated.  5. There is normal right ventricular global systolic function. QUANTITATIVE DATA SUMMARY: 2D MEASUREMENTS:                           Normal Ranges: Ao Root d:     3.50 cm    (2.0-3.7cm) LAs:           4.30 cm    (2.7-4.0cm) IVSd:          0.90 cm    (0.6-1.1cm) LVPWd:         0.80 cm    (0.6-1.1cm) LVIDd:         7.40 cm    (3.9-5.9cm) LVIDs:         7.20 cm LV Mass Index: 118.0 g/m2 LV % FS        2.7 % LA VOLUME:                               Normal Ranges: LA Vol A4C:        51.0 ml    (22+/-6mL/m2) LA Vol A2C:        70.9 ml LA Vol BP:         60.3 ml LA Vol Index A4C:  20.7ml/m2 LA Vol Index A2C:  28.8 ml/m2 LA Vol Index BP:   24.5 ml/m2 LA Area A4C:       19.7 cm2 LA Area A2C:       23.3 cm2 LA Major Axis A4C: 6.5 cm LA Major Axis A2C: 6.5 cm LA Volume Index:   23.5 ml/m2 RA VOLUME BY A/L METHOD:                       Normal Ranges: RA Area A4C: 17.1 cm2 AORTA MEASUREMENTS:                    Normal Ranges: Asc Ao, d: 2.90 cm (2.1-3.4cm) LV SYSTOLIC FUNCTION BY 2D PLANIMETRY (MOD):                      Normal Ranges: EF-A4C View:     25 % (>=55%) EF-A2C View:    23 % EF-Biplane:     26 % EF-Visual:      10 % LV EF Reported: 10 % LV DIASTOLIC FUNCTION:                              Normal Ranges: MV Peak E:        0.75 m/s   (0.7-1.2 m/s) MV Peak A:        0.41 m/s   (0.42-0.7 m/s) E/A Ratio:        1.84       (1.0-2.2) MV e'             0.054 m/s  (>8.0) MV lateral e'     0.05 m/s MV medial e'      0.06 m/s E/e' Ratio:       13.76      (<8.0) PulmV Sys Jean Claude:    36.50 cm/s PulmV Nair Jean Claude:   48.10 cm/s PulmV S/D Jean Claude:    0.80 PulmV A Revs Jean Claude: 26.10 cm/s MITRAL VALVE:                 Normal Ranges: MV DT: 169 msec (150-240msec) AORTIC VALVE:                         Normal Ranges: AoV Vmax:      0.96 m/s (<=1.7m/s) AoV Peak PG:   3.7 mmHg (<20mmHg) LVOT Max Jean Claude:  0.72 m/s (<=1.1m/s) LVOT VTI:      11.00 cm LVOT Diameter: 2.30 cm  (1.8-2.4cm) AoV Area,Vmax: 3.10 cm2 (2.5-4.5cm2)  RIGHT VENTRICLE: TAPSE: 20.8 mm RV s'  0.10 m/s PULMONIC VALVE:                         Normal Ranges: PV Accel Time: 140 msec (>120ms) PV Max Jean Claude:    0.9 m/s  (0.6-0.9m/s) PV Max PG:     3.0 mmHg Pulmonary Veins: PulmV A Revs Jean Claude: 26.10 cm/s PulmV Nair Jean Claude:   48.10 cm/s PulmV S/D Jean Claude:    0.80 PulmV Sys Jean Claude:    36.50 cm/s  [id number] Diana Cheng MD Electronically signed on 7/19/2024 at 1:16:53 PM  ** Final **     ECG 12 lead (Clinic Performed)    Result Date: 7/19/2024  Normal sinus rhythm Possible Left atrial enlargement Left axis deviation Nonspecific intraventricular block Abnormal ECG When compared with ECG of 23-FEB-2024 07:15, T wave inversion no longer evident in Lateral leads Confirmed by Jose Carlos Pena ([id number]) on 7/19/2024 8:23:46 AM    XR chest 1 view    Result Date: 7/19/2024  Interpreted By:  Juan Patel and Sheng Max STUDY: XR CHEST 1 VIEW;  7/18/2024 5:11 pm   INDICATION: Signs/Symptoms:new admission, SGC.   COMPARISON: Chest radiograph dated 02/21/2024, CT abdomen pelvis dated 07/13/2024   ACCESSION NUMBER(S): [id number]   ORDERING CLINICIAN: ALMA  LEWARCHICK   FINDINGS: AP radiograph of the chest:   LINES AND DEVICES: Right internal jugular approach [place] catheter tip projects over the right pulmonary artery.   CARDIOMEDIASTINAL SILHOUETTE: Stable enlargement of the cardiomediastinal silhouette.   LUNGS: Increased bibasilar airspace opacities and increased interstitial prominence bilaterally. Small left and trace right pleural effusions. No focal consolidation or pneumothorax.   ABDOMEN: No remarkable upper abdominal findings.   BONES: No acute osseous abnormality.       1. Increased bibasilar airspace opacities with increased interstitial prominence bilaterally. In the setting of enlarged cardiomediastinal silhouette without leukocytosis, these findings are most consistent with pulmonary edema/atelectasis and less likely pneumonia. 2. Right internal jugular approach [place] catheter tip projects over the right pulmonary artery.     I personally reviewed the images/study and I agree with the findings as stated by Dr. Maicol Lo. This study was interpreted at [place], [place].   MACRO: None   Signed by: Juan Patel 7/19/2024 8:09 AM Dictation workstation:   [id number]    Cardiac Catheterization Procedure    Result Date: 7/18/2024   [facility], Cath Lab, [address] [place] [address] Cardiovascular Catheterization Report Patient Name:      JAZZ LEWIS         Performing Physician:  Mirna Sainz MD Study Date:        7/18/2024             Verifying Physician:   Mirna Sainz MD MRN/PID:           [id number]              Cardiologist/Co-Scrub: Accession#:        [id number]          Ordering Provider:     Mirna SAINZ Date of Birth/Age: 1985 / 38 years  Cardiologist: Gender:            M                     Fellow: Encounter#:        [id number]            Surgeon:  Study: Right Heart Cath  Indications:  Procedure Description: After infiltration of local anesthetic, the right internal jugular vein was identified with two-dimensional ultrasound. Under direct ultrasound visualization, the right internal jugular vein was cannulated with a micropuncture technique. A 8.5 Vietnamese sheath was placed in the vein. Post-procedure, the venous sheath was pulled and pressure was applied to the site.  Hemo Personnel: +---------------------+---------+ Name                 Duty      +---------------------+---------+ Delroy Jurado MD 1 +---------------------+---------+  Hemodynamic Pressures:  +---------+-------+-------+-------+--------+-------+          RA mmHgRV mmHgPA mmHgPCW mmHgAo mmHg +---------+-------+-------+-------+--------+-------+ Systolic        63     64             123     +---------+-------+-------+-------+--------+-------+ Diastolic              40             90      +---------+-------+-------+-------+--------+-------+ Mean     24            48     41      101     +---------+-------+-------+-------+--------+-------+ +----+----------+---------+-------------+-------------+---+----+-------+-------+ SiteDate Time   Phase  Systolic mmHg  Diastolic  ED MeanA-Wave V-Wave                  Name                    mmHg     mmHmmHg mmHg   mmHg                                                     g                    +----+----------+---------+-------------+-------------+---+----+-------+-------+   AO 7/18/2024 AIR REST          123           90    101                     10:20:05                                                                     AM                                                          +----+----------+---------+-------------+-------------+---+----+-------+-------+   RA 7/18/2024 AIR REST                               19     24     22       10:32:15                                                                     AM                                                         +----+----------+---------+-------------+-------------+---+----+-------+-------+   RA 7/18/2024 AIR REST                               17     24     21       10:32:22                                                                     AM                                                         +----+----------+---------+-------------+-------------+---+----+-------+-------+   RA 7/18/2024 AIR REST                               15     20     18       10:32:34                                                                     AM                                                         +----+----------+---------+-------------+-------------+---+----+-------+-------+   RA 7/18/2024 AIR REST                               15     18     17       10:32:44                                                                     AM                                                         +----+----------+---------+-------------+-------------+---+----+-------+-------+   RV 7/18/2024 AIR REST           56           17 21                         10:33:15                                                                     AM                                                         +----+----------+---------+-------------+-------------+---+----+-------+-------+   RV 7/18/2024 AIR REST           57           13 21                         10:33:26                                                                     AM                                                          +----+----------+---------+-------------+-------------+---+----+-------+-------+   PW 7/18/2024 AIR REST                               29     31     30       10:34:13                                                                     AM                                                         +----+----------+---------+-------------+-------------+---+----+-------+-------+   PW 7/18/2024 AIR REST                               34     36     37       10:34:20                                                                     AM                                                         +----+----------+---------+-------------+-------------+---+----+-------+-------+   PW 7/18/2024 AIR REST                               38     40     44       10:34:26                                                                     AM                                                         +----+----------+---------+-------------+-------------+---+----+-------+-------+   PA 7/18/2024 AIR REST           70           13     33                     10:35:00                                                                     AM                                                         +----+----------+---------+-------------+-------------+---+----+-------+-------+   PA 7/18/2024 AIR REST           60           14     33                     10:35:08                                                                     AM                                                         +----+----------+---------+-------------+-------------+---+----+-------+-------+   PA 7/18/2024 AIR REST           58           10     32                     10:35:17                                                                     AM                                                          +----+----------+---------+-------------+-------------+---+----+-------+-------+   PA 7/18/2024 AIR REST           58            7     30                     10:35:27                                                                     AM                                                         +----+----------+---------+-------------+-------------+---+----+-------+-------+   PA 7/18/2024 AIR REST           55            6     26                     10:35:38                                                                     AM                                                         +----+----------+---------+-------------+-------------+---+----+-------+-------+   PA 7/18/2024 AIR REST           56           28     44                     10:36:07                                                                     AM                                                         +----+----------+---------+-------------+-------------+---+----+-------+-------+   PA 7/18/2024 AIR REST           54           29     41                     10:36:33                                                                     AM                                                         +----+----------+---------+-------------+-------------+---+----+-------+-------+  Oxygen Saturation %: +-----------+----------+------------+ Sample SiteO2 Sat (%)HB (g/100ml) +-----------+----------+------------+          FA        98        12.3 +-----------+----------+------------+          PA        55        12.3 +-----------+----------+------------+          FA        98        12.3 +-----------+----------+------------+          PA        55        12.3 +-----------+----------+------------+  Cardiac Outputs: +----------------+---------------+--------+--------------+-------------+-------+        Thermo CO      Thermo  CI  Thermo       ILDA CO      ILDA CIFICK SV          (l/min)     (l/min/m2)      SV       (l/min)   (l/min/m2)        +----------------+---------------+--------+--------------+-------------+-------+              3.5            1.4    35.4           4.5          1.8   46.3 +----------------+---------------+--------+--------------+-------------+-------+  Vascular Resistance Calculated Values (Wood Units): +-----+---+----+-------+----+----+---+----+----+----+-------+ PhasePVRPVRIPVR/SVRSVR SVRITPRTPRITVR TVRITPR/TVR +-----+---+----+-------+----+----+---+----+----+----+-------+ 0    1.22.8 0      [phone]       +-----+---+----+-------+----+----+---+----+----+----+-------+  Complications: No in-lab complications observed.  Cardiac Cath Post Procedure Notes: Post Procedure Diagnosis: I50.23. Blood Loss:               Estimated blood loss during the procedure was 5cc mls. Specimens Removed:        Number of specimen(s) removed: none. ____________________________________________________________________________________ CONCLUSIONS:  1. Access: RIJ 7Fr exchanged for 8.5F leave in [facility].  2. Hemo: RA 24, RVSP 63, PA 64/40 (48), W 41, /90 (101) mmHg.  3. CO/CI [Ilda]: 4.4 / 1.7.  4. CO/CI [TD]: 3.47 / 1.4.  5. SVR: 1775 cgs, PVR: 2.0 COLON.  6. Markedly elevated biventricular filling pressures with reduced cardiac index by direct and indirect measurements. ICD 10 Codes: Acute on chronic systolic (congestive) heart failure (CHF)-I50.23  [id number] Delroy Jurado MD Performing Physician Electronically signed by [id number] Delroy Jurado MD on 7/18/2024 at 11:23:16 AM  ** Final **     CT abdomen pelvis w IV contrast    Result Date: 7/13/2024  STUDY: CT Abdomen and Pelvis with IV Contrast; 7/13/2024 2:21 PM INDICATION: Right lower abdominal abscess. COMPARISON: 10/20/2022 CT abdomen and pelvis. ACCESSION NUMBER(S): [id number] ORDERING CLINICIAN: RAYMUNDO ALMANZA  TECHNIQUE: CT of the abdomen and pelvis was performed.  Contiguous axial images were obtained at 3 mm slice thickness through the abdomen and pelvis. Coronal and sagittal reconstructions at 3 mm slice thickness were performed.  Omnipaque 350 75 mL was administered intravenously.  FINDINGS: LOWER CHEST: No cardiomegaly.  No pericardial effusion.  Lung bases are clear.  ABDOMEN:  LIVER: No hepatomegaly.  Smooth surface contour.  There is fatty filtration of the liver.  BILE DUCTS: No intrahepatic or extrahepatic biliary ductal dilatation.  GALLBLADDER: The gallbladder is present without gallstones. STOMACH: No abnormalities identified.  PANCREAS: No masses or ductal dilatation.  SPLEEN: No splenomegaly or focal splenic lesion.  ADRENAL GLANDS: No thickening or nodules.  KIDNEYS AND URETERS: Kidneys are normal in size and location.  No renal or ureteral calculi.  PELVIS:  BLADDER: No abnormalities identified.  REPRODUCTIVE ORGANS: No abnormalities identified.  BOWEL: Appendix is normal.  Colonic diverticulosis without findings of diverticulitis.  VESSELS: No abnormalities identified.  Abdominal aorta is normal in caliber.  PERITONEUM/RETROPERITONEUM/LYMPH NODES: No free fluid.  No pneumoperitoneum. Few mild prominent lymph nodes in the inguinal regions likely reactive  ABDOMINAL WALL: There is diffuse skin thickening of the anterior pelvic wall on the right.  There is associated small abscess laterally in the anterior abdominal wall on image 145 measuring 2.3 cm x 3.5 cm x 2 cm. SOFT TISSUES: No abnormalities identified.  BONES: No acute fracture or aggressive osseous lesion.    1. Diffuse skin thickening of the anterior pelvic wall on the right compatible with cellulitis. There is associated small abscess laterally in the anterior abdominal wall measuring 2.3 cm x 3.5 cm x 2 cm. 2. Hepatic steatosis. 3. Colonic diverticulosis without findings of diverticulitis. Signed by Derrick Welsh MD    ECG 12 lead    Result  Date: 7/4/2024  See ED provider note for full interpretation and clinical correlation Confirmed by Eran Velasco ([id number]) on 7/4/2024 1:44:45 AM   99  }  Assessment/Plan   Principal Problem:    Cardiogenic shock (Multi)  Active Problems:    Nonischemic cardiomyopathy (Multi)    Morbid obesity with BMI of 45.0-49.9, adult (Multi)    Cocaine use disorder, mild (Multi)    Acute decompensated heart failure (Multi)      Arthur Carranza is a 38 y.o. male presenting with uncontrolled T2DM in the setting of ICU admission for cardiogenic shock/HFrEF (EF 5-10%). His A1c has been above 13% even before his hospitalization, which is consistent with past notes showing noncompliance with diabetes management. Since he is in the ICU we want to avoid hypoglycemia, even if we are a bit permissive with hyperglycemia. His 55 lantus he is currently on as an inpatient seems to be controlling his glucose overnight. We need to increase his lispro with meals again because he ids often requiring at least 4 units of additional lispro with meals. He also has milrinone in D5, which can also make him hyperglycemic. His Cr is not elevated enough from baseline to warrant lower insulin doses.         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 (Multi)  2024-01: Hyperglycemia due to diabetes mellitus (Multi)  2023-12: Type 2 diabetes mellitus with hyperglycemia (Multi)  2023-03: Type 2 diabetes mellitus with neurologic complication, with   long-term current use of insulin (Multi)      History where DKA was Reason for Admission in  "last year N/A    Current Outpatient Medications   Medication Instructions    atorvastatin (LIPITOR) 40 mg, oral, Nightly    blood-glucose meter,continuous (FreeStyle Nadir 3 Adrian) misc Use as instructed to test blood glucose throughout the day    blood-glucose sensor (FreeStyle Nadir 3 Sensor) device Use as directed to test blood glucose throughout the day    buPROPion XL (WELLBUTRIN XL) 150 mg, oral, Daily    carvedilol (COREG) 6.25 mg, oral, 2 times daily (morning and late afternoon)    dapagliflozin propanediol (FARXIGA) 10 mg, oral, Daily    insulin lispro (HumaLOG) 100 unit/mL injection Inject 16 Units under the skin 3 times a day with meals. Take as directed per insulin instructions.    Lantus Solostar U-100 Insulin 55 Units, subcutaneous, Nightly, Take as directed per insulin instructions.    metFORMIN (Glucophage) 1,000 mg tablet 1 tablet, oral, 2 times daily    pen needle, diabetic 31 gauge x 5/16\" needle Use to inject 1-4 times daily as directed.    rivaroxaban (XARELTO) 20 mg, oral, Daily with evening meal, Take with food.    sacubitriL-valsartan (Entresto) 49-51 mg tablet 1 tablet, oral, 2 times daily    sertraline (ZOLOFT) 100 mg, oral, Daily    spironolactone (ALDACTONE) 12.5 mg, oral, Daily    sulfamethoxazole-trimethoprim (Bactrim DS) 800-160 mg tablet 1 tablet, oral, Every 12 hours    torsemide (DEMADEX) 20 mg, oral, Daily    traZODone (DESYREL) 50 mg, oral, Nightly PRN    True Metrix Glucose Test Strip strip USE TO CHECK BLOOD SUGAR FOUR TIMES DAILY    Trulicity 1.5 mg, subcutaneous, Once Weekly    Ultra Thin Lancets 30 gauge misc TEST BLOOD SUGAR FOUR TIMES DAILY AS DIRECTED        Insulin administered via shot    Insulin Dose: 55 glargine qpm, 18 lispro TID with meals    RECOMMENDATIONS:     #Hyperglycemia 2/2 poorly controlled diabetes, insufficient insulin administration, and stress state  -Goal -180  -Diabetes educator for education on insulin use  -Continue 55 units lantus " nightly  -Increase lispro to 20 units with each meal scheduled  -Change sliding scale to scale 2       Endocrinology is being consulted for T2DM management.    Endocrine will continue to follow.  Plan communicated with primary team.  Please [facility] (8AM-5PM) or page [phone] with any questions or concerns.  Patient seen, discussed, and examined with Dr. Olivia who agrees with the management plan.    Jasmin Ascencio MD

## 2024-07-22 NOTE — CARE PLAN
Problem: Fall/Injury  Goal: Not fall by end of shift  Outcome: Progressing  Goal: Be free from injury by end of the shift  Outcome: Progressing  Goal: Verbalize understanding of personal risk factors for fall in the hospital  Outcome: Progressing  Goal: Pace activities to prevent fatigue by end of the shift  Outcome: Progressing     Problem: Pain - Adult  Goal: Verbalizes/displays adequate comfort level or baseline comfort level  Outcome: Progressing     Problem: Safety - Adult  Goal: Free from fall injury  Outcome: Progressing     Problem: Chronic Conditions and Co-morbidities  Goal: Patient's chronic conditions and co-morbidity symptoms are monitored and maintained or improved  Outcome: Progressing

## 2024-07-22 NOTE — PROGRESS NOTES
HFICU Attending Note    Principal Problem:    Acute decompensated heart failure (Multi)    38 y.o. father of 8 from [place], [place] with NICM/HFrEF (EF 5-10%), initially diagnosed 10/2022. Admitted low output HF/CS currently on milrinone with marginal hemodynamics. 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 Stage D HF syndrome and evaluation initiated for LVAD.     Wife at bedside and questions answered.     This critically ill patient continues to be at-risk for clinically significant deterioration / failure due to the above mentioned dysfunctional, unstable organ systems.  I have personally identified and managed all complex critical care issues to prevent aforementioned clinical deterioration.  Critical care time is spent at bedside and/or the immediate area and has included, but is not limited to, the review of diagnostic tests, labs, radiographs, serial assessments of hemodynamics, respiratory status, ventilatory management, and family updates.  Time spent in procedures and teaching are reported separately.    Critical care time: __35__ minutes     Objective    Arthur Carranza/[id number]  Admit Date: 7/18/2024  Hospital Length of Stay: 4   ICU Length of Stay: 3d 15h     MEDICATIONS  Infusions:  heparin, Last Rate: 2,000 Units/hr (07/22/24 0400)  lactated Ringer's, Last Rate: 10 mL/hr (07/22/24 0400)  milrinone, Last Rate: 0.25 mcg/kg/min (07/22/24 0400)      Scheduled:  atorvastatin, 40 mg, Nightly  buPROPion XL, 150 mg, Daily  dapagliflozin propanediol, 10 mg, Daily  heparin, 4,000 Units, Once  hydrALAZINE, 50 mg, TID  insulin glargine, 55 Units, Nightly  insulin lispro, 0-20 Units, TID  insulin lispro, 18 Units, TID  magnesium oxide, 400 mg, Daily  perflutren protein A microsphere, 0.5 mL, Once in imaging  polyethylene  "glycol, 17 g, Daily  sacubitriL-valsartan, 1 tablet, BID  sertraline, 100 mg, Daily  spironolactone, 12.5 mg, Daily  sulfamethoxazole-trimethoprim, 1 tablet, q12h JAREK      PRN:  acetaminophen, 650 mg, q6h PRN  dextrose, 12.5 g, q15 min PRN  dextrose, 25 g, q15 min PRN  glucagon, 1 mg, q15 min PRN  glucagon, 1 mg, q15 min PRN  heparin, 3,000-4,000 Units, q4h PRN  oxygen, , Continuous PRN - O2/gases  traZODone, 50 mg, Nightly PRN        Prior to Admission Meds:  Medications Prior to Admission   Medication Sig Dispense Refill Last Dose    atorvastatin (Lipitor) 40 mg tablet TAKE 1 TABLET BY MOUTH AT BEDTIME 30 tablet 3     blood-glucose meter,continuous (FreeStyle Nadir 3 [place]) misc Use as instructed to test blood glucose throughout the day 1 each 0     blood-glucose sensor (FreeStyle Nadir 3 Sensor) device Use as directed to test blood glucose throughout the day 2 each 11     buPROPion XL (Wellbutrin XL) 150 mg 24 hr tablet Take 1 tablet (150 mg) by mouth once daily.       carvedilol (Coreg) 6.25 mg tablet Take 1 tablet (6.25 mg) by mouth 2 times daily (morning and late afternoon). 180 tablet 3     dapagliflozin propanediol (Farxiga) 10 mg Take 1 tablet (10 mg) by mouth once daily. 90 tablet 3     dulaglutide (Trulicity) 1.5 mg/0.5 mL pen injector injection Inject 1.5 mg under the skin 1 (one) time per week. 2 mL 1     insulin glargine (Lantus Solostar U-100 Insulin) 100 unit/mL (3 mL) pen Inject 55 Units under the skin once daily at bedtime. Take as directed per insulin instructions. (Patient taking differently: Inject 60 Units under the skin once daily at bedtime. Take as directed per insulin instructions.) 15 mL 0     insulin lispro (HumaLOG) 100 unit/mL injection Inject 16 Units under the skin 3 times a day with meals. Take as directed per insulin instructions. 15 mL 0     metFORMIN (Glucophage) 1,000 mg tablet Take 1 tablet (1,000 mg) by mouth 2 times a day.       pen needle, diabetic 31 gauge x 5/16\" needle " Use to inject 1-4 times daily as directed. 100 each 11     rivaroxaban (Xarelto) 20 mg tablet Take 1 tablet (20 mg) by mouth once daily in the evening. Take with meals. Take with food. 90 tablet 3     sacubitriL-valsartan (Entresto) 49-51 mg tablet Take 1 tablet by mouth 2 times a day. 180 tablet 3     sertraline (Zoloft) 100 mg tablet Take 1 tablet (100 mg) by mouth once daily.       spironolactone (Aldactone) 25 mg tablet Take 0.5 tablets (12.5 mg) by mouth once daily. Do not start before February 24, 2024. 15 tablet 11     sulfamethoxazole-trimethoprim (Bactrim DS) 800-160 mg tablet Take 1 tablet by mouth every 12 hours for 10 days. 20 tablet 0     torsemide (Demadex) 20 mg tablet Take 1 tablet (20 mg) by mouth once daily. 30 tablet 11     traZODone (Desyrel) 50 mg tablet Take 1 tablet (50 mg) by mouth as needed at bedtime for sleep.       True Metrix Glucose Test Strip strip USE TO CHECK BLOOD SUGAR FOUR TIMES DAILY       Ultra Thin Lancets 30 gauge misc TEST BLOOD SUGAR FOUR TIMES DAILY AS DIRECTED          Invasive Hemodynamics:    Most Recent Range Past 24hrs   BP (Art)   No data recorded   MAP(Art)   No data recorded   RA/CVP   No data recorded   PA 40/19 PAP  Min: 16/5  Max: 49/31   PA(mean) 28 mmHg PAP (Mean)  Min: 10 mmHg  Max: 37 mmHg   PCWP 7 mmHg No data recorded   CO 5 L/min CO (L/min)  Min: 4.1 L/min  Max: 5.5 L/min   CI 2 L/min/m2 CI (L/min/m2)  Min: 1.7 L/min/m2  Max: 2.2 L/min/m2   Mixed Venous 67 % SVO2 (%)  Min: 64 %  Max: 67 %   SVR  1183 (dyne*sec)/cm5 SVR (dyne*sec)/cm5  Min: 1183 (dyne*sec)/cm5  Max: 1709 (dyne*sec)/cm5    (dyne*sec)/cm5 No data recorded     MCS:   Heart Mate III:     Most Recent Range Past 24hrs   Flow   No data recorded   Speed   No data recorded   Power   No data recorded   PI   No data recorded     ECMO:     Most Recent Range Past 24hrs   Flow   No data recorded   Speed   No data recorded   Sweep   No data recorded     Impella:      Most Recent Range Past 24hrs  "  Performance Level   No data recorded   Flow (L/min)   No data recorded   Motor Current   No data recorded   Placement Signal    Placement OK could not be evaluated. This SmartLink does not work with rows of the type: Custom List   Purge (mmHg)   No data recorded   Purge rate (mL/hr)   No data recorded     VENT:    Most Recent Range Past 24hrs   Mode      FiO2   No data recorded   Rate   No data recorded   Vt    No data recorded   PEEP   No data recorded         7/22/2024     7:05 AM 7/22/2024     7:00 AM 7/22/2024     6:00 AM 7/22/2024     5:39 AM 7/22/2024     5:14 AM 7/22/2024     5:00 AM 7/22/2024     4:00 AM   Vitals   Systolic 93  98   97 92   Diastolic 48  51   65 61   Heart Rate 96  92 93 93 98 93   Temp  36.6 °C (97.9 °F)        Resp 12  13 13 10 13 13     Visit Vitals  BP (!) 93/48   Pulse 96   Temp 36.6 °C (97.9 °F)   Resp 12   Ht 1.75 m (5' 8.9\")   Wt 136 kg (299 lb 13.2 oz)   SpO2 95%   BMI 44.41 kg/m²   Smoking Status Every Day   BSA 2.57 m²     Wt Readings from Last 5 Encounters:   07/21/24 136 kg (299 lb 13.2 oz)   07/18/24 137 kg (301 lb 9.4 oz)   07/13/24 136 kg (300 lb)   07/12/24 137 kg (301 lb)   07/02/24 137 kg (301 lb 9.6 oz)       Intake/Output Summary (Last 24 hours) at 7/22/2024 0809  Last data filed at 7/22/2024 0700  Gross per 24 hour   Intake 2896.65 ml   Output 3100 ml   Net -203.35 ml     CHEST: Unlabored, Clear, Diminished  CV:  Normal sinus rhythm  ABD:  Soft, Rounded Nontender Bowel sounds: All quadrants, Flatus: Yes  EXT:   RLE: Appropriate for ethnicity,Warm  DP: Moderate  PT: Moderate  LLE: Appropriate for ethnicity,Warm  DP: Moderate  PT: Moderate  NEURO:   RASS: Alert and calm  CAM: Negative  LOC: Alert  Cognition: Appropriate judgement  GCS: 15    DATA:  CMP:  Recent Labs     07/22/24  0525 07/21/24  0515 07/20/24  0601 07/19/24  0519 07/18/24  1558 07/13/24  1242 07/02/24  0910 05/21/24  0938 02/23/24  0340 02/22/24  0533 02/21/24  0958 02/21/24  0134 02/20/24  0528 " 02/20/24  0126 02/01/24  0544 02/01/24  0000 01/31/24  2014 10/22/22  0549 10/21/22  0528    135* 136 136 135* 132* 131* 133* 129* 129*   < > 133*   < > 129* 130*   < > 122*   < > 131*   K 4.6 4.2 4.3 4.0 4.3 3.8 4.5 4.9 3.5 4.1   < > 3.6   < > 4.5 4.1   < > 4.5   < > 4.4   CL 97* 98 96* 96* 100 102 91* 99 92* 90*   < > 92*   < > 93* 96*   < > 88*   < > 96*   CO2 23 23 29 29 25 23 29 25 30 31   < > 32   < > 21 26   < > 20*   < > 25   ANIONGAP 22* 18 15 15 14 11 16 14 11 12   < > 13   < > 20 12   < > 19   < > 14   BUN 24* 20 21 21 20 15 16 24* 29* 30*   < > 27*   < > 25* 14   < > 16   < > 22   CREATININE 1.41* 1.57* 1.39* 1.31* 1.16 1.01 1.39* 1.43* 1.32* 1.60*   < > 1.78*   < > 1.47* 1.05   < > 1.30   < > 1.34*   EGFR 66 58* 67 72 83 >90 67 65 71 57*   < > 50*   < > 63 >90   < > 73   < >  --    MG 2.34 2.29 2.48* 1.98 1.90  --   --   --   --   --   --  2.51*  --  1.59* 1.75  --  1.86  --  2.20    < > = values in this interval not displayed.     Recent Labs     07/22/24  0525 07/21/24  0515 07/20/24  0601 07/19/24  0519 07/18/24  1558 07/02/24  0910 05/21/24  0938 02/21/24  0134 02/20/24  0528 02/01/24  0544 01/31/24 2014 12/02/23  2342 11/29/21  0443 11/07/20  1338   ALBUMIN 3.6 3.7 4.0 3.7 3.8 4.4 4.1 4.2 4.5 3.9 4.0 4.0   < > 4.6   ALT  --   --   --   --  17 33 21 15 15 12 13 48   < > 33   AST  --   --   --   --  19 21 24 18 17 16 18 34   < > 34   BILITOT  --   --   --   --  0.5 0.8 0.6 1.1 0.9 0.3 0.3 0.8   < > 0.7   LIPASE  --   --   --   --   --   --   --   --   --   --   --   --   --  29    < > = values in this interval not displayed.     CBC:  Recent Labs     07/21/24  2340 07/20/24  0601 07/18/24  1558 07/13/24  1242 07/02/24  0910 05/21/24  0938 02/23/24  0340 02/22/24  0532   WBC 7.3 6.4  6.4 6.2 6.3 4.5 4.7 5.0 5.6   HGB 13.2* 14.9  14.9 13.6 12.3* 14.8 14.8 13.6 14.4   HCT 39.4* 45.0  45.0 42.3 37.1* 43.4 45.6 39.6* 42.5    232  232 241 206 242 242 221 228   MCV 93 95  95 98 96 94 99  "91 92     COAG:   Recent Labs     07/22/24  0525 07/21/24  2039 07/21/24  1713 07/21/24  1155 07/21/24  0515 07/18/24  1818 07/18/24  1558 07/02/24  0910 12/02/23  2341 12/28/22  1755 12/28/22  1533 10/20/22  1211 06/15/22  0756 11/23/20  1523   INR  --   --   --   --   --   --  1.1 1.0 1.1 1.0 1.0 1.0 1.0 1.1   HAUF 0.3 0.3 0.4 0.2 0.8   < >  --   --   --   --   --   --   --   --    DDIMERVTE  --   --   --   --   --   --   --   --   --   --   --  696*  --   --     < > = values in this interval not displayed.     ABO: No results for input(s): \"ABO\" in the last 34405 hours.  HEME/ENDO:   Recent Labs     07/18/24  1558 05/21/24  0938 02/01/24  0544 12/12/23  1112 04/20/23  1109 03/14/23  1056 12/28/22  1937   FERRITIN 234 274  --   --   --  394*  --    IRONSAT 28 24*  --   --   --  28  --    TSH 2.29 3.98  --   --   --  3.62 3.69   HGBA1C 13.3* 13.7* 15.3* 10.8*   < >  --  12.9*    < > = values in this interval not displayed.     CARDIAC:   Recent Labs     07/18/24  1558 07/02/24  0910 05/21/24  0938 02/21/24  0958 02/20/24  0126 02/01/24  0154 01/31/24 2014 12/03/23  0823 12/03/23  0128 12/02/23  2342 12/02/23  2341 03/14/23  1056 12/28/22  1937   TROPHS 59*  --   --  67* 65* 177* 99* 55* 54* 40*  --   --  62*   * 173* 195*  --  259*  --  314*  --   --   --  375* 68 213*     Recent Labs     07/22/24  0526 07/19/24  0625 07/19/24  0519 07/18/24  2357 07/18/24  1725   LACMX  --   --  0.7 1.3 0.7   SO2MV 64   < > 55 59 59  59    < > = values in this interval not displayed.     No results for input(s): \"TACROLIMUS\", \"SIROLIMUS\", \"CYCLOSPORINE\" in the last 59766 hours.  Recent Labs     12/12/23  1112 04/20/23  1109 12/28/22  1937 10/21/22  0528   CHOL 125 122 162 166   LDLF  --  65 - 85   LDLCALC 50  --   --   --    HDL 26.6 31.4* 32.4* 40.0   TRIG 241* 128 401* 207*     MICRO:   Recent Labs     02/01/24  0000 06/15/22  0756 12/30/21  1241   CRP 0.50 6.52* 0.49     Susceptibility data from last 90 " days.  Collected Specimen Info Organism   07/13/24 Tissue/Biopsy from Skin/Superficial Abscess Mixed Anaerobic Bacteria     Mixed Gram-Positive and Gram-Negative Bacteria       LDA:  Introducer Internal jugular Right (Active)   Earliest Known Present: 07/18/24   Location: Internal jugular  Orientation: Right  Placement Verified: Transduced waveform   Number of days: 4       Pulmonary Artery Catheter Internal jugular (Active)   Placement Date/Time: 07/18/24 1338   Hand Hygiene Performed Prior to CVC Insertion: Yes  Site Prep: Chlorhexidine   Site Prep Agent has Completely Dried Before Insertion: Yes  All 5 Sterile Barriers Used (Gloves, Gown, Cap, Mask, Large Sterile Drape):...   Number of days: 3     NUTRITION: Adult diet Cardiac, Carb Controlled; 75 gram carb/meal, 45 gram Carb evening snack; 70 gm fat; 2 - 3 grams Sodium  EMERGENCY CONTACT: Extended Emergency Contact Information  Primary Emergency Contact: FREDDY LEWIS  Address: [address] [place] [address] [address]           [place] [address] [facility]  Home Phone: [phone]  Mobile Phone: [phone]  Relation: Spouse  Preferred language: English   needed? No  CODE STATUS: Full Code  DISPO: Discharge Planning  Living Arrangements: Spouse/significant other  Support Systems: Spouse/significant other  Assistance Needed: none  Type of Residence: Private residence  Do you have animals or pets at home?: No  Who is requesting discharge planning?: Provider  Home or Post Acute Services: None  Expected Discharge Disposition: Home or Self Care  Does the patient need discharge transport arranged?: No  FOLLOWUP:   Future Appointments   Date Time Provider Department [place]   7/23/2024  8:00 AM Yon Vora MD [facility]   7/29/2024  8:00 AM CARDIAC REHAB [address]   7/31/2024  8:00 AM [address]ARD[address]AC REHAB [address]   8/2/2024  8:00 AM CARDIAC REHAB [address]  Academic   8/5/2024  8:00 AM [facility]AC REHAB [facility] [id number] CARDREHB ROOM [id number] Academic   8/7/2024  8:00 AM CARDIAC REHAB [facility] [id number] CARDREHB ROOM [id number] Academic   8/9/2024  8:00 AM CARDIAC REHAB [facility] [id number] CARDREHB ROOM [id number] Academic   8/12/2024  8:00 AM CARDIAC REHAB [facility] [id number] CARDREHB ROOM [id number] Academic   8/14/2024  8:00 AM CARDIAC REHAB [facility] [id number] CARDREHB ROOM [id number] Academic   8/14/2024 11:00 AM PHARMACY [facility] [id number] Academic   8/16/2024  8:00 AM CARDIAC REHAB [facility] [id number] CARDREHB ROOM [id number] Academic   8/19/2024  8:00 AM CARDIAC REHAB [facility] [id number] CARDREHB ROOM [id number] Academic   8/20/2024  8:30 AM Delroy Jurado MD [id number] [facility]   8/21/2024  8:00 AM CARDIAC REHAB [facility] [id number] CARDREHB ROOM [id number] Academic   8/23/2024  8:00 AM CARDIAC REHAB [facility] [id number] CARDREHB ROOM [id number] Academic   8/26/2024  8:00 AM CARDIAC REHAB [facility] [id number] CARDREHB ROOM [id number] Academic   8/26/2024 11:40 AM Christopher D'Amico, DO [id number] [facility]   8/28/2024  8:00 AM CARDIAC REHAB [facility] [id number] CARDREHB ROOM [id number] Academic   8/30/2024  8:00 AM CARDIAC REHAB [facility] [id number] CARDREHB ROOM [id number] Academic   9/4/2024  8:00 AM CARDIAC REHAB [facility] [id number] CARDREHB ROOM [id number] Academic   9/6/2024  8:00 AM CARDIAC REHAB [facility] [id number] CARDREHB ROOM [id number] Academic   9/17/2024  7:45 PM SLEEP LAB [address]   10/2/2024  8:00 AM Brittney Stoll MD [id number] Academic

## 2024-07-22 NOTE — CARE PLAN
The patient's goals for the shift include      The clinical goals for the shift include continue to optimize cardiac numbers and diuresis as tolerated    Over the shift, the patient did not make progress toward the following goals. Barriers to progression include . Recommendations to address these barriers include .      Problem: Fall/Injury  Goal: Not fall by end of shift  Outcome: Progressing  Goal: Be free from injury by end of the shift  Outcome: Progressing  Goal: Verbalize understanding of personal risk factors for fall in the hospital  Outcome: Progressing  Goal: Verbalize understanding of risk factor reduction measures to prevent injury from fall in the home  Outcome: Progressing  Goal: Pace activities to prevent fatigue by end of the shift  Outcome: Progressing     Problem: Pain - Adult  Goal: Verbalizes/displays adequate comfort level or baseline comfort level  Outcome: Progressing     Problem: Safety - Adult  Goal: Free from fall injury  Outcome: Progressing     Problem: Discharge Planning  Goal: Discharge to home or other facility with appropriate resources  Outcome: Progressing     Problem: Chronic Conditions and Co-morbidities  Goal: Patient's chronic conditions and co-morbidity symptoms are monitored and maintained or improved  Outcome: Progressing

## 2024-07-22 NOTE — PROGRESS NOTES
Child Life Assessment:   Reason for Consult  Discipline: Child Life Specialist  Reason for Consult: Family support  Referral Source: Self  Total Time Spent (min): 10 minutes    Patient Intervention(s)  Type of Intervention Performed: Healing environment interventions  Healing Environment Intervention(s): Assessment, Resources provided, Orientation to services (support for children of adult patient)    Evaluation  Evaluation/Plan of Care: Patient/family receptive    Session Details: Certified Child Life Specialist (CCLS) introduced child life services to patient, offering support for patient's eight children ranging in age from 8-19.  Patient and CCLS discussed how children may cope with patient's hospitalization and their understanding of his health status.  CCLS provided a Children and Kids Worry Too resource booklet with information regarding reactions to a loved ones hospitalization and ways of staying connected.  Patient plans to speak with his aunt, with whom the younger children live, to assess coping.  Child life services available as needed for children of patient.    EUN Mcgee, CCLS  Epic Secure Chat

## 2024-07-22 NOTE — PROGRESS NOTES
Occupational Therapy    Evaluation    Patient Name: Arthur Carranza  MRN: [id number]  Today's Date: 7/22/2024  Room: 12/12  Time Calculation  Start Time: 1428  Stop Time: 1447  Time Calculation (min): 19 min    Assessment  IP OT Assessment  OT Assessment: Impaired self-care and functional mobility.  MoCA assessment completed this date:  Patient completed the [place] Cognitive Assessment (MOCA), version 8.2.  He scored 23/30 (</=12yrs education), indicating mild cognitive impairment.      (Severity ranges for MOCA 26-30 normal, 18-25 Mild Cognitive Impairment, 10-17 Moderate Cognitive Impairment, 10 or less Severe Cognitive Impairment).     Prognosis: Good  Barriers to Discharge:  (Medical co-morbidities)  Evaluation/Treatment Tolerance: Patient tolerated treatment well  Medical Staff Made Aware: Yes  End of Session Communication: Bedside nurse  End of Session Patient Position: Alarm off, not on at start of session (Seated EOB)  Plan:  Inpatient Plan  Treatment Interventions: ADL retraining, Endurance training, Neuromuscular reeducation, Compensatory technique education  OT Frequency: 1 time per week  OT Discharge Recommendations: Low intensity level of continued care  Equipment Recommended upon Discharge:  (None)  OT Recommended Transfer Status: Stand by assist  OT - OK to Discharge: Yes  OT Assessment  OT Assessment Results: Decreased ADL status, Decreased endurance  Prognosis: Good  Barriers to Discharge:  (Medical co-morbidities)  Evaluation/Treatment Tolerance: Patient tolerated treatment well  Medical Staff Made Aware: Yes    Subjective   Current Problem:  1. Acute decompensated heart failure (Multi)        2. Acute on chronic combined systolic and diastolic congestive heart failure (Multi)  Transthoracic Echo (TTE) Complete    Transthoracic Echo (TTE) Complete        General:  Reason for Referral: 37 y/o male admitted as a transfer s/p elective RHC where he showed high right and left sided filling pressures and  low CO/CI concerning for low output state/cardiogenic shock. S/p right heart cath 7/18. Advanced therapies work-up.  Past Medical History Relevant to Rehab: NCIM/HFrEH, HTN, HLD, hypertriglyceridemia, T2DM, severe obesity, cardioembolic stroke with no residual symptoms, intermitent tobacco use, cocaine use, alcohol use, hx of noncompliance with medicines and dr appointments.  Prior to Session Communication: Bedside nurse  Patient Position Received: Bed, 2 rail up, Alarm off, not on at start of session  Family/Caregiver Present: Yes  Caregiver Feedback: Mother ad grandmother present during session  General Comment: Pt is pleasant and cooperative. He puts forth good effort towards task completion. (Milrinone .25, heparin)   Precautions:  Medical Precautions: Cardiac precautions, Fall precautions  Vital Signs:  Heart Rate: 92  Resp: 19  SpO2: 97 %  BP: 104/67  MAP (mmHg): 76  BP Location: Right arm  BP Method: Automatic  Pain:  Pain Assessment  Pain Assessment: 0-10  0-10 (Numeric) Pain Score: 0 - No pain  Lines/Tubes/Drains:  Introducer Internal jugular Right (Active)   Number of days: 4       Pulmonary Artery Catheter Internal jugular (Active)   Number of days: 4         Objective   Cognition:  Overall Cognitive Status: Within Functional Limits  Arousal/Alertness: Appropriate responses to stimuli  Orientation Level: Oriented X4  Following Commands: Follows all commands and directions without difficulty        Lozano Agitation Sedation Scale  Lozano Agitation Sedation Scale (RASS): Alert and calm  Home Living:  Type of Home: Apartment  Lives With:  (Mother)  Home Adaptive Equipment: Walker rolling or standard  Home Layout: One level  Home Access: Elevator   Prior Function:  Level of [place]: Independent with ADLs and functional transfers, Independent with homemaking with ambulation  Receives Help From: Parent(s), Family  ADL Assistance: Independent  Homemaking Assistance: Independent  Ambulatory Assistance:  Independent  Vocational: Part time employment ()  Leisure: Does not identify anything he enjoys doing  Hand Dominance: Right  IADL History:     ADL:  Grooming Assistance: Stand by  Bathing Assistance: Stand by  UE Dressing Assistance: Stand by  LE Dressing Assistance: Stand by  Toileting Assistance with Device: Stand by  Activity Tolerance:  Endurance: Decreased tolerance for upright activites  Early Mobility/Exercise Safety Screen: Proceed with mobilization - No exclusion criteria met  Balance:  Static Sitting Balance  Static Sitting-Level of Assistance: Distant supervision  Static Standing Balance  Static Standing-Level of Assistance: Close supervision  Bed Mobility/Transfers: Bed Mobility  Bed Mobility: Yes  Bed Mobility 1  Bed Mobility 1: Supine to sitting  Level of Assistance 1: Independent  Functional Mobility  Functional Mobility Performed: Yes  Functional Mobility 1  Device 1: No device  Assistance 1: Close supervision  Comments 1: Through room and around obstacles   and Transfers  Transfer: Yes  Transfer 1  Transfer From 1: Sit to  Transfer to 1: Stand  Technique 1: Sit to stand, Stand to sit  Transfer Level of Assistance 1: Close supervision  IADL's:      Vision:     and Vision - Complex Assessment  Ocular Range of Motion: Within Functional Limits  Sensation:  Light Touch: No apparent deficits  Strength:  Strength Comments: BUE strength WFL  Perception:  Inattention/Neglect: Appears intact  Coordination:  Movements are Fluid and Coordinated: Yes   Hand Function:  Hand Function  Gross Grasp: Functional ( strength assessed using dynamometer (average of 3 trials): LUE: 84# RUE: 99.7#. WFL for age.)  Coordination: Functional  Extremities:  ,  ,  , and      Outcome Measures: [facility] Daily Activity  Putting on and taking off regular lower body clothing: None  Bathing (including washing, rinsing, drying): A little  Putting on and taking off regular upper body clothing: None  Toileting, which includes  using toilet, bedpan or urinal: None  Taking care of personal grooming such as brushing teeth: None  Eating Meals: None  Daily Activity - Total Score: 23    Confusion Assessment Method-ICU (CAM-ICU)  Feature 3: Altered Level of Consciousness: Negative   ICU Mobility Screen  Early Mobility/Exercise Safety Screen: Proceed with mobilization - No exclusion criteria met, MoCA  Visuospatial/Executive: 5  Naming: 3  Memory (Score '0' as this is an Unscored Section): 0  Attention: Read List of Digits: 1  Attention: Read List of Letters: 1  Attention: Serial Sevens: 3  Language: Repeat: 1  Language: Fluency: 0  Abstraction: 0  Delayed Recall: 2  Orientation: 6  Add 1 Point if </=12 yr Education: 1  MOCA Total Score: 23 Lozano Agitation Sedation Scale  Lozano Agitation Sedation Scale (RASS): Alert and calm OT Adult Other Outcome Measures  5x Sit to Stand: No use of UE, performed from the couch. 11.60 seconds. RPD 7/20, RPE .5/20. Close supervision  MOCA Total Score: 23    Education Documentation  Body Mechanics, taught by Uma Pichardo OT at 7/22/2024  4:00 PM.  Learner: Patient  Readiness: Acceptance  Method: Explanation  Response: Verbalizes Understanding    Precautions, taught by Uma Pichardo OT at 7/22/2024  4:00 PM.  Learner: Patient  Readiness: Acceptance  Method: Explanation  Response: Verbalizes Understanding    ADL Training, taught by Uma Pichardo OT at 7/22/2024  4:00 PM.  Learner: Patient  Readiness: Acceptance  Method: Explanation  Response: Verbalizes Understanding    Education Comments  No comments found.        Goals:     Encounter Problems       Encounter Problems (Active)       ADLs       Patient will complete lower body dressing with MOD I for donning and doffing all LE clothes in order to increase Indep with task participation.        Start:  07/22/24    Expected End:  08/12/24               COGNITION/SAFETY       Patient will complete pill box simulation independently with use of medication  list.       Start:  07/22/24    Expected End:  08/12/24               EXERCISE/STRENGTHENING       Pt will increase endurance to tolerate 20min of activity with no more than 1 rest break in order to increase ability to engage in ADL completion.        Start:  07/22/24    Expected End:  08/12/24               MOBILITY       Pt will demo increased functional mobility to tolerate tasks necessary to complete ADL routine with MOD I.       Start:  07/22/24    Expected End:  08/12/24 07/22/24 at 4:00 PM   Uma Pichardo, OT   Rehab Office: [phone]

## 2024-07-22 NOTE — PROGRESS NOTES
Physical Therapy    Physical Therapy Treatment    Patient Name: Arthur Carranza  MRN: [id number]  Today's Date: 7/22/2024  Time Calculation  Start Time: 1232  Stop Time: 1252  Time Calculation (min): 20 min    Assessment/Plan   PT Assessment  End of Session Communication: Bedside nurse  End of Session Patient Position: Bed, 2 rail up, Alarm off, not on at start of session (seated EOB)     PT Plan  Treatment/Interventions: Transfer training, Bed mobility, Gait training, Balance training, Strengthening, Endurance training, Range of motion, Therapeutic exercise, Therapeutic activity  PT Plan: Ongoing PT  PT Frequency: 2 times per week  PT Discharge Recommendations: Other (comment) (Outpatient cardiac rehab)  PT Recommended Transfer Status: Contact guard  PT - OK to Discharge: Yes    General Visit Information:   PT  Visit  PT Received On: 07/22/24  Response to Previous Treatment: Other (Comment) (Now advanced therapies workup)  General  Prior to Session Communication: Bedside nurse  Patient Position Received: Bed, 2 rail up, Alarm off, not on at start of session  General Comment: Pt pleasant and cooperative to therapy. Telemetry, swan-thuy catheter, heparin gtt, milirinone .25mcg    Subjective     Precautions:  Precautions  Medical Precautions: Cardiac precautions    Vital Signs:  Vital Signs  Heart Rate:  (PRE: 90 POST: 108)  Resp: 19  SpO2:  (PRE: 92 POST: 95)  BP:  (PRE: 122/58 POST: 117/86)    Objective     Pain:  Pain Assessment  Pain Assessment: 0-10  0-10 (Numeric) Pain Score: 4  Pain Type: Acute pain  Pain Location: Hip (B hip pain, noted due to not moving)    Cognition:  Cognition  Overall Cognitive Status: Within Functional Limits    Activity Tolerance:  Activity Tolerance  Early Mobility/Exercise Safety Screen: Proceed with mobilization - No exclusion criteria met    Treatments:  Bed Mobility  Bed Mobility: Yes  Bed Mobility 1  Bed Mobility 1: Supine to sitting  Level of Assistance 1:  Independent    Ambulation/Gait Training  Ambulation/Gait Training Performed: Yes  Ambulation/Gait Training 1  Surface 1: Level tile  Device 1: IV Pole  Assistance 1: Close supervision  Quality of Gait 1: Wide base of support (lateral sway, ER of BLE)  Comments/Distance (ft) 1: 70ft  Transfers  Transfer: Yes  Transfer 1  Technique 1: Sit to stand, Stand to sit  Transfer Level of Assistance 1: Close supervision  Trials/Comments 1: x2    Outcome Measures:  [facility] Basic Mobility  Turning from your back to your side while in a flat bed without using bedrails: None  Moving from lying on your back to sitting on the side of a flat bed without using bedrails: None  Moving to and from bed to chair (including a wheelchair): None  Standing up from a chair using your arms (e.g. wheelchair or bedside chair): None  To walk in hospital room: None  Climbing 3-5 steps with railing: A little  Basic Mobility - Total Score: 23    Confusion Assessment Method-ICU (CAM-ICU)  Feature 1: Acute Onset or Fluctuating Course: Negative  Overall CAM-ICU: Negative    FSS-ICU  Ambulation: Walks >/ or equal to 150 feet with supervision  Rolling: Unable to perform  Sitting: Complete independence  Transfer Sit-to-Stand: Supervision or set-up only  Transfer Supine-to-Sit: Unable to perform  Total Score: 17    Early Mobility/Exercise Safety Screen: Proceed with mobilization - No exclusion criteria met  ICU Mobility Scale: Walking with assistance of 1 person [8]  E = Exercise and Early Mobility  Early Mobility/Exercise Safety Screen: Proceed with mobilization - No exclusion criteria met  ICU Mobility Scale: Walking with assistance of 1 person       [place]  Sitting Balance: Steady, safe  Arises: Able without using arms  Attempts to Arise: Able to arise, one attempt  Immediate Standing Balance (First 5 Seconds): Steady without walker or other support  Standing Balance: Narrow stance without support  Nudged: Steady without walker or other support  Eyes  Closed: Steady  Turned 360 Degrees: Steadiness: Steady  Turned 360 Degrees: Continuity of Steps: Continuous  Sitting Down: Safe, smooth motion  Balance Score: 16  Initiation of Gait: No hesitancy  Step Height: R Swing Foot: Right foot complete clears floor  Step Length: R Swing Foot: Passes left stance foot  Step Height: L Swing Foot: Left foot complete clears floor  Step Length: L Swing Foot: Passes right stance foot  Step Symmetry: Right and left step appear equal  Step Continuity: Steps appear continuous  Path: Straight without walking aid  Trunk: No sway, no flexion, no use of arms, no walking aid  Walking Time: Heels almost touching while walking  Gait Score: 12  Total Score: 28    Other Measures  5x Sit to Stand: No use of UE, performed from the couch. 11.60 seconds. RPD 7/20, RPE .5/20. Close supervision  6 min Walk Test: Pt performed the test in the hallway with minimal distractions. Pt held onto the IV pole and close supervision. 972ft. RPE 7/20, RPD .5/10.    Education Documentation  No documentation found.  Education Comments  No comments found.      Encounter Problems       Encounter Problems (Active)       Balance       Pt will score >/=48 on the JEFF balance test for safe community ambulation (Progressing)       Start:  07/19/24    Expected End:  08/02/24               Mobility       Patient will ambulate >1000ft with no assistive device and supervision (Progressing)       Start:  07/19/24    Expected End:  08/02/24               Mobility       Pt will ambulate >1100ft during the 6MWT with <3/10 RPD and <11/20 RPE       Start:  07/22/24    Expected End:  08/05/24               PT Transfers       Patient will perform bed mobility independently (Progressing)       Start:  07/19/24    Expected End:  08/02/24            Patient will transfer sit to and from stand independently (Progressing)       Start:  07/19/24    Expected End:  08/02/24               PT Transfers       Pt will complete 15 sit <-> stands  during 30 second STS with RPE <11/20 and RPD <3/10 without UE use       Start:  07/22/24    Expected End:  08/05/24               Pain - Adult

## 2024-07-22 NOTE — PROGRESS NOTES
"  [facility]  HFICU PROGRESS NOTE    Arthur Carranza/[id number]    Admit Date: 7/18/2024  Hospital Length of Stay: 4   ICU Length of Stay: 3d 18h   Primary Service: advanced heart failure   Primary HF Cardiologist: : Dr. Jurado   Referring: Dr. Jurado     INTERVAL EVENTS / PERTINENT ROS:   No events overnight. He denies SOB, CP, abd pain or any GI/ symptoms. His questions about progression to advanced heart failure and future options were addressed in the rounds. Wife present at bedside.     Plan:  - increasing aldactone to 25 mg for better BP and afterload reduction.   - Continue diuresis with Lasix 40 mg IV q 12 hours  - Continue milrinone 0.25   - hemodynamics Q6 hrs   - Starting eval for LVAD     MEDICATIONS  Infusions:  heparin, Last Rate: 2,000 Units/hr (07/22/24 0400)  lactated Ringer's, Last Rate: 10 mL/hr (07/22/24 0400)  milrinone, Last Rate: 0.25 mcg/kg/min (07/22/24 0906)      Scheduled:  atorvastatin, 40 mg, Nightly  buPROPion XL, 150 mg, Daily  dapagliflozin propanediol, 10 mg, Daily  furosemide, 40 mg, BID  heparin, 4,000 Units, Once  hydrALAZINE, 50 mg, TID  insulin glargine, 55 Units, Nightly  insulin lispro, 0-20 Units, TID  insulin lispro, 18 Units, TID  perflutren protein A microsphere, 0.5 mL, Once in imaging  polyethylene glycol, 17 g, Daily  sacubitriL-valsartan, 1 tablet, BID  sertraline, 100 mg, Daily  [START ON 7/23/2024] spironolactone, 25 mg, Daily  sulfamethoxazole-trimethoprim, 1 tablet, q12h JAREK      PRN:  acetaminophen, 650 mg, q6h PRN  dextrose, 12.5 g, q15 min PRN  dextrose, 25 g, q15 min PRN  glucagon, 1 mg, q15 min PRN  glucagon, 1 mg, q15 min PRN  heparin, 3,000-4,000 Units, q4h PRN  oxygen, , Continuous PRN - O2/gases  traZODone, 50 mg, Nightly PRN        PHYSICAL EXAM:   Visit Vitals  /59   Pulse 96   Temp 36.6 °C (97.9 °F)   Resp 21   Ht 1.75 m (5' 8.9\")   Wt 136 kg (299 lb 13.2 oz)   SpO2 95%   BMI 44.41 kg/m²   Smoking Status Every " "Day   BSA 2.57 m²       Physical Exam  Constitutional:       General: He is not in acute distress.     Appearance: He is obese.   Eyes:      Extraocular Movements: Extraocular movements intact.      Conjunctiva/sclera: Conjunctivae normal.   Cardiovascular:      Rate and Rhythm: Normal rate and regular rhythm.   Pulmonary:      Effort: Pulmonary effort is normal. No respiratory distress.   Abdominal:      Palpations: Abdomen is soft.      Tenderness: There is no abdominal tenderness.   Musculoskeletal:      Right lower leg: No edema.      Left lower leg: No edema.   Neurological:      General: No focal deficit present.      Mental Status: He is alert and oriented to person, place, and time. Mental status is at baseline.   Psychiatric:         Mood and Affect: Mood normal.         Behavior: Behavior normal.         DATA:  CMP:  Results from last 7 days   Lab Units 07/22/24  0525 07/21/24  0515 07/20/24  0601 07/19/24  0519 07/18/24  1558   SODIUM mmol/L 137 135* 136 136 135*   POTASSIUM mmol/L 4.6 4.2 4.3 4.0 4.3   CHLORIDE mmol/L 97* 98 96* 96* 100   CO2 mmol/L 23 23 29 29 25   ANION GAP mmol/L 22* 18 15 15 14   BUN mg/dL 24* 20 21 21 20   CREATININE mg/dL 1.41* 1.57* 1.39* 1.31* 1.16   EGFR mL/min/1.73m*2 66 58* 67 72 83   MAGNESIUM mg/dL 2.34 2.29 2.48* 1.98 1.90   ALBUMIN g/dL 3.6 3.7 4.0 3.7 3.8   ALT U/L  --   --   --   --  17   AST U/L  --   --   --   --  19   BILIRUBIN TOTAL mg/dL  --   --   --   --  0.5     CBC:  Results from last 7 days   Lab Units 07/21/24  2340 07/20/24  0601 07/18/24  1558   WBC AUTO x10*3/uL 7.3 6.4  6.4 6.2   HEMOGLOBIN g/dL 13.2* 14.9  14.9 13.6   HEMATOCRIT % 39.4* 45.0  45.0 42.3   PLATELETS AUTO x10*3/uL 196 232  232 241   MCV fL 93 95  95 98     COAG:   Results from last 7 days   Lab Units 07/18/24  1558   INR  1.1     ABO: No results found for: \"ABO\"  HEME/ENDO:  Results from last 7 days   Lab Units 07/18/24  1558   FERRITIN ng/mL 234   IRON SATURATION % 28   TSH mIU/L 2.29 "   HEMOGLOBIN A1C % 13.3*      CARDIAC:   Results from last 7 days   Lab Units 07/18/24  1558   [facility] ng/L 59*   BNP pg/mL 127*       ASSESSMENT AND PLAN:       37 YO Male with hx of NICM/HFrEF (EF 5-10% on echo fev 2024), initially diagnosed 10/2022, HTN, HLD, hypertriglyceridemia, Type II DM (poorly controlled A1c 13.7%, on insulin), severe obesity (BMI 44), 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      Neuro/ Psych:  # Hx of cardioembolic stroke: (12/2022)  - no residual symptoms, AO x 3 on arrival  - Takes xarelto 20 mg at home   - holding xarelto inpatient, will start on heparin gtt  - Statin: cont lipitor 40 mg  - Serial neuro assessments   - PO Tylenol PRN for pain  - PT/OT Consult, OOB to chair  - CAM ICU score every shift     # Anxiety/depression:  - continue home zoloft 100 mg daily  - mood normal on admission  - - Sleep/wake cycle normalization     # Physical Status  - Morbid Obesity  - no Age-related debility/bed confined        #Substance abuse  -Alcohol abuse: reports occasional binge drinking but denies withdrawal   -Tobacco use/Nicotine Dependence: occasional cigarette smoker, previous hx of heavy alcohol use      Cardiovascular:  # NICM HFrEF (EF 5-10%, TTE 02/2024)- combined systolic and diastolic dysnfuction  # Acute decompensated heart failure  # Low cardiac output state +/- cardiogenic shock   - diagnosed since 2022  - [facility] in 2022 with no evidence of angiographically obstructive CAD  - TTE 07/2024:  CONCLUSIONS:   1. Poorly visualized anatomical structures due to suboptimal image quality.   2. Left ventricular ejection fraction is severely decreased, by visual estimate at  10%.   3. Spectral Doppler shows a pseudonormal pattern of left ventricular diastolic filling.   4. Left ventricular cavity size is severely dilated (LVIDd 7.4 cm)   5. There is normal right ventricular global systolic function.    - He is presenting with preogressively worsening Dyspnea at rest and KEY, orthopnea, PND and generalized weakness over months.   - Elective RHC 07/18 showed elevated R and L sided filing pressure and low cardiac output/index: RA 24, RVSP 63, PA 64/40 (48), W 41, /90 (101) mmHg. 3. CO/CI [Evelia]: 4.4 / 1.7. CO/CI [TD]: 3.47 / 1.4. 5. SVR: 1775 cgs, PVR: 2.0 COLON, /74.  - Opening SGC #: /72 (82), PAP 56/43(47), CVP 8, SVR 1357, CI 1.76  - admit weight: 140 Kg  - admit BNP: 127 (low as morbidly obese)  GDMT: - continue entresto. Dose increased to 49/51 mg BID on 07/21 for elevated MAP. continue spironolactone (dose increased to 25 mg on 07/22), farxiga 10 mg. Holding Coreg in setting of low output state.   - Diuretics: ordered lasix 40 mg IV Q12 hrs (takes torsemide 20 mg daily at home)  - Inotropes/ Pressors: continue milrinone 0.25 mcg/kg/min     - Weight trend in last few days with diruresis:  Date/Time Weight   07/22/24 1115 136 kg (299 lb 13.2 oz)   07/21/24 1251 136 kg (299 lb 13.2 oz)   07/21/24 0630 136 kg (299 lb 13.2 oz)   07/20/24 1200 137 kg (300 lb 14.9 oz)   07/19/24 0550 137 kg (302 lb 0.5 oz)   07/18/24 1705 140 kg (308 lb 10.3 oz)   -Hemodynamics through [place] [place] on milrinone 0.25 mcg/kg/min:   Most Recent Range Past 24hrs   BP (Art)   No data recorded   MAP(Art)   No data recorded   RA/CVP  4 mmHg No data recorded   PA 23/10 PAP  Min: 17/6  Max: 49/31   PA(mean) 16 mmHg PAP (Mean)  Min: 11 mmHg  Max: 37 mmHg   PCWP 7 mmHg No data recorded   CO 5 L/min CO (L/min)  Min: 4.1 L/min  Max: 5.5 L/min   CI 2 L/min/m2 CI (L/min/m2)  Min: 1.7 L/min/m2  Max: 2.2 L/min/m2   Mixed Venous 67 % SVO2 (%)  Min: 64 %  Max: 67 %   SVR  1183 (dyne*sec)/cm5 SVR (dyne*sec)/cm5   Min: 1183 (dyne*sec)/cm5  Max: 1709 (dyne*sec)/cm5    (dyne*sec)/cm5 No data recorded   TPG  16 mmHg - 7 mmHg = 9 mmHg       Intake/Output Summary (Last 24 hours) at 7/22/2024 1121  Last data filed at 7/22/2024 1000  Gross per 24 hour   Intake 2846.65 ml   Output 4575 ml   Net -1728.35 ml   - Daily standing weights, 2gm sodium diet, 2L fluid restriction, strict I&Os  - Advance therapies: patient consented for LVAD evaluation today, will start the process.      Pulmonary:   -No Hx of known pulmonary disease/Pulmonary Hypertension  -History of smoking: prn nicotine patches    -Monitor and maintain SpO2 > 92%  -CXR with pulmonary vascular congestion, bilateral pulm edema no consolidation.   -diuresis plan as above     GI:  - no known GI issues   - Bowel regimen: prn Miralax + senna      :  # No known renal issues   -Baseline BUN/Cr 15/1.1  -Admit BUN/Cr 20/1.1  -Creatinine slightly high to 1.4-1.6 in last few days, still EGFR>65. Will continue to monitor.   -I/Os  -avoid hypotension and nephrotoxic agents     Heme:  #no known hematology issues   - Labs: CBC, TIBC, ferritin, serum Fe, folate, B12 - all normal    - CTM      Endo:  #DM type 2 (uncontrolled)   - hgbA1c : 13.3 (07 /2024)  - home regimen: Insulin lantus 55 U qpm, lispro 18 U TID means, Trulicity 1.5 q weekly, metformin 1000 mg BID  - Inpatient regimen: Lantus 55 qpm, Lispro 18 U TID meals, high dose sliding scale, holding metformin and Trulicity   - Endocrine following    - BGM AC & HS  - Consistent carbs diet     #Thyroid  -TSH:2.29         ID:  - Recent abdominal wall abscess  - Recently seen in ER on 07/13 for abd wall abscess 2.3 cm x 3.5 cm x 2 cm s/p I& D and started on bactrim DS 1 tab BID x 10 days (end date 07/23)  -afebrile, nontoxic   -no s/s infx  -trend temps q4h    PHYSICAL AND OCCUPATIONAL THERAPY:    LINES:  PIV  [place]-thuy    DVT: heparin gtt  CENTRAL LINE BUNDLE: Marshal daley  GLYCEMIC CONTROL: insulin + sliding scale ordered  BOWEL  CARE: Miralax prn ordered  NUTRITION: Adult diet Cardiac; 70 gm fat; 2 - 3 grams Sodium  NUTRITION: Adult diet Cardiac, Carb Controlled; 75 gram carb/meal, 45 gram Carb evening snack; 1500 mL fluid; 70 gm fat; 2 - 3 grams Sodium      EMERGENCY CONTACT: Extended Emergency Contact Information  Primary Emergency Contact: FREDDY LEWIS  Address: [address] [place] [address] [address]           [place] [address] [facility] [place]  Home Phone: [phone]  Mobile Phone: [phone]  Relation: Spouse  Preferred language: English   needed? No  FAMILY UPDATE: wife present at bedside, updated 07/22  CODE STATUS: Full Code  DISPO: pending clinical status    Patient seen and assessed with MD Cara Ace MD, MS  Heart Failure Fellow,  [facility]

## 2024-07-22 NOTE — PROGRESS NOTES
Transplant/LVAD Education Consent:   Topic: Pre Advanced Therapy Patient Education   Attendees: Patient Arthur Carranza, patient wife Era Carranza, VAD coordinator Liza Courtney, VAD coordinator Julia Urrutia      Patient given the [facility] Pre Advanced Therapy Education binder. Patient received education regarding the following topics for their pre Heart Transplant and pre Left Ventricular Assist Device evaluation:    The evaluation process including advanced therapy team members and roles, required testing and consults, selection criteria and suitability for OHT and LVAD, OHT listing process and organ offer, hands on LVAD equipment review, psychological and financial considerations for a successful outcome with advanced therapies, and patient responsibilities including adherence to a strict medical regimen.    An overview of the surgical procedure for OHT and LVAD.    The potential for certain risk factors including infection, pneumonia, blood clot formation, organ rejection, failure, and possibility of re transplantation, lifetime immunosuppression and associated risks with OHT, arrhythmias and cardiovascular collapse, multi-organ system failure, depression, post- traumatic stress disorder, anxiety, issues of dependence or feelings of guilt, right ventricular failure with LVAD, and death.   National and Transplant [place] outcomes for one year patient and graft survival from the most recent SRTR program specific report.    Donor risk factors that could affect the success of the transplant and health of the patient including donor age, donor medical and social history, condition of the organ, and the risk of radha cancer, HIV, Hepatitis B or C, and/or malaria if the infection is not detectable at the time of donation.    Transplants not performed in a Medicare-approved transplant center could affect the patient's ability to have immunosuppression medication paid for under Medicare part B.    STS  Intermacs and  LVAD implant outcomes for one year patient survival.    The medical necessity of electricity and a working telephone with implantation of the LVAD.    Available alternatives to OHT and LVAD.    The patient's right to withdraw consent for the evaluations at any time during the process.      Patient was given the opportunity to have questions answered.   Consent signed for Heart Transplant and LVAD? : No   If no, which consent was signed? : VAD  Current barriers: Nicotine use, BMI  Consent obtained by: Julia Urrutia

## 2024-07-23 ENCOUNTER — APPOINTMENT (OUTPATIENT)
Dept: VASCULAR MEDICINE | Facility: HOSPITAL | Age: 39
End: 2024-07-23
Payer: COMMERCIAL

## 2024-07-23 ENCOUNTER — APPOINTMENT (OUTPATIENT)
Dept: RADIOLOGY | Facility: HOSPITAL | Age: 39
End: 2024-07-23
Payer: COMMERCIAL

## 2024-07-23 LAB
ALBUMIN SERPL BCP-MCNC: 3.8 G/DL (ref 3.4–5)
ANION GAP SERPL CALC-SCNC: 16 MMOL/L (ref 10–20)
APTT PPP: 63 SECONDS (ref 27–38)
ATRIAL RATE: 94 BPM
BASE EXCESS BLDMV CALC-SCNC: -0.5 MMOL/L (ref -2–3)
BASE EXCESS BLDMV CALC-SCNC: -1.2 MMOL/L (ref -2–3)
BASE EXCESS BLDMV CALC-SCNC: -2 MMOL/L (ref -2–3)
BASE EXCESS BLDMV CALC-SCNC: -5.4 MMOL/L (ref -2–3)
BASOPHILS # BLD AUTO: 0.02 X10*3/UL (ref 0–0.1)
BASOPHILS NFR BLD AUTO: 0.4 %
BODY TEMPERATURE: 37 DEGREES CELSIUS
BUN SERPL-MCNC: 27 MG/DL (ref 6–23)
CALCIUM SERPL-MCNC: 9.1 MG/DL (ref 8.6–10.6)
CHLORIDE SERPL-SCNC: 95 MMOL/L (ref 98–107)
CO2 SERPL-SCNC: 25 MMOL/L (ref 21–32)
CREAT SERPL-MCNC: 1.64 MG/DL (ref 0.5–1.3)
EGFRCR SERPLBLD CKD-EPI 2021: 55 ML/MIN/1.73M*2
EOSINOPHIL # BLD AUTO: 0.17 X10*3/UL (ref 0–0.7)
EOSINOPHIL NFR BLD AUTO: 3 %
ERYTHROCYTE [DISTWIDTH] IN BLOOD BY AUTOMATED COUNT: 12.2 % (ref 11.5–14.5)
ERYTHROCYTE [DISTWIDTH] IN BLOOD BY AUTOMATED COUNT: 12.5 % (ref 11.5–14.5)
GLUCOSE BLD MANUAL STRIP-MCNC: 124 MG/DL (ref 74–99)
GLUCOSE BLD MANUAL STRIP-MCNC: 132 MG/DL (ref 74–99)
GLUCOSE BLD MANUAL STRIP-MCNC: 180 MG/DL (ref 74–99)
GLUCOSE BLD MANUAL STRIP-MCNC: 217 MG/DL (ref 74–99)
GLUCOSE BLD MANUAL STRIP-MCNC: 239 MG/DL (ref 74–99)
GLUCOSE BLD MANUAL STRIP-MCNC: 66 MG/DL (ref 74–99)
GLUCOSE BLD MANUAL STRIP-MCNC: 75 MG/DL (ref 74–99)
GLUCOSE SERPL-MCNC: 202 MG/DL (ref 74–99)
HCO3 BLDMV-SCNC: 20 MMOL/L (ref 22–26)
HCO3 BLDMV-SCNC: 23.7 MMOL/L (ref 22–26)
HCO3 BLDMV-SCNC: 25.7 MMOL/L (ref 22–26)
HCO3 BLDMV-SCNC: 26.3 MMOL/L (ref 22–26)
HCT VFR BLD AUTO: 39.9 % (ref 41–52)
HCT VFR BLD AUTO: 52.8 % (ref 41–52)
HGB BLD-MCNC: 13.6 G/DL (ref 13.5–17.5)
HGB BLD-MCNC: 18.1 G/DL (ref 13.5–17.5)
HOLD SPECIMEN: NORMAL
IMM GRANULOCYTES # BLD AUTO: 0.03 X10*3/UL (ref 0–0.7)
IMM GRANULOCYTES NFR BLD AUTO: 0.5 % (ref 0–0.9)
INHALED O2 CONCENTRATION: 21 %
INR PPP: 0.9 (ref 0.9–1.1)
LYMPHOCYTES # BLD AUTO: 1.26 X10*3/UL (ref 1.2–4.8)
LYMPHOCYTES NFR BLD AUTO: 22.5 %
MAGNESIUM SERPL-MCNC: 2.07 MG/DL (ref 1.6–2.4)
MCH RBC QN AUTO: 31 PG (ref 26–34)
MCH RBC QN AUTO: 31.3 PG (ref 26–34)
MCHC RBC AUTO-ENTMCNC: 34.1 G/DL (ref 32–36)
MCHC RBC AUTO-ENTMCNC: 34.3 G/DL (ref 32–36)
MCV RBC AUTO: 91 FL (ref 80–100)
MCV RBC AUTO: 92 FL (ref 80–100)
MONOCYTES # BLD AUTO: 0.74 X10*3/UL (ref 0.1–1)
MONOCYTES NFR BLD AUTO: 13.2 %
NEUTROPHILS # BLD AUTO: 3.38 X10*3/UL (ref 1.2–7.7)
NEUTROPHILS NFR BLD AUTO: 60.4 %
NRBC BLD-RTO: 0 /100 WBCS (ref 0–0)
NRBC BLD-RTO: 0 /100 WBCS (ref 0–0)
OXYHGB MFR BLDMV: 62.4 % (ref 45–75)
OXYHGB MFR BLDMV: 63.7 % (ref 45–75)
OXYHGB MFR BLDMV: 65.2 % (ref 45–75)
OXYHGB MFR BLDMV: 70.8 % (ref 45–75)
P AXIS: 69 DEGREES
P OFFSET: 175 MS
P ONSET: 121 MS
PCO2 BLDMV: 38 MM HG (ref 41–51)
PCO2 BLDMV: 43 MM HG (ref 41–51)
PCO2 BLDMV: 51 MM HG (ref 41–51)
PCO2 BLDMV: 51 MM HG (ref 41–51)
PH BLDMV: 7.31 PH (ref 7.33–7.43)
PH BLDMV: 7.32 PH (ref 7.33–7.43)
PH BLDMV: 7.33 PH (ref 7.33–7.43)
PH BLDMV: 7.35 PH (ref 7.33–7.43)
PHOSPHATE SERPL-MCNC: 4.2 MG/DL (ref 2.5–4.9)
PLATELET # BLD AUTO: 121 X10*3/UL (ref 150–450)
PLATELET # BLD AUTO: 197 X10*3/UL (ref 150–450)
PO2 BLDMV: 39 MM HG (ref 35–45)
PO2 BLDMV: 40 MM HG (ref 35–45)
PO2 BLDMV: 40 MM HG (ref 35–45)
PO2 BLDMV: 41 MM HG (ref 35–45)
POTASSIUM SERPL-SCNC: 3.9 MMOL/L (ref 3.5–5.3)
PR INTERVAL: 186 MS
PROTHROMBIN TIME: 10.6 SECONDS (ref 9.8–12.8)
Q ONSET: 214 MS
QRS COUNT: 16 BEATS
QRS DURATION: 136 MS
QT INTERVAL: 420 MS
QTC CALCULATION(BAZETT): 525 MS
QTC FREDERICIA: 487 MS
R AXIS: -68 DEGREES
RBC # BLD AUTO: 4.34 X10*6/UL (ref 4.5–5.9)
RBC # BLD AUTO: 5.83 X10*6/UL (ref 4.5–5.9)
SAO2 % BLDMV: 63 % (ref 45–75)
SAO2 % BLDMV: 65 % (ref 45–75)
SAO2 % BLDMV: 66 % (ref 45–75)
SAO2 % BLDMV: 72 % (ref 45–75)
SODIUM SERPL-SCNC: 132 MMOL/L (ref 136–145)
T AXIS: 82 DEGREES
T OFFSET: 424 MS
UFH PPP CHRO-ACNC: 0.3 IU/ML
UFH PPP CHRO-ACNC: 0.9 IU/ML
VENTRICULAR RATE: 94 BPM
WBC # BLD AUTO: 3.8 X10*3/UL (ref 4.4–11.3)
WBC # BLD AUTO: 5.6 X10*3/UL (ref 4.4–11.3)

## 2024-07-23 PROCEDURE — 94660 CPAP INITIATION&MGMT: CPT

## 2024-07-23 PROCEDURE — 99291 CRITICAL CARE FIRST HOUR: CPT | Performed by: STUDENT IN AN ORGANIZED HEALTH CARE EDUCATION/TRAINING PROGRAM

## 2024-07-23 PROCEDURE — 2020000001 HC ICU ROOM DAILY

## 2024-07-23 PROCEDURE — 85305 CLOT INHIBIT PROT S TOTAL: CPT | Performed by: STUDENT IN AN ORGANIZED HEALTH CARE EDUCATION/TRAINING PROGRAM

## 2024-07-23 PROCEDURE — 85302 CLOT INHIBIT PROT C ANTIGEN: CPT | Performed by: STUDENT IN AN ORGANIZED HEALTH CARE EDUCATION/TRAINING PROGRAM

## 2024-07-23 PROCEDURE — 71045 X-RAY EXAM CHEST 1 VIEW: CPT | Performed by: RADIOLOGY

## 2024-07-23 PROCEDURE — 97530 THERAPEUTIC ACTIVITIES: CPT | Mod: GP

## 2024-07-23 PROCEDURE — 85520 HEPARIN ASSAY: CPT | Performed by: STUDENT IN AN ORGANIZED HEALTH CARE EDUCATION/TRAINING PROGRAM

## 2024-07-23 PROCEDURE — 82947 ASSAY GLUCOSE BLOOD QUANT: CPT

## 2024-07-23 PROCEDURE — 71045 X-RAY EXAM CHEST 1 VIEW: CPT

## 2024-07-23 PROCEDURE — 99254 IP/OBS CNSLTJ NEW/EST MOD 60: CPT

## 2024-07-23 PROCEDURE — 85025 COMPLETE CBC W/AUTO DIFF WBC: CPT | Performed by: NURSE PRACTITIONER

## 2024-07-23 PROCEDURE — 2500000002 HC RX 250 W HCPCS SELF ADMINISTERED DRUGS (ALT 637 FOR MEDICARE OP, ALT 636 FOR OP/ED)

## 2024-07-23 PROCEDURE — 36415 COLL VENOUS BLD VENIPUNCTURE: CPT | Performed by: STUDENT IN AN ORGANIZED HEALTH CARE EDUCATION/TRAINING PROGRAM

## 2024-07-23 PROCEDURE — 83735 ASSAY OF MAGNESIUM: CPT | Performed by: STUDENT IN AN ORGANIZED HEALTH CARE EDUCATION/TRAINING PROGRAM

## 2024-07-23 PROCEDURE — 2500000004 HC RX 250 GENERAL PHARMACY W/ HCPCS (ALT 636 FOR OP/ED): Performed by: STUDENT IN AN ORGANIZED HEALTH CARE EDUCATION/TRAINING PROGRAM

## 2024-07-23 PROCEDURE — 2500000001 HC RX 250 WO HCPCS SELF ADMINISTERED DRUGS (ALT 637 FOR MEDICARE OP): Performed by: STUDENT IN AN ORGANIZED HEALTH CARE EDUCATION/TRAINING PROGRAM

## 2024-07-23 PROCEDURE — 37799 UNLISTED PX VASCULAR SURGERY: CPT | Performed by: NURSE PRACTITIONER

## 2024-07-23 PROCEDURE — 80069 RENAL FUNCTION PANEL: CPT | Performed by: STUDENT IN AN ORGANIZED HEALTH CARE EDUCATION/TRAINING PROGRAM

## 2024-07-23 PROCEDURE — 2500000002 HC RX 250 W HCPCS SELF ADMINISTERED DRUGS (ALT 637 FOR MEDICARE OP, ALT 636 FOR OP/ED): Performed by: STUDENT IN AN ORGANIZED HEALTH CARE EDUCATION/TRAINING PROGRAM

## 2024-07-23 PROCEDURE — 85027 COMPLETE CBC AUTOMATED: CPT | Performed by: NURSE PRACTITIONER

## 2024-07-23 PROCEDURE — 82805 BLOOD GASES W/O2 SATURATION: CPT | Performed by: STUDENT IN AN ORGANIZED HEALTH CARE EDUCATION/TRAINING PROGRAM

## 2024-07-23 PROCEDURE — 2500000001 HC RX 250 WO HCPCS SELF ADMINISTERED DRUGS (ALT 637 FOR MEDICARE OP): Performed by: NURSE PRACTITIONER

## 2024-07-23 PROCEDURE — 85610 PROTHROMBIN TIME: CPT | Performed by: STUDENT IN AN ORGANIZED HEALTH CARE EDUCATION/TRAINING PROGRAM

## 2024-07-23 RX ORDER — TALC
3 POWDER (GRAM) TOPICAL NIGHTLY PRN
Status: DISCONTINUED | OUTPATIENT
Start: 2024-07-23 | End: 2024-07-25

## 2024-07-23 ASSESSMENT — ANXIETY QUESTIONNAIRES
5. BEING SO RESTLESS THAT IT IS HARD TO SIT STILL: NOT AT ALL
7. FEELING AFRAID AS IF SOMETHING AWFUL MIGHT HAPPEN: NEARLY EVERY DAY
2. NOT BEING ABLE TO STOP OR CONTROL WORRYING: NOT AT ALL
3. WORRYING TOO MUCH ABOUT DIFFERENT THINGS: MORE THAN HALF THE DAYS
1. FEELING NERVOUS, ANXIOUS, OR ON EDGE: NOT AT ALL
GAD7 TOTAL SCORE: 5
4. TROUBLE RELAXING: NOT AT ALL
6. BECOMING EASILY ANNOYED OR IRRITABLE: NOT AT ALL

## 2024-07-23 ASSESSMENT — LIFESTYLE VARIABLES
BEEN TREATED OUTPATIENT FOR PROBLEMS RELATED TO DRUG USE: NO
LOST A JOB DUE TO USE OF DRUGS: NO
HOW OFTEN DURING THE LAST YEAR HAVE YOU FAILED TO DO WHAT WAS NORMALLY EXPECTED FROM YOU BECAUSE OF DRINKING: NEVER
HOW OFTEN DURING THE LAST YEAR HAVE YOU HAD A FEELING OF GUILT OR REMORSE AFTER DRINKING: NEVER
HOSPITALIZED FOR MEDICAL PROBLEMS DUE TO DRUGS: NO
ARRESTED FOR POSSESSION OF ILLEGAL DRUGS: YES
HOW OFTEN DURING THE LAST YEAR HAVE YOU BEEN UNABLE TO REMEMBER WHAT HAPPENED THE NIGHT BEFORE BECAUSE YOU HAD BEEN DRINKING: NEVER
ENGAGED IN ILLEGAL ACTIVITIES TO OBTAIN DRUGS: NO
ABLE TO STOP USING DRUGS WHEN WANT: YES
FAMILY MEMBER EVER SOUGHT HELP FOR YOUR DRUG USE: NO
FEEL BAD ABOUT YOUR DRUG USE: YES
HAS A RELATIVE, FRIEND, DOCTOR, OR ANOTHER HEALTH PROFESSIONAL EXPRESSED CONCERN ABOUT YOUR DRINKING OR SUGGESTED YOU CUT DOWN: NO
TROUBLE AT WORK DUE TO DRUGS: NO
EVER INVOLVED IN TREATMENT PROGRAM SPECIFICALLY FOR DRUGS: YES
HAVE YOU OR SOMEONE ELSE BEEN INJURED AS A RESULT OF YOUR DRINKING: NO
DRUG USE CAUSE PROBLEMS BETWEEN YOU AND SPOUSE: NO
EXPERIENCED WITHDRAWAL SYMPTOMS DUE TO HEAVY DRUG INTAKE: NO
USE DRUGS ON CONTINUOUS BASIS: NO
AUDIT TOTAL SCORE: 4
ARRESTED FOR UNUSUAL BEHAVIOR WHILE UNDER INFLUENCE OF DRUGS: NO
AUDIT TOTAL SCORE: 4
FRIENDS OR RELATIVES KNOW OR SUSPECT DRUG USE: NO
MISUSED PRESCRIPTION DRUGS: NO
HOW OFTEN DO YOU HAVE A DRINK CONTAINING ALCOHOL: 2-4 TIMES A MONTH
HOW OFTEN DO YOU HAVE 5 OR MORE DRINKS ON ONE OCCASION: NEVER
TRY TO LIMIT DRUG USE TO CERTAIN SITUATIONS: NO
MISUSED PRESCRIPTION DRUGS: YES
GET THROUGH WEEK WITHOUT USING DRUGS RECREATIONALLY: YES
NEGLECTED FAMILY OR WORK DUE TO DRUG USE: YES
USE MORE THAN ONE DRUG AT A TIME: YES
DAST10 TOTAL SCORE: 7
LOST FRIENDS DUE TO USE OF DRUGS: NO
HOW OFTEN DURING THE LAST YEAR HAVE YOU FOUND THAT YOU WERE NOT ABLE TO STOP DRINKING ONCE YOU HAD STARTED: NEVER
HOW OFTEN DURING THE LAST YEAR HAVE YOU NEEDED AN ALCOHOLIC DRINK FIRST THING IN THE MORNING TO GET YOURSELF GOING AFTER A NIGHT OF HEAVY DRINKING: NEVER
ARRESTED FOR DRIVING WHILE UNDER INFLUENCE OF DRUGS: NO
EVER GONE TO ANYONE FOR HELP FOR DRUG PROBLEM: NO
SPOUSE OR PARENTS COMPLAIN ABOUT INVOLVEMENT WITH DRUGS: NO
MEDICAL PROBLEMS AS RESULT OF DRUG USE: NO
HOW MANY STANDARD DRINKS CONTAINING ALCOHOL DO YOU HAVE ON A TYPICAL DAY: 5 OR 6 DRINKS
GOTTEN INTO FIGHTS WHEN UNDER INFLUENCE OF DRUGS: NO

## 2024-07-23 ASSESSMENT — PAIN - FUNCTIONAL ASSESSMENT
PAIN_FUNCTIONAL_ASSESSMENT: 0-10
PAIN_FUNCTIONAL_ASSESSMENT: CPOT (CRITICAL CARE PAIN OBSERVATION TOOL)
PAIN_FUNCTIONAL_ASSESSMENT: CPOT (CRITICAL CARE PAIN OBSERVATION TOOL)
PAIN_FUNCTIONAL_ASSESSMENT: 0-10
PAIN_FUNCTIONAL_ASSESSMENT: CPOT (CRITICAL CARE PAIN OBSERVATION TOOL)
PAIN_FUNCTIONAL_ASSESSMENT: CPOT (CRITICAL CARE PAIN OBSERVATION TOOL)
PAIN_FUNCTIONAL_ASSESSMENT: 0-10

## 2024-07-23 ASSESSMENT — PAIN SCALES - GENERAL
PAINLEVEL_OUTOF10: 0 - NO PAIN

## 2024-07-23 ASSESSMENT — PATIENT HEALTH QUESTIONNAIRE - PHQ9
9. THOUGHTS THAT YOU WOULD BE BETTER OFF DEAD, OR OF HURTING YOURSELF: NOT AT ALL
4. FEELING TIRED OR HAVING LITTLE ENERGY: MORE THAN HALF THE DAYS
5. POOR APPETITE OR OVEREATING: SEVERAL DAYS
6. FEELING BAD ABOUT YOURSELF - OR THAT YOU ARE A FAILURE OR HAVE LET YOURSELF OR YOUR FAMILY DOWN: NEARLY EVERY DAY
2. FEELING DOWN, DEPRESSED OR HOPELESS: NEARLY EVERY DAY
8. MOVING OR SPEAKING SO SLOWLY THAT OTHER PEOPLE COULD HAVE NOTICED. OR THE OPPOSITE, BEING SO FIGETY OR RESTLESS THAT YOU HAVE BEEN MOVING AROUND A LOT MORE THAN USUAL: NOT AT ALL
7. TROUBLE CONCENTRATING ON THINGS, SUCH AS READING THE NEWSPAPER OR WATCHING TELEVISION: NOT AT ALL
SUM OF ALL RESPONSES TO PHQ9 QUESTIONS 1 & 2: 4
3. TROUBLE FALLING OR STAYING ASLEEP OR SLEEPING TOO MUCH: MORE THAN HALF THE DAYS
10. IF YOU CHECKED OFF ANY PROBLEMS, HOW DIFFICULT HAVE THESE PROBLEMS MADE IT FOR YOU TO DO YOUR WORK, TAKE CARE OF THINGS AT HOME, OR GET ALONG WITH OTHER PEOPLE: SOMEWHAT DIFFICULT
1. LITTLE INTEREST OR PLEASURE IN DOING THINGS: SEVERAL DAYS
SUM OF ALL RESPONSES TO PHQ QUESTIONS 1-9: 12

## 2024-07-23 ASSESSMENT — COGNITIVE AND FUNCTIONAL STATUS - GENERAL
CLIMB 3 TO 5 STEPS WITH RAILING: A LITTLE
STANDING UP FROM CHAIR USING ARMS: A LITTLE
WALKING IN HOSPITAL ROOM: A LITTLE
MOBILITY SCORE: 20
MOVING TO AND FROM BED TO CHAIR: A LITTLE

## 2024-07-23 NOTE — CARE PLAN
Problem: Fall/Injury  Goal: Not fall by end of shift  7/23/2024 0242 by Vida Gold RN  Outcome: Progressing  7/23/2024 0242 by Vida Gold RN  Outcome: Progressing  Goal: Be free from injury by end of the shift  7/23/2024 0242 by Vida Gold RN  Outcome: Progressing  7/23/2024 0242 by Vida Gold RN  Outcome: Progressing  Goal: Verbalize understanding of personal risk factors for fall in the hospital  7/23/2024 0242 by Vida Gold RN  Outcome: Progressing  7/23/2024 0242 by Vida Gold RN  Outcome: Progressing  Goal: Verbalize understanding of risk factor reduction measures to prevent injury from fall in the home  7/23/2024 0242 by Vida Gold RN  Outcome: Progressing  7/23/2024 0242 by Vida Gold RN  Outcome: Progressing  Goal: Pace activities to prevent fatigue by end of the shift  7/23/2024 0242 by Vida Gold RN  Outcome: Progressing  7/23/2024 0242 by Vida Gold RN  Outcome: Progressing     Problem: Pain - Adult  Goal: Verbalizes/displays adequate comfort level or baseline comfort level  7/23/2024 0242 by Vida Gold RN  Outcome: Progressing  7/23/2024 0242 by Vida Gold RN  Outcome: Progressing     Problem: Safety - Adult  Goal: Free from fall injury  7/23/2024 0242 by Vida Gold RN  Outcome: Progressing  7/23/2024 0242 by Vida Gold RN  Outcome: Progressing     Problem: Discharge Planning  Goal: Discharge to home or other facility with appropriate resources  7/23/2024 0242 by Vida Gold RN  Outcome: Progressing  7/23/2024 0242 by Vida Gold RN  Outcome: Progressing     Problem: Chronic Conditions and Co-morbidities  Goal: Patient's chronic conditions and co-morbidity symptoms are monitored and maintained or improved  7/23/2024 0242 by Vida Gold RN  Outcome: Progressing  7/23/2024 0242 by Vida Gold RN  Outcome: Progressing     Problem: Skin  Goal: Participates in plan/prevention/treatment measures  7/23/2024 0242 by Vida Gold RN  Outcome:  Progressing  Flowsheets (Taken 7/23/2024 0242)  Participates in plan/prevention/treatment measures:   Discuss with provider PT/OT consult   Increase activity/out of bed for meals  7/23/2024 0242 by Vida Gold RN  Outcome: Progressing  Flowsheets (Taken 7/23/2024 0242)  Participates in plan/prevention/treatment measures:   Discuss with provider PT/OT consult   Increase activity/out of bed for meals  Goal: Prevent/manage excess moisture  7/23/2024 0242 by Vida Gold RN  Outcome: Progressing  Flowsheets (Taken 7/23/2024 0242)  Prevent/manage excess moisture:   Monitor for/manage infection if present   Moisturize dry skin  7/23/2024 0242 by Vida Gold RN  Outcome: Progressing  Flowsheets (Taken 7/23/2024 0242)  Prevent/manage excess moisture:   Monitor for/manage infection if present   Moisturize dry skin  Goal: Prevent/minimize sheer/friction injuries  7/23/2024 0242 by Vida Gold RN  Outcome: Progressing  Flowsheets (Taken 7/23/2024 0242)  Prevent/minimize sheer/friction injuries:   Increase activity/out of bed for meals   Turn/reposition every 2 hours/use positioning/transfer devices  7/23/2024 0242 by Vida Gold RN  Outcome: Progressing  Flowsheets (Taken 7/23/2024 0242)  Prevent/minimize sheer/friction injuries:   Increase activity/out of bed for meals   Turn/reposition every 2 hours/use positioning/transfer devices  Goal: Promote/optimize nutrition  7/23/2024 0242 by Vida Gold RN  Outcome: Progressing  Flowsheets (Taken 7/23/2024 0242)  Promote/optimize nutrition:   Consume > 50% meals/supplements   Monitor/record intake including meals   Offer water/supplements/favorite foods  7/23/2024 0242 by Vida Gold RN  Outcome: Progressing  Flowsheets (Taken 7/23/2024 0242)  Promote/optimize nutrition:   Consume > 50% meals/supplements   Monitor/record intake including meals   Offer water/supplements/favorite foods

## 2024-07-23 NOTE — CONSULTS
Inpatient consult to Palliative Care  Consult performed by: Alison Smith MD  Consult ordered by: Cara Toribio MD          Palliative Medicine Consult  Complex medical decision making, symptom management, patient/family support    History obtained from chart review including ED note, H&P, patient's daily progress notes, review of lab/test results, and discussion with primary team and bedside RN.    Subjective    History of Present Illness  Arthur Carranza is a 37 yo M w PMH of non-ischemic cardiomyopathy/HFrEF (EF 5-10%), poorly controlled T2DM, polysubstance use disorder, and cardioembolic stroke (2022) transferred from [facility] after elective RHC showed high right and left sided filling pressures and low cardiac output, transferred for LVAD workup. Echo on 7/21 showed LV hypokinesis with an EF of 10%.    Patient follows with HF with Dr. Jurado as an outpatient, however, there are concerns regarding adherence with medications and follow-up with doctors appointments. At outpatient appointment on 07/02/2024, patient reported dyspnea at rest and on exertion, orthopnea, and PND. His entresto dose was increased and he was scheduled for a RHC.    Introduction to Palliative Care  Met with Arthur at bedside.   Patient alert and oriented, has capacity to make their own medical decisions at this time.   Staff present: Dr. Wayne  Palliative Medicine was introduced as a specialty service for patients with serious illness to help with symptom management, improve quality of life, assist with goals of care conversations, navigate complex decision making, and provide support to patients and families. Support and empathy was provided throughout the encounter. Provided reflective listening and presence.     Symptoms  Pain: denies  Dyspnea: denies, says markedly improved since hospitalization  Fatigue: denies  Insomnia: endorses, says he has trouble falling asleep mainly but also a component of trouble staying asleep. Has used  "trazodone for sleep in the past.  Drowsiness: denies  Constipation: denies  Nausea: denies  Appetite: good  Anxiety: endorses, says he has a lot of thoughts that he is unable to keep away and these continue to build up, is on sertraline for anxiety  Depression: endorses, also reports seeing a therapist in the past, was suicidal in the past, but recognizes the impact that suicide would have on his children, they are a protective factor.    Palliative Medicine Social History:  The patient is  to his wife Era. Patient has 8 children, 6 with Era. The patient states that he does not currently have stable housing and has been couch-surfing, specifically between his biological mother's house in [place] and his daughter's house in [place]. He recently reunited with his biological mother. He grew up with a foster family and has about 11 foster siblings, with whom he is close. 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 He endorses drinking about 4-5 shots at a time about once a week at occasions. The patient ambulates without assistance. Patient sees Dr. Jurado for heart failure but has trouble making it to all of his appointments. Arthur states that he used to enjoy poetry, jazz music, and comedy, but recently does not find inocencio in these things. When asked about coping methods and hobbies, he states that he does not have any. He reports that he is a foodie and his priority is his children and getting better for them. He believes in God and is spiritual (Denominational hai background) but does not practice any Mormon.  Denies any safety concerns.    Objective    Last Recorded Vitals  /71   Pulse 79   Temp 35.8 °C (96.4 °F)   Resp 19   Ht 1.75 m (5' 8.9\")   Wt 138 kg (304 lb 3.8 oz)   SpO2 98%   BMI 45.06 kg/m²      Physical Exam   General: well-appearing, no acute distress, resting comfortably  HEENT: normocephalic, atraumatic  Pulm: breathing " comfortably on room air  Abd: soft, non-tender, non-distended  Extremities: no peripheral edema, scars, or lesions  Neuro: alert and oriented to person, place, and time, CN II-XII grossly intact  Psych: mood and affect are appropriate     Relevant Results  Results for orders placed or performed during the hospital encounter of 07/18/24 (from the past 24 hour(s))   POCT GLUCOSE   Result Value Ref Range    POCT Glucose 110 (H) 74 - 99 mg/dL   Anti-Cardiolipin Antibody (IgA, IgG, and IgM)   Result Value Ref Range    Anticardiolipin IgA <0.5 <20.0 APL U/mL    Anticardiolipin IgG <1.6 <20.0 GPL U/mL    Anticardiolipin IgM 0.6 <20.0 MPL U/mL   CBC and Auto Differential   Result Value Ref Range    WBC 5.9 4.4 - 11.3 x10*3/uL    nRBC 0.0 0.0 - 0.0 /100 WBCs    RBC 4.49 (L) 4.50 - 5.90 x10*6/uL    Hemoglobin 14.3 13.5 - 17.5 g/dL    Hematocrit 41.0 41.0 - 52.0 %    MCV 91 80 - 100 fL    MCH 31.8 26.0 - 34.0 pg    MCHC 34.9 32.0 - 36.0 g/dL    RDW 12.7 11.5 - 14.5 %    Platelets 193 150 - 450 x10*3/uL    Neutrophils % 63.2 40.0 - 80.0 %    Immature Granulocytes %, Automated 0.5 0.0 - 0.9 %    Lymphocytes % 19.4 13.0 - 44.0 %    Monocytes % 14.0 2.0 - 10.0 %    Eosinophils % 2.6 0.0 - 6.0 %    Basophils % 0.3 0.0 - 2.0 %    Neutrophils Absolute 3.71 1.20 - 7.70 x10*3/uL    Immature Granulocytes Absolute, Automated 0.03 0.00 - 0.70 x10*3/uL    Lymphocytes Absolute 1.14 (L) 1.20 - 4.80 x10*3/uL    Monocytes Absolute 0.82 0.10 - 1.00 x10*3/uL    Eosinophils Absolute 0.15 0.00 - 0.70 x10*3/uL    Basophils Absolute 0.02 0.00 - 0.10 x10*3/uL   Hepatitis A Antibody, Total   Result Value Ref Range    Hepatitis A  AB-Total Reactive (A) Nonreactive   Hepatitis B Core Antibody, Total   Result Value Ref Range    Hepatitis B Core AB- Total Nonreactive Nonreactive   Hepatitis B Surface Antibody   Result Value Ref Range    Hepatitis B Surface .3 (H) <10.0 mIU/mL   Lactate Dehydrogenase   Result Value Ref Range     84 - 246 U/L    Magnesium   Result Value Ref Range    Magnesium 2.23 1.60 - 2.40 mg/dL   Prealbumin   Result Value Ref Range    Prealbumin 21.3 18.0 - 40.0 mg/dL   Prostate Specific Antigen, Screen   Result Value Ref Range    Prostate Specific Antigen,Screen 0.35 <=4.00 ng/mL   Thyroid Stimulating Hormone   Result Value Ref Range    Thyroid Stimulating Hormone 1.73 0.44 - 3.98 mIU/L   Triiodothyronine, Total   Result Value Ref Range    Triiodothyronine 83 60 - 200 ng/dL   Thyroxine, Total   Result Value Ref Range    Thyroxine 5.6 4.5 - 11.1 ug/dL   Comprehensive Metabolic Panel   Result Value Ref Range    Glucose 155 (H) 74 - 99 mg/dL    Sodium 134 (L) 136 - 145 mmol/L    Potassium 4.4 3.5 - 5.3 mmol/L    Chloride 96 (L) 98 - 107 mmol/L    Bicarbonate 25 21 - 32 mmol/L    Anion Gap 17 10 - 20 mmol/L    Urea Nitrogen 26 (H) 6 - 23 mg/dL    Creatinine 1.75 (H) 0.50 - 1.30 mg/dL    eGFR 51 (L) >60 mL/min/1.73m*2    Calcium 9.2 8.6 - 10.6 mg/dL    Albumin 4.0 3.4 - 5.0 g/dL    Alkaline Phosphatase 58 33 - 120 U/L    Total Protein 7.0 6.4 - 8.2 g/dL    AST 25 9 - 39 U/L    Bilirubin, Total 0.5 0.0 - 1.2 mg/dL    ALT 16 10 - 52 U/L   B-Type Natriuretic Peptide   Result Value Ref Range     (H) 0 - 99 pg/mL   Abo/Rh   Result Value Ref Range    ABO TYPE O     Rh TYPE POS    HIV 1/2 Antigen/Antibody Screen with Reflex to Confirmation   Result Value Ref Range    HIV 1/2 Antigen/Antibody Screen with Reflex to Confirmation Nonreactive Nonreactive   Blood Gas Mixed Venous   Result Value Ref Range    POCT pH, Mixed 7.37 7.33 - 7.43 pH    POCT pCO2, Mixed 43 41 - 51 mm Hg    POCT pO2, Mixed 37 35 - 45 mm Hg    POCT SO2, Mixed 61 45 - 75 %    POCT Oxy Hemoglobin, Mixed 60.2 45.0 - 75.0 %    POCT Base Excess, Mixed -0.6 -2.0 - 3.0 mmol/L    POCT HCO3 Calculated, Mixed 24.9 22.0 - 26.0 mmol/L    Patient Temperature 37.0 degrees Celsius    FiO2 21 %   Urinalysis with Reflex Culture and Microscopic   Result Value Ref Range    Color, Urine  Light-Yellow Light-Yellow, Yellow, Dark-Yellow    Appearance, Urine Clear Clear    Specific Gravity, Urine 1.020 1.005 - 1.035    pH, Urine 6.5 5.0, 5.5, 6.0, 6.5, 7.0, 7.5, 8.0    Protein, Urine 20 (TRACE) NEGATIVE, 10 (TRACE), 20 (TRACE) mg/dL    Glucose, Urine OVER (4+) (A) Normal mg/dL    Blood, Urine NEGATIVE NEGATIVE    Ketones, Urine NEGATIVE NEGATIVE mg/dL    Bilirubin, Urine NEGATIVE NEGATIVE    Urobilinogen, Urine Normal Normal mg/dL    Nitrite, Urine NEGATIVE NEGATIVE    Leukocyte Esterase, Urine NEGATIVE NEGATIVE   Extra Urine Gray Tube   Result Value Ref Range    Extra Tube Hold for add-ons.    Urinalysis Microscopic   Result Value Ref Range    WBC, Urine 1-5 1-5, NONE /HPF    RBC, Urine 1-2 NONE, 1-2, 3-5 /HPF    Bacteria, Urine 1+ (A) NONE SEEN /HPF   POCT GLUCOSE   Result Value Ref Range    POCT Glucose 190 (H) 74 - 99 mg/dL   Blood Gas Mixed Venous   Result Value Ref Range    POCT pH, Mixed 7.35 7.33 - 7.43 pH    POCT pCO2, Mixed 45 41 - 51 mm Hg    POCT pO2, Mixed 42 35 - 45 mm Hg    POCT SO2, Mixed 66 45 - 75 %    POCT Oxy Hemoglobin, Mixed 64.5 45.0 - 75.0 %    POCT Base Excess, Mixed -1.1 -2.0 - 3.0 mmol/L    POCT HCO3 Calculated, Mixed 24.8 22.0 - 26.0 mmol/L    Patient Temperature 37.0 degrees Celsius    FiO2 21 %   Renal function panel   Result Value Ref Range    Glucose 202 (H) 74 - 99 mg/dL    Sodium 132 (L) 136 - 145 mmol/L    Potassium 3.9 3.5 - 5.3 mmol/L    Chloride 95 (L) 98 - 107 mmol/L    Bicarbonate 25 21 - 32 mmol/L    Anion Gap 16 10 - 20 mmol/L    Urea Nitrogen 27 (H) 6 - 23 mg/dL    Creatinine 1.64 (H) 0.50 - 1.30 mg/dL    eGFR 55 (L) >60 mL/min/1.73m*2    Calcium 9.1 8.6 - 10.6 mg/dL    Phosphorus 4.2 2.5 - 4.9 mg/dL    Albumin 3.8 3.4 - 5.0 g/dL   Magnesium   Result Value Ref Range    Magnesium 2.07 1.60 - 2.40 mg/dL   Heparin Assay, UFH   Result Value Ref Range    Heparin Unfractionated 0.9 See Comment Below for Therapeutic Ranges IU/mL   CBC   Result Value Ref Range    WBC  3.8 (L) 4.4 - 11.3 x10*3/uL    nRBC 0.0 0.0 - 0.0 /100 WBCs    RBC 5.83 4.50 - 5.90 x10*6/uL    Hemoglobin 18.1 (H) 13.5 - 17.5 g/dL    Hematocrit 52.8 (H) 41.0 - 52.0 %    MCV 91 80 - 100 fL    MCH 31.0 26.0 - 34.0 pg    MCHC 34.3 32.0 - 36.0 g/dL    RDW 12.2 11.5 - 14.5 %    Platelets 121 (L) 150 - 450 x10*3/uL   Coagulation Screen   Result Value Ref Range    Protime 10.6 9.8 - 12.8 seconds    INR 0.9 0.9 - 1.1    aPTT 63 (H) 27 - 38 seconds   Heparin Assay, UFH   Result Value Ref Range    Heparin Unfractionated 0.3 See Comment Below for Therapeutic Ranges IU/mL   Blood Gas Mixed Venous   Result Value Ref Range    POCT pH, Mixed 7.31 (L) 7.33 - 7.43 pH    POCT pCO2, Mixed 51 41 - 51 mm Hg    POCT pO2, Mixed 41 35 - 45 mm Hg    POCT SO2, Mixed 66 45 - 75 %    POCT Oxy Hemoglobin, Mixed 65.2 45.0 - 75.0 %    POCT Base Excess, Mixed -1.2 -2.0 - 3.0 mmol/L    POCT HCO3 Calculated, Mixed 25.7 22.0 - 26.0 mmol/L    Patient Temperature 37.0 degrees Celsius    FiO2 21 %   CBC and Auto Differential   Result Value Ref Range    WBC 5.6 4.4 - 11.3 x10*3/uL    nRBC 0.0 0.0 - 0.0 /100 WBCs    RBC 4.34 (L) 4.50 - 5.90 x10*6/uL    Hemoglobin 13.6 13.5 - 17.5 g/dL    Hematocrit 39.9 (L) 41.0 - 52.0 %    MCV 92 80 - 100 fL    MCH 31.3 26.0 - 34.0 pg    MCHC 34.1 32.0 - 36.0 g/dL    RDW 12.5 11.5 - 14.5 %    Platelets 197 150 - 450 x10*3/uL    Neutrophils % 60.4 40.0 - 80.0 %    Immature Granulocytes %, Automated 0.5 0.0 - 0.9 %    Lymphocytes % 22.5 13.0 - 44.0 %    Monocytes % 13.2 2.0 - 10.0 %    Eosinophils % 3.0 0.0 - 6.0 %    Basophils % 0.4 0.0 - 2.0 %    Neutrophils Absolute 3.38 1.20 - 7.70 x10*3/uL    Immature Granulocytes Absolute, Automated 0.03 0.00 - 0.70 x10*3/uL    Lymphocytes Absolute 1.26 1.20 - 4.80 x10*3/uL    Monocytes Absolute 0.74 0.10 - 1.00 x10*3/uL    Eosinophils Absolute 0.17 0.00 - 0.70 x10*3/uL    Basophils Absolute 0.02 0.00 - 0.10 x10*3/uL   POCT GLUCOSE   Result Value Ref Range    POCT Glucose 239 (H)  74 - 99 mg/dL   Blood Gas Mixed Venous   Result Value Ref Range    POCT pH, Mixed 7.33 7.33 - 7.43 pH    POCT pCO2, Mixed 38 (L) 41 - 51 mm Hg    POCT pO2, Mixed 40 35 - 45 mm Hg    POCT SO2, Mixed 72 45 - 75 %    POCT Oxy Hemoglobin, Mixed 70.8 45.0 - 75.0 %    POCT Base Excess, Mixed -5.4 (L) -2.0 - 3.0 mmol/L    POCT HCO3 Calculated, Mixed 20.0 (L) 22.0 - 26.0 mmol/L    Patient Temperature 37.0 degrees Celsius    FiO2 21 %   POCT GLUCOSE   Result Value Ref Range    POCT Glucose 217 (H) 74 - 99 mg/dL      XR panorex  Narrative: Interpreted By:  Kyleigh Camarena and Booth Cameron   STUDY:  XR PANOREX; ;  7/22/2024 10:37 pm      INDICATION:  Signs/Symptoms:VAD workup.      COMPARISON:  None.      ACCESSION NUMBER(S):  [id number]      ORDERING CLINICIAN:  JULIO C JARAMILLO      FINDINGS:  There is no discrete radiolucency surrounding apices of dental roots  to suggest periapical abscess. There is no fracture of the mandible.  Soft tissues are unremarkable.      Impression: No periapical lucency to suggest periapical abscess      I personally reviewed the images/study and I agree with the findings  as stated. This study was interpreted at [facility], [place], [place].      MACRO:  None      Signed by: Kyleigh Camarena 7/23/2024 12:54 PM  Dictation workstation:   [id number]  Electrocardiogram, 12-lead  Normal sinus rhythm  Left axis deviation  Left ventricular hypertrophy with QRS widening  Abnormal ECG  When compared with ECG of 18-JUL-2024 14:57,  No significant change was found     Encounter Date: 07/18/24   Electrocardiogram, 12-lead   Result Value    Ventricular Rate 94    Atrial Rate 94    MS Interval 186    QRS Duration 136    QT Interval 420    QTC Calculation(Bazett) 525    P Axis 69    R Axis -68    T Axis 82    QRS Count 16    Q Onset 214    P Onset 121    P Offset 175    T Offset 424    QTC Fredericia 487    Narrative    Normal sinus rhythm  Left axis deviation  Left  ventricular hypertrophy with QRS widening  Abnormal ECG  When compared with ECG of 18-JUL-2024 14:57,  No significant change was found        Allergies  Shellfish containing products    Scheduled medications  atorvastatin, 40 mg, oral, Nightly  buPROPion XL, 150 mg, oral, Daily  dapagliflozin propanediol, 10 mg, oral, Daily  [Held by provider] furosemide, 40 mg, intravenous, BID  heparin, 4,000 Units, intravenous, Once  insulin glargine, 55 Units, subcutaneous, Nightly  insulin lispro, 0-10 Units, subcutaneous, TID  insulin lispro, 20 Units, subcutaneous, TID  perflutren protein A microsphere, 0.5 mL, intravenous, Once in imaging  polyethylene glycol, 17 g, oral, Daily  sacubitriL-valsartan, 1 tablet, oral, BID  sertraline, 100 mg, oral, Daily  spironolactone, 25 mg, oral, Daily  sulfamethoxazole-trimethoprim, 1 tablet, oral, q12h JAREK      Continuous medications  heparin, 0-4,000 Units/hr, Last Rate: 2,000 Units/hr (07/23/24 0618)  lactated Ringer's, 10 mL/hr, Last Rate: 10 mL/hr (07/22/24 1521)  milrinone, 0.25 mcg/kg/min, Last Rate: 0.25 mcg/kg/min (07/23/24 0522)      PRN medications  PRN medications: acetaminophen, dextrose, dextrose, glucagon, glucagon, heparin, oxygen, traZODone     Assessment/Plan    Arthur Carranza is a 37 yo M w PMH of nonischemic cardiomyopathy/HFrEF, T2DM, and cardioembolic stroke presenting after elective RHC for LVAD workup.    Advanced Care Planning  Patient and/or family consented to a voluntary Advanced Care Planning meeting.   Serious Illness Assessment and Counseling:  Life Limiting Disease: Severe exacerbation of HFrEF (EF 10%)    Disease Specific Information Provided/Prognosis Discussed: Patient's current clinical condition, including diagnosis, prognosis, and management plan were discussed.   Counseling provided on LVAD benefits/complications, including bleeding, infection, and possible need for dialysis in event of kidney dysfunction following LVAD    Goals/expectations of LVAD  implantation: being able to live longer and care meaningfully for his children.    Hemodialysis: Patient verbalized understanding that preexisting renal disease or renal injury during/after VAD implantation puts patients at risk for dialysis dependent renal failure.     ICH/stroke: Patient is aware of risk for bleeding or stroke.    LVAD infection/need for long term antibiotics: Patient is aware of the risk for infection of driveline, device, and blood. He is amenable to being treated with long term antibiotics if this were to occur    Mechanical ventilation: Patient would undergo short term ventilation.     Postoperative rehab plan: Patient states he has been informed about what his post op period would look like.     Understanding/Overall Impression: Patient expressing clear understanding of overall health status and severity of illness.     Goals/Hopes: Discussion ensued about patient's goals for their medical care going forward. Allowed patient time to talk about his/her current quality of life, disease course/progression, and symptom and treatment burden. Discussed care plan to continue with aggressive hospital care despite symptom and treatment burden versus choosing to transition to comfort based plan of care that focuses on symptom management and quality of life.     Fears/Worries/Concerns: patient worried about carrying around a backpack/batteries after he receives an LVAD. Also worried about the cost of parking in the patient garage.     Minimal Acceptable Quality of Life/Maximal [place] Tolerable for the Possibility of More Time: Counseling provided on the concept of MAO/Maximal [place]. Patient expressing that they would never want to be in a health state where they were unable to interact meaningfully with his family/children.    Advanced Directives:  Counseling provided on the importance of not crisis planning as disease burden progresses but to establish treatment limitations now so in the future  medical team will be clear on what patient feels is an acceptable quality of life for the patient and what treatment limitations' patient would like set into place based on that.       Surrogate Health Care Decision Maker: sister (Michell), transplant social work to help with completion of HPOA paperwork.    Code Status: Full Code    #Complex Medical Decision Making  #Goals of Care  #Advanced Care Planning  - Code status: full code  - Surrogate decision maker:  (Michell)  - Goals are survival and time based   - Plan for patient to complete Advanced Directives with transplant social work    #HFrEF  #LVAD workup  - will continue to discuss LVAD preparedness, risks/benefits    #Insomnia  - please start melatonin 3mg at 6PM  - please start melatonin 3mg at bedtime    #Anxiety  - c/w sertraline  Dr. Wayne taught patient several mindfulness exercises-  -4 min meditation- gratitude, healing, strengthening, accomplishments, celebration    #Psychosocial Support  - Music Therapy (likes jazz music)  - Spiritual Care Support  - Art Therapy  - Pet Therapy     Plan of Care discussed with: Updated MD Dr. Cara Toribio and bedside RN on goals of care decision, medication adjustments, and code status     Thank you for allowing us to participate in the care of this patient. Palliative will continue to follow as needed. Palliative medicine is available Monday-Friday, 8a-6p. Please contact team with any questions or concerns.  Team pager [phone] (weekdays)  Alison Smith MD  Internal Medicine PGY1

## 2024-07-23 NOTE — PROGRESS NOTES
HFICU Attending Note    Principal Problem:    Cardiogenic shock (Multi)  Active Problems:    Nonischemic cardiomyopathy (Multi)    Morbid obesity with BMI of 45.0-49.9, adult (Multi)    Cocaine use disorder, mild (Multi)    Acute decompensated heart failure (Multi)    38 y.o. father of 8 from [place], [place] with NICM/HFrEF (EF 5-10%), initially diagnosed 10/2022. Admitted low output HF/CS currently on milrinone with marginal hemodynamics. 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 Stage D HF syndrome and evaluation initiated for LVAD.     Wife at bedside and questions answered.     This critically ill patient continues to be at-risk for clinically significant deterioration / failure due to the above mentioned dysfunctional, unstable organ systems.  I have personally identified and managed all complex critical care issues to prevent aforementioned clinical deterioration.  Critical care time is spent at bedside and/or the immediate area and has included, but is not limited to, the review of diagnostic tests, labs, radiographs, serial assessments of hemodynamics, respiratory status, ventilatory management, and family updates.  Time spent in procedures and teaching are reported separately.    Critical care time: __35__ minutes     Objective    Arthur Carranza/[id number]  Admit Date: 7/18/2024  Hospital Length of Stay: 5   ICU Length of Stay: 4d 15h     MEDICATIONS  Infusions:  heparin, Last Rate: 2,000 Units/hr (07/23/24 0618)  lactated Ringer's, Last Rate: 10 mL/hr (07/22/24 1521)  milrinone, Last Rate: 0.25 mcg/kg/min (07/23/24 0522)      Scheduled:  atorvastatin, 40 mg, Nightly  buPROPion XL, 150 mg, Daily  dapagliflozin propanediol, 10 mg, Daily  [Held by provider] furosemide, 40 mg, BID  heparin, 4,000 Units,  Once  hydrALAZINE, 25 mg, TID  insulin glargine, 55 Units, Nightly  insulin lispro, 0-10 Units, TID  insulin lispro, 20 Units, TID  perflutren protein A microsphere, 0.5 mL, Once in imaging  polyethylene glycol, 17 g, Daily  sacubitriL-valsartan, 1 tablet, BID  sertraline, 100 mg, Daily  spironolactone, 25 mg, Daily  sulfamethoxazole-trimethoprim, 1 tablet, q12h JAREK      PRN:  acetaminophen, 650 mg, q6h PRN  dextrose, 12.5 g, q15 min PRN  dextrose, 25 g, q15 min PRN  glucagon, 1 mg, q15 min PRN  glucagon, 1 mg, q15 min PRN  heparin, 3,000-4,000 Units, q4h PRN  oxygen, , Continuous PRN - O2/gases  traZODone, 50 mg, Nightly PRN        Prior to Admission Meds:  Medications Prior to Admission   Medication Sig Dispense Refill Last Dose    atorvastatin (Lipitor) 40 mg tablet TAKE 1 TABLET BY MOUTH AT BEDTIME 30 tablet 3     blood-glucose meter,continuous (FreeStyle Nadir 3 Norman) [facility] [place] Use as instructed to test blood glucose throughout the day 1 each 0     blood-glucose sensor (FreeStyle Nadir 3 Sensor) device Use as directed to test blood glucose throughout the day 2 each 11     buPROPion XL (Wellbutrin XL) 150 mg 24 hr tablet Take 1 tablet (150 mg) by mouth once daily.       carvedilol (Coreg) 6.25 mg tablet Take 1 tablet (6.25 mg) by mouth 2 times daily (morning and late afternoon). 180 tablet 3     dapagliflozin propanediol (Farxiga) 10 mg Take 1 tablet (10 mg) by mouth once daily. 90 tablet 3     dulaglutide (Trulicity) 1.5 mg/0.5 mL pen injector injection Inject 1.5 mg under the skin 1 (one) time per week. 2 mL 1     insulin glargine (Lantus Solostar U-100 Insulin) 100 unit/mL (3 mL) pen Inject 55 Units under the skin once daily at bedtime. Take as directed per insulin instructions. (Patient taking differently: Inject 60 Units under the skin once daily at bedtime. Take as directed per insulin instructions.) 15 mL 0     insulin lispro (HumaLOG) 100 unit/mL injection Inject 16 Units under the skin 3 times a day with  "meals. Take as directed per insulin instructions. 15 mL 0     metFORMIN (Glucophage) 1,000 mg tablet Take 1 tablet (1,000 mg) by mouth 2 times a day.       pen needle, diabetic 31 gauge x 5/16\" needle Use to inject 1-4 times daily as directed. 100 each 11     rivaroxaban (Xarelto) 20 mg tablet Take 1 tablet (20 mg) by mouth once daily in the evening. Take with meals. Take with food. 90 tablet 3     sacubitriL-valsartan (Entresto) 49-51 mg tablet Take 1 tablet by mouth 2 times a day. 180 tablet 3     sertraline (Zoloft) 100 mg tablet Take 1 tablet (100 mg) by mouth once daily.       spironolactone (Aldactone) 25 mg tablet Take 0.5 tablets (12.5 mg) by mouth once daily. Do not start before February 24, 2024. 15 tablet 11     sulfamethoxazole-trimethoprim (Bactrim DS) 800-160 mg tablet Take 1 tablet by mouth every 12 hours for 10 days. 20 tablet 0     torsemide (Demadex) 20 mg tablet Take 1 tablet (20 mg) by mouth once daily. 30 tablet 11     traZODone (Desyrel) 50 mg tablet Take 1 tablet (50 mg) by mouth as needed at bedtime for sleep.       True Metrix Glucose Test Strip strip USE TO CHECK BLOOD SUGAR FOUR TIMES DAILY       Ultra Thin Lancets 30 gauge misc TEST BLOOD SUGAR FOUR TIMES DAILY AS DIRECTED          Invasive Hemodynamics:    Most Recent Range Past 24hrs   BP (Art)   No data recorded   MAP(Art)   No data recorded   RA/CVP   No data recorded   PA 19/5 PAP  Min: 16/8  Max: 53/6   PA(mean) 13 mmHg PAP (Mean)  Min: 9 mmHg  Max: 34 mmHg   PCWP 11 mmHg PCWP (mmHg)  Min: 11 mmHg  Max: 11 mmHg   CO 5 L/min CO (L/min)  Min: 4.7 L/min  Max: 5.7 L/min   CI 2 L/min/m2 CI (L/min/m2)  Min: 1.9 L/min/m2  Max: 2.2 L/min/m2   Mixed Venous 66 % SVO2 (%)  Min: 61 %  Max: 66 %   SVR  912 (dyne*sec)/cm5 SVR (dyne*sec)/cm5  Min: 912 (dyne*sec)/cm5  Max: 1463 (dyne*sec)/cm5   PVR 46 (dyne*sec)/cm5 PVR (dyne*sec)/cm5  Min: 46 (dyne*sec)/cm5  Max: 46 (dyne*sec)/cm5     MCS:   Heart Mate III:     Most Recent Range Past 24hrs   Flow  " " No data recorded   Speed   No data recorded   Power   No data recorded   PI   No data recorded     ECMO:     Most Recent Range Past 24hrs   Flow   No data recorded   Speed   No data recorded   Sweep   No data recorded     Impella:      Most Recent Range Past 24hrs   Performance Level   No data recorded   Flow (L/min)   No data recorded   Motor Current   No data recorded   Placement Signal    Placement OK could not be evaluated. This SmartLink does not work with rows of the type: Custom List   Purge (mmHg)   No data recorded   Purge rate (mL/hr)   No data recorded     VENT:    Most Recent Range Past 24hrs   Mode      FiO2 21 % FiO2 (%)  Min: 21 %   Min taken time: 07/23/24 0253  Max: 21 %   Max taken time: 07/23/24 0253   Rate   No data recorded   Vt    No data recorded   PEEP   No data recorded         7/23/2024     7:04 AM 7/23/2024     7:00 AM 7/23/2024     6:43 AM 7/23/2024     6:02 AM 7/23/2024     6:00 AM 7/23/2024     5:00 AM 7/23/2024     4:00 AM   Vitals   Systolic 107   91 89 86 92   Diastolic 62   57 58 52 56   Heart Rate 83   89 93 94 89   Temp  35.8 °C (96.4 °F)     35 °C (95 °F)   Resp 23   14 14 21 14   Weight (lb)   304.24       BMI   45.06 kg/m2       BSA (m2)   2.59 m2         Visit Vitals  /62   Pulse 83   Temp 35.8 °C (96.4 °F) (Temporal)   Resp 23   Ht 1.75 m (5' 8.9\")   Wt 138 kg (304 lb 3.8 oz)   SpO2 99%   BMI 45.06 kg/m²   Smoking Status Every Day   BSA 2.59 m²     Wt Readings from Last 5 Encounters:   07/23/24 138 kg (304 lb 3.8 oz)   07/18/24 137 kg (301 lb 9.4 oz)   07/13/24 136 kg (300 lb)   07/12/24 137 kg (301 lb)   07/02/24 137 kg (301 lb 9.6 oz)       Intake/Output Summary (Last 24 hours) at 7/23/2024 0821  Last data filed at 7/22/2024 2300  Gross per 24 hour   Intake 2797.76 ml   Output 2725 ml   Net 72.76 ml     CHEST: Unlabored, Diminished, Clear  CV:  Normal sinus rhythm  ABD:  Soft, Rounded Soft, Nontender Bowel sounds: All quadrants, Flatus: Yes  EXT:   RLE: Appropriate " for ethnicity,Cool  DP: Moderate  PT: Moderate  LLE: Appropriate for ethnicity,Cool  DP: Moderate  PT: Moderate  NEURO:   RASS: Drowsy  CAM: Negative  LOC: Alert  Cognition: Appropriate judgement  GCS: 15    DATA:  CMP:  Recent Labs     07/23/24  0009 07/22/24  1634 07/22/24  0525 07/21/24  0515 07/20/24  0601 07/19/24  0519 07/18/24  1558 07/13/24  1242 07/02/24  0910 05/21/24  0938 02/21/24  0958 02/21/24  0134 02/20/24  0528 02/20/24  0126 02/01/24  0544   * 134* 137 135* 136 136 135* 132* 131* 133*   < > 133*   < > 129* 130*   K 3.9 4.4 4.6 4.2 4.3 4.0 4.3 3.8 4.5 4.9   < > 3.6   < > 4.5 4.1   CL 95* 96* 97* 98 96* 96* 100 102 91* 99   < > 92*   < > 93* 96*   CO2 25 25 23 23 29 29 25 23 29 25   < > 32   < > 21 26   ANIONGAP 16 17 22* 18 15 15 14 11 16 14   < > 13   < > 20 12   BUN 27* 26* 24* 20 21 21 20 15 16 24*   < > 27*   < > 25* 14   CREATININE 1.64* 1.75* 1.41* 1.57* 1.39* 1.31* 1.16 1.01 1.39* 1.43*   < > 1.78*   < > 1.47* 1.05   EGFR 55* 51* 66 58* 67 72 83 >90 67 65   < > 50*   < > 63 >90   MG 2.07 2.23 2.34 2.29 2.48* 1.98 1.90  --   --   --   --  2.51*  --  1.59* 1.75    < > = values in this interval not displayed.     Recent Labs     07/23/24  0009 07/22/24  1634 07/22/24  0525 07/21/24  0515 07/20/24  0601 07/19/24  0519 07/18/24  1558 07/02/24  0910 05/21/24  0938 02/21/24  0134 02/20/24  0528 02/01/24  0544 01/31/24 2014 11/29/21  0443 11/07/20  1338   ALBUMIN 3.8 4.0 3.6 3.7 4.0 3.7 3.8 4.4 4.1 4.2 4.5 3.9 4.0   < > 4.6   ALT  --  16  --   --   --   --  17 33 21 15 15 12 13   < > 33   AST  --  25  --   --   --   --  19 21 24 18 17 16 18   < > 34   BILITOT  --  0.5  --   --   --   --  0.5 0.8 0.6 1.1 0.9 0.3 0.3   < > 0.7   LIPASE  --   --   --   --   --   --   --   --   --   --   --   --   --   --  29    < > = values in this interval not displayed.     CBC:  Recent Labs     07/23/24  0528 07/23/24  0024 07/22/24  1634 07/21/24  2340 07/20/24  0601 07/18/24  1558 07/13/24  1242  "07/02/24  0910   WBC 5.6 3.8* 5.9 7.3 6.4  6.4 6.2 6.3 4.5   HGB 13.6 18.1* 14.3 13.2* 14.9  14.9 13.6 12.3* 14.8   HCT 39.9* 52.8* 41.0 39.4* 45.0  45.0 42.3 37.1* 43.4    121* 193 196 232  232 241 206 242   MCV 92 91 91 93 95  95 98 96 94     COAG:   Recent Labs     07/23/24  0216 07/23/24  0009 07/22/24  0525 07/21/24  2039 07/21/24  1713 07/18/24  1818 07/18/24  1558 07/02/24  0910 12/02/23  2341 12/28/22  1755 12/28/22  1533 10/20/22  1211 06/15/22  0756   INR 0.9  --   --   --   --   --  1.1 1.0 1.1 1.0 1.0 1.0 1.0   HAUF 0.3 0.9 0.3 0.3 0.4   < >  --   --   --   --   --   --   --    DDIMERVTE  --   --   --   --   --   --   --   --   --   --   --  696*  --     < > = values in this interval not displayed.     ABO:   Recent Labs     07/22/24 1634   ABO O     HEME/ENDO:   Recent Labs     07/22/24  1634 07/18/24  1558 05/21/24  0938 02/01/24  0544 12/12/23  1112 04/20/23  1109 03/14/23  1056   FERRITIN  --  234 274  --   --   --  394*   IRONSAT  --  28 24*  --   --   --  28   TSH 1.73 2.29 3.98  --   --   --  3.62   HGBA1C  --  13.3* 13.7* 15.3* 10.8*   < >  --     < > = values in this interval not displayed.     CARDIAC:   Recent Labs     07/22/24  1634 07/18/24  1558 07/02/24  0910 05/21/24  0938 02/21/24  0958 02/20/24  0126 02/01/24  0154 01/31/24  2014 12/03/23  0823 12/03/23  0128 12/02/23  2342 12/02/23  2341 03/14/23  1056     --   --   --   --   --   --   --   --   --   --   --   --    TROPHS  --  59*  --   --  67* 65* 177* 99* 55* 54* 40*  --   --    * 127* 173* 195*  --  259*  --  314*  --   --   --  375* 68     Recent Labs     07/23/24  0528 07/19/24  0625 07/19/24  0519 07/18/24  2357 07/18/24  1725   LACMX  --   --  0.7 1.3 0.7   SO2MV 66   < > 55 59 59  59    < > = values in this interval not displayed.     No results for input(s): \"TACROLIMUS\", \"SIROLIMUS\", \"CYCLOSPORINE\" in the last 55917 hours.  Recent Labs     12/12/23  1112 04/20/23  1109 12/28/22  1937 " 10/21/22  0528   CHOL 125 122 162 166   LDLF  --  65 - 85   LDLCALC 50  --   --   --    HDL 26.6 31.4* 32.4* 40.0   TRIG 241* 128 401* 207*     MICRO:   Recent Labs     02/01/24  0000 06/15/22  0756 12/30/21  1241   CRP 0.50 6.52* 0.49     Susceptibility data from last 90 days.  Collected Specimen Info Organism   07/13/24 Tissue/Biopsy from Skin/Superficial Abscess Mixed Anaerobic Bacteria     Mixed Gram-Positive and Gram-Negative Bacteria       LDA:  Introducer Internal jugular Right (Active)   Earliest Known Present: 07/18/24   Location: Internal jugular  Orientation: Right  Placement Verified: Transduced waveform   Number of days: 5       Pulmonary Artery Catheter Internal jugular (Active)   Placement Date/Time: 07/18/24 1338   Hand Hygiene Performed Prior to CVC Insertion: Yes  Site Prep: Chlorhexidine   Site Prep Agent has Completely Dried Before Insertion: Yes  All 5 Sterile Barriers Used (Gloves, Gown, Cap, Mask, Large Sterile Drape):...   Number of days: 4     NUTRITION: Adult diet Cardiac, Carb Controlled; 75 gram carb/meal, 45 gram Carb evening snack; 1500 mL fluid; 70 gm fat; 2 - 3 grams Sodium  EMERGENCY CONTACT: Extended Emergency Contact Information  Primary Emergency Contact: FREDDY LEWIS  Address: [address] [place] [address] [address]           [place] [id number] [facility]  Home Phone: [phone]  Mobile Phone: [phone]  Relation: Spouse  Preferred language: English   needed? No  CODE STATUS: Full Code  DISPO: Discharge Planning  Living Arrangements: Spouse/significant other  Support Systems: Spouse/significant other  Assistance Needed: none  Type of Residence: Private residence  Do you have animals or pets at home?: No  Who is requesting discharge planning?: Provider  Home or Post Acute Services: None  Expected Discharge Disposition: Home or Self Care  Does the patient need discharge transport arranged?: No  FOLLOWUP:   Future Appointments   Date Time Provider Department Center    7/24/2024  2:00 PM [facility] [id number] PFT RM 2 [id number] Academic   7/29/2024  8:00 AM CARDIAC REHAB [facility] [id number] CARDREHB ROOM [id number] Academic   7/31/2024  8:00 AM CARDIAC REHAB [facility] [id number] CARDREHB ROOM [id number] Academic   8/2/2024  8:00 AM CARDIAC REHAB [facility] [id number] CARDREHB ROOM [id number] Academic   8/5/2024  8:00 AM CARDIAC REHAB [facility] [id number] CARDREHB ROOM [id number] Academic   8/7/2024  8:00 AM CARDIAC REHAB [facility] [id number] CARDREHB ROOM [id number] Academic   8/9/2024  8:00 AM CARDIAC REHAB [facility] [id number] CARDREHB ROOM [id number] Academic   8/12/2024  8:00 AM CARDIAC REHAB [facility] [id number] CARDREHB ROOM [id number] Academic   8/14/2024  8:00 AM CARDIAC REHAB [facility] [id number] CARDREHB ROOM [id number] Academic   8/14/2024 11:00 AM PHARMACY [facility] [id number] Academic   8/16/2024  8:00 AM CARDIAC REHAB [facility] [id number] CARDREHB ROOM [id number] Academic   8/19/2024  8:00 AM CARDIAC REHAB [facility] [id number] CARDREHB ROOM [id number] Academic   8/20/2024  8:30 AM Delroy Jurado MD [id number] Academic   8/21/2024  8:00 AM CARDIAC REHAB [facility] [id number] CARDREHB ROOM [id number] Academic   8/23/2024  8:00 AM CARDIAC REHAB [facility] [id number] CARDREHB ROOM [id number] Academic   8/26/2024  8:00 AM CARDIAC REHAB [facility] [id number] CARDREHB ROOM [id number] Academic   8/26/2024 11:40 AM Christopher D'Amico, DO [id number] [facility]   8/28/2024  8:00 AM CARDIAC REHAB [facility] [id number] CARDREHB ROOM [id number] Academic   8/30/2024  8:00 AM CARDIAC REHAB [facility] [id number] CARDREHB ROOM [id number] Academic   9/4/2024  8:00 AM CARDIAC REHAB [facility] [id number] CARDREHB ROOM [id number] [facility]   9/6/2024  8:00 AM CARD[facility]AC REHAB [facility] [id number] [facility] [id number] Academic   9/17/2024  7:45 PM SLEEP LAB [facility] ROOM [address] [place] [address]   10/2/2024  8:00 AM Brittney Stoll MD [id number] Academic

## 2024-07-23 NOTE — CONSULTS
"Nutrition Diet Education:   Nutrition Assessment    Reason for Assessment: Provider consult order (Diet education)    Patient is a 38 y.o. male transferred from [facility] after his elective RHC showed high right and left sided filling pressures and low CO/CI concerning for low output state/cardiogenic shock     PMH: NICM/HFrEF (EF 5-10% on echo fev 2024), initially diagnosed 10/2022, HTN, HLD, hypertriglyceridemia, Type II DM (poorly controlled A1c 13.7%, on insulin), severe obesity (BMI 44), cardioembolic stroke (12/2022) with limited residual symptoms, intermittent tobacco abuse,cocaine use, alcohol use, history of non-compliance with medication and doctor appointments       Nutrition History:  Food and Nutrient History: Pt reported PTA he did not have a stable place to store groceries or eat - so mainly was eating on the rods. Choosing [employer], Chinese, etc. He said he tends to overeat and not \"seem to know when to stop\". Explained he drinks \"a lot\" of juice and soda. He mentioned that he wants to start making lifestyle changes - reported he bought a  and started to make smoothies prior to coming to hospital. Also explained he is going to receive [employer] when he leaves, so he is hoping to \"start over\". He emphasized that he finds drinking things is more appealing to him that eating because he feels as though he will overeat his food and he wants to stop. Pt did mention that he has been educated by a dietitian before - noted he has materials in a binder and needs to find the time to look through them again once he has his living situation figured out.       Anthropometrics:  Height: 175 cm (5' 8.9\")   Weight: 138 kg (304 lb 3.8 oz)   BMI (Calculated): 45.06      Weight History:   Wt Readings from Last 10 Encounters:   07/23/24 138 kg (304 lb 3.8 oz)   07/18/24 137 kg (301 lb 9.4 oz)   07/13/24 136 kg (300 lb)   07/12/24 137 kg (301 lb)   07/02/24 137 kg (301 lb 9.6 oz)   06/10/24 138 kg " (305 lb)   06/06/24 136 kg (300 lb 7.8 oz)   05/28/24 138 kg (304 lb)   05/28/24 136 kg (300 lb)   05/21/24 134 kg (296 lb)     Weight Change %:  Significant Weight Loss: No    I/O:   Last BM Date: 07/23/24; Stool Appearance: Loose, Soft (07/22/24 1000)    Nutrition Specific Labs:  HgA1c: 13.3      Dietary Orders (From admission, onward)       Start     Ordered    07/22/24 0811  Adult diet Cardiac, Carb Controlled; 75 gram carb/meal, 45 gram Carb evening snack; 1500 mL fluid; 70 gm fat; 2 - 3 grams Sodium  Diet effective now        Question Answer Comment   Diet type Cardiac    Diet type Carb Controlled    Carb diet selection: 75 gram carb/meal, 45 gram Carb evening snack    Dietary fluid restriction / 24h: 1500 mL fluid    Fat restriction: 70 gm fat    Sodium restriction: 2 - 3 grams Sodium        07/22/24 0811         Nutrition Diagnosis   Malnutrition Diagnosis  Patient has Malnutrition Diagnosis:  (No concern for malnutrition at this time)    Nutrition Diagnosis  Nutrition Diagnosis 1: Inconsistent carbohydrate intake  Related to (1): limited access to consistent/stable housing for grocery storage resulting in eating fast food and high CHO foods/liquids more often  As Evidenced by (1): HgA1c 13.3       Nutrition Interventions/Recommendations         Nutrition Prescription:  Recommend to continue carb controlled diet - note: if appetite decreases, can liberalize to higher CHO amount and be within needs (90 CHO at meals and 60 CHO at snack).   Recommend outpatient RDN referral for further diet education once discharged    Nutrition Education:   Education Documentation  Diet Education, taught by Nadia Gomez RDN, LD at 7/23/2024  4:36 PM.  Learner: Patient  Readiness: Eager  Method: Explanation, Teach-back  Response: Verbalizes Understanding, Needs Reinforcement  Comment: Pt goal is smoothies - discussed appropriate recipes/portion sizes (leverage low CHO recipes online). Reading food labels to not exceed 2gm  Na; encouraged outpatient RDN; consistent/appropriate CHO intake; cooler for traveling/reducing eating out    Education Comments  No comments found.            Nutrition Monitoring and Evaluation      Time Spent (min): 45 minutes

## 2024-07-23 NOTE — PROGRESS NOTES
[facility]  HFICU PROGRESS NOTE    Arthur Carranza/[id number]    Admit Date: 7/18/2024  Hospital Length of Stay: 5   ICU Length of Stay: 4d 21h   Primary Service: advanced heart failure   Primary HF Cardiologist: : Dr. Jurado   Referring: Dr. Jurado     INTERVAL EVENTS / PERTINENT ROS:   Started on Bipap last night as MAPs were low to increase the CO/CI. Also reduced hydralazine to 25 and received increased dose of Entresto 97/103. This morning, CO/CI 6.48/2.6. MAP slightly soft so held hydralazine in the morning and gave Entresto 49/51 but MAP again high in the afternoon so dose increased back to 97/103 for the evening.     He denies SOB, CP, abd pain or any GI/ symptoms this morning. His CVP and Wedge are normal so holding lasix today. Also his Cr was uptrending (1.8 from 1.4 in the morning) yesterday evening with lasix, UOP >4L after lasix 40 mg IV in the morning yesterday.     Plan:  - Lasix holiday today, monitor UOP off lasix   - Continue milrinone 0.25   - hemodynamics Q6 hrs   - LVAD eval undergoing     MEDICATIONS  Infusions:  heparin, Last Rate: 2,000 Units/hr (07/23/24 0618)  lactated Ringer's, Last Rate: 10 mL/hr (07/22/24 1521)  milrinone, Last Rate: 0.25 mcg/kg/min (07/23/24 0522)      Scheduled:  atorvastatin, 40 mg, Nightly  buPROPion XL, 150 mg, Daily  dapagliflozin propanediol, 10 mg, Daily  [Held by provider] furosemide, 40 mg, BID  heparin, 4,000 Units, Once  insulin glargine, 55 Units, Nightly  insulin lispro, 0-10 Units, TID  insulin lispro, 20 Units, TID  perflutren protein A microsphere, 0.5 mL, Once in imaging  polyethylene glycol, 17 g, Daily  sacubitriL-valsartan, 1 tablet, BID  [Held by provider] sacubitriL-valsartan, 1 tablet, BID  sertraline, 100 mg, Daily  spironolactone, 25 mg, Daily  sulfamethoxazole-trimethoprim, 1 tablet, q12h [facility]      PRN:  acetaminophen, 650 mg, q6h PRN  dextrose, 12.5 g, q15 min PRN  dextrose, 25 g, q15 min PRN  glucagon, 1  "mg, q15 min PRN  glucagon, 1 mg, q15 min PRN  heparin, 3,000-4,000 Units, q4h PRN  oxygen, , Continuous PRN - O2/gases  traZODone, 50 mg, Nightly PRN        PHYSICAL EXAM:   Visit Vitals  /71   Pulse 79   Temp 35.8 °C (96.4 °F)   Resp 19   Ht 1.75 m (5' 8.9\")   Wt 138 kg (304 lb 3.8 oz)   SpO2 98%   BMI 45.06 kg/m²   Smoking Status Every Day   BSA 2.59 m²       Physical Exam  Constitutional:       General: He is not in acute distress.     Appearance: He is obese.   Eyes:      Extraocular Movements: Extraocular movements intact.      Conjunctiva/sclera: Conjunctivae normal.   Cardiovascular:      Rate and Rhythm: Normal rate and regular rhythm.   Pulmonary:      Effort: Pulmonary effort is normal. No respiratory distress.   Abdominal:      Palpations: Abdomen is soft.      Tenderness: There is no abdominal tenderness.   Musculoskeletal:      Right lower leg: No edema.      Left lower leg: No edema.   Neurological:      General: No focal deficit present.      Mental Status: He is alert and oriented to person, place, and time. Mental status is at baseline.   Psychiatric:         Mood and Affect: Mood normal.         Behavior: Behavior normal.         DATA:  CMP:  Results from last 7 days   Lab Units 07/23/24  0009 07/22/24  1634 07/22/24  0525 07/21/24  0515 07/20/24  0601 07/19/24  0519 07/18/24  1558   SODIUM mmol/L 132* 134* 137 135* 136 136 135*   POTASSIUM mmol/L 3.9 4.4 4.6 4.2 4.3 4.0 4.3   CHLORIDE mmol/L 95* 96* 97* 98 96* 96* 100   CO2 mmol/L 25 25 23 23 29 29 25   ANION GAP mmol/L 16 17 22* 18 15 15 14   BUN mg/dL 27* 26* 24* 20 21 21 20   CREATININE mg/dL 1.64* 1.75* 1.41* 1.57* 1.39* 1.31* 1.16   EGFR mL/min/1.73m*2 55* 51* 66 58* 67 72 83   MAGNESIUM mg/dL 2.07 2.23 2.34 2.29 2.48* 1.98 1.90   ALBUMIN g/dL 3.8 4.0 3.6 3.7 4.0 3.7 3.8   ALT U/L  --  16  --   --   --   --  17   AST U/L  --  25  --   --   --   --  19   BILIRUBIN TOTAL mg/dL  --  0.5  --   --   --   --  0.5     CBC:  Results from last 7 " days   Lab Units 07/23/24  0528 07/23/24  0024 07/22/24  1634 07/21/24  2340 07/20/24  0601 07/18/24  1558   WBC AUTO x10*3/uL 5.6 3.8* 5.9 7.3 6.4  6.4 6.2   HEMOGLOBIN g/dL 13.6 18.1* 14.3 13.2* 14.9  14.9 13.6   HEMATOCRIT % 39.9* 52.8* 41.0 39.4* 45.0  45.0 42.3   PLATELETS AUTO x10*3/uL 197 121* 193 196 232  232 241   MCV fL 92 91 91 93 95  95 98     COAG:   Results from last 7 days   Lab Units 07/23/24  0216 07/18/24  1558   INR  0.9 1.1     ABO:   ABO TYPE   Date Value Ref Range Status   07/22/2024 O  Final     HEME/ENDO:  Results from last 7 days   Lab Units 07/22/24  1634 07/18/24  1558   FERRITIN ng/mL  --  234   IRON SATURATION %  --  28   TSH mIU/L 1.73 2.29   HEMOGLOBIN A1C %  --  13.3*      CARDIAC:   Results from last 7 days   Lab Units 07/22/24  1634 07/18/24  1558   LD U/L 208  --    TROPHSCMC ng/L  --  59*   BNP pg/mL 107* 127*       ASSESSMENT AND PLAN:     39 YO Male with hx of NICM/HFrEF (EF 5-10% on echo fev 2024), initially diagnosed 10/2022, HTN, HLD, hypertriglyceridemia, Type II DM (poorly controlled A1c 13.7%, on insulin), severe obesity (BMI 44), 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 Started on IV lasix and GDMT on admission but CO/CI persistently low so started milrinone on 07/19 0.125 > 0.25 later that day for improving CO/CI. Currently undergoing LVAD evaluation, signed consent 07/22. However, significant psychosocial barriers at play at this time (court dates, unstable housing, polysubstance use etc).      Neuro/ Psych:  # Hx of cardioembolic stroke: (12/2022)  - no residual symptoms, AO x 3 on arrival  - Takes xarelto 20 mg at home   - holding xarelto  inpatient, will start on heparin gtt  - Statin: cont lipitor 40 mg  - Serial neuro assessments   - PO Tylenol PRN for pain  - PT/OT Consult, OOB to chair  - CAM ICU score every shift     # Anxiety/depression:  - continue home zoloft 100 mg daily  - mood normal on admission  - - Sleep/wake cycle normalization     # Physical Status  - Morbid Obesity  - no Age-related debility/bed confined     #Substance abuse  -Alcohol abuse: reports occasional binge drinking but denies withdrawal   -Tobacco use/Nicotine Dependence: occasional cigarette smoker, previous hx of heavy alcohol use      Cardiovascular:  # NICM HFrEF (EF 5-10%, TTE 02/2024)- combined systolic and diastolic dysnfuction  # Acute decompensated heart failure  # Low cardiac output state +/- cardiogenic shock   - diagnosed since 2022  - [facility] in 2022 with no evidence of angiographically obstructive CAD  - TTE 07/2024:  CONCLUSIONS:   1. Poorly visualized anatomical structures due to suboptimal image quality.   2. Left ventricular ejection fraction is severely decreased, by visual estimate at 10%.   3. Spectral Doppler shows a pseudonormal pattern of left ventricular diastolic filling.   4. Left ventricular cavity size is severely dilated (LVIDd 7.4 cm)   5. There is normal right ventricular global systolic function.    - He is presenting with preogressively worsening Dyspnea at rest and exertion, orthopnea, PND and generalized weakness over months.   - Elective RHC 07/18 showed elevated R and L sided filing pressure and low cardiac output/index: RA 24, RVSP 63, PA 64/40 (48), W 41, /90 (101) mmHg. 3. CO/CI [Evelia]: 4.4 / 1.7. CO/CI [TD]: 3.47 / 1.4. 5. SVR: 1775 cgs, PVR: 2.0 COLON, /74.  - Opening SGC #: /72 (82), PAP 56/43(47), CVP 8, SVR 1357, CI 1.76  - admit weight: 140 Kg  > 138 kg (07/23)  - admit BNP: 127 (low as morbidly obese) >107 (07/23)  GDMT: - continue entresto. Dose increased to 97/103 mg BID on 07/23 for elevated MAP. continue  spironolactone (dose increased to 25 mg on 07/22), farxiga 10 mg. Holding Coreg in setting of low output state.   - Diuretics: ordered lasix 40 mg IV Q12 hrs (takes torsemide 20 mg daily at home)- Currently on diuretic holiday for MICHAELA after aggressive diuresis   - Inotropes/ Pressors: continue milrinone 0.25 mcg/kg/min     - Weight trend in last few days with diruresis:  Date/Time Weight   07/23/24 0643 138 kg (304 lb 3.8 oz)   07/22/24 1115 136 kg (299 lb 13.2 oz)   07/21/24 1251 136 kg (299 lb 13.2 oz)   07/21/24 0630 136 kg (299 lb 13.2 oz)   07/20/24 1200 137 kg (300 lb 14.9 oz)   07/19/24 0550 137 kg (302 lb 0.5 oz)   07/18/24 1705 140 kg (308 lb 10.3 oz)     Hemodynamics:    Most Recent Range Past 24hrs   BP (Art)   No data recorded   MAP(Art)   No data recorded   RA/CVP  4 mmHg No data recorded   PA 31/14 PAP  Min: 16/8  Max: 53/6   PA(mean) 23 mmHg PAP (Mean)  Min: 9 mmHg  Max: 38 mmHg   PCWP 11 mmHg PCWP (mmHg)  Min: 11 mmHg  Max: 11 mmHg   CO 5 L/min CO (L/min)  Min: 4.7 L/min  Max: 5.7 L/min   CI 2 L/min/m2 CI (L/min/m2)  Min: 1.9 L/min/m2  Max: 2.2 L/min/m2   Mixed Venous 72 % SVO2 (%)  Min: 61 %  Max: 72 %   SVR  912 (dyne*sec)/cm5 SVR (dyne*sec)/cm5  Min: 912 (dyne*sec)/cm5  Max: 1463 (dyne*sec)/cm5   PVR 46 (dyne*sec)/cm5 PVR (dyne*sec)/cm5  Min: 46 (dyne*sec)/cm5  Max: 46 (dyne*sec)/cm5   TPG  23 mmHg - 11 mmHg = 12 mmHg       Intake/Output Summary (Last 24 hours) at 7/23/2024 1404  Last data filed at 7/23/2024 1300  Gross per 24 hour   Intake 2007.33 ml   Output 1425 ml   Net 582.33 ml   - Daily standing weights, 2gm sodium diet, 2L fluid restriction, strict I&Os  - Advance therapies:   patient consented for LVAD evaluation 07/22, will start the process.      Pulmonary:   -No Hx of known pulmonary disease/Pulmonary Hypertension  -History of smoking: prn nicotine patches    -Monitor and maintain SpO2 > 92%  -CXR (07/18) with pulmonary vascular congestion, bilateral pulm edema   -CXR 07/21: ?R basilar  edema/infiltrate   -diuresis plan as above     GI:  - no known GI issues   - Bowel regimen: prn Miralax + senna      :  # No known renal issues   -Baseline BUN/Cr 15/1.1  -Admit BUN/Cr 20/1.1  -s/p aggressive diuresis with IV lasix (net negative ~ 9-10 L since admission), Cr jumped to 1.8 on 07/22 evening, currently on diuretic holiday  -I/Os  -avoid hypotension and nephrotoxic agents     Heme:  #no known hematology issues   - Labs: CBC, TIBC, ferritin, serum Fe, folate, B12 - all normal    - CTM      Endo:  #DM type 2 (uncontrolled)   - hgbA1c : 13.3 (07 /2024)  - home regimen: Insulin lantus 55 U qpm, lispro 18 U TID means, Trulicity 1.5 q weekly, metformin 1000 mg BID  - Inpatient regimen: Lantus 55 qpm, Lispro 20 U TID meals, sliding scale # 2  - holding metformin and Trulicity   - Endocrine following    - BGM AC & HS  - Consistent carbs diet     #Thyroid  -TSH:2.29         ID:  - Recent abdominal wall abscess  - Recently seen in ER on 07/13 for abd wall abscess 2.3 cm x 3.5 cm x 2 cm s/p I& D and started on bactrim DS 1 tab BID x 10 days (end date 07/23)  -afebrile, nontoxic   -no s/s infx  -trend temps q4h    PHYSICAL AND OCCUPATIONAL THERAPY:    LINES:  PIV  [place]-thuy (07/18)    DVT: heparin gtt  CENTRAL LINE BUNDLE: [place] thuy  GLYCEMIC CONTROL: insulin + sliding scale ordered  BOWEL CARE: Miralax prn ordered  NUTRITION: Adult diet Cardiac; 70 gm fat; 2 - 3 grams Sodium  NUTRITION: Adult diet Cardiac, Carb Controlled; 75 gram carb/meal, 45 gram Carb evening snack; 1500 mL fluid; 70 gm fat; 2 - 3 grams Sodium      EMERGENCY CONTACT: Extended Emergency Contact Information  Primary Emergency Contact: FREDDY LEWIS  Address: [address] [place] [address] [address]           [place] [address] [facility]  Home Phone: [phone]  Mobile Phone: [phone]  Relation: Spouse  Preferred language: English   needed? No  FAMILY UPDATE: wife present at bedside, updated 07/23  CODE STATUS: Full Code  DISPO:  pending clinical status    Patient seen and assessed with MD Cara Ace MD, MS  Heart Failure Fellow,  [facility]

## 2024-07-23 NOTE — PROGRESS NOTES
"  [facility]  HFICU PROGRESS NOTE    Arthur Carranza/[id number]    Admit Date: 7/18/2024  Hospital Length of Stay: 5   ICU Length of Stay: 5d 1h   Primary Service: Advanced heart failure   Primary HF Cardiologist: : Dr. Jurado   Referring: Dr. Jurado     INTERVAL EVENTS / PERTINENT ROS:   No events overnight. He denies SOB, CP, abd pain or any GI/ symptoms. His questions about progression to advanced heart failure and future options were addressed in the rounds. Wife present at bedside.     Plan:  - increasing aldactone to 25 mg for better BP and afterload reduction.   - Continue diuresis with Lasix 40 mg IV q 12 hours  - Continue milrinone 0.25   - hemodynamics Q6 hrs   - Starting eval for LVAD     MEDICATIONS  Infusions:  heparin, Last Rate: 2,000 Units/hr (07/23/24 0618)  lactated Ringer's, Last Rate: 10 mL/hr (07/22/24 1521)  milrinone, Last Rate: 0.25 mcg/kg/min (07/23/24 1718)      Scheduled:  atorvastatin, 40 mg, Nightly  buPROPion XL, 150 mg, Daily  dapagliflozin propanediol, 10 mg, Daily  [Held by provider] furosemide, 40 mg, BID  heparin, 4,000 Units, Once  insulin glargine, 55 Units, Nightly  insulin lispro, 0-10 Units, TID  insulin lispro, 20 Units, TID  perflutren protein A microsphere, 0.5 mL, Once in imaging  polyethylene glycol, 17 g, Daily  sacubitriL-valsartan, 1 tablet, BID  sertraline, 100 mg, Daily  spironolactone, 25 mg, Daily  sulfamethoxazole-trimethoprim, 1 tablet, q12h JAREK      PRN:  acetaminophen, 650 mg, q6h PRN  dextrose, 12.5 g, q15 min PRN  dextrose, 25 g, q15 min PRN  glucagon, 1 mg, q15 min PRN  glucagon, 1 mg, q15 min PRN  heparin, 3,000-4,000 Units, q4h PRN  melatonin, 3 mg, Nightly PRN  oxygen, , Continuous PRN - O2/gases  traZODone, 50 mg, Nightly PRN        PHYSICAL EXAM:   Visit Vitals  /68   Pulse 87   Temp 36.2 °C (97.2 °F) (Temporal)   Resp 14   Ht 1.75 m (5' 8.9\")   Wt 138 kg (304 lb 3.8 oz)   SpO2 97%   BMI 45.06 kg/m²   Smoking " Status Every Day   BSA 2.59 m²       Physical Exam  Constitutional:       General: He is not in acute distress.     Appearance: He is obese.   Eyes:      Extraocular Movements: Extraocular movements intact.      Conjunctiva/sclera: Conjunctivae normal.   Cardiovascular:      Rate and Rhythm: Normal rate and regular rhythm.   Pulmonary:      Effort: Pulmonary effort is normal. No respiratory distress.   Abdominal:      Palpations: Abdomen is soft.      Tenderness: There is no abdominal tenderness.   Musculoskeletal:      Right lower leg: No edema.      Left lower leg: No edema.   Neurological:      General: No focal deficit present.      Mental Status: He is alert and oriented to person, place, and time. Mental status is at baseline.   Psychiatric:         Mood and Affect: Mood normal.         Behavior: Behavior normal.         DATA:  CMP:  Results from last 7 days   Lab Units 07/23/24  0009 07/22/24  1634 07/22/24  0525 07/21/24  0515 07/20/24  0601 07/19/24  0519 07/18/24  1558   SODIUM mmol/L 132* 134* 137 135* 136 136 135*   POTASSIUM mmol/L 3.9 4.4 4.6 4.2 4.3 4.0 4.3   CHLORIDE mmol/L 95* 96* 97* 98 96* 96* 100   CO2 mmol/L 25 25 23 23 29 29 25   ANION GAP mmol/L 16 17 22* 18 15 15 14   BUN mg/dL 27* 26* 24* 20 21 21 20   CREATININE mg/dL 1.64* 1.75* 1.41* 1.57* 1.39* 1.31* 1.16   EGFR mL/min/1.73m*2 55* 51* 66 58* 67 72 83   MAGNESIUM mg/dL 2.07 2.23 2.34 2.29 2.48* 1.98 1.90   ALBUMIN g/dL 3.8 4.0 3.6 3.7 4.0 3.7 3.8   ALT U/L  --  16  --   --   --   --  17   AST U/L  --  25  --   --   --   --  19   BILIRUBIN TOTAL mg/dL  --  0.5  --   --   --   --  0.5     CBC:  Results from last 7 days   Lab Units 07/23/24  0528 07/23/24  0024 07/22/24  1634 07/21/24  2340 07/20/24  0601 07/18/24  1558   WBC AUTO x10*3/uL 5.6 3.8* 5.9 7.3 6.4  6.4 6.2   HEMOGLOBIN g/dL 13.6 18.1* 14.3 13.2* 14.9  14.9 13.6   HEMATOCRIT % 39.9* 52.8* 41.0 39.4* 45.0  45.0 42.3   PLATELETS AUTO x10*3/uL 197 121* 193 196 232  232 241   MCV  fL 92 91 91 93 95  95 98     COAG:   Results from last 7 days   Lab Units 07/23/24  0216 07/18/24  1558   INR  0.9 1.1     ABO:   ABO TYPE   Date Value Ref Range Status   07/22/2024 O  Final     HEME/ENDO:  Results from last 7 days   Lab Units 07/22/24  1634 07/18/24  1558   FERRITIN ng/mL  --  234   IRON SATURATION %  --  28   TSH mIU/L 1.73 2.29   HEMOGLOBIN A1C %  --  13.3*      CARDIAC:   Results from last 7 days   Lab Units 07/22/24  1634 07/18/24  1558   LD U/L 208  --    TROPHSCMC ng/L  --  59*   BNP pg/mL 107* 127*       ASSESSMENT AND PLAN:       37 YO Male with hx of NICM/HFrEF (EF 5-10% on echo fev 2024), initially diagnosed 10/2022, HTN, HLD, hypertriglyceridemia, Type II DM (poorly controlled A1c 13.7%, on insulin), severe obesity (BMI 44), cardioembolic stroke (12/2022) with limited residual symptoms, intermittent tobacco abuse,cocaine use, alcohol use, history of non-compliance with medication and doctor appointments, who is transferred from [facility] after his elective RHC showed high right and left sided filling pressures and low CO/CI concerning for low output state/cardiogenic shock.      Neuro/ Psych:  # Hx of cardioembolic stroke: (12/2022)  - no residual symptoms, AO x 3 on arrival  - Takes xarelto 20 mg at home   - holding xarelto inpatient, will start on heparin gtt  - Statin: cont lipitor 40 mg  - Serial neuro assessments   - PO Tylenol PRN for pain  - PT/OT Consult, OOB to chair  - CAM ICU score every shift     # Anxiety/depression:  - continue home zoloft 100 mg daily  - mood normal on admission  - - Sleep/wake cycle normalization     # Physical Status  - Morbid Obesity  - no Age-related debility/bed confined        #Substance abuse  -Alcohol abuse: reports occasional binge drinking but denies withdrawal   -Tobacco use/Nicotine Dependence: occasional cigarette smoker, previous hx of heavy alcohol use      Cardiovascular:  # NICM HFrEF (EF 5-10%, TTE 02/2024)- combined systolic  and diastolic dysnfuction  # Acute decompensated heart failure  # Low cardiac output state +/- cardiogenic shock   - diagnosed since 2022  - [facility] in 2022 with no evidence of angiographically obstructive CAD  - TTE 07/2024:  CONCLUSIONS:   1. Poorly visualized anatomical structures due to suboptimal image quality.   2. Left ventricular ejection fraction is severely decreased, by visual estimate at 10%.   3. Spectral Doppler shows a pseudonormal pattern of left ventricular diastolic filling.   4. Left ventricular cavity size is severely dilated (LVIDd 7.4 cm)   5. There is normal right ventricular global systolic function.    - He is presenting with preogressively worsening Dyspnea at rest and KEY, orthopnea, PND and generalized weakness over months.   - Elective RHC 07/18 showed elevated R and L sided filing pressure and low cardiac output/index: RA 24, RVSP 63, PA 64/40 (48), W 41, /90 (101) mmHg. 3. CO/CI [Evelia]: 4.4 / 1.7. CO/CI [TD]: 3.47 / 1.4. 5. SVR: 1775 cgs, PVR: 2.0 COLON, /74.  - Opening SGC #: /72 (82), PAP 56/43(47), CVP 8, SVR 1357, CI 1.76  - admit weight: 140 Kg  - admit BNP: 127 (low as morbidly obese)  GDMT: - continue entresto. Dose increased to 49/51 mg BID on 07/21 for elevated MAP. continue spironolactone (dose increased to 25 mg on 07/22), farxiga 10 mg. Holding Coreg in setting of low output state.   - Diuretics: ordered lasix 40 mg IV Q12 hrs (takes torsemide 20 mg daily at home)  - Inotropes/ Pressors: continue milrinone 0.25 mcg/kg/min     - Weight trend in last few days with diruresis:  Date/Time Weight   07/22/24 1115 136 kg (299 lb 13.2 oz)   07/21/24 1251 136 kg (299 lb 13.2 oz)   07/21/24 0630 136 kg (299 lb 13.2 oz)   07/20/24 1200 137 kg (300 lb 14.9 oz)   07/19/24 0550 137 kg (302 lb 0.5 oz)   07/18/24 1705 140 kg (308 lb 10.3 oz)   -Hemodynamics through [place] on milrinone 0.25 mcg/kg/min:   Most Recent Range Past 24hrs   BP (Art)   No data recorded   MAP(Art)    No data recorded   RA/CVP  4 mmHg No data recorded   PA 20/13 PAP  Min: 16/8  Max: 53/6   PA(mean) 20 mmHg PAP (Mean)  Min: 9 mmHg  Max: 38 mmHg   PCWP 11 mmHg PCWP (mmHg)  Min: 11 mmHg  Max: 11 mmHg   CO 5 L/min CO (L/min)  Min: 5 L/min  Max: 5.7 L/min   CI 2 L/min/m2 CI (L/min/m2)  Min: 2 L/min/m2  Max: 2.2 L/min/m2   Mixed Venous 72 % SVO2 (%)  Min: 66 %  Max: 72 %   SVR  912 (dyne*sec)/cm5 SVR (dyne*sec)/cm5  Min: 912 (dyne*sec)/cm5  Max: 914 (dyne*sec)/cm5   PVR 46 (dyne*sec)/cm5 PVR (dyne*sec)/cm5  Min: 46 (dyne*sec)/cm5  Max: 46 (dyne*sec)/cm5   TPG  20 mmHg - 11 mmHg = 9 mmHg       Intake/Output Summary (Last 24 hours) at 7/23/2024 1818  Last data filed at 7/23/2024 1700  Gross per 24 hour   Intake 1160.5 ml   Output 850 ml   Net 310.5 ml   - Daily standing weights, 2gm sodium diet, 2L fluid restriction, strict I&Os  - Advance therapies: patient consented for LVAD evaluation today, will start the process.      Pulmonary:   -No Hx of known pulmonary disease/Pulmonary Hypertension  -History of smoking: prn nicotine patches    -Monitor and maintain SpO2 > 92%  -CXR with pulmonary vascular congestion, bilateral pulm edema no consolidation.   -diuresis plan as above     GI:  - no known GI issues   - Bowel regimen: prn Miralax + senna      :  # No known renal issues   -Baseline BUN/Cr 15/1.1  -Admit BUN/Cr 20/1.1  -Creatinine slightly high to 1.4-1.6 in last few days, still EGFR>65. Will continue to monitor.   -I/Os  -avoid hypotension and nephrotoxic agents     Heme:  #no known hematology issues   - Labs: CBC, TIBC, ferritin, serum Fe, folate, B12 - all normal    - CTM      Endo:  #DM type 2 (uncontrolled)   - hgbA1c : 13.3 (07 /2024)  - home regimen: Insulin lantus 55 U qpm, lispro 18 U TID means, Trulicity 1.5 q weekly, metformin 1000 mg BID  - Inpatient regimen: Lantus 55 qpm, Lispro 18 U TID meals, high dose sliding scale, holding metformin and Trulicity   - Endocrine following    - BGM AC & HS  -  Consistent carbs diet     #Thyroid  -TSH:2.29         ID:  - Recent abdominal wall abscess  - Recently seen in ER on 07/13 for abd wall abscess 2.3 cm x 3.5 cm x 2 cm s/p I& D and started on bactrim DS 1 tab BID x 10 days (end date 07/23)  -afebrile, nontoxic   -no s/s infx  -trend temps q4h    PHYSICAL AND OCCUPATIONAL THERAPY:    LINES:  PIV  [place]-thuy    DVT: heparin gtt  CENTRAL LINE BUNDLE: [place]  GLYCEMIC CONTROL: insulin + sliding scale ordered  BOWEL CARE: Miralax prn ordered  NUTRITION: Adult diet Cardiac; 70 gm fat; 2 - 3 grams Sodium  NUTRITION: Adult diet Cardiac, Carb Controlled; 75 gram carb/meal, 45 gram Carb evening snack; 1500 mL fluid; 70 gm fat; 2 - 3 grams Sodium      EMERGENCY CONTACT: Extended Emergency Contact Information  Primary Emergency Contact: FREDDY LEWIS  Address: [address] [place] [address] [address] [place], [place] [id number] [facility]  Home Phone: [phone]  Mobile Phone: [phone]  Relation: Spouse  Preferred language: English   needed? No  FAMILY UPDATE: wife present at bedside, updated 07/22  CODE STATUS: Full Code  DISPO: pending clinical status    Patient seen and assessed with MD Cara Ace MD, MS  Heart Failure Fellow,  [facility]

## 2024-07-23 NOTE — PROGRESS NOTES
CONRAD met with pt along with Dr Smith today.  His X-Spouse Era is listed as NOK. Pt does not have a permanent home.  He often lives between his mother and his x-wife. His x wife has been staying here with him since his admission on Thursday and his concern is his parking cost.  SW to check into this. He grew up in the foster system and has 11 foster siblings.  His foster sister He indicated that he wanted Michell Matthews to be his surrogate decision maker.  He is unemployed.   He has 8 children ranging from 19- 8.  He has 1 grandchild and one on the way. He stated his strongest emotional support is his mother. He is a  by [employer]. He does not identify anything that brings him inocencio.  He stated he used to like jazz, poetry, and comedy but since recent troubles and his brother's suicide he doesn't enjoy doing things. His motivation for the LVAD is his kids. Later, Dr Wayne joined in the discussion and provided LVAD information so he knows the and understands the risks involved. Pt is motivated to be strong and get beter to be able to take of his kids.   Dr Wayne provided some relaxation techniques to help with his anxiety and sleeplessness. SW to follow for support.

## 2024-07-23 NOTE — PROGRESS NOTES
ENCOUNTER    Visit Type Initial Visit  Location: [facility]  room 12    Barriers to Communication / Understanding:   [] Language [] Vision [] Hearing [] Other      []  Present     Accompanied By: wife Era at bedside     Organ For Transplant: none    Device For Implant: VAD    Ethnicity:  Black Or     Has court today and another court date this week but wants to get something's done before lvad, Need a letter for court saying he's here.     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, help paint houses     DME none    Knowledge of Health Fair  Diabetes, hyper tension     Why Do You Have End Stage Organ Disease unknown, did genetic testing     Knowledge of Transplant / VAD:  Yes Patient Is Able To Make An Informed Decision    Patient Understands the Risks of Transplant / VAD  Yes Rejection  Yes Infection Yes Complications  Yes Death  Death, stroke     Patient Understands Recovery and Follow-Up from Transplant / VAD  Yes Length Of Stay Yes Appointments  Yes Labs  Yes Rehabilitation  8 weeks, doing cardiac rehab already     Patient Has Identified Goals of Transplant / VAD Yes  Better breathing, better heart, be able to exercise more and build strength     Any Potential Donors? N/A    Overall Compliance  poor 10 meds, took his meds 3-4 days out of the week,  always on the road     Compliance With Medications poor    Managed By Patient keeps them in drivers seat of card pill box in bag     Understanding Of Medication  Poor doesn't know them   Compliance With Appointments Poor now that he has a car can make most of them, got very overwhelming, trying to get them scheduled,   No stable place to stay    How Does Patient Handle Health Problems: very responsive, goes to the ED, feels like something is wrong would go to the hospital    Phone about to be shut off soon    Organ Heart    Heart    Cardiac  Rehab: Active     IOP:     Fluid Restriction:   No [] Compliant     Anticoagulation Service:   Yes [] Compliant   [] INR  Entresto     Medical And Clinic Appointment Compliant No    SOCIAL HISTORY  No   ?  No    Education:  education: Other 10th grade maybe     Literate Yes some challenges reading writing growing up  Since stroke had some memory issues, dec 2022 stroke   Computer literate Yes  Internet access No    Looking for a place now in between, manny lives separate, friends and family couch surfing, since 22 lost place when lost stroke, couldn't pay rent and had to move,  hasn't applied, would like help with   Had an eviction     Sources of Income:  Patient's Current Employment    [] Full-Time [] Part-Time [] Unemployed [] Retired     []  None [] Not working by choice [x] Not working disabled ssi ssd, pay child support, different jobs      [] Short Term Leave   [] Other   Employment History worked in a [employer] at night, in morning working at [employer], was a work a holic    Will patient have paid status from employment during recovery   None    Other Sources of Income SSI and SSD    Pays child support to child services and for three other grown children he has     Does patient have financial concerns Yes comes out of ssdi so that is paid, trying to live on own is hardest part money getting taken out and only getting so much      Is patient able to meet current monthly expenses No    Resources:  none    Patient was provided information on transplant fundraising No    Insurance:  Primary Insurance Self caresource medicaid     Secondary Insurance     Prescription Coverage     Medicare coverage due to     Medicare Part A No Effective date    Medicare Part B No Effective date    Medicare Part C No Deductable  Out of Pocket Max    Medicare Part D No Extra Help?    Medicaid Yes Waiver No Redetermination Date    HMO       FAMILY SYSTEM  Single Yes   Yes How long 2012  Describe  Relationship   Yes How long 2-4 years ago   Describe Relationship she wants to reunite but pt isn't in the mood right now, tries to help her out,    No When   No When  In a Relationship No  How Long  Describe Relationship    Spouse / SO Name Manny   Age   Health   Other Caregiver Responsibilities  Spouse / Significant others reaction to donation    Children:  Yes # Biological 8 kids   # Adopted   No # Step Children  3 kids same time with different women   6 kids with manny and 2 separate outside 3 19 year olds, 16, 13,12, 10, 9 more contact with younger ones, few times a week, sees older ones 3 x a week       Child #1 Name  Age   Health    Lives   How Much Contact     Parents:  Raised By Other foster home     Did the patient have contact with the other parent No    Mother  No Age   Cause of Death   Father  unknown Age   Cause of Death    Living Parent #1 bio mom Christine hx of running away from foster home and see her, reconnected when he was 20, loved foster mom very much, still very close to foster family  from [place] and moved to [place]     Siblings:  [x] # Biological  Had a bio bro and sister both passed away bro committed suicide in 2018     Foster siblings    Sister that was adopted and came around a few years ago  [] # Half Siblings [] # Step Siblings   Sibling #1      Support & Recovery Plan:  Yes Adequate    Primary Support:  Name Stacie Carranza Phone [phone]  Age 58  Relationship to Patient mom  If employed, can they take time off work No ssi  If so, is it paid time off No   If not, will this impact your finances No   Did they attend education classes No   Do they have other caregiver responsibilities (child or eldercare) No   Do they have their own conditions which may prevent them from providing care for you No  (Medical, psychological, physical limitations)  Are they available on short notice Yes   Are they reliable Yes   Are they responsible  Yes   Are they able to understand and process new information Yes   Do they have reliable transportation or will you allow them to use your vehicle Yes   Are they currently involved in your care Yes always back and forth with her stays there a lot   Comments maybe able to stay with her [address] [place], [place]  Only her in the house weiner dog, uses cigarettes     Secondary Support:  Name Era Carranza Phone [phone]  Age 38  Relationship to Patient separate wife   If employed, can they take time off work No   If so, is it paid time off No   If not, will this impact your finances No nikhil has been helping  Did they attend education classes Yes   Do they have other caregiver responsibilities (child or eldercare) Yes 6 together 10 total 19-10, helps with grandson sometimes   Do they have their own conditions which may prevent them from providing care for you No  (Medical, psychological, physical limitations)  Are they available on short notice Yes   Are they reliable Yes   Are they responsible Yes   Are they able to understand and process new information Yes   Do they have reliable transportation or will you allow them to use your vehicle No license suspended, dtr has a car   Are they currently involved in your care Yes   Comments does own research [facility]     Alternate Support:  Name Meg Pelaez Phone [phone] Age 38  Relationship to Patient sister  If employed, can they take time off work  no  If so, is it paid time off No   If not, will this impact your finances No   Did they attend education classes no but sw sent an email with copy of education documents   Do they have other caregiver responsibilities (child or eldercare) : 4 kids two are grown, youngest is 14  Do they have their own conditions which may prevent them from providing care for you No  (Medical, psychological, physical limitations)  Are they available on short notice Yes   Are they reliable yes  Are  "they responsible    Are they able to understand and process new information yes   Do they have reliable transportation or will you allow them to use your vehicle yes   Are they currently involved in your care  yes, make sure he's where he is and goes to appts   Comments [email or web address]  not far from moms less than 5 mins  Alternate Support      Housing: mom and dtrs house  mom in [place] dtr in [place]   No Adequate Lives in someone else's home  Type of Home   Distance to [facility] unknown  Pets no  Does Patient Feel Safe in Home Yes    Transportation:  No Adequate  # Licensed Drivers in the Home no drivers license   Does Patient Drive Yes If not, why  # Reliable Vehicles 1   Does Patient use Public Transportation No  Does Patient use Medical Transportation No  Comments use to catch a bus before he had a vehicle     MENTAL HEALTH   The patient reports their mood as feeling good, guess he has been feeling good      Reported Mental Health Diagnosis yes, severe depression, has a lot of troubles, doesn't think aobut   Depression  Family History of Mental Health Concerns brother committed suicide, \"mom is crazy\" knows mom and grandmother, uncle and two aunties doesn't know much about family   What are patient psychosocial stressors doesn't like having people in his business mom wont take him places but wants him to get this procedure, she never wants to leave the house yet has come everyday, likes his alone time,     Cognition:  Reported Impairment memory concerns doesn't remember things like he use to,        Current Medications:  Yes  Mental Health Meds  Yes sertraline, bupropion  Rx'd by  tre[facility] schneider, \"rai why I am with them, doent talk to them, nurse calls about meds\", wants to focus on mental health after focus on heart, wants to focus on health, and housing    Sleep Meds Trazadone   Rx'd by   Pain Meds none  Rx'd by     OTC Meds none  Past Medications none    Counseling Previously  Had previously gone abe " center first time felt like it helped was able to go and talk to someone and have them there to listen,     IOP  Has patient ever been hospitalized for mental health reasons Yes several times 3-4 years ago,    Was the hospitalization voluntary  Duration   Where    When   Describe situation didn't want to be here any more, brother and sister , foster mom , foster bro killed other foster brother. So much death and loss, didn't want to be around or move on, there was alsways something like you got to, stayed alive for kids, didn't want them to feel what he felt,  no plan but knew what he was going to do     Discharge Plan for Follow Up  Was Discharge Plan Completed Yes  Referral to Transplant Psych Yes  Mental Health Follow Up Required Yes     Suicide Assessment:  History of Suicide Ideation Yes  [x] Timeframe [] Frequency a few times over the years.    Frequency 1 to 3 times a month  Plan Created method confirmed  Intent to Follow Through  Outcome      History of Suicide Attempt Yes Tried to jump off a bridge and drown Timeframe    long time ago     History of Suicidal Ideation in the past 3 months No Intensity   Duration     Description of Plan    Current Plan for Safety    Plan for Follow-Up continued mental health management     Patient's Reported Trauma History hx of recovery    What are patient's coping behaviors doesn't do things, stand up comedy, jazz, poetry, draw a lot, portraits, kids can draw, super hero movies, loves dc and marvel, teen titans,     Scientologist / Spirituality used to be Scientology but is spiritual now, doesn't follow god in all the ways people can serve him, can't be Hinduism cause he wants to eat fernandez     Attitude toward interviewer Cooperative, Appropriate, and Guarded    Eye Contact Patient maintained good eye contact throughout appointment and Avoided    Appearance The patient was neatly groomed, appropriately dressed and adequately nourished    Affect Appropriate and  "Anxious    Thought Process Appropriate and Rambling    Substance Use /Abuse History:  Current Tobacco User Yes  Smoked half a cig 2 weeks ago, one cig every week  doesn't want to smell like a cig all the time   Patient uses Cigarettes  Tobacco Frequency   For How Long  Is Patient Required to Quit   Former Tobacco User Yes  Describe past tobacco use and date quit  Whole pack of miles when drinking alcohol    Current Alcohol User Yes  Type of Alcohol Used   Amount \"a fifth\"  Frequency 1x a week  Pattern of Alcohol Use  A fifth by himself and be okay, not more than, about once a week, won't drink by himself, has to be invited to drink from friends  Is Patient Required to Quit Yes  Continued to use the substance despite being told the substance is affecting their health  History of problems at work, school or home due to substance use none    Former Alcohol User Yes: use to drink more often  Describe past alcohol use and date quit  Has patient ever gone to CD treatment No  If yes, When, Where and What type of Program  Attends AA meetings Never  No Sponsor  Do support people drink alcohol Yes social in nature, not daily if pt doesn't buy they wont by   If yes, describe support people's use has been years   Is alcohol kept in the home No  Does Patient need to sign a CD contract may need it    Current Illegal / Unprescribed Drug User Yes  Type of Illegal Drug Used   Frequency  Pattern of Drug Use  Marijuana doesn't smoke it anymore, maybe couple months ago, never everyday, if someone rolled a blunt would take a few hits every so often   Is Patient Required to Quit Yes    Illegal / Unprescribed Drug #2 cocaine couple months ago, replaced with mj once or twice a month, when his kids went into foster home, thinking he wouldn't get them back, 2-4 years ago when he started can count on both hands how many times he did it, children services wanted him to attend CD treatment, stated he went to [facility] and was told he didn't need " "official treatment program. He later tested dirty for Child services. Pt shared this is part of his court dates he had.     Type of Illegal Drug Used   Frequency  Pattern of Drug Use  Continue to use the substance despite being told the substance is affecting their health  History of problems at work, school or home due to substance use    Prescription Drug Abuse: no barely took his meds   No Has patient experienced feelings of addiction  No Has patient experienced symptoms of withdrawal  No Has patient experienced any side effects? e.g.  hallucinations or delusions    Does Patient Meet the Criteria for Alcohol Use Disorder No Diagnosis  Does Patient Meet OSOTC guidelines No  Does Patient Meet the Criteria for Illegal Drug Use Disorder No Diagnosis  Does Patient Meet OSOTC guidelines No      LEGAL ISSUES    children services take custody from pt and pt wife August 20th in [place]  August 15th lower childsupport on ssi and ssdi  court this coming Monday child support back pay for a child 19 year old  brother got letter, set next court date, just went to court and had them continue waiting for  to call back phone is going to be off tomorrow     Court date, driving without a seatbelt today  Court date for child support  Kids court next month   Yes Arrests  No Currently probation or parole   off this year, April or march, was on probation   snf Yes  When Last year, \"catch and release\"  How long   Where   Several times    Citizenship:  Yes [place] Citizen  No Green Card No Visa    Advance Directives: No trying to get one now, trying to get life insurance   HCPOA       Guardian:    RAEGAN MARTINES met with pt at bedside on [place] [address] room 12 for initial evaluation for potential heart transplant or lvad implant. Pt wife Era was at bedside during beginning of evaluation. Pt shared that prior to his hospitalization, he was fully independent for ADLs and IADLs and was active. 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 Pt knows he has diabetes and hyper tension but is unsure of any other diagnosis. Pt demonstrated a fair understanding of vad risks and recovery expectations. Pt shared he does sometimes have memory concerns ever since his stroke two years ago. Pt shared his goal for advanced therapy is to breathe better, have a better heart and be able to exercise more and build strength. Pt shared he takes about 10 medications and he takes them about 3-4 days out of the week, understands this isn't best practice. Pt shared he is always on the road so he keeps his medications in the car. Pt stated he does not know what medications he is taking except for his insulin. Pt shared he does not have a stable place to live at this time, he goes back and forth between his mom's house and his dtr's house. Pt shared he missed many of his appts, but has a car at this time so he can make them better, shared he would get overwhelmed trying to manage them all. Pt also disclosed that he does not currently have a valid 's license. Pt shared if he has a new health problem, he would typically go to the ED. Pt stated he was doing cardiac rehab and was not on a fluid restriction at home and was prescribed entresto. Pt completed up to the tenth grade he thinks, had some challenges with reading and writing growing up but is able to read documents and sign name at this time. Pt shared that he is looking for a place where he can stay, shared he is couch surfing at his moms house or his dtrs house. Pt shared he used to have his own place but was evicted in 2022 after he had his stroke as he was not able to pay rent at that time. Pt is not currently working, is receiving ssi, ssd. 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 Pt shared he used to be a “work a holic”.  Pt shared he pays child support that gets taken out of his monthly disability checks.  Pt confirmed there are many financial concerns. Pt stated he is legally  to Era but they have been  for the past 2-4 years. Pt shared Era is wanting to rekindle their relationship but pt is not sure if he wants that at this time. Pt confirmed he has eight biological children ranging from the age of 19-9 years old. Pt shared he has contact with all of his children. Pt stated he grew up in the foster care system. Pt shared he was very close to his foster family and had rekindled his relationship with his bio mom not too long ago. Pt shared he had biological siblings but they have passed away. Pt stated he has foster siblings that he has contact with. Pt named his mom, wife, and sister as primary support people. SW was able to speak with pt mom, wife and sister and confirmed with them their ability to provide support to pt. Pt plan is to stay with mom Stacie upon dc. Pt mom and sister Meg will help with transportation for pt. Pt wife can be emotional support and has been present while pt has been inpatient. Pt stated they are  and the wife lives in [place] with their children. Pt wife does not drive at this time. Pt support people will need additional education and support from the team moving forward.  Pt shared for his housing before his admission, he would stay between his mom and his [facility] [place] house, goes back and forth from [facility], spends a lot of time in his truck. Pt does not have an active drivers license at this time but still drives, used public transportation before he got his own vehicle. Pt shared his mood as “feeling good”. Pt shared he was previously diagnosed with severe depression, has a lot of troubles he tries to not think about. Pt shared his bio brother committed suicide in 2018 and stated “mom is crazy”, only knows about maternal side of bio family. Pt shared his stressors include having people in his “business” like his mom or wife. Pt shared his  mom previously would not take him to dr alvarado yet wants him to get the procedure completed. Pt mom upon sw inquiring stated she would help him. Pt reported memory concerns, started post his stroke in 2022. Pt shared he is being seen for medication management at [facility] but stated “ Brett why I am with them, don't talk with them, a nurse calls about my meds”. Pt shared he wants to focus on his mental health after he focuses on his heart, health, and housing. Pt stated he is prescribed sertraline and bupropion and trazadone to help with sleep. Pt shared he previously attended counseling services thru the [facility], felt like it helped at that time. Pt shared this was around the time of his brother's passing. Pt confirmed he has been hospitalized for mental health reasons several times, the last time being 3-4 years ago. Pt shared that he had been surrounded by so much death, several family members and he “didn't want to be here anymore”. Pt shared he thought about his children and didn't want them to feel the feelings he did when his brother passed. Pt stated he didn't have a planned day but he knew what method he would choose. Pt confirmed he does have a SI hx including a past suicide attempt. Pt denied any recent thoughts of self harm. Pt shared that his hx of recovery was traumatic for hism. Pt shared he typically doesn't do things, he enjoys stand up comedy, jazz, poetry, drawing, and watching superhero movies. Pt shared he is spiritual, not Holiness at this time. Pt confirmed he is currently using cigarettes, alcohol, and marijuana. Pt stated he typically uses 2 cigarettes a week, stating he would use a whole pack of black and miles when he is drinking alcohol. Pt confirmed he drinks about once a week and drinks “a fifth” of alcohol by himself. SW reviewed with pt this means about 16 shots or mixed drinks worth. Pt shared he previously would drink more often, but maybe not as much. Pt shared he used  marijuana a few months ago, never every day. Pt shared if someone rolled a blunt he would take a few hits every so often. Pt also shared he has a cocaine hx. Pt stated he last used a few months ago. Pt shared when he stopped using cocaine, he replaced with marijuana, so would use this once to twice a month. Pt shared he really started using this substance when his children went into foster care about 2-4 years ago. Pt shared he hadn't used cocaine more than 10 times in the past. Children services wanted him to attend CD treatment, stated he went to his evaluation and they decided he did not need official treatment. Pt shared he later tested positive for the substance for Child services. Pt shared this is one of the reasons why he has court dates. Pt denied any feelings of addiction, withdrawal or side effects from any prescribed medications. Pt shared that he has legal hx, has served penitentiary time several times, previously was on probation, got off of probation in April of May of this year, unable to remember. Pt does have recent court dates, at least two that he missed since being inpatient. Pt shared he has another several court dates in August that are in regards to his children. Pt confirmed he is a [place] citizen. Pt does not yet have advanced directives completed but is working on completing these forms. SW confirmed pt and his family had no additional questions at this time. SW provided contact information.     PLAN  SW to continue following pt as evaluation continues. SW to present pt to committee when appropriate. SIPAT score is 58 meaning poor candidate. Moderate psychosocial risk. Was housing insecure, arranged pt to stay with mom at this time, medication noncompliance potentially due to housing situation, questionable adequate support people, can confirm with time ability to provide support, moderate clinical depression symptoms that pt should continue following up with psychiatry provider for medication  management and counseling, hx of SI with an attempt 3-4 years ago and several hospitalizations. Active/recent substance use including tobacco, marijuana and cocaine. Pt has open cases in court system in which he needed to appear but missed the dates due to being inpatient, team helped provide a letter to court stating he was inpatient. SW will continue following pt and assisting with needed resources as appropriate.     Juhi POPE

## 2024-07-24 ENCOUNTER — APPOINTMENT (OUTPATIENT)
Dept: CARDIAC REHAB | Facility: HOSPITAL | Age: 39
End: 2024-07-24
Payer: COMMERCIAL

## 2024-07-24 ENCOUNTER — APPOINTMENT (OUTPATIENT)
Dept: RESPIRATORY THERAPY | Facility: HOSPITAL | Age: 39
End: 2024-07-24
Payer: COMMERCIAL

## 2024-07-24 ENCOUNTER — APPOINTMENT (OUTPATIENT)
Dept: VASCULAR MEDICINE | Facility: HOSPITAL | Age: 39
End: 2024-07-24
Payer: COMMERCIAL

## 2024-07-24 ENCOUNTER — APPOINTMENT (OUTPATIENT)
Dept: RADIOLOGY | Facility: HOSPITAL | Age: 39
End: 2024-07-24
Payer: COMMERCIAL

## 2024-07-24 LAB
ALBUMIN SERPL BCP-MCNC: 4 G/DL (ref 3.4–5)
ALBUMIN SERPL BCP-MCNC: 4.3 G/DL (ref 3.4–5)
ANION GAP SERPL CALC-SCNC: 14 MMOL/L (ref 10–20)
ANION GAP SERPL CALC-SCNC: 14 MMOL/L (ref 10–20)
BASE EXCESS BLDMV CALC-SCNC: -2.9 MMOL/L (ref -2–3)
BODY TEMPERATURE: 37 DEGREES CELSIUS
BUN SERPL-MCNC: 26 MG/DL (ref 6–23)
BUN SERPL-MCNC: 28 MG/DL (ref 6–23)
CALCIUM SERPL-MCNC: 10 MG/DL (ref 8.6–10.6)
CALCIUM SERPL-MCNC: 9.1 MG/DL (ref 8.6–10.6)
CHLORIDE SERPL-SCNC: 99 MMOL/L (ref 98–107)
CHLORIDE SERPL-SCNC: 99 MMOL/L (ref 98–107)
CO2 SERPL-SCNC: 23 MMOL/L (ref 21–32)
CO2 SERPL-SCNC: 25 MMOL/L (ref 21–32)
CREAT SERPL-MCNC: 1.57 MG/DL (ref 0.5–1.3)
CREAT SERPL-MCNC: 1.69 MG/DL (ref 0.5–1.3)
EGFRCR SERPLBLD CKD-EPI 2021: 53 ML/MIN/1.73M*2
EGFRCR SERPLBLD CKD-EPI 2021: 58 ML/MIN/1.73M*2
ERYTHROCYTE [DISTWIDTH] IN BLOOD BY AUTOMATED COUNT: 12.6 % (ref 11.5–14.5)
GLUCOSE BLD MANUAL STRIP-MCNC: 136 MG/DL (ref 74–99)
GLUCOSE BLD MANUAL STRIP-MCNC: 152 MG/DL (ref 74–99)
GLUCOSE BLD MANUAL STRIP-MCNC: 159 MG/DL (ref 74–99)
GLUCOSE BLD MANUAL STRIP-MCNC: 163 MG/DL (ref 74–99)
GLUCOSE BLD MANUAL STRIP-MCNC: 77 MG/DL (ref 74–99)
GLUCOSE SERPL-MCNC: 128 MG/DL (ref 74–99)
GLUCOSE SERPL-MCNC: 75 MG/DL (ref 74–99)
HCO3 BLDMV-SCNC: 23.7 MMOL/L (ref 22–26)
HCT VFR BLD AUTO: 39 % (ref 41–52)
HGB BLD-MCNC: 13.4 G/DL (ref 13.5–17.5)
INHALED O2 CONCENTRATION: 21 %
MAGNESIUM SERPL-MCNC: 2.3 MG/DL (ref 1.6–2.4)
MCH RBC QN AUTO: 31.6 PG (ref 26–34)
MCHC RBC AUTO-ENTMCNC: 34.4 G/DL (ref 32–36)
MCV RBC AUTO: 92 FL (ref 80–100)
NRBC BLD-RTO: 0 /100 WBCS (ref 0–0)
OXYHGB MFR BLDMV: 65.8 % (ref 45–75)
PCO2 BLDMV: 47 MM HG (ref 41–51)
PH BLDMV: 7.31 PH (ref 7.33–7.43)
PHOSPHATE SERPL-MCNC: 4.6 MG/DL (ref 2.5–4.9)
PHOSPHATE SERPL-MCNC: 5.5 MG/DL (ref 2.5–4.9)
PLATELET # BLD AUTO: 179 X10*3/UL (ref 150–450)
PO2 BLDMV: 43 MM HG (ref 35–45)
POTASSIUM SERPL-SCNC: 4.4 MMOL/L (ref 3.5–5.3)
POTASSIUM SERPL-SCNC: 4.7 MMOL/L (ref 3.5–5.3)
RBC # BLD AUTO: 4.24 X10*6/UL (ref 4.5–5.9)
SAO2 % BLDMV: 67 % (ref 45–75)
SODIUM SERPL-SCNC: 132 MMOL/L (ref 136–145)
SODIUM SERPL-SCNC: 133 MMOL/L (ref 136–145)
UFH PPP CHRO-ACNC: 0.4 IU/ML
WBC # BLD AUTO: 5.1 X10*3/UL (ref 4.4–11.3)

## 2024-07-24 PROCEDURE — 1100000001 HC PRIVATE ROOM DAILY

## 2024-07-24 PROCEDURE — 2500000001 HC RX 250 WO HCPCS SELF ADMINISTERED DRUGS (ALT 637 FOR MEDICARE OP): Performed by: STUDENT IN AN ORGANIZED HEALTH CARE EDUCATION/TRAINING PROGRAM

## 2024-07-24 PROCEDURE — 94010 BREATHING CAPACITY TEST: CPT | Performed by: INTERNAL MEDICINE

## 2024-07-24 PROCEDURE — 99233 SBSQ HOSP IP/OBS HIGH 50: CPT | Performed by: PHYSICIAN ASSISTANT

## 2024-07-24 PROCEDURE — 71045 X-RAY EXAM CHEST 1 VIEW: CPT | Performed by: RADIOLOGY

## 2024-07-24 PROCEDURE — 80069 RENAL FUNCTION PANEL: CPT | Performed by: STUDENT IN AN ORGANIZED HEALTH CARE EDUCATION/TRAINING PROGRAM

## 2024-07-24 PROCEDURE — 99291 CRITICAL CARE FIRST HOUR: CPT | Performed by: STUDENT IN AN ORGANIZED HEALTH CARE EDUCATION/TRAINING PROGRAM

## 2024-07-24 PROCEDURE — 94727 GAS DIL/WSHOT DETER LNG VOL: CPT | Performed by: INTERNAL MEDICINE

## 2024-07-24 PROCEDURE — 37799 UNLISTED PX VASCULAR SURGERY: CPT | Performed by: STUDENT IN AN ORGANIZED HEALTH CARE EDUCATION/TRAINING PROGRAM

## 2024-07-24 PROCEDURE — 85520 HEPARIN ASSAY: CPT | Performed by: STUDENT IN AN ORGANIZED HEALTH CARE EDUCATION/TRAINING PROGRAM

## 2024-07-24 PROCEDURE — 94727 GAS DIL/WSHOT DETER LNG VOL: CPT

## 2024-07-24 PROCEDURE — 2500000004 HC RX 250 GENERAL PHARMACY W/ HCPCS (ALT 636 FOR OP/ED): Performed by: STUDENT IN AN ORGANIZED HEALTH CARE EDUCATION/TRAINING PROGRAM

## 2024-07-24 PROCEDURE — 82805 BLOOD GASES W/O2 SATURATION: CPT | Performed by: STUDENT IN AN ORGANIZED HEALTH CARE EDUCATION/TRAINING PROGRAM

## 2024-07-24 PROCEDURE — 94729 DIFFUSING CAPACITY: CPT | Performed by: INTERNAL MEDICINE

## 2024-07-24 PROCEDURE — 2500000002 HC RX 250 W HCPCS SELF ADMINISTERED DRUGS (ALT 637 FOR MEDICARE OP, ALT 636 FOR OP/ED): Performed by: STUDENT IN AN ORGANIZED HEALTH CARE EDUCATION/TRAINING PROGRAM

## 2024-07-24 PROCEDURE — 81241 F5 GENE: CPT

## 2024-07-24 PROCEDURE — 94660 CPAP INITIATION&MGMT: CPT

## 2024-07-24 PROCEDURE — 71045 X-RAY EXAM CHEST 1 VIEW: CPT

## 2024-07-24 PROCEDURE — 2500000002 HC RX 250 W HCPCS SELF ADMINISTERED DRUGS (ALT 637 FOR MEDICARE OP, ALT 636 FOR OP/ED)

## 2024-07-24 PROCEDURE — 82947 ASSAY GLUCOSE BLOOD QUANT: CPT

## 2024-07-24 PROCEDURE — 83735 ASSAY OF MAGNESIUM: CPT | Performed by: STUDENT IN AN ORGANIZED HEALTH CARE EDUCATION/TRAINING PROGRAM

## 2024-07-24 PROCEDURE — 85027 COMPLETE CBC AUTOMATED: CPT | Performed by: NURSE PRACTITIONER

## 2024-07-24 RX ORDER — INSULIN LISPRO 100 [IU]/ML
0-10 INJECTION, SOLUTION INTRAVENOUS; SUBCUTANEOUS
Status: DISCONTINUED | OUTPATIENT
Start: 2024-07-24 | End: 2024-07-30 | Stop reason: HOSPADM

## 2024-07-24 RX ORDER — INSULIN LISPRO 100 [IU]/ML
20 INJECTION, SOLUTION INTRAVENOUS; SUBCUTANEOUS
Status: DISCONTINUED | OUTPATIENT
Start: 2024-07-24 | End: 2024-07-28

## 2024-07-24 ASSESSMENT — PAIN SCALES - GENERAL
PAINLEVEL_OUTOF10: 0 - NO PAIN

## 2024-07-24 ASSESSMENT — PAIN - FUNCTIONAL ASSESSMENT
PAIN_FUNCTIONAL_ASSESSMENT: 0-10
PAIN_FUNCTIONAL_ASSESSMENT: CPOT (CRITICAL CARE PAIN OBSERVATION TOOL)
PAIN_FUNCTIONAL_ASSESSMENT: 0-10
PAIN_FUNCTIONAL_ASSESSMENT: CPOT (CRITICAL CARE PAIN OBSERVATION TOOL)
PAIN_FUNCTIONAL_ASSESSMENT: 0-10
PAIN_FUNCTIONAL_ASSESSMENT: CPOT (CRITICAL CARE PAIN OBSERVATION TOOL)
PAIN_FUNCTIONAL_ASSESSMENT: CPOT (CRITICAL CARE PAIN OBSERVATION TOOL)
PAIN_FUNCTIONAL_ASSESSMENT: 0-10
PAIN_FUNCTIONAL_ASSESSMENT: 0-10

## 2024-07-24 ASSESSMENT — COGNITIVE AND FUNCTIONAL STATUS - GENERAL
MOBILITY SCORE: 23
DAILY ACTIVITIY SCORE: 24
CLIMB 3 TO 5 STEPS WITH RAILING: A LITTLE

## 2024-07-24 NOTE — PROGRESS NOTES
"  [facility]  HFICU PROGRESS NOTE    Arthur Carranza/[id number]    Admit Date: 7/18/2024  Hospital Length of Stay: 5   ICU Length of Stay: 4d 21h   Primary Service: advanced heart failure   Primary HF Cardiologist: : Dr. Jurado   Referring: Dr. Jurado     INTERVAL EVENTS / PERTINENT ROS:   He denies SOB, CP, abd pain or any GI/ symptoms this morning. His CVP and Wedge are normal so lasix remains on hold. Plan to stop the milrinone and obtain hemodynamics in the evening, if still looking good, remove swan and transfer to floor.     Plan:  - Lasix on hold since 07/23, monitor UOP off lasix   - Stop milrinone   - hemodynamics in the evening via TD, if CI above 2.0, remove swan and transfer to floor.   - LVAD eval undergoing     MEDICATIONS  Infusions:  heparin, Last Rate: 2,000 Units/hr (07/23/24 0618)  lactated Ringer's, Last Rate: 10 mL/hr (07/22/24 1521)  milrinone, Last Rate: 0.25 mcg/kg/min (07/23/24 0522)    Scheduled:  atorvastatin, 40 mg, Nightly  buPROPion XL, 150 mg, Daily  dapagliflozin propanediol, 10 mg, Daily  [Held by provider] furosemide, 40 mg, BID  heparin, 4,000 Units, Once  insulin glargine, 55 Units, Nightly  insulin lispro, 0-10 Units, TID  insulin lispro, 20 Units, TID  perflutren protein A microsphere, 0.5 mL, Once in imaging  polyethylene glycol, 17 g, Daily  sacubitriL-valsartan, 1 tablet, BID  [Held by provider] sacubitriL-valsartan, 1 tablet, BID  sertraline, 100 mg, Daily  spironolactone, 25 mg, Daily  sulfamethoxazole-trimethoprim, 1 tablet, q12h JAREK    PRN:  acetaminophen, 650 mg, q6h PRN  dextrose, 12.5 g, q15 min PRN  dextrose, 25 g, q15 min PRN  glucagon, 1 mg, q15 min PRN  glucagon, 1 mg, q15 min PRN  heparin, 3,000-4,000 Units, q4h PRN  oxygen, , Continuous PRN - O2/gases  traZODone, 50 mg, Nightly PRN      PHYSICAL EXAM:   Visit Vitals  /71   Pulse 79   Temp 35.8 °C (96.4 °F)   Resp 19   Ht 1.75 m (5' 8.9\")   Wt 138 kg (304 lb 3.8 oz)   SpO2 " 98%   BMI 45.06 kg/m²   Smoking Status Every Day   BSA 2.59 m²       Physical Exam  Constitutional:       General: He is not in acute distress.     Appearance: He is obese.   Eyes:      Extraocular Movements: Extraocular movements intact.      Conjunctiva/sclera: Conjunctivae normal.   Cardiovascular:      Rate and Rhythm: Normal rate and regular rhythm.   Pulmonary:      Effort: Pulmonary effort is normal. No respiratory distress.   Abdominal:      Palpations: Abdomen is soft.      Tenderness: There is no abdominal tenderness.   Musculoskeletal:      Right lower leg: No edema.      Left lower leg: No edema.   Neurological:      General: No focal deficit present.      Mental Status: He is alert and oriented to person, place, and time. Mental status is at baseline.   Psychiatric:         Mood and Affect: Mood normal.         Behavior: Behavior normal.         DATA:  CMP:  Results from last 7 days   Lab Units 07/23/24  0009 07/22/24  1634 07/22/24  0525 07/21/24  0515 07/20/24  0601 07/19/24  0519 07/18/24  1558   SODIUM mmol/L 132* 134* 137 135* 136 136 135*   POTASSIUM mmol/L 3.9 4.4 4.6 4.2 4.3 4.0 4.3   CHLORIDE mmol/L 95* 96* 97* 98 96* 96* 100   CO2 mmol/L 25 25 23 23 29 29 25   ANION GAP mmol/L 16 17 22* 18 15 15 14   BUN mg/dL 27* 26* 24* 20 21 21 20   CREATININE mg/dL 1.64* 1.75* 1.41* 1.57* 1.39* 1.31* 1.16   EGFR mL/min/1.73m*2 55* 51* 66 58* 67 72 83   MAGNESIUM mg/dL 2.07 2.23 2.34 2.29 2.48* 1.98 1.90   ALBUMIN g/dL 3.8 4.0 3.6 3.7 4.0 3.7 3.8   ALT U/L  --  16  --   --   --   --  17   AST U/L  --  25  --   --   --   --  19   BILIRUBIN TOTAL mg/dL  --  0.5  --   --   --   --  0.5     CBC:  Results from last 7 days   Lab Units 07/23/24  0528 07/23/24  0024 07/22/24  1634 07/21/24  2340 07/20/24  0601 07/18/24  1558   WBC AUTO x10*3/uL 5.6 3.8* 5.9 7.3 6.4  6.4 6.2   HEMOGLOBIN g/dL 13.6 18.1* 14.3 13.2* 14.9  14.9 13.6   HEMATOCRIT % 39.9* 52.8* 41.0 39.4* 45.0  45.0 42.3   PLATELETS AUTO x10*3/uL 197  121* 193 196 232  232 241   MCV fL 92 91 91 93 95  95 98     COAG:   Results from last 7 days   Lab Units 07/23/24  0216 07/18/24  1558   INR  0.9 1.1     ABO:   ABO TYPE   Date Value Ref Range Status   07/22/2024 O  Final     HEME/ENDO:  Results from last 7 days   Lab Units 07/22/24  1634 07/18/24  1558   FERRITIN ng/mL  --  234   IRON SATURATION %  --  28   TSH mIU/L 1.73 2.29   HEMOGLOBIN A1C %  --  13.3*      CARDIAC:   Results from last 7 days   Lab Units 07/22/24  1634 07/18/24  1558   LD U/L 208  --    TROPHSCMC ng/L  --  59*   BNP pg/mL 107* 127*       ASSESSMENT AND PLAN:     37 YO Male with hx of NICM/HFrEF (EF 5-10% on echo fev 2024), initially diagnosed 10/2022, HTN, HLD, hypertriglyceridemia, Type II DM (poorly controlled A1c 13.7%, on insulin), severe obesity (BMI 44), 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 Started on IV lasix and GDMT on admission but CO/CI persistently low so started milrinone on 07/19 0.125 > 0.25 later that day for improving CO/CI. Currently undergoing LVAD evaluation, signed consent 07/22. However, significant psychosocial barriers at play at this time (court dates, unstable housing, polysubstance use etc).      Neuro/ Psych:  # Hx of cardioembolic stroke: (12/2022)  - no residual symptoms, AO x 3 on arrival  - Takes xarelto 20 mg at home   - holding xarelto inpatient, will start on heparin gtt  - Statin: cont lipitor 40 mg  - Serial neuro assessments   - PO Tylenol PRN for pain  - PT/OT Consult, OOB to chair  - CAM ICU score every shift     # Anxiety/depression:  - continue home zoloft 100 mg daily  - mood normal on admission  - - Sleep/wake cycle  normalization     # Physical Status  - Morbid Obesity  - no Age-related debility/bed confined     #Substance abuse  -Alcohol abuse: reports occasional binge drinking but denies withdrawal   -Tobacco use/Nicotine Dependence: occasional cigarette smoker, previous hx of heavy alcohol use      Cardiovascular:  # NICM HFrEF (EF 5-10%, TTE 02/2024)- combined systolic and diastolic dysnfuction  # Acute decompensated heart failure  # Low cardiac output state +/- cardiogenic shock   - diagnosed since 2022  - [facility] in 2022 with no evidence of angiographically obstructive CAD  - TTE 07/2024:  CONCLUSIONS:   1. Poorly visualized anatomical structures due to suboptimal image quality.   2. Left ventricular ejection fraction is severely decreased, by visual estimate at 10%.   3. Spectral Doppler shows a pseudonormal pattern of left ventricular diastolic filling.   4. Left ventricular cavity size is severely dilated (LVIDd 7.4 cm)   5. There is normal right ventricular global systolic function.    - He is presenting with preogressively worsening Dyspnea at rest and exertion, orthopnea, PND and generalized weakness over months.   - Elective RHC 07/18 showed elevated R and L sided filing pressure and low cardiac output/index: RA 24, RVSP 63, PA 64/40 (48), W 41, /90 (101) mmHg. 3. CO/CI [Evelia]: 4.4 / 1.7. CO/CI [TD]: 3.47 / 1.4. 5. SVR: 1775 cgs, PVR: 2.0 COLON, /74.  - Opening SGC on arrival to -[facility] #: /72 (82), PAP 56/43(47), CVP 8, SVR 1357, CI 1.76  - admit weight: 140 Kg  > 138 kg (07/24)  - admit BNP: 127 (low as morbidly obese) >107 (07/23)  GDMT: - continue entresto 49/51 mg bid. continue spironolactone (dose increased to 25 mg on 07/22), farxiga 10 mg. Holding Coreg in setting of low output state.   - Diuretics:  Currently on diuretic holiday for MICHAELA after aggressive diuresis (on hold since 07/23)  - Inotropes/ Pressors: milrinone 0.25 stopped today     - Weight trend in last few days with diruresis:  3 Day  Weight Change: 0.333 kg (11.8 oz) per day    5 Day Weight Change: -0.5 kg (-1 lb 1.6 oz) per day    Hemodynamics:    Most Recent Range Past 24hrs   BP (Art)   No data recorded   MAP(Art)   No data recorded   RA/CVP   No data recorded   PA 22/16 PAP  Min: 9/2  Max: 51/42   PA(mean) 18 mmHg PAP (Mean)  Min: 6 mmHg  Max: 55 mmHg   PCWP 10 mmHg PCWP (mmHg)  Min: 10 mmHg  Max: 10 mmHg   CO 6.3 L/min CO (L/min)  Min: 4.9 L/min  Max: 7.6 L/min   CI 2.6 L/min/m2 CI (L/min/m2)  Min: 2 L/min/m2  Max: 3.1 L/min/m2   Mixed Venous 65 % SVO2 (%)  Min: 63 %  Max: 65 %   SVR  1338 (dyne*sec)/cm5 SVR (dyne*sec)/cm5  Min: 877 (dyne*sec)/cm5  Max: 1338 (dyne*sec)/cm5   PVR 46 (dyne*sec)/cm5 No data recorded   TPG  18 mmHg - 10 mmHg = 8 mmHg PAP (Mean)  Min: 6 mmHg  Max: 55 mmHg - PCWP (mmHg)  Min: 10 mmHg  Max: 10 mmHg =      Closing [place] CO/CI#: 7.6/3.1 off milrinone, removing swan and transferring to floor     Intake/Output Summary (Last 24 hours) at 7/24/2024 1633  Last data filed at 7/24/2024 1200  Gross per 24 hour   Intake 676.65 ml   Output 1300 ml   Net -623.35 ml     -Daily standing weights, 2gm sodium diet, 2L fluid restriction, strict I&Os  - Advance therapies:   - patient consented for LVAD evaluation 07/22, process started.      :  # MICHAELA:  No known renal issues   -Baseline BUN/Cr 15/1.1  -Admit BUN/Cr 20/1.1  -s/p aggressive diuresis with IV lasix (net negative ~ 9-10 L since admission), Cr jumped to 1.8 on 07/22 evening, currently on diuretic holiday, Cr slowly downtrending 1.67 today  - continue holding diuretics for now  -I/Os  -avoid hypotension and nephrotoxic agents     Pulmonary:   -No Hx of known pulmonary disease/Pulmonary Hypertension  -History of smoking: prn nicotine patches    -Monitor and maintain SpO2 > 92%  -CXR (07/18) with pulmonary vascular congestion, bilateral pulm edema   -CXR 07/21: ?R basilar edema/infiltrate   -diuresis plan as above     GI:  - no known GI issues   - Bowel regimen: prn Miralax +  senna        Heme:  #no known hematology issues   - Labs: CBC, TIBC, ferritin, serum Fe, folate, B12 - all normal    - CTM      Endo:  #DM type 2 (uncontrolled)   - hgbA1c : 13.3 (07 /2024)  - home regimen: Insulin lantus 55 U qpm, lispro 18 U TID means, Trulicity 1.5 q weekly, metformin 1000 mg BID  - Inpatient regimen: Lantus 55 qpm, Lispro 20 U TID meals, sliding scale # 2  - holding metformin and Trulicity   - Endocrine following    - BGM AC & HS  - Consistent carbs diet     #Thyroid  -TSH:2.29         ID:  - Recent abdominal wall abscess  - Recently seen in ER on 07/13 for abd wall abscess 2.3 cm x 3.5 cm x 2 cm s/p I& D and started on bactrim DS 1 tab BID x 10 days (end date 07/23)  -afebrile, nontoxic   -no s/s infx  -trend temps q4h    PHYSICAL AND OCCUPATIONAL THERAPY:    LINES:  PIV  Kit (07/18 - 07/24)    DVT: heparin gtt  CENTRAL LINE BUNDLE: Marshal daley  GLYCEMIC CONTROL: insulin + sliding scale ordered  BOWEL CARE: Miralax prn ordered  NUTRITION: Adult diet Cardiac; 70 gm fat; 2 - 3 grams Sodium  NUTRITION: Adult diet Cardiac, Carb Controlled; 75 gram carb/meal, 45 gram Carb evening snack; 1500 mL fluid; 70 gm fat; 2 - 3 grams Sodium      EMERGENCY CONTACT: Extended Emergency Contact Information  Primary Emergency Contact: DEBBIEFREDDY  Address: [address] [place] [address] [address]           [place] [id number] [facility]  Home Phone: [phone]  Mobile Phone: [phone]  Relation: Spouse  Preferred language: English   needed? No  FAMILY UPDATE: wife present at bedside, updated 07/24  CODE STATUS: Full Code  DISPO: pending clinical status    Patient seen and assessed with MD Cara Ace MD, MS  Heart Failure Fellow,  [facility]

## 2024-07-24 NOTE — CONSULTS
"Reason For Consult  Assessment of dentition and soft tissue before Left Ventricular Assist Device Surgery.    History Of Present Illness  Arthur Carranza is a 38 y.o. male presenting with cavities on multiple teeth.     Past Medical History  He has a past medical history of CHF (congestive heart failure) (Multi), Diabetes mellitus (Multi), Heart disease, Hypertension, and Substance abuse (Multi).    Surgical History  He has a past surgical history that includes Other surgical history (07/21/2022); Other surgical history (10/25/2022); MR angio head wo IV contrast (12/29/2022); and Cardiac catheterization (N/A, 7/18/2024).     Social History  He reports that he has been smoking cigarettes. He does not have any smokeless tobacco history on file. He reports current alcohol use. He reports that he does not currently use drugs after having used the following drugs: Codeine.    Family History  Family History   Adopted: Yes        Allergies  Shellfish containing products    Review of Systems  Refer to Provider's Note     Physical Exam    Physical exam of patient dentition is unremarkable. Patient presents with carious lesions that will need fillings.     Last Recorded Vitals  Blood pressure 111/75, pulse 89, temperature 36.3 °C (97.3 °F), temperature source Temporal, resp. rate 17, height 1.75 m (5' 8.9\"), weight 138 kg (304 lb 3.8 oz), SpO2 96%.    Relevant Results  There is no discrete radiolucency surrounding apices of dental roots to suggest periapical abscess. There is no fracture of the mandible. Soft tissues are unremarkable.       Assessment/Plan     The provider assessed the patient's gingiva and teeth and he was told that the findings were unremarkable. No response to percussion and palpation.  Patient was informed that he had cavities that will need to be filled at a later time.      I spent 40 minutes in the professional and overall care of this patient.      Dorene Suarez DDS    "

## 2024-07-24 NOTE — CARE PLAN
The patient's goals for the shift include      The clinical goals for the shift include VSS, rest, monitor SGC #s    Over the shift, the patient did not make progress toward the following goals. Barriers to progression include . Recommendations to address these barriers include .

## 2024-07-24 NOTE — PROGRESS NOTES
"Arthur Carranza is a 38 y.o. male on day 6 of admission presenting with Cardiogenic shock (Multi).    Subjective   No acute events overnight. Pt's glucose has been well controlled on current regimen. 30 minutes spent on diabetes education including dietary and medication adherence. Pt agrees that he would benefit from endocrine care outpt.         Objective   Review of Systems   All other systems reviewed and are negative.        Physical Exam  Constitutional:       Appearance: Normal appearance. He is obese.   HENT:      Head: Normocephalic and atraumatic.      Nose: Nose normal.      Mouth/Throat:      Mouth: Mucous membranes are dry.      Pharynx: Oropharynx is clear.   Eyes:      Extraocular Movements: Extraocular movements intact.      Conjunctiva/sclera: Conjunctivae normal.   Cardiovascular:      Rate and Rhythm: Normal rate and regular rhythm.      Pulses: Normal pulses.      Heart sounds: Normal heart sounds.   Pulmonary:      Effort: Pulmonary effort is normal.      Breath sounds: Normal breath sounds.   Abdominal:      General: Abdomen is flat. Bowel sounds are normal.      Palpations: Abdomen is soft.   Skin:     General: Skin is warm and dry.   Neurological:      General: No focal deficit present.      Mental Status: He is alert and oriented to person, place, and time. Mental status is at baseline.   Psychiatric:         Mood and Affect: Mood normal.         Behavior: Behavior normal.         Thought Content: Thought content normal.         Judgment: Judgment normal.         Last Recorded Vitals  Blood pressure 114/78, pulse 95, temperature 36 °C (96.8 °F), resp. rate 10, height 1.75 m (5' 8.9\"), weight 138 kg (303 lb 2.1 oz), SpO2 96%.    Intake/Output last 3 Shifts:  I/O last 3 completed shifts:  In: 2611 (19 mL/kg) [P.O.:2245; I.V.:366 (2.7 mL/kg)]  Out: 3000 (21.8 mL/kg) [Urine:3000 (0.6 mL/kg/hr)]  Weight: 137.5 kg     Scheduled medications  atorvastatin, 40 mg, oral, Nightly  buPROPion XL, 150 mg, " oral, Daily  dapagliflozin propanediol, 10 mg, oral, Daily  [Held by provider] furosemide, 40 mg, intravenous, BID  heparin, 4,000 Units, intravenous, Once  insulin glargine, 55 Units, subcutaneous, Nightly  insulin lispro, 0-10 Units, subcutaneous, TID  insulin lispro, 20 Units, subcutaneous, TID  perflutren protein A microsphere, 0.5 mL, intravenous, Once in imaging  polyethylene glycol, 17 g, oral, Daily  sacubitriL-valsartan, 1 tablet, oral, BID  sertraline, 100 mg, oral, Daily  spironolactone, 25 mg, oral, Daily      Continuous medications  heparin, 0-4,000 Units/hr, Last Rate: 2,000 Units/hr (07/24/24 1200)  lactated Ringer's, 10 mL/hr, Last Rate: 10 mL/hr (07/24/24 1200)  [Held by provider] milrinone, 0.25 mcg/kg/min, Last Rate: Stopped (07/24/24 1118)      PRN medications  PRN medications: acetaminophen, dextrose, dextrose, glucagon, glucagon, heparin, melatonin, oxygen, traZODone      Results from last 7 days   Lab Units 07/24/24  0529 07/23/24  0009 07/22/24  1634 07/19/24  0519 07/18/24  1558   HEMOGLOBIN A1C %  --   --   --   --  13.3*   SODIUM mmol/L 132*   < > 134*   < > 135*   POTASSIUM mmol/L 4.4   < > 4.4   < > 4.3   CHLORIDE mmol/L 99   < > 96*   < > 100   CO2 mmol/L 23   < > 25   < > 25   BUN mg/dL 26*   < > 26*   < > 20   CREATININE mg/dL 1.69*   < > 1.75*   < > 1.16   CALCIUM mg/dL 9.1   < > 9.2   < > 8.9   ALBUMIN g/dL 4.0   < > 4.0   < > 3.8   PROTEIN TOTAL g/dL  --   --  7.0  --  6.5   BILIRUBIN TOTAL mg/dL  --   --  0.5  --  0.5   ALK PHOS U/L  --   --  58  --  57   ALT U/L  --   --  16  --  17   AST U/L  --   --  25  --  19   GLUCOSE mg/dL 128*   < > 155*   < > 206*    < > = values in this interval not displayed.     }  Assessment/Plan   Principal Problem:    Cardiogenic shock (Multi)  Active Problems:    Nonischemic cardiomyopathy (Multi)    Morbid obesity with BMI of 45.0-49.9, adult (Multi)    Cocaine use disorder, mild (Multi)    Acute decompensated heart failure (Multi)      Arthur ANNE  Dillon is a 38 y.o. male presenting with uncontrolled T2DM in the setting of ICU admission for cardiogenic shock/HFrEF (EF 5-10%). His A1c has been above 13% even before his hospitalization, which is consistent with past notes showing noncompliance with diabetes management. Since he is in the ICU we want to avoid hypoglycemia, even if we are a bit permissive with hyperglycemia. His 55 lantus he is currently on as an inpatient seems to be controlling his glucose overnight. We need to increase his lispro with meals again because he ids often requiring at least 4 units of additional lispro with meals. He also has milrinone in D5, which can also make him hyperglycemic. His Cr is not elevated enough from baseline to warrant lower insulin doses.         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 (Multi)  2024-01: Hyperglycemia due to diabetes mellitus (Multi)  2023-12: Type 2 diabetes mellitus with hyperglycemia (Multi)  2023-03: Type 2 diabetes mellitus with neurologic complication, with   long-term current use of insulin (Multi)      History where DKA was Reason for Admission in last year N/A      RECOMMENDATIONS:     PLAN  Steroids: none    - continue glargine 55u at bedtime        If NPO: reduce to 35u  - continue lispro 20 with meals plus scale       If NPO: hold  - continue lispro corrective scale #2 with meals, adjust to q4h if NPO   = 0u  151-200 = 2u  201-250 = 4u  251-300 = 6u  301-350 =  8u  351-400 = 10u    -Accuchecks (not BMP) TIDAC and QHS- kindly ensure QHS Accucheck is drawn; it is often missed   - Goal -180  -Hypoglycemia protocol  -Diabetes Diet- low carb 60 CHO  -Will continue to follow and titrate insulin accordingly    SGLT2i tx may not be utilized in inpt setting unless the following conditions are met. Only HF Cardiology may initiate inpatient SGLT2i use.   1) pt is eating and drinking by mouth with a total daily carb intake exceeding 60g of CHO  2) pt is urinating independently of urinary catheters  3) pt can ambulate freely to the restroom in order to maintain personal hygiene  4) no current concern for UTI/genital or inguinal candidiasis  5) no plans for NPO within the next 48-72hr    Discharge planning:   [] patient may expect to discharge home on MDI, final doses TBD by titration  []will provide CGM sample prior to discharge   []will enroll pt in  pharmacy platinum plan program  []follow up with [facility]     Teresa Nino PA-C   Endocrinology  Inpatient Diabetes Management Team

## 2024-07-24 NOTE — CARE PLAN
Problem: Fall/Injury  Goal: Not fall by end of shift  Outcome: Progressing  Goal: Be free from injury by end of the shift  Outcome: Progressing  Goal: Verbalize understanding of personal risk factors for fall in the hospital  Outcome: Progressing  Goal: Verbalize understanding of risk factor reduction measures to prevent injury from fall in the home  Outcome: Progressing  Goal: Pace activities to prevent fatigue by end of the shift  Outcome: Progressing     Problem: Pain - Adult  Goal: Verbalizes/displays adequate comfort level or baseline comfort level  Outcome: Progressing     Problem: Safety - Adult  Goal: Free from fall injury  Outcome: Progressing     Problem: Discharge Planning  Goal: Discharge to home or other facility with appropriate resources  Outcome: Progressing     Problem: Chronic Conditions and Co-morbidities  Goal: Patient's chronic conditions and co-morbidity symptoms are monitored and maintained or improved  Outcome: Progressing     Problem: Skin  Goal: Participates in plan/prevention/treatment measures  Outcome: Progressing  Flowsheets (Taken 7/24/2024 0340)  Participates in plan/prevention/treatment measures:   Elevate heels   Increase activity/out of bed for meals  Goal: Prevent/manage excess moisture  Recent Flowsheet Documentation  Taken 7/24/2024 0340 by Vida Gold RN  Prevent/manage excess moisture:   Monitor for/manage infection if present   Moisturize dry skin  Goal: Prevent/minimize sheer/friction injuries  Recent Flowsheet Documentation  Taken 7/24/2024 0340 by Vida Gold RN  Prevent/minimize sheer/friction injuries:   Turn/reposition every 2 hours/use positioning/transfer devices   Increase activity/out of bed for meals  Goal: Promote/optimize nutrition  Recent Flowsheet Documentation  Taken 7/24/2024 0340 by Vida Gold RN  Promote/optimize nutrition:   Consume > 50% meals/supplements   Monitor/record intake including meals   Offer water/supplements/favorite foods  Goal:  Prevent/manage excess moisture  Outcome: Progressing  Flowsheets (Taken 7/24/2024 0340)  Prevent/manage excess moisture:   Monitor for/manage infection if present   Moisturize dry skin  Goal: Prevent/minimize sheer/friction injuries  Outcome: Progressing  Flowsheets (Taken 7/24/2024 0340)  Prevent/minimize sheer/friction injuries:   Turn/reposition every 2 hours/use positioning/transfer devices   Increase activity/out of bed for meals  Goal: Promote/optimize nutrition  Outcome: Progressing  Flowsheets (Taken 7/24/2024 0340)  Promote/optimize nutrition:   Consume > 50% meals/supplements   Monitor/record intake including meals   Offer water/supplements/favorite foods     Problem: Not Adhering To Sodium Restrictions  Goal: 2000mg or less of sodium per day over the next 30 days  Outcome: Progressing     Problem: Not Adhering To Fluid Restrictions  Goal: Patient will verbalize understanding of fluid restrictions  Outcome: Not Progressing     Problem: Not Completing Daily Weights  Goal: Patient will do weights every morning and call doctor for an increase of +3lbs overnight/+5Ibs over a week over next 30 days  Outcome: Progressing     Problem: Not Taking Heart Failure Medication  Goal: Patient will verbalize understanding of all prescribed heart failure medications and take routinely over next 30 days  Outcome: Progressing     Problem: Poor Understanding of Medications  Goal: Patient will verbalize understanding of all prescribed heart failure medications and take routinely over next 30 days  Outcome: Progressing     Problem: Unawere Of When To Call MD  Goal: Patient Will Verbalize Understanding Of Heart Failure Flare-Up Symptoms Over Netxt 30 Days  Outcome: Progressing  Goal: Patient Will Verbalize Understanding Of Heart Failure Flare-Up Symptoms Over Netxt 30 Days  Outcome: Progressing

## 2024-07-24 NOTE — PROGRESS NOTES
HFICU Attending Note    Principal Problem:    Cardiogenic shock (Multi)  Active Problems:    Nonischemic cardiomyopathy (Multi)    Morbid obesity with BMI of 45.0-49.9, adult (Multi)    Cocaine use disorder, mild (Multi)    Acute decompensated heart failure (Multi)    38 y.o. father of 8 from [place], [place] with NICM/HFrEF (EF 5-10%), initially diagnosed 10/2022. Admitted low output HF/CS currently on milrinone with marginal hemodynamics. 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 Stage D HF syndrome and evaluation initiated for LVAD.     Wife at bedside and questions answered.     - wean milrinone, low threshold to restart but would like to show depedence    This critically ill patient continues to be at-risk for clinically significant deterioration / failure due to the above mentioned dysfunctional, unstable organ systems.  I have personally identified and managed all complex critical care issues to prevent aforementioned clinical deterioration.  Critical care time is spent at bedside and/or the immediate area and has included, but is not limited to, the review of diagnostic tests, labs, radiographs, serial assessments of hemodynamics, respiratory status, ventilatory management, and family updates.  Time spent in procedures and teaching are reported separately.    Critical care time: __35__ minutes     Objective    Arthur Carranza/[id number]  Admit Date: 7/18/2024  Hospital Length of Stay: 6   ICU Length of Stay: 5d 15h     MEDICATIONS  Infusions:  heparin, Last Rate: 2,000 Units/hr (07/24/24 0743)  lactated Ringer's, Last Rate: 10 mL/hr (07/22/24 1521)  milrinone, Last Rate: 0.25 mcg/kg/min (07/24/24 0743)      Scheduled:  atorvastatin, 40 mg, Nightly  buPROPion XL, 150 mg, Daily  dapagliflozin propanediol, 10 mg,  Daily  [Held by provider] furosemide, 40 mg, BID  heparin, 4,000 Units, Once  insulin glargine, 55 Units, Nightly  insulin lispro, 0-10 Units, TID  insulin lispro, 20 Units, TID  perflutren protein A microsphere, 0.5 mL, Once in imaging  polyethylene glycol, 17 g, Daily  sacubitriL-valsartan, 1 tablet, BID  sertraline, 100 mg, Daily  spironolactone, 25 mg, Daily      PRN:  acetaminophen, 650 mg, q6h PRN  dextrose, 12.5 g, q15 min PRN  dextrose, 25 g, q15 min PRN  glucagon, 1 mg, q15 min PRN  glucagon, 1 mg, q15 min PRN  heparin, 3,000-4,000 Units, q4h PRN  melatonin, 3 mg, Nightly PRN  oxygen, , Continuous PRN - O2/gases  traZODone, 50 mg, Nightly PRN        Prior to Admission Meds:  Medications Prior to Admission   Medication Sig Dispense Refill Last Dose    atorvastatin (Lipitor) 40 mg tablet TAKE 1 TABLET BY MOUTH AT BEDTIME 30 tablet 3     blood-glucose meter,continuous (FreeStyle Nadir 3 Ryde) misc Use as instructed to test blood glucose throughout the day 1 each 0     blood-glucose sensor (FreeStyle Nadir 3 Sensor) device Use as directed to test blood glucose throughout the day 2 each 11     buPROPion XL (Wellbutrin XL) 150 mg 24 hr tablet Take 1 tablet (150 mg) by mouth once daily.       carvedilol (Coreg) 6.25 mg tablet Take 1 tablet (6.25 mg) by mouth 2 times daily (morning and late afternoon). 180 tablet 3     dapagliflozin propanediol (Farxiga) 10 mg Take 1 tablet (10 mg) by mouth once daily. 90 tablet 3     dulaglutide (Trulicity) 1.5 mg/0.5 mL pen injector injection Inject 1.5 mg under the skin 1 (one) time per week. 2 mL 1     insulin glargine (Lantus Solostar U-100 Insulin) 100 unit/mL (3 mL) pen Inject 55 Units under the skin once daily at bedtime. Take as directed per insulin instructions. (Patient taking differently: Inject 60 Units under the skin once daily at bedtime. Take as directed per insulin instructions.) 15 mL 0     insulin lispro (HumaLOG) 100 unit/mL injection Inject 16 Units under the  "skin 3 times a day with meals. Take as directed per insulin instructions. 15 mL 0     metFORMIN (Glucophage) 1,000 mg tablet Take 1 tablet (1,000 mg) by mouth 2 times a day.       pen needle, diabetic 31 gauge x 5/16\" needle Use to inject 1-4 times daily as directed. 100 each 11     rivaroxaban (Xarelto) 20 mg tablet Take 1 tablet (20 mg) by mouth once daily in the evening. Take with meals. Take with food. 90 tablet 3     sacubitriL-valsartan (Entresto) 49-51 mg tablet Take 1 tablet by mouth 2 times a day. 180 tablet 3     sertraline (Zoloft) 100 mg tablet Take 1 tablet (100 mg) by mouth once daily.       spironolactone (Aldactone) 25 mg tablet Take 0.5 tablets (12.5 mg) by mouth once daily. Do not start before 2024. 15 tablet 11     [] sulfamethoxazole-trimethoprim (Bactrim DS) 800-160 mg tablet Take 1 tablet by mouth every 12 hours for 10 days. 20 tablet 0     torsemide (Demadex) 20 mg tablet Take 1 tablet (20 mg) by mouth once daily. 30 tablet 11     traZODone (Desyrel) 50 mg tablet Take 1 tablet (50 mg) by mouth as needed at bedtime for sleep.       True Metrix Glucose Test Strip strip USE TO CHECK BLOOD SUGAR FOUR TIMES DAILY       Ultra Thin Lancets 30 gauge misc TEST BLOOD SUGAR FOUR TIMES DAILY AS DIRECTED          Invasive Hemodynamics:    Most Recent Range Past 24hrs   BP (Art)   No data recorded   MAP(Art)   No data recorded   RA/CVP   No data recorded   PA 19/10 PAP  Min: 9/2  Max: 51/42   PA(mean) 13 mmHg PAP (Mean)  Min: 6 mmHg  Max: 55 mmHg   PCWP 10 mmHg PCWP (mmHg)  Min: 10 mmHg  Max: 10 mmHg   CO 5.1 L/min CO (L/min)  Min: 4.9 L/min  Max: 5.6 L/min   CI 2.1 L/min/m2 CI (L/min/m2)  Min: 2 L/min/m2  Max: 2.3 L/min/m2   Mixed Venous 65 % SVO2 (%)  Min: 63 %  Max: 72 %   SVR  1338 (dyne*sec)/cm5 SVR (dyne*sec)/cm5  Min: 877 (dyne*sec)/cm5  Max: 1338 (dyne*sec)/cm5   PVR 46 (dyne*sec)/cm5 No data recorded     MCS:   Heart Mate III:     Most Recent Range Past 24hrs   Flow   No data " "recorded   Speed   No data recorded   Power   No data recorded   PI   No data recorded     ECMO:     Most Recent Range Past 24hrs   Flow   No data recorded   Speed   No data recorded   Sweep   No data recorded     Impella:      Most Recent Range Past 24hrs   Performance Level   No data recorded   Flow (L/min)   No data recorded   Motor Current   No data recorded   Placement Signal    Placement OK could not be evaluated. This SmartLink does not work with rows of the type: Custom List   Purge (mmHg)   No data recorded   Purge rate (mL/hr)   No data recorded     VENT:    Most Recent Range Past 24hrs   Mode      FiO2 21 % FiO2 (%)  Min: 21 %   Min taken time: 07/24/24 0400  Max: 21 %   Max taken time: 07/24/24 0400   Rate   No data recorded   Vt    No data recorded   PEEP   No data recorded         7/24/2024     7:59 AM 7/24/2024     7:04 AM 7/24/2024     6:34 AM 7/24/2024     6:00 AM 7/24/2024     5:58 AM 7/24/2024     5:50 AM 7/24/2024     5:00 AM   Vitals   Systolic  101  103 116  96   Diastolic  73  56 78  70   Heart Rate  81  87 86 83 90   Temp 36 °C (96.8 °F)         Resp  14  21 16 15 14   Weight (lb)   303.13       BMI   44.9 kg/m2       BSA (m2)   2.59 m2         Visit Vitals  /73   Pulse 81   Temp 36 °C (96.8 °F)   Resp 14   Ht 1.75 m (5' 8.9\")   Wt 138 kg (303 lb 2.1 oz)   SpO2 97%   BMI 44.90 kg/m²   Smoking Status Every Day   BSA 2.59 m²     Wt Readings from Last 5 Encounters:   07/24/24 138 kg (303 lb 2.1 oz)   07/18/24 137 kg (301 lb 9.4 oz)   07/13/24 136 kg (300 lb)   07/12/24 137 kg (301 lb)   07/02/24 137 kg (301 lb 9.6 oz)       Intake/Output Summary (Last 24 hours) at 7/24/2024 0803  Last data filed at 7/23/2024 2300  Gross per 24 hour   Intake 1725 ml   Output 1700 ml   Net 25 ml     CHEST: Unlabored, Clear, Diminished  CV:  Normal sinus rhythm  ABD:  Soft, Rounded Soft, Nontender Bowel sounds: All quadrants, Flatus: Yes  EXT:   RLE: Appropriate for ethnicity,Cool  DP: Moderate  PT: " Moderate  LLE: Appropriate for ethnicity,Cool  DP: Moderate  PT: Moderate  NEURO:   RASS: Drowsy  CAM: Negative  LOC: Alert  Cognition: Appropriate judgement, Appropriate safety awareness, Appropriate attention/concentration, Appropriate for developmental age, Follows commands  GCS: 15    DATA:  CMP:  Recent Labs     07/24/24  0529 07/23/24  0009 07/22/24  1634 07/22/24  0525 07/21/24  0515 07/20/24  0601 07/19/24  0519 07/18/24  1558 07/13/24  1242 07/02/24  0910 02/21/24  0958 02/21/24  0134 02/20/24  0528 02/20/24  0126   * 132* 134* 137 135* 136 136 135* 132* 131*   < > 133*   < > 129*   K 4.4 3.9 4.4 4.6 4.2 4.3 4.0 4.3 3.8 4.5   < > 3.6   < > 4.5   CL 99 95* 96* 97* 98 96* 96* 100 102 91*   < > 92*   < > 93*   CO2 23 25 25 23 23 29 29 25 23 29   < > 32   < > 21   ANIONGAP 14 16 17 22* 18 15 15 14 11 16   < > 13   < > 20   BUN 26* 27* 26* 24* 20 21 21 20 15 16   < > 27*   < > 25*   CREATININE 1.69* 1.64* 1.75* 1.41* 1.57* 1.39* 1.31* 1.16 1.01 1.39*   < > 1.78*   < > 1.47*   EGFR 53* 55* 51* 66 58* 67 72 83 >90 67   < > 50*   < > 63   MG 2.30 2.07 2.23 2.34 2.29 2.48* 1.98 1.90  --   --   --  2.51*  --  1.59*    < > = values in this interval not displayed.     Recent Labs     07/24/24  0529 07/23/24  0009 07/22/24  1634 07/22/24  0525 07/21/24  0515 07/20/24  0601 07/19/24  0519 07/18/24  1558 07/02/24  0910 05/21/24  0938 02/21/24  0134 02/20/24  0528 02/01/24  0544 01/31/24  2014 11/29/21  0443 11/07/20  1338   ALBUMIN 4.0 3.8 4.0 3.6 3.7 4.0 3.7 3.8 4.4 4.1 4.2 4.5 3.9 4.0   < > 4.6   ALT  --   --  16  --   --   --   --  17 33 21 15 15 12 13   < > 33   AST  --   --  25  --   --   --   --  19 21 24 18 17 16 18   < > 34   BILITOT  --   --  0.5  --   --   --   --  0.5 0.8 0.6 1.1 0.9 0.3 0.3   < > 0.7   LIPASE  --   --   --   --   --   --   --   --   --   --   --   --   --   --   --  29    < > = values in this interval not displayed.     CBC:  Recent Labs     07/24/24  0529 07/23/24  0528 07/23/24  0024  "07/22/24  1634 07/21/24  2340 07/20/24  0601 07/18/24  1558 07/13/24  1242   WBC 5.1 5.6 3.8* 5.9 7.3 6.4  6.4 6.2 6.3   HGB 13.4* 13.6 18.1* 14.3 13.2* 14.9  14.9 13.6 12.3*   HCT 39.0* 39.9* 52.8* 41.0 39.4* 45.0  45.0 42.3 37.1*    197 121* 193 196 232  232 241 206   MCV 92 92 91 91 93 95  95 98 96     COAG:   Recent Labs     07/24/24  0529 07/23/24  0216 07/23/24  0009 07/22/24  0525 07/21/24  2039 07/18/24  1818 07/18/24  1558 07/02/24  0910 12/02/23  2341 12/28/22  1755 12/28/22  1533 10/20/22  1211 06/15/22  0756   INR  --  0.9  --   --   --   --  1.1 1.0 1.1 1.0 1.0 1.0 1.0   HAUF 0.4 0.3 0.9 0.3 0.3   < >  --   --   --   --   --   --   --    DDIMERVTE  --   --   --   --   --   --   --   --   --   --   --  696*  --     < > = values in this interval not displayed.     ABO:   Recent Labs     07/22/24 1634   ABO O     HEME/ENDO:   Recent Labs     07/22/24  1634 07/18/24  1558 05/21/24  0938 02/01/24  0544 12/12/23  1112 04/20/23  1109 03/14/23  1056   FERRITIN  --  234 274  --   --   --  394*   IRONSAT  --  28 24*  --   --   --  28   TSH 1.73 2.29 3.98  --   --   --  3.62   HGBA1C  --  13.3* 13.7* 15.3* 10.8*   < >  --     < > = values in this interval not displayed.     CARDIAC:   Recent Labs     07/22/24  1634 07/18/24  1558 07/02/24  0910 05/21/24  0938 02/21/24  0958 02/20/24  0126 02/01/24  0154 01/31/24 2014 12/03/23  0823 12/03/23  0128 12/02/23  2342 12/02/23  2341 03/14/23  1056     --   --   --   --   --   --   --   --   --   --   --   --    TROPHS  --  59*  --   --  67* 65* 177* 99* 55* 54* 40*  --   --    * 127* 173* 195*  --  259*  --  314*  --   --   --  375* 68     Recent Labs     07/24/24  0529 07/19/24  0625 07/19/24  0519 07/18/24  2357 07/18/24  1725   LACMX  --   --  0.7 1.3 0.7   SO2MV 67   < > 55 59 59  59    < > = values in this interval not displayed.     No results for input(s): \"TACROLIMUS\", \"SIROLIMUS\", \"CYCLOSPORINE\" in the last 22456 hours.  Recent Labs "     12/12/23  1112 04/20/23  1109 12/28/22  1937 10/21/22  0528   CHOL 125 122 162 166   LDLF  --  65 - 85   LDLCALC 50  --   --   --    HDL 26.6 31.4* 32.4* 40.0   TRIG 241* 128 401* 207*     MICRO:   Recent Labs     02/01/24  0000 06/15/22  0756 12/30/21  1241   CRP 0.50 6.52* 0.49     Susceptibility data from last 90 days.  Collected Specimen Info Organism   07/13/24 Tissue/Biopsy from Skin/Superficial Abscess Mixed Anaerobic Bacteria     Mixed Gram-Positive and Gram-Negative Bacteria       LDA:  Introducer Internal jugular Right (Active)   Earliest Known Present: 07/18/24   Location: Internal jugular  Orientation: Right  Placement Verified: Transduced waveform   Number of days: 6       Pulmonary Artery Catheter Internal jugular (Active)   Placement Date/Time: 07/18/24 1338   Hand Hygiene Performed Prior to CVC Insertion: Yes  Site Prep: Chlorhexidine   Site Prep Agent has Completely Dried Before Insertion: Yes  All 5 Sterile Barriers Used (Gloves, Gown, Cap, Mask, Large Sterile Drape):...   Number of days: 5     NUTRITION: Adult diet Cardiac, Carb Controlled; 75 gram carb/meal, 45 gram Carb evening snack; 1500 mL fluid; 70 gm fat; 2 - 3 grams Sodium  EMERGENCY CONTACT: Extended Emergency Contact Information  Primary Emergency Contact: FREDDY LEWIS  Address: [address] [place] [address] [address]           [place] [id number] [facility]  Home Phone: [phone]  Mobile Phone: [phone]  Relation: Spouse  Preferred language: English   needed? No  CODE STATUS: Full Code  DISPO: Discharge Planning  Living Arrangements: Spouse/significant other  Support Systems: Spouse/significant other  Assistance Needed: none  Type of Residence: Private residence  Do you have animals or pets at home?: No  Who is requesting discharge planning?: Provider  Home or Post Acute Services: None  Expected Discharge Disposition: Home or Self Care  Does the patient need discharge transport arranged?: No  FOLLOWUP:   Future  Appointments   Date Time Provider Department Center   7/24/2024  8:30 AM [facility] INPATIENT PORTABLE [id number] [facility]   7/24/2024  9:00 AM [facility]PATIENT PORT[facility] [id number] [facility]   7/24/2024  2:00 PM [facility] BOL6F PFT RM 2 [id number] [facility]   7/29/2024  8:00 AM CARDIAC REHAB [facility] [id number] CARDREHB ROOM [id number] Academic   7/31/2024  8:00 AM CARDIAC REHAB [facility] [id number] CARDREHB ROOM [id number] [facility]   8/2/2024  8:00 AM CARDIAC REHAB [facility] [id number] CARDREHB ROOM [id number] Academic   8/5/2024  8:00 AM CARDIAC REHAB [facility] [id number] CARDREHB ROOM [id number] Academic   8/7/2024  8:00 AM CARDIAC REHAB [facility] [id number] CARDREHB ROOM [id number] Academic   8/9/2024  8:00 AM CARDIAC REHAB [facility] [id number] CARDREHB ROOM [id number] Academic   8/12/2024  8:00 AM CARDIAC REHAB [facility] [id number] CARDREHB ROOM [id number] Academic   8/14/2024  8:00 AM CARDIAC REHAB [facility] [id number] CARDREHB ROOM [id number] Academic   8/14/2024 11:00 AM PHARMACY [facility] [id number] Academic   8/16/2024  8:00 AM CARDIAC REHAB [facility] [id number] CARDREHB ROOM [id number] Academic   8/19/2024  8:00 AM CARDIAC REHAB [facility] [id number] CARDREHB ROOM [id number] Academic   8/20/2024  8:30 AM Delroy Jurado MD [id number] Academic   8/21/2024  8:00 AM CARDIAC REHAB [facility] [id number] CARDREHB ROOM [id number] Academic   8/23/2024  8:00 AM CARDIAC REHAB [facility] [id number] CARDREHB ROOM [id number] Academic   8/26/2024  8:00 AM CARDIAC REHAB [facility] [id number] CARDREHB ROOM [id number] Academic   8/26/2024 11:40 AM Christopher D'Amico, DO [id number] [facility]   8/28/2024  8:00 AM CARDIAC REHAB [facility] [id number] CARDREHB ROOM [id number] Academic   8/30/2024  8:00 AM CARDIAC REHAB [facility] [id number] CARDREHB ROOM [id number] Academic   9/4/2024  8:00 AM CARDIAC REHAB [facility] [id number] CARDREHB ROOM [id number] Academic   9/6/2024  8:00 AM CARDIAC REHAB [facility] [id number] CARDREHB ROOM [id number] Academic   9/17/2024  7:45 PM SLEEP LAB [address]   10/2/2024  8:00 AM Brittney  MD Jerman [id number] Academic

## 2024-07-25 ENCOUNTER — APPOINTMENT (OUTPATIENT)
Dept: VASCULAR MEDICINE | Facility: HOSPITAL | Age: 39
End: 2024-07-25
Payer: COMMERCIAL

## 2024-07-25 ENCOUNTER — APPOINTMENT (OUTPATIENT)
Dept: RADIOLOGY | Facility: HOSPITAL | Age: 39
End: 2024-07-25
Payer: COMMERCIAL

## 2024-07-25 ENCOUNTER — APPOINTMENT (OUTPATIENT)
Dept: CARDIOLOGY | Facility: HOSPITAL | Age: 39
End: 2024-07-25
Payer: COMMERCIAL

## 2024-07-25 LAB
ALBUMIN SERPL BCP-MCNC: 4.4 G/DL (ref 3.4–5)
AMPHETAMINES SERPL QL SCN: NEGATIVE NG/ML
ANION GAP SERPL CALC-SCNC: 16 MMOL/L (ref 10–20)
ANNOTATION COMMENT IMP: NORMAL
APTT PPP: 66 SECONDS (ref 27–38)
BARBITURATES SERPL QL SCN: NEGATIVE NG/ML
BENZODIAZ SERPL QL SCN: NEGATIVE NG/ML
BODY SURFACE AREA: 2.58 M2
BUN SERPL-MCNC: 25 MG/DL (ref 6–23)
BUPRENORPHINE SERPL-MCNC: NEGATIVE NG/ML
CALCIUM SERPL-MCNC: 9.7 MG/DL (ref 8.6–10.6)
CANNABINOIDS SERPL QL SCN: NEGATIVE NG/ML
CHLORIDE SERPL-SCNC: 98 MMOL/L (ref 98–107)
CO2 SERPL-SCNC: 24 MMOL/L (ref 21–32)
COCAINE SERPL QL SCN: NEGATIVE NG/ML
CREAT SERPL-MCNC: 1.76 MG/DL (ref 0.5–1.3)
EGFRCR SERPLBLD CKD-EPI 2021: 50 ML/MIN/1.73M*2
ERYTHROCYTE [DISTWIDTH] IN BLOOD BY AUTOMATED COUNT: 12.9 % (ref 11.5–14.5)
GLUCOSE BLD MANUAL STRIP-MCNC: 107 MG/DL (ref 74–99)
GLUCOSE BLD MANUAL STRIP-MCNC: 127 MG/DL (ref 74–99)
GLUCOSE BLD MANUAL STRIP-MCNC: 134 MG/DL (ref 74–99)
GLUCOSE BLD MANUAL STRIP-MCNC: 180 MG/DL (ref 74–99)
GLUCOSE BLD MANUAL STRIP-MCNC: 214 MG/DL (ref 74–99)
GLUCOSE BLD MANUAL STRIP-MCNC: 237 MG/DL (ref 74–99)
GLUCOSE BLD MANUAL STRIP-MCNC: 78 MG/DL (ref 74–99)
GLUCOSE SERPL-MCNC: 127 MG/DL (ref 74–99)
HCT VFR BLD AUTO: 46.6 % (ref 41–52)
HCV RNA SERPL NAA+PROBE-ACNC: NOT DETECTED K[IU]/ML
HCV RNA SERPL NAA+PROBE-LOG IU: NORMAL {LOG_IU}/ML
HGB BLD-MCNC: 15.1 G/DL (ref 13.5–17.5)
INR PPP: 1 (ref 0.9–1.1)
MAGNESIUM SERPL-MCNC: 2.35 MG/DL (ref 1.6–2.4)
MCH RBC QN AUTO: 31.3 PG (ref 26–34)
MCHC RBC AUTO-ENTMCNC: 32.4 G/DL (ref 32–36)
MCV RBC AUTO: 97 FL (ref 80–100)
METHADONE SERPL QL SCN: NEGATIVE NG/ML
METHAMPHET SERPL QL: NEGATIVE NG/ML
MGC ASCENT PFT - FEV1 - PRE: 2.04
MGC ASCENT PFT - FEV1 - PREDICTED: 3.91
MGC ASCENT PFT - FVC - PRE: 3.46
MGC ASCENT PFT - FVC - PREDICTED: 4.77
NRBC BLD-RTO: 0 /100 WBCS (ref 0–0)
OPIATES SERPL QL SCN: NEGATIVE NG/ML
OXYCODONE SERPL QL: NEGATIVE NG/ML
PCP SERPL QL SCN: NEGATIVE NG/ML
PHOSPHATE SERPL-MCNC: 4.8 MG/DL (ref 2.5–4.9)
PLATELET # BLD AUTO: 202 X10*3/UL (ref 150–450)
POTASSIUM SERPL-SCNC: 5 MMOL/L (ref 3.5–5.3)
POTASSIUM SERPL-SCNC: 5.9 MMOL/L (ref 3.5–5.3)
POTASSIUM SERPL-SCNC: 6.6 MMOL/L (ref 3.5–5.3)
PROT C AG ACT/NOR PPP IA: 92 % (ref 63–153)
PROT S AG ACT/NOR PPP IA: 168 % (ref 84–134)
PROTHROMBIN TIME: 11.7 SECONDS (ref 9.8–12.8)
RBC # BLD AUTO: 4.82 X10*6/UL (ref 4.5–5.9)
SODIUM SERPL-SCNC: 132 MMOL/L (ref 136–145)
UFH PPP CHRO-ACNC: 0.4 IU/ML
WBC # BLD AUTO: 5.3 X10*3/UL (ref 4.4–11.3)

## 2024-07-25 PROCEDURE — 83735 ASSAY OF MAGNESIUM: CPT

## 2024-07-25 PROCEDURE — 93010 ELECTROCARDIOGRAM REPORT: CPT | Performed by: INTERNAL MEDICINE

## 2024-07-25 PROCEDURE — 85027 COMPLETE CBC AUTOMATED: CPT

## 2024-07-25 PROCEDURE — 84132 ASSAY OF SERUM POTASSIUM: CPT

## 2024-07-25 PROCEDURE — 2500000002 HC RX 250 W HCPCS SELF ADMINISTERED DRUGS (ALT 637 FOR MEDICARE OP, ALT 636 FOR OP/ED)

## 2024-07-25 PROCEDURE — 81003 URINALYSIS AUTO W/O SCOPE: CPT

## 2024-07-25 PROCEDURE — 2500000005 HC RX 250 GENERAL PHARMACY W/O HCPCS: Performed by: REGISTERED NURSE

## 2024-07-25 PROCEDURE — 85520 HEPARIN ASSAY: CPT

## 2024-07-25 PROCEDURE — 93922 UPR/L XTREMITY ART 2 LEVELS: CPT

## 2024-07-25 PROCEDURE — 93880 EXTRACRANIAL BILAT STUDY: CPT

## 2024-07-25 PROCEDURE — 1100000001 HC PRIVATE ROOM DAILY

## 2024-07-25 PROCEDURE — 2500000004 HC RX 250 GENERAL PHARMACY W/ HCPCS (ALT 636 FOR OP/ED)

## 2024-07-25 PROCEDURE — 99232 SBSQ HOSP IP/OBS MODERATE 35: CPT | Performed by: PHYSICIAN ASSISTANT

## 2024-07-25 PROCEDURE — 93979 VASCULAR STUDY: CPT | Performed by: INTERNAL MEDICINE

## 2024-07-25 PROCEDURE — 36415 COLL VENOUS BLD VENIPUNCTURE: CPT

## 2024-07-25 PROCEDURE — 71045 X-RAY EXAM CHEST 1 VIEW: CPT

## 2024-07-25 PROCEDURE — 80069 RENAL FUNCTION PANEL: CPT

## 2024-07-25 PROCEDURE — 85610 PROTHROMBIN TIME: CPT

## 2024-07-25 PROCEDURE — 99232 SBSQ HOSP IP/OBS MODERATE 35: CPT | Performed by: STUDENT IN AN ORGANIZED HEALTH CARE EDUCATION/TRAINING PROGRAM

## 2024-07-25 PROCEDURE — 93922 UPR/L XTREMITY ART 2 LEVELS: CPT | Performed by: INTERNAL MEDICINE

## 2024-07-25 PROCEDURE — 93005 ELECTROCARDIOGRAM TRACING: CPT

## 2024-07-25 PROCEDURE — 71045 X-RAY EXAM CHEST 1 VIEW: CPT | Performed by: RADIOLOGY

## 2024-07-25 PROCEDURE — 93880 EXTRACRANIAL BILAT STUDY: CPT | Performed by: INTERNAL MEDICINE

## 2024-07-25 PROCEDURE — 2500000001 HC RX 250 WO HCPCS SELF ADMINISTERED DRUGS (ALT 637 FOR MEDICARE OP)

## 2024-07-25 PROCEDURE — 2500000002 HC RX 250 W HCPCS SELF ADMINISTERED DRUGS (ALT 637 FOR MEDICARE OP, ALT 636 FOR OP/ED): Performed by: REGISTERED NURSE

## 2024-07-25 PROCEDURE — 82947 ASSAY GLUCOSE BLOOD QUANT: CPT

## 2024-07-25 PROCEDURE — 93979 VASCULAR STUDY: CPT

## 2024-07-25 PROCEDURE — 80323 ALKALOIDS NOS: CPT

## 2024-07-25 PROCEDURE — 99232 SBSQ HOSP IP/OBS MODERATE 35: CPT

## 2024-07-25 RX ORDER — DEXTROSE 50 % IN WATER (D50W) INTRAVENOUS SYRINGE
25 ONCE
Status: DISCONTINUED | OUTPATIENT
Start: 2024-07-25 | End: 2024-07-25

## 2024-07-25 RX ORDER — DEXTROSE MONOHYDRATE 100 MG/ML
50 INJECTION, SOLUTION INTRAVENOUS CONTINUOUS
Status: DISCONTINUED | OUTPATIENT
Start: 2024-07-25 | End: 2024-07-25

## 2024-07-25 RX ORDER — TALC
3 POWDER (GRAM) TOPICAL NIGHTLY
Status: DISCONTINUED | OUTPATIENT
Start: 2024-07-25 | End: 2024-07-30 | Stop reason: HOSPADM

## 2024-07-25 RX ORDER — CALCIUM GLUCONATE 20 MG/ML
2 INJECTION, SOLUTION INTRAVENOUS ONCE
Status: DISCONTINUED | OUTPATIENT
Start: 2024-07-25 | End: 2024-07-30

## 2024-07-25 RX ORDER — DEXTROSE MONOHYDRATE 100 MG/ML
50 INJECTION, SOLUTION INTRAVENOUS CONTINUOUS
Status: ACTIVE | OUTPATIENT
Start: 2024-07-25 | End: 2024-07-25

## 2024-07-25 RX ORDER — DEXTROSE 50 % IN WATER (D50W) INTRAVENOUS SYRINGE
25 ONCE
Status: COMPLETED | OUTPATIENT
Start: 2024-07-25 | End: 2024-07-25

## 2024-07-25 ASSESSMENT — COGNITIVE AND FUNCTIONAL STATUS - GENERAL
MOBILITY SCORE: 23
PERSONAL GROOMING: A LITTLE
EATING MEALS: A LITTLE
CLIMB 3 TO 5 STEPS WITH RAILING: A LITTLE
EATING MEALS: A LITTLE
PERSONAL GROOMING: A LITTLE
DAILY ACTIVITIY SCORE: 22
CLIMB 3 TO 5 STEPS WITH RAILING: A LITTLE
DAILY ACTIVITIY SCORE: 22
MOBILITY SCORE: 23

## 2024-07-25 ASSESSMENT — ACTIVITIES OF DAILY LIVING (ADL): LACK_OF_TRANSPORTATION: NO

## 2024-07-25 ASSESSMENT — PAIN SCALES - GENERAL
PAINLEVEL_OUTOF10: 0 - NO PAIN
PAINLEVEL_OUTOF10: 0 - NO PAIN

## 2024-07-25 ASSESSMENT — PAIN - FUNCTIONAL ASSESSMENT: PAIN_FUNCTIONAL_ASSESSMENT: 0-10

## 2024-07-25 NOTE — CARE PLAN
The patient's goals for the shift include      The clinical goals for the shift include Patient will have clear plan of LVAd workup  Patient potassium came back 1st 5.9. 2nd draw came back 6.6 gave kt cocktail, 10  of insulin and IV push dextrose. Patient sugar remained stable and redrew potassium

## 2024-07-25 NOTE — PROGRESS NOTES
"Arthur Carranza is a 38 y.o. male on day 6 of admission presenting with Cardiogenic shock (Multi).    Subjective   - Denies Dyspnea, Chest pain, or any other complaints today  - Pressures were normal and Lasix remained on hold today  - [place] was removed and plan to transfer to floor as pt in good condition       Objective     Physical Exam  Constitutional:       Appearance: He is obese.   HENT:      Head: Normocephalic.      Nose: Nose normal.   Eyes:      Conjunctiva/sclera: Conjunctivae normal.   Cardiovascular:      Rate and Rhythm: Normal rate and regular rhythm.      Heart sounds: No murmur heard.     No friction rub. No gallop.   Pulmonary:      Effort: Pulmonary effort is normal.      Breath sounds: Normal breath sounds.   Abdominal:      General: Abdomen is flat.      Palpations: Abdomen is soft.   Musculoskeletal:         General: Normal range of motion.   Skin:     Capillary Refill: Capillary refill takes less than 2 seconds.   Neurological:      Mental Status: He is alert. Mental status is at baseline.   Psychiatric:         Mood and Affect: Mood normal.         Last Recorded Vitals  Blood pressure 114/78, pulse 109, temperature 36.1 °C (97 °F), temperature source Temporal, resp. rate 20, height 1.75 m (5' 8.9\"), weight 138 kg (303 lb 2.1 oz), SpO2 100%.  Intake/Output last 3 Shifts:  I/O last 3 completed shifts:  In: 2941.7 (21.4 mL/kg) [P.O.:2200; I.V.:741.7 (5.4 mL/kg)]  Out: 3050 (22.2 mL/kg) [Urine:3050 (0.6 mL/kg/hr)]  Weight: 137.5 kg     Relevant Results  Results for orders placed or performed during the hospital encounter of 07/18/24 (from the past 24 hour(s))   POCT GLUCOSE   Result Value Ref Range    POCT Glucose 132 (H) 74 - 99 mg/dL   Blood Gas Mixed Venous   Result Value Ref Range    POCT pH, Mixed 7.32 (L) 7.33 - 7.43 pH    POCT pCO2, Mixed 51 41 - 51 mm Hg    POCT pO2, Mixed 40 35 - 45 mm Hg    POCT SO2, Mixed 65 45 - 75 %    POCT Oxy Hemoglobin, Mixed 63.7 45.0 - 75.0 %    POCT Base Excess, " Mixed -0.5 -2.0 - 3.0 mmol/L    POCT HCO3 Calculated, Mixed 26.3 (H) 22.0 - 26.0 mmol/L    Patient Temperature 37.0 degrees Celsius    FiO2 21 %   Renal function panel   Result Value Ref Range    Glucose 128 (H) 74 - 99 mg/dL    Sodium 132 (L) 136 - 145 mmol/L    Potassium 4.4 3.5 - 5.3 mmol/L    Chloride 99 98 - 107 mmol/L    Bicarbonate 23 21 - 32 mmol/L    Anion Gap 14 10 - 20 mmol/L    Urea Nitrogen 26 (H) 6 - 23 mg/dL    Creatinine 1.69 (H) 0.50 - 1.30 mg/dL    eGFR 53 (L) >60 mL/min/1.73m*2    Calcium 9.1 8.6 - 10.6 mg/dL    Phosphorus 5.5 (H) 2.5 - 4.9 mg/dL    Albumin 4.0 3.4 - 5.0 g/dL   Magnesium   Result Value Ref Range    Magnesium 2.30 1.60 - 2.40 mg/dL   CBC   Result Value Ref Range    WBC 5.1 4.4 - 11.3 x10*3/uL    nRBC 0.0 0.0 - 0.0 /100 WBCs    RBC 4.24 (L) 4.50 - 5.90 x10*6/uL    Hemoglobin 13.4 (L) 13.5 - 17.5 g/dL    Hematocrit 39.0 (L) 41.0 - 52.0 %    MCV 92 80 - 100 fL    MCH 31.6 26.0 - 34.0 pg    MCHC 34.4 32.0 - 36.0 g/dL    RDW 12.6 11.5 - 14.5 %    Platelets 179 150 - 450 x10*3/uL   Heparin Assay, UFH   Result Value Ref Range    Heparin Unfractionated 0.4 See Comment Below for Therapeutic Ranges IU/mL   Blood Gas Mixed Venous   Result Value Ref Range    POCT pH, Mixed 7.31 (L) 7.33 - 7.43 pH    POCT pCO2, Mixed 47 41 - 51 mm Hg    POCT pO2, Mixed 43 35 - 45 mm Hg    POCT SO2, Mixed 67 45 - 75 %    POCT Oxy Hemoglobin, Mixed 65.8 45.0 - 75.0 %    POCT Base Excess, Mixed -2.9 (L) -2.0 - 3.0 mmol/L    POCT HCO3 Calculated, Mixed 23.7 22.0 - 26.0 mmol/L    Patient Temperature 37.0 degrees Celsius    FiO2 21 %   POCT GLUCOSE   Result Value Ref Range    POCT Glucose 163 (H) 74 - 99 mg/dL   POCT GLUCOSE   Result Value Ref Range    POCT Glucose 136 (H) 74 - 99 mg/dL   Pulmonary function testing   Result Value Ref Range    FVC - Predicted 4.77     FEV1 - Predicted 3.91     FVC - PRE 3.46     FEV1 - Pre 2.04    Renal function panel   Result Value Ref Range    Glucose 75 74 - 99 mg/dL    Sodium 133  (L) 136 - 145 mmol/L    Potassium 4.7 3.5 - 5.3 mmol/L    Chloride 99 98 - 107 mmol/L    Bicarbonate 25 21 - 32 mmol/L    Anion Gap 14 10 - 20 mmol/L    Urea Nitrogen 28 (H) 6 - 23 mg/dL    Creatinine 1.57 (H) 0.50 - 1.30 mg/dL    eGFR 58 (L) >60 mL/min/1.73m*2    Calcium 10.0 8.6 - 10.6 mg/dL    Phosphorus 4.6 2.5 - 4.9 mg/dL    Albumin 4.3 3.4 - 5.0 g/dL   POCT GLUCOSE   Result Value Ref Range    POCT Glucose 77 74 - 99 mg/dL   POCT GLUCOSE   Result Value Ref Range    POCT Glucose 159 (H) 74 - 99 mg/dL              Assessment/Plan   Principal Problem:    Cardiogenic shock (Multi)  Active Problems:    Nonischemic cardiomyopathy (Multi)    Morbid obesity with BMI of 45.0-49.9, adult (Multi)    Cocaine use disorder, mild (Multi)    Acute decompensated heart failure (Multi)    39 YO Male with hx of NICM/HFrEF (EF 5-10), initially diagnosed 10/2022, HTN, HLD, hypertriglyceridemia, Type II DM (poorly controlled A1c 13.7%, on insulin), severe obesity (BMI 44), 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 Started on IV lasix and GDMT on admission but CO/CI persistently low so started milrinone on 07/19 0.125 > 0.25 later that day for improving CO/CI. Currently undergoing LVAD evaluation, signed consent 07/22. However, significant psychosocial barriers at play at this time (court dates, unstable housing, polysubstance use etc). Patient's pressured normalized and CI improved >2 and so swan was removed on 7/24 and pt transferred to the floor in stable condition.    # NICM HFrEF (EF 5-10%, TTE 02/2024)- combined systolic and diastolic dysfunction  #Low cardiac output state +/- cardiogenic shock (Resolved)  -  Diagnosed since 2022  - [facility] in 2022 with no evidence of angiographically obstructive CAD  - TTE 07/2024:  CONCLUSIONS:   1. Poorly visualized anatomical structures due to suboptimal image quality.   2. Left ventricular ejection fraction is severely decreased, by visual estimate at 10%.   3. Spectral Doppler shows a pseudonormal pattern of left ventricular diastolic filling.   4. Left ventricular cavity size is severely dilated (LVIDd 7.4 cm)   5. There is normal right ventricular global systolic function.  - Elective RHC 07/18 showed elevated R and L sided filing pressure and low cardiac output/index: RA 24, RVSP 63, PA 64/40 (48), W 41, /90 (101) mmHg. 3. CO/CI [Evelia]: 4.4 / 1.7. CO/CI [TD]: 3.47 / 1.4. 5. SVR: 1775 cgs, PVR: 2.0 COLON, /74.  - Opening SGC on arrival to [facility] #: /72 (82), PAP 56/43(47), CVP 8, SVR 1357, CI 1.76  - admit weight: 140 Kg  > 138 kg (07/24)  - admit BNP: 127 (low as morbidly obese) >107 (07/23)  - GDMT: - continue entresto 49/51 mg bid. continue spironolactone (dose increased to 25 mg on 07/22), farxiga 10 mg. Holding Coreg in setting of low output state.   - Diuretics:  Currently on diuretic holiday for MICHAELA after aggressive diuresis (on hold since 07/23)  - Inotropes/ Pressors: milrinone 0.25 stopped 7/24  - Latest swan numbers on 7/24: Mean PA 18, PCWP 10, CI 2.6, and SVR 1338  - Given these numbers, the swan was removed on 7/24 and pt was transferred to floor in stable condition  - Currently undergoing LVAD eval (Signed consent on 7/22)  - Closing [place] CO/CI#: 7.6/3.1 off milrinone, removing swan and transferring to floor   - Daily standing weights, 2gm sodium diet, 2L fluid restriction, strict I&Os    #MICHAELA:  - No known renal issues   - Baseline BUN/Cr 15/1.1  - Post aggressive diuresis with IV lasix (net negative ~ 9-10 L since admission)  - Cr jumped to 1.8 on 07/22 evening, Cr slowly downtrending since diuresis stopped on 7/23 and Cr now 1.57  - Continue to monitor  Cr off diuresis  -I/Os  -avoid hypotension and nephrotoxic agent    #DM type 2 (uncontrolled)   - hgbA1c : 13.3 (07 /2024)  - home regimen: Insulin lantus 55 U qpm, lispro 18 U TID means, Trulicity 1.5 q weekly, metformin 1000 mg BID  - Inpatient regimen: Lantus 55 qpm, Lispro 20 U TID meals, sliding scale # 2  - holding metformin and Trulicity   - Endocrine following    - BGM AC & HS  - Consistent carbs diet    #Recent abdominal wall abscess  - Recently seen in ER on 07/13 for abd wall abscess 2.3 cm x 3.5 cm x 2 cm s/p I& D and started on bactrim DS 1 tab BID x 10 days (end date 07/23)  -afebrile, nontoxic   -no s/s infx    #Hx of cardioembolic stroke: (12/2022)  - no residual symptoms, AO x 3 on arrival  - Takes xarelto 20 mg at home   - holding xarelto inpatient, will start on heparin gtt  - Statin: cont lipitor 40 mg  - Serial neuro assessments   - PO Tylenol PRN for pain  - PT/OT Consult, OOB to chair  - CAM ICU score every shift     #Anxiety/depression:  - continue home zoloft 100 mg daily  - mood normal on admission  - Sleep/wake cycle normalization    #Substance abuse  -Alcohol abuse: reports occasional binge drinking but denies withdrawal   -Tobacco use/Nicotine Dependence: occasional cigarette smoker, previous hx of heavy alcohol use         PHYSICAL AND OCCUPATIONAL THERAPY:     LINES:  MANE Pantoja (07/18 - 07/24)  DVT: heparin gtt  GLYCEMIC CONTROL: insulin + sliding scale ordered  BOWEL CARE: Miralax prn ordered  NUTRITION: Adult diet Cardiac; 70 gm fat; 2 - 3 grams Sodium  NUTRITION: Adult diet Cardiac, Carb Controlled; 75 gram carb/meal, 45 gram Carb evening snack; 1500 mL fluid; 70 gm fat; 2 - 3 grams Sodium     EMERGENCY CONTACT: Extended Emergency Contact Information  Primary Emergency Contact: FREDDY LEWIS  Address: [address] [place] [address] [address]           [place] [address] [facility]  Home Phone: [phone]  Mobile Phone: [phone]  Relation: Spouse  Preferred language:  English   needed? No  FAMILY UPDATE: wife present at bedside, updated 07/24  CODE STATUS: Full Code  DISPO: pending clinical status         Bethel Gonzales MD

## 2024-07-25 NOTE — PROGRESS NOTES
"Arthur Carranza is a 38 y.o. male on day 7 of admission presenting with Cardiogenic shock (Multi).    Subjective   Transferred to floor last night. NAEO. He underwent vascular ultrasounds for LVAD evaluation today which revealed persistence of R abdominal wall abscess and an acute R internal jugular thrombus.     This morning, his potassium was also elevated to 5.9. It was 6.6 on recheck. No EKG changes on serial EKGs. He was given insulin+dextrose and lokelma with repeat K pending. He is asymptomatic from a cardiac standpoint.    Objective     Physical Exam  Constitutional:       Appearance: He is obese.   HENT:      Head: Normocephalic.      Nose: Nose normal.   Eyes:      Conjunctiva/sclera: Conjunctivae normal.   Cardiovascular:      Rate and Rhythm: Normal rate and regular rhythm.      Heart sounds: No murmur heard.     No friction rub. No gallop.   Pulmonary:      Effort: Pulmonary effort is normal.      Breath sounds: Normal breath sounds.   Abdominal:      General: Abdomen is flat.      Palpations: Abdomen is soft.   Musculoskeletal:         General: Normal range of motion.   Skin:     Capillary Refill: Capillary refill takes less than 2 seconds.   Neurological:      Mental Status: He is alert. Mental status is at baseline.   Psychiatric:         Mood and Affect: Mood normal.         Last Recorded Vitals  Blood pressure 104/75, pulse 99, temperature 35.8 °C (96.4 °F), temperature source Temporal, resp. rate 20, height 1.75 m (5' 8.9\"), weight 137 kg (302 lb 0.5 oz), SpO2 97%.  Intake/Output last 3 Shifts:  I/O last 3 completed shifts:  In: 1948.7 (14.2 mL/kg) [P.O.:1320; I.V.:628.7 (4.6 mL/kg)]  Out: 4400 (32.1 mL/kg) [Urine:4400 (0.9 mL/kg/hr)]  Weight: 137 kg     Relevant Results  Results for orders placed or performed during the hospital encounter of 07/18/24 (from the past 24 hour(s))   Renal function panel   Result Value Ref Range    Glucose 75 74 - 99 mg/dL    Sodium 133 (L) 136 - 145 mmol/L    Potassium " 4.7 3.5 - 5.3 mmol/L    Chloride 99 98 - 107 mmol/L    Bicarbonate 25 21 - 32 mmol/L    Anion Gap 14 10 - 20 mmol/L    Urea Nitrogen 28 (H) 6 - 23 mg/dL    Creatinine 1.57 (H) 0.50 - 1.30 mg/dL    eGFR 58 (L) >60 mL/min/1.73m*2    Calcium 10.0 8.6 - 10.6 mg/dL    Phosphorus 4.6 2.5 - 4.9 mg/dL    Albumin 4.3 3.4 - 5.0 g/dL   POCT GLUCOSE   Result Value Ref Range    POCT Glucose 77 74 - 99 mg/dL   POCT GLUCOSE   Result Value Ref Range    POCT Glucose 159 (H) 74 - 99 mg/dL   POCT GLUCOSE   Result Value Ref Range    POCT Glucose 152 (H) 74 - 99 mg/dL   POCT GLUCOSE   Result Value Ref Range    POCT Glucose 127 (H) 74 - 99 mg/dL   Renal function panel   Result Value Ref Range    Glucose 127 (H) 74 - 99 mg/dL    Sodium 132 (L) 136 - 145 mmol/L    Potassium 5.9 (H) 3.5 - 5.3 mmol/L    Chloride 98 98 - 107 mmol/L    Bicarbonate 24 21 - 32 mmol/L    Anion Gap 16 10 - 20 mmol/L    Urea Nitrogen 25 (H) 6 - 23 mg/dL    Creatinine 1.76 (H) 0.50 - 1.30 mg/dL    eGFR 50 (L) >60 mL/min/1.73m*2    Calcium 9.7 8.6 - 10.6 mg/dL    Phosphorus 4.8 2.5 - 4.9 mg/dL    Albumin 4.4 3.4 - 5.0 g/dL   Magnesium   Result Value Ref Range    Magnesium 2.35 1.60 - 2.40 mg/dL   CBC   Result Value Ref Range    WBC 5.3 4.4 - 11.3 x10*3/uL    nRBC 0.0 0.0 - 0.0 /100 WBCs    RBC 4.82 4.50 - 5.90 x10*6/uL    Hemoglobin 15.1 13.5 - 17.5 g/dL    Hematocrit 46.6 41.0 - 52.0 %    MCV 97 80 - 100 fL    MCH 31.3 26.0 - 34.0 pg    MCHC 32.4 32.0 - 36.0 g/dL    RDW 12.9 11.5 - 14.5 %    Platelets 202 150 - 450 x10*3/uL   Heparin Assay, UFH   Result Value Ref Range    Heparin Unfractionated 0.4 See Comment Below for Therapeutic Ranges IU/mL   POCT GLUCOSE   Result Value Ref Range    POCT Glucose 237 (H) 74 - 99 mg/dL   Electrocardiogram, 12-lead PRN ACS symtpoms   Result Value Ref Range    Ventricular Rate 95 BPM    Atrial Rate 95 BPM    NY Interval 192 ms    QRS Duration 142 ms    QT Interval 408 ms    QTC Calculation(Bazett) 512 ms    P Axis 58 degrees    R  Axis 186 degrees    T Axis 10 degrees    QRS Count 16 beats    Q Onset 216 ms    P Onset 120 ms    P Offset 170 ms    T Offset 420 ms    QTC Fredericia 475 ms   POCT GLUCOSE   Result Value Ref Range    POCT Glucose 214 (H) 74 - 99 mg/dL   Potassium   Result Value Ref Range    Potassium 6.6 (HH) 3.5 - 5.3 mmol/L   POCT GLUCOSE   Result Value Ref Range    POCT Glucose 107 (H) 74 - 99 mg/dL   Electrocardiogram, 12-lead PRN ACS symtpoms   Result Value Ref Range    Ventricular Rate 84 BPM    Atrial Rate 84 BPM    AL Interval 198 ms    QRS Duration 152 ms    QT Interval 420 ms    QTC Calculation(Bazett) 496 ms    P Axis 47 degrees    R Axis -56 degrees    T Axis 88 degrees    QRS Count 14 beats    Q Onset 213 ms    P Onset 114 ms    P Offset 170 ms    T Offset 423 ms    QTC Fredericia 469 ms   POCT GLUCOSE   Result Value Ref Range    POCT Glucose 78 74 - 99 mg/dL   POCT GLUCOSE   Result Value Ref Range    POCT Glucose 134 (H) 74 - 99 mg/dL              Assessment/Plan   Principal Problem:    Cardiogenic shock (Multi)  Active Problems:    Nonischemic cardiomyopathy (Multi)    Morbid obesity with BMI of 45.0-49.9, adult (Multi)    Cocaine use disorder, mild (Multi)    Acute decompensated heart failure (Multi)    37 YO Male with hx of NICM/HFrEF (EF 5-10), initially diagnosed 10/2022, HTN, HLD, hypertriglyceridemia, Type II DM (poorly controlled A1c 13.7%, on insulin), severe obesity (BMI 44), 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 Started on IV lasix and GDMT on admission but CO/CI persistently low so started milrinone on 07/19 0.125 > 0.25 later that day for improving CO/CI. Currently undergoing LVAD  evaluation, signed consent 07/22. However, significant psychosocial barriers at play at this time (court dates, unstable housing, polysubstance use etc). Patient's pressured normalized and CI improved >2 and so swan was removed on 7/24 and pt transferred to the floor in stable condition.    # NICM HFrEF (EF 5-10%, TTE 02/2024)- combined systolic and diastolic dysfunction (NYHA IV, ACC Stage D)  #Low cardiac output state +/- cardiogenic shock (Resolved)  - Diagnosed since 2022  - [facility] in 2022 with no evidence of angiographically obstructive CAD  - TTE 07/2024:  CONCLUSIONS:   1. Poorly visualized anatomical structures due to suboptimal image quality.   2. Left ventricular ejection fraction is severely decreased, by visual estimate at 10%.   3. Spectral Doppler shows a pseudonormal pattern of left ventricular diastolic filling.   4. Left ventricular cavity size is severely dilated (LVIDd 7.4 cm)   5. There is normal right ventricular global systolic function.  - Elective RHC 07/18 showed elevated R and L sided filing pressure and low cardiac output/index: RA 24, RVSP 63, PA 64/40 (48), W 41, /90 (101) mmHg. 3. CO/CI [Evelia]: 4.4 / 1.7. CO/CI [TD]: 3.47 / 1.4. 5. SVR: 1775 cgs, PVR: 2.0 COLON, /74.  - [place] [place] (82), PAP 56/43(47), CVP 8, SVR 1357, CI 1.76  - admit weight: 140 Kg  > 138 kg (07/24)  - admit BNP: 127 (low as morbidly obese) >107 (07/23)  - GDMT: - continue entresto 49/51 mg bid. continue spironolactone (dose increased to 25 mg on 07/22), farxiga 10 mg. Holding Coreg in setting of low output state.   - Diuretics:  Currently on diuretic holiday for MICHAELA after aggressive diuresis (on hold since 07/23)  - Inotropes/ Pressors: milrinone 0.25 stopped 7/24  - Closing [place] CO/CI#: 7.6/3.1 off milrinone  - Daily standing weights, 2gm sodium diet, 2L fluid restriction, strict I&Os    #Advanced therapies evaluation  LVAD or Transplant   -BT or BTT: O positive  -Email sent  on (7/22)  -Labs: Needs factor V leiden, Protein S, and nicotine completed.  -RHC: complete as above  -LHC (10/21/22): No CAD  -CPET: not completed  -ECHO (7/19/2024): EF 10-15%  -CT chest: Ordered. Needs completion  -US abdomen/aorta/iliac/IVC (7/25/2024): Obsctructed by abscess, but normal in calibur on CT from 7/13  -Carotid US (7/25/2024): Unable to visualize R carotid due to bandage. L carotid w <50% stenosis.  -CURRY (lower): No PAD  -2 view CXR (7/22/2024)  -Device interrogation  -PFTs (7/24/2024): read pending  -Colonoscopy/Occult stool: Not indicated  -LVAD/TX education: met with patient on 7/22/24  -CT surgery consult: needs consult  -Palliative following  -Nutrition consult (7/23/24)  -Dental consult/Orthopantogram (7/23/2024): Has caries that will need to be filled at later time  -Physical and Occupational Therapy     #MICHAELA:  - No known renal issues   - Baseline BUN/Cr 15/1.1  - Post aggressive diuresis with IV lasix (net negative ~ 9-10 L since admission)  - Cr jumped to 1.8 on 07/22 evening, Cr slowly downtrending since diuresis stopped on 7/23 and Cr now 1.57  - Continue to monitor Cr off diuresis  -I/Os  -avoid hypotension and nephrotoxic agent    #DM type 2 (uncontrolled)   - hgbA1c : 13.3 (07 /2024)  - home regimen: Insulin lantus 55 U qpm, lispro 18 U TID means, Trulicity 1.5 q weekly, metformin 1000 mg BID  - Inpatient regimen: Lantus 55 qpm, Lispro 20 U TID meals, sliding scale # 2  - holding metformin and Trulicity   - Endocrine following    - BGM AC & HS  - Consistent carbs diet    #Right abdominal wall abscess  - Recently seen in ER on 07/13 for abd wall abscess 2.3 cm x 3.5 cm x 2 cm s/p I& D and completed bactrim DS 1 tab BID x 10 days (end date 07/23)  - Aortic ultrasound redemonstrated abscess findings.  - ID and ACS consulted for further management, appreciate recs.  -afebrile, nontoxic   -no s/s infx    #Acute R internal jugular thrombus  - Found incidentally on vascular carotid  ultrasound.  - On heparin gtt, transition to DOAC when able.    #Hx of cardioembolic stroke: (12/2022)  - no residual symptoms, AO x 3 on arrival  - Takes xarelto 20 mg at home   - holding xarelto inpatient, will start on heparin gtt  - Statin: cont lipitor 40 mg  - Serial neuro assessments   - PO Tylenol PRN for pain  - PT/OT Consult, OOB to chair  - CAM ICU score every shift     #Anxiety/depression:  - continue home zoloft 100 mg daily  - mood normal on admission  - Sleep/wake cycle normalization    #Substance abuse  -Alcohol abuse: reports occasional binge drinking but denies withdrawal   -Tobacco use/Nicotine Dependence: occasional cigarette smoker, previous hx of heavy alcohol use         PHYSICAL AND OCCUPATIONAL THERAPY:     LINES:  MANE Pantoja (07/18 - 07/24)  DVT: heparin gtt  GLYCEMIC CONTROL: insulin + sliding scale ordered  BOWEL CARE: Miralax prn ordered  NUTRITION: Adult diet Cardiac; 70 gm fat; 2 - 3 grams Sodium  NUTRITION: Adult diet Cardiac, Carb Controlled; 75 gram carb/meal, 45 gram Carb evening snack; 1500 mL fluid; 70 gm fat; 2 - 3 grams Sodium   resent at bedside, updated 07/24  CODE STATUS: Full Code  DISPO: pending LVAD evaluation.         Dariusz Hernández MD

## 2024-07-25 NOTE — CARE PLAN
The patient's goals for the shift include      The clinical goals for the shift include pt will have SGC removed and transferred to floor    Over the shift, the patient remained safe without injury, any complaint of dizziness or pain. He remained therapeutic on Heparin assay.

## 2024-07-25 NOTE — CONSULTS
[facility]  ACUTE CARE SURGERY - HISTORY AND PHYSICAL / CONSULT    Patient Name: Arthur Carranza  MRN: [id number]  Admit Date: 718  : 1985  AGE: 38 y.o.   GENDER: male  ==============================================================================  TODAY'S ASSESSMENT AND PLAN OF CARE:  37 yo male, extensive medical history most notable for NICM/HFrEF (EF 5-10%) and T2DM (A1c >13%). ACS consulted for possible RLQ abdominal abscess (has undergone prior I&Ds of this area). Patient denies pain or tenderness, drainage, f/c, or any symptoms in this area. Bedside ultrasound performed without any signs of underlying abscess    - recommended patient stop smoking and try to get his diabetes under better control  - no need for abx or drainage  - ACS will sign off    [facility]  [id number]    ==============================================================================  CHIEF COMPLAINT/REASON FOR CONSULT:  Possible RLQ abscess    PAST MEDICAL HISTORY:   PMH:   Past Medical History:   Diagnosis Date    CHF (congestive heart failure) (Multi)     Diabetes mellitus (Multi)     Heart disease     Hypertension     Substance abuse (Multi)        PSH:   Past Surgical History:   Procedure Laterality Date    CARDIAC CATHETERIZATION N/A 2024    Procedure: Right Heart Cath;  Surgeon: Delroy Jurado MD;  Location: [facility] Cardiac Cath Lab;  Service: Cardiovascular;  Laterality: N/A;    MR HEAD ANGIO WO IV CONTRAST  2022    MR HEAD ANGIO WO IV CONTRAST 2022 DOCTOR OFFICE LEGACY    OTHER SURGICAL HISTORY  2022    Cyst excision    OTHER SURGICAL HISTORY  10/25/2022    Cardiac catheterization     FH:   Family History   Adopted: Yes     SOCIAL HISTORY:    Smoking:    Social History     Tobacco Use   Smoking Status Every Day    Types: Cigarettes   Smokeless Tobacco Not on file       Alcohol:    Social History     Substance and Sexual Activity   Alcohol Use Yes    Comment: occasionaly  "- 1-2 times a month         MEDICATIONS:   Prior to Admission medications    Medication Sig Start Date End Date Taking? Authorizing Provider   atorvastatin (Lipitor) 40 mg tablet TAKE 1 TABLET BY MOUTH AT BEDTIME 7/17/24   Delroy Jurado MD   blood-glucose meter,continuous (FreeStyle Nadir 3 Brush) [facility] Use as instructed to test blood glucose throughout the day 6/17/24   Christopher D'Amico, DO   blood-glucose sensor (FreeStyle Nadir 3 Sensor) device Use as directed to test blood glucose throughout the day 7/17/24   Christopher D'Amico, DO   buPROPion XL (Wellbutrin XL) 150 mg 24 hr tablet Take 1 tablet (150 mg) by mouth once daily. 10/24/23   Historical Provider, MD   carvedilol (Coreg) 6.25 mg tablet Take 1 tablet (6.25 mg) by mouth 2 times daily (morning and late afternoon). 7/2/24 7/2/25  Delroy Jurado MD   dapagliflozin propanediol (Farxiga) 10 mg Take 1 tablet (10 mg) by mouth once daily. 5/21/24 5/21/25  Delroy Jurado MD   dulaglutide (Trulicity) 1.5 mg/0.5 mL pen injector injection Inject 1.5 mg under the skin 1 (one) time per week. 7/21/24   Christopher D'Amico, DO   insulin glargine (Lantus Solostar U-100 Insulin) 100 unit/mL (3 mL) pen Inject 55 Units under the skin once daily at bedtime. Take as directed per insulin instructions.  Patient taking differently: Inject 60 Units under the skin once daily at bedtime. Take as directed per insulin instructions. 2/23/24 7/19/24  Merry Chaney MD   insulin lispro (HumaLOG) 100 unit/mL injection Inject 16 Units under the skin 3 times a day with meals. Take as directed per insulin instructions. 2/23/24   Merry Chaney MD   metFORMIN (Glucophage) 1,000 mg tablet Take 1 tablet (1,000 mg) by mouth 2 times a day.    Historical Provider, MD   pen needle, diabetic 31 gauge x 5/16\" needle Use to inject 1-4 times daily as directed. 2/23/24   Merry Chaney MD   rivaroxaban (Xarelto) 20 mg tablet Take 1 tablet (20 mg) by mouth once daily in " the evening. Take with meals. Take with food. 5/21/24   Delroy Jurado MD   sacubitriL-valsartan (Entresto) 49-51 mg tablet Take 1 tablet by mouth 2 times a day. 7/2/24 7/2/25  Delroy Jurado MD   sertraline (Zoloft) 100 mg tablet Take 1 tablet (100 mg) by mouth once daily. 10/24/23   Historical Provider, MD   spironolactone (Aldactone) 25 mg tablet Take 0.5 tablets (12.5 mg) by mouth once daily. Do not start before February 24, 2024. 2/24/24 2/23/25  GEE Galaviz   sulfamethoxazole-trimethoprim (Bactrim DS) 800-160 mg tablet Take 1 tablet by mouth every 12 hours for 10 days. 7/13/24 7/23/24  Kel Nelson PA-C   torsemide (Demadex) 20 mg tablet Take 1 tablet (20 mg) by mouth once daily. 2/23/24 2/22/25  GEE Galaviz   traZODone (Desyrel) 50 mg tablet Take 1 tablet (50 mg) by mouth as needed at bedtime for sleep. 10/24/23   Historical Provider, MD   True Metrix Glucose Test Strip strip USE TO CHECK BLOOD SUGAR FOUR TIMES DAILY 4/19/23   Historical Provider, MD   Ultra Thin Lancets 30 gauge misc TEST BLOOD SUGAR FOUR TIMES DAILY AS DIRECTED 4/19/23   Historical Provider, MD     ALLERGIES:   Allergies   Allergen Reactions    Shellfish Containing Products Unknown       REVIEW OF SYSTEMS:  Review of Systems as per hpi, otherwise negative      PHYSICAL EXAM:  Physical Exam  NAD  Abd soft, nontender, nondistneded, RLQ under pannus with evidence of prior I&Ds, no erythema, no fluctuance, no induration, no drainage, no tenderness      IMAGING SUMMARY:   Bedside US performed: no fluid collections      LABS:  Results from last 7 days   Lab Units 07/25/24  0724 07/24/24  0529 07/23/24  0528 07/23/24  0024 07/22/24  1634 07/21/24  2340 07/20/24  0601   WBC AUTO x10*3/uL 5.3 5.1 5.6   < > 5.9   < > 6.4  6.4   HEMOGLOBIN g/dL 15.1 13.4* 13.6   < > 14.3   < > 14.9  14.9   HEMATOCRIT % 46.6 39.0* 39.9*   < > 41.0   < > 45.0  45.0   PLATELETS AUTO x10*3/uL 202 179 197   < > 193   < >  232  232   NEUTROS PCT AUTO %  --   --  60.4  --  63.2  --  65.4   LYMPHS PCT AUTO %  --   --  22.5  --  19.4  --  21.6   MONOS PCT AUTO %  --   --  13.2  --  14.0  --  10.8   EOS PCT AUTO %  --   --  3.0  --  2.6  --  1.5    < > = values in this interval not displayed.     Results from last 7 days   Lab Units 07/23/24  0216   APTT seconds 63*   INR  0.9     Results from last 7 days   Lab Units 07/25/24  1236 07/25/24  0724 07/24/24  1804 07/24/24  0529 07/23/24  0009 07/22/24  1634   SODIUM mmol/L  --  132* 133* 132*   < > 134*   POTASSIUM mmol/L 6.6* 5.9* 4.7 4.4   < > 4.4   CHLORIDE mmol/L  --  98 99 99   < > 96*   CO2 mmol/L  --  24 25 23   < > 25   BUN mg/dL  --  25* 28* 26*   < > 26*   CREATININE mg/dL  --  1.76* 1.57* 1.69*   < > 1.75*   CALCIUM mg/dL  --  9.7 10.0 9.1   < > 9.2   PROTEIN TOTAL g/dL  --   --   --   --   --  7.0   BILIRUBIN TOTAL mg/dL  --   --   --   --   --  0.5   ALK PHOS U/L  --   --   --   --   --  58   ALT U/L  --   --   --   --   --  16   AST U/L  --   --   --   --   --  25   GLUCOSE mg/dL  --  127* 75 128*   < > 155*    < > = values in this interval not displayed.     Results from last 7 days   Lab Units 07/22/24  1634   BILIRUBIN TOTAL mg/dL 0.5             I have reviewed all laboratory and imaging results ordered/pertinent for this encounter.

## 2024-07-25 NOTE — PROGRESS NOTES
Spiritual Care Visit    Clinical Encounter Type  Visited With: Patient and family together (Pt's wife, Era, present.)  Routine Visit: Introduction  Continue Visiting: Yes     Introduced palliative spiritual care to the patient and inquired about coping and systems of support. Provided non-anxious, supportive presence and reflective listening. Will continue to follow.

## 2024-07-25 NOTE — PROGRESS NOTES
"Arthur Carranza is a 38 y.o. male on day 7 of admission presenting with Cardiogenic shock (Multi).    Subjective   No acute events overnight. Pt's glucose has been well controlled on current regimen. 15 minutes spent on diabetes education including dietary and medication adherence. Pt agrees that he would benefit from endocrine care outpt.     Pt is now to remain admitted until LVAD meeting next week will determine intervention schedule. He agrees art therapy may be helpful for his mood with prolonged hospital stay.         Objective   Review of Systems   All other systems reviewed and are negative.        Physical Exam  Constitutional:       Appearance: Normal appearance. He is obese.   HENT:      Head: Normocephalic and atraumatic.      Nose: Nose normal.      Mouth/Throat:      Mouth: Mucous membranes are dry.      Pharynx: Oropharynx is clear.   Eyes:      Extraocular Movements: Extraocular movements intact.      Conjunctiva/sclera: Conjunctivae normal.   Cardiovascular:      Rate and Rhythm: Normal rate and regular rhythm.      Pulses: Normal pulses.      Heart sounds: Normal heart sounds.   Pulmonary:      Effort: Pulmonary effort is normal.      Breath sounds: Normal breath sounds.   Abdominal:      General: Abdomen is flat. Bowel sounds are normal.      Palpations: Abdomen is soft.   Skin:     General: Skin is warm and dry.   Neurological:      General: No focal deficit present.      Mental Status: He is alert and oriented to person, place, and time. Mental status is at baseline.   Psychiatric:         Mood and Affect: Mood normal.         Behavior: Behavior normal.         Thought Content: Thought content normal.         Judgment: Judgment normal.         Last Recorded Vitals  Blood pressure 104/75, pulse 99, temperature 35.8 °C (96.4 °F), temperature source Temporal, resp. rate 20, height 1.75 m (5' 8.9\"), weight 137 kg (302 lb 0.5 oz), SpO2 97%.    Intake/Output last 3 Shifts:  I/O last 3 completed " shifts:  In: 1948.7 (14.2 mL/kg) [P.O.:1320; I.V.:628.7 (4.6 mL/kg)]  Out: 4400 (32.1 mL/kg) [Urine:4400 (0.9 mL/kg/hr)]  Weight: 137 kg     Scheduled medications  atorvastatin, 40 mg, oral, Nightly  buPROPion XL, 150 mg, oral, Daily  calcium gluconate, 2 g, intravenous, Once  dapagliflozin propanediol, 10 mg, oral, Daily  heparin, 4,000 Units, intravenous, Once  insulin glargine, 55 Units, subcutaneous, Nightly  insulin lispro, 0-10 Units, subcutaneous, TID AC  insulin lispro, 20 Units, subcutaneous, TID AC  perflutren protein A microsphere, 0.5 mL, intravenous, Once in imaging  polyethylene glycol, 17 g, oral, Daily  [Held by provider] sacubitriL-valsartan, 1 tablet, oral, BID  sertraline, 100 mg, oral, Daily  sodium zirconium cyclosilicate, 10 g, oral, q8h  [Held by provider] spironolactone, 25 mg, oral, Daily      Continuous medications  dextrose 10 % in water (D10W), 50 mL/hr  heparin, 0-4,000 Units/hr, Last Rate: 2,000 Units/hr (07/25/24 1325)  lactated Ringer's, 10 mL/hr, Last Rate: Stopped (07/24/24 2000)  [Held by provider] milrinone, 0.25 mcg/kg/min, Last Rate: Stopped (07/24/24 1118)      PRN medications  PRN medications: acetaminophen, dextrose, dextrose, glucagon, glucagon, heparin, melatonin, oxygen, traZODone      Results from last 7 days   Lab Units 07/25/24  1236 07/25/24  0724 07/23/24  0009 07/22/24  1634   SODIUM mmol/L  --  132*   < > 134*   POTASSIUM mmol/L 6.6* 5.9*   < > 4.4   CHLORIDE mmol/L  --  98   < > 96*   CO2 mmol/L  --  24   < > 25   BUN mg/dL  --  25*   < > 26*   CREATININE mg/dL  --  1.76*   < > 1.75*   CALCIUM mg/dL  --  9.7   < > 9.2   ALBUMIN g/dL  --  4.4   < > 4.0   PROTEIN TOTAL g/dL  --   --   --  7.0   BILIRUBIN TOTAL mg/dL  --   --   --  0.5   ALK PHOS U/L  --   --   --  58   ALT U/L  --   --   --  16   AST U/L  --   --   --  25   GLUCOSE mg/dL  --  127*   < > 155*    < > = values in this interval not displayed.     }  Assessment/Plan   Principal Problem:    Cardiogenic  shock (Multi)  Active Problems:    Nonischemic cardiomyopathy (Multi)    Morbid obesity with BMI of 45.0-49.9, adult (Multi)    Cocaine use disorder, mild (Multi)    Acute decompensated heart failure (Multi)      Arthur Carranza is a 38 y.o. male presenting with uncontrolled T2DM in the setting of ICU admission for cardiogenic shock/HFrEF (EF 5-10%). His A1c has been above 13% even before his hospitalization, which is consistent with past notes showing noncompliance with diabetes management. Since he is in the ICU we want to avoid hypoglycemia, even if we are a bit permissive with hyperglycemia. His 55 lantus he is currently on as an inpatient seems to be controlling his glucose overnight. We need to increase his lispro with meals again because he ids often requiring at least 4 units of additional lispro with meals. He also has milrinone in D5, which can also make him hyperglycemic. His Cr is not elevated enough from baseline to warrant lower insulin doses.         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 (Multi)  2024-01: Hyperglycemia due to diabetes mellitus (Multi)  2023-12: Type 2 diabetes mellitus with hyperglycemia (Multi)  2023-03: Type 2 diabetes mellitus with neurologic complication, with   long-term current use of insulin (Multi)      History where DKA was Reason for Admission in last year N/A      RECOMMENDATIONS:     PLAN  Steroids: none    - continue glargine 55u at bedtime        If NPO:  reduce to 35u  - continue lispro 20 with meals plus scale       If NPO: hold  - continue lispro corrective scale #2 with meals, adjust to q4h if NPO   = 0u  151-200 = 2u  201-250 = 4u  251-300 = 6u  301-350 = 8u  351-400 = 10u  - art therapy ordered today     -Accuchecks (not BMP) TIDAC and QHS- kindly ensure QHS Accucheck is drawn; it is often missed   - Goal -180  -Hypoglycemia protocol  -Diabetes Diet- low carb 60 CHO  -Will continue to follow and titrate insulin accordingly    SGLT2i tx may not be utilized in inpt setting unless the following conditions are met. Only HF Cardiology may initiate inpatient SGLT2i use.   1) pt is eating and drinking by mouth with a total daily carb intake exceeding 60g of CHO  2) pt is urinating independently of urinary catheters  3) pt can ambulate freely to the restroom in order to maintain personal hygiene  4) no current concern for UTI/genital or inguinal candidiasis  5) no plans for NPO within the next 48-72hr    Discharge planning:   [] patient may expect to discharge home on MDI, final doses TBD by titration  []will provide CGM sample prior to discharge   []will enroll pt in  pharmacy platinum plan program  []follow up with [facility]     Teresa Nino PA-C   Endocrinology  Inpatient Diabetes Management Team

## 2024-07-25 NOTE — PROGRESS NOTES
07/25/24 1255   Discharge Planning   Living Arrangements Parent   Support Systems Parent   Assistance Needed none   Type of Residence Private residence   Number of Stairs to Enter Residence 0   Number of Stairs Within Residence 0   Do you have animals or pets at home? Yes   Type of Animals or Pets dog   Who is requesting discharge planning? Provider   Home or Post Acute Services None   Expected Discharge Disposition Home   Does the patient need discharge transport arranged? No   Financial Resource Strain   How hard is it for you to pay for the very basics like food, housing, medical care, and heating? Not very   Housing Stability   In the last 12 months, was there a time when you were not able to pay the mortgage or rent on time? N   In the past 12 months, how many times have you moved where you were living? 1   At any time in the past 12 months, were you homeless or living in a shelter (including now)? N   Transportation Needs   In the past 12 months, has lack of transportation kept you from medical appointments or from getting medications? no   In the past 12 months, has lack of transportation kept you from meetings, work, or from getting things needed for daily living? No   Patient Choice   Provider Choice list and CMS website (https://medicare.gov/care-compare#search) for post-acute Quality and Resource Measure Data were provided and reviewed with: Patient   Patient / Family choosing to utilize agency / facility established prior to hospitalization No     Transitional Care Coordinator Note:  7/25/2024@9:00 Met with patient to introduce myself, role and discuss discharge planning s/p admission.  Patient lives with mom  Independent in all ADL's. Does  require assist devices for ambulation. Demographics and contact information confirmed.  Will continue to monitor patient for all home going needs.  Benjy Syed RN [facility] [phone]              Previous Home Care  None   DME   None   Pharmacy  [facility]   Falls:  none   PCP  LESTER CHICAS   O2/Cpap-None   Dialysis None   Transportation at discharge- Has ride home

## 2024-07-25 NOTE — PROGRESS NOTES
"Palliative Medicine following for:  Complex medical decision making, symptom management, patient/family support    History obtained from chart review including ED note, H&P, patient's daily progress notes, review of lab/test results, and discussion with primary team and bedside RN.    Subjective    This morning, Mr. Carranza reported feeling better and ready to go home. He says he has been undergoing several tests and LVAD meeting planned for Tuesday.    Symptoms  Pain: denies  Dyspnea: denies, improved from admission  Fatigue: denies  Insomnia: endorses, says he got melatonin two nights ago and it helped significantly but he has not received any since and he has been having trouble falling asleep without it  Drowsiness: denies  Constipation: denies  Nausea: denies  Appetite: good  Anxiety: improved from prior, says he has been practicing the mindfulness techniques that were discussed on Tuesday  Depression: denies    Objective    Last Recorded Vitals  /77 (BP Location: Right arm, Patient Position: Lying)   Pulse 95   Temp 36.9 °C (98.4 °F) (Temporal)   Resp 18   Ht 1.75 m (5' 8.9\")   Wt 137 kg (302 lb 0.5 oz)   SpO2 94%   BMI 44.73 kg/m²      Physical Exam   General: well-appearing, no acute distress, resting comfortably in bed  HEENT: normocephalic, atraumatic  Cardio: regular rate and rhythm, normal S1 and S2, no murmurs  Pulm: clear to auscultation bilaterally, no wheezes, crackles, or rhonchi, breathing comfortably on room air  Abd: normal, active bowel sounds; soft, non-tender, non-distended  Extremities: no peripheral edema, scars, or lesions  Neuro: alert and oriented to person, place, and time, CN II-XII grossly intact  Psych: mood and affect are appropriate    Relevant Results   Results for orders placed or performed during the hospital encounter of 07/18/24 (from the past 24 hour(s))   POCT GLUCOSE   Result Value Ref Range    POCT Glucose 136 (H) 74 - 99 mg/dL   Pulmonary function testing "   Result Value Ref Range    FVC - Predicted 4.77     FEV1 - Predicted 3.91     FVC - PRE 3.46     FEV1 - Pre 2.04    Renal function panel   Result Value Ref Range    Glucose 75 74 - 99 mg/dL    Sodium 133 (L) 136 - 145 mmol/L    Potassium 4.7 3.5 - 5.3 mmol/L    Chloride 99 98 - 107 mmol/L    Bicarbonate 25 21 - 32 mmol/L    Anion Gap 14 10 - 20 mmol/L    Urea Nitrogen 28 (H) 6 - 23 mg/dL    Creatinine 1.57 (H) 0.50 - 1.30 mg/dL    eGFR 58 (L) >60 mL/min/1.73m*2    Calcium 10.0 8.6 - 10.6 mg/dL    Phosphorus 4.6 2.5 - 4.9 mg/dL    Albumin 4.3 3.4 - 5.0 g/dL   POCT GLUCOSE   Result Value Ref Range    POCT Glucose 77 74 - 99 mg/dL   POCT GLUCOSE   Result Value Ref Range    POCT Glucose 159 (H) 74 - 99 mg/dL   POCT GLUCOSE   Result Value Ref Range    POCT Glucose 152 (H) 74 - 99 mg/dL   POCT GLUCOSE   Result Value Ref Range    POCT Glucose 127 (H) 74 - 99 mg/dL   Renal function panel   Result Value Ref Range    Glucose 127 (H) 74 - 99 mg/dL    Sodium 132 (L) 136 - 145 mmol/L    Potassium 5.9 (H) 3.5 - 5.3 mmol/L    Chloride 98 98 - 107 mmol/L    Bicarbonate 24 21 - 32 mmol/L    Anion Gap 16 10 - 20 mmol/L    Urea Nitrogen 25 (H) 6 - 23 mg/dL    Creatinine 1.76 (H) 0.50 - 1.30 mg/dL    eGFR 50 (L) >60 mL/min/1.73m*2    Calcium 9.7 8.6 - 10.6 mg/dL    Phosphorus 4.8 2.5 - 4.9 mg/dL    Albumin 4.4 3.4 - 5.0 g/dL   Magnesium   Result Value Ref Range    Magnesium 2.35 1.60 - 2.40 mg/dL   CBC   Result Value Ref Range    WBC 5.3 4.4 - 11.3 x10*3/uL    nRBC 0.0 0.0 - 0.0 /100 WBCs    RBC 4.82 4.50 - 5.90 x10*6/uL    Hemoglobin 15.1 13.5 - 17.5 g/dL    Hematocrit 46.6 41.0 - 52.0 %    MCV 97 80 - 100 fL    MCH 31.3 26.0 - 34.0 pg    MCHC 32.4 32.0 - 36.0 g/dL    RDW 12.9 11.5 - 14.5 %    Platelets 202 150 - 450 x10*3/uL   Heparin Assay, UFH   Result Value Ref Range    Heparin Unfractionated 0.4 See Comment Below for Therapeutic Ranges IU/mL   Vascular US aorta iliac duplex limited   Result Value Ref Range    BSA 2.58 m2    Vascular US Ankle Brachial Index (CURRY) Without Exercise   Result Value Ref Range    BSA 2.58 m2        Allergies  Shellfish containing products  Medications  Scheduled medications  atorvastatin, 40 mg, oral, Nightly  buPROPion XL, 150 mg, oral, Daily  dapagliflozin propanediol, 10 mg, oral, Daily  heparin, 4,000 Units, intravenous, Once  insulin glargine, 55 Units, subcutaneous, Nightly  insulin lispro, 0-10 Units, subcutaneous, TID AC  insulin lispro, 20 Units, subcutaneous, TID AC  perflutren protein A microsphere, 0.5 mL, intravenous, Once in imaging  polyethylene glycol, 17 g, oral, Daily  sacubitriL-valsartan, 1 tablet, oral, BID  sertraline, 100 mg, oral, Daily  spironolactone, 25 mg, oral, Daily      Continuous medications  heparin, 0-4,000 Units/hr, Last Rate: 2,000 Units/hr (07/25/24 0114)  lactated Ringer's, 10 mL/hr, Last Rate: Stopped (07/24/24 2000)  [Held by provider] milrinone, 0.25 mcg/kg/min, Last Rate: Stopped (07/24/24 1118)      PRN medications  PRN medications: acetaminophen, dextrose, dextrose, glucagon, glucagon, heparin, melatonin, oxygen, traZODone     Assessment/Plan    Arthur Carranza is a 37 yo M w PMH of nonischemic cardiomyopathy/HFrEF, T2DM, and cardioembolic stroke presenting after elective RHC for LVAD workup. Main symptom is insomnia     Code Status: Full Code     #Complex Medical Decision Making  #Goals of Care  #Advanced Care Planning  - Code status: full code  - Surrogate decision maker: sister (Michell)  - Goals are survival and time based   - Plan for patient to complete Advanced Directives with social work prior to discharge     #HFrEF  #LVAD workup  - will continue to discuss LVAD preparedness, risks/benefits     #Insomnia  - please schedule melatonin 3mg at 6PM  - please schedule melatonin 3mg at bedtime     #Anxiety  - c/w sertraline  - continue mindfulness techniques discussed with Dr. Wayne     #Psychosocial Support  - Music Therapy (likes jazz music)  - Spiritual Care  Support  - Art Therapy  - Pet Therapy     Thank you for allowing us to care for this patient. Palliative Team will continue to follow as needed. Please contact team with any questions or concerns.   Team pager [phone] (weekdays)  Alison Smith MD  Internal Medicine PGY1

## 2024-07-26 ENCOUNTER — APPOINTMENT (OUTPATIENT)
Dept: RADIOLOGY | Facility: HOSPITAL | Age: 39
End: 2024-07-26
Payer: COMMERCIAL

## 2024-07-26 ENCOUNTER — APPOINTMENT (OUTPATIENT)
Dept: CARDIAC REHAB | Facility: HOSPITAL | Age: 39
End: 2024-07-26
Payer: COMMERCIAL

## 2024-07-26 LAB
ALBUMIN SERPL BCP-MCNC: 4.6 G/DL (ref 3.4–5)
ANION GAP SERPL CALC-SCNC: 17 MMOL/L (ref 10–20)
APPEARANCE UR: CLEAR
BILIRUB UR STRIP.AUTO-MCNC: NEGATIVE MG/DL
BUN SERPL-MCNC: 36 MG/DL (ref 6–23)
CALCIUM SERPL-MCNC: 10 MG/DL (ref 8.6–10.6)
CHLORIDE SERPL-SCNC: 97 MMOL/L (ref 98–107)
CO2 SERPL-SCNC: 25 MMOL/L (ref 21–32)
COLOR UR: COLORLESS
CREAT SERPL-MCNC: 1.87 MG/DL (ref 0.5–1.3)
EGFRCR SERPLBLD CKD-EPI 2021: 47 ML/MIN/1.73M*2
ERYTHROCYTE [DISTWIDTH] IN BLOOD BY AUTOMATED COUNT: 13.1 % (ref 11.5–14.5)
GLUCOSE BLD MANUAL STRIP-MCNC: 105 MG/DL (ref 74–99)
GLUCOSE BLD MANUAL STRIP-MCNC: 138 MG/DL (ref 74–99)
GLUCOSE BLD MANUAL STRIP-MCNC: 239 MG/DL (ref 74–99)
GLUCOSE BLD MANUAL STRIP-MCNC: 68 MG/DL (ref 74–99)
GLUCOSE BLD MANUAL STRIP-MCNC: 71 MG/DL (ref 74–99)
GLUCOSE SERPL-MCNC: 190 MG/DL (ref 74–99)
GLUCOSE UR STRIP.AUTO-MCNC: ABNORMAL MG/DL
HCT VFR BLD AUTO: 49 % (ref 41–52)
HGB BLD-MCNC: 15.6 G/DL (ref 13.5–17.5)
HOLD SPECIMEN: NORMAL
KETONES UR STRIP.AUTO-MCNC: NEGATIVE MG/DL
LEUKOCYTE ESTERASE UR QL STRIP.AUTO: NEGATIVE
MAGNESIUM SERPL-MCNC: 2.41 MG/DL (ref 1.6–2.4)
MCH RBC QN AUTO: 31.2 PG (ref 26–34)
MCHC RBC AUTO-ENTMCNC: 31.8 G/DL (ref 32–36)
MCV RBC AUTO: 98 FL (ref 80–100)
NITRITE UR QL STRIP.AUTO: NEGATIVE
NRBC BLD-RTO: 0 /100 WBCS (ref 0–0)
PH UR STRIP.AUTO: 6.5 [PH]
PHOSPHATE SERPL-MCNC: 5.9 MG/DL (ref 2.5–4.9)
PLATELET # BLD AUTO: 250 X10*3/UL (ref 150–450)
POTASSIUM SERPL-SCNC: 5 MMOL/L (ref 3.5–5.3)
PROT UR STRIP.AUTO-MCNC: NEGATIVE MG/DL
RBC # BLD AUTO: 5 X10*6/UL (ref 4.5–5.9)
RBC # UR STRIP.AUTO: NEGATIVE /UL
SODIUM SERPL-SCNC: 134 MMOL/L (ref 136–145)
SP GR UR STRIP.AUTO: 1.02
TREPONEMA PALLIDUM IGG+IGM AB [PRESENCE] IN SERUM OR PLASMA BY IMMUNOASSAY: NONREACTIVE
UFH PPP CHRO-ACNC: 0.4 IU/ML
UROBILINOGEN UR STRIP.AUTO-MCNC: NORMAL MG/DL
WBC # BLD AUTO: 5.3 X10*3/UL (ref 4.4–11.3)

## 2024-07-26 PROCEDURE — 74177 CT ABD & PELVIS W/CONTRAST: CPT

## 2024-07-26 PROCEDURE — 83735 ASSAY OF MAGNESIUM: CPT

## 2024-07-26 PROCEDURE — 1100000001 HC PRIVATE ROOM DAILY

## 2024-07-26 PROCEDURE — 2500000001 HC RX 250 WO HCPCS SELF ADMINISTERED DRUGS (ALT 637 FOR MEDICARE OP)

## 2024-07-26 PROCEDURE — 80069 RENAL FUNCTION PANEL: CPT

## 2024-07-26 PROCEDURE — 85027 COMPLETE CBC AUTOMATED: CPT

## 2024-07-26 PROCEDURE — 74177 CT ABD & PELVIS W/CONTRAST: CPT | Performed by: RADIOLOGY

## 2024-07-26 PROCEDURE — 85520 HEPARIN ASSAY: CPT

## 2024-07-26 PROCEDURE — 99232 SBSQ HOSP IP/OBS MODERATE 35: CPT | Performed by: STUDENT IN AN ORGANIZED HEALTH CARE EDUCATION/TRAINING PROGRAM

## 2024-07-26 PROCEDURE — 71250 CT THORAX DX C-: CPT | Performed by: RADIOLOGY

## 2024-07-26 PROCEDURE — 71250 CT THORAX DX C-: CPT

## 2024-07-26 PROCEDURE — 71045 X-RAY EXAM CHEST 1 VIEW: CPT | Performed by: RADIOLOGY

## 2024-07-26 PROCEDURE — 71045 X-RAY EXAM CHEST 1 VIEW: CPT

## 2024-07-26 PROCEDURE — 85302 CLOT INHIBIT PROT C ANTIGEN: CPT

## 2024-07-26 PROCEDURE — 2500000002 HC RX 250 W HCPCS SELF ADMINISTERED DRUGS (ALT 637 FOR MEDICARE OP, ALT 636 FOR OP/ED)

## 2024-07-26 PROCEDURE — 36415 COLL VENOUS BLD VENIPUNCTURE: CPT

## 2024-07-26 PROCEDURE — 2500000002 HC RX 250 W HCPCS SELF ADMINISTERED DRUGS (ALT 637 FOR MEDICARE OP, ALT 636 FOR OP/ED): Performed by: REGISTERED NURSE

## 2024-07-26 PROCEDURE — 2500000004 HC RX 250 GENERAL PHARMACY W/ HCPCS (ALT 636 FOR OP/ED)

## 2024-07-26 PROCEDURE — 99254 IP/OBS CNSLTJ NEW/EST MOD 60: CPT | Performed by: INTERNAL MEDICINE

## 2024-07-26 PROCEDURE — 97116 GAIT TRAINING THERAPY: CPT | Mod: GP

## 2024-07-26 PROCEDURE — 82947 ASSAY GLUCOSE BLOOD QUANT: CPT

## 2024-07-26 PROCEDURE — 2550000001 HC RX 255 CONTRASTS: Performed by: STUDENT IN AN ORGANIZED HEALTH CARE EDUCATION/TRAINING PROGRAM

## 2024-07-26 RX ORDER — ISOSORBIDE DINITRATE 20 MG/1
20 TABLET ORAL
Status: DISCONTINUED | OUTPATIENT
Start: 2024-07-26 | End: 2024-07-30 | Stop reason: HOSPADM

## 2024-07-26 RX ORDER — CEFEPIME 1 G/50ML
2 INJECTION, SOLUTION INTRAVENOUS EVERY 8 HOURS
Status: CANCELLED | OUTPATIENT
Start: 2024-07-26

## 2024-07-26 RX ORDER — HYDRALAZINE HYDROCHLORIDE 25 MG/1
25 TABLET, FILM COATED ORAL 3 TIMES DAILY
Status: DISCONTINUED | OUTPATIENT
Start: 2024-07-26 | End: 2024-07-30 | Stop reason: HOSPADM

## 2024-07-26 RX ORDER — METRONIDAZOLE 500 MG/1
500 TABLET ORAL EVERY 8 HOURS SCHEDULED
Status: CANCELLED | OUTPATIENT
Start: 2024-07-26

## 2024-07-26 ASSESSMENT — COGNITIVE AND FUNCTIONAL STATUS - GENERAL
CLIMB 3 TO 5 STEPS WITH RAILING: A LITTLE
EATING MEALS: A LITTLE
MOBILITY SCORE: 23
PERSONAL GROOMING: A LITTLE
CLIMB 3 TO 5 STEPS WITH RAILING: A LITTLE
MOBILITY SCORE: 23
EATING MEALS: A LITTLE
PERSONAL GROOMING: A LITTLE
CLIMB 3 TO 5 STEPS WITH RAILING: A LITTLE
DAILY ACTIVITIY SCORE: 22

## 2024-07-26 ASSESSMENT — PAIN SCALES - GENERAL
PAINLEVEL_OUTOF10: 0 - NO PAIN
PAINLEVEL_OUTOF10: 0 - NO PAIN

## 2024-07-26 ASSESSMENT — PAIN - FUNCTIONAL ASSESSMENT: PAIN_FUNCTIONAL_ASSESSMENT: 0-10

## 2024-07-26 NOTE — PROGRESS NOTES
"Arthur Carranza is a 38 y.o. male on day 8 of admission presenting with Cardiogenic shock (Multi).    Subjective   NAEO. Potassium was within normal limits this morning after hyperkalemia protocol and holding Entresto+spironolactone.    He was evaluated by ACS at bedside who did not appreciate the previously seen abscess on his aortic ultrasound.     He reports no CP, SOB, orthopnea, fevers, chills, or abdominal pain this morning.    Objective     Physical Exam  Constitutional:       Appearance: He is obese.   HENT:      Head: Normocephalic.      Nose: Nose normal.   Eyes:      Conjunctiva/sclera: Conjunctivae normal.   Cardiovascular:      Rate and Rhythm: Normal rate and regular rhythm.      Heart sounds: No murmur heard.     No friction rub. No gallop.   Pulmonary:      Effort: Pulmonary effort is normal.      Breath sounds: Normal breath sounds.   Abdominal:      General: Abdomen is flat.      Palpations: Abdomen is soft.   Musculoskeletal:         General: Normal range of motion.   Skin:     Capillary Refill: Capillary refill takes less than 2 seconds.   Neurological:      Mental Status: He is alert. Mental status is at baseline.   Psychiatric:         Mood and Affect: Mood normal.         Last Recorded Vitals  Blood pressure 109/81, pulse 98, temperature 36.7 °C (98.1 °F), temperature source Temporal, resp. rate 18, height 1.75 m (5' 8.9\"), weight 135 kg (297 lb 9.9 oz), SpO2 95%.  Intake/Output last 3 Shifts:  I/O last 3 completed shifts:  In: 1475.7 (10.9 mL/kg) [P.O.:720; I.V.:755.7 (5.6 mL/kg)]  Out: 3070 (22.7 mL/kg) [Urine:3070 (0.6 mL/kg/hr)]  Weight: 135 kg     Relevant Results  Results for orders placed or performed during the hospital encounter of 07/18/24 (from the past 24 hour(s))   POCT GLUCOSE   Result Value Ref Range    POCT Glucose 107 (H) 74 - 99 mg/dL   Electrocardiogram, 12-lead PRN ACS symtpoms   Result Value Ref Range    Ventricular Rate 84 BPM    Atrial Rate 84 BPM    ND Interval 198 ms    " QRS Duration 152 ms    QT Interval 420 ms    QTC Calculation(Bazett) 496 ms    P Axis 47 degrees    R Axis -56 degrees    T Axis 88 degrees    QRS Count 14 beats    Q Onset 213 ms    P Onset 114 ms    P Offset 170 ms    T Offset 423 ms    QTC Fredericia 469 ms   POCT GLUCOSE   Result Value Ref Range    POCT Glucose 78 74 - 99 mg/dL   POCT GLUCOSE   Result Value Ref Range    POCT Glucose 134 (H) 74 - 99 mg/dL   Potassium   Result Value Ref Range    Potassium 5.0 3.5 - 5.3 mmol/L   Coagulation Screen   Result Value Ref Range    Protime 11.7 9.8 - 12.8 seconds    INR 1.0 0.9 - 1.1    aPTT 66 (H) 27 - 38 seconds   POCT GLUCOSE   Result Value Ref Range    POCT Glucose 180 (H) 74 - 99 mg/dL   Urinalysis with Reflex Culture and Microscopic   Result Value Ref Range    Color, Urine Colorless (N) Light-Yellow, Yellow, Dark-Yellow    Appearance, Urine Clear Clear    Specific Gravity, Urine 1.016 1.005 - 1.035    pH, Urine 6.5 5.0, 5.5, 6.0, 6.5, 7.0, 7.5, 8.0    Protein, Urine NEGATIVE NEGATIVE, 10 (TRACE), 20 (TRACE) mg/dL    Glucose, Urine OVER (4+) (A) Normal mg/dL    Blood, Urine NEGATIVE NEGATIVE    Ketones, Urine NEGATIVE NEGATIVE mg/dL    Bilirubin, Urine NEGATIVE NEGATIVE    Urobilinogen, Urine Normal Normal mg/dL    Nitrite, Urine NEGATIVE NEGATIVE    Leukocyte Esterase, Urine NEGATIVE NEGATIVE   Extra Urine Gray Tube   Result Value Ref Range    Extra Tube Hold for add-ons.    POCT GLUCOSE   Result Value Ref Range    POCT Glucose 239 (H) 74 - 99 mg/dL   Renal function panel   Result Value Ref Range    Glucose 190 (H) 74 - 99 mg/dL    Sodium 134 (L) 136 - 145 mmol/L    Potassium 5.0 3.5 - 5.3 mmol/L    Chloride 97 (L) 98 - 107 mmol/L    Bicarbonate 25 21 - 32 mmol/L    Anion Gap 17 10 - 20 mmol/L    Urea Nitrogen 36 (H) 6 - 23 mg/dL    Creatinine 1.87 (H) 0.50 - 1.30 mg/dL    eGFR 47 (L) >60 mL/min/1.73m*2    Calcium 10.0 8.6 - 10.6 mg/dL    Phosphorus 5.9 (H) 2.5 - 4.9 mg/dL    Albumin 4.6 3.4 - 5.0 g/dL   Magnesium    Result Value Ref Range    Magnesium 2.41 (H) 1.60 - 2.40 mg/dL   CBC   Result Value Ref Range    WBC 5.3 4.4 - 11.3 x10*3/uL    nRBC 0.0 0.0 - 0.0 /100 WBCs    RBC 5.00 4.50 - 5.90 x10*6/uL    Hemoglobin 15.6 13.5 - 17.5 g/dL    Hematocrit 49.0 41.0 - 52.0 %    MCV 98 80 - 100 fL    MCH 31.2 26.0 - 34.0 pg    MCHC 31.8 (L) 32.0 - 36.0 g/dL    RDW 13.1 11.5 - 14.5 %    Platelets 250 150 - 450 x10*3/uL   Heparin Assay, UFH   Result Value Ref Range    Heparin Unfractionated 0.4 See Comment Below for Therapeutic Ranges IU/mL   POCT GLUCOSE   Result Value Ref Range    POCT Glucose 105 (H) 74 - 99 mg/dL              Assessment/Plan   Principal Problem:    Cardiogenic shock (Multi)  Active Problems:    Nonischemic cardiomyopathy (Multi)    Morbid obesity with BMI of 45.0-49.9, adult (Multi)    Cocaine use disorder, mild (Multi)    Acute decompensated heart failure (Multi)    37 YO Male with hx of NICM/HFrEF (EF 5-10), initially diagnosed 10/2022, HTN, HLD, hypertriglyceridemia, Type II DM (poorly controlled A1c 13.7%, on insulin), severe obesity (BMI 44), 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 Started on IV lasix and GDMT on admission but CO/CI persistently low so started milrinone on 07/19 0.125 > 0.25 later that day for improving CO/CI. Currently undergoing LVAD evaluation, signed consent 07/22. However, significant psychosocial barriers at play at this time (court dates, unstable housing, polysubstance use etc). Patient's pressured normalized and CI improved >2 and so swan was removed on 7/24 and pt transferred to the floor in stable condition.    # NICM HFrEF (EF 5-10%, TTE 02/2024)- combined  systolic and diastolic dysfunction (NYHA IV, ACC Stage D)  #Low cardiac output state +/- cardiogenic shock (Resolved)  - Diagnosed since 2022  - LHC in 2022 with no evidence of angiographically obstructive CAD  - TTE 07/2024:  CONCLUSIONS:   1. Poorly visualized anatomical structures due to suboptimal image quality.   2. Left ventricular ejection fraction is severely decreased, by visual estimate at 10%.   3. Spectral Doppler shows a pseudonormal pattern of left ventricular diastolic filling.   4. Left ventricular cavity size is severely dilated (LVIDd 7.4 cm)   5. There is normal right ventricular global systolic function.  - Elective RHC 07/18 showed elevated R and L sided filing pressure and low cardiac output/index: RA 24, RVSP 63, PA 64/40 (48), W 41, /90 (101) mmHg. 3. CO/CI [Evelia]: 4.4 / 1.7. CO/CI [TD]: 3.47 / 1.4. 5. SVR: 1775 cgs, PVR: 2.0 COLON, /74.  - [place] [place] /72 (82), PAP 56/43(47), CVP 8, SVR 1357, CI 1.76  - admit weight: 140 Kg  > 138 kg (07/24)  - admit BNP: 127 (low as morbidly obese) >107 (07/23)  - GDMT: - Coreg held due to low output state. Entresto and spironolactone held due to hyperkalemia on 7/25.   - Start hydralazine 25 PO TID/Isordil 20 mg TID today.  - Diuretics:  Currently on diuretic holiday for MICHAELA after aggressive diuresis (on hold since 07/23)  - Inotropes/ Pressors: milrinone 0.25 stopped 7/24  - Closing [place] CO/CI#: 7.6/3.1 off milrinone  - Daily standing weights, 2gm sodium diet, 2L fluid restriction, strict I&Os    #Advanced therapies evaluation  LVAD or Transplant   -BT or BTT: O positive  -Email sent on (7/22)  -Labs: Needs factor V leiden, Protein S, and nicotine completed. Obtain TB spot and Syphilis screen  -RHC: complete as above  -[facility] (10/21/22): No CAD  -CPET: not completed  -ECHO (7/19/2024): EF 10-15%  -CT chest: Ordered. Needs completion  -US abdomen/aorta/iliac/IVC (7/25/2024): Obsctructed by abscess, but normal in calibur  on CT from 7/13  -Carotid US (7/25/2024): Unable to visualize R carotid due to bandage. L carotid w <50% stenosis.  -CURRY (lower): No PAD  -2 view CXR (7/22/2024)  -Device interrogation  -PFTs (7/24/2024): moderate airflow obstruction. Lung volumes indicate a mild restrictive defect.   -Colonoscopy/Occult stool: Not indicated  -LVAD/TX education: met with patient on 7/22/24  -CT surgery consult: CT surgery to see  -Palliative following  -Nutrition consult (7/23/24)  -Dental consult/Orthopantogram (7/23/2024): Has caries that will need to be filled at later time  -Physical and Occupational Therapy   - Word recall 3/3. Timed get up completed.    #MICHAELA:  - No known renal issues   - Baseline BUN/Cr 15/1.1  - Post aggressive diuresis with IV lasix (net negative ~ 9-10 L since admission)  - Cr increasing to 1.8 today off of dialysis  - Continue to monitor Cr off diuresis and with contrasted CT scans.  -I/Os  -avoid hypotension and nephrotoxic agent    #Recurrent Right abdominal wall abscess  - Recently seen in ER on 07/13 for abd wall abscess 2.3 cm x 3.5 cm x 2 cm s/p I& D and completed bactrim DS 1 tab BID x 10 days (end date 07/23)  - Aortic ultrasound redemonstrated abscess findings.  - ID and ACS consulted for further management, appreciate recs.   - No abdominal wall abscess visualized by surgery (7/25).   - Obtain CT AP w/ contrast to evaluate for abdominal wall abscess   - If present, re-engage IR or ACS for drainage and obtain cultures/sensitivities   - Once culture obtained, start cefepime/flagyl  -afebrile, nontoxic   -no s/s infx    #DM type 2 (uncontrolled)   - hgbA1c : 13.3 (07 /2024)  - home regimen: Insulin lantus 55 U qpm, lispro 18 U TID means, Trulicity 1.5 q weekly, metformin 1000 mg BID  - Inpatient regimen: Lantus 55 qpm, Lispro 20 U TID meals, sliding scale # 2  - holding metformin and Trulicity   - Endocrine following    - BGM AC & HS  - Consistent carbs diet    #Acute R internal jugular thrombus  -  Found incidentally on vascular carotid ultrasound.  - On heparin gtt, transition to DOAC when able.    #Hx of cardioembolic stroke: (12/2022)  - no residual symptoms, AO x 3 on arrival  - Takes xarelto 20 mg at home   - holding xarelto inpatient, will start on heparin gtt  - Statin: cont lipitor 40 mg  - Serial neuro assessments   - PO Tylenol PRN for pain  - PT/OT Consult, OOB to chair  - CAM ICU score every shift     #Anxiety/depression:  - continue home zoloft 100 mg daily  - mood normal on admission  - Sleep/wake cycle normalization    #Substance abuse  -Alcohol abuse: reports occasional binge drinking but denies withdrawal   -Tobacco use/Nicotine Dependence: occasional cigarette smoker, previous hx of heavy alcohol use         PHYSICAL AND OCCUPATIONAL THERAPY:     LINES:  MANE Pantoja (07/18 - 07/24)  DVT: heparin gtt  GLYCEMIC CONTROL: insulin + sliding scale ordered  BOWEL CARE: Miralax prn ordered  NUTRITION: Adult diet Cardiac; 70 gm fat; 2 - 3 grams Sodium  NUTRITION: Adult diet Cardiac, Carb Controlled; 75 gram carb/meal, 45 gram Carb evening snack; 1500 mL fluid; 70 gm fat; 2 - 3 grams Sodium   resent at bedside, updated 07/24  CODE STATUS: Full Code  DISPO: pending LVAD evaluation.         Dariusz Hernández MD

## 2024-07-26 NOTE — CARE PLAN
The patient's goals for the shift include      The clinical goals for the shift include Potassium level will be between 3.5-5 by the end on shift    Over the shift, the patient remained HDS.

## 2024-07-26 NOTE — CONSULTS
Inpatient consult to infectious diseases  Consult performed by: Brittany COATS MD  Consult ordered by: Dariusz Hernández MD      Referred by Dr. Delroy Cruz MD: Christopher D'Amico, DO    Reason For Consult  Persistent abdominal wall abscess    History Of Present Illness  Arthur Carranza is a 38 y.o. male with medical history significant for hypertension, dyslipidemia, poorly controlled type 2 diabetes, obesity (BMI 44), and nonischemic cardiomyopathy with an ejection fraction of 5 to 10% (diagnosed October 2022) who has been scheduled for an elective right heart catheterization at [facility] on July 18, 2023.  He was found to have elevated bilateral filling pressures and decreased cardiac output thus he was transferred to the heart failure intensive care unit at [facility] on July 18 for further management and evaluation for more advanced therapeutic options.    Prior to this admission, on July 13, 2024, he presented to the emergency department with right lower quadrant abdominal pain and tenderness.  He had a history of recurrent abscesses at that site.  In the emergency department he was afebrile, tachycardic, and hypertensive.  His white blood cell count was normal at 6.3 and he was hyperglycemic at 396.  CT scan of his abdomen and pelvis revealed a 2.3x 3.2x 2 centimeter collection in his right abdominal wall inferior to his pannus.  He underwent bedside I&D with cultures being sent.  He was managed with Nu Gauze packing and referral to ambulatory wound clinic.  He was prescribed Bactrim double strength twice daily through July 23, 2024.    On 7/17/24, the culture returned positive for mixed gram-positive and gram-negative bacteria as well as mixed anaerobes that were beta-lactamase positive.  As he was admitted to [facility] at that time of review, changes in antibiotics were not made by the ER team.  He completed Bactrim therapy as prescribed on July 23, 2024.    He was managed  in the heart failure ICU and while undergoing further workup for future cardiac management, an ultrasound of his aorta was performed on July 25.  This revealed a 10.2 x 7 cm abscess in the right lower quadrant wall.    ID was consulted for further assistance.    Currently, he denies fever, chills, night sweats.  He denies pain in his right lower abdominal wall.  He was lying flat in bed with 1 pillow without respiratory distress, or complaints of shortness of breath, PND, or orthopnea.    He has had 2 prior abscesses in the right lower quadrant:    7/7/23: Culture growing mixed skin ellis, P acnes, and Staph lugdunensis.  Treated with Bactrim, per ID note from 8/22/23.    8/7/23: 1+ mixed anaerobes.  Unclear if treated.  Cannot find documentation of antibiotic prescriptions corresponding to this time-line.  Patient cannot recall.         Past Medical History  He has a past medical history of CHF (congestive heart failure) (Multi), Diabetes mellitus (Multi), Heart disease, Hypertension, and Substance abuse (Multi).    Surgical History  He has a past surgical history that includes Other surgical history (07/21/2022); Other surgical history (10/25/2022); MR angio head wo IV contrast (12/29/2022); and Cardiac catheterization (N/A, 7/18/2024).     Social History     Occupational History    Not on file   Tobacco Use    Smoking status: Every Day     Types: Cigarettes    Smokeless tobacco: Not on file   Vaping Use    Vaping status: Unknown   Substance and Sexual Activity    Alcohol use: Yes     Comment: occasionaly - 1-2 times a month    Drug use: Not Currently     Types: Codeine    Sexual activity: Defer     Travel History   Travel since 06/26/24    No documented travel since 06/26/24            Family History  Family History   Adopted: Yes     Allergies  Shellfish containing products     Immunization History   Administered Date(s) Administered    Influenza, injectable, MDCK, quadrivalent 10/15/2018    Influenza, seasonal,  "injectable, preservative free 11/24/2017    Tdap vaccine, age 7 year and older (BOOSTRIX, ADACEL) 10/15/2018     Medications  Home medications:  Medications Prior to Admission   Medication Sig Dispense Refill Last Dose    atorvastatin (Lipitor) 40 mg tablet TAKE 1 TABLET BY MOUTH AT BEDTIME 30 tablet 3     blood-glucose meter,continuous (FreeStyle Nadir 3 [place]) misc Use as instructed to test blood glucose throughout the day 1 each 0     blood-glucose sensor (FreeStyle Nadir 3 Sensor) device Use as directed to test blood glucose throughout the day 2 each 11     buPROPion XL (Wellbutrin XL) 150 mg 24 hr tablet Take 1 tablet (150 mg) by mouth once daily.       carvedilol (Coreg) 6.25 mg tablet Take 1 tablet (6.25 mg) by mouth 2 times daily (morning and late afternoon). 180 tablet 3     dapagliflozin propanediol (Farxiga) 10 mg Take 1 tablet (10 mg) by mouth once daily. 90 tablet 3     dulaglutide (Trulicity) 1.5 mg/0.5 mL pen injector injection Inject 1.5 mg under the skin 1 (one) time per week. 2 mL 1     insulin glargine (Lantus Solostar U-100 Insulin) 100 unit/mL (3 mL) pen Inject 55 Units under the skin once daily at bedtime. Take as directed per insulin instructions. (Patient taking differently: Inject 60 Units under the skin once daily at bedtime. Take as directed per insulin instructions.) 15 mL 0     insulin lispro (HumaLOG) 100 unit/mL injection Inject 16 Units under the skin 3 times a day with meals. Take as directed per insulin instructions. 15 mL 0     metFORMIN (Glucophage) 1,000 mg tablet Take 1 tablet (1,000 mg) by mouth 2 times a day.       pen needle, diabetic 31 gauge x 5/16\" needle Use to inject 1-4 times daily as directed. 100 each 11     rivaroxaban (Xarelto) 20 mg tablet Take 1 tablet (20 mg) by mouth once daily in the evening. Take with meals. Take with food. 90 tablet 3     sacubitriL-valsartan (Entresto) 49-51 mg tablet Take 1 tablet by mouth 2 times a day. 180 tablet 3     sertraline " (Zoloft) 100 mg tablet Take 1 tablet (100 mg) by mouth once daily.       spironolactone (Aldactone) 25 mg tablet Take 0.5 tablets (12.5 mg) by mouth once daily. Do not start before 2024. 15 tablet 11     [] sulfamethoxazole-trimethoprim (Bactrim DS) 800-160 mg tablet Take 1 tablet by mouth every 12 hours for 10 days. 20 tablet 0     torsemide (Demadex) 20 mg tablet Take 1 tablet (20 mg) by mouth once daily. 30 tablet 11     traZODone (Desyrel) 50 mg tablet Take 1 tablet (50 mg) by mouth as needed at bedtime for sleep.       True Metrix Glucose Test Strip strip USE TO CHECK BLOOD SUGAR FOUR TIMES DAILY       Ultra Thin Lancets 30 gauge misc TEST BLOOD SUGAR FOUR TIMES DAILY AS DIRECTED        Current medications:  Scheduled medications  atorvastatin, 40 mg, oral, Nightly  buPROPion XL, 150 mg, oral, Daily  calcium gluconate, 2 g, intravenous, Once  dapagliflozin propanediol, 10 mg, oral, Daily  heparin, 4,000 Units, intravenous, Once  insulin glargine, 55 Units, subcutaneous, Nightly  insulin lispro, 0-10 Units, subcutaneous, TID AC  insulin lispro, 20 Units, subcutaneous, TID AC  melatonin, 3 mg, oral, Nightly  perflutren protein A microsphere, 0.5 mL, intravenous, Once in imaging  polyethylene glycol, 17 g, oral, Daily  [Held by provider] sacubitriL-valsartan, 1 tablet, oral, BID  sertraline, 100 mg, oral, Daily  sodium zirconium cyclosilicate, 10 g, oral, q8h  [Held by provider] spironolactone, 25 mg, oral, Daily      Continuous medications  heparin, 0-4,000 Units/hr, Last Rate: 2,000 Units/hr (24 0555)  lactated Ringer's, 10 mL/hr, Last Rate: Stopped (24)  [Held by provider] milrinone, 0.25 mcg/kg/min, Last Rate: Stopped (24 1118)      PRN medications  PRN medications: acetaminophen, dextrose, dextrose, glucagon, glucagon, heparin, oxygen, traZODone    Review of Systems: All pertinent positives and negatives as documented in HPI.      Objective  Range of Vitals (last 24  hours)  Heart Rate:  []   Temp:  [35.8 °C (96.4 °F)-36.2 °C (97.2 °F)]   Resp:  [18-20]   BP: ()/(70-84)   Weight:  [135 kg (297 lb 9.9 oz)]   SpO2:  [95 %-100 %]   Daily Weight  07/26/24 : 135 kg (297 lb 9.9 oz)    Body mass index is 44.08 kg/m².     Physical Exam  Vitals reviewed.   Constitutional:       General: He is not in acute distress.     Appearance: He is obese. He is not ill-appearing or diaphoretic.   HENT:      Head: Normocephalic and atraumatic.      Mouth/Throat:      Mouth: Mucous membranes are moist.      Pharynx: No oropharyngeal exudate or posterior oropharyngeal erythema.      Comments: Dentition in good repair  Eyes:      Extraocular Movements: Extraocular movements intact.      Conjunctiva/sclera: Conjunctivae normal.      Pupils: Pupils are equal, round, and reactive to light.   Cardiovascular:      Rate and Rhythm: Normal rate.      Heart sounds: No murmur heard.     No gallop.      Comments: Sorft heart sounds. S1 & S2 noted.    Pulmonary:      Effort: Pulmonary effort is normal.      Breath sounds: Normal breath sounds.   Abdominal:      Palpations: Abdomen is soft. There is no mass.      Tenderness: There is no abdominal tenderness. There is no guarding or rebound.      Hernia: No hernia is present.      Comments: Obese, large pannus.  No induration, erythema or drainage in pannus   Musculoskeletal:         General: No swelling.      Cervical back: Normal range of motion and neck supple.   Skin:     General: Skin is warm and dry.      Findings: No lesion or rash.   Neurological:      Mental Status: He is alert and oriented to person, place, and time.      Cranial Nerves: No cranial nerve deficit.   Psychiatric:         Mood and Affect: Mood normal.         Behavior: Behavior normal.          Relevant Results      Labs  Results from last 72 hours   Lab Units 07/26/24  0841 07/25/24  0724 07/24/24  0529   WBC AUTO x10*3/uL 5.3 5.3 5.1   HEMOGLOBIN g/dL 15.6 15.1 13.4*  "  HEMATOCRIT % 49.0 46.6 39.0*   PLATELETS AUTO x10*3/uL 250 202 179     Results from last 72 hours   Lab Units 07/26/24  0841 07/25/24  1620 07/25/24  1236 07/25/24  0724 07/24/24  1804   SODIUM mmol/L 134*  --   --  132* 133*   POTASSIUM mmol/L 5.0 5.0 6.6* 5.9* 4.7   CHLORIDE mmol/L 97*  --   --  98 99   CO2 mmol/L 25  --   --  24 25   BUN mg/dL 36*  --   --  25* 28*   CREATININE mg/dL 1.87*  --   --  1.76* 1.57*   GLUCOSE mg/dL 190*  --   --  127* 75   CALCIUM mg/dL 10.0  --   --  9.7 10.0   ANION GAP mmol/L 17  --   --  16 14   EGFR mL/min/1.73m*2 47*  --   --  50* 58*   PHOSPHORUS mg/dL 5.9*  --   --  4.8 4.6     Results from last 72 hours   Lab Units 07/26/24  0841 07/25/24  0724 07/24/24  1804   ALBUMIN g/dL 4.6 4.4 4.3     Estimated Creatinine Clearance: 73.7 mL/min (A) (by C-G formula based on SCr of 1.87 mg/dL (H)).  C-Reactive Protein   Date Value Ref Range Status   02/01/2024 0.50 <1.00 mg/dL Final     CRP   Date Value Ref Range Status   06/15/2022 6.52 (A) mg/dL Final     Comment:     REF VALUE  < 1.00     12/30/2021 0.49 mg/dL Final     Comment:     REF VALUE  < 1.00       Sedimentation Rate   Date Value Ref Range Status   06/15/2022 29 (H) 0 - 15 mm/h Final     Comment:     Please note new reference ranges as of 5/9/2022.   12/30/2021 13 0 - 15 mm/h Final     HIV 1/2 Antigen/Antibody Screen with Reflex to Confirmation   Date Value Ref Range Status   07/22/2024 Nonreactive Nonreactive Final     No results found for: \"HEPCABINIT\", \"HEPCAB\", \"HCVPCRQUANT\"  Microbiology  Susceptibility data from last 90 days.  Collected Specimen Info Organism   07/13/24 Tissue/Biopsy from Skin/Superficial Abscess Mixed Anaerobic Bacteria     Mixed Gram-Positive and Gram-Negative Bacteria         Imaging    CT abdomen pelvis w IV contrast [[id number]] 07/13/2024      1. Diffuse skin thickening of the anterior pelvic wall on the right  compatible with cellulitis. There is associated small abscess  laterally in the anterior " "abdominal wall measuring 2.3 cm x 3.5 cm x 2cm.  2. Hepatic steatosis.  3. Colonic diverticulosis without findings of diverticulitis.  Signed by Derrick Welsh MD  ~~~~~~~~~~~~~~~~~~~~~~~~~~~~~~~~~~~~~~~~~~~~~~~~~~~    Vasc US Abdominal Or Pelvic Duplex Limited  7/25/24  CONCLUSIONS:  Aorta/Common Iliac Arteries/IVC: Today's exam was limited due to overlying bowel gas, body habitus the presence of a known abscess on the anterior abdominal wall on the right side measuring 10.2 x 7.0 cm. Unable to visualize the abdominal aorta, can not rule out disease, however the abdominal aorta was noted to be of \"normal caliber\" on CTA 7/13/24 - Advised Delroy Jurado MD and Cara Toribio MD of limitations and findings 7/25/25 9:15 AM EV.     Imaging & Doppler Findings:     [id number] Eugenia Paula MD, RPVI  Electronically signed by [id number] FRANCISCA Sandoval MD on 7/25/2024 at 1:47:33 PM     Assessment/Plan   39yo AAM admitted for management of decompensated heart failure / cardiogenic shock after routine RHC procedure.  He has known NICM with EF of 5-10%, poorly controlled DM, HTN, DLD.  He was seen in ER on 7/13 for recurrent RLQ anterior abdominal wall abscess for which he underwent bed-side I&D with Nu-gauze packing and empiric Sulfamethoxazole-Trimethoprim DS (800mg-160mg) therapy while awaiting cultures.    Culture resulted on 7/17/24.  No changes made in abx by ER as he was admitted to [facility] for his RHC.  He has been maintained on Sulfamethoxazole-Trimethoprim DS (800mg-160mg) only through the planned stop date of 7/23/24.    He has been afebrile here.  Routine Aortic US on 7/25 revealed persistent and enlarged abdominal wall abscess in setting of inadequate antibiotic therapy / drainage.      Per review of OSH records, it is possible that he has recurrence as Sulfamethoxazole-Trimethoprim DS (800mg-160mg) is not active against P. Acnes, Streptococci or anaerobes, a mixture of these have been isolated in the past.  While " drainage alone is often adequate for resolution, his poorly controlled diabetes likely prevents his own WBCs to assist in abscess resolution.    RECOMMENDATIONS:     Consult IR for drain placement into this collection  vs Gen surgery consult for more formal I&D and better source control given this is his 3rd abscess in this site as well as discussions of LVAD vs heart transplant.  Please send aspirate for culture to see what organisms continue to grow after Sulfamethoxazole-Trimethoprim DS (800mg-160mg) given its lack of anaerobic and narrow gram positive coverage.  After fluid collected /drained, would start Cefepime 2gm IV q 8 hours with ORAL Metronidazole 500mg PO TID in order to avoid the sodium load associated with Piperacillin-tazobactam.  4.   For pre-transplant evaluation, would add a T-spot test, Syphilis IgG to blood work.  He has already been screened for Hepatitis         A, B, and C as well as HIV and have responded to vaccinations for his Hepatitis A/B.    I spent 60 minutes in the professional and overall care of this patient.      Brittany Brooks MD

## 2024-07-26 NOTE — PROGRESS NOTES
Physical Therapy    Physical Therapy Treatment    Patient Name: Arthur Carranza  MRN: [id number]  Today's Date: 7/26/2024  Time Calculation  Start Time: 1417  Stop Time: 1428  Time Calculation (min): 11 min    Assessment/Plan   PT Assessment  Rehab Prognosis: Good  Evaluation/Treatment Tolerance: Patient tolerated treatment well  Medical Staff Made Aware: Yes  Assessment Comment: Pt with good tolerance to session. Pt able to abulate 650ft with IV pole and close supervision. Patient continues to benefit from skilled physical therapy to maximize functional mobility and safety.   End of Session Patient Position: Bed, 2 rail up, Alarm off, not on at start of session (sitting EOB)  PT Plan  Treatment/Interventions: Transfer training, Bed mobility, Gait training, Balance training, Strengthening, Endurance training, Range of motion, Therapeutic exercise, Therapeutic activity  PT Plan: Ongoing PT  PT Frequency: 4 times per week (increased frequency 2/2 advanced therapies work-up)  PT Discharge Recommendations: Other (comment) (Outpatient cardiac rehab)  PT Recommended Transfer Status: Stand by assist  PT - OK to Discharge: Yes  RN cleared prior to session    General Visit Information:   PT  Visit  PT Received On: 07/26/24  Response to Previous Treatment: Patient with no complaints from previous session.  General  Reason for Referral: 37 y/o male admitted as a transfer s/p elective RHC where he showed high right and left sided filling pressures and low CO/CI concerning for low output state/cardiogenic shock. S/p right heart cath 7/18. Advanced therapies work-up.  Past Medical History Relevant to Rehab: NCIM/HFrEH, HTN, HLD, hypertriglyceridemia, T2DM, severe obesity, cardioembolic stroke with no residual symptoms, intermitent tobacco use, cocaine use, alcohol use, hx of noncompliance with medicines and dr appointments.  Prior to Session Communication: Bedside nurse  Patient Position Received: Bed, 2 rail up, Alarm off, not on at  start of session  Preferred Learning Style: auditory, verbal  General Comment: Pt pleasant and agreeable to therapy.    Subjective   Precautions:  Precautions  Medical Precautions: Cardiac precautions, Fall precautions  Vital Signs:  Vital Signs  Heart Rate:  (highest during 103)  Heart Rate Source: Monitor    Objective   Pain:  Pain Assessment  Pain Assessment: 0-10  0-10 (Numeric) Pain Score: 0 - No pain  Cognition:  Cognition  Overall Cognitive Status: Within Functional Limits  Arousal/Alertness: Appropriate responses to stimuli  Orientation Level: Oriented X4  Following Commands: Follows all commands and directions without difficulty  Coordination:  Movements are Fluid and Coordinated: Yes  Postural Control:  Postural Control  Postural Control: Within Functional Limits  Activity Tolerance:  Activity Tolerance  Endurance: Tolerates 10 - 20 min exercise with multiple rests  Treatments:  Bed Mobility  Bed Mobility: Yes  Bed Mobility 1  Bed Mobility 1: Supine to sitting  Level of Assistance 1: Independent  Bed Mobility Comments 1: HOB elevated    Ambulation/Gait Training  Ambulation/Gait Training Performed: Yes  Ambulation/Gait Training 1  Surface 1: Level tile  Device 1: IV Pole  Assistance 1: Close supervision  Quality of Gait 1: Wide base of support  Comments/Distance (ft) 1: 650ft and min vc for safety  Transfers  Transfer: Yes  Transfer 1  Transfer From 1: Sit to, Stand to  Transfer to 1: Stand, Sit  Technique 1: Sit to stand, Stand to sit  Transfer Device 1:  (none)  Transfer Level of Assistance 1: Close supervision  Trials/Comments 1: 2 trials    Outcome Measures:  [facility] Basic Mobility  Turning from your back to your side while in a flat bed without using bedrails: None  Moving from lying on your back to sitting on the side of a flat bed without using bedrails: None  Moving to and from bed to chair (including a wheelchair): None  Standing up from a chair using your arms (e.g. wheelchair or bedside chair):  None  To walk in hospital room: None  Climbing 3-5 steps with railing: A little  Basic Mobility - Total Score: 23    Education Documentation  Handouts, taught by Gissel Lema PT at 7/26/2024  3:31 PM.  Learner: Patient  Readiness: Acceptance  Method: Explanation, Demonstration  Response: Verbalizes Understanding, Needs Reinforcement    Mobility Training, taught by Gissel Lema PT at 7/26/2024  3:31 PM.  Learner: Patient  Readiness: Acceptance  Method: Explanation, Demonstration  Response: Verbalizes Understanding, Needs Reinforcement    Education Comments  No comments found.      Encounter Problems       Encounter Problems (Active)       Balance       Pt will score >/=48 on the JEFF balance test for safe community ambulation (Progressing)       Start:  07/19/24    Expected End:  08/02/24               Mobility       Patient will ambulate >1000ft with no assistive device and supervision (Progressing)       Start:  07/19/24    Expected End:  08/02/24               Mobility       Pt will ambulate >1100ft during the 6MWT with <3/10 RPD and <11/20 RPE (Progressing)       Start:  07/22/24    Expected End:  08/05/24               PT Transfers       Patient will perform bed mobility independently (Progressing)       Start:  07/19/24    Expected End:  08/02/24            Patient will transfer sit to and from stand independently (Progressing)       Start:  07/19/24    Expected End:  08/02/24               PT Transfers       Pt will complete 15 sit <-> stands during 30 second STS with RPE <11/20 and RPD <3/10 without UE use (Progressing)       Start:  07/22/24    Expected End:  08/05/24               Pain - Adult

## 2024-07-27 LAB
ALBUMIN SERPL BCP-MCNC: 4.3 G/DL (ref 3.4–5)
ANABASINE UR-MCNC: <5 NG/ML
ANION GAP SERPL CALC-SCNC: 16 MMOL/L (ref 10–20)
BUN SERPL-MCNC: 35 MG/DL (ref 6–23)
CALCIUM SERPL-MCNC: 10.1 MG/DL (ref 8.6–10.6)
CHLORIDE SERPL-SCNC: 96 MMOL/L (ref 98–107)
CO2 SERPL-SCNC: 26 MMOL/L (ref 21–32)
COTININE UR-MCNC: 20 NG/ML
CREAT SERPL-MCNC: 1.78 MG/DL (ref 0.5–1.3)
EGFRCR SERPLBLD CKD-EPI 2021: 50 ML/MIN/1.73M*2
ERYTHROCYTE [DISTWIDTH] IN BLOOD BY AUTOMATED COUNT: 12.9 % (ref 11.5–14.5)
GLUCOSE BLD MANUAL STRIP-MCNC: 101 MG/DL (ref 74–99)
GLUCOSE BLD MANUAL STRIP-MCNC: 109 MG/DL (ref 74–99)
GLUCOSE BLD MANUAL STRIP-MCNC: 112 MG/DL (ref 74–99)
GLUCOSE BLD MANUAL STRIP-MCNC: 82 MG/DL (ref 74–99)
GLUCOSE SERPL-MCNC: 102 MG/DL (ref 74–99)
HCT VFR BLD AUTO: 43 % (ref 41–52)
HGB BLD-MCNC: 14 G/DL (ref 13.5–17.5)
MAGNESIUM SERPL-MCNC: 2.28 MG/DL (ref 1.6–2.4)
MCH RBC QN AUTO: 31 PG (ref 26–34)
MCHC RBC AUTO-ENTMCNC: 32.6 G/DL (ref 32–36)
MCV RBC AUTO: 95 FL (ref 80–100)
NICOTINE UR-MCNC: <15 NG/ML
NRBC BLD-RTO: 0 /100 WBCS (ref 0–0)
PHOSPHATE SERPL-MCNC: 5.5 MG/DL (ref 2.5–4.9)
PLATELET # BLD AUTO: 248 X10*3/UL (ref 150–450)
POTASSIUM SERPL-SCNC: 4.5 MMOL/L (ref 3.5–5.3)
RBC # BLD AUTO: 4.52 X10*6/UL (ref 4.5–5.9)
SODIUM SERPL-SCNC: 133 MMOL/L (ref 136–145)
TRANS-3-OH-COTININE UR-MCNC: 64 NG/ML
UFH PPP CHRO-ACNC: 0.5 IU/ML
WBC # BLD AUTO: 4.7 X10*3/UL (ref 4.4–11.3)

## 2024-07-27 PROCEDURE — 2500000001 HC RX 250 WO HCPCS SELF ADMINISTERED DRUGS (ALT 637 FOR MEDICARE OP)

## 2024-07-27 PROCEDURE — 2500000004 HC RX 250 GENERAL PHARMACY W/ HCPCS (ALT 636 FOR OP/ED)

## 2024-07-27 PROCEDURE — 99232 SBSQ HOSP IP/OBS MODERATE 35: CPT | Performed by: STUDENT IN AN ORGANIZED HEALTH CARE EDUCATION/TRAINING PROGRAM

## 2024-07-27 PROCEDURE — 2500000002 HC RX 250 W HCPCS SELF ADMINISTERED DRUGS (ALT 637 FOR MEDICARE OP, ALT 636 FOR OP/ED)

## 2024-07-27 PROCEDURE — 85027 COMPLETE CBC AUTOMATED: CPT

## 2024-07-27 PROCEDURE — 80069 RENAL FUNCTION PANEL: CPT

## 2024-07-27 PROCEDURE — 99231 SBSQ HOSP IP/OBS SF/LOW 25: CPT | Performed by: INTERNAL MEDICINE

## 2024-07-27 PROCEDURE — 2500000002 HC RX 250 W HCPCS SELF ADMINISTERED DRUGS (ALT 637 FOR MEDICARE OP, ALT 636 FOR OP/ED): Performed by: REGISTERED NURSE

## 2024-07-27 PROCEDURE — 82947 ASSAY GLUCOSE BLOOD QUANT: CPT

## 2024-07-27 PROCEDURE — 85520 HEPARIN ASSAY: CPT

## 2024-07-27 PROCEDURE — 83735 ASSAY OF MAGNESIUM: CPT

## 2024-07-27 PROCEDURE — 36415 COLL VENOUS BLD VENIPUNCTURE: CPT

## 2024-07-27 PROCEDURE — 1100000001 HC PRIVATE ROOM DAILY

## 2024-07-27 PROCEDURE — 86481 TB AG RESPONSE T-CELL SUSP: CPT

## 2024-07-27 ASSESSMENT — COGNITIVE AND FUNCTIONAL STATUS - GENERAL
CLIMB 3 TO 5 STEPS WITH RAILING: A LITTLE
MOBILITY SCORE: 23
DAILY ACTIVITIY SCORE: 24
CLIMB 3 TO 5 STEPS WITH RAILING: A LITTLE
DAILY ACTIVITIY SCORE: 24
MOBILITY SCORE: 23

## 2024-07-27 ASSESSMENT — PAIN - FUNCTIONAL ASSESSMENT
PAIN_FUNCTIONAL_ASSESSMENT: 0-10
PAIN_FUNCTIONAL_ASSESSMENT: 0-10

## 2024-07-27 ASSESSMENT — PAIN SCALES - GENERAL
PAINLEVEL_OUTOF10: 0 - NO PAIN

## 2024-07-27 NOTE — PROGRESS NOTES
37 YO Male with hx of NICM/HFrEF (EF 5-10), initially diagnosed 10/2022, HTN, HLD, hypertriglyceridemia, Type II DM (poorly controlled A1c 13.7%, on insulin), severe obesity (BMI 44), cardioembolic stroke (12/2022) with limited residual symptoms, intermittent tobacco abuse, cocaine use, alcohol use, history of non-compliance with medication and doctor appointments, who is transferred from [facility] after his elective RHC showed high right and left sided filling pressures and low CO/CI concerning for low output state/cardiogenic shock. Started on IV lasix and GDMT on admission but CO/CI persistently low so started milrinone on 07/19 0.125 > 0.25 later that day for improving CO/CI. Currently undergoing LVAD evaluation, signed consent 07/22. However, significant psychosocial barriers at play at this time (court dates, unstable housing, polysubstance use etc). Patient's pressured normalized off milrinone and CI improved >2 and so swan was removed on 7/24 and pt transferred to the floor in stable condition.    Subjective   NAEO. He reports no CP, SOB, orthopnea, fevers, chills, or abdominal pain this morning. Ambulating okay, still on diuretic holiday and reports urinating but volume small (< 1L/ day)    CTAP with resolution of abscess.       Objective     Physical Exam  Constitutional:       Appearance: He is obese.   HENT:      Head: Normocephalic.      Nose: Nose normal.   Eyes:      Conjunctiva/sclera: Conjunctivae normal.   Cardiovascular:      Rate and Rhythm: Normal rate and regular rhythm.      Heart sounds: No murmur heard.     No friction rub. No gallop.   Pulmonary:      Effort: Pulmonary effort is normal.      Breath sounds: Normal breath sounds.   Abdominal:      General: Abdomen is flat.      Palpations: Abdomen is soft.   Musculoskeletal:         General: Normal range of motion.   Skin:     Capillary Refill: Capillary refill takes less than 2 seconds.   Neurological:      Mental Status: He is  "alert. Mental status is at baseline.   Psychiatric:         Mood and Affect: Mood normal.         Last Recorded Vitals  Blood pressure 113/75, pulse 95, temperature 36.4 °C (97.5 °F), temperature source Temporal, resp. rate 18, height 1.75 m (5' 8.9\"), weight 135 kg (297 lb 9.9 oz), SpO2 98%.  Intake/Output last 3 Shifts:  I/O last 3 completed shifts:  In: 1856 (13.7 mL/kg) [P.O.:870; I.V.:986 (7.3 mL/kg)]  Out: 1920 (14.2 mL/kg) [Urine:1920 (0.4 mL/kg/hr)]  Weight: 135 kg     Relevant Results  Results for orders placed or performed during the hospital encounter of 07/18/24 (from the past 24 hour(s))   POCT GLUCOSE   Result Value Ref Range    POCT Glucose 68 (L) 74 - 99 mg/dL   POCT GLUCOSE   Result Value Ref Range    POCT Glucose 71 (L) 74 - 99 mg/dL   POCT GLUCOSE   Result Value Ref Range    POCT Glucose 138 (H) 74 - 99 mg/dL   POCT GLUCOSE   Result Value Ref Range    POCT Glucose 112 (H) 74 - 99 mg/dL   Renal function panel   Result Value Ref Range    Glucose 102 (H) 74 - 99 mg/dL    Sodium 133 (L) 136 - 145 mmol/L    Potassium 4.5 3.5 - 5.3 mmol/L    Chloride 96 (L) 98 - 107 mmol/L    Bicarbonate 26 21 - 32 mmol/L    Anion Gap 16 10 - 20 mmol/L    Urea Nitrogen 35 (H) 6 - 23 mg/dL    Creatinine 1.78 (H) 0.50 - 1.30 mg/dL    eGFR 50 (L) >60 mL/min/1.73m*2    Calcium 10.1 8.6 - 10.6 mg/dL    Phosphorus 5.5 (H) 2.5 - 4.9 mg/dL    Albumin 4.3 3.4 - 5.0 g/dL   Magnesium   Result Value Ref Range    Magnesium 2.28 1.60 - 2.40 mg/dL   CBC   Result Value Ref Range    WBC 4.7 4.4 - 11.3 x10*3/uL    nRBC 0.0 0.0 - 0.0 /100 WBCs    RBC 4.52 4.50 - 5.90 x10*6/uL    Hemoglobin 14.0 13.5 - 17.5 g/dL    Hematocrit 43.0 41.0 - 52.0 %    MCV 95 80 - 100 fL    MCH 31.0 26.0 - 34.0 pg    MCHC 32.6 32.0 - 36.0 g/dL    RDW 12.9 11.5 - 14.5 %    Platelets 248 150 - 450 x10*3/uL   Heparin Assay, UFH   Result Value Ref Range    Heparin Unfractionated 0.5 See Comment Below for Therapeutic Ranges IU/mL   POCT GLUCOSE   Result Value Ref " Range    POCT Glucose 109 (H) 74 - 99 mg/dL              Assessment/Plan   Principal Problem:    Cardiogenic shock (Multi)  Active Problems:    Nonischemic cardiomyopathy (Multi)    Morbid obesity with BMI of 45.0-49.9, adult (Multi)    Cocaine use disorder, mild (Multi)    Acute decompensated heart failure (Multi)    37 YO Male with hx of NICM/HFrEF (EF 5-10), initially diagnosed 10/2022, HTN, HLD, hypertriglyceridemia, Type II DM (poorly controlled A1c 13.7%, on insulin), severe obesity (BMI 44), 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 Started on IV lasix and GDMT on admission but CO/CI persistently low so started milrinone on 07/19 0.125 > 0.25 later that day for improving CO/CI. Currently undergoing LVAD evaluation, signed consent 07/22. However, significant psychosocial barriers at play at this time (court dates, unstable housing, polysubstance use etc). Patient's pressured normalized and CI improved >2 and so swan was removed on 7/24 and pt transferred to the floor in stable condition.    # NICM HFrEF (EF 5-10%, TTE 02/2024)- combined systolic and diastolic dysfunction (NYHA IV, ACC Stage D)  #Low cardiac output state +/- cardiogenic shock (Resolved)  - Diagnosed since 2022  - [facility] in 2022 with no evidence of angiographically obstructive CAD  - TTE 07/2024:  CONCLUSIONS:   1. Poorly visualized anatomical structures due to suboptimal image quality.   2. Left ventricular ejection fraction is severely decreased, by visual estimate at 10%.   3. Spectral Doppler shows a pseudonormal pattern of left ventricular diastolic filling.   4. Left ventricular cavity size is severely dilated (LVIDd 7.4  cm)   5. There is normal right ventricular global systolic function.  - Elective RHC 07/18 showed elevated R and L sided filing pressure and low cardiac output/index: RA 24, RVSP 63, PA 64/40 (48), W 41, /90 (101) mmHg. 3. CO/CI [Evelia]: 4.4 / 1.7. CO/CI [TD]: 3.47 / 1.4. 5. SVR: 1775 cgs, PVR: 2.0 COLON, /74.  - Opening SGC on arrival to -[facility] #: /72 (82), PAP 56/43(47), CVP 8, SVR 1357, CI 1.76  - admit weight: 140 Kg  > 138 kg (07/24)  - admit BNP: 127 (low as morbidly obese) >107 (07/23)  - GDMT: - Coreg held due to low output state. Entresto and spironolactone held due to hyperkalemia on 7/25. Continue to hold till renal function recovers, BP currently in normal range    - c/w hydralazine 25 PO TID/Isordil 20 mg TID (07/26 -) .  - Diuretics:  Currently on diuretic holiday for MICHAELA after aggressive diuresis (on hold since 07/23)  - Inotropes/ Pressors: milrinone 0.25 stopped 7/24  - Closing [place] CO/CI#: 7.6/3.1 off milrinone  - Daily standing weights, 2gm sodium diet, 2L fluid restriction, strict I&Os    #Advanced therapies evaluation  LVAD or Transplant   -BT or BTT: O positive  -Email sent on (7/22)  -Labs: Needs factor V leiden, Protein S, and nicotine completed. Obtain TB spot and Syphilis screen  -RHC: complete as above  -LHC (10/21/22): No CAD  -CPET: not completed  -ECHO (7/19/2024): EF 10-15%  -CT chest: no acute abnormalities   -CT A/P: resolution of abd abscess, fatty liver   -US abdomen/aorta/iliac/IVC (7/25/2024): Obsctructed by abscess, but normal in calibur on CT from 7/13  -Carotid US (7/25/2024): Unable to visualize R carotid due to bandage. L carotid w <50% stenosis.  -CURRY (lower): No PAD  -2 view CXR (7/22/2024)  -Device interrogation  -PFTs (7/24/2024): moderate airflow obstruction. Lung volumes indicate a mild restrictive defect.   -Colonoscopy/Occult stool: Not indicated  -LVAD/TX education: met with patient on 7/22/24  -CT surgery consult: CT surgery to see  -Palliative  following  -Nutrition consult (7/23/24)  -Dental consult/Orthopantogram (7/23/2024): Has caries that will need to be filled at later time  -Physical and Occupational Therapy   - Word recall 3/3. Timed get up completed.    #MICHAELA:  - No known renal issues   - Baseline BUN/Cr 15/1.1  - Post aggressive diuresis with IV lasix (net negative ~ 9-10 L since admission)  - Cr increasing to 1.8 today off of dialysis  - Continue to monitor Cr off diuresis and with contrasted CT scans.  -I/Os  -avoid hypotension and nephrotoxic agent    #Recurrent Right abdominal wall abscess  - Recently seen in ER on 07/13 for abd wall abscess 2.3 cm x 3.5 cm x 2 cm s/p I& D and completed bactrim DS 1 tab BID x 10 days (end date 07/23)  - Aortic ultrasound redemonstrated abscess findings. However, CTAP 07/26 shows resolution of abscess. No further workup needed.   - ID and ACS consulted for further management, appreciate recs.   - No abdominal wall abscess visualized by surgery (7/25).   -afebrile, nontoxic   -no s/s infx    #DM type 2 (uncontrolled)   - hgbA1c : 13.3 (07 /2024)  - home regimen: Insulin lantus 55 U qpm, lispro 18 U TID means, Trulicity 1.5 q weekly, metformin 1000 mg BID  - Inpatient regimen: Lantus 55 qpm, Lispro 20 U TID meals, sliding scale # 2  - holding metformin and Trulicity   - Endocrine following    - BGM AC & HS  - Consistent carbs diet    #Acute R internal jugular thrombus  - Found incidentally on vascular carotid ultrasound.  - On heparin gtt, transition to DOAC when able.    #Hx of cardioembolic stroke: (12/2022)  - no residual symptoms, AO x 3 on arrival  - Takes xarelto 20 mg at home   - holding xarelto inpatient, will start on heparin gtt  - Statin: cont lipitor 40 mg  - Serial neuro assessments   - PO Tylenol PRN for pain  - PT/OT Consult, OOB to chair  - CAM ICU score every shift     #Anxiety/depression:  - continue home zoloft 100 mg daily  - mood normal on admission  - Sleep/wake cycle  normalization    #Substance abuse  -Alcohol abuse: reports occasional binge drinking but denies withdrawal   -Tobacco use/Nicotine Dependence: occasional cigarette smoker, previous hx of heavy alcohol use         PHYSICAL AND OCCUPATIONAL THERAPY:     LINES:  MANE Pantoja (07/18 - 07/24)  DVT: heparin gtt  GLYCEMIC CONTROL: insulin + sliding scale ordered  BOWEL CARE: Miralax prn ordered  NUTRITION: Adult diet Cardiac; 70 gm fat; 2 - 3 grams Sodium  NUTRITION: Adult diet Cardiac, Carb Controlled; 75 gram carb/meal, 45 gram Carb evening snack; 1500 mL fluid; 70 gm fat; 2 - 3 grams Sodium   resent at bedside, updated 07/24  CODE STATUS: Full Code  DISPO: pending LVAD evaluation.         Cara Toribio MD, MS, FACP   Heart Failure Fellow,  [facility]

## 2024-07-27 NOTE — PROGRESS NOTES
"Arthur Carranza is a 38 y.o. male on day 9 of admission presenting with Cardiogenic shock (Multi).    Subjective   Interval History:     Seen by ACS yesterday, they also were under-whelmed by exam.  CTAP obtained last evening reviewed this AM personally (no read is yet available) and I see no evidence of fluid collection on his abdominal wall were previous area noted.  Definitely, no 10 cm collection.        He remains afebrile and hemodynamically stable.  He is feeling well.  No SOB, KEY, PND, or orthopnea.  Denies abdominal pain or tenderness.    We discussed the need to improve his Glc control in the future in order to prevent or decreased his infection episodes.  States that he has difficulty eating correctly as he currently does not have a place to store any healthy foods. After his discharge, he will be staying with his mother \"for a while\" so he is hoping this will allow him to eat healthier choices.  We discussed keeping a food diary of what he eats in a day, listing specific items and amounts.  He could then see what his eating patterns are as well as his choices.  This may help with his DM dietician visits in future.    Objective   Range of Vitals (last 24 hours)  Heart Rate:  []   Temp:  [36 °C (96.8 °F)-36.8 °C (98.2 °F)]   Resp:  [18-20]   BP: (108-111)/(70-81)   Weight:  [135 kg (297 lb 9.9 oz)]   SpO2:  [95 %-98 %]   Daily Weight  07/27/24 : 135 kg (297 lb 9.9 oz)    Body mass index is 44.08 kg/m².    Physical Exam  Looks well.  Walking around room.  No findings on his abdominal wall    Antibiotics  This patient does not have an active medication from one of the medication groupers.    Relevant Results  Labs  Results from last 72 hours   Lab Units 07/27/24  0759 07/26/24  0841 07/25/24  0724   WBC AUTO x10*3/uL 4.7 5.3 5.3   HEMOGLOBIN g/dL 14.0 15.6 15.1   HEMATOCRIT % 43.0 49.0 46.6   PLATELETS AUTO x10*3/uL 248 250 202     Results from last 72 hours   Lab Units 07/26/24  0841 07/25/24  1620 " 07/25/24  1236 07/25/24  0724 07/24/24  1804   SODIUM mmol/L 134*  --   --  132* 133*   POTASSIUM mmol/L 5.0 5.0 6.6* 5.9* 4.7   CHLORIDE mmol/L 97*  --   --  98 99   CO2 mmol/L 25  --   --  24 25   BUN mg/dL 36*  --   --  25* 28*   CREATININE mg/dL 1.87*  --   --  1.76* 1.57*   GLUCOSE mg/dL 190*  --   --  127* 75   CALCIUM mg/dL 10.0  --   --  9.7 10.0   ANION GAP mmol/L 17  --   --  16 14   EGFR mL/min/1.73m*2 47*  --   --  50* 58*   PHOSPHORUS mg/dL 5.9*  --   --  4.8 4.6     Results from last 72 hours   Lab Units 07/26/24  0841 07/25/24  0724 07/24/24  1804   ALBUMIN g/dL 4.6 4.4 4.3     Estimated Creatinine Clearance: 73.7 mL/min (A) (by C-G formula based on SCr of 1.87 mg/dL (H)).  C-Reactive Protein   Date Value Ref Range Status   02/01/2024 0.50 <1.00 mg/dL Final     CRP   Date Value Ref Range Status   06/15/2022 6.52 (A) mg/dL Final     Comment:     REF VALUE  < 1.00     12/30/2021 0.49 mg/dL Final     Comment:     REF VALUE  < 1.00       Microbiology  Susceptibility data from last 14 days.  Collected Specimen Info Organism   07/13/24 Tissue/Biopsy from Skin/Superficial Abscess Mixed Anaerobic Bacteria     Mixed Gram-Positive and Gram-Negative Bacteria     Imaging    CT abdomen pelvis w IV contrast [[id number]] 07/13/2024    Status: Normal  1. Diffuse skin thickening of the anterior pelvic wall on the right  compatible with cellulitis. There is associated small abscess  laterally in the anterior abdominal wall measuring 2.3 cm x 3.5 cm x 2  cm.  2. Hepatic steatosis.  3. Colonic diverticulosis without findings of diverticulitis.  Signed by Derrick Welsh MD      Assessment/Plan   39yo AAM admitted for management of decompensated heart failure / cardiogenic shock after routine RHC procedure.  He has known NICM with EF of 5-10%, poorly controlled DM, HTN, DLD.  He was seen in ER on 7/13 for recurrent RLQ anterior abdominal wall abscess for which he underwent bed-side I&D with Nu-gauze packing and empiric  Sulfamethoxazole-Trimethoprim DS (800mg-160mg) therapy while awaiting cultures.     Culture resulted on 7/17/24.  No changes made in abx by ER as he was admitted to [facility] for his RHC.  He has been maintained on Sulfamethoxazole-Trimethoprim DS (800mg-160mg) only through the planned stop date of 7/23/24.     He has been afebrile here.  Routine Aortic US on 7/25 revealed persistent and enlarged abdominal wall abscess. Howeever examination was not consistent with this finding.    CT abdomen and pelvics yesterday evening, though not formally read yet, shows no fluid collections in his abdominal wall fat or musculature.  There is also no collection in the intraabdominal space     Recommendations:    Continue to hold all antibiotics  2. Consider having diabetic dietician meet with patient before discharge, if not done already.    Thank-you for allowing us to assist in your patient's management.  We are signing off.  Call if any further issues should arise or you should have any questions.      I spent 25 minutes in the professional and overall care of this patient.      Brittany Brooks MD.  (please reach through EPIC Chat)  Infectious Diseases, Senior Attending Physician  Team B, pager [phone]

## 2024-07-27 NOTE — CARE PLAN
The patient's goals for the shift include      The clinical goals for the shift include remain HDS throughout shift    Patient remained safe and stable throughout shift. No complaints of pain.  Keren Martínez RN

## 2024-07-27 NOTE — CARE PLAN
The patient's goals for the shift include      The clinical goals for the shift include Patient potassium will be between 4-5 by the end of the shift    Over the shift, the patient continued to received scheduled Lokelma. Lab are yet to be drawn to confirm his potassium levels.

## 2024-07-28 VITALS
TEMPERATURE: 98.1 F | SYSTOLIC BLOOD PRESSURE: 155 MMHG | OXYGEN SATURATION: 95 % | DIASTOLIC BLOOD PRESSURE: 76 MMHG | RESPIRATION RATE: 22 BRPM | HEIGHT: 69 IN | HEART RATE: 112 BPM | BODY MASS INDEX: 44.21 KG/M2 | WEIGHT: 298.5 LBS

## 2024-07-28 PROBLEM — Z79.4 CONTROLLED TYPE 2 DIABETES MELLITUS WITH STAGE 3 CHRONIC KIDNEY DISEASE, WITH LONG-TERM CURRENT USE OF INSULIN (MULTI): Status: ACTIVE | Noted: 2024-07-28

## 2024-07-28 PROBLEM — E11.22 CONTROLLED TYPE 2 DIABETES MELLITUS WITH STAGE 3 CHRONIC KIDNEY DISEASE, WITH LONG-TERM CURRENT USE OF INSULIN (MULTI): Status: ACTIVE | Noted: 2024-07-28

## 2024-07-28 PROBLEM — N18.30 CONTROLLED TYPE 2 DIABETES MELLITUS WITH STAGE 3 CHRONIC KIDNEY DISEASE, WITH LONG-TERM CURRENT USE OF INSULIN (MULTI): Status: ACTIVE | Noted: 2024-07-28

## 2024-07-28 LAB
ALBUMIN SERPL BCP-MCNC: 4.1 G/DL (ref 3.4–5)
ALBUMIN SERPL BCP-MCNC: 4.1 G/DL (ref 3.4–5)
ALP SERPL-CCNC: 49 U/L (ref 33–120)
ALT SERPL W P-5'-P-CCNC: 20 U/L (ref 10–52)
ANION GAP SERPL CALC-SCNC: 15 MMOL/L (ref 10–20)
ANION GAP SERPL CALC-SCNC: 18 MMOL/L (ref 10–20)
AST SERPL W P-5'-P-CCNC: 22 U/L (ref 9–39)
ATRIAL RATE: 84 BPM
ATRIAL RATE: 95 BPM
BILIRUB SERPL-MCNC: 0.5 MG/DL (ref 0–1.2)
BNP SERPL-MCNC: 108 PG/ML (ref 0–99)
BUN SERPL-MCNC: 34 MG/DL (ref 6–23)
BUN SERPL-MCNC: 35 MG/DL (ref 6–23)
CALCIUM SERPL-MCNC: 9.6 MG/DL (ref 8.6–10.6)
CALCIUM SERPL-MCNC: 9.6 MG/DL (ref 8.6–10.6)
CHLORIDE SERPL-SCNC: 97 MMOL/L (ref 98–107)
CHLORIDE SERPL-SCNC: 97 MMOL/L (ref 98–107)
CO2 SERPL-SCNC: 24 MMOL/L (ref 21–32)
CO2 SERPL-SCNC: 27 MMOL/L (ref 21–32)
CREAT SERPL-MCNC: 1.85 MG/DL (ref 0.5–1.3)
CREAT SERPL-MCNC: 1.95 MG/DL (ref 0.5–1.3)
EGFRCR SERPLBLD CKD-EPI 2021: 45 ML/MIN/1.73M*2
EGFRCR SERPLBLD CKD-EPI 2021: 48 ML/MIN/1.73M*2
GLUCOSE BLD MANUAL STRIP-MCNC: 153 MG/DL (ref 74–99)
GLUCOSE BLD MANUAL STRIP-MCNC: 58 MG/DL (ref 74–99)
GLUCOSE SERPL-MCNC: 110 MG/DL (ref 74–99)
GLUCOSE SERPL-MCNC: 110 MG/DL (ref 74–99)
LACTATE SERPL-SCNC: 0.5 MMOL/L (ref 0.4–2)
MAGNESIUM SERPL-MCNC: 2.29 MG/DL (ref 1.6–2.4)
P AXIS: 47 DEGREES
P AXIS: 58 DEGREES
P OFFSET: 170 MS
P OFFSET: 170 MS
P ONSET: 114 MS
P ONSET: 120 MS
PHOSPHATE SERPL-MCNC: 5.5 MG/DL (ref 2.5–4.9)
POTASSIUM SERPL-SCNC: 4.7 MMOL/L (ref 3.5–5.3)
POTASSIUM SERPL-SCNC: 4.8 MMOL/L (ref 3.5–5.3)
PR INTERVAL: 192 MS
PR INTERVAL: 198 MS
PROT SERPL-MCNC: 7.2 G/DL (ref 6.4–8.2)
Q ONSET: 213 MS
Q ONSET: 216 MS
QRS COUNT: 14 BEATS
QRS COUNT: 16 BEATS
QRS DURATION: 142 MS
QRS DURATION: 152 MS
QT INTERVAL: 408 MS
QT INTERVAL: 420 MS
QTC CALCULATION(BAZETT): 496 MS
QTC CALCULATION(BAZETT): 512 MS
QTC FREDERICIA: 469 MS
QTC FREDERICIA: 475 MS
R AXIS: -56 DEGREES
R AXIS: 186 DEGREES
SODIUM SERPL-SCNC: 134 MMOL/L (ref 136–145)
SODIUM SERPL-SCNC: 134 MMOL/L (ref 136–145)
T AXIS: 10 DEGREES
T AXIS: 88 DEGREES
T OFFSET: 420 MS
T OFFSET: 423 MS
UFH PPP CHRO-ACNC: 0.5 IU/ML
VENTRICULAR RATE: 84 BPM
VENTRICULAR RATE: 95 BPM

## 2024-07-28 PROCEDURE — 83735 ASSAY OF MAGNESIUM: CPT

## 2024-07-28 PROCEDURE — 2500000002 HC RX 250 W HCPCS SELF ADMINISTERED DRUGS (ALT 637 FOR MEDICARE OP, ALT 636 FOR OP/ED): Performed by: STUDENT IN AN ORGANIZED HEALTH CARE EDUCATION/TRAINING PROGRAM

## 2024-07-28 PROCEDURE — 85520 HEPARIN ASSAY: CPT

## 2024-07-28 PROCEDURE — 2500000001 HC RX 250 WO HCPCS SELF ADMINISTERED DRUGS (ALT 637 FOR MEDICARE OP)

## 2024-07-28 PROCEDURE — 80053 COMPREHEN METABOLIC PANEL: CPT

## 2024-07-28 PROCEDURE — 36415 COLL VENOUS BLD VENIPUNCTURE: CPT

## 2024-07-28 PROCEDURE — 2500000004 HC RX 250 GENERAL PHARMACY W/ HCPCS (ALT 636 FOR OP/ED)

## 2024-07-28 PROCEDURE — 82947 ASSAY GLUCOSE BLOOD QUANT: CPT

## 2024-07-28 PROCEDURE — 83880 ASSAY OF NATRIURETIC PEPTIDE: CPT | Performed by: STUDENT IN AN ORGANIZED HEALTH CARE EDUCATION/TRAINING PROGRAM

## 2024-07-28 PROCEDURE — 36415 COLL VENOUS BLD VENIPUNCTURE: CPT | Performed by: STUDENT IN AN ORGANIZED HEALTH CARE EDUCATION/TRAINING PROGRAM

## 2024-07-28 PROCEDURE — 1100000001 HC PRIVATE ROOM DAILY

## 2024-07-28 PROCEDURE — 99232 SBSQ HOSP IP/OBS MODERATE 35: CPT | Performed by: STUDENT IN AN ORGANIZED HEALTH CARE EDUCATION/TRAINING PROGRAM

## 2024-07-28 PROCEDURE — 2500000002 HC RX 250 W HCPCS SELF ADMINISTERED DRUGS (ALT 637 FOR MEDICARE OP, ALT 636 FOR OP/ED)

## 2024-07-28 PROCEDURE — 83605 ASSAY OF LACTIC ACID: CPT | Performed by: STUDENT IN AN ORGANIZED HEALTH CARE EDUCATION/TRAINING PROGRAM

## 2024-07-28 RX ORDER — INSULIN LISPRO 100 [IU]/ML
15 INJECTION, SOLUTION INTRAVENOUS; SUBCUTANEOUS
Status: DISCONTINUED | OUTPATIENT
Start: 2024-07-28 | End: 2024-07-30

## 2024-07-28 ASSESSMENT — COGNITIVE AND FUNCTIONAL STATUS - GENERAL
CLIMB 3 TO 5 STEPS WITH RAILING: A LITTLE
DAILY ACTIVITIY SCORE: 24
MOBILITY SCORE: 23
CLIMB 3 TO 5 STEPS WITH RAILING: A LITTLE
MOBILITY SCORE: 23
DAILY ACTIVITIY SCORE: 24

## 2024-07-28 ASSESSMENT — PAIN SCALES - GENERAL
PAINLEVEL_OUTOF10: 0 - NO PAIN
PAINLEVEL_OUTOF10: 0 - NO PAIN

## 2024-07-28 ASSESSMENT — PAIN - FUNCTIONAL ASSESSMENT: PAIN_FUNCTIONAL_ASSESSMENT: 0-10

## 2024-07-28 NOTE — CARE PLAN
The patient's goals for the shift include      The clinical goals for the shift include patient will remain HDS throughout shift    Patient remained safe and stable throughout shift. No complaints of pain.  Keren Martínez RN

## 2024-07-28 NOTE — CARE PLAN
The patient's goals for the shift include      The clinical goals for the shift include patient will remain HDS throughout shift    Over the shift, the patient did not c/o any pain/discomfort, shortness of breath. Remained free from injury. Heparin assays therapeutic.

## 2024-07-28 NOTE — PROGRESS NOTES
37 YO Male with hx of NICM/HFrEF (EF 5-10), initially diagnosed 10/2022, HTN, HLD, hypertriglyceridemia, Type II DM (poorly controlled A1c 13.7%, on insulin), severe obesity (BMI 44), cardioembolic stroke (12/2022) with limited residual symptoms, intermittent tobacco abuse, cocaine use, alcohol use, history of non-compliance with medication and doctor appointments, who is transferred from [facility] after his elective RHC showed high right and left sided filling pressures and low CO/CI concerning for low output state/cardiogenic shock. Started on IV lasix and GDMT on admission but CO/CI persistently low so started milrinone on 07/19 0.125 > 0.25 later that day for improving CO/CI. Currently undergoing LVAD evaluation, signed consent 07/22. However, significant psychosocial barriers at play at this time (court dates, unstable housing, polysubstance use etc). Patient's pressured normalized off milrinone and CI improved >2 and so swan was removed on 7/24 and pt transferred to the floor in stable condition.    Subjective   NAEO. He reports no CP, SOB, orthopnea, fevers, chills, or abdominal pain this morning. Ambulating okay, still on diuretic holiday and reports urinating but volume small (< 1L/ day)    Objective     Physical Exam  Constitutional:       Appearance: He is obese.   HENT:      Head: Normocephalic.      Nose: Nose normal.   Eyes:      Conjunctiva/sclera: Conjunctivae normal.   Cardiovascular:      Rate and Rhythm: Normal rate and regular rhythm.      Heart sounds: No murmur heard.     No friction rub. No gallop.   Pulmonary:      Effort: Pulmonary effort is normal.      Breath sounds: Normal breath sounds.   Abdominal:      General: Abdomen is flat.      Palpations: Abdomen is soft.   Musculoskeletal:         General: Normal range of motion.   Skin:     Capillary Refill: Capillary refill takes less than 2 seconds.   Neurological:      Mental Status: He is alert. Mental status is at baseline.  "  Psychiatric:         Mood and Affect: Mood normal.         Last Recorded Vitals  Blood pressure 120/81, pulse 99, temperature 36.1 °C (97 °F), temperature source Temporal, resp. rate 18, height 1.75 m (5' 8.9\"), weight 135 kg (298 lb 8.1 oz), SpO2 98%.  Intake/Output last 3 Shifts:  I/O last 3 completed shifts:  In: 1653.3 (12.2 mL/kg) [P.O.:1170; I.V.:483.3 (3.6 mL/kg)]  Out: 1100 (8.1 mL/kg) [Urine:1100 (0.2 mL/kg/hr)]  Weight: 135.4 kg     Relevant Results  Results for orders placed or performed during the hospital encounter of 07/18/24 (from the past 24 hour(s))   POCT GLUCOSE   Result Value Ref Range    POCT Glucose 82 74 - 99 mg/dL   POCT GLUCOSE   Result Value Ref Range    POCT Glucose 101 (H) 74 - 99 mg/dL   Renal function panel   Result Value Ref Range    Glucose 110 (H) 74 - 99 mg/dL    Sodium 134 (L) 136 - 145 mmol/L    Potassium 4.7 3.5 - 5.3 mmol/L    Chloride 97 (L) 98 - 107 mmol/L    Bicarbonate 27 21 - 32 mmol/L    Anion Gap 15 10 - 20 mmol/L    Urea Nitrogen 35 (H) 6 - 23 mg/dL    Creatinine 1.95 (H) 0.50 - 1.30 mg/dL    eGFR 45 (L) >60 mL/min/1.73m*2    Calcium 9.6 8.6 - 10.6 mg/dL    Phosphorus 5.5 (H) 2.5 - 4.9 mg/dL    Albumin 4.1 3.4 - 5.0 g/dL   Magnesium   Result Value Ref Range    Magnesium 2.29 1.60 - 2.40 mg/dL   Heparin Assay, UFH   Result Value Ref Range    Heparin Unfractionated 0.5 See Comment Below for Therapeutic Ranges IU/mL   Comprehensive metabolic panel   Result Value Ref Range    Glucose 110 (H) 74 - 99 mg/dL    Sodium 134 (L) 136 - 145 mmol/L    Potassium 4.8 3.5 - 5.3 mmol/L    Chloride 97 (L) 98 - 107 mmol/L    Bicarbonate 24 21 - 32 mmol/L    Anion Gap 18 10 - 20 mmol/L    Urea Nitrogen 34 (H) 6 - 23 mg/dL    Creatinine 1.85 (H) 0.50 - 1.30 mg/dL    eGFR 48 (L) >60 mL/min/1.73m*2    Calcium 9.6 8.6 - 10.6 mg/dL    Albumin 4.1 3.4 - 5.0 g/dL    Alkaline Phosphatase 49 33 - 120 U/L    Total Protein 7.2 6.4 - 8.2 g/dL    AST 22 9 - 39 U/L    Bilirubin, Total 0.5 0.0 - 1.2 " mg/dL    ALT 20 10 - 52 U/L   POCT GLUCOSE   Result Value Ref Range    POCT Glucose 153 (H) 74 - 99 mg/dL   B-type natriuretic peptide   Result Value Ref Range     (H) 0 - 99 pg/mL   Lactate   Result Value Ref Range    Lactate 0.5 0.4 - 2.0 mmol/L   POCT GLUCOSE   Result Value Ref Range    POCT Glucose 58 (L) 74 - 99 mg/dL              Assessment/Plan   Principal Problem:    Cardiogenic shock (Multi)  Active Problems:    Nonischemic cardiomyopathy (Multi)    Morbid obesity with BMI of 45.0-49.9, adult (Multi)    Cocaine use disorder, mild (Multi)    Acute decompensated heart failure (Multi)    37 YO Male with hx of NICM/HFrEF (EF 5-10), initially diagnosed 10/2022, HTN, HLD, hypertriglyceridemia, Type II DM (poorly controlled A1c 13.7%, on insulin), severe obesity (BMI 44), cardioembolic stroke (12/2022) with limited residual symptoms, intermittent tobacco abuse, cocaine use, alcohol use, history of non-compliance with medication and doctor appointments, who is transferred from [facility] after his elective RHC showed high right and left sided filling pressures and low CO/CI concerning for low output state/cardiogenic shock. Started on IV lasix and GDMT on admission but CO/CI persistently low so started milrinone on 07/19 0.125 > 0.25 later that day for improving CO/CI. Currently undergoing LVAD evaluation, signed consent 07/22. However, significant psychosocial barriers at play at this time (court dates, unstable housing, polysubstance use etc). Patient's pressured normalized and CI improved >2 and so swan was removed on 7/24 and pt transferred to the floor in stable condition.    # NICM HFrEF (EF 5-10%, TTE 02/2024)- combined systolic and diastolic dysfunction (NYHA IV, ACC Stage D)  #Low cardiac output state +/- cardiogenic shock (Resolved)  - Diagnosed since 2022  - [facility] in 2022 with no evidence of angiographically obstructive CAD  - TTE 07/2024:  CONCLUSIONS:   1. Poorly visualized anatomical  structures due to suboptimal image quality.   2. Left ventricular ejection fraction is severely decreased, by visual estimate at 10%.   3. Spectral Doppler shows a pseudonormal pattern of left ventricular diastolic filling.   4. Left ventricular cavity size is severely dilated (LVIDd 7.4 cm)   5. There is normal right ventricular global systolic function.  - Elective RHC 07/18 showed elevated R and L sided filing pressure and low cardiac output/index: RA 24, RVSP 63, PA 64/40 (48), W 41, /90 (101) mmHg. 3. CO/CI [Evelia]: 4.4 / 1.7. CO/CI [TD]: 3.47 / 1.4. 5. SVR: 1775 cgs, PVR: 2.0 COLON, /74.  - Opening SGC on arrival to [facility] #: /72 (82), PAP 56/43(47), CVP 8, SVR 1357, CI 1.76  - admit weight: 140 Kg  > 138 kg (07/24)  - admit BNP: 127 (low as morbidly obese) >107 (07/23)  - GDMT: - Coreg held due to low output state. Entresto and spironolactone held due to hyperkalemia on 7/25. Continue to hold till renal function recovers, BP currently in normal range    - c/w hydralazine 25 PO TID/Isordil 20 mg TID (07/26 -) .  - Diuretics:  Currently on diuretic holiday for MICHAELA after aggressive diuresis (on hold since 07/23)  - Inotropes/ Pressors: milrinone 0.25 stopped 7/24.   - Closing [place] CO/CI#: 7.6/3.1 off milrinone  - Daily standing weights, 2gm sodium diet, 2L fluid restriction, strict I&Os    #Advanced therapies evaluation  LVAD or Transplant   -BT or BTT: O positive  -Email sent on (7/22)  -Labs: factor V leiden, Protein S, and nicotine, TB spot in process. Syphilis screen negative   -RHC: complete as above  -LHC (10/21/22): No CAD  -CPET: not completed  -ECHO (7/19/2024): EF 10-15%  -CT chest: no acute abnormalities   -CT A/P: resolution of abd abscess, fatty liver   -US abdomen/aorta/iliac/IVC (7/25/2024): Obsctructed by abscess, but normal in calibur on CT from 7/13  -Carotid US (7/25/2024): Unable to visualize R carotid due to bandage. L carotid w <50% stenosis.  -CURRY (lower): No PAD  -2 view CXR  (7/22/2024)  -Device interrogation  -PFTs (7/24/2024): moderate airflow obstruction. Lung volumes indicate a mild restrictive defect.   -Colonoscopy/Occult stool: Not indicated  -LVAD/TX education: met with patient on 7/22/24  -CT surgery consult: CT surgery to see  -Palliative following  -Nutrition consult (7/23/24)  -Dental consult/Orthopantogram (7/23/2024): Has caries that will need to be filled at later time  -Physical and Occupational Therapy   - Word recall 3/3. Timed get up completed.    #MICHAELA:  - No known renal issues   - Baseline BUN/Cr 15/1.1  - Post aggressive diuresis with IV lasix (net negative ~ 9-10 L since admission) and exposure to contrast 07/26  - off diuretics since 07/23  - Cr increasing to 2.0 today, patient reports making urine and no features of volume overload.   - Continue to monitor Cr off diuresis   -I/Os  -avoid hypotension and nephrotoxic agent    #Recurrent Right abdominal wall abscess  - Recently seen in ER on 07/13 for abd wall abscess 2.3 cm x 3.5 cm x 2 cm s/p I& D and completed bactrim DS 1 tab BID x 10 days (end date 07/23)  - Aortic ultrasound redemonstrated abscess findings. However, CTAP 07/26 shows resolution of abscess. No further workup needed.   - ID and ACS consulted for further management, appreciate recs.   - No abdominal wall abscess visualized by surgery (7/25).   -afebrile, nontoxic   -no s/s infx    #DM type 2 (uncontrolled)   - hgbA1c : 13.3 (07 /2024)  - home regimen: Insulin lantus 55 U qpm, lispro 18 U TID means, Trulicity 1.5 q weekly, metformin 1000 mg BID  - Inpatient regimen: Lantus 55 qpm, Lispro 15 U TID meals, sliding scale # 2  - holding metformin and Trulicity   - Patient has been noticed with tight glucose control (at times hypoglycemic) on the aforementioned regimen suggesting non-compliance in outpatient setting. Moreover, with MICHAELA might need less insulin. Recommend discussing with endocrine tomorrow.   - Endocrine following    - BGM AC & HS  -  Consistent carbs diet    #Acute R internal jugular thrombus  - Found incidentally on vascular carotid ultrasound.  - will switch to Xarelto (renal dosed) as no immediate procedures planned     #Hx of cardioembolic stroke: (12/2022)  - no residual symptoms, AO x 3 on arrival  - Takes xarelto 20 mg at home   - resume home Xarelto as no immediate procedures planned   - Statin: cont lipitor 40 mg  - Serial neuro assessments   - PO Tylenol PRN for pain  - PT/OT Consult, OOB to chair  - CAM ICU score every shift     #Anxiety/depression:  - continue home zoloft 100 mg daily  - mood normal on admission  - Sleep/wake cycle normalization    #Substance abuse  -Alcohol abuse: reports occasional binge drinking but denies withdrawal   -Tobacco use/Nicotine Dependence: occasional cigarette smoker, previous hx of heavy alcohol use         PHYSICAL AND OCCUPATIONAL THERAPY:     LINES:  MANE Pantoja (07/18 - 07/24)  DVT: xarelto  GLYCEMIC CONTROL: insulin + sliding scale ordered  BOWEL CARE: Miralax prn ordered  NUTRITION: Adult diet Cardiac; 70 gm fat; 2 - 3 grams Sodium  NUTRITION: Adult diet Cardiac, Carb Controlled; 75 gram carb/meal, 45 gram Carb evening snack; 1500 mL fluid; 70 gm fat; 2 - 3 grams Sodium  CODE STATUS: Full Code  DISPO: pending LVAD evaluation.         Cara Toribio MD, MS, FACP   Heart Failure Fellow,  [facility]

## 2024-07-29 ENCOUNTER — DOCUMENTATION (OUTPATIENT)
Dept: CARDIAC REHAB | Facility: HOSPITAL | Age: 39
End: 2024-07-29
Payer: COMMERCIAL

## 2024-07-29 ENCOUNTER — APPOINTMENT (OUTPATIENT)
Dept: CARDIAC REHAB | Facility: HOSPITAL | Age: 39
End: 2024-07-29
Payer: COMMERCIAL

## 2024-07-29 LAB
ALBUMIN SERPL BCP-MCNC: 4.2 G/DL (ref 3.4–5)
ANION GAP SERPL CALC-SCNC: 14 MMOL/L (ref 10–20)
BUN SERPL-MCNC: 32 MG/DL (ref 6–23)
CALCIUM SERPL-MCNC: 9.5 MG/DL (ref 8.6–10.6)
CHLORIDE SERPL-SCNC: 98 MMOL/L (ref 98–107)
CO2 SERPL-SCNC: 27 MMOL/L (ref 21–32)
CREAT SERPL-MCNC: 1.7 MG/DL (ref 0.5–1.3)
EGFRCR SERPLBLD CKD-EPI 2021: 53 ML/MIN/1.73M*2
ELECTRONICALLY SIGNED BY: NORMAL
FACTOR V LEIDEN INTERPRETATION: NORMAL
FACTOR V LEIDEN RESULT: NORMAL
GLUCOSE BLD MANUAL STRIP-MCNC: 167 MG/DL (ref 74–99)
GLUCOSE BLD MANUAL STRIP-MCNC: 80 MG/DL (ref 74–99)
GLUCOSE SERPL-MCNC: 165 MG/DL (ref 74–99)
LIPASE SERPL-CCNC: 59 U/L (ref 9–82)
MAGNESIUM SERPL-MCNC: 2.4 MG/DL (ref 1.6–2.4)
PHOSPHATE SERPL-MCNC: 4.6 MG/DL (ref 2.5–4.9)
POC FINGERSTICK BLOOD GLUCOSE: 143 MG/DL (ref 70–100)
POC FINGERSTICK BLOOD GLUCOSE: 196 MG/DL (ref 70–100)
POTASSIUM SERPL-SCNC: 4.8 MMOL/L (ref 3.5–5.3)
SODIUM SERPL-SCNC: 134 MMOL/L (ref 136–145)

## 2024-07-29 PROCEDURE — 99233 SBSQ HOSP IP/OBS HIGH 50: CPT | Performed by: PHYSICIAN ASSISTANT

## 2024-07-29 PROCEDURE — 2500000001 HC RX 250 WO HCPCS SELF ADMINISTERED DRUGS (ALT 637 FOR MEDICARE OP)

## 2024-07-29 PROCEDURE — 36415 COLL VENOUS BLD VENIPUNCTURE: CPT

## 2024-07-29 PROCEDURE — 83735 ASSAY OF MAGNESIUM: CPT

## 2024-07-29 PROCEDURE — 99233 SBSQ HOSP IP/OBS HIGH 50: CPT | Performed by: INTERNAL MEDICINE

## 2024-07-29 PROCEDURE — 1100000001 HC PRIVATE ROOM DAILY

## 2024-07-29 PROCEDURE — 83690 ASSAY OF LIPASE: CPT

## 2024-07-29 PROCEDURE — 80069 RENAL FUNCTION PANEL: CPT

## 2024-07-29 PROCEDURE — 2500000002 HC RX 250 W HCPCS SELF ADMINISTERED DRUGS (ALT 637 FOR MEDICARE OP, ALT 636 FOR OP/ED)

## 2024-07-29 PROCEDURE — 82947 ASSAY GLUCOSE BLOOD QUANT: CPT

## 2024-07-29 PROCEDURE — 2500000002 HC RX 250 W HCPCS SELF ADMINISTERED DRUGS (ALT 637 FOR MEDICARE OP, ALT 636 FOR OP/ED): Performed by: STUDENT IN AN ORGANIZED HEALTH CARE EDUCATION/TRAINING PROGRAM

## 2024-07-29 ASSESSMENT — COGNITIVE AND FUNCTIONAL STATUS - GENERAL
EATING MEALS: A LITTLE
DAILY ACTIVITIY SCORE: 24
PERSONAL GROOMING: A LITTLE
MOBILITY SCORE: 24
CLIMB 3 TO 5 STEPS WITH RAILING: A LITTLE

## 2024-07-29 ASSESSMENT — PAIN SCALES - GENERAL
PAINLEVEL_OUTOF10: 0 - NO PAIN
PAINLEVEL_OUTOF10: 0 - NO PAIN

## 2024-07-29 ASSESSMENT — PAIN - FUNCTIONAL ASSESSMENT
PAIN_FUNCTIONAL_ASSESSMENT: 0-10
PAIN_FUNCTIONAL_ASSESSMENT: 0-10

## 2024-07-29 NOTE — PROGRESS NOTES
Cardiac Rehabilitation Discharge Summary    Name: Arthur Carranza   Medical Record Number: [id number]  YOB: 1985   Age: 38 y.o.  Today’s Date: 7/29/2024   Primary Care Physician: Christopher D'Amico, DO   Referring Physician: Delroy Jurado MD   Program Location: [address]  Primary Diagnosis:   1. HFrEF (heart failure with reduced ejection fraction) (Multi)  Referral to Cardiac Rehab      2. Primary hypertension  Referral to Cardiac Rehab      3. Acute on chronic combined systolic and diastolic congestive heart failure (Multi)  Referral to Cardiac Rehab         Onset/Date of Diagnosis: 10/21/22   Session #: 3  AACVPR Risk Stratification: High   Falls Risk: Low    Psychosocial Discharge:   Pre PHQ-9: 19  No data recorded   Sent PH-Q 9 to MD if score > 20: Yes; Date sent: 5/28/2024    Pt reported/currently experiencing stress: Yes; Stress; Severity: marked, Depression; Severity: marked, and PTSD; Severity: moderate  Patient uses stress management skills: Yes , painting  History of:  Depression and PTSD  Currently seeing a mental health provider: Yes, Then provider name: unknown; patient states he would like to switch providers  Social Support: No  Quality of Life Survey: KCCQ (see Heart Failure Management below)  Learning Assessment:  Learning assessment/barriers: Emotional, Financial, and Motivation  Preferred learning method: Auditory, Visual, Reading handout, and Writing handout  Barriers: None  Comments: Consistency and motivation is the biggest barrier for patient     Stages of Change: Action    Psychosocial Plan  Goal Status: In progress  Psychosocial Goals: Demonstrating proper techniques for stress management, Maintain or lower PH-Q 9 score by discharge, and Identify strategies for managing depression    Psychosocial Interventions/Education:   7/12/24 Arthur's initial PHQ-9 score was elevated and staff RN spoke to patient regarding his emotional  stress burden.  Patient has history of anxiety and depression; last saw a provider in  psychiatry as of 2022. Patient would benefit from re:seeing a behavioral health team member. Will inquire through patient referrals and referring MD.  07/29/24 See staff comments below.    Nutrition Discharge:    Diet Habit Survey: Picture Your Plate  Pre:    Post: To be done at discharge.    PYP reviewed with Dietician: Not at this time    Lipids Assessment  Hyperlipidemia: Yes   Lab Results   Component Value Date    CHOL 125 12/12/2023    HDL 26.6 12/12/2023    LDLCALC 50 12/12/2023    TRIG 241 (H) 12/12/2023     Current Dietary Guidelines: Low fat, Low sodium  Barriers to dietary change: no    Diabetes Assessment  Lab Results   Component Value Date    HGBA1C 13.3 (H) 07/18/2024      History of Diabetes: Yes   Pt monitors BS at home:  Not currently; patient has Nadir and willing to try utilizing  Frequency: x1 /day  FBS range: 300 - 800  Hypoglycemic Episodes:  Yes; patient ED visit 7/3/2024 for Hyperglycemia. Has had past hypoglycemic episodes.    Weight Management       Nutrition Plan  Goal Status: In progress  Nutrition Goals: Lipid Goal: HDL>45, LDL <70, Total <180, Trigs <150, Improve Diet Habit Survey score by 5-10 points by discharge, Learn how to read and interpret nutrition labels prior to discharge, and Improve HgbA1C prior to discharge  Nutrition Interventions/Education:   7/12/2024    Patient has not yet returned diet survey for assessment due to lack of attendance and compliance. Staff would like patient to meet with dietitian when available to encourage best heart health diet options for patient. Patient has expressed the want to lose weight as well.  7/29/24 See staff comments below.    Exercise Discharge:  Current Home Exercise:   Current Home Exercise: Current Home Exercise?: No  Mode: NA  Frequency: NA  Duration: NA     Exercise Prescription based on:   Cardiopulmonary Metabolic Stress  6/6/2024    [facility] Activity Status Index  (DASI)  DASI Score: 26.45   MET Score: 6     Exercise Prescription  Frequency:  3 days/week  Mode: Treadmill, NuStep, and Recumbent Cycle  Duration: 27 total aerobic minutes  Intensity: RPE 12-16  Target HR:   112-132; THR calculated via CPET by program EP  MET Level: 2.6  Patient wears supplemental O2: No   Modality Workload METs Duration (minutes)   1 Pre-Exercise      2 Session Warm Up   5 : 00   3 Treadmill 2.0 mph @ 0 % 2.6 9 : 00   4 NuStep 4000 3 load @70 potts 2.5 9 : 00   5 Recumbent Bike 3 load @ 50 rpms 2.3 9 : 00   6 Post-Exercise         Resistance Training:  After session #10 ; not completed  Home Exercise Prescription given: To be given at session # 10 ; not completed    Exercise Plan  Goal Status: In progress  Exercise Goals: Increase exercise MET level by 5-10% each week, Increase total exercise duration to 30-45 minutes, Obtain 150 minutes/week of moderate intensity aerobic exercise, and Establish a home exercise program before discharge    Exercise Interventions/Education:   7/12/24 Patient has not attended enough rehabilitation sessions to make exercise prescription changes. Current workloads are appropriate based on patient's pre-rehabilitation cardiopulmonary metabolic stress test.  7/29/24 See staff comments below.    Other Core Components/Risk Factor Discharge:  Medication adherence  Medication compliance: No, patient admits to not taking medications as prescribed.  Uses pill box/organizer:  Not at this time. Given to patient by rehab staff  Carries medication list:  Yes, patient has medication list in MyChart/phone  Current Medications:   Medication Documentation Review Audit       Reviewed by Elroy Fuentes PharmD (Pharmacist) on 07/19/24 at 1657      Medication Order Taking? Sig Documenting Provider Last Dose Status   atorvastatin (Lipitor) 40 mg tablet [id number]  TAKE 1 TABLET BY MOUTH AT BEDTIME Delroy Jurado MD  Active   blood-glucose meter,continuous (FreeStyle Nadir 3  "[place]) misc [id number]  Use as instructed to test blood glucose throughout the day Christopher D'Amico, DO  Active   blood-glucose sensor (FreeStyle Nadir 3 Sensor) device [id number]  Use as directed to test blood glucose throughout the day Christopher D'Amico, DO  Active   buPROPion XL (Wellbutrin XL) 150 mg 24 hr tablet [id number]  Take 1 tablet (150 mg) by mouth once daily. Historical Provider, MD  Active   carvedilol (Coreg) 6.25 mg tablet [id number]  Take 1 tablet (6.25 mg) by mouth 2 times daily (morning and late afternoon). Delroy Jurado MD  Active   dapagliflozin propanediol (Farxiga) 10 mg [id number]  Take 1 tablet (10 mg) by mouth once daily. Delroy Jurado MD  Active   dulaglutide (Trulicity) 1.5 mg/0.5 mL pen injector injection [id number]  Inject 1.5 mg under the skin 1 (one) time per week. Christopher D'Amico, DO  Active   insulin glargine (Lantus Solostar U-100 Insulin) 100 unit/mL (3 mL) pen [id number]  Inject 55 Units under the skin once daily at bedtime. Take as directed per insulin instructions.   Patient taking differently: Inject 60 Units under the skin once daily at bedtime. Take as directed per insulin instructions.    Merry Chaney MD  Active   insulin lispro (HumaLOG) 100 unit/mL injection [id number]  Inject 16 Units under the skin 3 times a day with meals. Take as directed per insulin instructions. Merry Chaney MD  Active   metFORMIN (Glucophage) 1,000 mg tablet [id number]  Take 1 tablet (1,000 mg) by mouth 2 times a day. Historical Provider, MD  Active   pen needle, diabetic 31 gauge x 5/16\" needle [id number]  Use to inject 1-4 times daily as directed. Merry Chaney MD  Active   rivaroxaban (Xarelto) 20 mg tablet [id number]  Take 1 tablet (20 mg) by mouth once daily in the evening. Take with meals. Take with food. Delroy Jurado MD  Active   sacubitriL-valsartan (Entresto) 49-51 mg tablet [id number]  Take 1 tablet by mouth 2 times a day. Delroy Jurado MD  " Active   sertraline (Zoloft) 100 mg tablet [id number]  Take 1 tablet (100 mg) by mouth once daily. Historical Provider, MD  Active   spironolactone (Aldactone) 25 mg tablet [id number]  Take 0.5 tablets (12.5 mg) by mouth once daily. Do not start before February 24, 2024. GEE Galaviz  Active   sulfamethoxazole-trimethoprim (Bactrim DS) 800-160 mg tablet [id number]  Take 1 tablet by mouth every 12 hours for 10 days. Kel Nelson PA-C  Active   torsemide (Demadex) 20 mg tablet [id number]  Take 1 tablet (20 mg) by mouth once daily. GEE Galaviz  Active   traZODone (Desyrel) 50 mg tablet [id number]  Take 1 tablet (50 mg) by mouth as needed at bedtime for sleep. Historical Provider, MD  Active   True Metrix Glucose Test Strip strip [id number]  USE TO CHECK BLOOD SUGAR FOUR TIMES DAILY Historical Provider, MD  Active   Ultra Thin Lancets 30 gauge misc [id number]  TEST BLOOD SUGAR FOUR TIMES DAILY AS DIRECTED Historical Provider, MD  Active                   Blood Pressure Management  History of Hypertension: Yes   Medication Changes: No   Resting BP:  There were no vitals taken for this visit.    Heart Failure Management  Hx of Heart Failure: Yes, Type (selection): HFrEF  Most recent EF: Echocardiogram - LVEF (%): 10     Onset of heart failure diagnosis: 03/2023  Last heart failure hospitalization: 02/21/2024 - 02/23/2024 CHF  Number of HF admissions per year: 5    Symptoms: Fatigue at rest, Fatigue with exertion, Shortness of breath, Orthopnea, and PND and chest pain that is sharp lasting 1-2 seconds  Is there a family Hx of HF:  unknown family history; foster care  Does patient obtain daily weight  Every other day    KCCQ survey: Pre: TBD  Post: TBD    Heart Failure Reassessment Heart Failure Symptom Management: Initial Treatment Plan. Will reassess in 30 days.    Heart Failure Goals: Able to verbalize signs and symptoms of fluid retention and when to contact MD, Adhere to proper fluid  restrictions, Obtain daily weight and verbalize proper method of obtaining weights    Smoking/Tobacco Assessment  Social History     Tobacco Use    Smoking status: Every Day     Types: Cigarettes   Vaping Use    Vaping status: Unknown   Substance Use Topics    Alcohol use: Yes     Comment: occasionaly - 1-2 times a month    Drug use: Not Currently     Types: Codeine      Other Core Component Plan  Goal Status: In progress  Other Core Component Goals: Medication compliance, Verbalize medication usage and drug actions by discharge, Achieve resting BP of < 130/80 by discharge, and tobacco cessation follow up (recent quit)    Other Core Component Interventions/Education:   7/12/2024  Extensive education was given to patient on 7/3/24 regarding medication compliance and goals. Patient admitted to not taking his medications as prescribed on 7/3/2024 and for the last 2-3 months. Staff followed up ED visit with telephone call and can actively share that medications are prescribed, status picked up from pharmacy, and patient reports taking daily. Medication reconciliation suggested on a 30-day basis until patient has become adherent to prescribed medication.  7/29/24 See staff comments below.    Individual Patient Goals:  Be able to start home exercise routine by discharge from program.   Lose 1-3 lbs while enrolled in Cardiac Rehab.   Goal Status: In progress    Staff Comments:  Patient has not been able to be compliant with attendance to rehabilitation due ED visit and current hospital admission. Medical team is currently evaluating for implantation of assistive device. Patient is being discharge for the same reason and could be enrolled in the program with the new referral in future.    Rehab Staff Signature: ELAINA Ivy

## 2024-07-29 NOTE — PROGRESS NOTES
07/29/24        Transitional Care Coordination Progress Note:   Patient discussed during interdisciplinary rounds.   Team members present: RN TL MARTINES MD   Plan per Medical/Surgical team: Monitoring for improvement in Creatine patient not medcailly ready for discharge   Discharge disposition: Home   Status-Inpatient  Payer- [employer]   Potential Barriers: None   ADOD: 7-         Benjy Syed RN [facility] [phone]

## 2024-07-29 NOTE — PROGRESS NOTES
"Arthur Carranza is a 38 y.o. male on day 11 of admission presenting with Cardiogenic shock (Multi).    Subjective   No acute events overnight. Pt's glucose dropped to <90 before lunch today after his breakfast lispro was given based on post-prandial glucose.     Pt will work on calling nurse to measure his glucose before meals.       Objective   Review of Systems   All other systems reviewed and are negative.        Physical Exam  Constitutional:       Appearance: Normal appearance. He is obese.   HENT:      Head: Normocephalic and atraumatic.      Nose: Nose normal.      Mouth/Throat:      Mouth: Mucous membranes are dry.      Pharynx: Oropharynx is clear.   Eyes:      Extraocular Movements: Extraocular movements intact.      Conjunctiva/sclera: Conjunctivae normal.   Cardiovascular:      Rate and Rhythm: Normal rate and regular rhythm.      Pulses: Normal pulses.      Heart sounds: Normal heart sounds.   Pulmonary:      Effort: Pulmonary effort is normal.      Breath sounds: Normal breath sounds.   Abdominal:      General: Abdomen is flat. Bowel sounds are normal.      Palpations: Abdomen is soft.   Skin:     General: Skin is warm and dry.   Neurological:      General: No focal deficit present.      Mental Status: He is alert and oriented to person, place, and time. Mental status is at baseline.   Psychiatric:         Mood and Affect: Mood normal.         Behavior: Behavior normal.         Thought Content: Thought content normal.         Judgment: Judgment normal.         Last Recorded Vitals  Blood pressure 106/71, pulse 100, temperature 36 °C (96.8 °F), temperature source Temporal, resp. rate 16, height 1.75 m (5' 8.9\"), weight 137 kg (301 lb 9.4 oz), SpO2 95%.    Intake/Output last 3 Shifts:  I/O last 3 completed shifts:  In: 1020 (7.5 mL/kg) [P.O.:1020]  Out: 1000 (7.3 mL/kg) [Urine:1000 (0.2 mL/kg/hr)]  Weight: 136.8 kg     Scheduled medications  atorvastatin, 40 mg, oral, Nightly  buPROPion XL, 150 mg, oral, " Daily  calcium gluconate, 2 g, intravenous, Once  dapagliflozin propanediol, 10 mg, oral, Daily  hydrALAZINE, 25 mg, oral, TID  insulin glargine, 55 Units, subcutaneous, Nightly  insulin lispro, 0-10 Units, subcutaneous, TID AC  insulin lispro, 15 Units, subcutaneous, TID AC  isosorbide dinitrate, 20 mg, oral, TID  melatonin, 3 mg, oral, Nightly  perflutren protein A microsphere, 0.5 mL, intravenous, Once in imaging  polyethylene glycol, 17 g, oral, Daily  rivaroxaban, 20 mg, oral, Daily with breakfast  [Held by provider] sacubitriL-valsartan, 1 tablet, oral, BID  sertraline, 100 mg, oral, Daily  [Held by provider] spironolactone, 25 mg, oral, Daily      Continuous medications       PRN medications  PRN medications: acetaminophen, dextrose, dextrose, glucagon, glucagon, oxygen, traZODone      Results from last 7 days   Lab Units 07/29/24  0554 07/28/24  0617   SODIUM mmol/L 134* 134*  134*   POTASSIUM mmol/L 4.8 4.8  4.7   CHLORIDE mmol/L 98 97*  97*   CO2 mmol/L 27 24  27   BUN mg/dL 32* 34*  35*   CREATININE mg/dL 1.70* 1.85*  1.95*   CALCIUM mg/dL 9.5 9.6  9.6   ALBUMIN g/dL 4.2 4.1  4.1   PROTEIN TOTAL g/dL  --  7.2   BILIRUBIN TOTAL mg/dL  --  0.5   ALK PHOS U/L  --  49   ALT U/L  --  20   AST U/L  --  22   GLUCOSE mg/dL 165* 110*  110*     }  Assessment/Plan   Principal Problem:    Cardiogenic shock (Multi)  Active Problems:    Nonischemic cardiomyopathy (Multi)    Morbid obesity with BMI of 45.0-49.9, adult (Multi)    Acute on chronic combined systolic and diastolic CHF (congestive heart failure) (Multi)    Anxiety and depression    Cocaine use disorder, mild (Multi)    Acute decompensated heart failure (Multi)    Controlled type 2 diabetes mellitus with stage 3 chronic kidney disease, with long-term current use of insulin (Multi)    HFrEF (heart failure with reduced ejection fraction) (Multi)      Arthur Carranza is a 38 y.o. male presenting with uncontrolled T2DM in the setting of ICU admission for  cardiogenic shock/HFrEF (EF 5-10%). His A1c has been above 13% even before his hospitalization, which is consistent with past notes showing noncompliance with diabetes management. Since he is in the ICU we want to avoid hypoglycemia, even if we are a bit permissive with hyperglycemia. His 55 lantus he is currently on as an inpatient seems to be controlling his glucose overnight. We need to increase his lispro with meals again because he ids often requiring at least 4 units of additional lispro with meals. He also has milrinone in D5, which can also make him hyperglycemic. His Cr is not elevated enough from baseline to warrant lower insulin doses.         Diabetes History  Outpatient provider for endocrine care Jaki Hanks, PharmD   date of last visit 7/17/24  Known complications due to diabetes include: obesity and hyperglycemia    Home Management  Insulin administered via shot    Insulin Dose: 55 glargine qpm, 18 lispro TID with meals    Results from Most Recent A1C  Hemoglobin A1C   Date/Time Value Ref Range Status   07/18/2024 03:58 PM 13.3 (H) see below % Final        Diabetes Problem List Entries with Dates  Problem List:  2024-07: Controlled type 2 diabetes mellitus with stage 3 chronic   kidney disease, with long-term current use of insulin (Multi)  2024-01: 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 (Multi)  2024-01: Hyperglycemia due to diabetes mellitus (Multi)  2023-12: Type 2 diabetes mellitus with hyperglycemia (Multi)  2023-03: Type 2 diabetes mellitus with neurologic complication, with   long-term current use of insulin (Multi)      History where DKA was Reason for Admission in last year N/A      RECOMMENDATIONS:     PLAN  Steroids: none    - continue glargine 55u at bedtime        If NPO: reduce to 35u  - continue lispro 15 with meals plus scale       If NPO: hold  - continue lispro corrective scale #2 with meals, adjust to q4h if NPO   = 0u  151-200 = 2u  201-250 =  4u  251-300 = 6u  301-350 = 8u  351-400 = 10u  - art therapy ordered today     -Accuchecks (not BMP) TIDAC and QHS- kindly ensure QHS Accucheck is drawn; it is often missed   - Goal -180  -Hypoglycemia protocol  -Diabetes Diet- low carb 60 CHO  -Will continue to follow and titrate insulin accordingly    SGLT2i tx may not be utilized in inpt setting unless the following conditions are met. Only HF Cardiology may initiate inpatient SGLT2i use.   1) pt is eating and drinking by mouth with a total daily carb intake exceeding 60g of CHO  2) pt is urinating independently of urinary catheters  3) pt can ambulate freely to the restroom in order to maintain personal hygiene  4) no current concern for UTI/genital or inguinal candidiasis  5) no plans for NPO within the next 48-72hr    Discharge planning:   [] patient may expect to discharge home on MDI, final doses TBD by titration  []will provide CGM sample prior to discharge   []will enroll pt in  pharmacy platinum plan program  []follow up with [facility]     Teresa Nino PA-C   Endocrinology  Inpatient Diabetes Management Team

## 2024-07-29 NOTE — CARE PLAN
Problem: Fall/Injury  Goal: Not fall by end of shift  Outcome: Progressing  Goal: Be free from injury by end of the shift  Outcome: Progressing  Goal: Verbalize understanding of personal risk factors for fall in the hospital  Outcome: Progressing  Goal: Verbalize understanding of risk factor reduction measures to prevent injury from fall in the home  Outcome: Progressing  Goal: Pace activities to prevent fatigue by end of the shift  Outcome: Progressing     Problem: Pain - Adult  Goal: Verbalizes/displays adequate comfort level or baseline comfort level  Outcome: Progressing     Problem: Safety - Adult  Goal: Free from fall injury  Outcome: Progressing     Pt HDS, awaiting if eligible for VAD vs transplant candidate.

## 2024-07-30 ENCOUNTER — COMMITTEE REVIEW (OUTPATIENT)
Dept: TRANSPLANT | Facility: HOSPITAL | Age: 39
End: 2024-07-30
Payer: COMMERCIAL

## 2024-07-30 ENCOUNTER — PHARMACY VISIT (OUTPATIENT)
Dept: PHARMACY | Facility: CLINIC | Age: 39
End: 2024-07-30
Payer: MEDICAID

## 2024-07-30 ENCOUNTER — APPOINTMENT (OUTPATIENT)
Dept: RADIOLOGY | Facility: HOSPITAL | Age: 39
End: 2024-07-30
Payer: COMMERCIAL

## 2024-07-30 VITALS
BODY MASS INDEX: 44.87 KG/M2 | SYSTOLIC BLOOD PRESSURE: 111 MMHG | HEART RATE: 101 BPM | RESPIRATION RATE: 20 BRPM | DIASTOLIC BLOOD PRESSURE: 61 MMHG | HEIGHT: 69 IN | OXYGEN SATURATION: 97 % | WEIGHT: 302.91 LBS | TEMPERATURE: 96.6 F

## 2024-07-30 DIAGNOSIS — I50.20 HFREF (HEART FAILURE WITH REDUCED EJECTION FRACTION) (MULTI): Primary | ICD-10-CM

## 2024-07-30 PROBLEM — I50.9 ACUTE DECOMPENSATED HEART FAILURE (MULTI): Status: RESOLVED | Noted: 2024-07-18 | Resolved: 2024-07-30

## 2024-07-30 PROBLEM — R57.0 CARDIOGENIC SHOCK (MULTI): Status: RESOLVED | Noted: 2024-07-22 | Resolved: 2024-07-30

## 2024-07-30 LAB
ALBUMIN SERPL BCP-MCNC: 4.2 G/DL (ref 3.4–5)
ANION GAP SERPL CALC-SCNC: 15 MMOL/L (ref 10–20)
BUN SERPL-MCNC: 28 MG/DL (ref 6–23)
CALCIUM SERPL-MCNC: 9.4 MG/DL (ref 8.6–10.6)
CHLORIDE SERPL-SCNC: 100 MMOL/L (ref 98–107)
CO2 SERPL-SCNC: 26 MMOL/L (ref 21–32)
CREAT SERPL-MCNC: 1.61 MG/DL (ref 0.5–1.3)
EGFRCR SERPLBLD CKD-EPI 2021: 56 ML/MIN/1.73M*2
ERYTHROCYTE [DISTWIDTH] IN BLOOD BY AUTOMATED COUNT: 13 % (ref 11.5–14.5)
GLUCOSE BLD MANUAL STRIP-MCNC: 136 MG/DL (ref 74–99)
GLUCOSE BLD MANUAL STRIP-MCNC: 225 MG/DL (ref 74–99)
GLUCOSE SERPL-MCNC: 137 MG/DL (ref 74–99)
HCT VFR BLD AUTO: 39.2 % (ref 41–52)
HGB BLD-MCNC: 12.7 G/DL (ref 13.5–17.5)
MAGNESIUM SERPL-MCNC: 2.26 MG/DL (ref 1.6–2.4)
MCH RBC QN AUTO: 31.2 PG (ref 26–34)
MCHC RBC AUTO-ENTMCNC: 32.4 G/DL (ref 32–36)
MCV RBC AUTO: 96 FL (ref 80–100)
NRBC BLD-RTO: 0 /100 WBCS (ref 0–0)
PHOSPHATE SERPL-MCNC: 4.1 MG/DL (ref 2.5–4.9)
PLATELET # BLD AUTO: 271 X10*3/UL (ref 150–450)
POTASSIUM SERPL-SCNC: 4.6 MMOL/L (ref 3.5–5.3)
PROT C AG ACT/NOR PPP IA: >95 % (ref 63–153)
RBC # BLD AUTO: 4.07 X10*6/UL (ref 4.5–5.9)
SODIUM SERPL-SCNC: 136 MMOL/L (ref 136–145)
WBC # BLD AUTO: 5.4 X10*3/UL (ref 4.4–11.3)

## 2024-07-30 PROCEDURE — 70450 CT HEAD/BRAIN W/O DYE: CPT

## 2024-07-30 PROCEDURE — 99233 SBSQ HOSP IP/OBS HIGH 50: CPT

## 2024-07-30 PROCEDURE — 70450 CT HEAD/BRAIN W/O DYE: CPT | Performed by: RADIOLOGY

## 2024-07-30 PROCEDURE — 2500000001 HC RX 250 WO HCPCS SELF ADMINISTERED DRUGS (ALT 637 FOR MEDICARE OP)

## 2024-07-30 PROCEDURE — 36415 COLL VENOUS BLD VENIPUNCTURE: CPT | Performed by: REGISTERED NURSE

## 2024-07-30 PROCEDURE — 36415 COLL VENOUS BLD VENIPUNCTURE: CPT

## 2024-07-30 PROCEDURE — 99239 HOSP IP/OBS DSCHRG MGMT >30: CPT | Performed by: INTERNAL MEDICINE

## 2024-07-30 PROCEDURE — 2500000002 HC RX 250 W HCPCS SELF ADMINISTERED DRUGS (ALT 637 FOR MEDICARE OP, ALT 636 FOR OP/ED)

## 2024-07-30 PROCEDURE — RXMED WILLOW AMBULATORY MEDICATION CHARGE

## 2024-07-30 PROCEDURE — 84132 ASSAY OF SERUM POTASSIUM: CPT

## 2024-07-30 PROCEDURE — 85027 COMPLETE CBC AUTOMATED: CPT | Performed by: REGISTERED NURSE

## 2024-07-30 PROCEDURE — 82947 ASSAY GLUCOSE BLOOD QUANT: CPT

## 2024-07-30 PROCEDURE — 2500000002 HC RX 250 W HCPCS SELF ADMINISTERED DRUGS (ALT 637 FOR MEDICARE OP, ALT 636 FOR OP/ED): Performed by: STUDENT IN AN ORGANIZED HEALTH CARE EDUCATION/TRAINING PROGRAM

## 2024-07-30 PROCEDURE — 83735 ASSAY OF MAGNESIUM: CPT

## 2024-07-30 RX ORDER — ISOSORBIDE DINITRATE 20 MG/1
20 TABLET ORAL
Qty: 90 TABLET | Refills: 0 | Status: SHIPPED | OUTPATIENT
Start: 2024-07-30 | End: 2024-08-29

## 2024-07-30 RX ORDER — HYDRALAZINE HYDROCHLORIDE 25 MG/1
25 TABLET, FILM COATED ORAL 3 TIMES DAILY
Qty: 90 TABLET | Refills: 0 | Status: SHIPPED | OUTPATIENT
Start: 2024-07-30 | End: 2024-08-29

## 2024-07-30 RX ORDER — INSULIN LISPRO 100 [IU]/ML
12 INJECTION, SOLUTION INTRAVENOUS; SUBCUTANEOUS
Status: DISCONTINUED | OUTPATIENT
Start: 2024-07-30 | End: 2024-07-30 | Stop reason: HOSPADM

## 2024-07-30 RX ORDER — PEN NEEDLE, DIABETIC 30 GX3/16"
1 NEEDLE, DISPOSABLE MISCELLANEOUS 4 TIMES DAILY
Qty: 400 EACH | Refills: 0 | Status: SHIPPED | OUTPATIENT
Start: 2024-07-30

## 2024-07-30 RX ORDER — INSULIN LISPRO 100 [IU]/ML
INJECTION, SOLUTION INTRAVENOUS; SUBCUTANEOUS
Qty: 30 ML | Refills: 0 | Status: SHIPPED | OUTPATIENT
Start: 2024-07-30

## 2024-07-30 RX ORDER — ISOPROPYL ALCOHOL 70 ML/100ML
1 SWAB TOPICAL 4 TIMES DAILY
Qty: 400 EACH | Refills: 3 | Status: SHIPPED | OUTPATIENT
Start: 2024-07-30 | End: 2024-11-07

## 2024-07-30 RX ORDER — METFORMIN HYDROCHLORIDE 1000 MG/1
1000 TABLET ORAL 2 TIMES DAILY
Qty: 60 TABLET | Refills: 0 | Status: SHIPPED | OUTPATIENT
Start: 2024-07-30

## 2024-07-30 RX ORDER — INSULIN GLARGINE 100 [IU]/ML
55 INJECTION, SOLUTION SUBCUTANEOUS NIGHTLY
Qty: 30 ML | Refills: 0 | Status: SHIPPED | OUTPATIENT
Start: 2024-07-30 | End: 2024-09-22

## 2024-07-30 ASSESSMENT — ENCOUNTER SYMPTOMS
FREQUENCY: 0
POLYDIPSIA: 0
JOINT SWELLING: 0
CHEST TIGHTNESS: 0
AGITATION: 0
PALPITATIONS: 0
DIAPHORESIS: 0
BRUISES/BLEEDS EASILY: 0
HEADACHES: 0
CONFUSION: 0
FATIGUE: 1
NAUSEA: 0
ACTIVITY CHANGE: 0
EYE PAIN: 0
TROUBLE SWALLOWING: 0
NUMBNESS: 0
SHORTNESS OF BREATH: 0
VOMITING: 0
DIZZINESS: 0
POLYPHAGIA: 0
DIARRHEA: 0
ABDOMINAL PAIN: 0
EYE REDNESS: 0
UNEXPECTED WEIGHT CHANGE: 0
COUGH: 0
DYSURIA: 0
LIGHT-HEADEDNESS: 0
SORE THROAT: 0
APPETITE CHANGE: 0

## 2024-07-30 ASSESSMENT — COGNITIVE AND FUNCTIONAL STATUS - GENERAL
MOBILITY SCORE: 24
DAILY ACTIVITIY SCORE: 24

## 2024-07-30 ASSESSMENT — PAIN SCALES - GENERAL: PAINLEVEL_OUTOF10: 0 - NO PAIN

## 2024-07-30 ASSESSMENT — PAIN - FUNCTIONAL ASSESSMENT: PAIN_FUNCTIONAL_ASSESSMENT: 0-10

## 2024-07-30 NOTE — DISCHARGE SUMMARY
Discharge Diagnosis  Cardiogenic shock (Multi)    Issues Requiring Follow-Up  - restarted on Entresto 24-26 BID the day of discharge. Please check BMP and BNP one week after discharge. Results will be forwarded to his HF cardiologist, Dr. Jurado  - Continue taking insulin as prescribed below. He will follow up with endocrinology as outpatient.  - Continue cardiac rehab.  - Presented for LVAD on 7/30/2024. Currently deferred given recent substance use, uncontrolled diabetes, and psychosocial barriers. Continue follow up with Dr. Jurado.    Test Results Pending At Discharge  Pending Labs       Order Current Status    CBC Collected (07/30/24 1204)    Nicotine + Metabolites, Urine In process    POCT fingerstick glucose manually resulted In process    POCT fingerstick glucose manually resulted In process    Protein C Antigen, Total In process            Hospital Course  Arthur Carranza is 38 year old male with hx of NICM/HFrEF (EF 5-10), initially diagnosed 10/2022, HTN, HLD, hypertriglyceridemia, Type II DM (poorly controlled A1c 13.7%, on insulin), severe obesity (BMI 44), cardioembolic stroke (12/2022) with limited residual symptoms, intermittent tobacco abuse, cocaine use, alcohol use, history of non-compliance with medication and doctor appointments, who was transferred from [facility] on 7/18/24 after an elective RHC showed high right and left sided filling pressures and low CO/CI concerning for low output state/cardiogenic shock. While in the HF-ICU, he was managed with Platinum guided diuresis and GDMT, but CO/CI was persistently low so started milrinone on 07/19 0.125 > 0.25 later that day. He was eventually able to wean off milrinone with improvement in filling pressures on 7/24 (Closing #'s: CO/CI 7.6/3.1). His SGC was removed on 7/24 and he was transferred to the floor to completed LVAD evaluation (consent signed 07/22).    While on floor, his hospital course was complicated by hyperkalemia to 6.6  and persistent MICHAELA (Cr up to 1.95). He was given a diuretic holiday and both MRA and ARNI were held. His K improved with lokelma, insulin, and dextrose and his Cr gradually started to normalize to 1.6 on discharge. UOP increased spontaneously, suggesting post ATN diuresis. Endocrinology was also consulted for his uncontrolled T2DM (A1c 13.3). He had episodic hypoglycemia to 50-60s on his home regimen, so his lispro was decreased to 12 U with meals plus a sliding scale. He will continue Lantus 55 nightly, Trulicity, and Metformin with plan to follow up with endocrinology as an outpatient.    Additionally, there was c/f for persistent R abdominal wall abscess that was previous treated with I&D on 7/13+10 days Bactrim when he had his aortic ultrasound. CTAP, however, showed resolution of abscess.    He was presented to the advanced therapies committee on 7/30 after completing LVAD work up. LVAD was deferred for now in the setting of psychosocial barriers (housing and transportation instability), uncontrolled diabetes, and recent positive drug screen for cocaine. His Entresto was resumed at a reduced dose of 24-26 at discharge and he will follow up with HF clinic (Dr. Jurado) and endocrinology at discharge. He was euvolemic on discharge.    Pertinent Physical Exam At Time of Discharge  Physical Exam  Constitutional:       General: He is not in acute distress.     Appearance: He is obese. He is not ill-appearing, toxic-appearing or diaphoretic.   HENT:      Head: Normocephalic and atraumatic.   Cardiovascular:      Rate and Rhythm: Normal rate and regular rhythm.      Pulses: Normal pulses.      Heart sounds: Normal heart sounds. No murmur heard.     No friction rub. No gallop.   Abdominal:      General: Bowel sounds are normal. There is distension.      Palpations: There is no mass.      Tenderness: There is no abdominal tenderness. There is no guarding or rebound.      Hernia: No hernia is present.  "  Musculoskeletal:      Right lower leg: No edema.      Left lower leg: No edema.   Skin:     General: Skin is warm and dry.      Findings: No lesion or rash.   Neurological:      General: No focal deficit present.      Mental Status: He is alert and oriented to person, place, and time.         Home Medications     Medication List      CONTINUE taking these medications     FreeStyle Nadir 3 [place] misc; Generic drug: blood-glucose   meter,continuous; Use as instructed to test blood glucose throughout the   day   FreeStyle Nadir 3 Sensor device; Generic drug: blood-glucose sensor; Use   as directed to test blood glucose throughout the day   TechLITE Pen Needle 31 gauge x 5/16\" needle; Generic drug: pen needle,   diabetic; Use to inject 1-4 times daily as directed.   Ultra Thin Lancets 30 gauge misc; Generic drug: lancets     ASK your doctor about these medications     atorvastatin 40 mg tablet; Commonly known as: Lipitor; TAKE 1 TABLET BY   MOUTH AT BEDTIME   buPROPion  mg 24 hr tablet; Commonly known as: Wellbutrin XL   carvedilol 6.25 mg tablet; Commonly known as: Coreg; Take 1 tablet (6.25   mg) by mouth 2 times daily (morning and late afternoon).   dapagliflozin propanediol 10 mg; Commonly known as: Farxiga; Take 1   tablet (10 mg) by mouth once daily.   insulin lispro 100 unit/mL injection; Commonly known as: HumaLOG; Inject   16 Units under the skin 3 times a day with meals. Take as directed per   insulin instructions.   Lantus Solostar U-100 Insulin 100 unit/mL (3 mL) pen; Generic drug:   insulin glargine; Inject 55 Units under the skin once daily at bedtime.   Take as directed per insulin instructions.   metFORMIN 1,000 mg tablet; Commonly known as: Glucophage   rivaroxaban 20 mg tablet; Commonly known as: Xarelto; Take 1 tablet (20   mg) by mouth once daily in the evening. Take with meals. Take with food.   sacubitriL-valsartan 49-51 mg tablet; Commonly known as: Entresto; Take   1 tablet by mouth 2 " times a day.   sertraline 100 mg tablet; Commonly known as: Zoloft   spironolactone 25 mg tablet; Commonly known as: Aldactone; Take 0.5   tablets (12.5 mg) by mouth once daily. Do not start before February 24, 2024.   sulfamethoxazole-trimethoprim 800-160 mg tablet; Commonly known as:   Bactrim DS; Take 1 tablet by mouth every 12 hours for 10 days.; Ask about:   Should I take this medication?   torsemide 20 mg tablet; Commonly known as: Demadex; Take 1 tablet (20   mg) by mouth once daily.   traZODone 50 mg tablet; Commonly known as: Desyrel   True Metrix Glucose Test Strip strip; Generic drug: blood sugar   diagnostic   Trulicity 1.5 mg/0.5 mL pen injector injection; Generic drug:   dulaglutide; Inject 1.5 mg under the skin 1 (one) time per week.       Outpatient Follow-Up  Future Appointments   Date Time Provider Department [place]   8/14/2024 11:00 AM PHARMACY [facility] [id number] [facility]   8/20/2024  8:30 AM Delroy Jurado MD [id number] [facility]   8/26/2024 11:40 AM Christopher D'Amico, DO [id number] [facility]   8/27/2024  8:00 AM Soni Contreras PA-C [id number] [facility]   9/17/2024  7:45 PM SLEEP LAB [facility] ROOM 1 [facility]   10/2/2024  8:00 AM Brittney Stoll MD [id number] [facility]       Dariusz Hernández MD  Heart Failure Fellow  PGY-4

## 2024-07-30 NOTE — PROGRESS NOTES
Arthur Carranza is a 38 y.o. male on day 12 of admission presenting with Cardiogenic shock (Multi).    Subjective   No acute events overnight. Discussed homegoing diabetes plan, printed and reviewed with pt  Pt agreeable to endo follow up and [facility]  Nadir 3 on and connected to clinic  All questions answered       Objective   Review of Systems   Constitutional:  Positive for fatigue. Negative for activity change, appetite change, diaphoresis and unexpected weight change.   HENT:  Negative for congestion, sore throat and trouble swallowing.    Eyes:  Negative for pain, redness and visual disturbance.   Respiratory:  Negative for cough, chest tightness and shortness of breath.    Cardiovascular:  Negative for chest pain, palpitations and leg swelling.   Gastrointestinal:  Negative for abdominal pain, diarrhea, nausea and vomiting.   Endocrine: Negative for cold intolerance, heat intolerance, polydipsia, polyphagia and polyuria.   Genitourinary:  Negative for dysuria, frequency and urgency.   Musculoskeletal:  Negative for gait problem and joint swelling.   Skin:  Negative for pallor and rash.   Allergic/Immunologic: Negative for immunocompromised state.   Neurological:  Negative for dizziness, light-headedness, numbness and headaches.   Hematological:  Does not bruise/bleed easily.   Psychiatric/Behavioral:  Negative for agitation, behavioral problems and confusion.    All other systems reviewed and are negative.        Physical Exam  Constitutional:       Appearance: Normal appearance. He is obese.   HENT:      Head: Normocephalic and atraumatic.      Nose: Nose normal.      Mouth/Throat:      Mouth: Mucous membranes are dry.      Pharynx: Oropharynx is clear.   Eyes:      Extraocular Movements: Extraocular movements intact.      Conjunctiva/sclera: Conjunctivae normal.   Cardiovascular:      Rate and Rhythm: Normal rate and regular rhythm.      Pulses: Normal pulses.      Heart sounds: Normal heart sounds.  "  Pulmonary:      Effort: Pulmonary effort is normal.      Breath sounds: Normal breath sounds.   Abdominal:      General: Abdomen is flat. Bowel sounds are normal.      Palpations: Abdomen is soft.   Skin:     General: Skin is warm and dry.   Neurological:      General: No focal deficit present.      Mental Status: He is alert and oriented to person, place, and time. Mental status is at baseline.   Psychiatric:         Mood and Affect: Mood normal.         Behavior: Behavior normal.         Thought Content: Thought content normal.         Judgment: Judgment normal.         Last Recorded Vitals  Blood pressure 111/61, pulse 101, temperature 35.9 °C (96.6 °F), temperature source Temporal, resp. rate 20, height 1.75 m (5' 8.9\"), weight 137 kg (302 lb 14.6 oz), SpO2 97%.    Intake/Output last 3 Shifts:  I/O last 3 completed shifts:  In: 720 (5.2 mL/kg) [P.O.:720]  Out: 3400 (24.7 mL/kg) [Urine:3400 (0.7 mL/kg/hr)]  Weight: 137.4 kg     Scheduled medications  atorvastatin, 40 mg, oral, Nightly  buPROPion XL, 150 mg, oral, Daily  dapagliflozin propanediol, 10 mg, oral, Daily  hydrALAZINE, 25 mg, oral, TID  insulin glargine, 55 Units, subcutaneous, Nightly  insulin lispro, 0-10 Units, subcutaneous, TID AC  insulin lispro, 12 Units, subcutaneous, TID AC  isosorbide dinitrate, 20 mg, oral, TID  melatonin, 3 mg, oral, Nightly  perflutren protein A microsphere, 0.5 mL, intravenous, Once in imaging  polyethylene glycol, 17 g, oral, Daily  rivaroxaban, 20 mg, oral, Daily with breakfast  sacubitriL-valsartan, 1 tablet, oral, BID  sertraline, 100 mg, oral, Daily  [Held by provider] spironolactone, 25 mg, oral, Daily      Continuous medications       PRN medications  PRN medications: acetaminophen, dextrose, dextrose, glucagon, glucagon, oxygen, traZODone    Lab Review  Lab Results   Component Value Date    BILITOT 0.5 07/28/2024    CALCIUM 9.4 07/30/2024    CO2 26 07/30/2024     07/30/2024    CREATININE 1.61 (H) 07/30/2024 "    GLUCOSE 137 (H) 07/30/2024    ALKPHOS 49 07/28/2024    K 4.6 07/30/2024    PROT 7.2 07/28/2024     07/30/2024    AST 22 07/28/2024    ALT 20 07/28/2024    BUN 28 (H) 07/30/2024    ANIONGAP 15 07/30/2024    MG 2.26 07/30/2024    PHOS 4.1 07/30/2024     07/22/2024    ALBUMIN 4.2 07/30/2024    LIPASE 59 07/29/2024    GFRMALE 64 08/08/2023     Lab Results   Component Value Date    TRIG 241 (H) 12/12/2023    CHOL 125 12/12/2023    LDLCALC 50 12/12/2023    HDL 26.6 12/12/2023     Lab Results   Component Value Date    HGBA1C 13.3 (H) 07/18/2024    HGBA1C 13.7 (H) 05/21/2024    HGBA1C 15.3 (H) 02/01/2024     The ASCVD Risk score (Joe GALEANA, et al., 2019) failed to calculate for the following reasons:    The 2019 ASCVD risk score is only valid for ages 40 to 79    The patient has a prior MI or stroke diagnosis    Scheduled medications  atorvastatin, 40 mg, oral, Nightly  buPROPion XL, 150 mg, oral, Daily  dapagliflozin propanediol, 10 mg, oral, Daily  hydrALAZINE, 25 mg, oral, TID  insulin glargine, 55 Units, subcutaneous, Nightly  insulin lispro, 0-10 Units, subcutaneous, TID AC  insulin lispro, 12 Units, subcutaneous, TID AC  isosorbide dinitrate, 20 mg, oral, TID  melatonin, 3 mg, oral, Nightly  perflutren protein A microsphere, 0.5 mL, intravenous, Once in imaging  polyethylene glycol, 17 g, oral, Daily  rivaroxaban, 20 mg, oral, Daily with breakfast  sacubitriL-valsartan, 0.5 tablet, oral, BID  sertraline, 100 mg, oral, Daily  [Held by provider] spironolactone, 25 mg, oral, Daily      Continuous medications     PRN medications  PRN medications: acetaminophen, dextrose, dextrose, glucagon, glucagon, oxygen, traZODone      }  Assessment/Plan   Principal Problem:    Cardiogenic shock (Multi)  Active Problems:    Nonischemic cardiomyopathy (Multi)    Morbid obesity with BMI of 45.0-49.9, adult (Multi)    Acute on chronic combined systolic and diastolic CHF (congestive heart failure) (Multi)    Anxiety and  depression    Cocaine use disorder, mild (Multi)    Acute decompensated heart failure (Multi)    Controlled type 2 diabetes mellitus with stage 3 chronic kidney disease, with long-term current use of insulin (Multi)    HFrEF (heart failure with reduced ejection fraction) (Multi)      Arthur Carranza is a 38 y.o. male presenting with uncontrolled T2DM in the setting of ICU admission for cardiogenic shock/HFrEF (EF 5-10%). His A1c has been above 13% even before his hospitalization, which is consistent with past notes showing noncompliance with diabetes management. Since he is in the ICU we want to avoid hypoglycemia, even if we are a bit permissive with hyperglycemia. His 55 lantus he is currently on as an inpatient seems to be controlling his glucose overnight. We need to increase his lispro with meals again because he ids often requiring at least 4 units of additional lispro with meals. He also has milrinone in D5, which can also make him hyperglycemic. His Cr is not elevated enough from baseline to warrant lower insulin doses.         Diabetes History  Outpatient provider for endocrine care Jaki Hanks, [place]   date of last visit 7/17/24  Known complications due to diabetes include: obesity and hyperglycemia    Home Management  Insulin administered via shot    Insulin Dose: 55 glargine qpm, 18 lispro TID with meals, metformin 1g BID and trulicity 1.5 mg weekly, farxiga 10mg daily    Results from Most Recent A1C  Hemoglobin A1C   Date/Time Value Ref Range Status   07/18/2024 03:58 PM 13.3 (H) see below % Final        Diabetes Problem List Entries with Dates  Problem List:  2024-07: Controlled type 2 diabetes mellitus with stage 3 chronic   kidney disease, with long-term current use of insulin (Multi)  2024-01: 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 (Multi)  2024-01: Hyperglycemia due to diabetes mellitus (Multi)  2023-12: Type 2 diabetes mellitus with hyperglycemia (Multi)  2023-03: Type  "2 diabetes mellitus with neurologic complication, with   long-term current use of insulin (Multi)      History where DKA was Reason for Admission in last year N/A      RECOMMENDATIONS:     PLAN  Steroids: none    - continue glargine 55u at bedtime        If NPO: reduce to 35u  - decrease lispro 12 with meals plus scale       If NPO: hold  - continue lispro corrective scale #2 with meals, adjust to q4h if NPO   = 0u  151-200 = 2u  201-250 = 4u  251-300 = 6u  301-350 = 8u  351-400 = 10u  - art therapy ordered today     -Accuchecks (not BMP) TIDAC and QHS- kindly ensure QHS Accucheck is drawn; it is often missed   - Goal -180  -Hypoglycemia protocol  -Diabetes Diet- low carb 60 CHO  -Will continue to follow and titrate insulin accordingly    SGLT2i tx may not be utilized in inpt setting unless the following conditions are met. Only HF Cardiology may initiate inpatient SGLT2i use.   1) pt is eating and drinking by mouth with a total daily carb intake exceeding 60g of CHO  2) pt is urinating independently of urinary catheters  3) pt can ambulate freely to the restroom in order to maintain personal hygiene  4) no current concern for UTI/genital or inguinal candidiasis  5) no plans for NPO within the next 48-72hr    Discharge planning:   [X] patient may expect to discharge home on metformin 1g BID and trulicity 1.5 mg weekly, farxiga 10mg daily, glargine 55u and lispro 12u + scale with meals   [X]will provide CGM sample prior to discharge - riccardo 3 placed on pt and connected to clinic   [X]will enroll pt in  pharmacy platinum plan program and healthy at home program  [X]follow up with [facility] - scheduled with Soni Contreras PA-C on 8/27 at 8am     HOME GOING RECS  in terms of ordering supplies, to make it easy, if you go under discharge tab, and click discharge orders on the left, when you go to write discharge orders, if you click on the \"new\" button with the green plus sign, and " "click \"follow users\" you can search for Soni Contreras PA-C. All insulins and DM supplies are saved so you can just select from that list if it is helpful!     - please order insulin glargine/basaglar U-100 insulin pen \"inject 55 units under the skin once every evening\" 30 days = 10 pens (30mL)  - please order insulin lispro U-100 insulin pen \"inject 12 units plus sliding scale three times daily with meals  = 0u, 151-200 = 2u, 201-250 = 4u, 251-300 = 6u, 301-350 = 8u, 351-400+ = 10u, use up to 60u daily\" 30 days = 10 pens (30mL)  - please order insulin pen needles 32G 4mm for QID injections, 90 days = 400 pen needles  - please order alcohol swabs  --write in comment section on all supplies ordered: \"substitutions may be made by pharmacist depending on insurance coverage and what the pharmacy has available\"          INSULIN INSTRUCTIONS   Breakfast time Lunch time Dinner time  Bedtime      Lantus/Glargine = 55 units   Humalog/Lispro = 12 units plus scale Humalog/Lispro = 12 units plus scale  Humalog/Lispro = 12 units plus scale      CORRECTIVE SCALE  GLUCOSE READING   HUMALOG/LISPRO SCALE DOSE    70 - 149 0   150 - 200 2   201 - 250 4   251 - 300 6   301 - 350 8   351 - 400+ 10       I spent 50 mins on the care and coordination of this patient       Soni Contreras PA-C      "

## 2024-07-30 NOTE — DISCHARGE INSTRUCTIONS
Dear  Dillon,    You were admitted to [facility] with decompensated heart failure. While here, we removed fluid from you and adjusted your heart failure medications. We also completed testing for your LVAD. Here are your discharge instructions:    Please STOP the following medications until you are seen by Dr. Jurado:  - Coreg (Carvedilol)  - Spironolactone    Please START the following medications:  - Entresto 24-26 mg two times a day. We decreased this from your previous dose of Entresto 49-51.  - Hydralazine 25 mg three times a day  - Isordil dinitrate 20 mg three times a day    Please adjust your insulin as follows:  - Continue Lantus (long acting insulin) 55 units at bedtime.  - Take insulin lispro 12 units with meals and then add an additional sliding scale as follows:    Follow up:  - Please get labs in 1 week to check your kidney numbers. These will go to Dr. Jurado  - Please follow up with Dr. Jurado in 1 week.  - You will follow up with endocrinology on August 27th at 8 AM with Soni Contreras PA-C.    It was a pleasure taking care of you! Please do not hesitate to reach out with any questions.    Sincerely,    Dariusz Hernández MD   Heart Failure Fellow  [facility] [place]

## 2024-07-30 NOTE — LETTER
July 31, 2024    Arthur Carranza  [address]  [place] [address]      Dear Mr. Carranza:    Our multi-disciplinary transplant team completed a review of your medical records on 7/30/2024.  I regret to inform you that the decision was not to proceed with placing you on the [employer] (UNOS) waiting list for a Heart transplant.    Our transplant program consists of surgeons and medical doctors who provide coverage 365 days a year, 24 hours a day.     If you have any questions or concerns regarding your insurance coverage or billing issues, a  is available to speak with you.     It is important to keep us updated of any major changes in your medical condition, contact information and health insurance coverage.     Please don't hesitate to contact us at Dept: [phone] with any questions or concerns. We look forward to working with you through this process.      Sincerely,      Maria E Olivia MD MPH          The UNOS Toll-free Patient Services Line:  Your Resource for Organ Transplant Information    If you have a question regarding your own medical care, you always should call your transplant hospital first. However, for general organ transplant-related information, you should call the [employer] (UNOS) toll-free patient services line at 1-571.385.5994.  Anyone, including potential transplant candidates, candidates, recipients, family members, friends, living donors, and donor family members, can call this number to:    Talk about organ donation, living donation, the transplant process, the donation process, and transplant policies.  Get a free patient information kit with helpful booklets, waiting list and transplant information, and a list of all transplant hospitals.  Ask questions about the Organ Procurement and Transplantation Network (OPTN) web site (http://optn.transplant.hrsa.gov/), the UNOS Web site (http://unos.org/), or the UNOS  web site for living donors and transplant recipients. (http://www.transplantliving.org/).  Learn how [facility] and the OPTN can help you.  Talk about any concerns that you may have with a transplant hospital.    [employer] is a not-for-profit organization that provides the administrative services for the national OPTN under federal contract to the Health Resources and Services Administration (HRSA), an agency under the U.S. Department of Health and Human Services (HHS).    [facility] and the OPTN are responsible for:    Providing educational material for patients, the public, and professionals.  Raising awareness of the need for donated organs and tissue.  Writing organ transplant policy with help from transplant professionals, transplant patients, transplant candidates, donor families, living donors, and the public.  Coordinating organ procurement, matching, and placement.  Collecting information about every organ transplant and donation that occurs in the [place].    Remember, you should contact your transplant hospital directly if you have questions or concerns about your own medical care including medical records, work-up progress, and test results.    [facility] is not your transplant hospital, and staff at [facility] will not be able to transfer you to your transplant hospital, so keep your transplant hospital’s phone number handy.    However, while you research your transplant needs and learn as much as you can about transplantation and donation, we welcome your call to our toll-free patient services line at [phone].      [facility] PIL Final Rev 1-

## 2024-07-30 NOTE — CARE PLAN
The patient's goals for the shift include      The clinical goals for the shift include Patient will remain HDS and without injury    Over the shift, the patient remained safe and without injury. He did X3 lapse of walk at the beginning of shift. He awaits decision from the team in the day.

## 2024-07-30 NOTE — NURSING NOTE
Discharge instructions were gone over with patient and family at the bedside who both verbalized understanding. Telemetry was removed. IV was removed with tip in tact and no complications. [id number] were delivered to the patient in hand at the bedside. Patient was taken down via transport with all belongings to main entrance.  Era Nair RN

## 2024-07-30 NOTE — COMMITTEE REVIEW
[facility] Advanced Heart Failure Therapeutics Committee     Patient's name: Arthur Carranza   Referring provider: Dr. Gottlieb   Primary HF cardiologist: Dr. 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 Broached the topic of LVAD/OHT with the patient. Unfortunately, currently he is not a candidate for heart transplantation due to morbid obesity, uncontrolled diabetes, polysubstance abuse and issues with non-compliance.     Committee decision and plan of care (Please discuss modifiable/non-modifiable barriers, plan to address barriers, and any relevant plan for reassessment if applicable): Patient was discussed at our Advanced Heart Failure Therapeutics Committee for LVAD and it was decided patient is declined for LVAD at this time.  The following information is needed prior to re-presenting the patient: Negative drug screens, well controlled hemoglobin A1C, stable housing, and a stable support system. The patient will need to get a CT head to assess prior stroke. The patient will follow with Dr. Jurado in the outpatient setting. The patient is to be updated  by Dr. Mason and the inpatient team at bedside. The patient can be brought to the Advanced Heart Failure Therapeutics Committee once above is met.

## 2024-07-31 ENCOUNTER — APPOINTMENT (OUTPATIENT)
Dept: CARDIAC REHAB | Facility: HOSPITAL | Age: 39
End: 2024-07-31
Payer: COMMERCIAL

## 2024-07-31 ENCOUNTER — PATIENT OUTREACH (OUTPATIENT)
Dept: PRIMARY CARE | Facility: CLINIC | Age: 39
End: 2024-07-31
Payer: COMMERCIAL

## 2024-07-31 LAB
ANABASINE UR-MCNC: <5 NG/ML
COTININE UR-MCNC: <15 NG/ML
NICOTINE UR-MCNC: <15 NG/ML
TRANS-3-OH-COTININE UR-MCNC: <50 NG/ML

## 2024-07-31 NOTE — PROGRESS NOTES
Discharge Facility: [facility]  Discharge Diagnosis: Cardiogenic shock   Admission Date: 7-  Discharge Date: 7-    PCP Appointment Date: OFFICE TO SCHEDULE FU     Please get labs in 1 week to check your kidney numbers. These will go to Dr. Jurado    Specialist Appointment Date:   -Follow up with [facility]- VIRTUAL   -Referred to Healthy at Home   -DR. JURADO  LABS IN 1 WEEK  -ENDOCRINOLOGY [facility] 8-   -REFERRED TO CARDIAC REHAB    Hospital Encounter and Summary Linked: Yes  UNSUCCESSFUL OUTREACH / UNABLE TO LEAVE MESSAGE / NOT ACCEPTING MESSAGES

## 2024-08-01 ENCOUNTER — PATIENT OUTREACH (OUTPATIENT)
Dept: HOME HEALTH SERVICES | Age: 39
End: 2024-08-01
Payer: COMMERCIAL

## 2024-08-01 DIAGNOSIS — Z79.4 TYPE 2 DIABETES MELLITUS WITH DIABETIC NEUROPATHY, WITH LONG-TERM CURRENT USE OF INSULIN (MULTI): ICD-10-CM

## 2024-08-01 DIAGNOSIS — E11.40 TYPE 2 DIABETES MELLITUS WITH DIABETIC NEUROPATHY, WITH LONG-TERM CURRENT USE OF INSULIN (MULTI): ICD-10-CM

## 2024-08-01 DIAGNOSIS — I50.43 ACUTE ON CHRONIC COMBINED SYSTOLIC AND DIASTOLIC CHF (CONGESTIVE HEART FAILURE) (MULTI): Primary | ICD-10-CM

## 2024-08-01 SDOH — ECONOMIC STABILITY: INCOME INSECURITY: IN THE LAST 12 MONTHS, WAS THERE A TIME WHEN YOU WERE NOT ABLE TO PAY THE MORTGAGE OR RENT ON TIME?: NO

## 2024-08-01 SDOH — HEALTH STABILITY: MENTAL HEALTH
STRESS IS WHEN SOMEONE FEELS TENSE, NERVOUS, ANXIOUS, OR CAN'T SLEEP AT NIGHT BECAUSE THEIR MIND IS TROUBLED. HOW STRESSED ARE YOU?: TO SOME EXTENT

## 2024-08-01 SDOH — SOCIAL STABILITY: SOCIAL NETWORK
DO YOU BELONG TO ANY CLUBS OR ORGANIZATIONS SUCH AS CHURCH GROUPS UNIONS, FRATERNAL OR ATHLETIC GROUPS, OR SCHOOL GROUPS?: NO

## 2024-08-01 SDOH — SOCIAL STABILITY: SOCIAL INSECURITY: WITHIN THE LAST YEAR, HAVE YOU BEEN AFRAID OF YOUR PARTNER OR EX-PARTNER?: NO

## 2024-08-01 SDOH — ECONOMIC STABILITY: INCOME INSECURITY: HOW HARD IS IT FOR YOU TO PAY FOR THE VERY BASICS LIKE FOOD, HOUSING, MEDICAL CARE, AND HEATING?: NOT VERY HARD

## 2024-08-01 SDOH — HEALTH STABILITY: MENTAL HEALTH: HOW OFTEN DO YOU HAVE A DRINK CONTAINING ALCOHOL?: 2-4 TIMES A MONTH

## 2024-08-01 SDOH — ECONOMIC STABILITY: FOOD INSECURITY: WITHIN THE PAST 12 MONTHS, THE FOOD YOU BOUGHT JUST DIDN'T LAST AND YOU DIDN'T HAVE MONEY TO GET MORE.: NEVER TRUE

## 2024-08-01 SDOH — HEALTH STABILITY: MENTAL HEALTH: HOW OFTEN DO YOU HAVE 6 OR MORE DRINKS ON ONE OCCASION?: NEVER

## 2024-08-01 SDOH — ECONOMIC STABILITY: INCOME INSECURITY: IN THE PAST 12 MONTHS, HAS THE ELECTRIC, GAS, OIL, OR WATER COMPANY THREATENED TO SHUT OFF SERVICE IN YOUR HOME?: NO

## 2024-08-01 SDOH — SOCIAL STABILITY: SOCIAL INSECURITY: WITHIN THE LAST YEAR, HAVE YOU BEEN HUMILIATED OR EMOTIONALLY ABUSED IN OTHER WAYS BY YOUR PARTNER OR EX-PARTNER?: NO

## 2024-08-01 SDOH — ECONOMIC STABILITY: FOOD INSECURITY: WITHIN THE PAST 12 MONTHS, YOU WORRIED THAT YOUR FOOD WOULD RUN OUT BEFORE YOU GOT MONEY TO BUY MORE.: NEVER TRUE

## 2024-08-01 SDOH — SOCIAL STABILITY: SOCIAL INSECURITY
WITHIN THE LAST YEAR, HAVE TO BEEN RAPED OR FORCED TO HAVE ANY KIND OF SEXUAL ACTIVITY BY YOUR PARTNER OR EX-PARTNER?: NO

## 2024-08-01 SDOH — SOCIAL STABILITY: SOCIAL INSECURITY
WITHIN THE LAST YEAR, HAVE YOU BEEN KICKED, HIT, SLAPPED, OR OTHERWISE PHYSICALLY HURT BY YOUR PARTNER OR EX-PARTNER?: NO

## 2024-08-01 SDOH — ECONOMIC STABILITY: HOUSING INSECURITY: AT ANY TIME IN THE PAST 12 MONTHS, WERE YOU HOMELESS OR LIVING IN A SHELTER (INCLUDING NOW)?: NO

## 2024-08-01 SDOH — SOCIAL STABILITY: SOCIAL NETWORK: HOW OFTEN DO YOU GET TOGETHER WITH FRIENDS OR RELATIVES?: TWICE A WEEK

## 2024-08-01 SDOH — HEALTH STABILITY: PHYSICAL HEALTH: ON AVERAGE, HOW MANY DAYS PER WEEK DO YOU ENGAGE IN MODERATE TO STRENUOUS EXERCISE (LIKE A BRISK WALK)?: 3 DAYS

## 2024-08-01 SDOH — HEALTH STABILITY: MENTAL HEALTH: HOW MANY STANDARD DRINKS CONTAINING ALCOHOL DO YOU HAVE ON A TYPICAL DAY?: 3 OR 4

## 2024-08-01 SDOH — SOCIAL STABILITY: SOCIAL NETWORK: HOW OFTEN DO YOU ATTEND CHURCH OR RELIGIOUS SERVICES?: NEVER

## 2024-08-01 SDOH — ECONOMIC STABILITY: HOUSING INSECURITY: IN THE PAST 12 MONTHS, HOW MANY TIMES HAVE YOU MOVED WHERE YOU WERE LIVING?: 1

## 2024-08-01 SDOH — SOCIAL STABILITY: SOCIAL NETWORK: ARE YOU MARRIED, WIDOWED, DIVORCED, SEPARATED, NEVER MARRIED, OR LIVING WITH A PARTNER?: MARRIED

## 2024-08-01 SDOH — HEALTH STABILITY: PHYSICAL HEALTH: ON AVERAGE, HOW MANY MINUTES DO YOU ENGAGE IN EXERCISE AT THIS LEVEL?: 60 MIN

## 2024-08-01 SDOH — SOCIAL STABILITY: SOCIAL NETWORK: HOW OFTEN DO YOU ATTENT MEETINGS OF THE CLUB OR ORGANIZATION YOU BELONG TO?: NEVER

## 2024-08-01 SDOH — SOCIAL STABILITY: SOCIAL NETWORK: IN A TYPICAL WEEK, HOW MANY TIMES DO YOU TALK ON THE PHONE WITH FAMILY, FRIENDS, OR NEIGHBORS?: TWICE A WEEK

## 2024-08-01 ASSESSMENT — LIFESTYLE VARIABLES
SKIP TO QUESTIONS 9-10: 0
AUDIT-C TOTAL SCORE: 3

## 2024-08-01 NOTE — PROGRESS NOTES
Patient agreeable to [facility] (Healthy at Home). Enrollment call completed and program overview explained to patient, with appropriate numbers/info given. Initial visit is 8/2/24.    Pmhx of hf, HTN, HLD, hypertriglyceridemia, Type II DM (poorly controlled A1c 13.7%, on insulin), severe obesity (BMI 44), cardioembolic stroke (12/2022) with limited residual symptoms, intermittent tobacco abuse, cocaine use, alcohol use, history of non-compliance with medication and doctor appointments.  Pt admitted to [facility] from [facility] on 7/18 after a heart cath for concerns of cardiogenic shock. Pt had swn guided diureses and GDMT/milrinone and improvement in filling pressures on 7/24.   Pt had concerns/complications of hyperkalemia, uncontrolled Dm, and r abd wall abscess previously treated with I/D and bactrim with resolution. Pt attempted to be worked up for LVAD, but some barriers were met there as well - unknown if this will be addressed at a later time. (Some psychosocial barriers). Pt had appropriate med changes made and prescribed and discharged home on 7/30.

## 2024-08-02 ENCOUNTER — TELEMEDICINE (OUTPATIENT)
Dept: PHARMACY | Facility: HOSPITAL | Age: 39
End: 2024-08-02
Payer: COMMERCIAL

## 2024-08-02 ENCOUNTER — TELEMEDICINE (OUTPATIENT)
Dept: CARE COORDINATION | Age: 39
End: 2024-08-02
Payer: COMMERCIAL

## 2024-08-02 ENCOUNTER — PATIENT OUTREACH (OUTPATIENT)
Dept: CARE COORDINATION | Age: 39
End: 2024-08-02

## 2024-08-02 ENCOUNTER — APPOINTMENT (OUTPATIENT)
Dept: CARDIAC REHAB | Facility: HOSPITAL | Age: 39
End: 2024-08-02
Payer: COMMERCIAL

## 2024-08-02 VITALS — SYSTOLIC BLOOD PRESSURE: 120 MMHG | DIASTOLIC BLOOD PRESSURE: 70 MMHG | HEART RATE: 78 BPM

## 2024-08-02 DIAGNOSIS — Z79.4 TYPE 2 DIABETES MELLITUS WITH DIABETIC NEUROPATHY, WITH LONG-TERM CURRENT USE OF INSULIN (MULTI): ICD-10-CM

## 2024-08-02 DIAGNOSIS — I63.512 ACUTE ISCHEMIC LEFT MCA STROKE (MULTI): ICD-10-CM

## 2024-08-02 DIAGNOSIS — I42.8 NONISCHEMIC CARDIOMYOPATHY (MULTI): Primary | ICD-10-CM

## 2024-08-02 DIAGNOSIS — I50.43 ACUTE ON CHRONIC COMBINED SYSTOLIC AND DIASTOLIC CHF (CONGESTIVE HEART FAILURE) (MULTI): ICD-10-CM

## 2024-08-02 DIAGNOSIS — E11.40 TYPE 2 DIABETES MELLITUS WITH DIABETIC NEUROPATHY, WITH LONG-TERM CURRENT USE OF INSULIN (MULTI): ICD-10-CM

## 2024-08-02 NOTE — PROGRESS NOTES
"Daily Call Note: Initial HHVC completed with pt, Dr. Layne, Davi - PharmD and this RN. Pt reports that he feels \"OK\" today. No pain or SOB. No edema. Discussed importance of logging weight and VS daily - pt verbalized understanding. He does have a scale and a BP cuff in the home; and monitors his blood sugars via CGM. Medication list reviewed in detail by Davi.   Dr. Layne reviewed that pt is being considered for an LVAD. She discussed the need for compliance with meds; monitoring VS diligently; getting HgA1C under control. Pt verbalized understanding. Dr. Layne explained that a disqualifying matter would be any illicit drug use. Reviewed the pt's age, HF and EF of 5-10% - strongly encouraged to comply with these criteria. Pt verbalized understanding.  Dr. Layne requests SW intervention for the SDOH - task added to chart and secure chat sent.  [facility] pt verbalized understanding.  Confirmed [facility] contact info and encouraged to call anytime with questions or concerns. Pt verbalized understanding.    Pt Education: per POC  Barriers: compliance, some SDOH  Topics for Daily Review: sx; medication compliance; VS; CGM; LVAD criteria  Pt demonstrates clear understanding: Yes    Daily Weight:  There were no vitals filed for this visit.   Last 3 Weights:  Wt Readings from Last 7 Encounters:   07/30/24 137 kg (302 lb 14.6 oz)   07/18/24 137 kg (301 lb 9.4 oz)   07/13/24 136 kg (300 lb)   07/12/24 137 kg (301 lb)   07/02/24 137 kg (301 lb 9.6 oz)   06/10/24 138 kg (305 lb)   06/06/24 136 kg (300 lb 7.8 oz)       Masimo Device: No   Masimo Clinical Impression: n/a    Virtual Visits--Scheduled (Most Recent Date at Top)  Follow up Appointments  Recent Visits  No visits were found meeting these conditions.  Showing recent visits within past 30 days and meeting all other requirements  Future Appointments  Date Type Provider Dept   08/26/24 Appointment Christopher D'Amico, DO [id number]   Showing " future appointments within next 90 days and meeting all other requirements       Frequency of RN Calls & Virtual Visits per Team Agreement: [facility] Frequency: Daily    Medication issues Addressed (what was done): medication list reviewed in detail by Davi pharmacist.    Follow up appointments scheduled by [facility] Staff: none  Referrals made by [facility] staff: SW referral

## 2024-08-02 NOTE — PROGRESS NOTES
Pharmacy Post-Discharge Visit    Arthur Carranza is a 38 y.o. male was referred to Clinical Pharmacy Team to complete a post-discharge medication optimization and monitoring visit.  The patient was referred for their CHF and diabetes management while now recently enrolled in our [facility].     Admission Date: 7/13/24  Discharge Date: 7/30/24    Referring Provider: Jd Hall*  PCP: Christopher D'Amico,  - next visit: 8/26      Subjective   Allergies   Allergen Reactions    Shellfish Containing Products Unknown       [id number] - [place], [place] - [address] [place] [address] AT [facility] [address] [place]  [address] [place] [address]  [place] [address]  Phone: [phone] Fax: [phone]    [id number] - [place], [place] - [address] AT [facility] [address]  [address] [place] [address]  Phone: [phone] Fax: [phone]    [facility]  [address], [address]  [facility] [address]  Phone: [phone] Fax: [phone]      Medication System Management:  Affordability/Accessibility: medicaid   Adherence/Organization: says has been compliant with meds since being home       Social History     Social History Narrative    Not on file        Notable Medication changes following discharge:  Start: hydralazine, isosorbide  Stop: none  Change: basaglar, lispro    Diabetes  He presents for his initial diabetic visit. He has type 2 diabetes mellitus. His disease course has been improving. There are no hypoglycemic associated symptoms. There are no hypoglycemic complications. Diabetic complications include heart disease. Risk factors for coronary artery disease include diabetes mellitus, male sex, obesity, tobacco exposure and sedentary lifestyle. Current diabetic treatment includes insulin injections and oral agent (dual therapy). He is compliant with treatment all of the time. He is following a low salt diet. His overall blood glucose  range is 110-130 mg/dl. An ACE inhibitor/angiotensin II receptor blocker is being taken.     CHF ASSESSMENT  Staging:  Most recent ejection fraction: 10%  NYHA Stage: I  ACC/AHA Stage: B    Symptom Assessment:  Weight changes/edema?: No  Dyspnea?: None  Dizziness/syncope/palpitations?: No    Current Regimen:  ARNI/ACEi/ARB: Yes - entresto  Beta Blocker: No  MRA: No  SGLT2i: Yes - farxiga     Other therapy:  Hydralazine   Isosorbide Dinitrate   Xarelto   Torsemide     Secondary Prevention:  The ASCVD Risk score (Joe GALEANA, et al., 2019) failed to calculate for the following reasons:    The 2019 ASCVD risk score is only valid for ages 40 to 79    The patient has a prior MI or stroke diagnosis    Aspirin 81mg? no  Statin?: Yes - atorvastatin   HTN?: Yes - well controlled currently      Review of Systems        Objective     There were no vitals taken for this visit.   BP Readings from Last 4 Encounters:   07/30/24 111/61   07/18/24 116/74   07/13/24 120/84   07/12/24 128/80      There were no vitals filed for this visit.     LAB  Lab Results   Component Value Date    BILITOT 0.5 07/28/2024    CALCIUM 9.4 07/30/2024    CO2 26 07/30/2024     07/30/2024    CREATININE 1.61 (H) 07/30/2024    GLUCOSE 137 (H) 07/30/2024    ALKPHOS 49 07/28/2024    K 4.6 07/30/2024    PROT 7.2 07/28/2024     07/30/2024    AST 22 07/28/2024    ALT 20 07/28/2024    BUN 28 (H) 07/30/2024    ANIONGAP 15 07/30/2024    MG 2.26 07/30/2024    PHOS 4.1 07/30/2024     07/22/2024    ALBUMIN 4.2 07/30/2024    LIPASE 59 07/29/2024    GFRMALE 64 08/08/2023     Lab Results   Component Value Date    TRIG 241 (H) 12/12/2023    CHOL 125 12/12/2023    LDLCALC 50 12/12/2023    HDL 26.6 12/12/2023     Lab Results   Component Value Date    HGBA1C 13.3 (H) 07/18/2024         Current Outpatient Medications   Medication Instructions    alcohol swabs pads, medicated 1 each, topical (top), 4 times daily, Use prior to checking glucose or injecting  "insulin    atorvastatin (LIPITOR) 40 mg, oral, Nightly    blood-glucose meter,continuous (FreeStyle Nadir 3 [place]) misc Use as instructed to test blood glucose throughout the day    blood-glucose sensor (FreeStyle Nadir 3 Sensor) device Use as directed to test blood glucose throughout the day    buPROPion XL (WELLBUTRIN XL) 150 mg, oral, Daily    dapagliflozin propanediol (FARXIGA) 10 mg, oral, Daily    hydrALAZINE (APRESOLINE) 25 mg, oral, 3 times daily    insulin lispro (HumaLOG) 100 unit/mL injection Inject 12 units plus sliding scale three times daily with meals:  = 0u, 151-200 = 2u, 201-250 = 4u, 251-300 = 6u, 301-350 = 8u, 351-400+ = 10u, use up to 60u daily    isosorbide dinitrate (ISORDIL) 20 mg, oral, 3 times daily (0900,1400,1900)    Lantus Solostar U-100 Insulin 55 Units, subcutaneous, Nightly, Take as directed per insulin instructions.    metFORMIN (GLUCOPHAGE) 1,000 mg, oral, 2 times daily    pen needle, diabetic 31 gauge x 5/16\" needle Use to inject 1-4 times daily as directed.    pen needle, diabetic 32 gauge x 5/32\" needle 1 each, miscellaneous, 4 times daily    rivaroxaban (XARELTO) 20 mg, oral, Daily with evening meal, Take with food.    sacubitriL-valsartan (Entresto) 24-26 mg tablet 1 tablet, oral, 2 times daily    sertraline (ZOLOFT) 100 mg, oral, Daily    torsemide (DEMADEX) 20 mg, oral, Daily    traZODone (DESYREL) 50 mg, oral, Nightly PRN    True Metrix Glucose Test Strip strip USE TO CHECK BLOOD SUGAR FOUR TIMES DAILY    Trulicity 1.5 mg, subcutaneous, Once Weekly    Ultra Thin Lancets 30 gauge misc TEST BLOOD SUGAR FOUR TIMES DAILY AS DIRECTED        HISTORICAL PHARMACOTHERAPY  N/a    DRUG INTERACTIONS  None at time of review      Assessment/Plan   Problem List Items Addressed This Visit       Type 2 diabetes mellitus with neurologic complication, with long-term current use of insulin (Multi)    Acute on chronic combined systolic and diastolic CHF (congestive heart failure) (Multi) "     DM  Most recent A1c 13.3% while admitted   Sugars being monitored via CGM and states that his range has been good from 's since being home   No hypo events either   Currenly doing 55 units of basaglar nightly and 12 units + SS of lispro   Taking max dose metformin and farxiga daily   Also doing his trulicity injections every Wednesday  Did discuss that while he was admitted the endo team thought it would be a good idea to switch to different GLP1  I will reach out to discuss more   Also will look into to test claims to see if insurance will cover other options   CHF  Most recent EF only 10% while admitted   Current GDMT --> entresto and farxiga  Discussed importance of being compliant with all meds   Torsemide only as needed for swelling or weight gain   Has not had any swelling since being home   Checking weights and BP's daily but not keeping log --> discussed to do so for all future visits   Plan to get LVAD done in the future but needs to have A1c more controlled and also has history of drug abuse     Follow Up: 1 week     Continue all meds under the continuation of care with the referring provider and clinical pharmacy team.    Davi Matias, PharmD     Verbal consent to manage patient's drug therapy was obtained from the patient. They were informed they may decline to participate or withdraw from participation in pharmacy services at any time.

## 2024-08-03 ENCOUNTER — PATIENT OUTREACH (OUTPATIENT)
Dept: HOME HEALTH SERVICES | Age: 39
End: 2024-08-03
Payer: COMMERCIAL

## 2024-08-03 NOTE — PROGRESS NOTES
Daily Call Note:   Daily call was completed - the patient denied chest pain, SOB, peripheral edema. He did not weigh or measure BP today. His blood glucos was 189 at 1100 and 172 at 1800 - he is not using the CGM for now and is using a glucometer. He was reminded to avoid sugary drinks after complaints of indigestion for which he will try gingerale - sugarless.   The patient denied needing extra intervention with diabetic diet and nutritional teaching - he stated that he feels comfortable with what was taught in hospital. He confirmed he is taking antidiabetic medication as ordered including use of sliding scale insulin.  Daily Weight:  There were no vitals filed for this visit.   Last 3 Weights:  Wt Readings from Last 7 Encounters:   07/30/24 137 kg (302 lb 14.6 oz)   07/18/24 137 kg (301 lb 9.4 oz)   07/13/24 136 kg (300 lb)   07/12/24 137 kg (301 lb)   07/02/24 137 kg (301 lb 9.6 oz)   06/10/24 138 kg (305 lb)   06/06/24 136 kg (300 lb 7.8 oz)           Virtual Visits--Scheduled (Most Recent Date at Top)  Follow up Appointments  Recent Visits  No visits were found meeting these conditions.  Showing recent visits within past 30 days and meeting all other requirements  Future Appointments  Date Type Provider Dept   08/26/24 Appointment Christopher D'Amico, DO Do [id number]   Showing future appointments within next 90 days and meeting all other requirements       Frequency of RN Calls & Virtual Visits per Team Agreement: [facility] Frequency: Daily

## 2024-08-05 ENCOUNTER — APPOINTMENT (OUTPATIENT)
Dept: CARDIAC REHAB | Facility: HOSPITAL | Age: 39
End: 2024-08-05
Payer: COMMERCIAL

## 2024-08-05 ENCOUNTER — PATIENT OUTREACH (OUTPATIENT)
Dept: CARE COORDINATION | Age: 39
End: 2024-08-05
Payer: COMMERCIAL

## 2024-08-05 NOTE — PROGRESS NOTES
Call to patient to inform him that the sensor refill is already at [facility]. Re: the Ozempic, his insurance will only cover Trulicity - so that has been sent to [facility] also.  Pt verbalized understanding.

## 2024-08-05 NOTE — PROGRESS NOTES
"Daily Call Note: daily call completed with pt today. He states that he is feeling \"a little sick\" this morning - he reports that he feels nauseated. Encouraged that is he uses gingerale, to ensure that it is sugarless. He verbalized understanding. Pt denies pain and SOB. FBG today is 190. No other vitals to reports as patient is not at home.  Pt stated that he needed sensor refill and was wondering if he was to be switched over to Ozempic - secure chat to Davi. Her reply:   He has refills on the sensors. And he also has a new prescription for the trulicity at [facility] too. His insurance will only cover trulicity so will need to continue this for now. If he would rather have them at [facility] though I can switch them over to there for him.  Unable to leave the pt a voice mail re: same. Will try again later.   No other questions or concerns.    Pt Education: POC  Barriers: none  Topics for Daily Review: sx; CGM sensors; Ozempic  Pt demonstrates clear understanding: Yes    Daily Weight:  There were no vitals filed for this visit.   Last 3 Weights:  Wt Readings from Last 7 Encounters:   07/30/24 137 kg (302 lb 14.6 oz)   07/18/24 137 kg (301 lb 9.4 oz)   07/13/24 136 kg (300 lb)   07/12/24 137 kg (301 lb)   07/02/24 137 kg (301 lb 9.6 oz)   06/10/24 138 kg (305 lb)   06/06/24 136 kg (300 lb 7.8 oz)       Masimo Device: No   Masimo Clinical Impression: n/a    Virtual Visits--Scheduled (Most Recent Date at Top)  Follow up Appointments  Recent Visits  Date Type Provider Dept   08/02/24 Telemedicine Hilary Hall MD [facility]   Showing recent visits within past 30 days and meeting all other requirements  Future Appointments  Date Type Provider Dept   08/26/24 Appointment Christopher D'Amico, DO Do [id number]   Showing future appointments within next 90 days and meeting all other requirements       Frequency of RN Calls & Virtual Visits per Team Agreement: [facility] Frequency: " Daily    Medication issues Addressed (what was done): sensor refills; Ozempic questions    Follow up appointments scheduled by [facility] Staff: none  Referrals made by [facility] staff: none

## 2024-08-06 ENCOUNTER — PATIENT OUTREACH (OUTPATIENT)
Dept: CARE COORDINATION | Age: 39
End: 2024-08-06
Payer: COMMERCIAL

## 2024-08-06 NOTE — PROGRESS NOTES
Daily Call Note: Daily call completed with pt today. He states that he is doing OK today. BG this morning was 164. No weight or other VS.   Pt denies pain, SOB, edema and CP.  Next [facility] scheduled for 8/9 at 0900.  No other questions or concerns at this time.    Pt Education: POC  Barriers: none  Topics for Daily Review: BG; sensors; sx  Pt demonstrates clear understanding: Yes    Daily Weight:  There were no vitals filed for this visit.   Last 3 Weights:  Wt Readings from Last 7 Encounters:   07/30/24 137 kg (302 lb 14.6 oz)   07/18/24 137 kg (301 lb 9.4 oz)   07/13/24 136 kg (300 lb)   07/12/24 137 kg (301 lb)   07/02/24 137 kg (301 lb 9.6 oz)   06/10/24 138 kg (305 lb)   06/06/24 136 kg (300 lb 7.8 oz)       Masimo Device: No   Masimo Clinical Impression: n/a    Virtual Visits--Scheduled (Most Recent Date at Top)  Follow up Appointments  Recent Visits  No visits were found meeting these conditions.  Showing recent visits within past 30 days and meeting all other requirements  Future Appointments  Date Type Provider Dept   08/26/24 Appointment Christopher D'Amico,  Do [id number]   Showing future appointments within next 90 days and meeting all other requirements       Frequency of RN Calls & Virtual Visits per Team Agreement: [facility] Frequency: Daily    Medication issues Addressed (what was done): none    Follow up appointments scheduled by [facility] Staff: none  Referrals made by [facility] staff: none

## 2024-08-07 ENCOUNTER — APPOINTMENT (OUTPATIENT)
Dept: CARDIAC REHAB | Facility: HOSPITAL | Age: 39
End: 2024-08-07
Payer: COMMERCIAL

## 2024-08-07 DIAGNOSIS — E11.40 TYPE 2 DIABETES MELLITUS WITH DIABETIC NEUROPATHY, WITH LONG-TERM CURRENT USE OF INSULIN (MULTI): Primary | ICD-10-CM

## 2024-08-07 DIAGNOSIS — I50.9 CONGESTIVE HEART FAILURE, UNSPECIFIED HF CHRONICITY, UNSPECIFIED HEART FAILURE TYPE (MULTI): ICD-10-CM

## 2024-08-07 DIAGNOSIS — I63.512 ACUTE ISCHEMIC LEFT MCA STROKE (MULTI): ICD-10-CM

## 2024-08-07 DIAGNOSIS — Z79.4 TYPE 2 DIABETES MELLITUS WITH DIABETIC NEUROPATHY, WITH LONG-TERM CURRENT USE OF INSULIN (MULTI): Primary | ICD-10-CM

## 2024-08-07 DIAGNOSIS — I50.43 ACUTE ON CHRONIC COMBINED SYSTOLIC AND DIASTOLIC CHF (CONGESTIVE HEART FAILURE) (MULTI): ICD-10-CM

## 2024-08-08 ENCOUNTER — PATIENT OUTREACH (OUTPATIENT)
Dept: HOME HEALTH SERVICES | Age: 39
End: 2024-08-08
Payer: COMMERCIAL

## 2024-08-08 NOTE — PROGRESS NOTES
Daily Call Note:     Daily call completed with the patient. He states he is doing okay. He needs a RTW letter.  Pt  has [facility] scheduled for tomorrow at 900.  Advised to mention it on the call.  Pt states his FBS was 193.  No add'l questions or concerns.  Aware of upcoming appts.      Pt Education:   Barriers:   Topics for Daily Review:   Pt demonstrates clear understanding:     Daily Weight:  There were no vitals filed for this visit.   Last 3 Weights:  Wt Readings from Last 7 Encounters:   07/30/24 137 kg (302 lb 14.6 oz)   07/18/24 137 kg (301 lb 9.4 oz)   07/13/24 136 kg (300 lb)   07/12/24 137 kg (301 lb)   07/02/24 137 kg (301 lb 9.6 oz)   06/10/24 138 kg (305 lb)   06/06/24 136 kg (300 lb 7.8 oz)       Masimo Device:    Masimo Clinical Impression:     Virtual Visits--Scheduled (Most Recent Date at Top)  Follow up Appointments  Recent Visits  No visits were found meeting these conditions.  Showing recent visits within past 30 days and meeting all other requirements  Future Appointments  Date Type Provider Dept   08/26/24 Appointment Christopher D'Amico, DO Do [id number]   Showing future appointments within next 90 days and meeting all other requirements       Frequency of RN Calls & Virtual Visits per Team Agreement:     Medication issues Addressed (what was done):     Follow up appointments scheduled by [facility] Staff:   Referrals made by [facility] staff:

## 2024-08-09 ENCOUNTER — APPOINTMENT (OUTPATIENT)
Dept: CARDIAC REHAB | Facility: HOSPITAL | Age: 39
End: 2024-08-09
Payer: COMMERCIAL

## 2024-08-09 ENCOUNTER — APPOINTMENT (OUTPATIENT)
Dept: CARE COORDINATION | Age: 39
End: 2024-08-09
Payer: COMMERCIAL

## 2024-08-09 ENCOUNTER — PATIENT OUTREACH (OUTPATIENT)
Dept: HOME HEALTH SERVICES | Age: 39
End: 2024-08-09

## 2024-08-09 ENCOUNTER — APPOINTMENT (OUTPATIENT)
Dept: PHARMACY | Facility: HOSPITAL | Age: 39
End: 2024-08-09
Payer: COMMERCIAL

## 2024-08-09 VITALS — WEIGHT: 300 LBS | BODY MASS INDEX: 44.43 KG/M2

## 2024-08-09 DIAGNOSIS — E11.40 TYPE 2 DIABETES MELLITUS WITH DIABETIC NEUROPATHY, WITH LONG-TERM CURRENT USE OF INSULIN (MULTI): Primary | ICD-10-CM

## 2024-08-09 DIAGNOSIS — I50.43 ACUTE ON CHRONIC COMBINED SYSTOLIC AND DIASTOLIC CHF (CONGESTIVE HEART FAILURE) (MULTI): Primary | ICD-10-CM

## 2024-08-09 DIAGNOSIS — Z79.4 TYPE 2 DIABETES MELLITUS WITH DIABETIC NEUROPATHY, WITH LONG-TERM CURRENT USE OF INSULIN (MULTI): Primary | ICD-10-CM

## 2024-08-09 DIAGNOSIS — I42.8 NONISCHEMIC CARDIOMYOPATHY (MULTI): ICD-10-CM

## 2024-08-09 DIAGNOSIS — Z79.4 TYPE 2 DIABETES MELLITUS WITH DIABETIC NEUROPATHY, WITH LONG-TERM CURRENT USE OF INSULIN (MULTI): ICD-10-CM

## 2024-08-09 DIAGNOSIS — E11.40 TYPE 2 DIABETES MELLITUS WITH DIABETIC NEUROPATHY, WITH LONG-TERM CURRENT USE OF INSULIN (MULTI): ICD-10-CM

## 2024-08-09 NOTE — PROGRESS NOTES
Pharmacy Post-Discharge Visit - Follow Up     Arthur Carranza is a 38 y.o. male was referred to Clinical Pharmacy Team to complete a post-discharge medication optimization and monitoring visit.  The patient was referred for their CHF and diabetes management while also enrolled in [facility].     Referring Provider: Narda Sanders APRN*  PCP: Christopher D'Amico, DO - next visit: 8/26      Subjective   Allergies   Allergen Reactions    Shellfish Containing Products Unknown       [id number] - [place], [place] - [address] [place] [address] AT [facility] [address] [place]  [address] [address]  [place] [address]  Phone: [phone] Fax: [phone]    [facility] #[id number] - [place], [place] - [address] AT [facility] [address]  [address] [place] [address]  Phone: [phone] Fax: [phone]    [facility]  [address], [address]  [facility] [address]  Phone: [phone] Fax: [phone]      Medication System Management:  Affordability/Accessibility: medicaid   Adherence/Organization: no issues       Social History     Social History Narrative    Not on file          HPI  Diabetes  He presents for his initial diabetic visit. He has type 2 diabetes mellitus. His disease course has been improving. There are no hypoglycemic associated symptoms. There are no hypoglycemic complications. Diabetic complications include heart disease. Risk factors for coronary artery disease include diabetes mellitus, male sex, obesity, tobacco exposure and sedentary lifestyle. Current diabetic treatment includes insulin injections and oral agent (dual therapy). He is compliant with treatment all of the time. He is following a low salt diet. His overall blood glucose range is 140-180 mg/dl. An ACE inhibitor/angiotensin II receptor blocker is being taken.      CHF ASSESSMENT  Staging:  Most recent ejection fraction: 10%  NYHA Stage: I  ACC/AHA Stage: B     Symptom Assessment:  Weight  changes/edema?: Yes - down 1 lb from last week visit   Dyspnea?: None  Dizziness/syncope/palpitations?: No     Current Regimen:  ARNI/ACEi/ARB: Yes - entresto  Beta Blocker: No  MRA: No  SGLT2i: Yes - farxiga      Other therapy:  Hydralazine   Isosorbide Dinitrate   Xarelto   Torsemide      Secondary Prevention:  The ASCVD Risk score (Joe GALEANA, et al., 2019) failed to calculate for the following reasons:    The 2019 ASCVD risk score is only valid for ages 40 to 79    The patient has a prior MI or stroke diagnosis     Aspirin 81mg? no  Statin?: Yes - atorvastatin   HTN?: Yes - well controlled currently      Review of Systems        Objective     There were no vitals taken for this visit.   BP Readings from Last 4 Encounters:   08/02/24 120/70   07/30/24 111/61   07/18/24 116/74   07/13/24 120/84      There were no vitals filed for this visit.     LAB  Lab Results   Component Value Date    BILITOT 0.5 07/28/2024    CALCIUM 9.4 07/30/2024    CO2 26 07/30/2024     07/30/2024    CREATININE 1.61 (H) 07/30/2024    GLUCOSE 137 (H) 07/30/2024    ALKPHOS 49 07/28/2024    K 4.6 07/30/2024    PROT 7.2 07/28/2024     07/30/2024    AST 22 07/28/2024    ALT 20 07/28/2024    BUN 28 (H) 07/30/2024    ANIONGAP 15 07/30/2024    MG 2.26 07/30/2024    PHOS 4.1 07/30/2024     07/22/2024    ALBUMIN 4.2 07/30/2024    LIPASE 59 07/29/2024    GFRMALE 64 08/08/2023     Lab Results   Component Value Date    TRIG 241 (H) 12/12/2023    CHOL 125 12/12/2023    LDLCALC 50 12/12/2023    HDL 26.6 12/12/2023     Lab Results   Component Value Date    HGBA1C 13.3 (H) 07/18/2024         Current Outpatient Medications   Medication Instructions    alcohol swabs pads, medicated 1 each, topical (top), 4 times daily, Use prior to checking glucose or injecting insulin    atorvastatin (LIPITOR) 40 mg, oral, Nightly    blood-glucose meter,continuous (FreeStyle Nadir 3 [place]) [facility] Use as instructed to test blood glucose throughout the day     "blood-glucose sensor (FreeStyle Nadir 3 Sensor) device Use as directed to test blood glucose throughout the day    buPROPion XL (WELLBUTRIN XL) 150 mg, oral, Daily    dapagliflozin propanediol (FARXIGA) 10 mg, oral, Daily    hydrALAZINE (APRESOLINE) 25 mg, oral, 3 times daily    insulin lispro (HumaLOG) 100 unit/mL injection Inject 12 units plus sliding scale three times daily with meals:  = 0u, 151-200 = 2u, 201-250 = 4u, 251-300 = 6u, 301-350 = 8u, 351-400+ = 10u, use up to 60u daily    isosorbide dinitrate (ISORDIL) 20 mg, oral, 3 times daily (0900,1400,1900)    Lantus Solostar U-100 Insulin 55 Units, subcutaneous, Nightly, Take as directed per insulin instructions.    metFORMIN (GLUCOPHAGE) 1,000 mg, oral, 2 times daily    pen needle, diabetic 32 gauge x 5/32\" needle 1 each, miscellaneous, 4 times daily    rivaroxaban (XARELTO) 20 mg, oral, Daily with evening meal, Take with food.    sacubitriL-valsartan (Entresto) 24-26 mg tablet 1 tablet, oral, 2 times daily    sertraline (ZOLOFT) 100 mg, oral, Daily    torsemide (DEMADEX) 20 mg, oral, Daily    traZODone (DESYREL) 50 mg, oral, Nightly PRN    Trulicity 1.5 mg, subcutaneous, Once Weekly            Assessment/Plan   Problem List Items Addressed This Visit    None    DM  Most recent A1c 13.3% while admitted   Sugars being monitored via CGM   Has been ranging from 140-180's this past week   Highest has been 196   No hypo events either   Currenly doing 55 units of basaglar nightly and 12 units + SS of lispro   Taking max dose metformin and farxiga daily   Also doing his trulicity injections every Wednesday  Did discuss that while he was admitted the endo team thought it would be a good idea to switch to different GLP1  Update - his insurance will only cover trulicity so will continue with this --> can increase dose once he is finished with 1.5mg pens   CHF  Most recent EF only 10% while admitted   Current GDMT --> entresto and farxiga  Discussed importance of " being compliant with all meds   Torsemide only as needed for swelling or weight gain   Has not had any swelling since being home   Checking weights and BP's daily   Plan to get LVAD done in the future but needs to have A1c more controlled and also has history of drug abuse   Stated he has not used any illicit drugs since being home so far     Follow Up: 1 week     Continue all meds under the continuation of care with the referring provider and clinical pharmacy team.    Davi Matias, Perez     Verbal consent to manage patient's drug therapy was obtained from the patient. They were informed they may decline to participate or withdraw from participation in pharmacy services at any time.

## 2024-08-09 NOTE — PROGRESS NOTES
Daily Call Note:     [facility] call completed with the patient, Narda POLANCO, Davi, and the Nurse.  Pt states he is doing pretty good.  No complaints of SOB, swelling, or chest pain.  He states there hasn't been any changes since last HHVC.  Pt states his BS ranges from 140-180, with the highest being 196.  He has been using his glucometer because the pharmacy close to him has been closed due to no power.  He will  his sensors for his CGM when they open back up.  Declined to have Davi send them to [facility].   Pt states he didn't check BP today.  His weight was consistently at 300 lbs.   He still has outstanding labs that he states he will have drawn this week.  His Insurance will only cover the Trulicity and he administers that every Wednesday.  Pt states he has attended cardiac rehab and needs to make next appt.   Pt had no questions or concerns.  Provider reviewed upcoming appts with the patient.  Next [facility] 8/16 @ 0900.    Pt Education:   Barriers:   Topics for Daily Review:   Pt demonstrates clear understanding:     Daily Weight:  There were no vitals filed for this visit.   Last 3 Weights:  Wt Readings from Last 7 Encounters:   07/30/24 137 kg (302 lb 14.6 oz)   07/18/24 137 kg (301 lb 9.4 oz)   07/13/24 136 kg (300 lb)   07/12/24 137 kg (301 lb)   07/02/24 137 kg (301 lb 9.6 oz)   06/10/24 138 kg (305 lb)   06/06/24 136 kg (300 lb 7.8 oz)       Masimo Device:    Masimo Clinical Impression:     Virtual Visits--Scheduled (Most Recent Date at Top)  Follow up Appointments  Recent Visits  No visits were found meeting these conditions.  Showing recent visits within past 30 days and meeting all other requirements  Future Appointments  Date Type Provider Dept   08/26/24 Appointment Christopher D'Amico,  Do [id number]   Showing future appointments within next 90 days and meeting all other requirements       Frequency of RN Calls & Virtual Visits per Team Agreement:     Medication issues Addressed (what was  done):     Follow up appointments scheduled by [facility] Staff:   Referrals made by [facility] staff:

## 2024-08-12 ENCOUNTER — PATIENT OUTREACH (OUTPATIENT)
Dept: CARE COORDINATION | Age: 39
End: 2024-08-12
Payer: COMMERCIAL

## 2024-08-12 ENCOUNTER — APPOINTMENT (OUTPATIENT)
Dept: CARDIAC REHAB | Facility: HOSPITAL | Age: 39
End: 2024-08-12
Payer: COMMERCIAL

## 2024-08-12 NOTE — PROGRESS NOTES
"Daily Call Note: Daily call completed with pt today. He states that he is feeling \"good\" today. No pain, SOB or edema.  FBG this morning was 240 - pt attributes this to \"drinking too much last night.\"  Pt requests a return to work letter - task added to discuss at the next [facility] on 8/16 at 0900 - pt verbalized understanding.  No other questions or concerns at this time.    Pt Education: per POC  Barriers: none  Topics for Daily Review: return to work letter  Pt demonstrates clear understanding: Yes    Daily Weight:  There were no vitals filed for this visit.   Last 3 Weights:  Wt Readings from Last 7 Encounters:   08/09/24 136 kg (300 lb)   07/30/24 137 kg (302 lb 14.6 oz)   07/18/24 137 kg (301 lb 9.4 oz)   07/13/24 136 kg (300 lb)   07/12/24 137 kg (301 lb)   07/02/24 137 kg (301 lb 9.6 oz)   06/10/24 138 kg (305 lb)       Masimo Device: No   Masimo Clinical Impression: n/a    Virtual Visits--Scheduled (Most Recent Date at Top)  Follow up Appointments  Recent Visits  No visits were found meeting these conditions.  Showing recent visits within past 30 days and meeting all other requirements  Future Appointments  Date Type Provider Dept   08/26/24 Appointment Christopher D'Amico, DO Do [id number] [facility]   Showing future appointments within next 90 days and meeting all other requirements       Frequency of RN Calls & Virtual Visits per Team Agreement: Healthy at Home Frequency: Daily    Medication issues Addressed (what was done): none    Follow up appointments scheduled by [facility] Staff: none  Referrals made by [facility] staff: none        "

## 2024-08-14 ENCOUNTER — APPOINTMENT (OUTPATIENT)
Dept: CARDIAC REHAB | Facility: HOSPITAL | Age: 39
End: 2024-08-14
Payer: COMMERCIAL

## 2024-08-14 ENCOUNTER — PATIENT OUTREACH (OUTPATIENT)
Dept: HOME HEALTH SERVICES | Age: 39
End: 2024-08-14

## 2024-08-14 ENCOUNTER — APPOINTMENT (OUTPATIENT)
Dept: PHARMACY | Facility: HOSPITAL | Age: 39
End: 2024-08-14
Payer: COMMERCIAL

## 2024-08-14 NOTE — PROGRESS NOTES
Daily Call Note:     Daily call completed with the patient.  He states his , and his BP was 121/ not sure what the bottom number was.  He mentioned that he needs a RTW form.  Will discuss on next [facility].  Pt had no add'l questions or concerns.  Aware of upcoming appts.      Pt Education:   Barriers:   Topics for Daily Review:   Pt demonstrates clear understanding:     Daily Weight:  There were no vitals filed for this visit.   Last 3 Weights:  Wt Readings from Last 7 Encounters:   08/09/24 136 kg (300 lb)   07/30/24 137 kg (302 lb 14.6 oz)   07/18/24 137 kg (301 lb 9.4 oz)   07/13/24 136 kg (300 lb)   07/12/24 137 kg (301 lb)   07/02/24 137 kg (301 lb 9.6 oz)   06/10/24 138 kg (305 lb)       Masimo Device:    Masimo Clinical Impression:     Virtual Visits--Scheduled (Most Recent Date at Top)  Follow up Appointments  Recent Visits  No visits were found meeting these conditions.  Showing recent visits within past 30 days and meeting all other requirements  Future Appointments  Date Type Provider Dept   08/26/24 Appointment Christopher D'Amico, DO Do [id number] [facility]   Showing future appointments within next 90 days and meeting all other requirements       Frequency of RN Calls & Virtual Visits per Team Agreement:     Medication issues Addressed (what was done):     Follow up appointments scheduled by [facility] Staff:   Referrals made by [facility] staff:

## 2024-08-16 ENCOUNTER — APPOINTMENT (OUTPATIENT)
Dept: CARDIAC REHAB | Facility: HOSPITAL | Age: 39
End: 2024-08-16
Payer: COMMERCIAL

## 2024-08-16 ENCOUNTER — APPOINTMENT (OUTPATIENT)
Dept: CARE COORDINATION | Age: 39
End: 2024-08-16
Payer: COMMERCIAL

## 2024-08-16 ENCOUNTER — PATIENT OUTREACH (OUTPATIENT)
Dept: CARE COORDINATION | Age: 39
End: 2024-08-16

## 2024-08-16 ENCOUNTER — TELEMEDICINE CLINICAL SUPPORT (OUTPATIENT)
Dept: CARE COORDINATION | Age: 39
End: 2024-08-16
Payer: COMMERCIAL

## 2024-08-16 DIAGNOSIS — I50.20 HFREF (HEART FAILURE WITH REDUCED EJECTION FRACTION) (MULTI): Primary | ICD-10-CM

## 2024-08-16 NOTE — PROGRESS NOTES
Daily Call Note:   1800 - [facility] completed with pt, Dr. Martell and this RN. Pt states that he is doing OK, though admitted that he is grieving the loss of the mother of one of his children who - he shared that she passed today. Condolences expressed.   Pt denies any sx - no pain, CP, SOB. And needs no refills.  Dr. Martell approved the return to work letter - this nurse completed it and emailed it to the email provided by the pt ([email or web address]).  Discussed that pt's calls are going very well and inquired if he was comfortable changing the frequency of calls - pt agreeable and he will be called MWF.  [facility] scheduled for 8/23 at 0930 - pt verbalized understanding.  No other questions or concerns at this time.    Pt Education: per POC  Barriers: none  Topics for Daily Review: return to work letter; change frequency; recent death  Pt demonstrates clear understanding: Yes    Daily Weight:  There were no vitals filed for this visit.   Last 3 Weights:  Wt Readings from Last 7 Encounters:   08/09/24 136 kg (300 lb)   07/30/24 137 kg (302 lb 14.6 oz)   07/18/24 137 kg (301 lb 9.4 oz)   07/13/24 136 kg (300 lb)   07/12/24 137 kg (301 lb)   07/02/24 137 kg (301 lb 9.6 oz)   06/10/24 138 kg (305 lb)       Masimo Device: No   Masimo Clinical Impression: n/a    Virtual Visits--Scheduled (Most Recent Date at Top)  Follow up Appointments  Recent Visits  No visits were found meeting these conditions.  Showing recent visits within past 30 days and meeting all other requirements  Future Appointments  Date Type Provider Dept   08/26/24 Appointment Christopher D'Amico,  Do [id number] [facility]   Showing future appointments within next 90 days and meeting all other requirements       Frequency of RN Calls & Virtual Visits per Team Agreement: Healthy at Home Frequency: MWF    Medication issues Addressed (what was done): none    Follow up appointments scheduled by [facility] Staff: none  Referrals made by [facility] staff: none

## 2024-08-19 ENCOUNTER — APPOINTMENT (OUTPATIENT)
Dept: CARDIAC REHAB | Facility: HOSPITAL | Age: 39
End: 2024-08-19
Payer: COMMERCIAL

## 2024-08-21 ENCOUNTER — PATIENT OUTREACH (OUTPATIENT)
Dept: HOME HEALTH SERVICES | Age: 39
End: 2024-08-21
Payer: COMMERCIAL

## 2024-08-21 ENCOUNTER — APPOINTMENT (OUTPATIENT)
Dept: CARDIAC REHAB | Facility: HOSPITAL | Age: 39
End: 2024-08-21
Payer: COMMERCIAL

## 2024-08-21 NOTE — PROGRESS NOTES
Daily Call Note:   Spoke to patient, he reports feeling alright. Reports Bg 216, states hasn't taken Trulicity in 4 days as he is staying in [place] for next few days in custody [place]. Had court yesterday and missed cardio apt.   Rescheduled cardio for patient, soonest Sept 24th.  Requested med refills for Trulicity and libre3 sensors to Davi be sent to pharmacy in [place] per patient request.   He is rescheduling cardiac rehab for next week.   He will have to get med refills at time of [facility] graduation to carry him to Cardio apt.   Next call reviewed.     Pt Education: POC  Barriers: na  Topics for Daily Review: POC  Pt demonstrates clear understanding: Yes    Daily Weight:  There were no vitals filed for this visit.   Last 3 Weights:  Wt Readings from Last 7 Encounters:   08/09/24 136 kg (300 lb)   07/30/24 137 kg (302 lb 14.6 oz)   07/18/24 137 kg (301 lb 9.4 oz)   07/13/24 136 kg (300 lb)   07/12/24 137 kg (301 lb)   07/02/24 137 kg (301 lb 9.6 oz)   06/10/24 138 kg (305 lb)       Masimo Device: No   Masimo Clinical Impression: na    Virtual Visits--Scheduled (Most Recent Date at Top)  Follow up Appointments  Recent Visits  No visits were found meeting these conditions.  Showing recent visits within past 30 days and meeting all other requirements  Future Appointments  Date Type Provider Dept   08/26/24 Appointment Christopher D'Amico,  Do [id number] [facility]   Showing future appointments within next 90 days and meeting all other requirements       Frequency of RN Calls & Virtual Visits per Team Agreement: Healthy at Home Frequency: Bi-Weekly    Medication issues Addressed (what was done): na    Follow up appointments scheduled by [facility] Staff: na  Referrals made by [facility] staff: erica

## 2024-08-23 ENCOUNTER — APPOINTMENT (OUTPATIENT)
Dept: CARE COORDINATION | Age: 39
End: 2024-08-23
Payer: COMMERCIAL

## 2024-08-23 ENCOUNTER — PATIENT OUTREACH (OUTPATIENT)
Dept: HOME HEALTH SERVICES | Age: 39
End: 2024-08-23

## 2024-08-23 ENCOUNTER — APPOINTMENT (OUTPATIENT)
Dept: CARDIAC REHAB | Facility: HOSPITAL | Age: 39
End: 2024-08-23
Payer: COMMERCIAL

## 2024-08-23 VITALS — WEIGHT: 300 LBS | BODY MASS INDEX: 44.43 KG/M2

## 2024-08-23 DIAGNOSIS — I50.20 HFREF (HEART FAILURE WITH REDUCED EJECTION FRACTION) (MULTI): ICD-10-CM

## 2024-08-23 DIAGNOSIS — I63.512 ACUTE ISCHEMIC LEFT MCA STROKE (MULTI): ICD-10-CM

## 2024-08-23 DIAGNOSIS — Z79.4 TYPE 2 DIABETES MELLITUS WITH DIABETIC NEUROPATHY, WITH LONG-TERM CURRENT USE OF INSULIN (MULTI): ICD-10-CM

## 2024-08-23 DIAGNOSIS — E11.40 TYPE 2 DIABETES MELLITUS WITH DIABETIC NEUROPATHY, WITH LONG-TERM CURRENT USE OF INSULIN (MULTI): ICD-10-CM

## 2024-08-23 DIAGNOSIS — I42.8 NONISCHEMIC CARDIOMYOPATHY (MULTI): Primary | ICD-10-CM

## 2024-08-23 NOTE — PROGRESS NOTES
[facility] Call Note:   Spoke to patient, he reports feeling alright. Reports BG, isnt checking bp, and reports weight.   He hasn't transferred rx to [place] like he asked yesterday and said he will just  in [place] today.  Denies fever, chills, SOB. Said he is not gaining any weight so he is feeling alright. C/o sharp pains in legs, still taking Xeralto and staying hydrated. Discussed labwork to check, BMP & BNP he said he will go Monday 8/26.   Denies further concerns or questions. Next [facility] scheduled. Next call reviewed.     Pt Education: POC  Barriers: na  Topics for Daily Review: POC  Pt demonstrates clear understanding: Yes    Daily Weight:  There were no vitals filed for this visit.   Last 3 Weights:  Wt Readings from Last 7 Encounters:   08/09/24 136 kg (300 lb)   07/30/24 137 kg (302 lb 14.6 oz)   07/18/24 137 kg (301 lb 9.4 oz)   07/13/24 136 kg (300 lb)   07/12/24 137 kg (301 lb)   07/02/24 137 kg (301 lb 9.6 oz)   06/10/24 138 kg (305 lb)       Masimo Device: No   Masimo Clinical Impression: na    Virtual Visits--Scheduled (Most Recent Date at Top)  Follow up Appointments  Recent Visits  No visits were found meeting these conditions.  Showing recent visits within past 30 days and meeting all other requirements  Future Appointments  Date Type Provider Dept   08/26/24 Appointment Christopher D'Amico,  Do [id number] [facility]   Showing future appointments within next 90 days and meeting all other requirements       Frequency of RN Calls & Virtual Visits per Team Agreement: Healthy at Home Frequency: Bi-Weekly    Medication issues Addressed (what was done): na    Follow up appointments scheduled by [facility] Staff: na  Referrals made by [facility] staff: erica

## 2024-08-26 ENCOUNTER — APPOINTMENT (OUTPATIENT)
Dept: PRIMARY CARE | Facility: CLINIC | Age: 39
End: 2024-08-26
Payer: COMMERCIAL

## 2024-08-26 ENCOUNTER — APPOINTMENT (OUTPATIENT)
Dept: CARDIAC REHAB | Facility: HOSPITAL | Age: 39
End: 2024-08-26
Payer: COMMERCIAL

## 2024-08-26 ENCOUNTER — PATIENT OUTREACH (OUTPATIENT)
Dept: CARE COORDINATION | Age: 39
End: 2024-08-26

## 2024-08-26 NOTE — PROGRESS NOTES
"Daily Call Note:   1250 - daily call completed with pt today - he was just leaving the  for the mother of one of his children.  He endorses back pain today - he attributes this to increased activity. He reports that his uncle has him taking walks and he is not used to that. Pt does say that he has SOB nearly all the time; and complains that his abdomen is \"hard.\" He is having regular, daily bowel movements. He does not own a scale - encouraged him to purchase and weigh himself daily as it is an indicator of excess fluid. He verbalized understanding.  Next [facility] is  at 0930.  No other questions or concerns.    Pt Education: per POC  Barriers: none  Topics for Daily Review: back pain; activity; SOB; abdomen; weight  Pt demonstrates clear understanding: Yes    Daily Weight:  There were no vitals filed for this visit.   Last 3 Weights:  Wt Readings from Last 7 Encounters:   24 136 kg (300 lb)   24 136 kg (300 lb)   24 137 kg (302 lb 14.6 oz)   24 137 kg (301 lb 9.4 oz)   24 136 kg (300 lb)   24 137 kg (301 lb)   24 137 kg (301 lb 9.6 oz)       Masimo Device: No   Masimo Clinical Impression: n/a    Virtual Visits--Scheduled (Most Recent Date at Top)  Follow up Appointments  Recent Visits  No visits were found meeting these conditions.  Showing recent visits within past 30 days and meeting all other requirements  Today's Visits  Date Type Provider Dept   24 Appointment Christopher D'Amico, DO Do [id number] [facility]   Showing today's visits and meeting all other requirements  Future Appointments  No visits were found meeting these conditions.  Showing future appointments within next 90 days and meeting all other requirements       Frequency of RN Calls & Virtual Visits per Team Agreement: Healthy at Home Frequency: MWF    Medication issues Addressed (what was done): none    Follow up appointments scheduled by [facility] Staff: none  Referrals made by [facility] staff: " none

## 2024-08-27 ENCOUNTER — APPOINTMENT (OUTPATIENT)
Dept: ENDOCRINOLOGY | Facility: CLINIC | Age: 39
End: 2024-08-27
Payer: COMMERCIAL

## 2024-08-28 ENCOUNTER — APPOINTMENT (OUTPATIENT)
Dept: CARDIAC REHAB | Facility: HOSPITAL | Age: 39
End: 2024-08-28
Payer: COMMERCIAL

## 2024-08-30 ENCOUNTER — TELEMEDICINE (OUTPATIENT)
Dept: PHARMACY | Facility: HOSPITAL | Age: 39
End: 2024-08-30
Payer: COMMERCIAL

## 2024-08-30 ENCOUNTER — APPOINTMENT (OUTPATIENT)
Dept: CARE COORDINATION | Age: 39
End: 2024-08-30
Payer: COMMERCIAL

## 2024-08-30 ENCOUNTER — DOCUMENTATION (OUTPATIENT)
Dept: CARE COORDINATION | Age: 39
End: 2024-08-30

## 2024-08-30 ENCOUNTER — APPOINTMENT (OUTPATIENT)
Dept: CARDIAC REHAB | Facility: HOSPITAL | Age: 39
End: 2024-08-30
Payer: COMMERCIAL

## 2024-08-30 ENCOUNTER — PATIENT OUTREACH (OUTPATIENT)
Dept: CARE COORDINATION | Age: 39
End: 2024-08-30

## 2024-08-30 DIAGNOSIS — I50.43 ACUTE ON CHRONIC COMBINED SYSTOLIC AND DIASTOLIC CHF (CONGESTIVE HEART FAILURE) (MULTI): Primary | ICD-10-CM

## 2024-08-30 DIAGNOSIS — Z79.4 TYPE 2 DIABETES MELLITUS WITH DIABETIC NEUROPATHY, WITH LONG-TERM CURRENT USE OF INSULIN (MULTI): ICD-10-CM

## 2024-08-30 DIAGNOSIS — E11.40 TYPE 2 DIABETES MELLITUS WITH DIABETIC NEUROPATHY, WITH LONG-TERM CURRENT USE OF INSULIN (MULTI): ICD-10-CM

## 2024-08-30 NOTE — PROGRESS NOTES
"Daily Call Note:   0930 - [facility] completed with pt, Dr. Mustafa, Davi Matias PharmD and this RN. Pt states that he is doing really well today. He reports that he increased his torsemide to 1.5 tablets due to SOB/orthopnea, as well as edema to BLE.The edema has resolved. Dr. Mustafa reviewed importance of PCP or cardiologist managing the medications; explained need to monitor kidney function since the medication is a diuretic. Dr. Mustafa reviewed the need for pt to maintain a fluid restriction of no more than 2 L daily. Pt verbalized understanding.   Pt reports that his activity is improving - he and his uncle walk two times weekly for one mile. Pt does take frequent rest breaks. Dr. Mustafa encouraged not to push it too much - \"listen to your body.\" Pt verbalized understanding.  FBG reviewed - typically in the 180s now - significantly improved from a month ago. And pt reports that he hasn't used any illicit drugs in well over a month.  Dr. Mustafa provided much encouragement for the work that the pt is doing to improve his health.  Next [facility] scheduled for 9/6 at 0930.  No other questions or concerns.    Pt Education: per POC  Barriers: none  Topics for Daily Review: activity; medication management; fluid restriction;  Pt demonstrates clear understanding: Yes    Daily Weight:  There were no vitals filed for this visit.   Last 3 Weights:  Wt Readings from Last 7 Encounters:   08/23/24 136 kg (300 lb)   08/09/24 136 kg (300 lb)   07/30/24 137 kg (302 lb 14.6 oz)   07/18/24 137 kg (301 lb 9.4 oz)   07/13/24 136 kg (300 lb)   07/12/24 137 kg (301 lb)   07/02/24 137 kg (301 lb 9.6 oz)       Masimo Device: No   Masimo Clinical Impression: n/a    Virtual Visits--Scheduled (Most Recent Date at Top)  Follow up Appointments  Recent Visits  No visits were found meeting these conditions.  Showing recent visits within past 30 days and meeting all other requirements  Future Appointments  No visits were found meeting these " conditions.  Showing future appointments within next 90 days and meeting all other requirements       Frequency of RN Calls & Virtual Visits per Team Agreement: Healthy at Home Frequency: MWF    Medication issues Addressed (what was done): torsemide; all meds for diabetes management    Follow up appointments scheduled by [facility] Staff: none  Referrals made by [facility] staff: none

## 2024-08-30 NOTE — PROGRESS NOTES
Pharmacy Post-Discharge Visit - Follow Up     Arthur Carranza is a 38 y.o. male was referred to Clinical Pharmacy Team to complete a post-discharge medication optimization and monitoring visit.  The patient was referred for their CHF and diabetes management while also enrolled in [facility].     Referring Provider: Joseph Mustafa MD  PCP: Christopher D'Amico, DO       Subjective   Allergies   Allergen Reactions    Shellfish Containing Products Unknown       [id number] - [place], [place] - [address] [place] [address] AT [facility] OF [address] [place]  [address] [place] [address]  [place] [address]  Phone: [phone] Fax: [phone]    [id number] - [place], [place] - [address] AT [facility] [address]  [address] [place] [address]  Phone: [phone] Fax: [phone]    [facility]  [address], [address]  [facility] [address]  Phone: [phone] Fax: [phone]      Medication System Management:  Affordability/Accessibility: medicaid   Adherence/Organization: no issues       Social History     Social History Narrative    Not on file          HPI  Diabetes  He presents for his initial diabetic visit. He has type 2 diabetes mellitus. His disease course has been improving. There are no hypoglycemic associated symptoms. There are no hypoglycemic complications. Diabetic complications include heart disease. Risk factors for coronary artery disease include diabetes mellitus, male sex, obesity, tobacco exposure and sedentary lifestyle. Current diabetic treatment includes insulin injections and oral agent (dual therapy). He is compliant with treatment all of the time. He is following a low salt diet. His overall blood glucose range is 140-180 mg/dl. An ACE inhibitor/angiotensin II receptor blocker is being taken.      CHF ASSESSMENT  Staging:  Most recent ejection fraction: 10%  NYHA Stage: I  ACC/AHA Stage: B     Symptom Assessment:  Weight changes/edema?: Yes - up 2 lbs from  visit last week   Dyspnea?: None  Dizziness/syncope/palpitations?: No     Current Regimen:  ARNI/ACEi/ARB: Yes - entresto  Beta Blocker: No  MRA: No  SGLT2i: Yes - farxiga      Other therapy:  Hydralazine   Isosorbide Dinitrate   Xarelto   Torsemide      Secondary Prevention:  The ASCVD Risk score (Joe GALEANA, et al., 2019) failed to calculate for the following reasons:    The 2019 ASCVD risk score is only valid for ages 40 to 79    The patient has a prior MI or stroke diagnosis     Aspirin 81mg? no  Statin?: Yes - atorvastatin   HTN?: Yes - well controlled currently      Review of Systems        Objective     There were no vitals taken for this visit.   BP Readings from Last 4 Encounters:   08/02/24 120/70   07/30/24 111/61   07/18/24 116/74   07/13/24 120/84      There were no vitals filed for this visit.     LAB  Lab Results   Component Value Date    BILITOT 0.5 07/28/2024    CALCIUM 9.4 07/30/2024    CO2 26 07/30/2024     07/30/2024    CREATININE 1.61 (H) 07/30/2024    GLUCOSE 137 (H) 07/30/2024    ALKPHOS 49 07/28/2024    K 4.6 07/30/2024    PROT 7.2 07/28/2024     07/30/2024    AST 22 07/28/2024    ALT 20 07/28/2024    BUN 28 (H) 07/30/2024    ANIONGAP 15 07/30/2024    MG 2.26 07/30/2024    PHOS 4.1 07/30/2024     07/22/2024    ALBUMIN 4.2 07/30/2024    LIPASE 59 07/29/2024    GFRMALE 64 08/08/2023     Lab Results   Component Value Date    TRIG 241 (H) 12/12/2023    CHOL 125 12/12/2023    LDLCALC 50 12/12/2023    HDL 26.6 12/12/2023     Lab Results   Component Value Date    HGBA1C 13.3 (H) 07/18/2024         Current Outpatient Medications   Medication Instructions    alcohol swabs pads, medicated 1 each, topical (top), 4 times daily, Use prior to checking glucose or injecting insulin    atorvastatin (LIPITOR) 40 mg, oral, Nightly    blood-glucose meter,continuous (FreeStyle Nadir 3 [place]) [facility] Use as instructed to test blood glucose throughout the day    blood-glucose sensor (FreeStyle Nadir  "3 Sensor) device Use as directed to test blood glucose throughout the day    buPROPion XL (WELLBUTRIN XL) 150 mg, oral, Daily    dapagliflozin propanediol (FARXIGA) 10 mg, oral, Daily    hydrALAZINE (APRESOLINE) 25 mg, oral, 3 times daily    insulin lispro (HumaLOG) 100 unit/mL injection Inject 12 units plus sliding scale three times daily with meals:  = 0u, 151-200 = 2u, 201-250 = 4u, 251-300 = 6u, 301-350 = 8u, 351-400+ = 10u, use up to 60u daily    isosorbide dinitrate (ISORDIL) 20 mg, oral, 3 times daily (0900,1400,1900)    Lantus Solostar U-100 Insulin 55 Units, subcutaneous, Nightly, Take as directed per insulin instructions.    metFORMIN (GLUCOPHAGE) 1,000 mg, oral, 2 times daily    pen needle, diabetic 32 gauge x 5/32\" needle 1 each, miscellaneous, 4 times daily    rivaroxaban (XARELTO) 20 mg, oral, Daily with evening meal, Take with food.    sacubitriL-valsartan (Entresto) 24-26 mg tablet 1 tablet, oral, 2 times daily    sertraline (ZOLOFT) 100 mg, oral, Daily    torsemide (DEMADEX) 20 mg, oral, Daily    traZODone (DESYREL) 50 mg, oral, Nightly PRN    Trulicity 1.5 mg, subcutaneous, Once Weekly            Assessment/Plan   Problem List Items Addressed This Visit    None    DM  Most recent A1c 13.3% while admitted   Sugars being monitored via CGM   Has been ranging from 140-180's this past week   Says the sugars are the highest in the evening and the morning   No hypo events either   Currenly doing 55 units of basaglar nightly and 12 units + SS of lispro   Taking max dose metformin and farxiga daily   Also doing his trulicity injections every Wednesday  Would recommend to increase to 3mg dose once weekly after using last 1.5mg dose pen   CHF  Most recent EF only 10% while admitted   Current GDMT --> entresto and farxiga  Discussed importance of being compliant with all meds   He has been taking 30mg of his torsemide the past few days due to some increased SOB   Also discussed that his fluid intake is a " lot more than he should be drinking daily so recommended only a 1.5 L fluid/day   Also go back to just taking torsemide 20mg daily   Will need to continue with daily weights and BP's   Plan to get LVAD done in the future but needs to have A1c more controlled and also has history of drug abuse   Stated he has not used any illicit drugs since being home so far     Follow Up: 1 week     Continue all meds under the continuation of care with the referring provider and clinical pharmacy team.    Davi Matias, Perez     Verbal consent to manage patient's drug therapy was obtained from the patient. They were informed they may decline to participate or withdraw from participation in pharmacy services at any time.

## 2024-08-30 NOTE — PROGRESS NOTES
Contacted and spoke w/Mr. Carranza in regards to [facility] referral for resouce assist. Pt was given info regarding housinG and INS information. Patient has transportation provided though his INS.      Discussed the following topics on behalf of the patient:  []  Behavioral Health Assistance     []  Case Management  []   Assistance  []  Digital Equity Assistance  []  Dental Health Assistance  []  Education Assistance  []  Employment Assistance  []  Financial Strain Relief Assistance  []  Food Insecurity Assistance  [x]  Healthcare Coverage Assistance  [x]  Housing Stability Assistance  []  IP Violence Relief Assistance  []  Legal Assistance  []  Physical Activity Assistance  []  Social Connection Assistance  []  Stress Relief Assistance   []  Substance Abuse Assistance  [x]  Transportation Assistance  []  Utility Assistance  []  Other: [insert comment here]    Next Steps:         Shirin Jurado LCSW

## 2024-09-04 ENCOUNTER — APPOINTMENT (OUTPATIENT)
Dept: CARDIAC REHAB | Facility: HOSPITAL | Age: 39
End: 2024-09-04
Payer: COMMERCIAL

## 2024-09-04 ENCOUNTER — PATIENT OUTREACH (OUTPATIENT)
Dept: HOME HEALTH SERVICES | Age: 39
End: 2024-09-04
Payer: COMMERCIAL

## 2024-09-06 ENCOUNTER — PATIENT OUTREACH (OUTPATIENT)
Dept: CARE COORDINATION | Age: 39
End: 2024-09-06

## 2024-09-06 ENCOUNTER — TELEMEDICINE (OUTPATIENT)
Dept: CARE COORDINATION | Age: 39
End: 2024-09-06
Payer: COMMERCIAL

## 2024-09-06 ENCOUNTER — PATIENT OUTREACH (OUTPATIENT)
Dept: HOME HEALTH SERVICES | Age: 39
End: 2024-09-06

## 2024-09-06 ENCOUNTER — TELEMEDICINE (OUTPATIENT)
Dept: PHARMACY | Facility: HOSPITAL | Age: 39
End: 2024-09-06
Payer: COMMERCIAL

## 2024-09-06 ENCOUNTER — APPOINTMENT (OUTPATIENT)
Dept: CARDIAC REHAB | Facility: HOSPITAL | Age: 39
End: 2024-09-06
Payer: COMMERCIAL

## 2024-09-06 VITALS — BODY MASS INDEX: 44.58 KG/M2 | WEIGHT: 301 LBS

## 2024-09-06 DIAGNOSIS — I10 PRIMARY HYPERTENSION: ICD-10-CM

## 2024-09-06 DIAGNOSIS — I50.9 ACUTE DECOMPENSATED HEART FAILURE (MULTI): ICD-10-CM

## 2024-09-06 DIAGNOSIS — I50.20 HFREF (HEART FAILURE WITH REDUCED EJECTION FRACTION) (MULTI): Primary | ICD-10-CM

## 2024-09-06 DIAGNOSIS — Z79.4 TYPE 2 DIABETES MELLITUS WITH DIABETIC NEUROPATHY, WITH LONG-TERM CURRENT USE OF INSULIN (MULTI): ICD-10-CM

## 2024-09-06 DIAGNOSIS — I50.43 ACUTE ON CHRONIC COMBINED SYSTOLIC AND DIASTOLIC CHF (CONGESTIVE HEART FAILURE) (MULTI): Primary | ICD-10-CM

## 2024-09-06 DIAGNOSIS — I50.43 ACUTE ON CHRONIC COMBINED SYSTOLIC AND DIASTOLIC CONGESTIVE HEART FAILURE (MULTI): ICD-10-CM

## 2024-09-06 DIAGNOSIS — I50.20 HFREF (HEART FAILURE WITH REDUCED EJECTION FRACTION) (MULTI): ICD-10-CM

## 2024-09-06 DIAGNOSIS — E11.40 TYPE 2 DIABETES MELLITUS WITH DIABETIC NEUROPATHY, WITH LONG-TERM CURRENT USE OF INSULIN (MULTI): ICD-10-CM

## 2024-09-06 RX ORDER — BLOOD-GLUCOSE SENSOR
EACH MISCELLANEOUS
Qty: 2 EACH | Refills: 11 | Status: SHIPPED | OUTPATIENT
Start: 2024-09-06

## 2024-09-06 RX ORDER — METFORMIN HYDROCHLORIDE 1000 MG/1
1000 TABLET ORAL 2 TIMES DAILY
Qty: 60 TABLET | Refills: 0 | Status: SHIPPED | OUTPATIENT
Start: 2024-09-06

## 2024-09-06 RX ORDER — INSULIN GLARGINE 100 [IU]/ML
55 INJECTION, SOLUTION SUBCUTANEOUS NIGHTLY
Qty: 30 ML | Refills: 0 | Status: SHIPPED | OUTPATIENT
Start: 2024-09-06 | End: 2024-10-31

## 2024-09-06 RX ORDER — DAPAGLIFLOZIN 10 MG/1
10 TABLET, FILM COATED ORAL DAILY
Qty: 90 TABLET | Refills: 3 | Status: SHIPPED | OUTPATIENT
Start: 2024-09-06 | End: 2025-09-06

## 2024-09-06 RX ORDER — INSULIN LISPRO 100 [IU]/ML
INJECTION, SOLUTION INTRAVENOUS; SUBCUTANEOUS
Qty: 30 ML | Refills: 0 | Status: SHIPPED | OUTPATIENT
Start: 2024-09-06

## 2024-09-06 RX ORDER — ISOSORBIDE DINITRATE 20 MG/1
20 TABLET ORAL
Qty: 90 TABLET | Refills: 0 | Status: SHIPPED | OUTPATIENT
Start: 2024-09-06 | End: 2024-10-06

## 2024-09-06 RX ORDER — ATORVASTATIN CALCIUM 40 MG/1
40 TABLET, FILM COATED ORAL NIGHTLY
Qty: 30 TABLET | Refills: 11 | Status: SHIPPED | OUTPATIENT
Start: 2024-09-06

## 2024-09-06 RX ORDER — HYDRALAZINE HYDROCHLORIDE 25 MG/1
25 TABLET, FILM COATED ORAL 3 TIMES DAILY
Qty: 90 TABLET | Refills: 0 | Status: SHIPPED | OUTPATIENT
Start: 2024-09-06 | End: 2024-10-06

## 2024-09-06 NOTE — PROGRESS NOTES
Pharmacy Post-Discharge Visit - Follow Up     Arthur Carranza is a 38 y.o. male was referred to Clinical Pharmacy Team to complete a post-discharge medication optimization and monitoring visit.  The patient was referred for their CHF and diabetes management while also enrolled in [facility].     Referring Provider: Steven Morales DO  PCP: Christopher D'Amico, DO      Subjective   Allergies   Allergen Reactions    Shellfish Containing Products Unknown       [id number] - [place], [place] - [address] [place] [address] AT [facility] [address] [place]  [address] [place] [address]  [place] [address]  Phone: [phone] Fax: [phone]    [id number] - [place], [place] - [address] AT [facility] [address]  [address] [place] [address]  Phone: [phone] Fax: [phone]    [facility]  [address], [address]  [facility] [address]  Phone: [phone] Fax: [phone]      Medication System Management:  Affordability/Accessibility: medicaid   Adherence/Organization: no issues       Social History     Social History Narrative    Not on file          HPI  Diabetes  He presents for his initial diabetic visit. He has type 2 diabetes mellitus. His disease course has been improving. There are no hypoglycemic associated symptoms. There are no hypoglycemic complications. Diabetic complications include heart disease. Risk factors for coronary artery disease include diabetes mellitus, male sex, obesity, tobacco exposure and sedentary lifestyle. Current diabetic treatment includes insulin injections and oral agent (dual therapy). He is compliant with treatment all of the time. He is following a low salt diet. His overall blood glucose range is 140-180 mg/dl. An ACE inhibitor/angiotensin II receptor blocker is being taken.      CHF ASSESSMENT  Staging:  Most recent ejection fraction: 10%  NYHA Stage: I  ACC/AHA Stage: B     Symptom Assessment:  Weight changes/edema?: Yes - down 1 lb from  visit last week   Dyspnea?: None  Dizziness/syncope/palpitations?: No     Current Regimen:  ARNI/ACEi/ARB: Yes - entresto  Beta Blocker: No  MRA: No  SGLT2i: Yes - farxiga      Other therapy:  Hydralazine   Isosorbide Dinitrate   Xarelto   Torsemide      Secondary Prevention:  The ASCVD Risk score (Joe GALEANA, et al., 2019) failed to calculate for the following reasons:    The 2019 ASCVD risk score is only valid for ages 40 to 79    The patient has a prior MI or stroke diagnosis     Aspirin 81mg? no  Statin?: Yes - atorvastatin   HTN?: Yes - well controlled currently      Review of Systems        Objective     There were no vitals taken for this visit.   BP Readings from Last 4 Encounters:   08/02/24 120/70   07/30/24 111/61   07/18/24 116/74   07/13/24 120/84      There were no vitals filed for this visit.     LAB  Lab Results   Component Value Date    BILITOT 0.5 07/28/2024    CALCIUM 9.4 07/30/2024    CO2 26 07/30/2024     07/30/2024    CREATININE 1.61 (H) 07/30/2024    GLUCOSE 137 (H) 07/30/2024    ALKPHOS 49 07/28/2024    K 4.6 07/30/2024    PROT 7.2 07/28/2024     07/30/2024    AST 22 07/28/2024    ALT 20 07/28/2024    BUN 28 (H) 07/30/2024    ANIONGAP 15 07/30/2024    MG 2.26 07/30/2024    PHOS 4.1 07/30/2024     07/22/2024    ALBUMIN 4.2 07/30/2024    LIPASE 59 07/29/2024    GFRMALE 64 08/08/2023     Lab Results   Component Value Date    TRIG 241 (H) 12/12/2023    CHOL 125 12/12/2023    LDLCALC 50 12/12/2023    HDL 26.6 12/12/2023     Lab Results   Component Value Date    HGBA1C 13.3 (H) 07/18/2024         Current Outpatient Medications   Medication Instructions    alcohol swabs pads, medicated 1 each, topical (top), 4 times daily, Use prior to checking glucose or injecting insulin    atorvastatin (LIPITOR) 40 mg, oral, Nightly    blood-glucose meter,continuous (FreeStyle Nadir 3 [place]) [facility] Use as instructed to test blood glucose throughout the day    blood-glucose sensor (FreeStyle Nadir  "3 Sensor) device Use as directed to test blood glucose throughout the day    buPROPion XL (WELLBUTRIN XL) 150 mg, oral, Daily    dapagliflozin propanediol (FARXIGA) 10 mg, oral, Daily    hydrALAZINE (APRESOLINE) 25 mg, oral, 3 times daily    insulin lispro (HumaLOG) 100 unit/mL injection Inject 12 units plus sliding scale three times daily with meals:  = 0u, 151-200 = 2u, 201-250 = 4u, 251-300 = 6u, 301-350 = 8u, 351-400+ = 10u, use up to 60u daily    isosorbide dinitrate (ISORDIL) 20 mg, oral, 3 times daily (0900,1400,1900)    Lantus Solostar U-100 Insulin 55 Units, subcutaneous, Nightly, Take as directed per insulin instructions.    metFORMIN (GLUCOPHAGE) 1,000 mg, oral, 2 times daily    pen needle, diabetic 32 gauge x 5/32\" needle 1 each, miscellaneous, 4 times daily    rivaroxaban (XARELTO) 20 mg, oral, Daily with evening meal, Take with food.    sacubitriL-valsartan (Entresto) 24-26 mg tablet 1 tablet, oral, 2 times daily    sertraline (ZOLOFT) 100 mg, oral, Daily    torsemide (DEMADEX) 20 mg, oral, Daily    traZODone (DESYREL) 50 mg, oral, Nightly PRN    Trulicity 1.5 mg, subcutaneous, Once Weekly            Assessment/Plan   Problem List Items Addressed This Visit    None    DM  Most recent A1c 13.3% while admitted   Sugars being monitored via CGM   Has been ranging from 's this past week he states   Says the sugars are the highest in the evening and the morning   No hypo events either   Currenly doing 55 units of basaglar nightly and 12 units + SS of lispro   Taking max dose metformin and farxiga daily   Also doing his trulicity injections every Wednesday  Will send new Rx for the 3mg dose to start once weekly once finished with his 1.5mg pens   CHF  Most recent EF only 10% while admitted   Current GDMT --> entresto and farxiga  Discussed importance of being compliant with all meds   Torsemide daily   Will need to continue with daily weights and BP's   Plan to get LVAD done in the future but " needs to have A1c more controlled and also has history of drug abuse   Stated he has not used any illicit drugs since being home so far     Follow Up: as needed     Continue all meds under the continuation of care with the referring provider and clinical pharmacy team.    Davi Matias PharmD     Verbal consent to manage patient's drug therapy was obtained from the patient. They were informed they may decline to participate or withdraw from participation in pharmacy services at any time.

## 2024-09-06 NOTE — PROGRESS NOTES
[facility] Call Note:   Spoke to patient, he reports feeling alright. Reports weight, has not been recording bp readings.   States Bg have been in the 90's.   Discussed PCP apt that was missed, rescheduled him fir first available. He is aware of appointment.   Discussed other upcoming appointments.   Med refills discussed to get him to cardio apt.   Graduation discussed and patient agreeable.  Patient graduated from [facility] today.    Pt Education: POC  Barriers: na  Topics for Daily Review: POC  Pt demonstrates clear understanding: Yes    Daily Weight:  There were no vitals filed for this visit.   Last 3 Weights:  Wt Readings from Last 7 Encounters:   08/23/24 136 kg (300 lb)   08/09/24 136 kg (300 lb)   07/30/24 137 kg (302 lb 14.6 oz)   07/18/24 137 kg (301 lb 9.4 oz)   07/13/24 136 kg (300 lb)   07/12/24 137 kg (301 lb)   07/02/24 137 kg (301 lb 9.6 oz)       Masimo Device: No   Masimo Clinical Impression: na    Virtual Visits--Scheduled (Most Recent Date at Top)  Follow up Appointments  Recent Visits  No visits were found meeting these conditions.  Showing recent visits within past 30 days and meeting all other requirements  Future Appointments  No visits were found meeting these conditions.  Showing future appointments within next 90 days and meeting all other requirements       Frequency of RN Calls & Virtual Visits per Team Agreement: Healthy at Home Frequency: Bi-Weekly    Medication issues Addressed (what was done): reviewed    Follow up appointments scheduled by [facility] Staff: na  Referrals made by [facility] staff: erica

## 2024-09-12 ENCOUNTER — PATIENT OUTREACH (OUTPATIENT)
Dept: PRIMARY CARE | Facility: CLINIC | Age: 39
End: 2024-09-12
Payer: COMMERCIAL

## 2024-09-13 ENCOUNTER — HOSPITAL ENCOUNTER (EMERGENCY)
Facility: HOSPITAL | Age: 39
Discharge: LEFT BEFORE DISCHARGE | End: 2024-09-13
Payer: COMMERCIAL

## 2024-09-13 PROCEDURE — 4500999001 HC ED NO CHARGE

## 2024-09-17 ENCOUNTER — CLINICAL SUPPORT (OUTPATIENT)
Dept: SLEEP MEDICINE | Facility: HOSPITAL | Age: 39
End: 2024-09-17
Payer: COMMERCIAL

## 2024-09-17 VITALS — BODY MASS INDEX: 44.73 KG/M2 | WEIGHT: 302.03 LBS | HEIGHT: 69 IN

## 2024-09-17 DIAGNOSIS — G47.30 SLEEP-RELATED BREATHING DISORDER: ICD-10-CM

## 2024-09-17 DIAGNOSIS — G47.33 OBSTRUCTIVE SLEEP APNEA (ADULT) (PEDIATRIC): ICD-10-CM

## 2024-09-17 DIAGNOSIS — R06.3 PERIODIC BREATHING: ICD-10-CM

## 2024-09-17 PROCEDURE — 95810 POLYSOM 6/> YRS 4/> PARAM: CPT | Performed by: STUDENT IN AN ORGANIZED HEALTH CARE EDUCATION/TRAINING PROGRAM

## 2024-09-18 ENCOUNTER — HOSPITAL ENCOUNTER (EMERGENCY)
Facility: HOSPITAL | Age: 39
Discharge: HOME | End: 2024-09-18
Payer: COMMERCIAL

## 2024-09-18 VITALS
TEMPERATURE: 97.2 F | HEIGHT: 68 IN | WEIGHT: 302 LBS | OXYGEN SATURATION: 98 % | SYSTOLIC BLOOD PRESSURE: 127 MMHG | RESPIRATION RATE: 17 BRPM | BODY MASS INDEX: 45.77 KG/M2 | HEART RATE: 97 BPM | DIASTOLIC BLOOD PRESSURE: 80 MMHG

## 2024-09-18 DIAGNOSIS — L02.211 ABDOMINAL WALL ABSCESS: Primary | ICD-10-CM

## 2024-09-18 PROCEDURE — 10060 I&D ABSCESS SIMPLE/SINGLE: CPT | Performed by: PHYSICIAN ASSISTANT

## 2024-09-18 PROCEDURE — 99283 EMERGENCY DEPT VISIT LOW MDM: CPT | Mod: 25

## 2024-09-18 RX ORDER — IBUPROFEN 800 MG/1
800 TABLET ORAL 3 TIMES DAILY
Qty: 21 TABLET | Refills: 0 | Status: SHIPPED | OUTPATIENT
Start: 2024-09-18 | End: 2024-09-25

## 2024-09-18 RX ORDER — SULFAMETHOXAZOLE AND TRIMETHOPRIM 800; 160 MG/1; MG/1
1 TABLET ORAL EVERY 12 HOURS
Qty: 20 TABLET | Refills: 0 | Status: SHIPPED | OUTPATIENT
Start: 2024-09-18 | End: 2024-09-28

## 2024-09-18 ASSESSMENT — LIFESTYLE VARIABLES
HAVE PEOPLE ANNOYED YOU BY CRITICIZING YOUR DRINKING: NO
EVER FELT BAD OR GUILTY ABOUT YOUR DRINKING: NO
TOTAL SCORE: 0
EVER HAD A DRINK FIRST THING IN THE MORNING TO STEADY YOUR NERVES TO GET RID OF A HANGOVER: NO
HAVE YOU EVER FELT YOU SHOULD CUT DOWN ON YOUR DRINKING: NO

## 2024-09-18 ASSESSMENT — PAIN SCALES - GENERAL: PAINLEVEL_OUTOF10: 0 - NO PAIN

## 2024-09-18 ASSESSMENT — PAIN - FUNCTIONAL ASSESSMENT: PAIN_FUNCTIONAL_ASSESSMENT: 0-10

## 2024-09-18 NOTE — ED PROVIDER NOTES
"HPI   Chief Complaint   Patient presents with    Abscess     \"I have a cyst or something on my belly and I need it drained or taken out\" Pt was just discharged from sleep lab       A 38-year-old male patient comes into the emergency department today with concern for an abscess to his lower abdomen.  States he has had this at least 7 times over the years.  States started about a week ago and is gradually increased in severity.  He states the pain is actually improved a little bit.  He otherwise has no other complaints this present time for this purpose comes in the emergency department today for further evaluation.              Patient History   Past Medical History:   Diagnosis Date    CHF (congestive heart failure) (Multi)     Diabetes mellitus (Multi)     Heart disease     Hypertension     Substance abuse (Multi)      Past Surgical History:   Procedure Laterality Date    CARDIAC CATHETERIZATION N/A 7/18/2024    Procedure: Right Heart Cath;  Surgeon: Delroy Jurado MD;  Location: [facility] Cardiac Cath Lab;  Service: Cardiovascular;  Laterality: N/A;    MR HEAD ANGIO WO IV CONTRAST  12/29/2022    MR HEAD ANGIO WO IV CONTRAST 12/29/2022 DOCTOR OFFICE LEGACY    OTHER SURGICAL HISTORY  07/21/2022    Cyst excision    OTHER SURGICAL HISTORY  10/25/2022    Cardiac catheterization     Family History   Adopted: Yes     Social History     Tobacco Use    Smoking status: Every Day     Types: Cigarettes    Smokeless tobacco: Not on file   Vaping Use    Vaping status: Unknown   Substance Use Topics    Alcohol use: Yes     Comment: occasionaly - 1-2 times a month    Drug use: Not Currently     Types: Codeine       Physical Exam   ED Triage Vitals [09/18/24 0643]   Temperature Heart Rate Respirations BP   36.2 °C (97.2 °F) 97 17 127/80      Pulse Ox Temp Source Heart Rate Source Patient Position   98 % Temporal Monitor Sitting      BP Location FiO2 (%)     Right arm --       Physical Exam  Constitutional:       General: He is in " acute distress.      Appearance: Normal appearance. He is obese. He is not ill-appearing.   HENT:      Head: Normocephalic and atraumatic.      Nose: Nose normal.   Eyes:      Extraocular Movements: Extraocular movements intact.      Conjunctiva/sclera: Conjunctivae normal.      Pupils: Pupils are equal, round, and reactive to light.   Cardiovascular:      Rate and Rhythm: Normal rate and regular rhythm.   Pulmonary:      Effort: Pulmonary effort is normal. No respiratory distress.      Breath sounds: Normal breath sounds. No stridor. No wheezing.   Musculoskeletal:         General: Normal range of motion.      Cervical back: Normal range of motion.   Skin:     General: Skin is warm and dry.      Comments: Small fluctuant area to the right lower pannus with mild surrounding induration.  No erythema.   Neurological:      General: No focal deficit present.      Mental Status: He is alert and oriented to person, place, and time. Mental status is at baseline.   Psychiatric:         Mood and Affect: Mood normal.           ED Course & MDM   Diagnoses as of 09/18/24 0712   Abdominal wall abscess                 No data recorded     [place] Coma Scale Score: 15 (09/18/24 0652 : Soto Mcdowell RN)                           Medical Decision Making  A 38-year-old male patient comes into the emergency department today with concern for an abscess to his lower abdomen.  States he has had this at least 7 times over the years.  States started about a week ago and is gradually increased in severity.  He states the pain is actually improved a little bit.  He otherwise has no other complaints this present time for this purpose comes in the emergency department today for further evaluation.    I&D was performed of patient's abscess.  Patient tolerated this procedure well.  Will discharge patient home on p.o. Bactrim and close follow-up with [facility] [place].  Patient agrees with this plan expressed verbal understanding.  All  questions were answered.    Historian is the patient    Diagnosis: Abdominal wall abscess        Procedure  Incision and Drainage    Performed by: Kel Nelson PA-C  Authorized by: Kel Nelson PA-C    Consent:     Consent obtained:  Verbal    Consent given by:  Patient    Risks discussed:  Incomplete drainage    Alternatives discussed:  No treatment  Universal protocol:     Patient identity confirmed:  Verbally with patient  Location:     Type:  Abscess    Size:  2 cm    Location:  Trunk    Trunk location:  Abdomen  Pre-procedure details:     Skin preparation:  Povidone-iodine  Sedation:     Sedation type:  None  Anesthesia:     Anesthesia method:  Local infiltration    Local anesthetic:  Lidocaine 1% WITH epi  Procedure type:     Complexity:  Simple  Procedure details:     Incision types:  Single straight    Drainage:  Serosanguinous    Drainage amount:  Moderate    Wound treatment:  Wound left open    Packing materials:  None  Post-procedure details:     Procedure completion:  Tolerated well, no immediate complications       Kel Nelson PA-C  09/18/24 0712

## 2024-09-18 NOTE — PROGRESS NOTES
[facility] TECH NOTE:     Patient: Arthur Carranza   MRN//AGE: [id number]  1985  38 y.o.   Technologist: Jalen Sanders   Room: 402   Service Date: 2024        Sleep Testing Location: [facility]    [place]: 6    TECHNOLOGIST SLEEP STUDY PROCEDURE NOTE:   This sleep study is being conducted according to the policies and procedures outlined by the AAS accreditation standards.  The sleep study procedure and processes involved during this appointment was explained to the patient/patient’s family, questions were answered. The patient/family verbalized understanding.      The patient is a 38 y.o. year old male scheduled for a  Diagnostic PSG  with montage of:  PSG w/ EtCO2 . he arrived for his appointment.      The study that was ultimately completed was a  Diagnostic PSG  with montage of:  PSG w/ EtCO2 .    The full study Was completed.  Patient questionnaires completed?: yes     Consents signed? yes    Initial Fall Risk Screening:     Arthur has not fallen in the last 6 months. his did not result in injury. Arthur does not have a fear of falling. He does not need assistance with sitting, standing, or walking. he does not need assistance walking in his home. he does not need assistance in an unfamiliar setting. The patient is notusing an assistive device.     Brief Study observations: Pt presents as 37 y/o Male here for scheduled PSG.  Noted override order for EtCO2 monitoring.  Study Observations:  Pt arrived on time, was ambulatory w/o assistance, and appeared alert/ oriented throughout interactions w/ sleep staff.  Throughout intervention period noted limited confidence AHI > 30, accompanied by frequent arousals, frequent awakenings, frequent leg movements, frequent positional changes, periodic WASO, and fragmented sleep architecture.  Noted increase in PVCs throughout study from occasional to frequent.  Noted no abnormal behaviors throughout duration of study.  Study Interventions:  Per  Split Night protocol, did not administer CPAP therapy due to < 120mins of recorded sleep observed during intervention window.  Additional Notes:  Poor EtCO2 Signal Waveform achieved despite attempts to troubleshoot.    Deviation to order/protocol and reason: N/A     If PAP, which was preferred mask/pressure/mode: N/A      Other: Poor EtCO2 Signal Waveform achieved despite attempts to troubleshoot.  Troubleshooting included tightening connections at amplifier/ monitor, and changing montage to assess various EtCO2 channels.    After the procedure, the patient/family was informed to ensure followup with ordering clinician for testing results.      Technologist: Jalen Sanders

## 2024-09-19 PROCEDURE — RXMED WILLOW AMBULATORY MEDICATION CHARGE

## 2024-09-20 PROCEDURE — RXMED WILLOW AMBULATORY MEDICATION CHARGE

## 2024-09-22 ENCOUNTER — PHARMACY VISIT (OUTPATIENT)
Dept: PHARMACY | Facility: CLINIC | Age: 39
End: 2024-09-22
Payer: MEDICAID

## 2024-09-23 ENCOUNTER — OFFICE VISIT (OUTPATIENT)
Dept: WOUND CARE | Facility: CLINIC | Age: 39
End: 2024-09-23
Payer: COMMERCIAL

## 2024-09-23 PROCEDURE — 11042 DBRDMT SUBQ TIS 1ST 20SQCM/<: CPT

## 2024-09-23 PROCEDURE — 99213 OFFICE O/P EST LOW 20 MIN: CPT | Mod: 25

## 2024-09-23 PROCEDURE — 99213 OFFICE O/P EST LOW 20 MIN: CPT | Performed by: PLASTIC SURGERY

## 2024-09-23 PROCEDURE — 11042 DBRDMT SUBQ TIS 1ST 20SQCM/<: CPT | Performed by: PLASTIC SURGERY

## 2024-09-23 NOTE — PROGRESS NOTES
Primary Care Physician: Christopher D'Amico, DO  Patient's Location: [facility] [address]    Date of Visit: 09/24/2024  8:30 AM EDT  Location of visit: [facility]   Type of Visit: Established  Date of initial visit: 3/14/2023  Last seen by me: 7/2/2024      HPI / Summary:   Arthur Carranza is a very pleasant 38 y.o. male from [place], [place] who presents for management of NICM/HFrEF (EF 5-10%), initially diagnosed 10/2022. 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      Initial CV History: from 3/14/2023  Mr. Carranza was initially diagnosed with NICM during hospitalization at [facility] in October 2022 after presentation with SOB, weight gain. During hospitalization he had elevated BNP and echo with EF 15-20%. Coronary angiogram showed no CAD. He was started on BB, ARB and discharged with LifeVest. He followed up with Dr. Andrews as outpatient where he had not started his medications. He had multiple no-shows with Dr. Andrews and EP team. In December he was admitted with R sided weakness related to Cardioembolic stroke. He was given TNK with improvement and discharged on rivaroxaban. He followed up with Eugenia Lyman for post hospital visit and was referred for tobacco cessation, cardiac rehab, primary care, comprehensive weight loss, sleep clinic but these have not been scheduled.     Hospitalizations: 2/20/24-2/23/24 CHF  Admitted 7/18/2024 - 7/30/2024 (12 days) [facility] for ADHF and LVAD eval. Presented for LVAD on 7/30/2024. Currently deferred given recent substance use, uncontrolled diabetes, and psychosocial barriers.     Interval history:   Recent Hospitalizations: 9/18/24 ED for abdominal abcess  Had less than a week without meds  Recent abscess  "drainage    Today:  Accompanied by: Wife  Diet: None  Exercise: He states he has cardiac rehab, but had not been attending.      Denies chest pain, chest pressure, palpitations, PND.   Denies headaches, dizziness, lightheadedness, and falls.     Patient reports SOB \"all of the time\", KEY, and orthopnea. He currently sleeps with two pillows at night.  He reports BLE edema. He states it gets worse throughout the day, and is better when he gets up in the morning.  He reports daily fatigue.   He states he will feel palpitations when he is short of breath.       PMHx: DM II, HTN, HLD, Obesity, ?PHOEBE, Bipolar I Disorder (Previous SI/HI 2021), Perirectal Abscess  PSHx: Denies.   Family Hx: Foster care, family hx unknown.   Social Hx: Current smoker. Hx of marijuana/cocaine- denies current use. Denies EtOH. 8 kids, 12 year old daughter w/?arrhythmias.       Objective   Medical History:   He has a past medical history of CHF (congestive heart failure) (Multi), Diabetes mellitus (Multi), Heart disease, Hypertension, and Substance abuse (Multi).  Surgical Hx:   He has a past surgical history that includes Other surgical history (07/21/2022); Other surgical history (10/25/2022); MR angio head wo IV contrast (12/29/2022); and Cardiac catheterization (N/A, 7/18/2024).   Social Hx:   He reports that he has been smoking cigarettes. He does not have any smokeless tobacco history on file. He reports current alcohol use. He reports that he does not currently use drugs after having used the following drugs: Codeine.  Family Hx:   His family history is not on file. He was adopted.   Allergies:   Allergies   Allergen Reactions    Shellfish Containing Products Unknown     Exam:       9/24/2024     8:44 AM 9/18/2024     6:43 AM 9/17/2024     8:39 PM 9/6/2024     9:00 AM 8/23/2024     9:00 AM 8/9/2024     9:16 AM   Vitals   Systolic 113 127       Diastolic 77 80       Heart Rate 103 97       Temp  36.2 °C (97.2 °F)       Resp  17     " "  Height (in) 1.753 m (5' 9\") 1.727 m (5' 8\") 1.75 m (5' 8.9\")      Weight (lb) 307.2 302 302.03 301 300 300   BMI 45.37 kg/m2 45.92 kg/m2 44.73 kg/m2 44.58 kg/m2 44.43 kg/m2 44.43 kg/m2   BSA (m2) 2.6 m2 2.56 m2 2.58 m2 2.58 m2 2.57 m2 2.57 m2   Visit Report Report          Wt Readings from Last 20 Encounters:   09/24/24 139 kg (307 lb 3.2 oz)   09/18/24 137 kg (302 lb)   09/17/24 137 kg (302 lb 0.5 oz)   09/06/24 137 kg (301 lb)   08/23/24 136 kg (300 lb)   08/09/24 136 kg (300 lb)   07/30/24 137 kg (302 lb 14.6 oz)   07/18/24 137 kg (301 lb 9.4 oz)   07/13/24 136 kg (300 lb)   07/12/24 137 kg (301 lb)   07/02/24 137 kg (301 lb 9.6 oz)   06/10/24 138 kg (305 lb)   06/06/24 136 kg (300 lb 7.8 oz)   05/28/24 138 kg (304 lb)   05/28/24 136 kg (300 lb)   05/21/24 134 kg (296 lb)   02/20/24 136 kg (300 lb)   01/31/24 134 kg (296 lb 4.8 oz)   12/03/23 138 kg (303 lb 12.7 oz)   07/20/23 138 kg (303 lb 5 oz)     GEN: Pleasant, well-appearing, mild conversational dyspnea.   HEENT: JVP not elevated, no icterus. No HJR  CHEST: No wheeze, good air movement.  CV: Normal rate, regular rhythm.  no m/r/g  ABD: Soft, ND, NT, obese  EXT: Warm, well perfused, No LE edema.   NEURO: Pleasant, Oriented to plan    Labs:   CMP:  Recent Labs     07/30/24  0550 07/29/24  0554 07/28/24  0617 07/27/24  0759 07/26/24  0841 07/25/24  1620 07/25/24  1236 07/25/24  0724 07/24/24  1804 07/24/24  0529 07/23/24  0009    134* 134*  134* 133* 134*  --   --  132* 133* 132* 132*   K 4.6 4.8 4.8  4.7 4.5 5.0 5.0 6.6* 5.9* 4.7 4.4 3.9    98 97*  97* 96* 97*  --   --  98 99 99 95*   CO2 26 27 24  27 26 25  --   --  24 25 23 25   ANIONGAP 15 14 18  15 16 17  --   --  16 14 14 16   BUN 28* 32* 34*  35* 35* 36*  --   --  25* 28* 26* 27*   CREATININE 1.61* 1.70* 1.85*  1.95* 1.78* 1.87*  --   --  1.76* 1.57* 1.69* 1.64*   EGFR 56* 53* 48*  45* 50* 47*  --   --  50* 58* 53* 55*   MG 2.26 2.40 2.29 2.28 2.41*  --   --  2.35  --  2.30 2.07 "     Recent Labs     07/30/24  0550 07/29/24  0554 07/28/24  0617 07/27/24  0759 07/26/24  0841 07/25/24  0724 07/24/24  1804 07/24/24  0529 07/23/24  0009 07/22/24  1634 07/19/24  0519 07/18/24  1558 07/02/24  0910 05/21/24  0938 02/21/24  0134 02/20/24  0528 02/01/24  0544 11/29/21  0443 11/07/20  1338   ALBUMIN 4.2 4.2 4.1  4.1 4.3 4.6 4.4 4.3 4.0   < > 4.0   < > 3.8 4.4 4.1 4.2 4.5 3.9   < > 4.6   ALT  --   --  20  --   --   --   --   --   --  16  --  17 33 21 15 15 12   < > 33   AST  --   --  22  --   --   --   --   --   --  25  --  19 21 24 18 17 16   < > 34   BILITOT  --   --  0.5  --   --   --   --   --   --  0.5  --  0.5 0.8 0.6 1.1 0.9 0.3   < > 0.7   LIPASE  --  59  --   --   --   --   --   --   --   --   --   --   --   --   --   --   --   --  29    < > = values in this interval not displayed.     CBC:  Recent Labs     07/30/24  1204 07/27/24  0759 07/26/24  0841 07/25/24  0724 07/24/24  0529 07/23/24  0528 07/23/24  0024 07/22/24  1634   WBC 5.4 4.7 5.3 5.3 5.1 5.6 3.8* 5.9   HGB 12.7* 14.0 15.6 15.1 13.4* 13.6 18.1* 14.3   HCT 39.2* 43.0 49.0 46.6 39.0* 39.9* 52.8* 41.0    248 250 202 179 197 121* 193   MCV 96 95 98 97 92 92 91 91     COAG:   Recent Labs     07/28/24  0617 07/27/24  0759 07/26/24  0841 07/25/24  1907 07/25/24  0724 07/24/24  0529 07/23/24  0216 07/18/24  1818 07/18/24  1558 07/02/24  0910 12/02/23  2341 12/28/22  1755 12/28/22  1533 10/20/22  1211   INR  --   --   --  1.0  --   --  0.9  --  1.1 1.0 1.1 1.0 1.0 1.0   HAUF 0.5 0.5 0.4  --  0.4 0.4 0.3   < >  --   --   --   --   --   --    DDIMERVTE  --   --   --   --   --   --   --   --   --   --   --   --   --  696*    < > = values in this interval not displayed.     ABO:   Recent Labs     07/22/24 1634   ABO O     HEME/ENDO:   Recent Labs     07/22/24  1634 07/18/24  1558 05/21/24  0938 02/01/24  0544 12/12/23  1112 04/20/23  1109 03/14/23  1056   FERRITIN  --  234 274  --   --   --  394*   IRONSAT  --  28 24*  --   --   --  28    TSH 1.73 2.29 3.98  --   --   --  3.62   HGBA1C  --  13.3* 13.7* 15.3* 10.8*   < >  --     < > = values in this interval not displayed.     CARDIAC:   Recent Labs     07/28/24  1010 07/22/24  1634 07/18/24  1558 07/02/24  0910 05/21/24  0938 02/21/24  0958 02/20/24  0126 02/01/24  0154 01/31/24 2014 12/03/23  0823 12/03/23  0128 12/02/23  2342 12/02/23  2341   LDH  --  208  --   --   --   --   --   --   --   --   --   --   --    TROPHS  --   --  59*  --   --  67* 65* 177* 99* 55* 54* 40*  --    * 107* 127* 173* 195*  --  259*  --  314*  --   --   --  375*     Recent Labs     12/12/23  1112 04/20/23  1109 12/28/22  1937 10/21/22  0528   CHOL 125 122 162 166   LDLF  --  65 - 85   LDLCALC 50  --   --   --    HDL 26.6 31.4* 32.4* 40.0   TRIG 241* 128 401* 207*     TOX:  Recent Labs     07/22/24  1634 07/18/24  1927 07/02/24  1222 02/20/24  0509   AMPHETAMINE Negative Presumptive Negative Presumptive Negative Presumptive Negative   BARBSCRNUR  --  Presumptive Negative Presumptive Negative Presumptive Negative   BENZO  --  Presumptive Negative Presumptive Negative Presumptive Negative   CANNABINOID  --  Presumptive Negative Presumptive Negative Presumptive Negative   COCAI  --  Presumptive Negative Presumptive Positive* Presumptive Positive*   FENTANYL  --  Presumptive Negative Presumptive Negative Presumptive Negative   OPIATE  --  Presumptive Negative Presumptive Negative Presumptive Negative   OXYCODONE  --  Presumptive Negative Presumptive Negative Presumptive Negative   PCP  --  Presumptive Negative Presumptive Negative Presumptive Negative     MICRO:   Recent Labs     02/01/24  0000 06/15/22  0756 12/30/21  1241   CRP 0.50 6.52* 0.49     Susceptibility data from last 90 days.  Collected Specimen Info Organism   07/13/24 Tissue/Biopsy from Skin/Superficial Abscess Mixed Anaerobic Bacteria     Mixed Gram-Positive and Gram-Negative Bacteria       Notable Studies:   EKG:   Recent Labs     07/25/24  4611  07/25/24  1205 07/18/24  1500   VENTRATE 84 95 94   ATRRATE 84 95 94   QTCFRED 469 475 487   QRSDUR 152 142 136   QTCCALCB 496 512 525     Encounter Date: 07/18/24   Electrocardiogram, 12-lead PRN ACS symtpoms   Result Value    Ventricular Rate 84    Atrial Rate 84    WA Interval 198    QRS Duration 152    QT Interval 420    QTC Calculation(Bazett) 496    P Axis 47    R Axis -56    T Axis 88    QRS Count 14    Q Onset 213    P Onset 114    P Offset 170    T Offset 423    QTC Fredericia 469    Narrative    Sinus rhythm with sinus arrhythmia with occasional Premature ventricular complexes  Possible Left atrial enlargement  Left axis deviation  Left bundle branch block  Abnormal ECG  When compared with ECG of 25-JUL-2024 12:02,  Significant changes have occurred  Confirmed by Abel Aburto ([id number]) on 7/28/2024 4:19:48 AM     EKG 7/2/24: ST at 91bpm, RACHEL, LBBB/IVCD QRS 142ms    Echocardiogram:   Recent Labs     07/19/24  1133 02/21/24  0249   EF 10 13   LVIDD 7.40 6.83   RVFRWALLPKSP 10.20 10.50   TAPSE 2.1 1.9   Transthoracic Echo (TTE) Complete With Contrast 02/21/2024  Left Ventricle: Left ventricular systolic function is severely decreased, with an estimated ejection fraction of 10%. There is global hypokinesis of the left ventricle with minor regional variations. The left ventricular cavity size is moderately dilated. Spectral Doppler shows a pseudonormal pattern of left ventricular diastolic filling.  Left Atrium: The left atrium is mild to moderately dilated.  Right Ventricle: The right ventricle is normal in size. There is normal right ventricular global systolic function.  Right Atrium: The right atrium is normal in size.  Aortic Valve: The aortic valve appears structurally normal. The aortic valve appears tricuspid. There is no evidence of aortic valve stenosis.  There is no evidence of aortic valve regurgitation. The peak instantaneous gradient of the aortic valve is 2.4 mmHg. The mean gradient of the aortic  valve is 2.0 mmHg.  Mitral Valve: The mitral valve is normal in structure. There is no evidence of mitral valve stenosis. There is normal mitral valve leaflet mobility. There is trace mitral valve regurgitation.  Tricuspid Valve: The tricuspid valve is structurally normal. There is mild tricuspid regurgitation.  Pulmonic Valve: The pulmonic valve is structurally normal. There is no indication of pulmonic valve regurgitation.  Pericardium: There is no pericardial effusion noted.  Aorta: The aortic root is normal.  Pulmonary Artery: The pulmonary artery is not well visualized.  Systemic Veins: The inferior vena cava appears to be of normal size.  In comparison to the previous echocardiogram(s): The left ventricular function is worse. The left ventricular diastolic function is worse.      CONCLUSIONS:  1. Left ventricular systolic function is severely decreased with a 10% estimated ejection fraction.  2. Spectral Doppler shows a pseudonormal pattern of left ventricular diastolic filling.  3. The left atrium is mild to moderately dilated.  4. Trace mitral valve regurgitation.  5. Mild tricuspid regurgitation is visualized.  6. Aortic valve stenosis is not present.  7. Left ventricular cavity size is moderately dilated.  8. The pulmonary artery is not well visualized.  9. There is global hypokinesis of the left ventricle with minor regional variations.      Coronary Angiography:   Adult Cath 10/21/2022    Coronary Angiography:  The coronary circulation is right dominant.    Left Main Coronary Artery:  There is 0% stenosis in the entire left main coronary artery.    Left Anterior Descending Coronary Artery Distribution:  There is 0stenosis in the entire left anterior descending artery.    Circumflex Coronary Artery Distribution:  There is 0stenosis in the the entire Circumflex artery.    Right Coronary Artery Distribution:    There is 0stenosis in the the entire Right Coronary Artery.  Cardiac Cath Transition of Care  Summary:  Post Procedure Diagnosis: Normal coronary arteries.  Findings:                 Severe LV dysfunction.  Blood Loss:               Estimated blood loss during the procedure was 0 mls.  Specimens Removed:        Number of specimen(s) removed: none.    ____________________________________________________________________________________  CONCLUSIONS:  1. The entire Left Main: 0% stenosis.  2. Entire LAD Lesion: The percent stenosis is 0%.  3. Entire CX Lesion: The percent stenosis is 0%.  4. The entire RCA Lesion: The percent stenosis is 0%.      Right Heart Cath: No results found for this or any previous visit from the past 1800 days.    Cardiac Scoring: No results found for this or any previous visit from the past 1800 days.    Cardiac MRI:   MR cardiac morphology and function w and wo IV contrast   2023-04-20 09:03:53  SUMMARY  =====================================================================  =====================================    NICMP. Severe LV dilation and remodeling.  Diffuse LV fibrosis with elevated extracellular volume (ECV of 40%).  No evidence of  No evidence of amyloidosis or hemachromatosis.  No LV thrombus.    LEFT VENTRICLE: Quantitative LVEF 13 %. LV wall thickness is normal.  LV cavity is severely enlarged. LV systolic function  is severely decreased globally. There is no LV mass/thrombus.    VIABILITY: LV scar size is 5 %.    RIGHT VENTRICLE: RV cavity size is normal. RV systolic function is  normal. There is no RV mass/thrombus. There is no RV  fibro-fatty infiltration.    LV/RV SEPTUM: The ventricular septum is intact.    LA/RA SEPTUM: The atrial septum is intact.    LEFT ATRIUM: LA is mildly enlarged.    RIGHT ATRIUM: RA cavity size is normal.    PERICARDIUM: Pericardium is normal. There is a trivial pericardial  effusion.    PLEURAL EFFUSION: There is no pleural effusion.    AORTIC VALVE: Peak aortic valve velocity -130 cm/sec. Aortic  regurgitant volume 1 ml. Aortic regurgitant  fraction 1 %.    MITRAL VALVE: Mitral valve leaflets are normal. There is no mitral  valve mass, thrombus, or vegetation. There is mild  mitral regurgitation.    TRICUSPID VALVE: Tricuspid valve leaflets are normal. The tricuspid  valve annulus is normal in size.    AORTIC ROOT: The aortic root is mildly dilated.      CORE EXAM  =====================================================================  =====================================    MEASUREMENTS  ---------------------------------------------------------------------  -------------------  VOLUMETRIC ANALYSIS  ----------------------------------------------  .------------------------------------------------------.  .      .          . LV   . Reference  . RV . Reference .  +------+----------+------+------------+----+-----------+  . EDV  . ml       .  440 .  (121-204) .    .           .  .      . ml/m\S\2    .  179 .  ()  .    .           .  . ESV  . ml       .  383 .  (33-78)   .    .           .  .      . ml/m\S\2    .  156 .  (18-39)   .    .           .  . CO   . L/min    . 5.40 .            .    .           .  .      . L/min/m\S\2 . 2.20 .            .    .           .  . MASS . g        .  328 .  (109-185) .    .           .  .      . g/m\S\2     .  133 .  (59-92)   .    .           .  . SV   . ml       .   57 .  ()  .    .           .  .      . ml/m\S\2    .   23 .  (43-67)   .    .           .  . EF   . %        .   13 .  (57-75)   .    .           .  '------+----------+------+------------+----+-----------'    CARDIAC OUTPUT HR:  95 bpm  LV DIMENSIONS  ----------------------------------------------  WALL THICKNESS - ANTEROSEPTAL:  1.0 cm  WALL THICKNESS - INFEROLATERAL:  1.0 cm  WALL THICKNESS - MAXIMUM:  1.1 cm  LV JOSE:  7.7 cm    LA DIMENSIONS (LV SYSTOLE)  ----------------------------------------------  AREA - 2 CHAMBER:  32 cm\S\2  LENGTH - 2 CHAMBER:  6.1 cm  AREA - 4 CHAMBER:  29 cm\S\2  LENGTH - 4 CHAMBER:  7 cm  VOLUME:  129 ml  VOLUME  NORMALIZED:  52.6 ml/m\S\2    RA DIMENSIONS (RV SYSTOLE)  ----------------------------------------------  AREA - 4 CHAMBER:  19 cm\S\2  LENGTH - 4 CHAMBER:  5.9 cm    AORTIC ROOT DIMENSIONS  ----------------------------------------------  ANNULUS:  2.7 cm  SINUS OF VALSALVA:  3 cm    EXTRACELLULAR VOLUME MEASUREMENT  ----------------------------------------------  PRE-CONTRAST T1 MYOCARDIUM:  1090 msec  PRE-CONTRAST T1 LV CAVITY:  1458 msec  POST-CONTRAST T1 MYOCARDIUM:  310 msec  POST-CONTRAST T1 LV CAVITY:  257 msec  HEMATOCRIT:  44 %  ECV:  40 %    IRON QUANTIFICATION  ----------------------------------------------  MYOCARDIAL T2*:  24 msec  LIVER T2*:  17 msec      17 SEGMENT  ---------------------------------------------------------------------  -------------------  .---------------------------------------------------------------------  ---------------------------.  . Segments           . Wall Motion  . Hyperenhancement . Stress  Perfusion . Interpretation       .  +--------------------+--------------+------------------+--------------  ----+----------------------+  . Base Anterior      . Severe Hypo  . None             .  . Abnormal Wall Motion .  . Base Anteroseptal  . Severe Hypo  . 1-25%            .  . Abnormal Wall Motion .  . Base Inferoseptal  . Severe Hypo  . 1-25%            .  . Abnormal Wall Motion .  . Base Inferior      . Severe Hypo  . None             .  . Abnormal Wall Motion .  . Base Inferolateral . Severe Hypo  . None             .  . Abnormal Wall Motion .  . Base Anterolateral . Severe Hypo  . 1-25%            .  . Abnormal Wall Motion .  . Mid Anterior       . Severe Hypo  . None             .  . Abnormal Wall Motion .  . Mid Anteroseptal   . Severe Hypo  . 1-25%            .  . Abnormal Wall Motion .  . Mid Inferoseptal   . Severe Hypo  . 1-25%            .  . Abnormal Wall Motion .  . Mid Inferior       . Severe Hypo  . None             .  . Abnormal Wall Motion .  . Mid Inferolateral  .  Severe Hypo  . None             .  . Abnormal Wall Motion .  . Mid Anterolateral  . Severe Hypo  . None             .  . Abnormal Wall Motion .  . Apical Anterior    . Severe Hypo  . None             .  . Abnormal Wall Motion .  . Apical Septal      . Severe Hypo  . 1-25%            .  . Abnormal Wall Motion .  . Apical Inferior    . Severe Hypo  . None             .  . Abnormal Wall Motion .  . Apical Lateral     . Severe Hypo  . None             .  . Abnormal Wall Motion .  . [place]               . Severe Hypo  . None             .  . Abnormal Wall Motion .  +--------------------+--------------+------------------+--------------  ----+----------------------+  . RV Segments        . Wall Motion  . Hyperenhancement . Stress  Perfusion . Interpretation       .  +--------------------+--------------+------------------+--------------  ----+----------------------+  . RV Basal Anterior  . Normal/Hyper . None             .  . Normal               .  . RV Basal Inferior  . Normal/Hyper . None             .  . Normal               .  . RV Mid             . Normal/Hyper . None             .  . Normal               .  -. RV Apical          . Normal/Hyper . None             .  . Normal               .  '--------------------+--------------+------------------+--------------  ----+----------------------'    FINDINGS  ----------------------------------------------  LV SCAR SIZE (17 SEGMENT):  5 %      Nuclear:No results found for this or any previous visit from the past 1800 days.    Metabolic Stress:   (NON-INVASIVE)  +--------------------+----+-------------+----------+                      RestPeak Exercise% Achieved  +--------------------+----+-------------+----------+        HR (BPM)       94      132         72      +--------------------+----+-------------+----------+  Systolic BP         124 160                      +--------------------+----+-------------+----------+  Diastolic BP        82  90                        +--------------------+----+-------------+----------+  MAP (mmHg)          96  113                      +--------------------+----+-------------+----------+  VO2 (ml/Kg/min)     2.8 17.3         42          +--------------------+----+-------------+----------+  VO2 (ml/min)        375 2357         42          +--------------------+----+-------------+----------+  O2 Pulse (ml/beat)  4   18                       +--------------------+----+-------------+----------+  VE (L/min)          12  79           56          +--------------------+----+-------------+----------+  VE/VCO2             48  36                       +--------------------+----+-------------+----------+  VD/VT               0.330.20                     +--------------------+----+-------------+----------+  Resp. Rate (br/min) 23  43                       +--------------------+----+-------------+----------+  Tidal Volume (ml)   538 1843                     +--------------------+----+-------------+----------+  RER                 0.670.93                     +--------------------+----+-------------+----------+  End Tidal CO2 (mmHg)28  32                       +--------------------+----+-------------+----------+  Breathing [place]%      44%                      +--------------------+----+-------------+----------+  SaO2%               91  93                       +--------------------+----+-------------+----------+   1. The peak VO2 achieved was [17.3] ml/kg/min which is consistent with [place] functional class B (moderate exercise impairment). The peak VO2 achieved was 42% peak predicted based on age gender height nomogram.   2. There was a [submaximal] exercise effort with peak RER of 0.93at peak exercise. The peak heart rate was 132bpm which is 72% APMHR.   3. The ventilatory efficiency slope (VE/VCO2) was moderately abnormal (36) at peak exercise.   4. Exercise stress EKG is  "non-diagnostic for ischemia _ due to inability to reach target heart rate.   5. There was [blunted] augmentation of O2 pulse from 4ml/beat at rest to 18ml/beat at peak exercise indicating limited augmentation of cardiac output and LV stroke volume.   6. The pulmonary response to exercise shows normal breathing reserve at 44% and normal oxygenation during exercise.   7. The ventilatory threshold did not occur.   8. Overall, submaximal cardiopulmonary exercise stress test. Markedly impaired ventilatory efficiency (VE/VCO2~36) is a high risk marker in this patient with heart failure and reduced ejection fraction.      Current Outpatient Medications   Medication Instructions    alcohol swabs pads, medicated 1 each, topical (top), 4 times daily, Use prior to checking glucose or injecting insulin    atorvastatin (LIPITOR) 40 mg, oral, Nightly    Basaglar KwikPen U-100 Insulin 55 Units, subcutaneous, Nightly, Take as directed per insulin instructions.    blood-glucose meter,continuous (FreeStyle Nadir 3 Adrian) [facility] [place] as instructed to test blood glucose throughout the day    blood-glucose sensor (FreeStyle Nadir 3 Plus Sensor) device Use as directed. Change sensor every 15 days    buPROPion XL (WELLBUTRIN XL) 150 mg, oral, Daily    carvedilol (COREG) 3.125 mg, oral, 2 times daily (morning and late afternoon)    Farxiga 10 mg, oral, Daily    hydrALAZINE (APRESOLINE) 25 mg, oral, 3 times daily    ibuprofen 800 mg, oral, 3 times daily    insulin lispro (HumaLOG) 100 unit/mL injection Inject 12 units plus sliding scale three times daily with meals:  = 0u, 151-200 = 2u, 201-250 = 4u, 251-300 = 6u, 301-350 = 8u, 351-400+ = 10u, use up to 60u daily    isosorbide dinitrate (ISORDIL) 20 mg, oral, 3 times daily (0900,1400,1900)    metFORMIN (GLUCOPHAGE) 1,000 mg, oral, 2 times daily    pen needle, diabetic 32 gauge x 5/32\" needle 1 each, miscellaneous, 4 times daily    sacubitriL-valsartan (Entresto) 24-26 mg tablet 1 " tablet, oral, 2 times daily    sertraline (ZOLOFT) 100 mg, oral, Daily    sulfamethoxazole-trimethoprim (Bactrim DS) 800-160 mg tablet 1 tablet, oral, Every 12 hours    torsemide (DEMADEX) 20 mg, oral, Daily    traZODone (DESYREL) 50 mg, oral, Nightly PRN    Trulicity 3 mg, subcutaneous, Weekly    Xarelto 20 mg, oral, Daily with evening meal, Take with food.      Notable Therapies   Class  Agent SAFETY    ARNI / ACE / ARB  sacubitriL-valsartan 24-26mg BID Last BP: 113/77, Last BUN/Cr (GFR): 28/1.61 (56), 7/30/2024:  5:50 AM.    Beta-Blocker  carvedilol 6.25mg BID (held) - > 3.125mg twice daily Last HR: 103    MRA  spironolactone 12.5mg daily (held) -> Last K: 7/30/2024: 4.6     SGLT2  dapagliflozin 10mg daily Last A1c 7/18/2024: 13.3     Diuretic  torsemide 20mg daily Last BNP: 7/28/2024: 108    Hydralazine/ISDN  H25 TID / ISDN20 TID     Anticoagulation  rivaroxaban 20mg daily Last Hgb: 7/30/2024: 12.7    Anti-arrhythmic       Antiplatelet   Last Platelet: 7/30/2024: 271    Lipid-Lowering  atorvastatin 40mg daily Last Tchol 12/12/2023: 125 / LDL No results found for requested labs within last 365 days. or 50 / HDL 26.6 / TRIG 241    Other        Device(s)  None, qualifies Last EF: 7/19/2024: 10  Last QRS: 7/25/2024: 152 (LBBB, refer for CRT-D)    Cardiac Rehab,   if EF <35/MI/OHS  Go back to attending      Problems Addressed:   # HFrEF / Chronic systolic heart failure, EF 10% (2/21/24), diagnosed 10/2022 with EF 5-10%. No ICD in place. Referred to EP  # Non-ischemic cardiomyopathy, Stage C, NYHA III, No ICD in place. Suspect underlying genetic origin given CM in daughter. He does not know family history. Of note he has LBBB and may improve with CRT.   # LBBB/IVCD (QRS 152ms): consideration for CRT-D, has been referred to EP multiple times  # Hypertension: continue GDMT  # Severe Obesity: previously referred to suman me  # Tobacco use,: 3 minutes spent in counselling on cessation  # Type II Diabetes Mellitus: Last  A1c (13.3): endocrinology followup  # Cardioembolic stroke s/p TNK 12/2022: continue rivaroxaban  - continue current medications  -- restart carvedilol 3.125mg twice daily  -- We will restart spironolactone 12.5mg daily if your labs are stable.  - EP referral for ICD (CRT-D) (discussed rationale and he is interested)  - CV genetics evaluation  - counselled to avoid alcohol and cocaine (denies recent use) recheck tox  - re-refer to cardiac rehab    # Hx of Bipolar I Disorder:  Referral to Adult Psychiatry.      Patient Instructions   If you have any questions or need cardiac medication refills, please call the Heart Failure office at [phone], option 6.   You may also contact the  Heart Failure Nursing team via email at [email or web address] (Please include your name and date of birth).      To reach Dr. Jurado's office please call [phone] / Fax: [phone].  To schedule an appointment call [phone].    - Continue current medications  -- restart carvedilol 3.125mg twice daily  -- We will restart spironolactone 12.5mg daily if your labs are stable.  - Labwork: today  - Imaging/Procedures: none  - Referrals:   -- electrophysiology discuss defibrillator  -- cardiac rehab  - Followup: 3 months / January     Orders:  Orders Placed This Encounter   Procedures    B-Type Natriuretic Peptide    CBC    Comprehensive Metabolic Panel    Drug Screen, Urine With Reflex to Confirmation    Referral to Cardiac Electrophysiology    Referral to Cardiac Rehab      Followup Appts:  Future Appointments   Date Time Provider Department Center   9/30/2024  2:30 PM Roland J Reyes, MD [id number] [place]   10/2/2024  8:00 AM Brittney Stoll MD [id number] [facility]   10/9/2024 10:00 AM ARIAN Berg-CNP [id number] [facility]   11/27/2024  8:20 AM Christopher D'Amico, DO [id number] [facility]       Time Spent: I spent 40 minutes reviewing medical testing, obtaining medical history and counselling and educating on  diagnosis and documenting clinical encounter.   ____________________________________________________________  Delroy Jurado MD  Section of Advanced Heart Failure and Cardiac Transplantation  Division of Cardiovascular Medicine  [facility]

## 2024-09-24 ENCOUNTER — OFFICE VISIT (OUTPATIENT)
Dept: CARDIOLOGY | Facility: HOSPITAL | Age: 39
End: 2024-09-24
Payer: COMMERCIAL

## 2024-09-24 VITALS
DIASTOLIC BLOOD PRESSURE: 77 MMHG | WEIGHT: 307.2 LBS | SYSTOLIC BLOOD PRESSURE: 113 MMHG | BODY MASS INDEX: 45.5 KG/M2 | HEART RATE: 103 BPM | OXYGEN SATURATION: 96 % | HEIGHT: 69 IN

## 2024-09-24 DIAGNOSIS — I50.20 HFREF (HEART FAILURE WITH REDUCED EJECTION FRACTION): Primary | ICD-10-CM

## 2024-09-24 DIAGNOSIS — I10 PRIMARY HYPERTENSION: ICD-10-CM

## 2024-09-24 DIAGNOSIS — E11.40 TYPE 2 DIABETES MELLITUS WITH DIABETIC NEUROPATHY, WITH LONG-TERM CURRENT USE OF INSULIN: ICD-10-CM

## 2024-09-24 DIAGNOSIS — I44.7 LEFT BUNDLE BRANCH BLOCK: ICD-10-CM

## 2024-09-24 DIAGNOSIS — Z79.4 TYPE 2 DIABETES MELLITUS WITH DIABETIC NEUROPATHY, WITH LONG-TERM CURRENT USE OF INSULIN: ICD-10-CM

## 2024-09-24 PROCEDURE — 99406 BEHAV CHNG SMOKING 3-10 MIN: CPT | Performed by: STUDENT IN AN ORGANIZED HEALTH CARE EDUCATION/TRAINING PROGRAM

## 2024-09-24 PROCEDURE — 3008F BODY MASS INDEX DOCD: CPT | Performed by: STUDENT IN AN ORGANIZED HEALTH CARE EDUCATION/TRAINING PROGRAM

## 2024-09-24 PROCEDURE — 99214 OFFICE O/P EST MOD 30 MIN: CPT | Performed by: STUDENT IN AN ORGANIZED HEALTH CARE EDUCATION/TRAINING PROGRAM

## 2024-09-24 PROCEDURE — 3078F DIAST BP <80 MM HG: CPT | Performed by: STUDENT IN AN ORGANIZED HEALTH CARE EDUCATION/TRAINING PROGRAM

## 2024-09-24 PROCEDURE — 3074F SYST BP LT 130 MM HG: CPT | Performed by: STUDENT IN AN ORGANIZED HEALTH CARE EDUCATION/TRAINING PROGRAM

## 2024-09-24 PROCEDURE — 3046F HEMOGLOBIN A1C LEVEL >9.0%: CPT | Performed by: STUDENT IN AN ORGANIZED HEALTH CARE EDUCATION/TRAINING PROGRAM

## 2024-09-24 RX ORDER — CARVEDILOL 3.12 MG/1
3.12 TABLET ORAL
Qty: 60 TABLET | Refills: 11 | Status: SHIPPED | OUTPATIENT
Start: 2024-09-24 | End: 2025-09-24

## 2024-09-24 ASSESSMENT — PAIN SCALES - GENERAL: PAINLEVEL: 0-NO PAIN

## 2024-09-24 NOTE — PROGRESS NOTES
"Recent Hospitalizations: 9/18/24 ED for abdominal abcess  Accompanied by: Wife  Diet: None  Exercise: He states he has cardiac rehab, but had not been attending.     Denies chest pain, chest pressure, palpitations, PND.   Denies headaches, dizziness, lightheadedness, and falls.    Patient reports SOB \"all of the time\", KEY, and orthopnea. He currently sleeps with two pillows at night.  He reports BLE edema. He states it gets worse throughout the day, and is better when he gets up in the morning.  He reports daily fatigue.   He states he will feel palpitations when he is short of breath.   "

## 2024-09-24 NOTE — PATIENT INSTRUCTIONS
If you have any questions or need cardiac medication refills, please call the Heart Failure office at [phone], option 6.   You may also contact the  Heart Failure Nursing team via email at [email or web address] (Please include your name and date of birth).      To reach Dr. Jurado's office please call [phone] / Fax: [phone].  To schedule an appointment call [phone].    - Continue current medications  -- restart carvedilol 3.125mg twice daily  -- We will restart spironolactone 12.5mg daily if your labs are stable.  - Labwork: today  - Imaging/Procedures: none  - Referrals:   -- electrophysiology discuss defibrillator  -- cardiac rehab  - Followup: 3 months / January

## 2024-09-30 PROCEDURE — 99284 EMERGENCY DEPT VISIT MOD MDM: CPT

## 2024-09-30 ASSESSMENT — PAIN SCALES - GENERAL: PAINLEVEL_OUTOF10: 8

## 2024-09-30 ASSESSMENT — PAIN DESCRIPTION - DESCRIPTORS: DESCRIPTORS: ACHING;SHARP

## 2024-09-30 ASSESSMENT — PAIN DESCRIPTION - ORIENTATION: ORIENTATION: RIGHT;LEFT;LOWER;UPPER

## 2024-09-30 ASSESSMENT — PAIN DESCRIPTION - LOCATION: LOCATION: ABDOMEN

## 2024-09-30 ASSESSMENT — LIFESTYLE VARIABLES
TOTAL SCORE: 0
HAVE YOU EVER FELT YOU SHOULD CUT DOWN ON YOUR DRINKING: NO
EVER FELT BAD OR GUILTY ABOUT YOUR DRINKING: NO
HAVE PEOPLE ANNOYED YOU BY CRITICIZING YOUR DRINKING: NO
EVER HAD A DRINK FIRST THING IN THE MORNING TO STEADY YOUR NERVES TO GET RID OF A HANGOVER: NO

## 2024-09-30 ASSESSMENT — PAIN - FUNCTIONAL ASSESSMENT: PAIN_FUNCTIONAL_ASSESSMENT: 0-10

## 2024-09-30 ASSESSMENT — PAIN DESCRIPTION - PAIN TYPE: TYPE: ACUTE PAIN

## 2024-10-01 ENCOUNTER — HOSPITAL ENCOUNTER (EMERGENCY)
Facility: HOSPITAL | Age: 39
Discharge: HOME | End: 2024-10-01
Payer: COMMERCIAL

## 2024-10-01 ENCOUNTER — APPOINTMENT (OUTPATIENT)
Dept: RADIOLOGY | Facility: HOSPITAL | Age: 39
End: 2024-10-01
Payer: COMMERCIAL

## 2024-10-01 VITALS
RESPIRATION RATE: 19 BRPM | DIASTOLIC BLOOD PRESSURE: 86 MMHG | TEMPERATURE: 98.2 F | OXYGEN SATURATION: 99 % | HEART RATE: 69 BPM | SYSTOLIC BLOOD PRESSURE: 122 MMHG | HEIGHT: 69 IN | BODY MASS INDEX: 44.43 KG/M2 | WEIGHT: 300 LBS

## 2024-10-01 DIAGNOSIS — K52.9 ENTEROCOLITIS: Primary | ICD-10-CM

## 2024-10-01 LAB
ALBUMIN SERPL BCP-MCNC: 4.5 G/DL (ref 3.4–5)
ALP SERPL-CCNC: 56 U/L (ref 33–120)
ALT SERPL W P-5'-P-CCNC: 15 U/L (ref 10–52)
AMPHETAMINES UR QL SCN: ABNORMAL
ANION GAP SERPL CALC-SCNC: 11 MMOL/L (ref 10–20)
APPEARANCE UR: CLEAR
AST SERPL W P-5'-P-CCNC: 23 U/L (ref 9–39)
BARBITURATES UR QL SCN: ABNORMAL
BASOPHILS # BLD AUTO: 0.02 X10*3/UL (ref 0–0.1)
BASOPHILS NFR BLD AUTO: 0.4 %
BENZODIAZ UR QL SCN: ABNORMAL
BILIRUB SERPL-MCNC: 1.1 MG/DL (ref 0–1.2)
BILIRUB UR STRIP.AUTO-MCNC: NEGATIVE MG/DL
BUN SERPL-MCNC: 19 MG/DL (ref 6–23)
BZE UR QL SCN: ABNORMAL
CALCIUM SERPL-MCNC: 9.6 MG/DL (ref 8.6–10.3)
CANNABINOIDS UR QL SCN: ABNORMAL
CHLORIDE SERPL-SCNC: 100 MMOL/L (ref 98–107)
CO2 SERPL-SCNC: 29 MMOL/L (ref 21–32)
COLOR UR: ABNORMAL
CREAT SERPL-MCNC: 1.3 MG/DL (ref 0.5–1.3)
EGFRCR SERPLBLD CKD-EPI 2021: 73 ML/MIN/1.73M*2
EOSINOPHIL # BLD AUTO: 0.06 X10*3/UL (ref 0–0.7)
EOSINOPHIL NFR BLD AUTO: 1.2 %
ERYTHROCYTE [DISTWIDTH] IN BLOOD BY AUTOMATED COUNT: 13 % (ref 11.5–14.5)
FENTANYL+NORFENTANYL UR QL SCN: ABNORMAL
GLUCOSE SERPL-MCNC: 182 MG/DL (ref 74–99)
GLUCOSE UR STRIP.AUTO-MCNC: NORMAL MG/DL
HCT VFR BLD AUTO: 40.3 % (ref 41–52)
HGB BLD-MCNC: 13.9 G/DL (ref 13.5–17.5)
HOLD SPECIMEN: NORMAL
HOLD SPECIMEN: NORMAL
IMM GRANULOCYTES # BLD AUTO: 0.03 X10*3/UL (ref 0–0.7)
IMM GRANULOCYTES NFR BLD AUTO: 0.6 % (ref 0–0.9)
KETONES UR STRIP.AUTO-MCNC: NEGATIVE MG/DL
LACTATE SERPL-SCNC: 1 MMOL/L (ref 0.4–2)
LEUKOCYTE ESTERASE UR QL STRIP.AUTO: NEGATIVE
LIPASE SERPL-CCNC: 25 U/L (ref 9–82)
LYMPHOCYTES # BLD AUTO: 0.74 X10*3/UL (ref 1.2–4.8)
LYMPHOCYTES NFR BLD AUTO: 14.3 %
MCH RBC QN AUTO: 32.3 PG (ref 26–34)
MCHC RBC AUTO-ENTMCNC: 34.5 G/DL (ref 32–36)
MCV RBC AUTO: 94 FL (ref 80–100)
METHADONE UR QL SCN: ABNORMAL
MONOCYTES # BLD AUTO: 0.54 X10*3/UL (ref 0.1–1)
MONOCYTES NFR BLD AUTO: 10.4 %
NEUTROPHILS # BLD AUTO: 3.78 X10*3/UL (ref 1.2–7.7)
NEUTROPHILS NFR BLD AUTO: 73.1 %
NITRITE UR QL STRIP.AUTO: NEGATIVE
NRBC BLD-RTO: 0 /100 WBCS (ref 0–0)
OPIATES UR QL SCN: ABNORMAL
OXYCODONE+OXYMORPHONE UR QL SCN: ABNORMAL
PCP UR QL SCN: ABNORMAL
PH UR STRIP.AUTO: 5.5 [PH]
PLATELET # BLD AUTO: 253 X10*3/UL (ref 150–450)
POTASSIUM SERPL-SCNC: 4.1 MMOL/L (ref 3.5–5.3)
PROT SERPL-MCNC: 7.7 G/DL (ref 6.4–8.2)
PROT UR STRIP.AUTO-MCNC: ABNORMAL MG/DL
RBC # BLD AUTO: 4.31 X10*6/UL (ref 4.5–5.9)
RBC # UR STRIP.AUTO: NEGATIVE /UL
RBC #/AREA URNS AUTO: NORMAL /HPF
SODIUM SERPL-SCNC: 136 MMOL/L (ref 136–145)
SP GR UR STRIP.AUTO: 1.04
UROBILINOGEN UR STRIP.AUTO-MCNC: NORMAL MG/DL
WBC # BLD AUTO: 5.2 X10*3/UL (ref 4.4–11.3)
WBC #/AREA URNS AUTO: NORMAL /HPF

## 2024-10-01 PROCEDURE — 2500000004 HC RX 250 GENERAL PHARMACY W/ HCPCS (ALT 636 FOR OP/ED): Performed by: REGISTERED NURSE

## 2024-10-01 PROCEDURE — 80307 DRUG TEST PRSMV CHEM ANLYZR: CPT | Performed by: REGISTERED NURSE

## 2024-10-01 PROCEDURE — 83605 ASSAY OF LACTIC ACID: CPT | Performed by: REGISTERED NURSE

## 2024-10-01 PROCEDURE — 36415 COLL VENOUS BLD VENIPUNCTURE: CPT | Performed by: REGISTERED NURSE

## 2024-10-01 PROCEDURE — 2550000001 HC RX 255 CONTRASTS: Performed by: REGISTERED NURSE

## 2024-10-01 PROCEDURE — 96374 THER/PROPH/DIAG INJ IV PUSH: CPT | Mod: 59

## 2024-10-01 PROCEDURE — 83690 ASSAY OF LIPASE: CPT | Performed by: REGISTERED NURSE

## 2024-10-01 PROCEDURE — 74177 CT ABD & PELVIS W/CONTRAST: CPT

## 2024-10-01 PROCEDURE — 74177 CT ABD & PELVIS W/CONTRAST: CPT | Performed by: STUDENT IN AN ORGANIZED HEALTH CARE EDUCATION/TRAINING PROGRAM

## 2024-10-01 PROCEDURE — 85025 COMPLETE CBC W/AUTO DIFF WBC: CPT | Performed by: REGISTERED NURSE

## 2024-10-01 PROCEDURE — 96361 HYDRATE IV INFUSION ADD-ON: CPT

## 2024-10-01 PROCEDURE — 84075 ASSAY ALKALINE PHOSPHATASE: CPT | Performed by: REGISTERED NURSE

## 2024-10-01 PROCEDURE — 96375 TX/PRO/DX INJ NEW DRUG ADDON: CPT

## 2024-10-01 PROCEDURE — 81001 URINALYSIS AUTO W/SCOPE: CPT | Performed by: REGISTERED NURSE

## 2024-10-01 PROCEDURE — 96372 THER/PROPH/DIAG INJ SC/IM: CPT | Performed by: REGISTERED NURSE

## 2024-10-01 RX ORDER — DICYCLOMINE HYDROCHLORIDE 20 MG/1
20 TABLET ORAL 4 TIMES DAILY PRN
Qty: 20 TABLET | Refills: 0 | Status: SHIPPED | OUTPATIENT
Start: 2024-10-01 | End: 2024-10-11

## 2024-10-01 RX ORDER — DICYCLOMINE HYDROCHLORIDE 10 MG/ML
10 INJECTION INTRAMUSCULAR ONCE
Status: COMPLETED | OUTPATIENT
Start: 2024-10-01 | End: 2024-10-01

## 2024-10-01 RX ORDER — ONDANSETRON HYDROCHLORIDE 2 MG/ML
4 INJECTION, SOLUTION INTRAVENOUS ONCE
Status: COMPLETED | OUTPATIENT
Start: 2024-10-01 | End: 2024-10-01

## 2024-10-01 RX ORDER — ONDANSETRON 4 MG/1
4 TABLET, ORALLY DISINTEGRATING ORAL EVERY 8 HOURS PRN
Qty: 20 TABLET | Refills: 0 | Status: SHIPPED | OUTPATIENT
Start: 2024-10-01 | End: 2024-10-08

## 2024-10-01 RX ORDER — PANTOPRAZOLE SODIUM 40 MG/10ML
40 INJECTION, POWDER, LYOPHILIZED, FOR SOLUTION INTRAVENOUS ONCE
Status: COMPLETED | OUTPATIENT
Start: 2024-10-01 | End: 2024-10-01

## 2024-10-01 ASSESSMENT — PAIN SCALES - GENERAL: PAINLEVEL_OUTOF10: 3

## 2024-10-01 NOTE — DISCHARGE INSTRUCTIONS
Is important to avoid dehydration dehydration.   Regular and adequate fluid intake is important.  Please use Zofran as needed for nausea and Bentyl  as needed for diarrhea/abdominal cramping. Advised to contact PCP for follow-up in 3 to 5 days if not improving. Return to the ED if any dizziness or concerns for dehydration, severe or worsening abdominal pain, blood in stool, fever, or any other new or worsening symptoms.

## 2024-10-01 NOTE — ED PROVIDER NOTES
HPI   Chief Complaint   Patient presents with    Abdominal Pain     Abdominal pain, vomiting, diarrhea   38-year-old male with past medical history significant for diabetes, hypertension, and CHF presents emergency department today for evaluation of abdominal pain.  Patient tells me around 4:00 this evening he started to have generalized abdominal cramping.  Patient rates his pain an 8/10 on the pain scale.  Patient also endorses nausea vomiting and diarrhea.  Patient denies any fever or chills.  Patient tells me he was feeling fine prior to this onset of pain.  Patient tells me his blood sugars have been well-controlled at home.  He tells me is a type 2 diabetes with both insulin and oral coverage.  Patient denies any chest pain shortness of breath.  Patient denies headache, dizziness or lightheadedness, numbness or tingling.  Patient denies illicit drug use.  Patient does report binge drinking on the weekends.  He tells me he drinks about 2/5 next weekend.  Denies any alcohol use during the week.  Patient denies history of alcohol withdrawal.      History provided by:  Patient   used: No            Patient History   Past Medical History:   Diagnosis Date    CHF (congestive heart failure) (Multi)     Diabetes mellitus (Multi)     Heart disease     Hypertension     Substance abuse (Multi)      Past Surgical History:   Procedure Laterality Date    CARDIAC CATHETERIZATION N/A 7/18/2024    Procedure: Right Heart Cath;  Surgeon: Delroy Jurado MD;  Location: [facility] Cardiac Cath Lab;  Service: Cardiovascular;  Laterality: N/A;    MR HEAD ANGIO WO IV CONTRAST  12/29/2022    MR HEAD ANGIO WO IV CONTRAST 12/29/2022 DOCTOR OFFICE LEGACY    OTHER SURGICAL HISTORY  07/21/2022    Cyst excision    OTHER SURGICAL HISTORY  10/25/2022    Cardiac catheterization     Family History   Adopted: Yes     Social History     Tobacco Use    Smoking status: Every Day     Types: Cigarettes    Smokeless tobacco: Not on  file   Vaping Use    Vaping status: Unknown   Substance Use Topics    Alcohol use: Yes     Comment: occasionaly - 1-2 times a month    Drug use: Not Currently     Types: Codeine       Physical Exam   ED Triage Vitals [09/30/24 2354]   Temperature Heart Rate Respirations BP   36.8 °C (98.2 °F) 74 19 129/84      Pulse Ox Temp Source Heart Rate Source Patient Position   97 % Temporal Monitor Sitting      BP Location FiO2 (%)     Right arm --       Physical Exam  Vitals and nursing note reviewed.   Constitutional:       Appearance: He is well-developed. He is obese.   HENT:      Head: Normocephalic and atraumatic.   Eyes:      Extraocular Movements: Extraocular movements intact.      Pupils: Pupils are equal, round, and reactive to light.   Abdominal:      General: Abdomen is flat. Bowel sounds are increased.      Palpations: Abdomen is rigid.      Tenderness: There is generalized abdominal tenderness.   Skin:     General: Skin is warm and dry.      Capillary Refill: Capillary refill takes less than 2 seconds.   Neurological:      General: No focal deficit present.      Mental Status: He is alert and oriented to person, place, and time.   Psychiatric:         Mood and Affect: Mood normal.         Behavior: Behavior normal.           ED Course & MDM   Diagnoses as of 10/01/24 0238   Enterocolitis                 No data recorded     [place] Coma Scale Score: 15 (09/30/24 2352 : Samanta Loera RN)                           Medical Decision Making  Patient seen exam emergency department; patient is healthy nontoxic in appearance and appearing acute distress.  Patient's lung sounds are clear bilaterally without any adventitious noise.  Heart regular rate and rhythm without a murmur appreciated.  Patient is neurologically intact without any focal deficits.  Patient does have generalized abdominal pain without any localization of tenderness.  Will do a CT of the abdomen pelvis to evaluate for perforation, obstruction or  colitis contributing patient's symptoms.  Also order CBC, CMP, lipase, and urinalysis.  Patient's symptoms treated with IV fluids, IV Zofran and IV Protonix as well as IM Bentyl.    Patient CBC and CMP unremarkable on my review, urinalysis is negative for leukocytes and nitrites.  Lipase is 25.  Lactate is 1.0.  CT of the abdomen pelvis does show fluid-filled nondilated small bowel and air fluid levels in the ascending colon likely related to patient's diarrhea and enterocolitis.    Upon reevaluation patient tells me he is feeling much better post administration of medications.  Patient I discussed that he does have enterocolitis.  We discussed symptomatic care and the importance of avoiding dehydration. Discussed importance of regular and adequate fluid intake. Will prescribe  Zofran as needed for nausea and Bentyl  as needed for diarrhea/abdominal cramping. Advised to contact PCP for follow-up in 3 to 5 days if not improving. Recommended to return to the ED if any dizziness or concerns for dehydration, severe or worsening abdominal pain, blood in stool, fever, or any other new or worsening symptoms.  Patient given return parameters which she verbalized understanding of.  All patient's questions and concerns were addressed prior to discharge.  Patient discharged home in stable condition.    Labs Reviewed   CBC WITH AUTO DIFFERENTIAL - Abnormal       Result Value    WBC 5.2      nRBC 0.0      RBC 4.31 (*)     Hemoglobin 13.9      Hematocrit 40.3 (*)     MCV 94      MCH 32.3      MCHC 34.5      RDW 13.0      Platelets 253      Neutrophils % 73.1      Immature Granulocytes %, Automated 0.6      Lymphocytes % 14.3      Monocytes % 10.4      Eosinophils % 1.2      Basophils % 0.4      Neutrophils Absolute 3.78      Immature Granulocytes Absolute, Automated 0.03      Lymphocytes Absolute 0.74 (*)     Monocytes Absolute 0.54      Eosinophils Absolute 0.06      Basophils Absolute 0.02     COMPREHENSIVE METABOLIC PANEL -  Abnormal    Glucose 182 (*)     Sodium 136      Potassium 4.1      Chloride 100      Bicarbonate 29      Anion Gap 11      Urea Nitrogen 19      Creatinine 1.30      eGFR 73      Calcium 9.6      Albumin 4.5      Alkaline Phosphatase 56      Total Protein 7.7      AST 23      Bilirubin, Total 1.1      ALT 15     URINALYSIS WITH REFLEX CULTURE AND MICROSCOPIC - Abnormal    Color, Urine Light-Yellow      Appearance, Urine Clear      Specific Gravity, Urine 1.037 (*)     pH, Urine 5.5      Protein, Urine 30 (1+) (*)     Glucose, Urine Normal      Blood, Urine NEGATIVE      Ketones, Urine NEGATIVE      Bilirubin, Urine NEGATIVE      Urobilinogen, Urine Normal      Nitrite, Urine NEGATIVE      Leukocyte Esterase, Urine NEGATIVE     LIPASE - Normal    Lipase 25      Narrative:     Venipuncture immediately after or during the administration of Metamizole may lead to falsely low results. Testing should be performed immediately prior to Metamizole dosing.   LACTATE - Normal    Lactate 1.0      Narrative:     Venipuncture immediately after or during the administration of Metamizole may lead to falsely low results. Testing should be performed immediately prior to Metamizole dosing.   URINALYSIS MICROSCOPIC WITH REFLEX CULTURE - Normal    WBC, Urine NONE      RBC, Urine 1-2     URINALYSIS WITH REFLEX CULTURE AND MICROSCOPIC    Narrative:     The following orders were created for panel order Urinalysis with Reflex Culture and Microscopic.  Procedure                               Abnormality         Status                     ---------                               -----------         ------                     Urinalysis with Reflex C...[[id number]]  Abnormal            Final result               Extra Urine Gray Tube[[id number]]                            In process                   Please view results for these tests on the individual orders.   DRUG SCREEN,URINE   EXTRA URINE GRAY TUBE       CT abdomen pelvis w IV contrast    Final Result   1.  Fluid-filled nondilated small bowel and air-fluid levels in the   ascending colon. Clinical correlation for diarrhea/enterocolitis is   recommended.             Signed by: Montrell Angeles 10/1/2024 2:19 AM   Dictation workstation:   [id number]            Procedure  Procedures     Samanta Kaufman, APRN-CNP  10/01/24 0239

## 2024-10-02 ENCOUNTER — APPOINTMENT (OUTPATIENT)
Dept: ENDOCRINOLOGY | Facility: CLINIC | Age: 39
End: 2024-10-02
Payer: COMMERCIAL

## 2024-10-02 NOTE — PROGRESS NOTES
[facility]  [facility]  [address]  [facility] [address]  [phone]     [facility]  Follow Up Visit Note    Virtual or Telephone Consent    An interactive audio and video telecommunication system which permits real time communications between the patient (at the originating site) and provider (at the distant site) was utilized to provide this telehealth service.   Verbal consent was requested and obtained from Arthur Carranza on this date, 10/09/24 for a telehealth visit.            Subjective  Patient ID: Arthur Carranza is a 38 y.o. male with past medical history significant for Morbid Obesity, Hypertension, Chronic Obstructive Pulmonary Disease, Congestive Heart Failure, diabetes, Post-traumatic stress disorder, OBSTRUCTIVE SLEEP APNEA and CENTRAL SLEEP APNEA.     10/9/24: UPDATED: The patient visited me virtually to review his sleep study to treat SEVERE OBSTRUCTIVE SLEEP APNEA and CENTRAL SLEEP APNEA. According to his sleep study result, Dr. Palacio recommends considering a PAP titration study with the eventual goal of PAP therapy. After the discussion, the patient agreed to do another in-lab sleep study at [facility]. His ESS is 13 today.       6/10/2024: The patient is here alone today for a comprehensive sleep medicine evaluation due to suspected sleep apnea, excessive daytime sleepiness/fatigue, and difficulty falling/ staying asleep. He is taking at least 1 hour to fall asleep and wake up around four times for dyspnea. POSITIVE for snoring, witnessed apnea, waking up gasping, choking for air, nasal congestion, mouth breathing, night sweats during sleep, waking up with racing heart, nocturnal cough, unrefreshing sleep, morning headache, morning dry mouth, and sleep inertia. He reported having heart issues, and his physician instructed him to come here to establish care. ESS: 6 CHRISTINA: 27   today.    HPI  Patient had been having these symptoms for the past 6 years.       SLEEP STUDY HISTORY: (personally reviewed raw data such as interpretation report, data sheet, hypnogram, and titration table if available and applicable)  9/17/2024 : PSG: BMI: 44.7 ,The RDI3% was 69.1/hr, RDI4% was 63.5/hr and CALLIE was 17.0/hr.  The oxygen saturation was <= 88% for 5.3 minutes. The SpO2 kandi was 84.0%. The Sp02 kandi was 84.0%. Central apneas observed during this study were most consistent with Cheyne-Renner respiration. Poor EtCO2 Signal Waveform achieved despite attempts to   troubleshoot. strong consideration for PAP titration study with eventual goal of PAP therapy is recommended.      SLEEP-WAKE SCHEDULE  Bedtime:  on weekdays, 10 PM on weekends  Subjective sleep latency: 60-90 minutes  Problems falling asleep: Yes  Number of awakenings: 4 times per night spontaneously for unknown reasons  Falls back asleep in 20 minutes  Problems staying asleep: Yes  Final wake time: 7-8 AM on weekdays, same on weekends  Out of bed time: 10 AM on weekdays, same on weekends  Shift work: No  Naps: No  Average sleep duration (excluding naps): 4-6 hours     SLEEP ENVIRONMENT  Sleep location: Yes  Sleep status: sleeps alone  Room is dark:  Yes  Room is quiet: Yes  Room is cool: Yes  Bed comfort: good     SLEEP HABITS:   Activities before bedtime: no  Activities in bed: no  Preferred sleep position: side     SLEEP ROS:  Night symptoms: POSITIVE for snoring, witnessed apnea, wake up gasping and/or choking for air, nasal congestion , mouth breathing, night sweats during sleep, waking up with racing heart, and nocturnal cough  Morning symptoms: POSITIVE for unrefreshing sleep, morning headache, morning dry mouth, and sleep inertia  Daytime symptoms POSITIVE for excessive daytime sleepiness, fatigue, trouble remembering things in daytime, trouble staying focused in daytime, irritability in daytime, and drowsy driving  Hypersomnia /  narcolepsy symptoms: Patient denies symptoms of a hypersomnolence disorder such as sleep paralysis, sleep-related hallucinations, recurrent sleep attacks, automatic behaviors, and cataplexy.   RLS symptoms: Patient denies RLS symptoms.  Movements in sleep: Patient denies problematic movements in sleep such as seizures during sleep, frequent leg kicks / jerks while asleep, and sleep-related bruxism.  Parasomnia symptoms: Patient denies symptoms of parasomnia such as sleepwalking, sleeptalking, sleep-eating, acting out dreams, and nightmares.      WEIGHT: gained 20 lbs in 6 months      REVIEW OF SYSTEMS: All other systems have been reviewed and are negative.     PERTINENT SOCIAL HISTORY:  Occupation: paint  Smoking: Yes   ETOH: Yes   Marijuana: No   Caffeine: No  Sleep aids: No   Claustrophobia: No      PERTINENT PAST SURGICAL HISTORY:  non-contributory     PERTINENT FAMILY HISTORY:  Unknown. Patient is adopted.      Active Problems, Allergy List, Medication List, and PMH/PSH/FH/Social Hx have been reviewed and reconciled in chart. No significant changes unless documented in the pertinent chart section. Updates made when necessary.          Objective    Physical Exam  Constitutional:alert and oriented to time, place, and person     Assessment/Plan  Arthur Carranza is a 38 y.o. male who is seen to evaluate for  OBSTRUCTIVE SLEEP APNEA / CENTRAL SLEEP APNEA. Risk factors of sleep apnea as well as cardiometabolic and neurocognitive sequelae associated with untreated sleep apnea were also discussed. Lastly, patient was advised to avoid driving vehicle or operating heavy machinery when sleepy.      Arthur Carranza with the following problems:     # OBSTRUCTIVE SLEEP APNEA-severe/ CENTRAL SLEEP APNEA:   -strong consideration for PAP titration study with eventual goal of PAP therapy is recommended by Dr. Palacio.  -Proceed a pap titration to get an appropriate pressure setting to treat his sleep apnea.  -Sleep apnea and PAP therapy  education were provided at length in the clinic today. Arthur Carranza verbalized understanding.  -Emphasized diet, exercise, and weight loss in the clinic, as were non-supine sleep, avoiding alcohol in the late evening, and driving or operating heavy machinery when sleepy.  -Arthur Carranzaverbalizes understanding of the above instructions and risks.      # CHRONIC SLEEP ONSET/ SLEEP MAINTENANCE INSOMNIA:  -likely due to poor sleep hygiene, irregular sleep schedule, depression, anxiety, untreated sleep apnea.  -Sleep hygiene discuss in the clinic.     # DEPRESSION and ANXIETY:   -Arthur Carranzais taking buPROPion XL (Wellbutrin XL) 150 mg , sertraline (Zoloft) 100 mg in the morning and doing well.  -Denies HI/SI     # HYPERTENSION/CHF:  -Denies headache, palpitation, and syncope in the clinic.  -Last Echo: Left ventricular systolic function is severely decreased, with an estimated ejection fraction of 10% on 2/21/24.  -Follows with PCP/ Cardiology     # MORBID OBESITY:  -with a BMI of 44.30 last visit. Arthur Carranza most recent Bicarbonate was 25        Bicarbonate   Date Value Ref Range Status   05/21/2024 25 21 - 32 mmol/L Final   -Encourage to have regular exercise to manage weight well.  -Refer to a nutritionist for weight control.  -Bariatric surgery candidate.     # NASAL CONGESTION:  -Instruct Arthur Welch use appropriate Flonase spray to ease congestion.     # XEROSTOMIA:  -Instruct Arthur Welch purchase the Biotene gel to ease the dry mouth symptom,     # TOBACCO ABUSE:Arthur Carranza is a current  1 cigar smoker for 0.5 years.  -Smoking Cessation given     RTC 2-3 weeks after sleep study         All of patient's questions were answered. He verbalizes understanding and agreement with my assessment and plan.

## 2024-10-09 ENCOUNTER — OFFICE VISIT (OUTPATIENT)
Dept: SLEEP MEDICINE | Facility: HOSPITAL | Age: 39
End: 2024-10-09
Payer: COMMERCIAL

## 2024-10-09 DIAGNOSIS — F41.9 ANXIETY AND DEPRESSION: ICD-10-CM

## 2024-10-09 DIAGNOSIS — I50.20 HFREF (HEART FAILURE WITH REDUCED EJECTION FRACTION): ICD-10-CM

## 2024-10-09 DIAGNOSIS — I10 HYPERTENSION, UNSPECIFIED TYPE: ICD-10-CM

## 2024-10-09 DIAGNOSIS — G47.33 OSA (OBSTRUCTIVE SLEEP APNEA): Primary | ICD-10-CM

## 2024-10-09 DIAGNOSIS — I50.43 ACUTE ON CHRONIC COMBINED SYSTOLIC AND DIASTOLIC CHF (CONGESTIVE HEART FAILURE): ICD-10-CM

## 2024-10-09 DIAGNOSIS — R60.9 EDEMA, UNSPECIFIED TYPE: ICD-10-CM

## 2024-10-09 DIAGNOSIS — F32.A ANXIETY AND DEPRESSION: ICD-10-CM

## 2024-10-09 DIAGNOSIS — G47.31 CSA (CENTRAL SLEEP APNEA): ICD-10-CM

## 2024-10-09 PROBLEM — Z79.4 CONTROLLED TYPE 2 DIABETES MELLITUS WITH STAGE 3 CHRONIC KIDNEY DISEASE, WITH LONG-TERM CURRENT USE OF INSULIN (MULTI): Status: RESOLVED | Noted: 2024-07-28 | Resolved: 2024-10-09

## 2024-10-09 PROBLEM — N18.30 CONTROLLED TYPE 2 DIABETES MELLITUS WITH STAGE 3 CHRONIC KIDNEY DISEASE, WITH LONG-TERM CURRENT USE OF INSULIN (MULTI): Status: RESOLVED | Noted: 2024-07-28 | Resolved: 2024-10-09

## 2024-10-09 PROBLEM — E66.01 MORBID OBESITY WITH BMI OF 45.0-49.9, ADULT (MULTI): Status: RESOLVED | Noted: 2023-03-01 | Resolved: 2024-10-09

## 2024-10-09 PROBLEM — R06.02 SHORTNESS OF BREATH: Status: RESOLVED | Noted: 2024-05-28 | Resolved: 2024-10-09

## 2024-10-09 PROBLEM — I50.9 CONGESTIVE HEART FAILURE, UNSPECIFIED HF CHRONICITY, UNSPECIFIED HEART FAILURE TYPE: Status: RESOLVED | Noted: 2024-02-20 | Resolved: 2024-10-09

## 2024-10-09 PROBLEM — E11.22 CONTROLLED TYPE 2 DIABETES MELLITUS WITH STAGE 3 CHRONIC KIDNEY DISEASE, WITH LONG-TERM CURRENT USE OF INSULIN (MULTI): Status: RESOLVED | Noted: 2024-07-28 | Resolved: 2024-10-09

## 2024-10-09 PROCEDURE — 99213 OFFICE O/P EST LOW 20 MIN: CPT

## 2024-10-09 PROCEDURE — 3046F HEMOGLOBIN A1C LEVEL >9.0%: CPT

## 2024-10-09 ASSESSMENT — PATIENT HEALTH QUESTIONNAIRE - PHQ9
2. FEELING DOWN, DEPRESSED OR HOPELESS: NOT AT ALL
SUM OF ALL RESPONSES TO PHQ9 QUESTIONS 1 AND 2: 0
1. LITTLE INTEREST OR PLEASURE IN DOING THINGS: NOT AT ALL

## 2024-10-09 ASSESSMENT — SLEEP AND FATIGUE QUESTIONNAIRES
HOW LIKELY ARE YOU TO NOD OFF OR FALL ASLEEP IN A CAR, WHILE STOPPED FOR A FEW MINUTES IN TRAFFIC: WOULD NEVER DOZE
HOW LIKELY ARE YOU TO NOD OFF OR FALL ASLEEP WHILE SITTING AND TALKING TO SOMEONE: WOULD NEVER DOZE
HOW LIKELY ARE YOU TO NOD OFF OR FALL ASLEEP WHILE WATCHING TV: MODERATE CHANCE OF DOZING
HOW LIKELY ARE YOU TO NOD OFF OR FALL ASLEEP WHILE SITTING AND READING: HIGH CHANCE OF DOZING
ESS-CHAD TOTAL SCORE: 13
HOW LIKELY ARE YOU TO NOD OFF OR FALL ASLEEP WHEN YOU ARE A PASSENGER IN A CAR FOR AN HOUR WITHOUT A BREAK: MODERATE CHANCE OF DOZING
SITING INACTIVE IN A PUBLIC PLACE LIKE A CLASS ROOM OR A MOVIE THEATER: HIGH CHANCE OF DOZING
HOW LIKELY ARE YOU TO NOD OFF OR FALL ASLEEP WHILE LYING DOWN TO REST IN THE AFTERNOON WHEN CIRCUMSTANCES PERMIT: SLIGHT CHANCE OF DOZING
HOW LIKELY ARE YOU TO NOD OFF OR FALL ASLEEP WHILE SITTING QUIETLY AFTER LUNCH WITHOUT ALCOHOL: MODERATE CHANCE OF DOZING

## 2024-10-09 NOTE — PATIENT INSTRUCTIONS
[facility] Sleep Medicine  [facility]  [address]  [facility] [id number] [phone]       Thank you for coming to the Sleep Medicine Clinic today! Your sleep medicine provider today was: GEE Berg Below is a summary of your treatment plan, patient education, other important information, and our contact numbers.    Dear Mr. Arthur Carranza       Your Sleep Provider Today: GEE Berg  Your Primary Care Physician: Christopher D'Amico, DO   Your Referring Provider: No ref. provider found    Diagnosis: OBSTRUCTIVE SLEEP APNEA/CENTRAL SLEEP APNEA       Thank you for visiting  Sleep Medicine Clinic !     1. According to your symptom and sleep study report; we will proceed with another PAP TITRATION sleep study to get an effective pressure setting to treat your sleep apnea.. The lab will call you and schedule it for you.     2. Please do not drive when you are sleepy and  continue practicing the sleep hygiene as discussed in clinic.    3. FOR QUESTIONS AND CONCERNS:   a) : To schedule, cancel, or reschedule SLEEP STUDY appointments, please call [phone]  b): Please call my office with issues or questions: [phone] (Vidhya); [phone] (Waldemar); [phone] (Trudy)    In the event that you are running more than 10 minutes late to your appointment, I will kindly ask you to reschedule. Thanks.      TREATMENT PLAN     Follow-up Appointment:   Follow-up in 2-3 weeks after sleep study.    PATIENT EDUCATION     OBSTRUCTIVE SLEEP APNEA (PHOEBE) is a sleep disorder where your upper airway muscles relax during sleep and the airway intermittently and repetitively narrows and collapses leading to partially blocked airway (hypopnea) or completely blocked airway (apnea) which, in turn, can disrupt breathing in sleep, lower oxygen levels while you sleep and cause night time wakings. Because both apnea and hypopnea may cause  higher carbon dioxide or low oxygen levels, untreated PHOEBE can lead to heart arrhythmia, elevation of blood pressure, and make it harder for the body to consolidate memory and facilitate metabolism (leading to higher blood sugars at night). Frequent partial arousals occur during sleep resulting in sleep deprivation and daytime sleepiness. PHOEBE is associated with an increased risk of cardiovascular disease, stroke, hypertension, and insulin resistance. Moreover, untreated PHOEBE with excessive daytime sleepiness can increase the risk of motor vehicular accidents.    Below are conservative strategies for PHOEBE regardless of PHOEBE severity are:   Positional therapy - Avoid sleeping on your back.   Healthy diet and regular exercise to optimize weight is highly encouraged.   Avoid alcohol late in the evening and sedative-hypnotics as these substances can make sleep apnea worse.   Improve breathing through the nose with intranasal steroid spray, saline rinse, or antihistamines    Safety: Avoid driving vehicle and operating heavy equipment while sleepy. Drowsy driving may lead to life-threatening motor vehicle accidents. A person driving while sleepy is 5 times more likely to have an accident. If you feel sleepy, pull over and take a short power nap (sleep for less than 30 minutes). Otherwise, ask somebody to drive you.    Treatment options for sleep apnea include weight management, positional therapy, Positive Airway Therapy (PAP) therapy, oral appliance therapy, hypoglossal nerve stimulator (Inspire) and select airway surgeries.    Sleep Testing for sleep apnea: The best and ideal way to check out if you have sleep apnea is to do an overnight sleep study in the sleep laboratory. Alternatively, a home sleep apnea test can also be done depending on your insurance and risk factors.     If you are having a home sleep apnea test, kindly allot 1 hour during pickup of the testing kit as you will have to complete paperwork and listen to  the sleep technician for in-person on-the-spot demonstration and instructions on how to hook up the testing kit at home. Do the test for 1 day and start off with sleeping on your back. If you sleep on your side in the middle of night or you have always been a side or stomach-sleeper, it is ok as long as you have some time on your back and off-back.     If you are having an overnight in-lab sleep study, please make sure to bring toiletries, a comfy pillow, additional warm blankets, and any nighttime medications (include as-needed inhaler, pain pill, etc) that you may regularly take. Also, be sure to eat dinner before you arrive as we generally do not provide meals inside the sleep testing center. Lastly, in order to fall asleep faster in the sleep testing center, we advise patients to wake up 2 hours earlier on the morning of scheduled testing and avoid napping 2 days prior testing. Sometimes, your sleep provider may prescribe a sleep aid to be taken at lights out in the sleep testing center. If you are taking a sleep aid, consider having somebody pick you up after the sleep testing.    Overnight sleep studies may be scheduled on a weekday or weekend. We also perform daytime testing for shift workers on a case-by-case basis.    Once you have booked an appointment to do the sleep study, please contact my office for follow-up visit to discuss results.    On the other hand, if you have any of the following, please consider calling the sleep testing center to RESCHEDULE your sleep study appointment:  If you tested positive for COVID within 10 days of your sleep study appointment.  If you were exposed to somebody who was confirmed for COVID within 10 days of your sleep study appointment and now you are having symptoms of possible COVID  If you have fever>100F or any acute symptoms that you think will lead to poor sleep during testing (e.g. new or worsening stuffy nose not relieved by steroid nasal spray)  If you have  traveled domestically or internationally in the last month and now you are having symptoms of possible COVID    Trouble falling asleep or trouble staying asleep is called INSOMNIA and it can be caused by many different things such as untreated sleep apnea, anxiety, depression, stress, poor sleep habits, and other medical conditions or medications. The best way to treat insomnia is to treat the cause. In general, we can all benefit from better sleep hygiene (also known as good sleep habits). Below are recommendations to help you improve your sleep so you can fall asleep, stay asleep, and wake up feeling refreshed.    Keep a routine bedtime and rise time even on weekends and non-work days. This helps your biological clock stay in sync with your sleep needs. If you currently have variable sleep-wake schedule, start off by waking up and getting out of bed the same time every day, even on weekends or non-work days. Whether you have a good or poor sleep the night before, waking up at the same time every day can help re-train and reset your body clock.  Go to bed when you are sleepy and not when tired nor before your goal bedtime. Long periods of time in bed will lead to fragmented, shallow, broken sleep.   Use the bed for sleep and intimacy only. Do not watch TV, eat, read or use phone/laptop in bed. Keeping sleep as the only activity in bed will help re-associate bed equals sleep.  Get up when you cannot sleep in 15-20 minutes. When you are unable to sleep, exit the bed, and go to another room or chair in bedroom, do something boring, calm, relaxing, distracting, or non-stimulating in dim lighting until you feel sleepy enough to fall back asleep. Avoid stimulating activity or any triggers for mental thinking (e.g. cellphone). Repeat as needed.  Avoid napping during the day to build up sleep pressure so that it won't be hard for you to fall asleep at night. Napping, particularly in the late afternoon or early evening may  "interfere with your night's sleep. If naps are necessary, limit naps under 15-20 minutes and not later than 3 PM.   Create a \"buffer zone\" or \"wind-down time\". This is a quiet time prior to bedtime, typically at least 30 minutes and perhaps as long as 1-2 hours. During this time, you should do things that are enjoyable, relaxing, and not necessarily goal-oriented like performing relaxation exercises, listening to relaxing music, reading a boring book, dimming the lights, or eating a light bedtime snack like a glass of milk and banana which can promote sleep. Activities that promote relaxation before sleep is important because these can help quiet the mind and relax the body to get into the right state for sleep.   Do not worry or plan in bed. If you are worrying, planning, feeling anxious, or cannot shut off your thoughts, get up and stay out of bed until you have quieted your mind. Set up a “scheduled worry time” in the morning or late afternoon to write down your worries and stressors as well as plans for the following day.   Keep your bedroom quiet, dark, and cool. Ideal sleeping temperature is 65F. If light bothers you, get a slumber mask or blackout shades. If noise bothers you, put ear plugs or get a white noise machine. Alternatively, you may turn on fan or humidifier to create a constant background noise to eliminate unexpected sounds that would otherwise wake you up in the middle of your sleep.  Keep your bedroom technology free. Avoid exposure to TV and electronics 1-2 hours prior to bedtime. May also consider wearing \"blue light blocker\" eyeglasses.  Turn the clock around to avoid clock-watching. Thoughts of the time can cause frustration and make it more difficult to go to sleep.   Other things to avoid to help   Avoid  caffeine (including chocolate) intake in the late afternoon and early evening. Limit the amount of caffeine and consume before noon.   Avoid smoking 2-3 hours before bedtime. Like " caffeine, nicotine in cigarette smoking acts a stimulant.   Avoid alcohol intake 2-3 hours before bedtime. Although alcohol may seem to help you fall asleep more easily as it slows down brain activity, it also disrupts your sleep during the night by causing frequent awakenings as the effect of alcohol wears off. Additionally, alcohol worsens snoring and sleep apnea  Avoid eating a heavy meal (high-fat, high-carbohydrate, gas-producing foods) at dinner and 2-3 hours before bedtime.    Avoid drinking more than 8 oz liquids in the evening. Limit fluid intake at 8 oz during dinner. Restrict fluids after dinner. May take sips of water enough to swallow bedtime medications. Void at bedtime.  Avoid physical exercise or hot shower/bath within 2 hours of bedtime. Regular exercise can improve sleep quality, but exercising or having a hot shower/bath too close to bedtime can disrupt sleep onset. The best time to exercise to help sleep is in the late afternoon or early evening but not within 2 hours of bedtime. In order to promote sleep, hot shower/bath can be taken in the evening for 15-20 minutes but not within 2 hours of going to bed.   Avoid sleeping with pets or kids if they appear to bother your sleep and/or sleep quality.  Last but the least, DO NOT TRY TOO HARD TO SLEEP. JUST ALLOW SLEEP TO UNFOLD.    MOST IMPORTANTLY:  BE PATIENT!  BE CONSISTENT!  Your sleep problem developed over time so it will take some time and consistency to return to a more normal sleep pattern. By following the suggestions listed above, you should see gradual sleep improvements over time.    Also you have been recommended to do Cognitive Behavioral Therapy for Insomnia (CBT-I). CBT-I is widely recognized as an effective treatment for a wide range of insomnias. CBT-I is typically made up of a number of components including assessment, behavioral and cognitive interventions, motivational techniques, and relapse prevention skills. An overwhelming  amount of evidence suggests that CBT-I is as effective as hypnotics and the newer non-benzodiazepine sleep aides in the acute treatment and is more effective than medication in the long term treatment of insomnia.     Below are some resources for CBT-I:  To see a Behavioral Sleep Medicine specialist for one-on-one counseling  CBT-I at the [facility] - Dr. Huseyin Farrar PsyD or Dr. Arina Mcghee, PhD - Phone: [phone]  Fax: [phone]. There may be a several month waiting period.  CBT-I at [facility] - Debora Najera PsyD (Call [phone] and speak with Nalini or Roshni  Fax: [phone] or backup fax: [phone])  Dr. Lexie Biggs - [email or web address]  - She only does telemedicine. She was formerly part of  but is in private practice and would be out-of-network  Dr. Melgoza - one on one CBT-I service www.[employer]. You may have to pay out of pocket and submit visits to your insurance for reimbursement.  Other BSM providers in [place]:  https://www.behavioralsleep.org/index.php/directory/browse-by/state?value=[place]    2. If doing CBTi using an online platform, there are several online platforms that are good resources, but may vary based on cost. These include:  Pao- online course via [facility]. Self-guided. One time charge to sign up: Pao ($40)  The SleepReset - online guided cognitive behavioral therapy https://www.[employer]/pricing ($300 for 8 weeks)  Sleepio - https://www.sleepio.com/sleepio/welcomeusint/354#1/1 (Some insurances or employers may cover - check with your HR Benefits office)  SleepEZ- Free self-guided course from the [facility] https://www.veterantraining.va.gov/insomnia/    You can also go to the following EDUCATION WEBSITES for further information:   American Academy of Sleep Medicine http://sleepeducation.org  National Sleep Foundation: https://sleepfoundation.org  American Sleep Apnea Association: https://www.sleepapnea.org (for patients with sleep  apnea)  Narcolepsy Network: https://www.narcolepsynetwork.org (for patients with narcolepsy)  [employer]ole[employer] inc: https://www.Novastcolepsy.org (for patients with narcolepsy)  Hypersomnia Foundation: https://www.hypersomniafoundation.org (for patients with idiopathic hypersomnia)  RLS foundation: https://www.rls.org (for patients with restless leg syndrome)    IMPORTANT INFORMATION     Call 911 for medical emergencies.  Our offices are generally open from Monday-Friday, 8 am - 5 pm.   There are no supporting services by either the sleep doctors or their staff on weekends and Holidays, or after 5 PM on weekdays.   If you need to get in touch with me, you may either call my office number or you can use HouseCall.  If a referral for a test, for CPAP, or for another specialist was made, and you have not heard about scheduling this within a week, please call scheduling at [phone] ([phone]).  If you are unable to make your appointment for clinic or an overnight study, kindly call the office or sleep testing center at least 48 hours in advance to cancel and reschedule.  If you are on CPAP, please bring your device's card and/or the device to each clinic appointment.   In case of problems with PAP machine or mask interface, please contact your [employer] (Durable Medical Equipment) company first. [employer] is the company who provides you the machine and/or PAP supplies.       PRESCRIPTIONS     We require 7 days advanced notice for prescription refills. If we do not receive the request in this time, we cannot guarantee that your medication will be refilled in time.    IMPORTANT PHONE NUMBERS     Sleep Medicine Clinic Fax: [phone]  Appointments (for Pediatric Sleep Clinic): [phone] - option 1  Appointments (for Adult Sleep Clinic): [phone] - option 2  Appointments (For Sleep Studies): [phone] - option 3  Behavioral Sleep Medicine: [phone]  Sleep Surgery: [phone]  Nutrition Service:  [phone]  Weight management clinics with endocrinology: [phone]  Bariatric Services: [phone] (includes weight loss medications and weight loss surgery)  [facility]: [phone] (offers holistic approaches to weight management)  ENT (Otolaryngology): [phone]  Headache Clinic (Neurology): [phone]  Neurology: [phone]  Psychiatry: [phone]  Pulmonary Function Testing (PFT) Center: [phone]  Pulmonary Medicine: [phone]  [facility] (DME): [phone]      OUR SLEEP TESTING LOCATIONS     Our team will contact you to schedule your sleep study, however, you can contact us as follow:  Main Phone Line (scheduling only): [phone]), option 3  Adult and Pediatric Locations  [facility] (6 years and older): [facility] - 4th floor ([address]) After hours line: [phone]  [facility] (Main campus: All ages): [facility], 6th floor. After hours line: [phone]   [place] (5 years and older; younger considered on case-by-case basis): [address]; [address], [address]. Scheduling  After hours line: [phone]   [place] (6 years and older): [address]; [address]; [address]   [place] (6 years and older): [address], [address]  After hours line: [phone]   [facility] (13 years and older) in [place]: [address], 2nd floor  After hours line: [phone]   [place] (13 year and older): [address]  After hours line: [phone]     Adult Only Locations:   [place] (18 years and older): [address], [address]   [place] (18 years and older): [address] [facility]; 4th floor  After hours line: [phone]  [facility] (18 years and older) at [place]: [address] [facility]  After hours line: [phone]     CONTACTING YOUR SLEEP MEDICINE PROVIDER AND SLEEP TEAM      For issues  "with your machine or mask interface, please call your DME provider first. DME stands for durable medical company. DME is the company who provides you the machine and/or PAP supplies / accessories.   To schedule, cancel, or reschedule SLEEP STUDY APPOINTMENTS, please call the Main Phone Line at [phone] ([phone] - option 3.   To schedule, cancel, or reschedule CLINIC APPOINTMENTS, you can do it in \"Sport/Life\", call [phone] to speak with my  (Jaki Lua), or call the Main Phone Line at [phone]) - option 2  For CLINICAL QUESTIONS or MEDICATION REFILLS, please call direct line for Adult Sleep Nurses at [phone].   Lastly, you can also send a message directly to your provider through \"My Chart\", which is the email service through your  Records Account: [email or web address]       Here at [facility], we wish you a restful sleep!   "

## 2024-10-22 ENCOUNTER — CLINICAL SUPPORT (OUTPATIENT)
Dept: CARDIAC REHAB | Facility: HOSPITAL | Age: 39
End: 2024-10-22
Payer: COMMERCIAL

## 2024-10-22 VITALS
DIASTOLIC BLOOD PRESSURE: 82 MMHG | SYSTOLIC BLOOD PRESSURE: 114 MMHG | HEIGHT: 69 IN | OXYGEN SATURATION: 97 % | WEIGHT: 308.2 LBS | BODY MASS INDEX: 45.65 KG/M2

## 2024-10-22 DIAGNOSIS — Z79.4 TYPE 2 DIABETES MELLITUS WITH DIABETIC NEUROPATHY, WITH LONG-TERM CURRENT USE OF INSULIN: ICD-10-CM

## 2024-10-22 DIAGNOSIS — I50.20 HFREF (HEART FAILURE WITH REDUCED EJECTION FRACTION): ICD-10-CM

## 2024-10-22 DIAGNOSIS — I10 PRIMARY HYPERTENSION: ICD-10-CM

## 2024-10-22 DIAGNOSIS — E11.40 TYPE 2 DIABETES MELLITUS WITH DIABETIC NEUROPATHY, WITH LONG-TERM CURRENT USE OF INSULIN: ICD-10-CM

## 2024-10-22 DIAGNOSIS — I44.7 LEFT BUNDLE BRANCH BLOCK: ICD-10-CM

## 2024-10-22 ASSESSMENT — DUKE ACTIVITY SCORE INDEX (DASI)
CAN YOU DO YARD WORK LIKE RAKING LEAVES, WEEDING OR PUSHING A MOWER: NO
CAN YOU HAVE SEXUAL RELATIONS: NO
CAN YOU DO HEAVY WORK AROUND THE HOUSE LIKE SCRUBBING FLOORS OR LIFTING AND MOVING HEAVY FURNITURE: NO
CAN YOU WALK INDOORS, SUCH AS AROUND YOUR HOUSE: YES
DASI METS SCORE: 4.7
CAN YOU PARTICIPATE IN MODERATE RECREATIONAL ACTIVITIES LIKE GOLF, BOWLING, DANCING, DOUBLES TENNIS OR THROWING A BASEBALL OR FOOTBALL: NO
CAN YOU RUN A SHORT DISTANCE: NO
CAN YOU TAKE CARE OF YOURSELF (EAT, DRESS, BATHE, OR USE TOILET): YES
CAN YOU PARTICIPATE IN STRENOUS SPORTS LIKE SWIMMING, SINGLES TENNIS, FOOTBALL, BASKETBALL, OR SKIING: NO
CAN YOU CLIMB A FLIGHT OF STAIRS OR WALK UP A HILL: YES
TOTAL_SCORE: 16.2
CAN YOU DO MODERATE WORK AROUND THE HOUSE LIKE VACUUMING, SWEEPING FLOORS OR CARRYING GROCERIES: YES
CAN YOU WALK A BLOCK OR TWO ON LEVEL GROUND: NO
CAN YOU DO LIGHT WORK AROUND THE HOUSE LIKE DUSTING OR WASHING DISHES: YES

## 2024-10-22 ASSESSMENT — PATIENT HEALTH QUESTIONNAIRE - PHQ9
7. TROUBLE CONCENTRATING ON THINGS, SUCH AS READING THE NEWSPAPER OR WATCHING TELEVISION: MORE THAN HALF THE DAYS
4. FEELING TIRED OR HAVING LITTLE ENERGY: NEARLY EVERY DAY
3. TROUBLE FALLING OR STAYING ASLEEP OR SLEEPING TOO MUCH: NEARLY EVERY DAY
SUM OF ALL RESPONSES TO PHQ9 QUESTIONS 1 & 2: 4
5. POOR APPETITE OR OVEREATING: MORE THAN HALF THE DAYS
8. MOVING OR SPEAKING SO SLOWLY THAT OTHER PEOPLE COULD HAVE NOTICED. OR THE OPPOSITE, BEING SO FIGETY OR RESTLESS THAT YOU HAVE BEEN MOVING AROUND A LOT MORE THAN USUAL: NOT AT ALL
SUM OF ALL RESPONSES TO PHQ QUESTIONS 1-9: 15
6. FEELING BAD ABOUT YOURSELF - OR THAT YOU ARE A FAILURE OR HAVE LET YOURSELF OR YOUR FAMILY DOWN: SEVERAL DAYS
SUM OF ALL RESPONSES TO PHQ QUESTIONS 1-9: 15
1. LITTLE INTEREST OR PLEASURE IN DOING THINGS: MORE THAN HALF THE DAYS
2. FEELING DOWN, DEPRESSED OR HOPELESS: MORE THAN HALF THE DAYS
9. THOUGHTS THAT YOU WOULD BE BETTER OFF DEAD, OR OF HURTING YOURSELF: NOT AT ALL

## 2024-10-22 NOTE — PROGRESS NOTES
Cardiac Rehabilitation Initial Treatment Plan    Name: Arthur Carranza  Medical Record Number: [id number]  YOB: 1985  Age: 38 y.o.    Today’s Date: 10/22/2024  Primary Care Physician: Christopher D'Amico, DO  Referring Physician: Delroy Jurado MD  Program Location: [facility]  Primary Diagnosis:   1. Type 2 diabetes mellitus with diabetic neuropathy, with long-term current use of insulin  Referral to Cardiac Rehab      2. HFrEF (heart failure with reduced ejection fraction)  Referral to Cardiac Rehab      3. Primary hypertension  Referral to Cardiac Rehab      4. Left bundle branch block  Referral to Cardiac Rehab         Onset/Date of Diagnosis: October 2022    Initial Assessment, not yet started program.    AACVPR Risk Stratification:       Falls Risk: Medium  Psychosocial Assessment         Sent PH-Q 9 to MD if score > 20: No; score < 20    Pt reported/currently experiencing stress: Yes. State of health, unable to breathe adequately to give daily tasks done.   Patient uses stress management skills: Yes   History of: anxiety and depression  Currently seeing a mental health provider: Yes, Then provider name: n/a  Social Support: Yes, Whom:friends  Quality of Life Survey: SF-36   SF-36 Pre Post   Physical Component Score TBD TBD   Mental Component Score TBD TBD     Learning Assessment:  Learning assessment/barriers: None  Preferred learning method: Writing handout  Barriers: None  Comments:    Stages of Change:Action    Psychosocial Plan    Goal Status: Initial Assessment; goals not yet started    Psychosocial Goals: Demonstrating proper techniques for stress management and Maintain or lower PH-Q 9 score by discharge    Psychosocial Interventions/Education: To be done in Cardiac Rehab.      Nutrition Assessment:    Hyperlipidemia: Yes    Lipids:   Lab Results   Component Value Date    CHOL 125 12/12/2023    HDL 26.6 12/12/2023    LDLF 65 04/20/2023    TRIG 241 (H) 12/12/2023  "      Current Dietary Guidelines:  Pt reports \"he is about to\" start implementing dietary changes. Patient hoping to implement low sodium, low carb, and fluid restriction at home.   Barriers to dietary change: yes financially.    Diet Habit Survey: Picture Your Plate  Pre: Survey not yet returned by patient.  Post: To be done at discharge.    Diabetes Assessment    Lab Results   Component Value Date    HGBA1C 13.3 (H) 07/18/2024       History of Diabetes: Yes Pt monitors BS at home: Yes   Frequency: 8 /day  FBS range: 200 - 300  Hypoglycemic Episodes: No     Weight Management       No data recorded    Nutrition Plan    Goal Status: Initial Assessment; goals not yet started    Nutrition Goals: Improve Diet Habit Survey score by 5-10 points by discharge, Adapt a low-sodium, DASH diet prior to discharge, and Adapt a Mediterranean focused diet prior to discharge    Nutrition Interventions/Education:   To be done in Cardiac Rehab.      Exercise Assessment    No  Mode: NA  Frequency: NA  Duration: NA    Exercise Prescription     Exercise Prescription based on: Patient health history   Frequency:   3  days/week   Mode: Treadmill, NuStep, and Arm Ergometer   Duration: 30-40 total aerobic minutes   Intensity: RPE 11-14  Target HR:  To be calculated after 6 attended sessions.  MET Level: 2.3  Patient wears supplemental O2: No     Modality Workload METs Duration (minutes)   1 Pre-Exercise      2 NuStep LV3  2.3 15 :00   3 Treadmill 1.7 mph  2.3 10 :00   4 Arm Ergometer LV1  TBD 10 :00   6 Post-Exercise        Resistance Training: Yes   Home Exercise Prescription given: To be given prior to discharge from program.    Exercise Plan    Goal Status: Initial Assessment; goals not yet started    Exercise Goals: Increase exercise MET level by 5-10% each week and Establish a home exercise program before discharge    Exercise Interventions/Education:   To be done in Cardiac Rehab.        Other Core Components/Risk Factor " Assessment:    Medication adherence  Current Medications:   Medication Documentation Review Audit       Reviewed by GEE Berg (Nurse Practitioner) on 10/09/24 at 1024      Medication Order Taking? Sig Documenting Provider Last Dose Status   alcohol swabs pads, medicated [id number] No Apply 1 each topically 4 times a day. Use prior to checking glucose or injecting insulin ARIAN Foreman-CNP Taking Active   atorvastatin (Lipitor) 40 mg tablet [id number] No Take 1 tablet (40 mg) by mouth once daily at bedtime. Steven Morales,  Taking Active   blood-glucose meter,continuous (FreeStyle Nadir 3 [place]) misc [id number] No Use as instructed to test blood glucose throughout the day Christopher D'Amico,  Taking Active   blood-glucose sensor (FreeStyle Nadir 3 Plus Sensor) device [id number] No Use as directed. Change sensor every 15 days Steven Morales, DO Taking Active   buPROPion XL (Wellbutrin XL) 150 mg 24 hr tablet [id number] No Take 1 tablet (150 mg) by mouth once daily. Historical Provider, MD Taking Active   carvedilol (Coreg) 3.125 mg tablet [id number]  Take 1 tablet (3.125 mg) by mouth 2 times daily (morning and late afternoon). Delroy Jurado MD  Active   dapagliflozin propanediol (Farxiga) 10 mg [id number] No Take 1 tablet (10 mg) by mouth once daily. Steven Morales,  Taking Active   dicyclomine (Bentyl) 20 mg tablet [id number]  Take 1 tablet (20 mg) by mouth 4 times a day as needed (Abdominal cramping diarrhea) for up to 10 days. Samanta Kaufman, APRN-CNP  Active   dulaglutide 3 mg/0.5 mL pen injector [id number] No Inject 3 mg under the skin 1 (one) time per week. Steven Morales DO Taking Active   hydrALAZINE (Apresoline) 25 mg tablet [id number] No Take 1 tablet (25 mg) by mouth 3 times a day. Steven Morales DO Taking Active   Patient not taking:  Discontinued 10/09/24 1024   insulin glargine (Basaglar KwikPen U-100 Insulin) 100 unit/mL (3 mL) pen [id number] No Inject 55 Units under  "the skin once daily at bedtime. Take as directed per insulin instructions. Steven Charlescedo, DO Taking Active   insulin lispro (HumaLOG) 100 unit/mL injection [id number] No Inject 12 units plus sliding scale three times daily with meals:  = 0u, 151-200 = 2u, 201-250 = 4u, 251-300 = 6u, 301-350 = 8u, 351-400+ = 10u, use up to 60u daily Steven Andrew, DO Taking Active   isosorbide dinitrate (Isordil) 20 mg tablet [id number] No Take 1 tablet (20 mg) by mouth 3 times a day. Steven Andrew, DO Taking Active   metFORMIN (Glucophage) 1,000 mg tablet [id number] No Take 1 tablet (1,000 mg) by mouth 2 times a day. Steven Andrew, DO Taking Active   ondansetron ODT (Zofran-ODT) 4 mg disintegrating tablet [id number]  Take 1 tablet (4 mg) by mouth every 8 hours if needed for nausea or vomiting for up to 7 days. Samanta Kaufman, APRN-CNP   10/08/24 2359   pen needle, diabetic 32 gauge x \" needle [id number] No 1 each 4 times a day. Rosa Jane, APRN-CNP Taking Active   rivaroxaban (Xarelto) 20 mg tablet [id number] No Take 1 tablet (20 mg) by mouth once daily in the evening. Take with meals. Take with food. Steven Morales, DO Taking Active   sacubitriL-valsartan (Entresto) 24-26 mg tablet [id number] No Take 1 tablet by mouth 2 times a day. Rosa Jane, APRN-CNP Taking Active   sertraline (Zoloft) 100 mg tablet [id number] No Take 1 tablet (100 mg) by mouth once daily. Historical Provider, MD Taking Active   sulfamethoxazole-trimethoprim (Bactrim DS) 800-160 mg tablet [id number] No Take 1 tablet by mouth every 12 hours for 10 days. Kel Nelson PA-C Taking  24 2359   torsemide (Demadex) 20 mg tablet [id number] No Take 1 tablet (20 mg) by mouth once daily. Kwame Orozco, APRN-CNP Taking Active   traZODone (Desyrel) 50 mg tablet [id number] No Take 1 tablet (50 mg) by mouth as needed at bedtime for sleep. Historical Provider, MD Taking Active                                 Medication " compliance: Yes   Uses pill box/organizer: Yes    Carries medication list: Patient uses mychart.     Blood Pressure Management  History of Hypertension: Yes   Medication Changes: No   Resting BP:  114/82    Heart Failure Management  Hx of Heart Failure: Yes;   Type (selection): HFrEF  Most recent EF:   15%    Onset of heart failure diagnosis: 10/2022  Last heart failure hospitalization: 07/2024  Number of HF admissions per year: 2    Symptoms: Fatigue at rest, Fatigue with exertion, Shortness of breath, LE edema, and Orthopnea  Is there a family Hx of HF:  Patient adopted, unaware of family history. 12 year old daughter currently diagnosed with cardiomyopathy.  Does patient obtain daily weight No     Heart Failure Reassessment: Initial Treatment Plan. Will reassess in 30 days.    Heart Failure Goals: Able to verbalize signs and symptoms of fluid retention and when to contact MD, Adhere to proper fluid restrictions, and Adapt a low sodium diet and verbalize guidelines    Smoking/Tobacco Assessment  Social History     Tobacco Use   Smoking Status Every Day    Types: Cigarettes   Smokeless Tobacco Not on file       Other Core Component Plan    Goal Status: Initial Assessment; goals not yet started    Other Core Component Goals: Medication compliance, Verbalize medication usage and drug actions by discharge, and Achieve resting BP of < 130/80 by discharge    Other Core Component Interventions/Education:   Medication compliance, Verbalize medication usage and drug actions by discharge, and Achieve resting BP of < 130/80 by discharge        Individual Patient Goals:    Breathe better.   Weight loss.     Goal Status: Initial Assessment; goals not yet started        Rehab Staff Signature: Kristyn Beltrán RN

## 2024-10-23 ENCOUNTER — PATIENT OUTREACH (OUTPATIENT)
Dept: PRIMARY CARE | Facility: CLINIC | Age: 39
End: 2024-10-23
Payer: COMMERCIAL

## 2024-10-23 ENCOUNTER — TRANSCRIBE ORDERS (OUTPATIENT)
Dept: CARDIAC REHAB | Facility: HOSPITAL | Age: 39
End: 2024-10-23
Payer: COMMERCIAL

## 2024-10-23 DIAGNOSIS — I50.42 CHRONIC COMBINED SYSTOLIC AND DIASTOLIC HEART FAILURE: ICD-10-CM

## 2024-10-23 DIAGNOSIS — I50.20 HFREF (HEART FAILURE WITH REDUCED EJECTION FRACTION): Primary | ICD-10-CM

## 2024-10-23 NOTE — PROGRESS NOTES
Cardiac Rehabilitation Initial Treatment Plan    Name: Arthur Carranza  Medical Record Number: [id number]  YOB: 1985  Age: 38 y.o.    Today’s Date: 10/23/2024  Primary Care Physician: Christopher D'Amico, DO  Referring Physician: Delroy Jurado MD  Program Location: [facility]  Primary Diagnosis:   1. Type 2 diabetes mellitus with diabetic neuropathy, with long-term current use of insulin  Referral to Cardiac Rehab      2. HFrEF (heart failure with reduced ejection fraction)  Referral to Cardiac Rehab      3. Primary hypertension  Referral to Cardiac Rehab      4. Left bundle branch block  Referral to Cardiac Rehab         Onset/Date of Diagnosis: October 2022    Initial Assessment, not yet started program.    AACVPR Risk Stratification: Moderate     Falls Risk: Medium  Psychosocial Assessment         Sent PH-Q 9 to MD if score > 20: No; score < 20    Pt reported/currently experiencing stress: Yes. State of health, unable to breathe adequately to give daily tasks done.   Patient uses stress management skills: Yes   History of: anxiety and depression  Currently seeing a mental health provider: Yes, Then provider name: n/a  Social Support: Yes, Whom:friends  Quality of Life Survey: SF-36   SF-36 Pre Post   Physical Component Score TBD TBD   Mental Component Score TBD TBD     Learning Assessment:  Learning assessment/barriers: None  Preferred learning method: Writing handout  Barriers: None  Comments:    Stages of Change:Action    Psychosocial Plan    Goal Status: Initial Assessment; goals not yet started    Psychosocial Goals: Demonstrating proper techniques for stress management and Maintain or lower PH-Q 9 score by discharge    Psychosocial Interventions/Education: To be done in Cardiac Rehab.      Nutrition Assessment:    Hyperlipidemia: Yes    Lipids:   Lab Results   Component Value Date    CHOL 125 12/12/2023    HDL 26.6 12/12/2023    LDLF 65 04/20/2023    TRIG 241 (H)  "12/12/2023       Current Dietary Guidelines:  Pt reports \"he is about to\" start implementing dietary changes. Patient hoping to implement low sodium, low carb, and fluid restriction at home.   Barriers to dietary change: yes financially.    Diet Habit Survey: Picture Your Plate  Pre: Survey not yet returned by patient.  Post: To be done at discharge.    Diabetes Assessment    Lab Results   Component Value Date    HGBA1C 13.3 (H) 07/18/2024       History of Diabetes: Yes Pt monitors BS at home: Yes   Frequency: 8 /day  FBS range: 200 - 300  Hypoglycemic Episodes: No     Weight Management    Height: 175.3 cm (5' 9\")  Weight: 140 kg (308 lb 3.2 oz)  BMI (Calculated): 45.49  No data recorded    Nutrition Plan    Goal Status: Initial Assessment; goals not yet started    Nutrition Goals: Improve Diet Habit Survey score by 5-10 points by discharge, Adapt a low-sodium, DASH diet prior to discharge, and Adapt a Mediterranean focused diet prior to discharge    Nutrition Interventions/Education:   To be done in Cardiac Rehab.      Exercise Assessment    No  Mode: NA  Frequency: NA  Duration: NA    Exercise Prescription     Exercise Prescription based on: Patient health history   Frequency:   3  days/week   Mode: Treadmill, NuStep, and Arm Ergometer   Duration: 30-40 total aerobic minutes   Intensity: RPE 11-14  Target HR:  To be calculated after 6 attended sessions.  MET Level: 2.3  Patient wears supplemental O2: No     Modality Workload METs Duration (minutes)   1 Pre-Exercise      2 NuStep LV3  2.3 15 :00   3 Treadmill 1.7 mph  2.3 10 :00   4 Arm Ergometer LV1  TBD 10 :00   6 Post-Exercise        Resistance Training: Yes   Home Exercise Prescription given: To be given prior to discharge from program.    Exercise Plan    Goal Status: Initial Assessment; goals not yet started    Exercise Goals: Increase exercise MET level by 5-10% each week and Establish a home exercise program before discharge    Exercise " Interventions/Education:   To be done in Cardiac Rehab.        Other Core Components/Risk Factor Assessment:    Medication adherence  Current Medications:   Medication Documentation Review Audit       Reviewed by GEE Berg (Nurse Practitioner) on 10/09/24 at 1024      Medication Order Taking? Sig Documenting Provider Last Dose Status   alcohol swabs pads, medicated [id number] No Apply 1 each topically 4 times a day. Use prior to checking glucose or injecting insulin GEE Foreman Taking Active   atorvastatin (Lipitor) 40 mg tablet [id number] No Take 1 tablet (40 mg) by mouth once daily at bedtime. Steven Morales,  Taking Active   blood-glucose meter,continuous (FreeStyle Nadir 3 [place]) misc [id number] No Use as instructed to test blood glucose throughout the day Christopher D'Amico,  Taking Active   blood-glucose sensor (FreeStyle Nadir 3 Plus Sensor) device [id number] No Use as directed. Change sensor every 15 days Steven Morales DO Taking Active   buPROPion XL (Wellbutrin XL) 150 mg 24 hr tablet [id number] No Take 1 tablet (150 mg) by mouth once daily. Historical Provider, MD Taking Active   carvedilol (Coreg) 3.125 mg tablet [id number]  Take 1 tablet (3.125 mg) by mouth 2 times daily (morning and late afternoon). Delroy Jurado MD  Active   dapagliflozin propanediol (Farxiga) 10 mg [id number] No Take 1 tablet (10 mg) by mouth once daily. Steven Morales DO Taking Active   dicyclomine (Bentyl) 20 mg tablet [id number]  Take 1 tablet (20 mg) by mouth 4 times a day as needed (Abdominal cramping diarrhea) for up to 10 days. GEE Parr  Active   dulaglutide 3 mg/0.5 mL pen injector [id number] No Inject 3 mg under the skin 1 (one) time per week. Steven Morales DO Taking Active   hydrALAZINE (Apresoline) 25 mg tablet [id number] No Take 1 tablet (25 mg) by mouth 3 times a day. Steven Morales DO Taking Active   Patient not taking:  Discontinued 10/09/24 1024   insulin  "glargine (Basaglar KwikPen U-100 Insulin) 100 unit/mL (3 mL) pen [id number] No Inject 55 Units under the skin once daily at bedtime. Take as directed per insulin instructions. Steven Morales, DO Taking Active   insulin lispro (HumaLOG) 100 unit/mL injection [id number] No Inject 12 units plus sliding scale three times daily with meals:  = 0u, 151-200 = 2u, 201-250 = 4u, 251-300 = 6u, 301-350 = 8u, 351-400+ = 10u, use up to 60u daily Steven Morales, DO Taking Active   isosorbide dinitrate (Isordil) 20 mg tablet [id number] No Take 1 tablet (20 mg) by mouth 3 times a day. Steven Morales, DO Taking Active   metFORMIN (Glucophage) 1,000 mg tablet [id number] No Take 1 tablet (1,000 mg) by mouth 2 times a day. Steven Morales, DO Taking Active   ondansetron ODT (Zofran-ODT) 4 mg disintegrating tablet [id number]  Take 1 tablet (4 mg) by mouth every 8 hours if needed for nausea or vomiting for up to 7 days. Samanta Kaufman, APRN-CNP   10/08/24 2359   pen needle, diabetic 32 gauge x 32\" needle [id number] No 1 each 4 times a day. Rosa Jane, APRN-CNP Taking Active   rivaroxaban (Xarelto) 20 mg tablet [id number] No Take 1 tablet (20 mg) by mouth once daily in the evening. Take with meals. Take with food. Steven Morales, DO Taking Active   sacubitriL-valsartan (Entresto) 24-26 mg tablet [id number] No Take 1 tablet by mouth 2 times a day. Rosa Jane APRN-CNP Taking Active   sertraline (Zoloft) 100 mg tablet [id number] No Take 1 tablet (100 mg) by mouth once daily. Historical Provider, MD Taking Active   sulfamethoxazole-trimethoprim (Bactrim DS) 800-160 mg tablet [id number] No Take 1 tablet by mouth every 12 hours for 10 days. Kel Nelson PA-C Taking  24 1355   torsemide (Demadex) 20 mg tablet [id number] No Take 1 tablet (20 mg) by mouth once daily. ARIAN Galaviz-CNP Taking Active   traZODone (Desyrel) 50 mg tablet [id number] No Take 1 tablet (50 mg) by mouth as needed at bedtime " for sleep. Historical Provider, MD Taking Active                                 Medication compliance: Yes   Uses pill box/organizer: Yes    Carries medication list: Patient uses mychart.     Blood Pressure Management  History of Hypertension: Yes   Medication Changes: No   Resting BP:  114/82    Heart Failure Management  Hx of Heart Failure: Yes;   Type (selection): HFrEF  Most recent EF:   15%    Onset of heart failure diagnosis: 10/2022  Last heart failure hospitalization: 07/2024  Number of HF admissions per year: 2    Symptoms: Fatigue at rest, Fatigue with exertion, Shortness of breath, LE edema, and Orthopnea  Is there a family Hx of HF:  Patient adopted, unaware of family history. 12 year old daughter currently diagnosed with cardiomyopathy.  Does patient obtain daily weight No     Heart Failure Reassessment: Initial Treatment Plan. Will reassess in 30 days.    Heart Failure Goals: Able to verbalize signs and symptoms of fluid retention and when to contact MD, Adhere to proper fluid restrictions, and Adapt a low sodium diet and verbalize guidelines    Smoking/Tobacco Assessment  Social History     Tobacco Use   Smoking Status Every Day    Types: Cigarettes   Smokeless Tobacco Not on file       Other Core Component Plan    Goal Status: Initial Assessment; goals not yet started    Other Core Component Goals: Medication compliance, Verbalize medication usage and drug actions by discharge, and Achieve resting BP of < 130/80 by discharge    Other Core Component Interventions/Education:   Medication compliance, Verbalize medication usage and drug actions by discharge, and Achieve resting BP of < 130/80 by discharge        Individual Patient Goals:    Breathe better.   Weight loss.     Goal Status: Initial Assessment; goals not yet started        Rehab Staff Signature: Rayma Brink RN

## 2024-10-28 ENCOUNTER — APPOINTMENT (OUTPATIENT)
Dept: CARDIAC REHAB | Facility: HOSPITAL | Age: 39
End: 2024-10-28
Payer: COMMERCIAL

## 2024-10-30 ENCOUNTER — CLINICAL SUPPORT (OUTPATIENT)
Dept: CARDIAC REHAB | Facility: HOSPITAL | Age: 39
End: 2024-10-30
Payer: COMMERCIAL

## 2024-10-30 DIAGNOSIS — I50.20 HFREF (HEART FAILURE WITH REDUCED EJECTION FRACTION): ICD-10-CM

## 2024-10-30 DIAGNOSIS — I50.42 CHRONIC COMBINED SYSTOLIC AND DIASTOLIC HEART FAILURE: ICD-10-CM

## 2024-10-30 LAB
GLUCOSE BLD MANUAL STRIP-MCNC: 282 MG/DL (ref 74–99)
GLUCOSE BLD MANUAL STRIP-MCNC: 308 MG/DL (ref 74–99)

## 2024-10-30 PROCEDURE — 93798 PHYS/QHP OP CAR RHAB W/ECG: CPT | Performed by: INTERNAL MEDICINE

## 2024-10-30 PROCEDURE — 82947 ASSAY GLUCOSE BLOOD QUANT: CPT

## 2024-11-13 ENCOUNTER — APPOINTMENT (OUTPATIENT)
Dept: SLEEP MEDICINE | Facility: HOSPITAL | Age: 39
End: 2024-11-13
Payer: COMMERCIAL

## 2024-11-27 ENCOUNTER — APPOINTMENT (OUTPATIENT)
Dept: PRIMARY CARE | Facility: CLINIC | Age: 39
End: 2024-11-27
Payer: COMMERCIAL

## 2024-12-04 ENCOUNTER — APPOINTMENT (OUTPATIENT)
Dept: RADIOLOGY | Facility: HOSPITAL | Age: 39
End: 2024-12-04
Payer: COMMERCIAL

## 2024-12-04 ENCOUNTER — HOSPITAL ENCOUNTER (EMERGENCY)
Facility: HOSPITAL | Age: 39
Discharge: HOME | End: 2024-12-04
Payer: COMMERCIAL

## 2024-12-04 ENCOUNTER — TELEPHONE (OUTPATIENT)
Dept: CARDIOLOGY | Facility: HOSPITAL | Age: 39
End: 2024-12-04
Payer: COMMERCIAL

## 2024-12-04 VITALS
HEART RATE: 98 BPM | HEIGHT: 69 IN | SYSTOLIC BLOOD PRESSURE: 122 MMHG | WEIGHT: 310 LBS | OXYGEN SATURATION: 97 % | TEMPERATURE: 97.7 F | RESPIRATION RATE: 20 BRPM | DIASTOLIC BLOOD PRESSURE: 88 MMHG | BODY MASS INDEX: 45.91 KG/M2

## 2024-12-04 DIAGNOSIS — I44.7 LEFT BUNDLE BRANCH BLOCK: ICD-10-CM

## 2024-12-04 DIAGNOSIS — I10 PRIMARY HYPERTENSION: ICD-10-CM

## 2024-12-04 DIAGNOSIS — R10.9 RIGHT SIDED ABDOMINAL PAIN: Primary | ICD-10-CM

## 2024-12-04 DIAGNOSIS — I50.20 HFREF (HEART FAILURE WITH REDUCED EJECTION FRACTION): Primary | ICD-10-CM

## 2024-12-04 DIAGNOSIS — I50.20 HFREF (HEART FAILURE WITH REDUCED EJECTION FRACTION): ICD-10-CM

## 2024-12-04 LAB
ALBUMIN SERPL BCP-MCNC: 3.7 G/DL (ref 3.4–5)
ALP SERPL-CCNC: 62 U/L (ref 33–120)
ALT SERPL W P-5'-P-CCNC: 27 U/L (ref 10–52)
ANION GAP SERPL CALC-SCNC: 12 MMOL/L (ref 10–20)
APPEARANCE UR: CLEAR
AST SERPL W P-5'-P-CCNC: 33 U/L (ref 9–39)
BASOPHILS # BLD AUTO: 0.02 X10*3/UL (ref 0–0.1)
BASOPHILS NFR BLD AUTO: 0.3 %
BILIRUB SERPL-MCNC: 0.7 MG/DL (ref 0–1.2)
BILIRUB UR STRIP.AUTO-MCNC: NEGATIVE MG/DL
BNP SERPL-MCNC: 431 PG/ML (ref 0–99)
BUN SERPL-MCNC: 20 MG/DL (ref 6–23)
CALCIUM SERPL-MCNC: 8.8 MG/DL (ref 8.6–10.3)
CHLORIDE SERPL-SCNC: 98 MMOL/L (ref 98–107)
CO2 SERPL-SCNC: 28 MMOL/L (ref 21–32)
COLOR UR: COLORLESS
CREAT SERPL-MCNC: 1.25 MG/DL (ref 0.5–1.3)
EGFRCR SERPLBLD CKD-EPI 2021: 76 ML/MIN/1.73M*2
EOSINOPHIL # BLD AUTO: 0.13 X10*3/UL (ref 0–0.7)
EOSINOPHIL NFR BLD AUTO: 1.9 %
ERYTHROCYTE [DISTWIDTH] IN BLOOD BY AUTOMATED COUNT: 12.4 % (ref 11.5–14.5)
FLUAV RNA RESP QL NAA+PROBE: NOT DETECTED
FLUBV RNA RESP QL NAA+PROBE: NOT DETECTED
GLUCOSE SERPL-MCNC: 453 MG/DL (ref 74–99)
GLUCOSE UR STRIP.AUTO-MCNC: ABNORMAL MG/DL
HCT VFR BLD AUTO: 38.9 % (ref 41–52)
HGB BLD-MCNC: 13 G/DL (ref 13.5–17.5)
HOLD SPECIMEN: NORMAL
IMM GRANULOCYTES # BLD AUTO: 0.02 X10*3/UL (ref 0–0.7)
IMM GRANULOCYTES NFR BLD AUTO: 0.3 % (ref 0–0.9)
KETONES UR STRIP.AUTO-MCNC: NEGATIVE MG/DL
LACTATE SERPL-SCNC: 2.4 MMOL/L (ref 0.4–2)
LEUKOCYTE ESTERASE UR QL STRIP.AUTO: NEGATIVE
LIPASE SERPL-CCNC: 44 U/L (ref 9–82)
LYMPHOCYTES # BLD AUTO: 1.22 X10*3/UL (ref 1.2–4.8)
LYMPHOCYTES NFR BLD AUTO: 18 %
MCH RBC QN AUTO: 31.6 PG (ref 26–34)
MCHC RBC AUTO-ENTMCNC: 33.4 G/DL (ref 32–36)
MCV RBC AUTO: 94 FL (ref 80–100)
MONOCYTES # BLD AUTO: 0.59 X10*3/UL (ref 0.1–1)
MONOCYTES NFR BLD AUTO: 8.7 %
NEUTROPHILS # BLD AUTO: 4.8 X10*3/UL (ref 1.2–7.7)
NEUTROPHILS NFR BLD AUTO: 70.8 %
NITRITE UR QL STRIP.AUTO: NEGATIVE
NRBC BLD-RTO: 0 /100 WBCS (ref 0–0)
PH UR STRIP.AUTO: 5.5 [PH]
PLATELET # BLD AUTO: 288 X10*3/UL (ref 150–450)
POTASSIUM SERPL-SCNC: 4 MMOL/L (ref 3.5–5.3)
PROT SERPL-MCNC: 6.7 G/DL (ref 6.4–8.2)
PROT UR STRIP.AUTO-MCNC: NEGATIVE MG/DL
RBC # BLD AUTO: 4.12 X10*6/UL (ref 4.5–5.9)
RBC # UR STRIP.AUTO: NEGATIVE /UL
SARS-COV-2 RNA RESP QL NAA+PROBE: NOT DETECTED
SODIUM SERPL-SCNC: 134 MMOL/L (ref 136–145)
SP GR UR STRIP.AUTO: 1.03
UROBILINOGEN UR STRIP.AUTO-MCNC: NORMAL MG/DL
WBC # BLD AUTO: 6.8 X10*3/UL (ref 4.4–11.3)

## 2024-12-04 PROCEDURE — 83605 ASSAY OF LACTIC ACID: CPT | Performed by: PHYSICIAN ASSISTANT

## 2024-12-04 PROCEDURE — 80053 COMPREHEN METABOLIC PANEL: CPT | Performed by: PHYSICIAN ASSISTANT

## 2024-12-04 PROCEDURE — 99285 EMERGENCY DEPT VISIT HI MDM: CPT | Mod: 25

## 2024-12-04 PROCEDURE — 71045 X-RAY EXAM CHEST 1 VIEW: CPT | Performed by: RADIOLOGY

## 2024-12-04 PROCEDURE — 71045 X-RAY EXAM CHEST 1 VIEW: CPT

## 2024-12-04 PROCEDURE — 2550000001 HC RX 255 CONTRASTS: Performed by: PHYSICIAN ASSISTANT

## 2024-12-04 PROCEDURE — 85025 COMPLETE CBC W/AUTO DIFF WBC: CPT | Performed by: PHYSICIAN ASSISTANT

## 2024-12-04 PROCEDURE — 99284 EMERGENCY DEPT VISIT MOD MDM: CPT | Mod: 25

## 2024-12-04 PROCEDURE — 83690 ASSAY OF LIPASE: CPT | Performed by: PHYSICIAN ASSISTANT

## 2024-12-04 PROCEDURE — 74177 CT ABD & PELVIS W/CONTRAST: CPT

## 2024-12-04 PROCEDURE — 2500000004 HC RX 250 GENERAL PHARMACY W/ HCPCS (ALT 636 FOR OP/ED): Performed by: PHYSICIAN ASSISTANT

## 2024-12-04 PROCEDURE — 74177 CT ABD & PELVIS W/CONTRAST: CPT | Performed by: RADIOLOGY

## 2024-12-04 PROCEDURE — 36415 COLL VENOUS BLD VENIPUNCTURE: CPT | Performed by: PHYSICIAN ASSISTANT

## 2024-12-04 PROCEDURE — 81003 URINALYSIS AUTO W/O SCOPE: CPT | Performed by: PHYSICIAN ASSISTANT

## 2024-12-04 PROCEDURE — 87636 SARSCOV2 & INF A&B AMP PRB: CPT | Performed by: PHYSICIAN ASSISTANT

## 2024-12-04 PROCEDURE — 96360 HYDRATION IV INFUSION INIT: CPT | Mod: 59

## 2024-12-04 PROCEDURE — 83880 ASSAY OF NATRIURETIC PEPTIDE: CPT | Performed by: STUDENT IN AN ORGANIZED HEALTH CARE EDUCATION/TRAINING PROGRAM

## 2024-12-04 RX ORDER — KETOROLAC TROMETHAMINE 10 MG/1
10 TABLET, FILM COATED ORAL EVERY 6 HOURS PRN
Qty: 20 TABLET | Refills: 0 | Status: SHIPPED | OUTPATIENT
Start: 2024-12-04 | End: 2024-12-09

## 2024-12-04 RX ORDER — ONDANSETRON 4 MG/1
4 TABLET, ORALLY DISINTEGRATING ORAL EVERY 8 HOURS PRN
Qty: 15 TABLET | Refills: 0 | Status: SHIPPED | OUTPATIENT
Start: 2024-12-04 | End: 2024-12-09

## 2024-12-04 RX ORDER — DICYCLOMINE HYDROCHLORIDE 20 MG/1
20 TABLET ORAL 2 TIMES DAILY
Qty: 20 TABLET | Refills: 0 | Status: SHIPPED | OUTPATIENT
Start: 2024-12-04 | End: 2024-12-14

## 2024-12-04 ASSESSMENT — PAIN DESCRIPTION - ORIENTATION: ORIENTATION: RIGHT

## 2024-12-04 ASSESSMENT — LIFESTYLE VARIABLES
EVER HAD A DRINK FIRST THING IN THE MORNING TO STEADY YOUR NERVES TO GET RID OF A HANGOVER: NO
HAVE PEOPLE ANNOYED YOU BY CRITICIZING YOUR DRINKING: NO
EVER FELT BAD OR GUILTY ABOUT YOUR DRINKING: NO
TOTAL SCORE: 0
HAVE YOU EVER FELT YOU SHOULD CUT DOWN ON YOUR DRINKING: NO

## 2024-12-04 ASSESSMENT — PAIN - FUNCTIONAL ASSESSMENT
PAIN_FUNCTIONAL_ASSESSMENT: 0-10
PAIN_FUNCTIONAL_ASSESSMENT: 0-10

## 2024-12-04 ASSESSMENT — PAIN SCALES - GENERAL: PAINLEVEL_OUTOF10: 8

## 2024-12-04 ASSESSMENT — PAIN DESCRIPTION - FREQUENCY: FREQUENCY: INTERMITTENT

## 2024-12-04 NOTE — ED PROVIDER NOTES
HPI   Chief Complaint   Patient presents with    Flank Pain     Right flank pain started at 0600 this morning.       A 38-year-old male patient comes into the emergency department today with complaints of severe right-sided abdominal pain and right lower quadrant abdominal pain.  States this started last night and was affecting his sleep.  States it comes in waves.  He rates pain an 8 out of 10 on the pain scale.  Describes it as sharp and burning.  States he also felt a little short of breath last night but that has improved.  He otherwise has no other complaints present time for this purpose comes into the emergency department today further evaluation.              Patient History   Past Medical History:   Diagnosis Date    CHF (congestive heart failure)     Diabetes mellitus (Multi)     Heart disease     Hypertension     Substance abuse (Multi)      Past Surgical History:   Procedure Laterality Date    CARDIAC CATHETERIZATION N/A 7/18/2024    Procedure: Right Heart Cath;  Surgeon: Delroy Jurado MD;  Location: [facility] Cardiac Cath Lab;  Service: Cardiovascular;  Laterality: N/A;    MR HEAD ANGIO WO IV CONTRAST  12/29/2022    MR HEAD ANGIO WO IV CONTRAST 12/29/2022 DOCTOR OFFICE [facility]    OTHER SURGICAL HISTORY  07/21/2022    Cyst excision    OTHER SURGICAL HISTORY  10/25/2022    Cardiac catheterization     Family History   Adopted: Yes     Social History     Tobacco Use    Smoking status: Every Day     Types: Cigarettes    Smokeless tobacco: Not on file   Vaping Use    Vaping status: Unknown   Substance Use Topics    Alcohol use: Yes     Comment: occasionaly - 1-2 times a month    Drug use: Not Currently     Types: Codeine       Physical Exam   ED Triage Vitals [12/04/24 0706]   Temperature Heart Rate Respirations BP   36.5 °C (97.7 °F) (!) 101 (!) 22 134/90      Pulse Ox Temp Source Heart Rate Source Patient Position   97 % Temporal Monitor Sitting      BP Location FiO2 (%)     Right arm --       Physical  Exam  Constitutional:       General: He is in acute distress.      Appearance: Normal appearance. He is not ill-appearing or diaphoretic.   HENT:      Head: Normocephalic and atraumatic.      Nose: Nose normal.   Eyes:      Extraocular Movements: Extraocular movements intact.      Conjunctiva/sclera: Conjunctivae normal.      Pupils: Pupils are equal, round, and reactive to light.   Cardiovascular:      Rate and Rhythm: Normal rate and regular rhythm.   Pulmonary:      Effort: Pulmonary effort is normal. No respiratory distress.      Breath sounds: Normal breath sounds. No stridor. No wheezing.   Abdominal:      Tenderness: There is abdominal tenderness (Right lower quadrant, right lateral flank). There is guarding.   Musculoskeletal:         General: Normal range of motion.      Cervical back: Normal range of motion.   Skin:     General: Skin is warm and dry.   Neurological:      General: No focal deficit present.      Mental Status: He is alert and oriented to person, place, and time. Mental status is at baseline.   Psychiatric:         Mood and Affect: Mood normal.           ED Course & MDM   Diagnoses as of 12/04/24 1023   Right sided abdominal pain                 No data recorded     [place] Coma Scale Score: 15 (12/04/24 0707 : Luc Felipe RN)                           Medical Decision Making  A 38-year-old male patient comes into the emergency department today with complaints of severe right-sided abdominal pain and right lower quadrant abdominal pain.  States this started last night and was affecting his sleep.  States it comes in waves.  He rates pain an 8 out of 10 on the pain scale.  Describes it as sharp and burning.  States he also felt a little short of breath last night but that has improved.  He otherwise has no other complaints present time for this purpose comes into the emergency department today further evaluation.    Laboratory studies ordered as well as CT abdomen pelvis and chest  x-ray.  Wrote electrolyte abnormality, leukocytosis, acute kidney injury, pneumonia, pneumothorax, pulmonary congestion or pleural effusions as well as rule out any acute intra-abdominal surgical need including but not limited to pyelonephritis, ureteral stone, appendicitis, cholecystitis, bowel perforation or abscess.  Offered patient medication for discomfort or pain at this time patient declines.    Patient's urinalysis is negative, negative for COVID-19 influenza, lipase negative, elevated glucose at 453 but normal renal function no electrolyte abnormalities, no DKA.  Patient has no leukocytosis or left shift.  Hemoglobin at 13.  CT of the abdomen pelvis is negative for any acute findings.  There is decreased enhancement in the midpole of the left kidney but patient does not pain in this area nor does he have infection in his urine nor any acute renal insufficiency.  Low concern for pyelonephritis.  Patient's chest x-ray shows some cardiomegaly but otherwise negative.    Patient will be discharged home with follow-up.  Patient agrees with this plan expressed full verbal understanding.  Questions were answered.    Diagnosis: Right side abdominal pain      Labs Reviewed   CBC WITH AUTO DIFFERENTIAL - Abnormal       Result Value    WBC 6.8      nRBC 0.0      RBC 4.12 (*)     Hemoglobin 13.0 (*)     Hematocrit 38.9 (*)     MCV 94      MCH 31.6      MCHC 33.4      RDW 12.4      Platelets 288      Neutrophils % 70.8      Immature Granulocytes %, Automated 0.3      Lymphocytes % 18.0      Monocytes % 8.7      Eosinophils % 1.9      Basophils % 0.3      Neutrophils Absolute 4.80      Immature Granulocytes Absolute, Automated 0.02      Lymphocytes Absolute 1.22      Monocytes Absolute 0.59      Eosinophils Absolute 0.13      Basophils Absolute 0.02     COMPREHENSIVE METABOLIC PANEL - Abnormal    Glucose 453 (*)     Sodium 134 (*)     Potassium 4.0      Chloride 98      Bicarbonate 28      Anion Gap 12      Urea Nitrogen  20      Creatinine 1.25      eGFR 76      Calcium 8.8      Albumin 3.7      Alkaline Phosphatase 62      Total Protein 6.7      AST 33      Bilirubin, Total 0.7      ALT 27     LACTATE - Abnormal    Lactate 2.4 (*)     Narrative:     Venipuncture immediately after or during the administration of Metamizole may lead to falsely low results. Testing should be performed immediately prior to Metamizole dosing.   URINALYSIS WITH REFLEX CULTURE AND MICROSCOPIC - Abnormal    Color, Urine Colorless (*)     Appearance, Urine Clear      Specific Gravity, Urine 1.026      pH, Urine 5.5      Protein, Urine NEGATIVE      Glucose, Urine OVER (4+) (*)     Blood, Urine NEGATIVE      Ketones, Urine NEGATIVE      Bilirubin, Urine NEGATIVE      Urobilinogen, Urine Normal      Nitrite, Urine NEGATIVE      Leukocyte Esterase, Urine NEGATIVE      Narrative:     OVER is reported when the result is greater than the clinically reportable range.   LIPASE - Normal    Lipase 44      Narrative:     Venipuncture immediately after or during the administration of Metamizole may lead to falsely low results. Testing should be performed immediately prior to Metamizole dosing.   SARS-COV-2 PCR - Normal    Coronavirus 2019, PCR Not Detected      Narrative:     This assay has received FDA Emergency Use Authorization (EUA) and is only authorized for the duration of time that circumstances exist to justify the authorization of the emergency use of in vitro diagnostic tests for the detection of SARS-CoV-2 virus and/or diagnosis of COVID-19 infection under section 564(b)(1) of the Act, 21 U.S.C. 360bbb-3(b)(1). This assay is an in vitro diagnostic nucleic acid amplification test for the qualitative detection of SARS-CoV-2 from nasopharyngeal specimens and has been validated for use at [facility]. Negative results do not preclude COVID-19 infections and should not be used as the sole basis for diagnosis, treatment, or other management  decisions.     INFLUENZA A AND B PCR - Normal    Flu A Result Not Detected      Flu B Result Not Detected      Narrative:     This assay is an in vitro diagnostic multiplex nucleic acid amplification test for the detection and discrimination of Influenza A & B from nasopharyngeal specimens, and has been validated for use at [facility]. Negative results do not preclude Influenza A/B infections, and should not be used as the sole basis for diagnosis, treatment, or other management decisions. If Influenza A/B and RSV PCR results are negative, testing for Parainfluenza virus, Adenovirus and Metapneumovirus is routinely performed for [facility] pediatric oncology and intensive care inpatients, and is available on other patients by placing an add-on request.   URINALYSIS WITH REFLEX CULTURE AND MICROSCOPIC    Narrative:     The following orders were created for panel order Urinalysis with Reflex Culture and Microscopic.  Procedure                               Abnormality         Status                     ---------                               -----------         ------                     Urinalysis with Reflex C...[[id number]]  Abnormal            Final result               Extra Urine Gray Tube[[id number]]                            In process                   Please view results for these tests on the individual orders.   EXTRA URINE GRAY TUBE   LACTATE        CT abdomen pelvis w IV contrast   Final Result   No CT evidence of bowel obstruction or inflammation.        No CT evidence of renal calculus disease.        Focus of decreased enhancement posteriorly in the midpole cortex of   the left kidney, suspicious for  focal pyelonephritis. Clinical and   laboratory correlation are needed.        Cardiomegaly.        Mild hepatomegaly.        MACRO:   None        Signed by: Christian Camacho 12/4/2024 9:18 AM   Dictation workstation:   [id number]      XR chest 1 view   Final Result   Mild cardiomegaly.   Currently without radiographic evidence of CHF or   pneumonia.        MACRO:   None        Signed by: Christian Camacho 12/4/2024 9:09 AM   Dictation workstation:   [id number]            Procedure  Procedures     Kel Nelson PA-C  12/04/24 1023

## 2024-12-05 ENCOUNTER — OFFICE VISIT (OUTPATIENT)
Dept: CARDIOLOGY | Facility: HOSPITAL | Age: 39
End: 2024-12-05
Payer: COMMERCIAL

## 2024-12-05 VITALS
HEART RATE: 101 BPM | DIASTOLIC BLOOD PRESSURE: 82 MMHG | SYSTOLIC BLOOD PRESSURE: 135 MMHG | OXYGEN SATURATION: 98 % | HEIGHT: 69 IN | WEIGHT: 302 LBS | BODY MASS INDEX: 44.73 KG/M2

## 2024-12-05 DIAGNOSIS — R60.9 EDEMA, UNSPECIFIED TYPE: ICD-10-CM

## 2024-12-05 DIAGNOSIS — Z79.4 TYPE 2 DIABETES MELLITUS WITH DIABETIC NEUROPATHY, WITH LONG-TERM CURRENT USE OF INSULIN: ICD-10-CM

## 2024-12-05 DIAGNOSIS — I50.43 ACUTE ON CHRONIC COMBINED SYSTOLIC AND DIASTOLIC CONGESTIVE HEART FAILURE: ICD-10-CM

## 2024-12-05 DIAGNOSIS — I50.9 ACUTE DECOMPENSATED HEART FAILURE: ICD-10-CM

## 2024-12-05 DIAGNOSIS — I44.7 LEFT BUNDLE BRANCH BLOCK: ICD-10-CM

## 2024-12-05 DIAGNOSIS — E11.40 TYPE 2 DIABETES MELLITUS WITH DIABETIC NEUROPATHY, WITH LONG-TERM CURRENT USE OF INSULIN: ICD-10-CM

## 2024-12-05 DIAGNOSIS — I10 PRIMARY HYPERTENSION: ICD-10-CM

## 2024-12-05 DIAGNOSIS — Z72.0 TOBACCO ABUSE: ICD-10-CM

## 2024-12-05 DIAGNOSIS — I50.20 HFREF (HEART FAILURE WITH REDUCED EJECTION FRACTION): Primary | ICD-10-CM

## 2024-12-05 PROCEDURE — 3079F DIAST BP 80-89 MM HG: CPT | Performed by: STUDENT IN AN ORGANIZED HEALTH CARE EDUCATION/TRAINING PROGRAM

## 2024-12-05 PROCEDURE — 99215 OFFICE O/P EST HI 40 MIN: CPT | Performed by: STUDENT IN AN ORGANIZED HEALTH CARE EDUCATION/TRAINING PROGRAM

## 2024-12-05 PROCEDURE — 3075F SYST BP GE 130 - 139MM HG: CPT | Performed by: STUDENT IN AN ORGANIZED HEALTH CARE EDUCATION/TRAINING PROGRAM

## 2024-12-05 PROCEDURE — 99406 BEHAV CHNG SMOKING 3-10 MIN: CPT | Performed by: STUDENT IN AN ORGANIZED HEALTH CARE EDUCATION/TRAINING PROGRAM

## 2024-12-05 PROCEDURE — 3046F HEMOGLOBIN A1C LEVEL >9.0%: CPT | Performed by: STUDENT IN AN ORGANIZED HEALTH CARE EDUCATION/TRAINING PROGRAM

## 2024-12-05 PROCEDURE — 3008F BODY MASS INDEX DOCD: CPT | Performed by: STUDENT IN AN ORGANIZED HEALTH CARE EDUCATION/TRAINING PROGRAM

## 2024-12-05 RX ORDER — ATORVASTATIN CALCIUM 40 MG/1
40 TABLET, FILM COATED ORAL NIGHTLY
Qty: 30 TABLET | Refills: 11 | Status: SHIPPED | OUTPATIENT
Start: 2024-12-05

## 2024-12-05 RX ORDER — HYDRALAZINE HYDROCHLORIDE 25 MG/1
25 TABLET, FILM COATED ORAL 3 TIMES DAILY
Qty: 270 TABLET | Refills: 3 | Status: SHIPPED | OUTPATIENT
Start: 2024-12-05 | End: 2025-12-05

## 2024-12-05 RX ORDER — SACUBITRIL AND VALSARTAN 49; 51 MG/1; MG/1
1 TABLET, FILM COATED ORAL 2 TIMES DAILY
Qty: 60 TABLET | Refills: 11 | Status: SHIPPED | OUTPATIENT
Start: 2024-12-05 | End: 2025-12-05

## 2024-12-05 RX ORDER — ISOSORBIDE DINITRATE 20 MG/1
20 TABLET ORAL
Qty: 90 TABLET | Refills: 3 | Status: SHIPPED | OUTPATIENT
Start: 2024-12-05

## 2024-12-05 RX ORDER — CARVEDILOL 6.25 MG/1
6.25 TABLET ORAL
Qty: 60 TABLET | Refills: 11 | Status: SHIPPED | OUTPATIENT
Start: 2024-12-05 | End: 2025-12-05

## 2024-12-05 RX ORDER — DAPAGLIFLOZIN 10 MG/1
10 TABLET, FILM COATED ORAL DAILY
Qty: 90 TABLET | Refills: 3 | Status: SHIPPED | OUTPATIENT
Start: 2024-12-05 | End: 2025-12-05

## 2024-12-05 RX ORDER — SPIRONOLACTONE 25 MG/1
25 TABLET ORAL DAILY
Qty: 30 TABLET | Refills: 11 | Status: SHIPPED | OUTPATIENT
Start: 2024-12-05 | End: 2025-12-05

## 2024-12-05 RX ORDER — TORSEMIDE 20 MG/1
60 TABLET ORAL 2 TIMES DAILY
Qty: 540 TABLET | Refills: 3 | Status: SHIPPED | OUTPATIENT
Start: 2024-12-05 | End: 2025-12-05

## 2024-12-05 NOTE — PROGRESS NOTES
Primary Care Physician: Christopher D'Amico, DO  Patient's Location: [facility] [address]    Date of Visit: 12/05/2024  1:30 PM EST  Location of visit: [facility]   Type of Visit: Established  Date of initial visit: 3/14/2023  Last visit: 9/24/2024      HPI / Summary:   Arthur Carranza is a very pleasant 38 y.o. male from [place], [place] who presents for management of NICM/HFrEF (EF 5-10%), initially diagnosed 10/2022. 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      Initial CV History: from 3/14/2023  Mr. Carranza was initially diagnosed with NICM during hospitalization at [facility] in October 2022 after presentation with SOB, weight gain. During hospitalization he had elevated BNP and echo with EF 15-20%. Coronary angiogram showed no CAD. He was started on BB, ARB and discharged with LifeVest. He followed up with Dr. Andrews as outpatient where he had not started his medications. He had multiple no-shows with Dr. Andrews and EP team. In December he was admitted with R sided weakness related to Cardioembolic stroke. He was given TNK with improvement and discharged on rivaroxaban. He followed up with Eugenia Lyman for post hospital visit and was referred for tobacco cessation, cardiac rehab, primary care, comprehensive weight loss, sleep clinic but these have not been scheduled.     Hospitalizations: 2/20/24-2/23/24 CHF  Admitted 7/18/2024 - 7/30/2024 (12 days) [facility] for ADHF and LVAD eval. Presented for LVAD on 7/30/2024. Currently deferred given recent substance use, uncontrolled diabetes, and psychosocial barriers.     Last visit: 9/24/2024  - Continue current medications  -- restart carvedilol 3.125mg twice daily  -- We will restart spironolactone 12.5mg  "daily if your labs are stable.  - Labwork: today  - Imaging/Procedures: none  - Referrals:   -- electrophysiology discuss defibrillator  -- cardiac rehab  - Followup: 3 months / January    Interval history:   ER visits 10/1 and 12/4 for abdominal pain.   Going to cardiac rehab    Scheduled for urgent appt today because:  \"wife reached out to inform us that he has been experiencing vomiting for the past 4 to 6 weeks, along with reports of kidney pain. He is having difficulty adhering to his medication regimen; currently, the only medication he is taking is his diuretic, which he takes in a dosage of 2 to 3 tablets daily. Additionally, he is experiencing shortness of breath and a decrease in urinary frequency. He visited the emergency room last night, where no issues were identified.\"    Today:  Accompanied by: Wife  Diet: None     Weight is up 10lbs. He states he actually is taking meds. Not peeing well.     Patient reports SOB \"all of the time\", KEY, and orthopnea. He currently sleeps with two pillows at night.  He reports BLE edema. He states it gets worse throughout the day, and is better when he gets up in the morning.  He reports daily fatigue.   He states he will feel palpitations when he is short of breath.     PMHx: DM II, HTN, HLD, Obesity, ?PHOEBE, Bipolar I Disorder (Previous SI/HI 2021), Perirectal Abscess  PSHx: Denies.   Family Hx: Foster care, family hx unknown.   Social Hx: Current smoker. Hx of marijuana/cocaine- denies current use. Denies EtOH. 8 kids, 12 year old daughter w/?arrhythmias.       Objective   Medical History:   He has a past medical history of CHF (congestive heart failure), Diabetes mellitus (Multi), Heart disease, Hypertension, and Substance abuse (Multi).  Surgical Hx:   He has a past surgical history that includes Other surgical history (07/21/2022); Other surgical history (10/25/2022); MR angio head wo IV contrast (12/29/2022); and Cardiac catheterization (N/A, 7/18/2024).   Social " "Hx:   He reports that he has been smoking cigarettes. He does not have any smokeless tobacco history on file. He reports current alcohol use. He reports that he does not currently use drugs after having used the following drugs: Codeine.  Family Hx:   His family history is not on file. He was adopted.   Allergies:   Allergies   Allergen Reactions    Shellfish Containing Products Itching, Rash and GI Upset     Exam:       12/4/2024    10:32 AM 12/4/2024     7:06 AM 11/18/2024     8:00 AM 10/22/2024     1:00 PM 10/1/2024     3:03 AM 10/1/2024    12:53 AM   Vitals   Systolic 122 134 114 114 122 114   Diastolic 88 90 79 82 86 91   BP Location  Right arm    Right arm   Heart Rate 98 101   69 72   Temp  36.5 °C (97.7 °F)       Resp 20 22   19 18   Height  1.753 m (5' 9\") 1.753 m (5' 9.02\") 1.753 m (5' 9\")     Weight (lb)  310 306 308.2     BMI  45.78 kg/m2 45.17 kg/m2 45.51 kg/m2     BSA (m2)  2.62 m2 2.6 m2 2.61 m2       Wt Readings from Last 20 Encounters:   12/04/24 141 kg (310 lb)   11/18/24 139 kg (306 lb)   10/22/24 140 kg (308 lb 3.2 oz)   09/30/24 136 kg (300 lb)   09/24/24 139 kg (307 lb 3.2 oz)   09/18/24 137 kg (302 lb)   09/17/24 137 kg (302 lb 0.5 oz)   09/06/24 137 kg (301 lb)   08/23/24 136 kg (300 lb)   08/09/24 136 kg (300 lb)   07/30/24 137 kg (302 lb 14.6 oz)   07/18/24 137 kg (301 lb 9.4 oz)   07/13/24 136 kg (300 lb)   07/12/24 137 kg (301 lb)   07/02/24 137 kg (301 lb 9.6 oz)   06/10/24 138 kg (305 lb)   06/06/24 136 kg (300 lb 7.8 oz)   05/28/24 138 kg (304 lb)   05/28/24 136 kg (300 lb)   05/21/24 134 kg (296 lb)     GEN: Pleasant, well-appearing, + conversational dyspnea.   HEENT: JVP not elevated, no icterus. No HJR  CHEST: No wheeze, good air movement.  CV: Normal rate, regular rhythm.  Splt S2 no m/r/g  ABD: Soft, ND, NT, obese.   EXT: Warm, well perfused, No LE edema.   NEURO: Pleasant, Oriented to plan    Labs:   CMP:  Recent Labs     12/04/24  0737 10/01/24  0036 07/30/24  0550 " 07/29/24  0554 07/28/24  0617 07/27/24  0759 07/26/24  0841 07/25/24  1620 07/25/24  1236 07/25/24  0724 07/24/24  1804 07/24/24  0529 07/23/24  0009   * 136 136 134* 134*  134* 133* 134*  --   --  132*   < > 132* 132*   K 4.0 4.1 4.6 4.8 4.8  4.7 4.5 5.0 5.0   < > 5.9*   < > 4.4 3.9   CL 98 100 100 98 97*  97* 96* 97*  --   --  98   < > 99 95*   CO2 28 29 26 27 24  27 26 25  --   --  24   < > 23 25   ANIONGAP 12 11 15 14 18  15 16 17  --   --  16   < > 14 16   BUN 20 19 28* 32* 34*  35* 35* 36*  --   --  25*   < > 26* 27*   CREATININE 1.25 1.30 1.61* 1.70* 1.85*  1.95* 1.78* 1.87*  --   --  1.76*   < > 1.69* 1.64*   EGFR 76 73 56* 53* 48*  45* 50* 47*  --   --  50*   < > 53* 55*   MG  --   --  2.26 2.40 2.29 2.28 2.41*  --   --  2.35  --  2.30 2.07    < > = values in this interval not displayed.     Recent Labs     12/04/24  0737 10/01/24  0036 07/30/24  0550 07/29/24  0554 07/28/24  0617 07/27/24  0759 07/26/24  0841 07/25/24  0724 07/23/24  0009 07/22/24  1634 07/19/24  0519 07/18/24  1558 07/02/24  0910 05/21/24  0938 02/21/24  0134 11/29/21  0443 11/07/20  1338   ALBUMIN 3.7 4.5 4.2 4.2 4.1  4.1 4.3 4.6 4.4   < > 4.0   < > 3.8 4.4 4.1 4.2   < > 4.6   ALT 27 15  --   --  20  --   --   --   --  16  --  17 33 21 15   < > 33   AST 33 23  --   --  22  --   --   --   --  25  --  19 21 24 18   < > 34   BILITOT 0.7 1.1  --   --  0.5  --   --   --   --  0.5  --  0.5 0.8 0.6 1.1   < > 0.7   LIPASE 44 25  --  59  --   --   --   --   --   --   --   --   --   --   --   --  29    < > = values in this interval not displayed.     CBC:  Recent Labs     12/04/24  0737 10/01/24  0036 07/30/24  1204 07/27/24  0759 07/26/24  0841 07/25/24  0724 07/24/24  0529 07/23/24  0528   WBC 6.8 5.2 5.4 4.7 5.3 5.3 5.1 5.6   HGB 13.0* 13.9 12.7* 14.0 15.6 15.1 13.4* 13.6   HCT 38.9* 40.3* 39.2* 43.0 49.0 46.6 39.0* 39.9*    253 271 248 250 202 179 197   MCV 94 94 96 95 98 97 92 92     COAG:   Recent Labs      07/28/24  0617 07/27/24  0759 07/26/24  0841 07/25/24  1907 07/25/24  0724 07/24/24  0529 07/23/24  0216 07/18/24  1818 07/18/24  1558 07/02/24  0910 12/02/23  2341 12/28/22  1755 12/28/22  1533 10/20/22  1211   INR  --   --   --  1.0  --   --  0.9  --  1.1 1.0 1.1 1.0 1.0 1.0   HAUF 0.5 0.5 0.4  --  0.4 0.4 0.3   < >  --   --   --   --   --   --    DDIMERVTE  --   --   --   --   --   --   --   --   --   --   --   --   --  696*    < > = values in this interval not displayed.     ABO:   Recent Labs     07/22/24 1634   ABO O     HEME/ENDO:   Recent Labs     07/22/24  1634 07/18/24  1558 05/21/24  0938 02/01/24  0544 12/12/23  1112 04/20/23  1109 03/14/23  1056   FERRITIN  --  234 274  --   --   --  394*   IRONSAT  --  28 24*  --   --   --  28   TSH 1.73 2.29 3.98  --   --   --  3.62   HGBA1C  --  13.3* 13.7* 15.3* 10.8*   < >  --     < > = values in this interval not displayed.     CARDIAC:   Recent Labs     12/04/24  0737 07/28/24  1010 07/22/24  1634 07/18/24  1558 07/02/24  0910 05/21/24  0938 02/21/24  0958 02/20/24  0126 02/01/24  0154 01/31/24 2014 12/03/23  0823 12/03/23  0128 12/02/23  2342   LDH  --   --  208  --   --   --   --   --   --   --   --   --   --    TROPHS  --   --   --  59*  --   --  67* 65* 177* 99* 55* 54* 40*   * 108* 107* 127* 173* 195*  --  259*  --  314*  --   --   --      Recent Labs     12/12/23  1112 04/20/23  1109 12/28/22  1937 10/21/22  0528   CHOL 125 122 162 166   LDLF  --  65 - 85   LDLCALC 50  --   --   --    HDL 26.6 31.4* 32.4* 40.0   TRIG 241* 128 401* 207*     TOX:  Recent Labs     10/01/24  0200 07/22/24  1634 07/18/24  1927 07/02/24  1222   AMPHETAMINE Presumptive Negative Negative Presumptive Negative Presumptive Negative   BARBSCRNUR Presumptive Negative  --  Presumptive Negative Presumptive Negative   BENZO Presumptive Negative  --  Presumptive Negative Presumptive Negative   CANNABINOID Presumptive Negative  --  Presumptive Negative Presumptive Negative   COCAI  Presumptive Positive*  --  Presumptive Negative Presumptive Positive*   FENTANYL Presumptive Negative  --  Presumptive Negative Presumptive Negative   OPIATE Presumptive Negative  --  Presumptive Negative Presumptive Negative   OXYCODONE Presumptive Negative  --  Presumptive Negative Presumptive Negative   PCP Presumptive Negative  --  Presumptive Negative Presumptive Negative     MICRO:   Recent Labs     02/01/24  0000 06/15/22  0756 12/30/21  1241   CRP 0.50 6.52* 0.49     No results found for the last 90 days.        Notable Studies:   EKG:   Recent Labs     07/25/24  1420 07/25/24  1205 07/18/24  1500   VENTRATE 84 95 94   ATRRATE 84 95 94   QTCFRED 469 475 487   QRSDUR 152 142 136   QTCCALCB 496 512 525     Encounter Date: 07/18/24   Electrocardiogram, 12-lead PRN ACS symtpoms   Result Value    Ventricular Rate 84    Atrial Rate 84    MA Interval 198    QRS Duration 152    QT Interval 420    QTC Calculation(Bazett) 496    P Axis 47    R Axis -56    T Axis 88    QRS Count 14    Q Onset 213    P Onset 114    P Offset 170    T Offset 423    QTC Fredericia 469    Narrative    Sinus rhythm with sinus arrhythmia with occasional Premature ventricular complexes  Possible Left atrial enlargement  Left axis deviation  Left bundle branch block  Abnormal ECG  When compared with ECG of 25-JUL-2024 12:02,  Significant changes have occurred  Confirmed by Abel Aburto ([id number]) on 7/28/2024 4:19:48 AM     EKG 7/2/24: ST at 91bpm, RACHEL, LBBB/IVCD QRS 142ms    Echocardiogram:   Recent Labs     07/19/24  1133 02/21/24  0249   EF 10 13   LVIDD 7.40 6.83   RVFRWALLPKSP 10.20 10.50   TAPSE 2.1 1.9   Transthoracic Echo (TTE) Complete With Contrast 02/21/2024  Left Ventricle: Left ventricular systolic function is severely decreased, with an estimated ejection fraction of 10%. There is global hypokinesis of the left ventricle with minor regional variations. The left ventricular cavity size is moderately dilated. Spectral Doppler shows a  pseudonormal pattern of left ventricular diastolic filling.  Left Atrium: The left atrium is mild to moderately dilated.  Right Ventricle: The right ventricle is normal in size. There is normal right ventricular global systolic function.  Right Atrium: The right atrium is normal in size.  Aortic Valve: The aortic valve appears structurally normal. The aortic valve appears tricuspid. There is no evidence of aortic valve stenosis.  There is no evidence of aortic valve regurgitation. The peak instantaneous gradient of the aortic valve is 2.4 mmHg. The mean gradient of the aortic valve is 2.0 mmHg.  Mitral Valve: The mitral valve is normal in structure. There is no evidence of mitral valve stenosis. There is normal mitral valve leaflet mobility. There is trace mitral valve regurgitation.  Tricuspid Valve: The tricuspid valve is structurally normal. There is mild tricuspid regurgitation.  Pulmonic Valve: The pulmonic valve is structurally normal. There is no indication of pulmonic valve regurgitation.  Pericardium: There is no pericardial effusion noted.  Aorta: The aortic root is normal.  Pulmonary Artery: The pulmonary artery is not well visualized.  Systemic Veins: The inferior vena cava appears to be of normal size.  In comparison to the previous echocardiogram(s): The left ventricular function is worse. The left ventricular diastolic function is worse.      CONCLUSIONS:  1. Left ventricular systolic function is severely decreased with a 10% estimated ejection fraction.  2. Spectral Doppler shows a pseudonormal pattern of left ventricular diastolic filling.  3. The left atrium is mild to moderately dilated.  4. Trace mitral valve regurgitation.  5. Mild tricuspid regurgitation is visualized.  6. Aortic valve stenosis is not present.  7. Left ventricular cavity size is moderately dilated.  8. The pulmonary artery is not well visualized.  9. There is global hypokinesis of the left ventricle with minor regional  variations.      Coronary Angiography:   Adult Cath 10/21/2022    Coronary Angiography:  The coronary circulation is right dominant.    Left Main Coronary Artery:  There is 0% stenosis in the entire left main coronary artery.    Left Anterior Descending Coronary Artery Distribution:  There is 0stenosis in the entire left anterior descending artery.    Circumflex Coronary Artery Distribution:  There is 0stenosis in the the entire Circumflex artery.    Right Coronary Artery Distribution:    There is 0stenosis in the the entire Right Coronary Artery.  Cardiac Cath Transition of Care Summary:  Post Procedure Diagnosis: Normal coronary arteries.  Findings:                 Severe LV dysfunction.  Blood Loss:               Estimated blood loss during the procedure was 0 mls.  Specimens Removed:        Number of specimen(s) removed: none.    ____________________________________________________________________________________  CONCLUSIONS:  1. The entire Left Main: 0% stenosis.  2. Entire LAD Lesion: The percent stenosis is 0%.  3. Entire CX Lesion: The percent stenosis is 0%.  4. The entire RCA Lesion: The percent stenosis is 0%.      Right Heart Cath: No results found for this or any previous visit from the past 1800 days.    Cardiac Scoring: No results found for this or any previous visit from the past 1800 days.    Cardiac MRI:   MR cardiac morphology and function w and wo IV contrast   2023-04-20 09:03:53  SUMMARY  =====================================================================  =====================================    NICMP. Severe LV dilation and remodeling.  Diffuse LV fibrosis with elevated extracellular volume (ECV of 40%).  No evidence of  No evidence of amyloidosis or hemachromatosis.  No LV thrombus.    LEFT VENTRICLE: Quantitative LVEF 13 %. LV wall thickness is normal.  LV cavity is severely enlarged. LV systolic function  is severely decreased globally. There is no LV mass/thrombus.    VIABILITY: LV  scar size is 5 %.    RIGHT VENTRICLE: RV cavity size is normal. RV systolic function is  normal. There is no RV mass/thrombus. There is no RV  fibro-fatty infiltration.    LV/RV SEPTUM: The ventricular septum is intact.    LA/RA SEPTUM: The atrial septum is intact.    LEFT ATRIUM: LA is mildly enlarged.    RIGHT ATRIUM: RA cavity size is normal.    PERICARDIUM: Pericardium is normal. There is a trivial pericardial  effusion.    PLEURAL EFFUSION: There is no pleural effusion.    AORTIC VALVE: Peak aortic valve velocity -130 cm/sec. Aortic  regurgitant volume 1 ml. Aortic regurgitant fraction 1 %.    MITRAL VALVE: Mitral valve leaflets are normal. There is no mitral  valve mass, thrombus, or vegetation. There is mild  mitral regurgitation.    TRICUSPID VALVE: Tricuspid valve leaflets are normal. The tricuspid  valve annulus is normal in size.    AORTIC ROOT: The aortic root is mildly dilated.      CORE EXAM  =====================================================================  =====================================    MEASUREMENTS  ---------------------------------------------------------------------  -------------------  VOLUMETRIC ANALYSIS  ----------------------------------------------  .------------------------------------------------------.  .      .          . LV   . Reference  . RV . Reference .  +------+----------+------+------------+----+-----------+  . EDV  . ml       .  440 .  (121-204) .    .           .  .      . ml/m\S\2    .  179 .  ()  .    .           .  . ESV  . ml       .  383 .  (33-78)   .    .           .  .      . ml/m\S\2    .  156 .  (18-39)   .    .           .  . CO   . L/min    . 5.40 .            .    .           .  .      . L/min/m\S\2 . 2.20 .            .    .           .  . MASS . g        .  328 .  (109-185) .    .           .  .      . g/m\S\2     .  133 .  (59-92)   .    .           .  . SV   . ml       .   57 .  ()  .    .           .  .      . ml/m\S\2    .   23 .   (43-67)   .    .           .  . EF   . %        .   13 .  (57-75)   .    .           .  '------+----------+------+------------+----+-----------'    CARDIAC OUTPUT HR:  95 bpm  LV DIMENSIONS  ----------------------------------------------  WALL THICKNESS - ANTEROSEPTAL:  1.0 cm  WALL THICKNESS - INFEROLATERAL:  1.0 cm  WALL THICKNESS - MAXIMUM:  1.1 cm  LV JOSE:  7.7 cm    LA DIMENSIONS (LV SYSTOLE)  ----------------------------------------------  AREA - 2 CHAMBER:  32 cm\S\2  LENGTH - 2 CHAMBER:  6.1 cm  AREA - 4 CHAMBER:  29 cm\S\2  LENGTH - 4 CHAMBER:  7 cm  VOLUME:  129 ml  VOLUME NORMALIZED:  52.6 ml/m\S\2    RA DIMENSIONS (RV SYSTOLE)  ----------------------------------------------  AREA - 4 CHAMBER:  19 cm\S\2  LENGTH - 4 CHAMBER:  5.9 cm    AORTIC ROOT DIMENSIONS  ----------------------------------------------  ANNULUS:  2.7 cm  SINUS OF VALSALVA:  3 cm    EXTRACELLULAR VOLUME MEASUREMENT  ----------------------------------------------  PRE-CONTRAST T1 MYOCARDIUM:  1090 msec  PRE-CONTRAST T1 LV CAVITY:  1458 msec  POST-CONTRAST T1 MYOCARDIUM:  310 msec  POST-CONTRAST T1 LV CAVITY:  257 msec  HEMATOCRIT:  44 %  ECV:  40 %    IRON QUANTIFICATION  ----------------------------------------------  MYOCARDIAL T2*:  24 msec  LIVER T2*:  17 msec      17 SEGMENT  ---------------------------------------------------------------------  -------------------  .---------------------------------------------------------------------  ---------------------------.  . Segments           . Wall Motion  . Hyperenhancement . Stress  Perfusion . Interpretation       .  +--------------------+--------------+------------------+--------------  ----+----------------------+  . Base Anterior      . Severe Hypo  . None             .  . Abnormal Wall Motion .  . Base Anteroseptal  . Severe Hypo  . 1-25%            .  . Abnormal Wall Motion .  . Base Inferoseptal  . Severe Hypo  . 1-25%            .  . Abnormal Wall Motion .  . Base Inferior       . Severe Hypo  . None             .  . Abnormal Wall Motion .  . Base Inferolateral . Severe Hypo  . None             .  . Abnormal Wall Motion .  . Base Anterolateral . Severe Hypo  . 1-25%            .  . Abnormal Wall Motion .  . Mid Anterior       . Severe Hypo  . None             .  . Abnormal Wall Motion .  . Mid Anteroseptal   . Severe Hypo  . 1-25%            .  . Abnormal Wall Motion .  . Mid Inferoseptal   . Severe Hypo  . 1-25%            .  . Abnormal Wall Motion .  . Mid Inferior       . Severe Hypo  . None             .  . Abnormal Wall Motion .  . Mid Inferolateral  . Severe Hypo  . None             .  . Abnormal Wall Motion .  . Mid Anterolateral  . Severe Hypo  . None             .  . Abnormal Wall Motion .  . Apical Anterior    . Severe Hypo  . None             .  . Abnormal Wall Motion .  . Apical Septal      . Severe Hypo  . 1-25%            .  . Abnormal Wall Motion .  . Apical Inferior    . Severe Hypo  . None             .  . Abnormal Wall Motion .  . Apical Lateral     . Severe Hypo  . None             .  . Abnormal Wall Motion .  . [place]               . Severe Hypo  . None             .  . Abnormal Wall Motion .  +--------------------+--------------+------------------+--------------  ----+----------------------+  . RV Segments        . Wall Motion  . Hyperenhancement . Stress  Perfusion . Interpretation       .  +--------------------+--------------+------------------+--------------  ----+----------------------+  . RV Basal Anterior  . Normal/Hyper . None             .  . Normal               .  . RV Basal Inferior  . Normal/Hyper . None             .  . Normal               .  . RV Mid             . Normal/Hyper . None             .  . Normal               .  -. RV Apical          . Normal/Hyper . None             .  . Normal                .  '--------------------+--------------+------------------+--------------  ----+----------------------'    FINDINGS  ----------------------------------------------  LV SCAR SIZE (17 SEGMENT):  5 %      Nuclear:No results found for this or any previous visit from the past 1800 days.    Metabolic Stress:   (NON-INVASIVE)  +--------------------+----+-------------+----------+                      RestPeak Exercise% Achieved  +--------------------+----+-------------+----------+        HR (BPM)       94      132         72      +--------------------+----+-------------+----------+  Systolic BP         124 160                      +--------------------+----+-------------+----------+  Diastolic BP        82  90                       +--------------------+----+-------------+----------+  MAP (mmHg)          96  113                      +--------------------+----+-------------+----------+  VO2 (ml/Kg/min)     2.8 17.3         42          +--------------------+----+-------------+----------+  VO2 (ml/min)        375 2357         42          +--------------------+----+-------------+----------+  O2 Pulse (ml/beat)  4   18                       +--------------------+----+-------------+----------+  VE (L/min)          12  79           56          +--------------------+----+-------------+----------+  VE/VCO2             48  36                       +--------------------+----+-------------+----------+  VD/VT               0.330.20                     +--------------------+----+-------------+----------+  Resp. Rate (br/min) 23  43                       +--------------------+----+-------------+----------+  Tidal Volume (ml)   538 1843                     +--------------------+----+-------------+----------+  RER                 0.670.93                     +--------------------+----+-------------+----------+  End Tidal CO2 (mmHg)28  32                        +--------------------+----+-------------+----------+  Breathing [place]%      44%                      +--------------------+----+-------------+----------+  SaO2%               91  93                       +--------------------+----+-------------+----------+   1. The peak VO2 achieved was [17.3] ml/kg/min which is consistent with Randolph functional class B (moderate exercise impairment). The peak VO2 achieved was 42% peak predicted based on age gender height nomogram.   2. There was a [submaximal] exercise effort with peak RER of 0.93at peak exercise. The peak heart rate was 132bpm which is 72% APMHR.   3. The ventilatory efficiency slope (VE/VCO2) was moderately abnormal (36) at peak exercise.   4. Exercise stress EKG is non-diagnostic for ischemia _ due to inability to reach target heart rate.   5. There was [blunted] augmentation of O2 pulse from 4ml/beat at rest to 18ml/beat at peak exercise indicating limited augmentation of cardiac output and LV stroke volume.   6. The pulmonary response to exercise shows normal breathing reserve at 44% and normal oxygenation during exercise.   7. The ventilatory threshold did not occur.   8. Overall, submaximal cardiopulmonary exercise stress test. Markedly impaired ventilatory efficiency (VE/VCO2~36) is a high risk marker in this patient with heart failure and reduced ejection fraction.      Current Outpatient Medications   Medication Instructions    atorvastatin (LIPITOR) 40 mg, oral, Nightly    Basaglar KwikPen U-100 Insulin 55 Units, subcutaneous, Nightly, Take as directed per insulin instructions.    blood-glucose meter,continuous (FreeStyle Nadir 3 Rake) [facility] Use as instructed to test blood glucose throughout the day    blood-glucose sensor (FreeStyle Nadir 3 Plus Sensor) device Use as directed. Change sensor every 15 days    buPROPion XL (WELLBUTRIN XL) 150 mg, oral, Daily    carvedilol (COREG) 3.125 mg, oral, 2 times daily (morning and late afternoon)     "dicyclomine (BENTYL) 20 mg, oral, 2 times daily    Farxiga 10 mg, oral, Daily    gentamicin (Garamycin) 0.1 % cream     hydrALAZINE (APRESOLINE) 25 mg, oral, 3 times daily    insulin lispro (HumaLOG) 100 unit/mL injection Inject 12 units plus sliding scale three times daily with meals:  = 0u, 151-200 = 2u, 201-250 = 4u, 251-300 = 6u, 301-350 = 8u, 351-400+ = 10u, use up to 60u daily    isosorbide dinitrate (ISORDIL) 20 mg, oral, 3 times daily (0900,1400,1900)    ketorolac (TORADOL) 10 mg, oral, Every 6 hours PRN    metFORMIN (GLUCOPHAGE) 1,000 mg, oral, 2 times daily    ondansetron ODT (ZOFRAN-ODT) 4 mg, oral, Every 8 hours PRN    pen needle, diabetic 32 gauge x 5/32\" needle 1 each, miscellaneous, 4 times daily    sacubitriL-valsartan (Entresto) 24-26 mg tablet 1 tablet, oral, 2 times daily    sertraline (ZOLOFT) 100 mg, oral, Daily    torsemide (DEMADEX) 20 mg, oral, Daily    traZODone (DESYREL) 50 mg, oral, Nightly PRN    Trulicity 3 mg, subcutaneous, Weekly    Xarelto 20 mg, oral, Daily with evening meal, Take with food.      Notable Therapies   Class  Agent SAFETY    ARNI / ACE / ARB  sacubitriL-valsartan 24-26mg BID Last BP:  , Last BUN/Cr (GFR): 20/1.25 (76), 12/4/2024:  7:37 AM.    Beta-Blocker  carvedilol 3.125mg twice daily Last HR:      MRA  spironolactone 12.5mg daily  Last K: 12/4/2024: 4.0     SGLT2  dapagliflozin 10mg daily Last A1c 7/18/2024: 13.3     Diuretic  torsemide 20mg daily Last BNP: 12/4/2024: 431    Hydralazine/ISDN  Hold H25 TID / ISDN20 TID     Anticoagulation  rivaroxaban 20mg daily Last Hgb: 12/4/2024: 13.0    Anti-arrhythmic       Antiplatelet   Last Platelet: 12/4/2024: 288    Lipid-Lowering  atorvastatin 40mg daily Last Tchol 12/12/2023: 125 / LDL No results found for requested labs within last 365 days. or 50 / HDL 26.6 / TRIG 241    Other        Device(s)  None, qualifies Last EF: 7/19/2024: 10  Last QRS: 7/25/2024: 152 (LBBB, refer for CRT-D)    Cardiac Rehab,   if EF " <35/MI/OHS  Go back to attending      Problems Addressed:   # HFrEF / Chronic systolic heart failure, EF 10% (2/21/24), diagnosed 10/2022 with EF 5-10%. No ICD in place. Referred to EP  # Non-ischemic cardiomyopathy, Stage C, NYHA III, No ICD in place. Suspect underlying genetic origin given CM in daughter. He does not know family history. + Cocaine use. Of note he has LBBB and may improve with CRT.   # LBBB/IVCD (QRS 152ms): consideration for CRT-D, has been referred to EP multiple times  # Hypertension: continue GDMT  # Severe Obesity: previously referred to [facility]  # Cocaine use: positive screens, last positive in October 2024. Advised cessation.   # Tobacco use,: 3 minutes spent in counselling on cessation  # Type II Diabetes Mellitus: Last A1c (13.3): endocrinology followup  # Cardioembolic stroke s/p TNK 12/2022: continue rivaroxaban  - Continue current medications  -- Entresto: take 49-51mg twice daily (from 24-26mg)  -- Carvedilol: take 6.25mg mg twice daily  -- Spironolactone: continue 25mg daily  -- Farxiga: continue 10mg daily  -- Torsemide take 60mg twice daily until your breathing improves.   -- Work to get a scale  - Labwork: none  - Imaging/Procedures: none  - Referrals:   -- See electrophysiology (Dr. Hoang in [place] on Tuesdays) discuss defibrillator   -- followup with endocrinology - Dr. Yady Guzman for followup  -- See your primary care phsycian Dr. D'Amico  -- Clinical Pharmacy   -- continue cardiac rehab  - Followup: keep January apppointment  - counselled to avoid alcohol and cocaine (denies recent use) recheck tox    # Hx of Bipolar I Disorder:  Referral to Adult Psychiatry.      Patient Instructions   If you have any questions or need cardiac medication refills, please call the Heart Failure office at [phone], option [phone].   You may also contact the  Heart Failure Nursing team via email at [email or web address] (Please include your name and date of birth).      To reach   Adrien's office please call [phone] / Fax: [phone].  To schedule an appointment call [phone].    - Continue current medications  -- Entresto: take 49-51mg twice daily (from 24-26mg)  -- Carvedilol: take 6.25mg mg twice daily  -- Spironolactone: continue 25mg daily  -- Farxiga: continue 10mg daily  -- Torsemide take 60mg twice daily until your breathing improves.   -- Work to get a scale  - Labwork: none  - Imaging/Procedures: none  - Referrals:   -- See electrophysiology (Dr. Hoang in [place] on Tuesdays) discuss defibrillator   -- followup with endocrinology - Dr. Yady Guzman for followup  -- See your primary care phsycian Dr. D'Amico  -- Clinical Pharmacy   -- continue cardiac rehab  - Followup: keep January apppointment     Orders:  No orders of the defined types were placed in this encounter.     Followup Appts:  Future Appointments   Date Time Provider Department Center   12/6/2024  8:00 AM CARDIAC REHAB [facility] CARDRE[facility] ROOM [facility]   12/9/2024  8:00 AM CARDIAC REHAB [facility]ARDRE[facility] ROOM [facility]   12/11/2024  8:00 AM CARDIAC REHAB [facility]ARDRE[facility] ROOM [facility]   12/13/2024  8:00 AM CARDIAC REHAB [facility]ARDREHB ROOM [facility]   12/16/2024  8:00 AM CARDIAC REHAB [facility]ARDRE[facility] ROOM [facility]   12/18/2024  8:00 AM CARDIAC REHAB [facility]ARDRE[facility] ROOM [facility]   12/20/2024  8:00 AM CARDIAC REHAB [facility]ARDRE[facility] ROOM [facility]   12/23/2024  8:00 AM CARDIAC REHAB [facility]ARD[facility] ROOM [facility]   12/27/2024  8:00 AM CARDIAC REHAB [facility] CARDRE[facility] ROOM [facility]   12/30/2024  8:00 AM CARDIAC REHAB [facility] CARDRE[facility] ROOM [facility]   1/3/2025  8:00 AM CARDIAC REHAB [facility] CARDREHB ROOM [facility]   1/6/2025  8:00 AM CARDIAC REHAB [facility] CARDREHB ROOM [facility]   1/8/2025  8:00 AM CARDIAC REHAB [facility] CARDREHB ROOM [facility]   1/10/2025  8:00 AM CARDIAC REHAB [facility] CARDREHB ROOM [facility]   1/13/2025  7:00 AM CARDIAC REHAB Y [facility]ARDREHB [facility]   1/15/2025  8:00 AM CARDIAC REHAB  ELY CARDREHB ROOM [facility]   1/17/2025  8:00 AM CARDIAC REHAB [facility] CARDREHB ROOM [facility]   1/20/2025  8:00 AM CARDIAC REHAB Y CARDREHB ROOM [facility]   1/22/2025  8:00 AM CARDIAC REHAB Y CARDREHB ROOM [facility]   1/24/2025  8:00 AM CARDIAC REHAB [facility] CARDREHB ROOM [facility]   1/27/2025  8:00 AM CARDIAC REHAB [facility] CARDREHB ROOM [facility]   1/28/2025  8:30 AM Delroy Jurado MD [id number] [facility]   1/29/2025  [facility]   1/31/2025  8:00 AM CARDIAC REHAB [facility] CARDREHB ROOM [facility]   2/3/2025  8:00 [facility]   2/5/2025  8:00 [facility]     Time Spent: I spent 40 minutes reviewing medical testing, obtaining medical history and counselling and educating on diagnosis and documenting clinical encounter. The encounter involved a comprehensive review of extensive data, including prior medical records, imaging studies, laboratory results, and consultations, followed by in-depth counseling regarding the patient's complex cardiac condition and comorbidities. We discussed treatment options, risks and benefits, and recommendations for ongoing care and careful monitoring of adverse effects from medications.     ____________________________________________________________  Delroy Jurado MD  Section of Advanced Heart Failure and Cardiac Transplantation  Division of Cardiovascular Medicine  [facility]

## 2024-12-05 NOTE — PATIENT INSTRUCTIONS
If you have any questions or need cardiac medication refills, please call the Heart Failure office at [phone], option 6.  You may also contact the  Heart Failure Nursing team via email at [email or web address] (Please include your name and date of birth). To reach Dr. Jurado's office please call [phone] / Fax: [phone].     If we placed a referral today for a specialist/procedure and you did not otherwise receive a phone number to schedule, please call the general scheduling line at [phone]. You may also schedule appointments on the [email or web address].     For radiology scheduling (Cardiac calcium score, CTA with HeartFlow, Cardiac MRI, NM cardiac stress test, PET viability study) the phone number is [phone].     To schedule an office appointment call [phone].      - Continue current medications  -- Entresto: take 49-51mg twice daily (from 24-26mg)  -- Carvedilol: take 6.25mg mg twice daily  -- Spironolactone: continue 25mg daily  -- Farxiga: continue 10mg daily  -- Torsemide take 60mg twice daily until your breathing improves.   -- Work to get a scale  - Labwork: none  - Imaging/Procedures: none  - Referrals:   -- See electrophysiology (Dr. Hoang in [place] on Tuesdays) discuss defibrillator   -- followup with endocrinology - Dr. Yady Guzman for followup  -- See your primary care phsycian Dr. D'Amico  -- Clinical Pharmacy   -- continue cardiac rehab  - Followup: keep January apppointment

## 2024-12-09 PROBLEM — E11.65 TYPE 2 DIABETES MELLITUS WITH HYPERGLYCEMIA, WITH LONG-TERM CURRENT USE OF INSULIN: Status: ACTIVE | Noted: 2024-12-09

## 2024-12-09 PROBLEM — K65.1 ABSCESS OF ABDOMINAL CAVITY (MULTI): Status: RESOLVED | Noted: 2023-08-07 | Resolved: 2024-12-09

## 2024-12-09 PROBLEM — Z79.4 TYPE 2 DIABETES MELLITUS WITH HYPERGLYCEMIA, WITH LONG-TERM CURRENT USE OF INSULIN: Status: ACTIVE | Noted: 2024-12-09

## 2024-12-09 PROBLEM — E11.9 TYPE 2 DIABETES MELLITUS, WITHOUT LONG-TERM CURRENT USE OF INSULIN (MULTI): Status: ACTIVE | Noted: 2024-12-09

## 2024-12-09 PROBLEM — F33.2 SEVERE EPISODE OF RECURRENT MAJOR DEPRESSIVE DISORDER, WITHOUT PSYCHOTIC FEATURES (MULTI): Status: RESOLVED | Noted: 2018-10-24 | Resolved: 2024-12-09

## 2025-01-03 ENCOUNTER — APPOINTMENT (OUTPATIENT)
Dept: CARDIAC REHAB | Facility: HOSPITAL | Age: 40
End: 2025-01-03
Payer: COMMERCIAL

## 2025-01-06 ENCOUNTER — APPOINTMENT (OUTPATIENT)
Dept: CARDIAC REHAB | Facility: HOSPITAL | Age: 40
End: 2025-01-06
Payer: COMMERCIAL

## 2025-01-08 ENCOUNTER — APPOINTMENT (OUTPATIENT)
Dept: CARDIAC REHAB | Facility: HOSPITAL | Age: 40
End: 2025-01-08
Payer: COMMERCIAL

## 2025-01-10 ENCOUNTER — APPOINTMENT (OUTPATIENT)
Dept: CARDIAC REHAB | Facility: HOSPITAL | Age: 40
End: 2025-01-10
Payer: COMMERCIAL

## 2025-01-13 ENCOUNTER — APPOINTMENT (OUTPATIENT)
Dept: CARDIAC REHAB | Facility: HOSPITAL | Age: 40
End: 2025-01-13
Payer: COMMERCIAL

## 2025-01-14 ENCOUNTER — APPOINTMENT (OUTPATIENT)
Dept: CARDIOLOGY | Facility: HOSPITAL | Age: 40
End: 2025-01-14
Payer: COMMERCIAL

## 2025-01-15 ENCOUNTER — APPOINTMENT (OUTPATIENT)
Dept: CARDIAC REHAB | Facility: HOSPITAL | Age: 40
End: 2025-01-15
Payer: COMMERCIAL

## 2025-01-17 ENCOUNTER — APPOINTMENT (OUTPATIENT)
Dept: CARDIAC REHAB | Facility: HOSPITAL | Age: 40
End: 2025-01-17
Payer: COMMERCIAL

## 2025-01-20 ENCOUNTER — APPOINTMENT (OUTPATIENT)
Dept: CARDIAC REHAB | Facility: HOSPITAL | Age: 40
End: 2025-01-20
Payer: COMMERCIAL

## 2025-01-22 ENCOUNTER — APPOINTMENT (OUTPATIENT)
Dept: CARDIAC REHAB | Facility: HOSPITAL | Age: 40
End: 2025-01-22
Payer: COMMERCIAL

## 2025-01-24 ENCOUNTER — TELEMEDICINE (OUTPATIENT)
Dept: PHARMACY | Facility: HOSPITAL | Age: 40
End: 2025-01-24
Payer: COMMERCIAL

## 2025-01-24 ENCOUNTER — APPOINTMENT (OUTPATIENT)
Dept: CARDIAC REHAB | Facility: HOSPITAL | Age: 40
End: 2025-01-24
Payer: COMMERCIAL

## 2025-01-24 DIAGNOSIS — I10 PRIMARY HYPERTENSION: ICD-10-CM

## 2025-01-24 DIAGNOSIS — E11.40 TYPE 2 DIABETES MELLITUS WITH DIABETIC NEUROPATHY, WITH LONG-TERM CURRENT USE OF INSULIN: ICD-10-CM

## 2025-01-24 DIAGNOSIS — Z79.4 TYPE 2 DIABETES MELLITUS WITH DIABETIC NEUROPATHY, WITH LONG-TERM CURRENT USE OF INSULIN: ICD-10-CM

## 2025-01-24 DIAGNOSIS — I50.43 ACUTE ON CHRONIC COMBINED SYSTOLIC AND DIASTOLIC CONGESTIVE HEART FAILURE: ICD-10-CM

## 2025-01-24 DIAGNOSIS — I50.20 HFREF (HEART FAILURE WITH REDUCED EJECTION FRACTION): ICD-10-CM

## 2025-01-24 DIAGNOSIS — I44.7 LEFT BUNDLE BRANCH BLOCK: ICD-10-CM

## 2025-01-24 NOTE — PROGRESS NOTES
Pharmacist Clinic: Cardiology Management    Arthur Carranza is a 39 y.o. male was referred to Clinical Pharmacy Team for Heart Failure management.     Referring Provider: Delroy Jurado MD    THIS IS A NEW PATIENT APPOINTMENT. PATIENT WILL BE ESTABLISHING CARE WITH CLINICAL PHARMACY.    Appointment was completed by Arthur who was reached at spouse's number.    Allergies Reviewed? Yes    Allergies   Allergen Reactions    Shellfish Containing Products Itching, Rash and GI Upset       Past Medical History:   Diagnosis Date    CHF (congestive heart failure)     Diabetes mellitus (Multi)     Heart disease     Hypertension     Substance abuse (Multi)        Current Outpatient Medications on File Prior to Visit   Medication Sig Dispense Refill    atorvastatin (Lipitor) 40 mg tablet Take 1 tablet (40 mg) by mouth once daily at bedtime. 30 tablet 11    buPROPion XL (Wellbutrin XL) 150 mg 24 hr tablet Take 1 tablet (150 mg) by mouth once daily.      carvedilol (Coreg) 6.25 mg tablet Take 1 tablet (6.25 mg) by mouth 2 times daily (morning and late afternoon). 60 tablet 11    dapagliflozin propanediol (Farxiga) 10 mg Take 1 tablet (10 mg) by mouth once daily. 90 tablet 3    gentamicin (Garamycin) 0.1 % cream       hydrALAZINE (Apresoline) 25 mg tablet Take 1 tablet (25 mg) by mouth 3 times a day. 270 tablet 3    insulin glargine (Basaglar KwikPen U-100 Insulin) 100 unit/mL (3 mL) pen Inject 55 Units under the skin once daily at bedtime. Take as directed per insulin instructions. 30 mL 0    insulin lispro (HumaLOG) 100 unit/mL injection Inject 12 units plus sliding scale three times daily with meals:  = 0u, 151-200 = 2u, 201-250 = 4u, 251-300 = 6u, 301-350 = 8u, 351-400+ = 10u, use up to 60u daily 30 mL 0    isosorbide dinitrate (Isordil) 20 mg tablet Take 1 tablet (20 mg) by mouth 3 times a day. 90 tablet 3    metFORMIN (Glucophage) 1,000 mg tablet Take 1 tablet (1,000 mg) by mouth 2 times a day. 60 tablet 0     "rivaroxaban (Xarelto) 20 mg tablet Take 1 tablet (20 mg) by mouth once daily in the evening. Take with meals. Take with food. 90 tablet 3    sacubitriL-valsartan (Entresto) 49-51 mg tablet Take 1 tablet by mouth 2 times a day. 60 tablet 11    sertraline (Zoloft) 100 mg tablet Take 1 tablet (100 mg) by mouth once daily.      spironolactone (Aldactone) 25 mg tablet Take 1 tablet (25 mg) by mouth once daily. 30 tablet 11    torsemide (Demadex) 20 mg tablet Take 3 tablets (60 mg) by mouth 2 times a day. 540 tablet 3    traZODone (Desyrel) 50 mg tablet Take 1 tablet (50 mg) by mouth as needed at bedtime for sleep.      blood-glucose meter,continuous (FreeStyle Nadir 3 [place]) [facility] Use as instructed to test blood glucose throughout the day 1 each 0    blood-glucose sensor (FreeStyle Nadir 3 Plus Sensor) device Use as directed. Change sensor every 15 days 2 each 11    dulaglutide 3 mg/0.5 mL pen injector Inject 3 mg under the skin 1 (one) time per week. (Patient not taking: Reported on 1/24/2025) 2 mL 11    pen needle, diabetic 32 gauge x 5/32\" needle 1 each 4 times a day. 400 each 0     No current facility-administered medications on file prior to visit.         RELEVANT LAB RESULTS:  Lab Results   Component Value Date    BILITOT 0.7 12/04/2024    CALCIUM 8.8 12/04/2024    CO2 28 12/04/2024    CL 98 12/04/2024    CREATININE 1.25 12/04/2024    GLUCOSE 453 (HH) 12/04/2024    ALKPHOS 62 12/04/2024    K 4.0 12/04/2024    PROT 6.7 12/04/2024     (L) 12/04/2024    AST 33 12/04/2024    ALT 27 12/04/2024    BUN 20 12/04/2024    ANIONGAP 12 12/04/2024    MG 2.26 07/30/2024    PHOS 4.1 07/30/2024     07/22/2024    ALBUMIN 3.7 12/04/2024    LIPASE 44 12/04/2024    GFRMALE 64 08/08/2023     Lab Results   Component Value Date    TRIG 241 (H) 12/12/2023    CHOL 125 12/12/2023    LDLCALC 50 12/12/2023    HDL 26.6 12/12/2023     No results found for: \"BMCBC\", \"CBCDIF\"     PHARMACEUTICAL ASSESSMENT:    MEDICATION " RECONCILIATION    Home Pharmacy Reviewed? Yes, describe: [facility]    Added:  - no  Changed:  -  no longer using Trulicity  Removed:  - no    Drug Interactions? No    Medication Documentation Review Audit       Reviewed by Mariano Piedra, PharmD (Pharmacist) on 01/24/25 at 1026      Medication Order Taking? Sig Documenting Provider Last Dose Status   atorvastatin (Lipitor) 40 mg tablet [id number] Yes Take 1 tablet (40 mg) by mouth once daily at bedtime. Delroy Jurado MD  Active   blood-glucose meter,continuous (FreeStyle Nadir 3 [place]) misc [id number]  Use as instructed to test blood glucose throughout the day Christopher D'Amico, DO  Active   blood-glucose sensor (FreeStyle Nadir 3 Plus Sensor) device [id number]  Use as directed. Change sensor every 15 days Steven Morales, DO  Active   buPROPion XL (Wellbutrin XL) 150 mg 24 hr tablet [id number] Yes Take 1 tablet (150 mg) by mouth once daily. Historical Provider, MD  Active   carvedilol (Coreg) 6.25 mg tablet [id number] Yes Take 1 tablet (6.25 mg) by mouth 2 times daily (morning and late afternoon). Delroy Jurado MD  Active   dapagliflozin propanediol (Farxiga) 10 mg [id number] Yes Take 1 tablet (10 mg) by mouth once daily. Delroy Jurado MD  Active   dulaglutide 3 mg/0.5 mL pen injector [id number] Yes Inject 3 mg under the skin 1 (one) time per week. Delroy Jurado MD  Active   gentamicin (Garamycin) 0.1 % cream [id number] Yes  Historical Provider, MD  Active   hydrALAZINE (Apresoline) 25 mg tablet [id number] Yes Take 1 tablet (25 mg) by mouth 3 times a day. Delroy Jurado MD  Active   insulin glargine (Basaglar KwikPen U-100 Insulin) 100 unit/mL (3 mL) pen [id number] Yes Inject 55 Units under the skin once daily at bedtime. Take as directed per insulin instructions. Steven Morales,   Active   insulin lispro (HumaLOG) 100 unit/mL injection [id number] Yes Inject 12 units plus sliding scale three times daily with meals:  = 0u, 151-200  "= 2u, 201-250 = 4u, 251-300 = 6u, 301-350 = 8u, 351-400+ = 10u, use up to 60u daily Steven Morales DO  Active   isosorbide dinitrate (Isordil) 20 mg tablet [id number] Yes Take 1 tablet (20 mg) by mouth 3 times a day. Delroy Jurado MD  Active   metFORMIN (Glucophage) 1,000 mg tablet [id number] Yes Take 1 tablet (1,000 mg) by mouth 2 times a day. Steven Morales DO  Active   pen needle, diabetic 32 gauge x 5/32\" needle [id number]  1 each 4 times a day. Rosa Jane, APRN-CNP  Active   rivaroxaban (Xarelto) 20 mg tablet [id number] Yes Take 1 tablet (20 mg) by mouth once daily in the evening. Take with meals. Take with food. Delroy Jurado MD  Active   sacubitriL-valsartan (Entresto) 49-51 mg tablet [id number] Yes Take 1 tablet by mouth 2 times a day. Delroy Jurado MD  Active   sertraline (Zoloft) 100 mg tablet [id number] Yes Take 1 tablet (100 mg) by mouth once daily. Historical Provider, MD  Active   spironolactone (Aldactone) 25 mg tablet [id number] Yes Take 1 tablet (25 mg) by mouth once daily. Delroy Jurado MD  Active   torsemide (Demadex) 20 mg tablet [id number] Yes Take 3 tablets (60 mg) by mouth 2 times a day. Delroy Jurado MD  Active   traZODone (Desyrel) 50 mg tablet [id number] Yes Take 1 tablet (50 mg) by mouth as needed at bedtime for sleep. Historical Provider, MD  Active                    DISEASE MANAGEMENT ASSESSMENT:     CHF ASSESSMENT     Symptom/Staging:  -Most recent ejection fraction: 10%  -NYHA Stage: III    Results for orders placed during the hospital encounter of 07/18/24    Transthoracic Echo (TTE) Complete    Narrative  [facility], [address] [place] [address]  Tel [phone] and Fax [phone]    TRANSTHORACIC ECHOCARDIOGRAM REPORT      Patient Name:      JAZZ Quigley Physician:    [id number] Diana Cheng MD  Study Date:        7/19/2024            Ordering Provider:    [id number] LAUREN KOCH  MRN/PID:           " [id number]             Fellow:  Accession#:        [id number]         Nurse:  Date of Birth/Age: 1985 / 38      Sonographer:          David huerta                                      RDCS, GABRIEL, TOBYE,  OBDULIA  Gender:            M                    Additional Staff:  Height:            175.26 cm            Admit Date:           7/18/2024  Weight:            136.99 kg            Admission Status:  BSA / BMI:         2.46 m2 / 44.60      Encounter#:           [id number]  kg/m2  Blood Pressure:    97/70 mmHg           Department Location:  [address]    Study Type:    TRANSTHORACIC ECHO (TTE) COMPLETE  Diagnosis/ICD: Acute on chronic combined systolic (congestive) and diastolic  (congestive) heart failure (CHF)-I50.43    Patient History:  Pertinent History: NICM, HFrEF (5-10%), HTN, HLD, hypertriglyceridemia, DMII,  severe obesity, cardioembolic CVA, tobacco, cocaine and  alcohol use.    Study Detail: The following Echo studies were performed: 2D, M-Mode, Doppler and  color flow. Technically challenging study due to poor acoustic  windows. Definity used as a contrast agent for endocardial border  definition. Total contrast used for this procedure was 2.0 mL via  IV push.      PHYSICIAN INTERPRETATION:  Left Ventricle: Left ventricular ejection fraction is severely decreased, by visual estimate at 10%. The left ventricular cavity size is severely dilated. Spectral Doppler shows a pseudonormal pattern of left ventricular diastolic filling.  Left Atrium: The left atrium is normal in size.  Right Ventricle: The right ventricle is normal in size. There is normal right ventricular global systolic function.  Right Atrium: The right atrium is normal in size.  Aortic Valve: The aortic valve is trileaflet. There is trivial aortic valve regurgitation. The peak instantaneous gradient of the aortic valve is 3.7 mmHg.  Mitral Valve: The mitral valve is normal in structure. There is mild mitral valve  regurgitation.  Tricuspid Valve: The tricuspid valve is structurally normal. There is trace tricuspid regurgitation.  Pulmonic Valve: The pulmonic valve is structurally normal. There is physiologic pulmonic valve regurgitation.  Pericardium: There is no pericardial effusion noted.  Aorta: The aortic root is normal. There is no dilatation of the aortic root.  Systemic Veins: The inferior vena cava appears to be of normal size. There is less than 50% IVC collapse with inspiration.  In comparison to the previous echocardiogram(s): Compared with study dated 2/21/2024, no significant change.      CONCLUSIONS:  1. Poorly visualized anatomical structures due to suboptimal image quality.  2. Left ventricular ejection fraction is severely decreased, by visual estimate at 10%.  3. Spectral Doppler shows a pseudonormal pattern of left ventricular diastolic filling.  4. Left ventricular cavity size is severely dilated.  5. There is normal right ventricular global systolic function.    QUANTITATIVE DATA SUMMARY:  2D MEASUREMENTS:  Normal Ranges:  Ao Root d:     3.50 cm    (2.0-3.7cm)  LAs:           4.30 cm    (2.7-4.0cm)  IVSd:          0.90 cm    (0.6-1.1cm)  LVPWd:         0.80 cm    (0.6-1.1cm)  LVIDd:         7.40 cm    (3.9-5.9cm)  LVIDs:         7.20 cm  LV Mass Index: 118.0 g/m2  LV % FS        2.7 %    LA VOLUME:  Normal Ranges:  LA Vol A4C:        51.0 ml    (22+/-6mL/m2)  LA Vol A2C:        70.9 ml  LA Vol BP:         60.3 ml  LA Vol Index A4C:  20.7ml/m2  LA Vol Index A2C:  28.8 ml/m2  LA Vol Index BP:   24.5 ml/m2  LA Area A4C:       19.7 cm2  LA Area A2C:       23.3 cm2  LA Major Axis A4C: 6.5 cm  LA Major Axis A2C: 6.5 cm  LA Volume Index:   23.5 ml/m2    RA VOLUME BY A/L METHOD:  Normal Ranges:  RA Area A4C: 17.1 cm2    AORTA MEASUREMENTS:  Normal Ranges:  Asc Ao, d: 2.90 cm (2.1-3.4cm)    LV SYSTOLIC FUNCTION BY 2D PLANIMETRY (MOD):  Normal Ranges:  EF-A4C View:    25 % (>=55%)  EF-A2C View:    23 %  EF-Biplane:      26 %  EF-Visual:      10 %  LV EF Reported: 10 %    LV DIASTOLIC FUNCTION:  Normal Ranges:  MV Peak E:        0.75 m/s   (0.7-1.2 m/s)  MV Peak A:        0.41 m/s   (0.42-0.7 m/s)  E/A Ratio:        1.84       (1.0-2.2)  MV e'             0.054 m/s  (>8.0)  MV lateral e'     0.05 m/s  MV medial e'      0.06 m/s  E/e' Ratio:       13.76      (<8.0)  PulmV Sys Jean Claude:    36.50 cm/s  PulmV Nair Jean Claude:   48.10 cm/s  PulmV S/D Jean Claude:    0.80  PulmV A Revs Jean Claude: 26.10 cm/s    MITRAL VALVE:  Normal Ranges:  MV DT: 169 msec (150-240msec)    AORTIC VALVE:  Normal Ranges:  AoV Vmax:      0.96 m/s (<=1.7m/s)  AoV Peak PG:   3.7 mmHg (<20mmHg)  LVOT Max Jean Claude:  0.72 m/s (<=1.1m/s)  LVOT VTI:      11.00 cm  LVOT Diameter: 2.30 cm  (1.8-2.4cm)  AoV Area,Vmax: 3.10 cm2 (2.5-4.5cm2)      RIGHT VENTRICLE:  TAPSE: 20.8 mm  RV s'  0.10 m/s    PULMONIC VALVE:  Normal Ranges:  PV Accel Time: 140 msec (>120ms)  PV Max Jean Claude:    0.9 m/s  (0.6-0.9m/s)  PV Max PG:     3.0 mmHg    Pulmonary Veins:  PulmV A Revs Jean Claude: 26.10 cm/s  PulmV Nair Jean Claude:   48.10 cm/s  PulmV S/D Jean Claude:    0.80  PulmV Sys Jean Claude:    36.50 cm/s      [id number] Diana Cheng MD  Electronically signed on 7/19/2024 at 1:16:53 PM        ** Final **      Guideline-Directed Medical Therapy:  -ARNI: Yes, describe: Entresto 49-51mg twice daily  -Beta Blocker: Yes, describe: carvedilol 6.25g twice daily  -MRA: Yes, describe: spironolactone 25mg daily  -SGLT2i: Yes, describe: Farxiga 10mg daily    Secondary Prevention:  -The ASCVD Risk score (Joe DK, et al., 2019) failed to calculate for the following reasons:    The 2019 ASCVD risk score is only valid for ages 40 to 79    Risk score cannot be calculated because patient has a medical history suggesting prior/existing ASCVD   -Aspirin 81mg? no  -Statin?: Yes, describe: atorvastatin 40mg daily  -HTN?: Yes, describe: controlled    CURRENT PHARMACOTHERAPY:   Hydralazine 25mg TID  Torsemide 60mg BID  Isosorbide dinitrate 20mg TID  Entresto 49-51mg  BID  Carvedilol 6.25mg BID  Spironolactone 25mg daily  Farxiga 10mg daily      Affordability: medicaid covering medication costs  Adherence/Compliance: reports adherence to all medications at this time  Adverse Effects: none reported    Monitoring Weights at Home: Yes  Home Weight Recordings: 302-306lbs    Past In Office Weight Readings:   Wt Readings from Last 6 Encounters:   12/05/24 137 kg (302 lb)   12/04/24 141 kg (310 lb)   11/18/24 139 kg (306 lb)   10/22/24 140 kg (308 lb 3.2 oz)   09/30/24 136 kg (300 lb)   09/24/24 139 kg (307 lb 3.2 oz)       Monitoring Blood Pressure at Home: Yes  Home BP Recordings: has not checked recently    Past In Office BP Readings:   BP Readings from Last 6 Encounters:   12/05/24 135/82   12/04/24 122/88   11/18/24 114/79   10/22/24 114/82   10/01/24 122/86   09/24/24 113/77       HEALTH MANAGEMENT    Maintaining fluid restriction (<2 L/day): N/A  Edema/swelling: Yes  Shortness of breath: No  Trouble sleeping/lying down: No  Dry/hacking cough: No  Recent Hospitalizations: No    EDUCATION/COUNSELING:   - Counseled patient on MOA, expectations, duration of therapy, contraindications, administration, and monitoring parameters  - Counseled patient on lifestyle modifications that can decrease your risk of having complications (smoking cessation, losing weight, daily weights, vaccines)  - Counseled patient on fluid intake and weight management. Recommended to not consume more than 2 liters of fliuids per day. If they have gained more than 2-3 pounds within a 24 hours period (or 5 pounds in a week), contact their cardiologist  - Answered all patient questions and concerns        DISCUSSION/NOTES:   Continues to notice swelling in his hands and ankles.  Reports he is working on his diet to cut out as much sodium as he can.  No longer having any issues with shortness of breath like he was in the past. Feels his breathing is back to normal.  Confirmed adherence to all medications.  No cost  issues with medications at this time.  Discussed checking blood pressure at home before making any medication changes as his blood pressure have been stable in office visits.  No signs of hypotension reported at home.  Arthur stopped taking Trulicity due to GI side effects.    ASSESSMENT:    Assessment/Plan   Problem List Items Addressed This Visit       Type 2 diabetes mellitus with neurologic complication, with long-term current use of insulin    Hypertension    HFrEF (heart failure with reduced ejection fraction)    Relevant Orders    Referral to Clinical Pharmacy    Acute on chronic combined systolic and diastolic congestive heart failure     Tolerating 4 GDMT medications at this time.  No dose adjustments needed based on kidney and liver functions.         Left bundle branch block         RECOMMENDATIONS/PLAN:    CONTINUE  Hydralazine 25mg TID  Torsemide 60mg BID  Isosorbide dinitrate 20mg TID  Entresto 49-51mg BID  Carvedilol 6.25mg BID  Spironolactone 25mg daily  Farxiga 10mg daily    Last Appnt with Referring Provider: 12/5/24  Next Appnt with Referring Provider: 1/28/25  Clinical Pharmacist follow up: 2/14/25  VAF/Application Expiration: No  Type of Encounter: Orquidea Piedra PharmD    Verbal consent to manage patient's drug therapy was obtained from the patient . They were informed they may decline to participate or withdraw from participation in pharmacy services at any time.    Continue all meds under the continuation of care with the referring provider and clinical pharmacy team.

## 2025-01-24 NOTE — ASSESSMENT & PLAN NOTE
Tolerating 4 GDMT medications at this time.  No dose adjustments needed based on kidney and liver functions.

## 2025-01-24 NOTE — Clinical Note
Medicaid patient no cost assistance needed. Reports breathing is much better, but still having some noticeable edema. BP stable in office, but educated to start checking at home so I can help with medication management at future appointments.

## 2025-01-27 ENCOUNTER — APPOINTMENT (OUTPATIENT)
Dept: CARDIAC REHAB | Facility: HOSPITAL | Age: 40
End: 2025-01-27
Payer: COMMERCIAL

## 2025-01-29 ENCOUNTER — APPOINTMENT (OUTPATIENT)
Dept: CARDIAC REHAB | Facility: HOSPITAL | Age: 40
End: 2025-01-29
Payer: COMMERCIAL

## 2025-01-31 ENCOUNTER — APPOINTMENT (OUTPATIENT)
Dept: CARDIAC REHAB | Facility: HOSPITAL | Age: 40
End: 2025-01-31
Payer: COMMERCIAL

## 2025-02-03 ENCOUNTER — APPOINTMENT (OUTPATIENT)
Dept: CARDIAC REHAB | Facility: HOSPITAL | Age: 40
End: 2025-02-03
Payer: COMMERCIAL

## 2025-02-05 ENCOUNTER — APPOINTMENT (OUTPATIENT)
Dept: CARDIAC REHAB | Facility: HOSPITAL | Age: 40
End: 2025-02-05
Payer: COMMERCIAL

## 2025-02-07 ENCOUNTER — APPOINTMENT (OUTPATIENT)
Dept: CARDIAC REHAB | Facility: HOSPITAL | Age: 40
End: 2025-02-07
Payer: COMMERCIAL

## 2025-02-10 ENCOUNTER — APPOINTMENT (OUTPATIENT)
Dept: CARDIAC REHAB | Facility: HOSPITAL | Age: 40
End: 2025-02-10
Payer: COMMERCIAL

## 2025-02-14 ENCOUNTER — CLINICAL SUPPORT (OUTPATIENT)
Dept: EMERGENCY MEDICINE | Facility: HOSPITAL | Age: 40
End: 2025-02-14
Payer: COMMERCIAL

## 2025-02-14 ENCOUNTER — APPOINTMENT (OUTPATIENT)
Dept: RADIOLOGY | Facility: HOSPITAL | Age: 40
End: 2025-02-14
Payer: COMMERCIAL

## 2025-02-14 ENCOUNTER — APPOINTMENT (OUTPATIENT)
Dept: PHARMACY | Facility: HOSPITAL | Age: 40
End: 2025-02-14
Payer: COMMERCIAL

## 2025-02-14 ENCOUNTER — HOSPITAL ENCOUNTER (EMERGENCY)
Facility: HOSPITAL | Age: 40
Discharge: HOME | End: 2025-02-14
Attending: EMERGENCY MEDICINE
Payer: COMMERCIAL

## 2025-02-14 VITALS
SYSTOLIC BLOOD PRESSURE: 128 MMHG | WEIGHT: 301 LBS | OXYGEN SATURATION: 99 % | HEIGHT: 69 IN | BODY MASS INDEX: 44.58 KG/M2 | DIASTOLIC BLOOD PRESSURE: 88 MMHG | TEMPERATURE: 97.2 F | HEART RATE: 103 BPM | RESPIRATION RATE: 20 BRPM

## 2025-02-14 DIAGNOSIS — E11.40 TYPE 2 DIABETES MELLITUS WITH DIABETIC NEUROPATHY, WITH LONG-TERM CURRENT USE OF INSULIN: ICD-10-CM

## 2025-02-14 DIAGNOSIS — I44.7 LEFT BUNDLE BRANCH BLOCK: ICD-10-CM

## 2025-02-14 DIAGNOSIS — I10 PRIMARY HYPERTENSION: ICD-10-CM

## 2025-02-14 DIAGNOSIS — N48.1 BALANITIS: Primary | ICD-10-CM

## 2025-02-14 DIAGNOSIS — I50.43 ACUTE ON CHRONIC COMBINED SYSTOLIC AND DIASTOLIC CHF (CONGESTIVE HEART FAILURE): Primary | ICD-10-CM

## 2025-02-14 DIAGNOSIS — R06.00 DYSPNEA, UNSPECIFIED TYPE: ICD-10-CM

## 2025-02-14 DIAGNOSIS — I50.20 HFREF (HEART FAILURE WITH REDUCED EJECTION FRACTION): ICD-10-CM

## 2025-02-14 DIAGNOSIS — Z79.4 TYPE 2 DIABETES MELLITUS WITH DIABETIC NEUROPATHY, WITH LONG-TERM CURRENT USE OF INSULIN: ICD-10-CM

## 2025-02-14 DIAGNOSIS — I50.43 ACUTE ON CHRONIC COMBINED SYSTOLIC AND DIASTOLIC CONGESTIVE HEART FAILURE: ICD-10-CM

## 2025-02-14 DIAGNOSIS — I50.9 ACUTE ON CHRONIC HEART FAILURE, UNSPECIFIED HEART FAILURE TYPE: ICD-10-CM

## 2025-02-14 DIAGNOSIS — R73.9 HYPERGLYCEMIA: ICD-10-CM

## 2025-02-14 LAB
ALBUMIN SERPL BCP-MCNC: 4.3 G/DL (ref 3.4–5)
ALP SERPL-CCNC: 70 U/L (ref 33–120)
ALT SERPL W P-5'-P-CCNC: 17 U/L (ref 10–52)
ANION GAP BLDV CALCULATED.4IONS-SCNC: 9 MMOL/L (ref 10–25)
ANION GAP SERPL CALC-SCNC: 13 MMOL/L (ref 10–20)
APPEARANCE UR: CLEAR
AST SERPL W P-5'-P-CCNC: 27 U/L (ref 9–39)
BASE EXCESS BLDV CALC-SCNC: 2.6 MMOL/L (ref -2–3)
BASOPHILS # BLD AUTO: 0.02 X10*3/UL (ref 0–0.1)
BASOPHILS NFR BLD AUTO: 0.3 %
BILIRUB SERPL-MCNC: 0.9 MG/DL (ref 0–1.2)
BILIRUB UR STRIP.AUTO-MCNC: NEGATIVE MG/DL
BNP SERPL-MCNC: 206 PG/ML (ref 0–99)
BODY TEMPERATURE: 37 DEGREES CELSIUS
BUN SERPL-MCNC: 18 MG/DL (ref 6–23)
CA-I BLDV-SCNC: 1.26 MMOL/L (ref 1.1–1.33)
CALCIUM SERPL-MCNC: 9.6 MG/DL (ref 8.6–10.6)
CARDIAC TROPONIN I PNL SERPL HS: 92 NG/L (ref 0–53)
CARDIAC TROPONIN I PNL SERPL HS: 95 NG/L (ref 0–53)
CHLORIDE BLDV-SCNC: 102 MMOL/L (ref 98–107)
CHLORIDE SERPL-SCNC: 101 MMOL/L (ref 98–107)
CO2 SERPL-SCNC: 25 MMOL/L (ref 21–32)
COLOR UR: ABNORMAL
CREAT SERPL-MCNC: 1.57 MG/DL (ref 0.5–1.3)
EGFRCR SERPLBLD CKD-EPI 2021: 57 ML/MIN/1.73M*2
EOSINOPHIL # BLD AUTO: 0.07 X10*3/UL (ref 0–0.7)
EOSINOPHIL NFR BLD AUTO: 1.2 %
ERYTHROCYTE [DISTWIDTH] IN BLOOD BY AUTOMATED COUNT: 12.8 % (ref 11.5–14.5)
GLUCOSE BLD MANUAL STRIP-MCNC: 307 MG/DL (ref 74–99)
GLUCOSE BLDV-MCNC: 360 MG/DL (ref 74–99)
GLUCOSE SERPL-MCNC: 332 MG/DL (ref 74–99)
GLUCOSE UR STRIP.AUTO-MCNC: ABNORMAL MG/DL
HCO3 BLDV-SCNC: 28.9 MMOL/L (ref 22–26)
HCT VFR BLD AUTO: 39.2 % (ref 41–52)
HCT VFR BLD EST: 43 % (ref 41–52)
HGB BLD-MCNC: 13.5 G/DL (ref 13.5–17.5)
HGB BLDV-MCNC: 14.2 G/DL (ref 13.5–17.5)
IMM GRANULOCYTES # BLD AUTO: 0.02 X10*3/UL (ref 0–0.7)
IMM GRANULOCYTES NFR BLD AUTO: 0.3 % (ref 0–0.9)
INHALED O2 CONCENTRATION: 21 %
KETONES UR STRIP.AUTO-MCNC: NEGATIVE MG/DL
LACTATE BLDV-SCNC: 1.4 MMOL/L (ref 0.4–2)
LEUKOCYTE ESTERASE UR QL STRIP.AUTO: NEGATIVE
LYMPHOCYTES # BLD AUTO: 1.24 X10*3/UL (ref 1.2–4.8)
LYMPHOCYTES NFR BLD AUTO: 20.7 %
MAGNESIUM SERPL-MCNC: 1.93 MG/DL (ref 1.6–2.4)
MCH RBC QN AUTO: 31.3 PG (ref 26–34)
MCHC RBC AUTO-ENTMCNC: 34.4 G/DL (ref 32–36)
MCV RBC AUTO: 91 FL (ref 80–100)
MONOCYTES # BLD AUTO: 0.54 X10*3/UL (ref 0.1–1)
MONOCYTES NFR BLD AUTO: 9 %
NEUTROPHILS # BLD AUTO: 4.11 X10*3/UL (ref 1.2–7.7)
NEUTROPHILS NFR BLD AUTO: 68.5 %
NITRITE UR QL STRIP.AUTO: NEGATIVE
NRBC BLD-RTO: 0 /100 WBCS (ref 0–0)
OXYHGB MFR BLDV: 52.7 % (ref 45–75)
PCO2 BLDV: 50 MM HG (ref 41–51)
PH BLDV: 7.37 PH (ref 7.33–7.43)
PH UR STRIP.AUTO: 5.5 [PH]
PLATELET # BLD AUTO: 218 X10*3/UL (ref 150–450)
PO2 BLDV: 36 MM HG (ref 35–45)
POTASSIUM BLDV-SCNC: 4.7 MMOL/L (ref 3.5–5.3)
POTASSIUM SERPL-SCNC: 4.3 MMOL/L (ref 3.5–5.3)
PROT SERPL-MCNC: 7.5 G/DL (ref 6.4–8.2)
PROT UR STRIP.AUTO-MCNC: ABNORMAL MG/DL
RBC # BLD AUTO: 4.31 X10*6/UL (ref 4.5–5.9)
RBC # UR STRIP.AUTO: NEGATIVE MG/DL
RBC #/AREA URNS AUTO: NORMAL /HPF
SAO2 % BLDV: 54 % (ref 45–75)
SODIUM BLDV-SCNC: 135 MMOL/L (ref 136–145)
SODIUM SERPL-SCNC: 135 MMOL/L (ref 136–145)
SP GR UR STRIP.AUTO: 1.02
UROBILINOGEN UR STRIP.AUTO-MCNC: NORMAL MG/DL
WBC # BLD AUTO: 6 X10*3/UL (ref 4.4–11.3)
WBC #/AREA URNS AUTO: NORMAL /HPF

## 2025-02-14 PROCEDURE — 2500000004 HC RX 250 GENERAL PHARMACY W/ HCPCS (ALT 636 FOR OP/ED): Mod: SE | Performed by: EMERGENCY MEDICINE

## 2025-02-14 PROCEDURE — 36415 COLL VENOUS BLD VENIPUNCTURE: CPT | Performed by: EMERGENCY MEDICINE

## 2025-02-14 PROCEDURE — 82810 BLOOD GASES O2 SAT ONLY: CPT | Mod: CCI | Performed by: EMERGENCY MEDICINE

## 2025-02-14 PROCEDURE — 84132 ASSAY OF SERUM POTASSIUM: CPT | Performed by: EMERGENCY MEDICINE

## 2025-02-14 PROCEDURE — 84484 ASSAY OF TROPONIN QUANT: CPT | Performed by: EMERGENCY MEDICINE

## 2025-02-14 PROCEDURE — 99285 EMERGENCY DEPT VISIT HI MDM: CPT | Mod: 25 | Performed by: EMERGENCY MEDICINE

## 2025-02-14 PROCEDURE — 96374 THER/PROPH/DIAG INJ IV PUSH: CPT

## 2025-02-14 PROCEDURE — 81001 URINALYSIS AUTO W/SCOPE: CPT

## 2025-02-14 PROCEDURE — 83735 ASSAY OF MAGNESIUM: CPT | Performed by: EMERGENCY MEDICINE

## 2025-02-14 PROCEDURE — 82947 ASSAY GLUCOSE BLOOD QUANT: CPT

## 2025-02-14 PROCEDURE — 85025 COMPLETE CBC W/AUTO DIFF WBC: CPT | Performed by: EMERGENCY MEDICINE

## 2025-02-14 PROCEDURE — 84132 ASSAY OF SERUM POTASSIUM: CPT | Mod: 59 | Performed by: EMERGENCY MEDICINE

## 2025-02-14 PROCEDURE — 83880 ASSAY OF NATRIURETIC PEPTIDE: CPT | Performed by: EMERGENCY MEDICINE

## 2025-02-14 PROCEDURE — 71046 X-RAY EXAM CHEST 2 VIEWS: CPT | Performed by: RADIOLOGY

## 2025-02-14 PROCEDURE — 71046 X-RAY EXAM CHEST 2 VIEWS: CPT

## 2025-02-14 PROCEDURE — 36415 COLL VENOUS BLD VENIPUNCTURE: CPT

## 2025-02-14 PROCEDURE — 84484 ASSAY OF TROPONIN QUANT: CPT

## 2025-02-14 PROCEDURE — 2500000001 HC RX 250 WO HCPCS SELF ADMINISTERED DRUGS (ALT 637 FOR MEDICARE OP): Mod: SE | Performed by: EMERGENCY MEDICINE

## 2025-02-14 PROCEDURE — 84075 ASSAY ALKALINE PHOSPHATASE: CPT | Performed by: EMERGENCY MEDICINE

## 2025-02-14 PROCEDURE — 93005 ELECTROCARDIOGRAM TRACING: CPT

## 2025-02-14 RX ORDER — HYDROCORTISONE 1 %
1 CREAM (GRAM) TOPICAL 2 TIMES DAILY
Qty: 14 G | Refills: 0 | Status: SHIPPED | OUTPATIENT
Start: 2025-02-14 | End: 2025-02-21

## 2025-02-14 RX ORDER — FUROSEMIDE 10 MG/ML
120 INJECTION INTRAMUSCULAR; INTRAVENOUS ONCE
Status: COMPLETED | OUTPATIENT
Start: 2025-02-14 | End: 2025-02-14

## 2025-02-14 RX ORDER — INSULIN LISPRO 100 [IU]/ML
12 INJECTION, SOLUTION INTRAVENOUS; SUBCUTANEOUS
Qty: 1 EACH | Refills: 2 | Status: SHIPPED | OUTPATIENT
Start: 2025-02-14

## 2025-02-14 RX ORDER — CEPHALEXIN 250 MG/1
500 CAPSULE ORAL ONCE
Status: COMPLETED | OUTPATIENT
Start: 2025-02-14 | End: 2025-02-14

## 2025-02-14 RX ORDER — CLOTRIMAZOLE 1 %
1 CREAM (GRAM) TOPICAL 2 TIMES DAILY
Qty: 45 G | Refills: 0 | Status: SHIPPED | OUTPATIENT
Start: 2025-02-14 | End: 2025-02-28

## 2025-02-14 RX ORDER — CEPHALEXIN 500 MG/1
500 CAPSULE ORAL 4 TIMES DAILY
Qty: 28 CAPSULE | Refills: 0 | Status: SHIPPED | OUTPATIENT
Start: 2025-02-14 | End: 2025-02-21

## 2025-02-14 RX ORDER — DULAGLUTIDE 1.5 MG/.5ML
1.5 INJECTION, SOLUTION SUBCUTANEOUS
Qty: 2 ML | Refills: 2 | Status: SHIPPED | OUTPATIENT
Start: 2025-02-16

## 2025-02-14 RX ADMIN — FUROSEMIDE 120 MG: 10 INJECTION INTRAMUSCULAR; INTRAVENOUS at 20:55

## 2025-02-14 RX ADMIN — CEPHALEXIN 500 MG: 250 CAPSULE ORAL at 21:22

## 2025-02-14 ASSESSMENT — LIFESTYLE VARIABLES
EVER FELT BAD OR GUILTY ABOUT YOUR DRINKING: NO
HAVE YOU EVER FELT YOU SHOULD CUT DOWN ON YOUR DRINKING: NO
TOTAL SCORE: 0
HAVE PEOPLE ANNOYED YOU BY CRITICIZING YOUR DRINKING: NO
EVER HAD A DRINK FIRST THING IN THE MORNING TO STEADY YOUR NERVES TO GET RID OF A HANGOVER: NO

## 2025-02-14 ASSESSMENT — COLUMBIA-SUICIDE SEVERITY RATING SCALE - C-SSRS
2. HAVE YOU ACTUALLY HAD ANY THOUGHTS OF KILLING YOURSELF?: NO
1. IN THE PAST MONTH, HAVE YOU WISHED YOU WERE DEAD OR WISHED YOU COULD GO TO SLEEP AND NOT WAKE UP?: NO
6. HAVE YOU EVER DONE ANYTHING, STARTED TO DO ANYTHING, OR PREPARED TO DO ANYTHING TO END YOUR LIFE?: NO

## 2025-02-14 ASSESSMENT — PAIN SCALES - GENERAL: PAINLEVEL_OUTOF10: 4

## 2025-02-14 ASSESSMENT — PAIN - FUNCTIONAL ASSESSMENT: PAIN_FUNCTIONAL_ASSESSMENT: 0-10

## 2025-02-14 NOTE — PROGRESS NOTES
Pharmacist Clinic: Cardiology Management    Arthur Carranza is a 39 y.o. male was referred to Clinical Pharmacy Team for Heart Failure/Cardiomyopathy management.     Referring Provider: Delroy Jurado MD    THIS IS A FOLLOW UP PATIENT APPOINTMENT. AT LAST VISIT ON 1/24/25 WITH PHARMACIST (Mariano Piedra).    Appointment was completed by Arthur who was reached at primary number.    REVIEW OF PAST APPNT (IF APPLICABLE):   During last appointment Arthur established care with clinical pharmacy and updated home medication list.  Discussed doing blood pressure checks for today's visit.    Allergies   Allergen Reactions    Shellfish Containing Products Itching, Rash and GI Upset       Past Medical History:   Diagnosis Date    CHF (congestive heart failure)     Diabetes mellitus (Multi)     Heart disease     Hypertension     Substance abuse (Multi)        Current Outpatient Medications on File Prior to Visit   Medication Sig Dispense Refill    atorvastatin (Lipitor) 40 mg tablet Take 1 tablet (40 mg) by mouth once daily at bedtime. 30 tablet 11    blood-glucose meter,continuous (FreeStyle Nadir 3 [place]) [facility] Use as instructed to test blood glucose throughout the day 1 each 0    blood-glucose sensor (FreeStyle Nadir 3 Plus Sensor) device Use as directed. Change sensor every 15 days 2 each 11    buPROPion XL (Wellbutrin XL) 150 mg 24 hr tablet Take 1 tablet (150 mg) by mouth once daily.      carvedilol (Coreg) 6.25 mg tablet Take 1 tablet (6.25 mg) by mouth 2 times daily (morning and late afternoon). 60 tablet 11    dapagliflozin propanediol (Farxiga) 10 mg Take 1 tablet (10 mg) by mouth once daily. 90 tablet 3    dulaglutide 3 mg/0.5 mL pen injector Inject 3 mg under the skin 1 (one) time per week. (Patient not taking: Reported on 1/24/2025) 2 mL 11    gentamicin (Garamycin) 0.1 % cream       hydrALAZINE (Apresoline) 25 mg tablet Take 1 tablet (25 mg) by mouth 3 times a day. 270 tablet 3    insulin glargine (Basaglar  "KwikPen U-100 Insulin) 100 unit/mL (3 mL) pen Inject 55 Units under the skin once daily at bedtime. Take as directed per insulin instructions. 30 mL 0    insulin lispro (HumaLOG) 100 unit/mL injection Inject 12 units plus sliding scale three times daily with meals:  = 0u, 151-200 = 2u, 201-250 = 4u, 251-300 = 6u, 301-350 = 8u, 351-400+ = 10u, use up to 60u daily 30 mL 0    isosorbide dinitrate (Isordil) 20 mg tablet Take 1 tablet (20 mg) by mouth 3 times a day. 90 tablet 3    metFORMIN (Glucophage) 1,000 mg tablet Take 1 tablet (1,000 mg) by mouth 2 times a day. 60 tablet 0    pen needle, diabetic 32 gauge x 5/32\" needle 1 each 4 times a day. 400 each 0    rivaroxaban (Xarelto) 20 mg tablet Take 1 tablet (20 mg) by mouth once daily in the evening. Take with meals. Take with food. 90 tablet 3    sacubitriL-valsartan (Entresto) 49-51 mg tablet Take 1 tablet by mouth 2 times a day. 60 tablet 11    sertraline (Zoloft) 100 mg tablet Take 1 tablet (100 mg) by mouth once daily.      spironolactone (Aldactone) 25 mg tablet Take 1 tablet (25 mg) by mouth once daily. 30 tablet 11    torsemide (Demadex) 20 mg tablet Take 3 tablets (60 mg) by mouth 2 times a day. 540 tablet 3    traZODone (Desyrel) 50 mg tablet Take 1 tablet (50 mg) by mouth as needed at bedtime for sleep.       No current facility-administered medications on file prior to visit.         RELEVANT LAB RESULTS:  Lab Results   Component Value Date    BILITOT 0.7 12/04/2024    CALCIUM 8.8 12/04/2024    CO2 28 12/04/2024    CL 98 12/04/2024    CREATININE 1.25 12/04/2024    GLUCOSE 453 (HH) 12/04/2024    ALKPHOS 62 12/04/2024    K 4.0 12/04/2024    PROT 6.7 12/04/2024     (L) 12/04/2024    AST 33 12/04/2024    ALT 27 12/04/2024    BUN 20 12/04/2024    ANIONGAP 12 12/04/2024    MG 2.26 07/30/2024    PHOS 4.1 07/30/2024     07/22/2024    ALBUMIN 3.7 12/04/2024    LIPASE 44 12/04/2024    GFRMALE 64 08/08/2023     Lab Results   Component Value Date    " "TRIG 241 (H) 12/12/2023    CHOL 125 12/12/2023    LDLCALC 50 12/12/2023    HDL 26.6 12/12/2023     No results found for: \"BMCBC\", \"CBCDIF\"     PHARMACEUTICAL ASSESSMENT:  Drug Interactions? No    Medication Documentation Review Audit       Reviewed by Mariano Piedra PharmD (Pharmacist) on 01/24/25 at 1026      Medication Order Taking? Sig Documenting Provider Last Dose Status   atorvastatin (Lipitor) 40 mg tablet [id number] Yes Take 1 tablet (40 mg) by mouth once daily at bedtime. Delroy Jurado MD  Active   blood-glucose meter,continuous (FreeStyle Nadir 3 [place]) misc [id number]  Use as instructed to test blood glucose throughout the day Christopher D'Amico,   Active   blood-glucose sensor (FreeStyle Nadir 3 Plus Sensor) device [id number]  Use as directed. Change sensor every 15 days Steven Morales DO  Active   buPROPion XL (Wellbutrin XL) 150 mg 24 hr tablet [id number] Yes Take 1 tablet (150 mg) by mouth once daily. Historical Provider, MD  Active   carvedilol (Coreg) 6.25 mg tablet [id number] Yes Take 1 tablet (6.25 mg) by mouth 2 times daily (morning and late afternoon). Delroy Jurado MD  Active   dapagliflozin propanediol (Farxiga) 10 mg [id number] Yes Take 1 tablet (10 mg) by mouth once daily. Delroy Jurado MD  Active   dulaglutide 3 mg/0.5 mL pen injector [id number] Yes Inject 3 mg under the skin 1 (one) time per week. Delroy Jurado MD  Active   gentamicin (Garamycin) 0.1 % cream [id number] Yes  Historical Provider, MD  Active   hydrALAZINE (Apresoline) 25 mg tablet [id number] Yes Take 1 tablet (25 mg) by mouth 3 times a day. Delroy Jurado MD  Active   insulin glargine (Basaglar KwikPen U-100 Insulin) 100 unit/mL (3 mL) pen [id number] Yes Inject 55 Units under the skin once daily at bedtime. Take as directed per insulin instructions. Steven Morales DO  Active   insulin lispro (HumaLOG) 100 unit/mL injection [id number] Yes Inject 12 units plus sliding scale three times daily " "with meals:  = 0u, 151-200 = 2u, 201-250 = 4u, 251-300 = 6u, 301-350 = 8u, 351-400+ = 10u, use up to 60u daily Steven Morales DO  Active   isosorbide dinitrate (Isordil) 20 mg tablet [id number] Yes Take 1 tablet (20 mg) by mouth 3 times a day. Delroy Jurado MD  Active   metFORMIN (Glucophage) 1,000 mg tablet [id number] Yes Take 1 tablet (1,000 mg) by mouth 2 times a day. Steven Morales DO  Active   pen needle, diabetic 32 gauge x 5/32\" needle [id number]  1 each 4 times a day. Rosa Jane, APRN-CNP  Active   rivaroxaban (Xarelto) 20 mg tablet [id number] Yes Take 1 tablet (20 mg) by mouth once daily in the evening. Take with meals. Take with food. Delroy Jurado MD  Active   sacubitriL-valsartan (Entresto) 49-51 mg tablet [id number] Yes Take 1 tablet by mouth 2 times a day. Delroy Jurado MD  Active   sertraline (Zoloft) 100 mg tablet [id number] Yes Take 1 tablet (100 mg) by mouth once daily. Historical Provider, MD  Active   spironolactone (Aldactone) 25 mg tablet [id number] Yes Take 1 tablet (25 mg) by mouth once daily. Delroy Jurado MD  Active   torsemide (Demadex) 20 mg tablet [id number] Yes Take 3 tablets (60 mg) by mouth 2 times a day. Delroy Jurado MD  Active   traZODone (Desyrel) 50 mg tablet [id number] Yes Take 1 tablet (50 mg) by mouth as needed at bedtime for sleep. Historical Provider, MD  Active                    DISEASE MANAGEMENT ASSESSMENT:     CHF ASSESSMENT     Symptom/Staging:  -Most recent ejection fraction: 10%  -NYHA Stage: III    Results for orders placed during the hospital encounter of 07/18/24    Transthoracic Echo (TTE) Complete    Narrative  [facility], [address] [place] [address]  Tel [phone] and Fax [phone]    TRANSTHORACIC ECHOCARDIOGRAM REPORT      Patient Name:      JAZZ Quigley Physician:    [id number] Diana Cheng MD  Study Date:        7/19/2024            Ordering Provider:    [id number] LAUREN" CODY PAZ  MRN/PID:           [id number]             Fellow:  Accession#:        [id number]         Nurse:  Date of Birth/Age: 1985 / 38      Sonographer:          David huerta                                      RDCS, GABRIEL, TOBYE,  ACS  Gender:            M                    Additional Staff:  Height:            175.26 cm            Admit Date:           7/18/2024  Weight:            136.99 kg            Admission Status:  BSA / BMI:         2.46 m2 / 44.60      Encounter#:           [id number]  kg/m2  Blood Pressure:    97/70 mmHg           Department Location:  [address]    Study Type:    TRANSTHORACIC ECHO (TTE) COMPLETE  Diagnosis/ICD: Acute on chronic combined systolic (congestive) and diastolic  (congestive) heart failure (CHF)-I50.43    Patient History:  Pertinent History: NICM, HFrEF (5-10%), HTN, HLD, hypertriglyceridemia, DMII,  severe obesity, cardioembolic CVA, tobacco, cocaine and  alcohol use.    Study Detail: The following Echo studies were performed: 2D, M-Mode, Doppler and  color flow. Technically challenging study due to poor acoustic  windows. Definity used as a contrast agent for endocardial border  definition. Total contrast used for this procedure was 2.0 mL via  IV push.      PHYSICIAN INTERPRETATION:  Left Ventricle: Left ventricular ejection fraction is severely decreased, by visual estimate at 10%. The left ventricular cavity size is severely dilated. Spectral Doppler shows a pseudonormal pattern of left ventricular diastolic filling.  Left Atrium: The left atrium is normal in size.  Right Ventricle: The right ventricle is normal in size. There is normal right ventricular global systolic function.  Right Atrium: The right atrium is normal in size.  Aortic Valve: The aortic valve is trileaflet. There is trivial aortic valve regurgitation. The peak instantaneous gradient of the aortic valve is 3.7 mmHg.  Mitral Valve: The mitral valve is normal in structure. There is mild  mitral valve regurgitation.  Tricuspid Valve: The tricuspid valve is structurally normal. There is trace tricuspid regurgitation.  Pulmonic Valve: The pulmonic valve is structurally normal. There is physiologic pulmonic valve regurgitation.  Pericardium: There is no pericardial effusion noted.  Aorta: The aortic root is normal. There is no dilatation of the aortic root.  Systemic Veins: The inferior vena cava appears to be of normal size. There is less than 50% IVC collapse with inspiration.  In comparison to the previous echocardiogram(s): Compared with study dated 2/21/2024, no significant change.      CONCLUSIONS:  1. Poorly visualized anatomical structures due to suboptimal image quality.  2. Left ventricular ejection fraction is severely decreased, by visual estimate at 10%.  3. Spectral Doppler shows a pseudonormal pattern of left ventricular diastolic filling.  4. Left ventricular cavity size is severely dilated.  5. There is normal right ventricular global systolic function.    QUANTITATIVE DATA SUMMARY:  2D MEASUREMENTS:  Normal Ranges:  Ao Root d:     3.50 cm    (2.0-3.7cm)  LAs:           4.30 cm    (2.7-4.0cm)  IVSd:          0.90 cm    (0.6-1.1cm)  LVPWd:         0.80 cm    (0.6-1.1cm)  LVIDd:         7.40 cm    (3.9-5.9cm)  LVIDs:         7.20 cm  LV Mass Index: 118.0 g/m2  LV % FS        2.7 %    LA VOLUME:  Normal Ranges:  LA Vol A4C:        51.0 ml    (22+/-6mL/m2)  LA Vol A2C:        70.9 ml  LA Vol BP:         60.3 ml  LA Vol Index A4C:  20.7ml/m2  LA Vol Index A2C:  28.8 ml/m2  LA Vol Index BP:   24.5 ml/m2  LA Area A4C:       19.7 cm2  LA Area A2C:       23.3 cm2  LA Major Axis A4C: 6.5 cm  LA Major Axis A2C: 6.5 cm  LA Volume Index:   23.5 ml/m2    RA VOLUME BY A/L METHOD:  Normal Ranges:  RA Area A4C: 17.1 cm2    AORTA MEASUREMENTS:  Normal Ranges:  Asc Ao, d: 2.90 cm (2.1-3.4cm)    LV SYSTOLIC FUNCTION BY 2D PLANIMETRY (MOD):  Normal Ranges:  EF-A4C View:    25 % (>=55%)  EF-A2C View:    23  %  EF-Biplane:     26 %  EF-Visual:      10 %  LV EF Reported: 10 %    LV DIASTOLIC FUNCTION:  Normal Ranges:  MV Peak E:        0.75 m/s   (0.7-1.2 m/s)  MV Peak A:        0.41 m/s   (0.42-0.7 m/s)  E/A Ratio:        1.84       (1.0-2.2)  MV e'             0.054 m/s  (>8.0)  MV lateral e'     0.05 m/s  MV medial e'      0.06 m/s  E/e' Ratio:       13.76      (<8.0)  PulmV Sys Jean Claude:    36.50 cm/s  PulmV Nair Jean Claude:   48.10 cm/s  PulmV S/D Jean Claude:    0.80  PulmV A Revs Jean Claude: 26.10 cm/s    MITRAL VALVE:  Normal Ranges:  MV DT: 169 msec (150-240msec)    AORTIC VALVE:  Normal Ranges:  AoV Vmax:      0.96 m/s (<=1.7m/s)  AoV Peak PG:   3.7 mmHg (<20mmHg)  LVOT Max Jean Claude:  0.72 m/s (<=1.1m/s)  LVOT VTI:      11.00 cm  LVOT Diameter: 2.30 cm  (1.8-2.4cm)  AoV Area,Vmax: 3.10 cm2 (2.5-4.5cm2)      RIGHT VENTRICLE:  TAPSE: 20.8 mm  RV s'  0.10 m/s    PULMONIC VALVE:  Normal Ranges:  PV Accel Time: 140 msec (>120ms)  PV Max Jean Claude:    0.9 m/s  (0.6-0.9m/s)  PV Max PG:     3.0 mmHg    Pulmonary Veins:  PulmV A Revs Jean Claude: 26.10 cm/s  PulmV Nair Jean Claude:   48.10 cm/s  PulmV S/D Jean Claude:    0.80  PulmV Sys Jean Claude:    36.50 cm/s      [id number] Diana Cheng MD  Electronically signed on 7/19/2024 at 1:16:53 PM        ** Final **      Guideline-Directed Medical Therapy:  -ARNI: Yes, describe: Entresto 49-51mg twice daily  -Beta Blocker: Yes, describe: carvedilol 6.25g twice daily  -MRA: Yes, describe: spironolactone 25mg daily  -SGLT2i: Yes, describe: Farxiga 10mg daily     Secondary Prevention:  -The ASCVD Risk score (Joe GALEANA, et al., 2019) failed to calculate for the following reasons:    The 2019 ASCVD risk score is only valid for ages 40 to 79    Risk score cannot be calculated because patient has a medical history suggesting prior/existing ASCVD   -Aspirin 81mg? no  -Statin?: Yes, describe: atorvastatin 40mg daily  -HTN?: Yes, describe: controlled     CURRENT PHARMACOTHERAPY:   Hydralazine 25mg TID  Torsemide 60mg BID  Isosorbide dinitrate 20mg  TID  Entresto 49-51mg BID  Carvedilol 6.25mg BID  Spironolactone 25mg daily  Farxiga 10mg daily    Affordability: [facility]  Adherence/Compliance: reports he ran out of some medications, but did not say which ones he needs refills on  Adverse Effects: none reproted    Monitoring Weights at Home: Yes  Home Weight Recordings: 301lbs    Past In Office Weight Readings:   Wt Readings from Last 6 Encounters:   02/14/25 137 kg (301 lb)   12/05/24 137 kg (302 lb)   12/04/24 141 kg (310 lb)   11/18/24 139 kg (306 lb)   10/22/24 140 kg (308 lb 3.2 oz)   09/30/24 136 kg (300 lb)       Monitoring Blood Pressure at Home: No  Home BP Recordings: NA    Past In Office BP Readings:   BP Readings from Last 6 Encounters:   02/14/25 125/85   12/05/24 135/82   12/04/24 122/88   11/18/24 114/79   10/22/24 114/82   10/01/24 122/86       HEALTH MANAGEMENT    Maintaining fluid restriction (<2 L/day): N/A  Edema/swelling: Yes  Shortness of breath: Yes  Trouble sleeping/lying down: Yes  Dry/hacking cough: No  Recent Hospitalizations: No    EDUCATION/COUNSELING:   - Counseled patient on MOA, expectations, duration of therapy, contraindications, administration, and monitoring parameters  - Counseled patient on lifestyle modifications that can decrease your risk of having complications (smoking cessation, losing weight, daily weights, vaccines)  - Counseled patient on fluid intake and weight management. Recommended to not consume more than 2 liters of fliuids per day. If they have gained more than 2-3 pounds within a 24 hours period (or 5 pounds in a week), contact their cardiologist  - Answered all patient questions and concerns       DISCUSSION/NOTES:   Reports he ran out of some medications but he is not sure the names of them.  Unable to complete a med rec due to patient leaving for the hospital. He reports his BG has been over 400 and insulin is not bringing this number down.  Has blood pressure machine at home will start using once done  unpacking from moving.    ASSESSMENT:    Assessment/Plan   Problem List Items Addressed This Visit       Acute on chronic combined systolic and diastolic CHF (congestive heart failure) - Primary     Unable to complete full appointment as patient was headed to ED due to recurrent high blood sugar readings.         HFrEF (heart failure with reduced ejection fraction)    Relevant Orders    Referral to Clinical Pharmacy         RECOMMENDATIONS/PLAN:    CONTINUE  Hydralazine 25mg TID  Torsemide 60mg BID  Isosorbide dinitrate 20mg TID  Entresto 49-51mg BID  Carvedilol 6.25mg BID  Spironolactone 25mg daily  Farxiga 10mg daily    Last Appnt with Referring Provider: 12/5/24  Next Appnt with Referring Provider: needs scheduling  Clinical Pharmacist follow up: 4/18/25  VAF/Application Expiration: No  Type of Encounter: Orquidea Piedra PharmD    Verbal consent to manage patient's drug therapy was obtained from the patient . They were informed they may decline to participate or withdraw from participation in pharmacy services at any time.    Continue all meds under the continuation of care with the referring provider and clinical pharmacy team.

## 2025-02-14 NOTE — Clinical Note
Unable to complete full appointment due to Arthur having recurrent high blood sugar readings and driving to ED at time of appointment.

## 2025-02-14 NOTE — ASSESSMENT & PLAN NOTE
Unable to complete full appointment as patient was headed to ED due to recurrent high blood sugar readings.

## 2025-02-14 NOTE — ED PROVIDER NOTES
Emergency Department Provider Note        History of Present Illness     History provided by: Patient  Limitations to History: None  External Records Reviewed with Brief Summary:  last echo 7/2024 with EF 10%    HPI:  Arthur Carranza is a 39 y.o. male past medical history of CHF, PTSD, bipolar disorder, type 2 diabetes presenting with chief complaint of hyperglycemia.  Patient states that he has been taking his home insulin and noticing that his glucose is running 400.  He states that he has even increased his sliding scale to 25 units, is taking 55 units of Lantus at bedtime.  Patient states previously was on SGLT2 but did have to stop this as it was making him sick.  Patient notes that he has been feeling short of breath on exertion.  He also notes dysuria and skin changes to the penis.  Given this discomfort with urinating he has stopped taking his furosemide over the last 2 days.  He denies prior history of DKA.  Patient states he is not having [place] chest pain but on exertion is feeling a midsternal tightness.  No cough congestions fevers chills.  No concerns for sexually transmitted infections.  Patient is concerned    Physical Exam   Triage vitals:  T 36.2 °C (97.2 °F)  HR (!) 102  /85  RR 18  O2 97 % None (Room air)    General: Awake, alert, in no acute distress  Eyes: Gaze conjugate.  No scleral icterus or injection  HENT: Normo-cephalic, atraumatic. No stridor  CV: Regular rate, regular rhythm. Radial pulses 2+ bilaterally  Resp: Breathing non-labored, speaking in full sentences.  Distant lung sounds but no wheezing or rhonchi noted.  GI: Soft, distended, non-tender. No rebound or guarding.  : significant balanitis noted with erythema of the majority of the glans penis. No purulent drainage and discharge from the urethra.   MSK/Extremities: No gross bony deformities. Moving all extremities. +1 pitting edema   Skin: Warm. Appropriate color  Neuro: Alert. Oriented. Face symmetric. Speech is fluent.   Gross strength and sensation intact in b/l UE and LEs  Psych: Appropriate mood and affect    Medical Decision Making & ED Course   Medical Decision Makin y.o. male past medical history of severe heart failure, type 2 diabetes on insulin presenting with a chief complaint of hyperglycemia.  On arrival patient is tachycardic at 102, maintaining his blood pressure, afebrile satting 97% on room air.  Patient has been noting hyperglycemia over the last day or 2 however CGM only has data from today.  He also states that he has been having exertional dyspnea and has stopped his home torsemide secondary to dysuria. UA obtained not demonstrating signs of UTI, patient declining need for STI testing. Physical exam with balanitis will treat with keflex, lotrimin and hydrocortisone. VBG without acidosis, no concern for DKA at this time.  CMP also without anion gap and bicarb of normal range.  Kidney function at 1.57 does appear higher than usual however 6 months prior creatinine was 1.6.  We had a risk benefit reduction about restarting trulicity which patient is agreeable with, will refill this for patient and recommending follow up with endocrinology. BNP elevated although not significantly compared to prior. POCUS preformed with evidence of B lines which likely is the cause of patients shortness of breath, We did initially discuss admission for elevated troponin of 91 with stable repeat. At this time, patient declines. Currently I have low suspicion for acute coronary syndrome. Will treat him with 120 mg IV lasix for diuresis. Will discharge home with strict return precautions and primary care follow up.    ----         Social Determinants of Health which Significantly Impact Care: None identified     EKG Independent Interpretation: EKG interpreted by myself. Please see ED Course for full interpretation.    Independent Result Review and Interpretation: Relevant laboratory and radiographic results were reviewed and  independently interpreted by myself.  As necessary, they are commented on in the ED Course.    Chronic conditions affecting the patient's care: As documented above in MDM    The patient was discussed with the following consultants/services: None    Care Considerations: As documented above in [facility]    ED Course:  ED Course as of 02/16/25 0932   Fri Feb 14, 2025   1728 XR chest 2 views  1. Stable cardiomegaly.  2. No focal pulmonary consolidation.   [AW]   1911 Troponin I, High Sensitivity (CMC)(!): 92 [AW]   2105 POCUS with b-lines [AM]   Sun Feb 16, 2025   0932 EKG demonstrates sinus tach with a rate of 105 bpm.  No ST elevations or depressions concerning for ischemia.  T wave versions in lead III.  When compared to prior in July 25, 2024 these are similar.  There are no significant changes [AW]      ED Course User Index  [AM] Arthur Alvarenga MD  [AW] Lakisha Ridley DO         Diagnoses as of 02/16/25 0932   Balanitis   Hyperglycemia   Dyspnea, unspecified type   Acute on chronic heart failure, unspecified heart failure type     Disposition   As a result of the work-up, the patient was discharged home.  he was informed of his diagnosis and instructed to come back with any concerns or worsening of condition.  he and was agreeable to the plan as discussed above.  he was given the opportunity to ask questions.  All of the patient's questions were answered.    Procedures   Procedures    Patient seen and discussed with ED attending physician.    Lakisha Ridley DO  Emergency Medicine       Lakisha Ridley DO  Resident  02/16/25 0903

## 2025-02-14 NOTE — ED TRIAGE NOTES
Pt presents to the ED with complaints of SOB and high blood sugar for the last week and a half. Pt states she has been having worsening SOB and states its been hard for him to catch his breath especially when he walks long distances. Pt states he does not use inhalers at home. Pt states he also has CHF and is on water pills at home. Pt is also a Type 2 diabetic and states he is unable to get his sugars down even with his home insulin. His home monitor was just reading High. Sugar was 307 in triage

## 2025-02-15 NOTE — DISCHARGE INSTRUCTIONS
You have an infection in your penis called balanitis.  This is usually from high blood sugar.  This is often fungal, but with how widespread the irritation is I am also going to treat you for bacterial infection.  Please use both creams twice per day for 14 days, and the antibiotic (keflex) for the full course.    For your blood sugar, I refilled the Trulicity and your insulin pen.  I gave you a couple refills so if you need more before you see your primary care doctor they should be able to refill it.    Please return to the emergency department if your shortness of breath gets worse, you have chest pain, or you have any new or concerning symptoms.

## 2025-04-17 NOTE — PROGRESS NOTES
"  Pharmacist Clinic: Cardiology Management    Arthur Carranza is a 39 y.o. male was referred to Clinical Pharmacy Team for Heart Failure management.     Referring Provider: Delroy Jurado MD    THIS IS A FOLLOW UP PATIENT APPOINTMENT. AT LAST VISIT ON 1/24/25 WITH PHARMACIST (Mariano Piedra).    Appointment was completed by Arthur who was reached at primary number.    REVIEW OF PAST APPNT (IF APPLICABLE):   Unable to complete last appointment as patient was driving to ED due to persistent high blood sugars.    Allergies[1]    Medical History[2]    Medications Ordered Prior to Encounter[3]      RELEVANT LAB RESULTS:  Lab Results   Component Value Date    BILITOT 0.9 02/14/2025    CALCIUM 9.6 02/14/2025    CO2 25 02/14/2025     02/14/2025    CREATININE 1.57 (H) 02/14/2025    GLUCOSE 332 (H) 02/14/2025    ALKPHOS 70 02/14/2025    K 4.3 02/14/2025    PROT 7.5 02/14/2025     (L) 02/14/2025    AST 27 02/14/2025    ALT 17 02/14/2025    BUN 18 02/14/2025    ANIONGAP 13 02/14/2025    MG 1.93 02/14/2025    PHOS 4.1 07/30/2024     07/22/2024    ALBUMIN 4.3 02/14/2025    LIPASE 44 12/04/2024    GFRMALE 64 08/08/2023     Lab Results   Component Value Date    TRIG 241 (H) 12/12/2023    CHOL 125 12/12/2023    LDLCALC 50 12/12/2023    HDL 26.6 12/12/2023     No results found for: \"BMCBC\", \"CBCDIF\"     PHARMACEUTICAL ASSESSMENT:    MEDICATION RECONCILIATION    Was a medication reconciliation completed at this visit? Yes  Home Pharmacy Reviewed? Yes, describe: [facility]    Added:  - none  Changed:  - none  Removed:  - none    Drug Interactions? No    Medication Documentation Review Audit       Reviewed by Mariano Piedra, PharmD (Pharmacist) on 01/24/25 at 1026      Medication Order Taking? Sig Documenting Provider Last Dose Status   atorvastatin (Lipitor) 40 mg tablet [id number] Yes Take 1 tablet (40 mg) by mouth once daily at bedtime. Delroy Jurado MD  Active   blood-glucose meter,continuous " "(FreeStyle Nadir 3 [place]) misc [id number]  Use as instructed to test blood glucose throughout the day Christopher D'Amico,   Active   blood-glucose sensor (FreeStyle Nadir 3 Plus Sensor) device [id number]  Use as directed. Change sensor every 15 days Steven Morales DO  Active   buPROPion XL (Wellbutrin XL) 150 mg 24 hr tablet [id number] Yes Take 1 tablet (150 mg) by mouth once daily. Historical Provider, MD  Active   carvedilol (Coreg) 6.25 mg tablet [id number] Yes Take 1 tablet (6.25 mg) by mouth 2 times daily (morning and late afternoon). Delroy Jurado MD  Active   dapagliflozin propanediol (Farxiga) 10 mg [id number] Yes Take 1 tablet (10 mg) by mouth once daily. Delroy Jurado MD  Active   dulaglutide 3 mg/0.5 mL pen injector [id number] Yes Inject 3 mg under the skin 1 (one) time per week. Delroy Jurado MD  Active   gentamicin (Garamycin) 0.1 % cream [id number] Yes  Historical Provider, MD  Active   hydrALAZINE (Apresoline) 25 mg tablet [id number] Yes Take 1 tablet (25 mg) by mouth 3 times a day. Delroy Jurado MD  Active   insulin glargine (Basaglar KwikPen U-100 Insulin) 100 unit/mL (3 mL) pen [id number] Yes Inject 55 Units under the skin once daily at bedtime. Take as directed per insulin instructions. Steven Morales DO  Active   insulin lispro (HumaLOG) 100 unit/mL injection [id number] Yes Inject 12 units plus sliding scale three times daily with meals:  = 0u, 151-200 = 2u, 201-250 = 4u, 251-300 = 6u, 301-350 = 8u, 351-400+ = 10u, use up to 60u daily Steven Morales DO  Active   isosorbide dinitrate (Isordil) 20 mg tablet [id number] Yes Take 1 tablet (20 mg) by mouth 3 times a day. Delroy Jurado MD  Active   metFORMIN (Glucophage) 1,000 mg tablet [id number] Yes Take 1 tablet (1,000 mg) by mouth 2 times a day. Steven Morales, DO  Active   pen needle, diabetic 32 gauge x 5/32\" needle [id number]  1 each 4 times a day. Rosa Jane, APRN-CNP  Active   rivaroxaban (Xarelto) 20 " mg tablet [id number] Yes Take 1 tablet (20 mg) by mouth once daily in the evening. Take with meals. Take with food. Delroy Jurado MD  Active   sacubitriL-valsartan (Entresto) 49-51 mg tablet [id number] Yes Take 1 tablet by mouth 2 times a day. Delroy Jurado MD  Active   sertraline (Zoloft) 100 mg tablet [id number] Yes Take 1 tablet (100 mg) by mouth once daily. Historical Provider, MD  Active   spironolactone (Aldactone) 25 mg tablet [id number] Yes Take 1 tablet (25 mg) by mouth once daily. Delroy Jurado MD  Active   torsemide (Demadex) 20 mg tablet [id number] Yes Take 3 tablets (60 mg) by mouth 2 times a day. Delroy Jurado MD  Active   traZODone (Desyrel) 50 mg tablet [id number] Yes Take 1 tablet (50 mg) by mouth as needed at bedtime for sleep. Historical Provider, MD  Active                    DISEASE MANAGEMENT ASSESSMENT:     CHF ASSESSMENT     Symptom/Staging:  -Most recent ejection fraction: 10%  -NYHA Stage: III    Results for orders placed during the hospital encounter of 07/18/24    Transthoracic Echo (TTE) Complete    [facility], [address] [place] [address]  Tel [phone] and Fax [phone]    TRANSTHORACIC ECHOCARDIOGRAM REPORT      Patient Name:      JAZZ OMID Quigley Physician:    [id number] Diana Cheng MD  Study Date:        7/19/2024            Ordering Provider:    [id number] LAUREN KOCH  MRN/PID:           [id number]             Fellow:  Accession#:        [id number]         Nurse:  Date of Birth/Age: 1985 / 38      Sonographer:          David huerta                                      RDCS, RCS, FASE,  ACS  Gender:            M                    Additional Staff:  Height:            175.26 cm            Admit Date:           7/18/2024  Weight:            136.99 kg            Admission Status:  BSA / BMI:         2.46 m2 / 44.60      Encounter#:           [id number]  kg/m2  Blood Pressure:    97/70 mmHg            Department Location:  [address]    Study Type:    TRANSTHORACIC ECHO (TTE) COMPLETE  Diagnosis/ICD: Acute on chronic combined systolic (congestive) and diastolic  (congestive) heart failure (CHF)-I50.43    Patient History:  Pertinent History: NICM, HFrEF (5-10%), HTN, HLD, hypertriglyceridemia, DMII,  severe obesity, cardioembolic CVA, tobacco, cocaine and  alcohol use.    Study Detail: The following Echo studies were performed: 2D, M-Mode, Doppler and  color flow. Technically challenging study due to poor acoustic  windows. Definity used as a contrast agent for endocardial border  definition. Total contrast used for this procedure was 2.0 mL via  IV push.      PHYSICIAN INTERPRETATION:  Left Ventricle: Left ventricular ejection fraction is severely decreased, by visual estimate at 10%. The left ventricular cavity size is severely dilated. Spectral Doppler shows a pseudonormal pattern of left ventricular diastolic filling.  Left Atrium: The left atrium is normal in size.  Right Ventricle: The right ventricle is normal in size. There is normal right ventricular global systolic function.  Right Atrium: The right atrium is normal in size.  Aortic Valve: The aortic valve is trileaflet. There is trivial aortic valve regurgitation. The peak instantaneous gradient of the aortic valve is 3.7 mmHg.  Mitral Valve: The mitral valve is normal in structure. There is mild mitral valve regurgitation.  Tricuspid Valve: The tricuspid valve is structurally normal. There is trace tricuspid regurgitation.  Pulmonic Valve: The pulmonic valve is structurally normal. There is physiologic pulmonic valve regurgitation.  Pericardium: There is no pericardial effusion noted.  Aorta: The aortic root is normal. There is no dilatation of the aortic root.  Systemic Veins: The inferior vena cava appears to be of normal size. There is less than 50% IVC collapse with inspiration.  In comparison to the previous echocardiogram(s): Compared  with study dated 2/21/2024, no significant change.      CONCLUSIONS:  1. Poorly visualized anatomical structures due to suboptimal image quality.  2. Left ventricular ejection fraction is severely decreased, by visual estimate at 10%.  3. Spectral Doppler shows a pseudonormal pattern of left ventricular diastolic filling.  4. Left ventricular cavity size is severely dilated.  5. There is normal right ventricular global systolic function.    QUANTITATIVE DATA SUMMARY:  2D MEASUREMENTS:  Normal Ranges:  Ao Root d:     3.50 cm    (2.0-3.7cm)  LAs:           4.30 cm    (2.7-4.0cm)  IVSd:          0.90 cm    (0.6-1.1cm)  LVPWd:         0.80 cm    (0.6-1.1cm)  LVIDd:         7.40 cm    (3.9-5.9cm)  LVIDs:         7.20 cm  LV Mass Index: 118.0 g/m2  LV % FS        2.7 %    LA VOLUME:  Normal Ranges:  LA Vol A4C:        51.0 ml    (22+/-6mL/m2)  LA Vol A2C:        70.9 ml  LA Vol BP:         60.3 ml  LA Vol Index A4C:  20.7ml/m2  LA Vol Index A2C:  28.8 ml/m2  LA Vol Index BP:   24.5 ml/m2  LA Area A4C:       19.7 cm2  LA Area A2C:       23.3 cm2  LA Major Axis A4C: 6.5 cm  LA Major Axis A2C: 6.5 cm  LA Volume Index:   23.5 ml/m2    RA VOLUME BY A/L METHOD:  Normal Ranges:  RA Area A4C: 17.1 cm2    AORTA MEASUREMENTS:  Normal Ranges:  Asc Ao, d: 2.90 cm (2.1-3.4cm)    LV SYSTOLIC FUNCTION BY 2D PLANIMETRY (MOD):  Normal Ranges:  EF-A4C View:    25 % (>=55%)  EF-A2C View:    23 %  EF-Biplane:     26 %  EF-Visual:      10 %  LV EF Reported: 10 %    LV DIASTOLIC FUNCTION:  Normal Ranges:  MV Peak E:        0.75 m/s   (0.7-1.2 m/s)  MV Peak A:        0.41 m/s   (0.42-0.7 m/s)  E/A Ratio:        1.84       (1.0-2.2)  MV e'             0.054 m/s  (>8.0)  MV lateral e'     0.05 m/s  MV medial e'      0.06 m/s  E/e' Ratio:       13.76      (<8.0)  PulmV Sys Jean Claude:    36.50 cm/s  PulmV Nair Jean Claude:   48.10 cm/s  PulmV S/D Jean Claude:    0.80  PulmV A Revs Jean Claude: 26.10 cm/s    MITRAL VALVE:  Normal Ranges:  MV DT: 169 msec (150-240msec)    AORTIC  VALVE:  Normal Ranges:  AoV Vmax:      0.96 m/s (<=1.7m/s)  AoV Peak PG:   3.7 mmHg (<20mmHg)  LVOT Max Jean Claude:  0.72 m/s (<=1.1m/s)  LVOT VTI:      11.00 cm  LVOT Diameter: 2.30 cm  (1.8-2.4cm)  AoV Area,Vmax: 3.10 cm2 (2.5-4.5cm2)      RIGHT VENTRICLE:  TAPSE: 20.8 mm  RV s'  0.10 m/s    PULMONIC VALVE:  Normal Ranges:  PV Accel Time: 140 msec (>120ms)  PV Max Jean Claude:    0.9 m/s  (0.6-0.9m/s)  PV Max PG:     3.0 mmHg    Pulmonary Veins:  PulmV A Revs Jean Claude: 26.10 cm/s  PulmV Nair Jean Claude:   48.10 cm/s  PulmV S/D Jean Claude:    0.80  PulmV Sys Jean Claude:    36.50 cm/s      [id number] Diana Cheng MD  Electronically signed on 7/19/2024 at 1:16:53 PM        ** Final **      Guideline-Directed Medical Therapy:  -ARNI: Yes, describe: Entresto 49-51mg twice daily  -Beta Blocker: Yes, describe: carvedilol 6.25g twice daily  -MRA: Yes, describe: spironolactone 25mg daily  -SGLT2i: Yes, describe: Farxiga 10mg daily     Secondary Prevention:  -The ASCVD Risk score (Joe DK, et al., 2019) failed to calculate for the following reasons:    The 2019 ASCVD risk score is only valid for ages 40 to 79    Risk score cannot be calculated because patient has a medical history suggesting prior/existing ASCVD   -Aspirin 81mg? no  -Statin?: Yes, describe: atorvastatin 40mg daily  -HTN?: Yes, describe: controlled     CURRENT PHARMACOTHERAPY:   Hydralazine 25mg TID  Torsemide 60mg BID  Isosorbide dinitrate 20mg TID  Entresto 49-51mg BID  Carvedilol 6.25mg BID  Spironolactone 25mg daily  Farxiga 10mg daily    Affordability: medicaid  Adherence/Compliance: reports adherence  Adverse Effects: none reported    Monitoring Weights at Home: Yes  Home Weight Recordings: 301lbs    Past In Office Weight Readings:   Wt Readings from Last 6 Encounters:   02/14/25 137 kg (301 lb)   12/05/24 137 kg (302 lb)   12/04/24 141 kg (310 lb)   11/18/24 139 kg (306 lb)   10/22/24 140 kg (308 lb 3.2 oz)   09/30/24 136 kg (300 lb)       Monitoring Blood Pressure at Home: Yes  Home BP  Recordings: 130s systolic    Past In Office BP Readings:   BP Readings from Last 6 Encounters:   02/14/25 128/88   12/05/24 135/82   12/04/24 122/88   11/18/24 114/79   10/22/24 114/82   10/01/24 122/86       HEALTH MANAGEMENT    Maintaining fluid restriction (<2 L/day): N/A  Edema/swelling: No  Shortness of breath: No  Trouble sleeping/lying down: No  Dry/hacking cough: No  Recent Hospitalizations: No    EDUCATION/COUNSELING:   - Counseled patient on MOA, expectations, duration of therapy, contraindications, administration, and monitoring parameters  - Counseled patient on lifestyle modifications that can decrease your risk of having complications (smoking cessation, losing weight, daily weights, vaccines)  - Counseled patient on fluid intake and weight management. Recommended to not consume more than 2 liters of fliuids per day. If they have gained more than 2-3 pounds within a 24 hours period (or 5 pounds in a week), contact their cardiologist  - Answered all patient questions and concerns       DISCUSSION/NOTES:   During today's appointment we discussed infrequent blood pressure checks while at home, and seeing some elevated systolic numbers. Will increase Entresto at this time to help maximize benefit from GDMT medications.  Completed a medication reconciliation and he noted adherence to all medications.    ASSESSMENT:    Assessment/Plan   Problem List Items Addressed This Visit       HFrEF (heart failure with reduced ejection fraction)    Tolerating 4 GDMT medications.  Continuing to maximize therapy. Increasing Entresto at this time.  No adjustments needed based on kidney and liver functions.         Relevant Medications    sacubitriL-valsartan (Entresto)  mg tablet    Other Relevant Orders    Referral to Clinical Pharmacy         RECOMMENDATIONS/PLAN:    INCREASE  Entresto 97-103mg BID  CONTINUE  Hydralazine 25mg TID  Torsemide 60mg BID  Isosorbide dinitrate 20mg TID  Carvedilol 6.25mg  BID  Spironolactone 25mg daily  Farxiga 10mg daily    Last Appnt with Referring Provider: 12/5/24  Next Appnt with Referring Provider: no showed last appointment  Clinical Pharmacist follow up: 5/23/25  VAF/Application Expiration: No  Type of Encounter: Orquidea Piedra PharmD    Verbal consent to manage patient's drug therapy was obtained from the patient . They were informed they may decline to participate or withdraw from participation in pharmacy services at any time.    Continue all meds under the continuation of care with the referring provider and clinical pharmacy team.            [1]   Allergies  Allergen Reactions    Shellfish Containing Products Itching, Rash and GI Upset   [2]   Past Medical History:  Diagnosis Date    CHF (congestive heart failure)     Diabetes mellitus (Multi)     Heart disease     Hypertension     Substance abuse (Multi)    [3]   Current Outpatient Medications on File Prior to Visit   Medication Sig Dispense Refill    atorvastatin (Lipitor) 40 mg tablet Take 1 tablet (40 mg) by mouth once daily at bedtime. 30 tablet 11    buPROPion XL (Wellbutrin XL) 150 mg 24 hr tablet Take 1 tablet (150 mg) by mouth once daily.      carvedilol (Coreg) 6.25 mg tablet Take 1 tablet (6.25 mg) by mouth 2 times daily (morning and late afternoon). 60 tablet 11    dapagliflozin propanediol (Farxiga) 10 mg Take 1 tablet (10 mg) by mouth once daily. 90 tablet 3    dulaglutide (Trulicity) 1.5 mg/0.5 mL pen injector injection Inject 1.5 mg under the skin 1 (one) time per week. 2 mL 2    gentamicin (Garamycin) 0.1 % cream       hydrALAZINE (Apresoline) 25 mg tablet Take 1 tablet (25 mg) by mouth 3 times a day. 270 tablet 3    insulin glargine (Basaglar KwikPen U-100 Insulin) 100 unit/mL (3 mL) pen Inject 55 Units under the skin once daily at bedtime. Take as directed per insulin instructions. 30 mL 0    insulin lispro (HumaLOG) 100 unit/mL injection Inject 12 Units under the skin 3 times daily  "(morning, midday, late afternoon). Take as directed per insulin instructions. 1 each 2    isosorbide dinitrate (Isordil) 20 mg tablet Take 1 tablet (20 mg) by mouth 3 times a day. 90 tablet 3    metFORMIN (Glucophage) 1,000 mg tablet Take 1 tablet (1,000 mg) by mouth 2 times a day. 60 tablet 0    rivaroxaban (Xarelto) 20 mg tablet Take 1 tablet (20 mg) by mouth once daily in the evening. Take with meals. Take with food. 90 tablet 3    sertraline (Zoloft) 100 mg tablet Take 1 tablet (100 mg) by mouth once daily.      spironolactone (Aldactone) 25 mg tablet Take 1 tablet (25 mg) by mouth once daily. 30 tablet 11    torsemide (Demadex) 20 mg tablet Take 3 tablets (60 mg) by mouth 2 times a day. 540 tablet 3    traZODone (Desyrel) 50 mg tablet Take 1 tablet (50 mg) by mouth as needed at bedtime for sleep.      [DISCONTINUED] sacubitriL-valsartan (Entresto) 49-51 mg tablet Take 1 tablet by mouth 2 times a day. 60 tablet 11    blood-glucose meter,continuous (FreeStyle Nadir 3 [place]) [facility] Use as instructed to test blood glucose throughout the day 1 each 0    blood-glucose sensor (FreeStyle Nadir 3 Plus Sensor) device Use as directed. Change sensor every 15 days 2 each 11    insulin lispro (HumaLOG) 100 unit/mL injection Inject 12 units plus sliding scale three times daily with meals:  = 0u, 151-200 = 2u, 201-250 = 4u, 251-300 = 6u, 301-350 = 8u, 351-400+ = 10u, use up to 60u daily 30 mL 0    pen needle, diabetic 32 gauge x 5/32\" needle 1 each 4 times a day. 400 each 0     No current facility-administered medications on file prior to visit.     "

## 2025-04-18 ENCOUNTER — APPOINTMENT (OUTPATIENT)
Dept: PHARMACY | Facility: HOSPITAL | Age: 40
End: 2025-04-18
Payer: COMMERCIAL

## 2025-04-18 DIAGNOSIS — I50.20 HFREF (HEART FAILURE WITH REDUCED EJECTION FRACTION): ICD-10-CM

## 2025-04-18 RX ORDER — SACUBITRIL AND VALSARTAN 97; 103 MG/1; MG/1
1 TABLET, FILM COATED ORAL 2 TIMES DAILY
Qty: 60 TABLET | Refills: 11 | Status: SHIPPED | OUTPATIENT
Start: 2025-04-18 | End: 2026-04-13

## 2025-04-18 NOTE — Clinical Note
Infrequently checks BP at home notes no hypotensive/bradycardia symptoms. When he does check he thinks systolic is usually in 130s increasing Entresto to 97-103mg BID.

## 2025-04-18 NOTE — ASSESSMENT & PLAN NOTE
Tolerating 4 GDMT medications.  Continuing to maximize therapy. Increasing Entresto at this time.  No adjustments needed based on kidney and liver functions.

## 2025-05-15 ENCOUNTER — HOSPITAL ENCOUNTER (EMERGENCY)
Facility: HOSPITAL | Age: 40
Discharge: HOME | End: 2025-05-15
Attending: STUDENT IN AN ORGANIZED HEALTH CARE EDUCATION/TRAINING PROGRAM
Payer: COMMERCIAL

## 2025-05-15 ENCOUNTER — APPOINTMENT (OUTPATIENT)
Dept: CARDIOLOGY | Facility: HOSPITAL | Age: 40
End: 2025-05-15
Payer: COMMERCIAL

## 2025-05-15 VITALS
DIASTOLIC BLOOD PRESSURE: 88 MMHG | WEIGHT: 307 LBS | HEART RATE: 87 BPM | RESPIRATION RATE: 19 BRPM | OXYGEN SATURATION: 97 % | BODY MASS INDEX: 45.47 KG/M2 | SYSTOLIC BLOOD PRESSURE: 123 MMHG | TEMPERATURE: 98.2 F | HEIGHT: 69 IN

## 2025-05-15 DIAGNOSIS — E11.40 TYPE 2 DIABETES MELLITUS WITH DIABETIC NEUROPATHY, WITH LONG-TERM CURRENT USE OF INSULIN: ICD-10-CM

## 2025-05-15 DIAGNOSIS — R73.9 HYPERGLYCEMIA: Primary | ICD-10-CM

## 2025-05-15 DIAGNOSIS — Z79.4 TYPE 2 DIABETES MELLITUS WITH DIABETIC NEUROPATHY, WITH LONG-TERM CURRENT USE OF INSULIN: ICD-10-CM

## 2025-05-15 LAB
ALBUMIN SERPL BCP-MCNC: 3.9 G/DL (ref 3.4–5)
ALP SERPL-CCNC: 70 U/L (ref 33–120)
ALT SERPL W P-5'-P-CCNC: 22 U/L (ref 10–52)
ANION GAP SERPL CALC-SCNC: 14 MMOL/L (ref 10–20)
APPEARANCE UR: CLEAR
AST SERPL W P-5'-P-CCNC: 24 U/L (ref 9–39)
ATRIAL RATE: 92 BPM
B-OH-BUTYR SERPL-SCNC: 0.15 MMOL/L (ref 0.02–0.27)
BASE EXCESS BLDV CALC-SCNC: -0.2 MMOL/L (ref -2–3)
BASOPHILS # BLD AUTO: 0.01 X10*3/UL (ref 0–0.1)
BASOPHILS NFR BLD AUTO: 0.2 %
BILIRUB SERPL-MCNC: 0.5 MG/DL (ref 0–1.2)
BILIRUB UR STRIP.AUTO-MCNC: NEGATIVE MG/DL
BODY TEMPERATURE: ABNORMAL
BUN SERPL-MCNC: 20 MG/DL (ref 6–23)
CALCIUM SERPL-MCNC: 8.5 MG/DL (ref 8.6–10.3)
CARDIAC TROPONIN I PNL SERPL HS: 46 NG/L (ref 0–20)
CARDIAC TROPONIN I PNL SERPL HS: 47 NG/L (ref 0–20)
CHLORIDE SERPL-SCNC: 94 MMOL/L (ref 98–107)
CO2 SERPL-SCNC: 23 MMOL/L (ref 21–32)
COLOR UR: COLORLESS
CREAT SERPL-MCNC: 1.42 MG/DL (ref 0.5–1.3)
EGFRCR SERPLBLD CKD-EPI 2021: 65 ML/MIN/1.73M*2
EOSINOPHIL # BLD AUTO: 0.08 X10*3/UL (ref 0–0.7)
EOSINOPHIL NFR BLD AUTO: 1.4 %
ERYTHROCYTE [DISTWIDTH] IN BLOOD BY AUTOMATED COUNT: 12.9 % (ref 11.5–14.5)
GLUCOSE BLD MANUAL STRIP-MCNC: 438 MG/DL (ref 74–99)
GLUCOSE BLD MANUAL STRIP-MCNC: >600 MG/DL (ref 74–99)
GLUCOSE BLD MANUAL STRIP-MCNC: >600 MG/DL (ref 74–99)
GLUCOSE SERPL-MCNC: 720 MG/DL (ref 74–99)
GLUCOSE UR STRIP.AUTO-MCNC: ABNORMAL MG/DL
HCO3 BLDV-SCNC: 24.8 MMOL/L (ref 22–26)
HCT VFR BLD AUTO: 39.8 % (ref 41–52)
HGB BLD-MCNC: 13.5 G/DL (ref 13.5–17.5)
IMM GRANULOCYTES # BLD AUTO: 0.01 X10*3/UL (ref 0–0.7)
IMM GRANULOCYTES NFR BLD AUTO: 0.2 % (ref 0–0.9)
INHALED O2 CONCENTRATION: 97 %
KETONES UR STRIP.AUTO-MCNC: NEGATIVE MG/DL
LACTATE SERPL-SCNC: 3.4 MMOL/L (ref 0.4–2)
LACTATE SERPL-SCNC: 4.1 MMOL/L (ref 0.4–2)
LEUKOCYTE ESTERASE UR QL STRIP.AUTO: NEGATIVE
LYMPHOCYTES # BLD AUTO: 0.95 X10*3/UL (ref 1.2–4.8)
LYMPHOCYTES NFR BLD AUTO: 17.1 %
MAGNESIUM SERPL-MCNC: 1.7 MG/DL (ref 1.6–2.4)
MCH RBC QN AUTO: 32.1 PG (ref 26–34)
MCHC RBC AUTO-ENTMCNC: 33.9 G/DL (ref 32–36)
MCV RBC AUTO: 95 FL (ref 80–100)
MONOCYTES # BLD AUTO: 0.6 X10*3/UL (ref 0.1–1)
MONOCYTES NFR BLD AUTO: 10.8 %
NEUTROPHILS # BLD AUTO: 3.9 X10*3/UL (ref 1.2–7.7)
NEUTROPHILS NFR BLD AUTO: 70.3 %
NITRITE UR QL STRIP.AUTO: NEGATIVE
NRBC BLD-RTO: 0 /100 WBCS (ref 0–0)
OXYHGB MFR BLDV: 91.9 % (ref 45–75)
P AXIS: 57 DEGREES
P OFFSET: 172 MS
P ONSET: 117 MS
PCO2 BLDV: 41 MM HG (ref 41–51)
PH BLDV: 7.39 PH (ref 7.33–7.43)
PH UR STRIP.AUTO: 6 [PH]
PLATELET # BLD AUTO: 188 X10*3/UL (ref 150–450)
PO2 BLDV: 67 MM HG (ref 35–45)
POTASSIUM SERPL-SCNC: 4.4 MMOL/L (ref 3.5–5.3)
PR INTERVAL: 188 MS
PROT SERPL-MCNC: 7.1 G/DL (ref 6.4–8.2)
PROT UR STRIP.AUTO-MCNC: NEGATIVE MG/DL
Q ONSET: 211 MS
QRS COUNT: 15 BEATS
QRS DURATION: 158 MS
QT INTERVAL: 444 MS
QTC CALCULATION(BAZETT): 549 MS
QTC FREDERICIA: 512 MS
R AXIS: -65 DEGREES
RBC # BLD AUTO: 4.21 X10*6/UL (ref 4.5–5.9)
RBC # UR STRIP.AUTO: NEGATIVE MG/DL
SAO2 % BLDV: 94 % (ref 45–75)
SODIUM SERPL-SCNC: 127 MMOL/L (ref 136–145)
SP GR UR STRIP.AUTO: <1.005
T AXIS: 100 DEGREES
T OFFSET: 433 MS
UROBILINOGEN UR STRIP.AUTO-MCNC: NORMAL MG/DL
VENTRICULAR RATE: 92 BPM
WBC # BLD AUTO: 5.6 X10*3/UL (ref 4.4–11.3)

## 2025-05-15 PROCEDURE — 96361 HYDRATE IV INFUSION ADD-ON: CPT

## 2025-05-15 PROCEDURE — 2500000002 HC RX 250 W HCPCS SELF ADMINISTERED DRUGS (ALT 637 FOR MEDICARE OP, ALT 636 FOR OP/ED): Performed by: PHYSICIAN ASSISTANT

## 2025-05-15 PROCEDURE — 82010 KETONE BODYS QUAN: CPT | Performed by: PHYSICIAN ASSISTANT

## 2025-05-15 PROCEDURE — 82947 ASSAY GLUCOSE BLOOD QUANT: CPT

## 2025-05-15 PROCEDURE — 99284 EMERGENCY DEPT VISIT MOD MDM: CPT | Mod: 25 | Performed by: STUDENT IN AN ORGANIZED HEALTH CARE EDUCATION/TRAINING PROGRAM

## 2025-05-15 PROCEDURE — 2500000004 HC RX 250 GENERAL PHARMACY W/ HCPCS (ALT 636 FOR OP/ED): Mod: JZ | Performed by: PHYSICIAN ASSISTANT

## 2025-05-15 PROCEDURE — 83735 ASSAY OF MAGNESIUM: CPT | Performed by: PHYSICIAN ASSISTANT

## 2025-05-15 PROCEDURE — 96360 HYDRATION IV INFUSION INIT: CPT

## 2025-05-15 PROCEDURE — 84075 ASSAY ALKALINE PHOSPHATASE: CPT | Performed by: PHYSICIAN ASSISTANT

## 2025-05-15 PROCEDURE — 93005 ELECTROCARDIOGRAM TRACING: CPT

## 2025-05-15 PROCEDURE — 85025 COMPLETE CBC W/AUTO DIFF WBC: CPT | Performed by: PHYSICIAN ASSISTANT

## 2025-05-15 PROCEDURE — 81003 URINALYSIS AUTO W/O SCOPE: CPT | Performed by: PHYSICIAN ASSISTANT

## 2025-05-15 PROCEDURE — 36415 COLL VENOUS BLD VENIPUNCTURE: CPT | Performed by: PHYSICIAN ASSISTANT

## 2025-05-15 PROCEDURE — 82805 BLOOD GASES W/O2 SATURATION: CPT | Performed by: PHYSICIAN ASSISTANT

## 2025-05-15 PROCEDURE — 84484 ASSAY OF TROPONIN QUANT: CPT | Performed by: PHYSICIAN ASSISTANT

## 2025-05-15 PROCEDURE — 83605 ASSAY OF LACTIC ACID: CPT | Performed by: PHYSICIAN ASSISTANT

## 2025-05-15 RX ORDER — INSULIN GLARGINE 100 [IU]/ML
55 INJECTION, SOLUTION SUBCUTANEOUS NIGHTLY
Qty: 30 ML | Refills: 0 | Status: SHIPPED | OUTPATIENT
Start: 2025-05-15 | End: 2025-07-09

## 2025-05-15 RX ORDER — INSULIN LISPRO 100 [IU]/ML
INJECTION, SOLUTION INTRAVENOUS; SUBCUTANEOUS
Qty: 30 ML | Refills: 0 | Status: SHIPPED | OUTPATIENT
Start: 2025-05-15

## 2025-05-15 RX ADMIN — INSULIN HUMAN 10 UNITS: 100 INJECTION, SOLUTION PARENTERAL at 19:44

## 2025-05-15 RX ADMIN — SODIUM CHLORIDE 1000 ML: 0.9 INJECTION, SOLUTION INTRAVENOUS at 20:33

## 2025-05-15 RX ADMIN — SODIUM CHLORIDE 1000 ML: 0.9 INJECTION, SOLUTION INTRAVENOUS at 17:17

## 2025-05-15 ASSESSMENT — PAIN - FUNCTIONAL ASSESSMENT: PAIN_FUNCTIONAL_ASSESSMENT: 0-10

## 2025-05-15 ASSESSMENT — LIFESTYLE VARIABLES
EVER FELT BAD OR GUILTY ABOUT YOUR DRINKING: NO
TOTAL SCORE: 0
EVER HAD A DRINK FIRST THING IN THE MORNING TO STEADY YOUR NERVES TO GET RID OF A HANGOVER: NO
HAVE PEOPLE ANNOYED YOU BY CRITICIZING YOUR DRINKING: NO
HAVE YOU EVER FELT YOU SHOULD CUT DOWN ON YOUR DRINKING: NO

## 2025-05-15 ASSESSMENT — COLUMBIA-SUICIDE SEVERITY RATING SCALE - C-SSRS
2. HAVE YOU ACTUALLY HAD ANY THOUGHTS OF KILLING YOURSELF?: NO
6. HAVE YOU EVER DONE ANYTHING, STARTED TO DO ANYTHING, OR PREPARED TO DO ANYTHING TO END YOUR LIFE?: NO
1. IN THE PAST MONTH, HAVE YOU WISHED YOU WERE DEAD OR WISHED YOU COULD GO TO SLEEP AND NOT WAKE UP?: NO

## 2025-05-15 ASSESSMENT — PAIN SCALES - GENERAL: PAINLEVEL_OUTOF10: 6

## 2025-05-15 ASSESSMENT — PAIN DESCRIPTION - LOCATION: LOCATION: LEG

## 2025-05-15 NOTE — ED PROVIDER NOTES
HPI   Chief Complaint   Patient presents with    Hypoglycemia       39 year old male with hx of HTN, CHF, HLD, DM presenting to the ED today with fatigue and elevated blood sugars.  Patient tells me that he is supposed to be on insulin during the day.  He has not been taking his medications over the past few days because he did not have them.  He states his blood sugars have been elevating and he feels thirsty and is urinating a lot.  He states he feels very fatigued.  He has not had any fever, cough, congestion, chest pain or shortness of breath.  He denies abdominal pain, nausea or vomiting or diarrhea.  He states that he feels like he needs to drink liquids but he does not like drinking water and prior before coming to the ED he did drink some blue raspberry lemonade.  He used to be on metformin but states that this was discontinued so he is not on any oral medications for his diabetes.  He is on Xarelto and states he has been compliant with this.  He has a history of heart failure he tells me but he has not had any pain or swelling in his legs.  No further complaints at this time.      History provided by:  Patient          Patient History   Medical History[1]  Surgical History[2]  Family History[3]  Social History[4]    Physical Exam   ED Triage Vitals   Temperature Heart Rate Respirations BP   05/15/25 1644 05/15/25 1644 05/15/25 1644 05/15/25 1649   37.4 °C (99.3 °F) (!) 101 18 124/83      Pulse Ox Temp src Heart Rate Source Patient Position   05/15/25 1644 -- -- --   96 %         BP Location FiO2 (%)     -- --             Physical Exam  HENT:      Mouth/Throat:      Mouth: Mucous membranes are dry.   Eyes:      Conjunctiva/sclera: Conjunctivae normal.   Cardiovascular:      Rate and Rhythm: Normal rate and regular rhythm.      Pulses: Normal pulses.      Heart sounds: Normal heart sounds.      Comments: Distal pulses intact and symmetric.  Cap refill less than 2 seconds.  Pulmonary:      Effort: Pulmonary  effort is normal.      Breath sounds: Normal breath sounds.   Abdominal:      General: Bowel sounds are normal. There is no distension.      Palpations: Abdomen is soft.      Tenderness: There is no abdominal tenderness. There is no guarding or rebound.   Musculoskeletal:      Comments: Normal gait.  No calf tenderness or swelling bilaterally.   Skin:     General: Skin is warm.      Capillary Refill: Capillary refill takes less than 2 seconds.   Neurological:      Mental Status: He is alert and oriented to person, place, and time.      Comments: Speech normal           ED Course & MDM   ED Course as of 05/15/25 2225   Thu May 15, 2025   1928 Albumin: 3.9 [ED]      ED Course User Index  [ED] Krysta Chin PA-C         Diagnoses as of 05/15/25 2225   Hyperglycemia                 No data recorded     [place] Coma Scale Score: 15 (05/15/25 1647 : Jacinda Lopez RN)                           Medical Decision Making  39-year-old male with a history of HTN, HLD, CHF on Xarelto, diabetes on Humalog and Lantus presenting to the ER today with elevated blood sugars over the past few days as he has not been taking his insulin.  He states that he is drinking more fluids and urinating more frequently and feels fatigued.  He is not have any other acute complaints and arrives afebrile, tachycardic with otherwise stable vital signs.  Patient is awake alert oriented and speech is clear.  He ambulates in the room to the bed without any difficulty.  On my exam however heart RRR, lungs are clear.  Mucous membranes do appear slightly dry.  He does have good distal pulses and cap refill is less than 2 seconds.  Abdomen soft nondistended nontender.  ECG, laboratory studies are ordered as well as IV fluids and patient agreed with this plan.    ECG per my interpretation normal sinus rhythm at 92 bpm with left bundle branch block, nonspecific changes.  Patient's blood glucose POC on arrival is high, greater than 600.  CBC with  stable hemoglobin and no leukocytosis.  Initial lactate is 4.1.  Initial troponin is 47 and will be trended.  Patient's beta hydroxybutyrate is 0.15.  CMP notable for hyperglycemia of 728 with pseudohyponatremia of 127.  Patient's renal function is sta magnesium is within normal limits.  Additional fluids are ordered for the patient, ble compared to his previous labs.  He is pending repeat lactate and blood glucose.    Repeat lactate is starting to downtrend to 3.4.  Patient is resting comfortably.  I was called into the room by nursing staff as the patient's family member brought him a swab to eat and he also has had water with lemonade mixed in.  I discussed with the patient that he does have critically elevated blood sugars and he cannot eat or drink these types of food currently in the ED.  Case is discussed with my attending.  Insulin is ordered for the patient and we will continue to monitor the patient.    Urinalysis obtained today does not show infection or hematuria.  Patient's VBG with pH of 7.39.  His lactate has down trended to 3.4.  Patient's repeat troponin is 46.  He is not having any acute EKG changes, chest pain or shortness of breath.  He has had elevated troponins in the past.  Patient's blood sugar has now down trended to 438 and on reassessment he is resting comfortably, tells me he feels improved and is ready to go home.  He is eating chips at the time of discharge and has no signs of acute distress.  We will refill his Humalog and Lantus and I discussed that he will need to monitor his blood sugars closely and follow-up with his doctor.  I also discussed warning signs to return to the ED and he expressed understanding and agreed with the plan of care today.      Labs Reviewed   CBC WITH AUTO DIFFERENTIAL - Abnormal       Result Value    WBC 5.6      nRBC 0.0      RBC 4.21 (*)     Hemoglobin 13.5      Hematocrit 39.8 (*)     MCV 95      MCH 32.1      MCHC 33.9      RDW 12.9      Platelets 188       Neutrophils % 70.3      Immature Granulocytes %, Automated 0.2      Lymphocytes % 17.1      Monocytes % 10.8      Eosinophils % 1.4      Basophils % 0.2      Neutrophils Absolute 3.90      Immature Granulocytes Absolute, Automated 0.01      Lymphocytes Absolute 0.95 (*)     Monocytes Absolute 0.60      Eosinophils Absolute 0.08      Basophils Absolute 0.01     COMPREHENSIVE METABOLIC PANEL - Abnormal    Glucose 720 (*)     Sodium 127 (*)     Potassium 4.4      Chloride 94 (*)     Bicarbonate 23      Anion Gap 14      Urea Nitrogen 20      Creatinine 1.42 (*)     eGFR 65      Calcium 8.5 (*)     Albumin 3.9      Alkaline Phosphatase 70      Total Protein 7.1      AST 24      Bilirubin, Total 0.5      ALT 22     LACTATE - Abnormal    Lactate 4.1 (*)     Narrative:     Venipuncture immediately after or during the administration of Metamizole may lead to falsely low results. Testing should be performed immediately prior to Metamizole dosing.   TROPONIN I, HIGH SENSITIVITY - Abnormal    Troponin I, High Sensitivity 47 (*)     Narrative:     Less than 99th percentile of normal range cutoff-  Female and children under 18 years old <14 ng/L; Male <21 ng/L: Negative  Repeat testing should be performed if clinically indicated.     Female and children under 18 years old 14-50 ng/L; Male 21-50 ng/L:  Consistent with possible cardiac damage and possible increased clinical   risk. Serial measurements may help to assess extent of myocardial damage.     >50 ng/L: Consistent with cardiac damage, increased clinical risk and  myocardial infarction. Serial measurements may help assess extent of   myocardial damage.      NOTE: Children less than 1 year old may have higher baseline troponin   levels and results should be interpreted in conjunction with the overall   clinical context.     NOTE: Troponin I testing is performed using a different   testing methodology at [facility] than at other   [facility]. Direct  result comparisons should only   be made within the same method.   URINALYSIS WITH REFLEX CULTURE AND MICROSCOPIC - Abnormal    Color, Urine Colorless (*)     Appearance, Urine Clear      Specific Gravity, Urine <1.005 (*)     pH, Urine 6.0      Protein, Urine NEGATIVE      Glucose, Urine OVER (4+) (*)     Blood, Urine NEGATIVE      Ketones, Urine NEGATIVE      Bilirubin, Urine NEGATIVE      Urobilinogen, Urine Normal      Nitrite, Urine NEGATIVE      Leukocyte Esterase, Urine NEGATIVE      Narrative:     OVER is reported when the result is greater than the clinically reportable range.   LACTATE - Abnormal    Lactate 3.4 (*)     Narrative:     Venipuncture immediately after or during the administration of Metamizole may lead to falsely low results. Testing should be performed immediately prior to Metamizole dosing.   BLOOD GAS VENOUS - Abnormal    POCT pH, Venous 7.39      POCT pCO2, Venous 41      POCT pO2, Venous 67 (*)     POCT SO2, Venous 94 (*)     POCT Oxy Hemoglobin, Venous 91.9 (*)     POCT Base Excess, Venous -0.2      POCT HCO3 Calculated, Venous 24.8      Patient Temperature        FiO2 97     TROPONIN I, HIGH SENSITIVITY - Abnormal    Troponin I, High Sensitivity 46 (*)     Narrative:     Less than 99th percentile of normal range cutoff-  Female and children under 18 years old <14 ng/L; Male <21 ng/L: Negative  Repeat testing should be performed if clinically indicated.     Female and children under 18 years old 14-50 ng/L; Male 21-50 ng/L:  Consistent with possible cardiac damage and possible increased clinical   risk. Serial measurements may help to assess extent of myocardial damage.     >50 ng/L: Consistent with cardiac damage, increased clinical risk and  myocardial infarction. Serial measurements may help assess extent of   myocardial damage.      NOTE: Children less than 1 year old may have higher baseline troponin   levels and results should be interpreted in conjunction with the overall    clinical context.     NOTE: Troponin I testing is performed using a different   testing methodology at [facility] than at other   [facility]. Direct result comparisons should only   be made within the same method.   POCT GLUCOSE - Abnormal    POCT Glucose >600 (*)    POCT GLUCOSE - Abnormal    POCT Glucose >600 (*)    POCT GLUCOSE - Abnormal    POCT Glucose 438 (*)    MAGNESIUM - Normal    Magnesium 1.70     BETA HYDROXYBUTYRATE - Normal    Beta-Hydroxybutyrate 0.15      Narrative:     The beta-hydroxybutyrate test performance characteristics have been validated by  [facility]. This test has not been approved by the FDA; however,such approval is not necessary.     URINALYSIS WITH REFLEX CULTURE AND MICROSCOPIC    Narrative:     The following orders were created for panel order Urinalysis with Reflex Culture and Microscopic.  Procedure                               Abnormality         Status                     ---------                               -----------         ------                     Urinalysis with Reflex C...[[id number]]  Abnormal            Final result               Extra Urine Gray Tube[[id number]]                            In process                   Please view results for these tests on the individual orders.   EXTRA URINE GRAY TUBE       No orders to display       Procedure  Procedures        ----------------------------------------------------    Shared WARD Attestation:     This patient was seen by the advanced practice provider.  I  made/approved the management plan and take responsibility for the patient management.     History: 39-year-old who is an insulin-dependent diabetic who presents with report of fatigue in the setting of drinking lots of sugary drinks and not taking his insulin over the last few days.  He reports he feels thirsty and has been urinating a lot.       MDM: Patient presents clinically dehydrated in the  setting of significant hyperglycemia.  He is not altered so doubt HHS and his blood work is not consistent with DKA.  We hydrated him and gave him insulin as well as encouraged him to take his home medications and refrain from only drinking lemonade and instead hydrating himself with low sugar options.  Will represcribe his diabetes regimen to facilitate outpatient care.       I agree with the workup, evaluation, medical decision making, management and diagnosis.  The care plan has been discussed.     Miquel Sevilla MD    ----------------------------------------------------       [1]   Past Medical History:  Diagnosis Date    CHF (congestive heart failure)     Diabetes mellitus (Multi)     Heart disease     Hypertension     Substance abuse (Multi)    [2]   Past Surgical History:  Procedure Laterality Date    CARDIAC CATHETERIZATION N/A 7/18/2024    Procedure: Right Heart Cath;  Surgeon: Delroy Jurado MD;  Location: [facility] Cardiac Cath Lab;  Service: Cardiovascular;  Laterality: N/A;    MR HEAD ANGIO WO IV CONTRAST  12/29/2022    MR HEAD ANGIO WO IV CONTRAST 12/29/2022 DOCTOR OFFICE LEGACY    OTHER SURGICAL HISTORY  07/21/2022    Cyst excision    OTHER SURGICAL HISTORY  10/25/2022    Cardiac catheterization   [3]   Family History  Adopted: Yes   [4]   Social History  Tobacco Use    Smoking status: Every Day     Types: Cigarettes    Smokeless tobacco: Not on file   Vaping Use    Vaping status: Unknown   Substance Use Topics    Alcohol use: Yes     Comment: occasionaly - 1-2 times a month    Drug use: Not Currently     Types: Valerio Chin PA-C  05/15/25 2228       Krysta Chin PA-C  05/15/25 2228

## 2025-05-15 NOTE — ED TRIAGE NOTES
"Pt to ED for c/o hyperglycemia.  On arrival pt finger stick reaad \"HI\" on monitor.Pt reports increaased fatigue.   "

## 2025-05-16 ENCOUNTER — PATIENT OUTREACH (OUTPATIENT)
Dept: CARE COORDINATION | Facility: CLINIC | Age: 40
End: 2025-05-16
Payer: COMMERCIAL

## 2025-05-16 LAB — HOLD SPECIMEN: NORMAL

## 2025-05-16 NOTE — PROGRESS NOTES
Outreach to the patient following their recent visit to the ED to assess their needs and provide any necessary follow-up support. The attempt to reach the patient was unsuccessful, unable to make contact at this time. LVM with my contact information.    Era Garcia RN, [facility] [place]  Phone [phone]

## 2025-05-23 ENCOUNTER — APPOINTMENT (OUTPATIENT)
Dept: PHARMACY | Facility: HOSPITAL | Age: 40
End: 2025-05-23
Payer: COMMERCIAL

## 2025-06-02 ENCOUNTER — TELEPHONE (OUTPATIENT)
Dept: CARDIAC REHAB | Facility: HOSPITAL | Age: 40
End: 2025-06-02
Payer: COMMERCIAL

## 2025-06-02 DIAGNOSIS — I42.8 NONISCHEMIC CARDIOMYOPATHY (MULTI): ICD-10-CM

## 2025-06-02 DIAGNOSIS — I50.20 HFREF (HEART FAILURE WITH REDUCED EJECTION FRACTION): Primary | ICD-10-CM

## 2025-06-02 NOTE — TELEPHONE ENCOUNTER
PATIENT: Arthur Carranza  DATE: 6/2/2025    Patient called and left a message regarding scheduling Cardiac Rehab at [facility]. Patient also would like to reestablish with Dr. Jurado in Cardiology Clinic for NICM/HFrEF.     Spoke to patient and informed him that we would need to have him reestablish care with cardiology and have order placed again for cardiac rehabilitation. Patient previously attended 2 sessions in [place] at [facility] Cardiac [facility] >6 months ago. Patient reports some medication compliance issues that landed him in ED with high glucose readings and shortness of breath. States he would like to be reconnected with Cardiology at [facility], as he is staying in the [place] area again now. I informed patient that I would need to contact his original referring provider, Dr. Jurado, and inquire about cardiac rehabilitation order. Will call patient back with continued communication.    Kasandra Jimenes MS, ACSM-CEP, AACVPR, CCRP

## 2025-06-09 ENCOUNTER — TELEPHONE (OUTPATIENT)
Dept: CARDIAC REHAB | Facility: HOSPITAL | Age: 40
End: 2025-06-09
Payer: COMMERCIAL

## 2025-06-09 NOTE — TELEPHONE ENCOUNTER
PATIENT: Arthur Carranza  DATE: 6/9/2025    Called patient regarding scheduling Cardiac Rehab at [facility].   Left voicemail to call our offices to get scheduled upon earliest convenience at [phone].    Kasandra Jimenes MS, ACSM-CEP, AACVPR, CCRP

## 2025-06-20 ENCOUNTER — APPOINTMENT (OUTPATIENT)
Dept: RADIOLOGY | Facility: HOSPITAL | Age: 40
End: 2025-06-20
Payer: MEDICARE

## 2025-06-20 VITALS
TEMPERATURE: 96.8 F | HEART RATE: 97 BPM | SYSTOLIC BLOOD PRESSURE: 127 MMHG | WEIGHT: 300 LBS | HEIGHT: 69 IN | OXYGEN SATURATION: 98 % | DIASTOLIC BLOOD PRESSURE: 85 MMHG | RESPIRATION RATE: 16 BRPM | BODY MASS INDEX: 44.43 KG/M2

## 2025-06-20 PROCEDURE — 93971 EXTREMITY STUDY: CPT

## 2025-06-20 PROCEDURE — 99284 EMERGENCY DEPT VISIT MOD MDM: CPT | Mod: 25

## 2025-06-20 RX ORDER — HYDROCODONE BITARTRATE AND ACETAMINOPHEN 5; 325 MG/1; MG/1
1 TABLET ORAL ONCE
Refills: 0 | Status: COMPLETED | OUTPATIENT
Start: 2025-06-20 | End: 2025-06-21

## 2025-06-20 RX ORDER — KETOROLAC TROMETHAMINE 30 MG/ML
30 INJECTION, SOLUTION INTRAMUSCULAR; INTRAVENOUS ONCE
Status: COMPLETED | OUTPATIENT
Start: 2025-06-20 | End: 2025-06-21

## 2025-06-20 ASSESSMENT — PAIN DESCRIPTION - ORIENTATION: ORIENTATION: LEFT

## 2025-06-20 ASSESSMENT — PAIN - FUNCTIONAL ASSESSMENT: PAIN_FUNCTIONAL_ASSESSMENT: 0-10

## 2025-06-20 ASSESSMENT — PAIN DESCRIPTION - DESCRIPTORS: DESCRIPTORS: PATIENT UNABLE TO DESCRIBE

## 2025-06-20 ASSESSMENT — PAIN DESCRIPTION - LOCATION: LOCATION: LEG

## 2025-06-20 ASSESSMENT — PAIN DESCRIPTION - FREQUENCY: FREQUENCY: CONSTANT/CONTINUOUS

## 2025-06-20 ASSESSMENT — PAIN SCALES - GENERAL: PAINLEVEL_OUTOF10: 8

## 2025-06-20 ASSESSMENT — PAIN DESCRIPTION - PAIN TYPE: TYPE: ACUTE PAIN

## 2025-06-21 ENCOUNTER — HOSPITAL ENCOUNTER (EMERGENCY)
Facility: HOSPITAL | Age: 40
Discharge: HOME | End: 2025-06-21
Payer: MEDICARE

## 2025-06-21 DIAGNOSIS — M79.605 LEFT LEG PAIN: Primary | ICD-10-CM

## 2025-06-21 LAB
ALBUMIN SERPL BCP-MCNC: 4 G/DL (ref 3.4–5)
ALP SERPL-CCNC: 72 U/L (ref 33–120)
ALT SERPL W P-5'-P-CCNC: 13 U/L (ref 10–52)
ANION GAP SERPL CALC-SCNC: 11 MMOL/L (ref 10–20)
APTT PPP: 29 SECONDS (ref 26–36)
AST SERPL W P-5'-P-CCNC: 15 U/L (ref 9–39)
BASOPHILS # BLD AUTO: 0.02 X10*3/UL (ref 0–0.1)
BASOPHILS NFR BLD AUTO: 0.4 %
BILIRUB SERPL-MCNC: 0.9 MG/DL (ref 0–1.2)
BUN SERPL-MCNC: 14 MG/DL (ref 6–23)
CALCIUM SERPL-MCNC: 9.2 MG/DL (ref 8.6–10.3)
CHLORIDE SERPL-SCNC: 98 MMOL/L (ref 98–107)
CO2 SERPL-SCNC: 29 MMOL/L (ref 21–32)
CREAT SERPL-MCNC: 1.18 MG/DL (ref 0.5–1.3)
EGFRCR SERPLBLD CKD-EPI 2021: 81 ML/MIN/1.73M*2
EOSINOPHIL # BLD AUTO: 0.1 X10*3/UL (ref 0–0.7)
EOSINOPHIL NFR BLD AUTO: 2.2 %
ERYTHROCYTE [DISTWIDTH] IN BLOOD BY AUTOMATED COUNT: 12.8 % (ref 11.5–14.5)
GLUCOSE SERPL-MCNC: 369 MG/DL (ref 74–99)
HCT VFR BLD AUTO: 44 % (ref 41–52)
HGB BLD-MCNC: 15.1 G/DL (ref 13.5–17.5)
IMM GRANULOCYTES # BLD AUTO: 0.02 X10*3/UL (ref 0–0.7)
IMM GRANULOCYTES NFR BLD AUTO: 0.4 % (ref 0–0.9)
INR PPP: 1 (ref 0.9–1.1)
LYMPHOCYTES # BLD AUTO: 1.09 X10*3/UL (ref 1.2–4.8)
LYMPHOCYTES NFR BLD AUTO: 23.6 %
MAGNESIUM SERPL-MCNC: 1.79 MG/DL (ref 1.6–2.4)
MCH RBC QN AUTO: 32 PG (ref 26–34)
MCHC RBC AUTO-ENTMCNC: 34.3 G/DL (ref 32–36)
MCV RBC AUTO: 93 FL (ref 80–100)
MONOCYTES # BLD AUTO: 0.42 X10*3/UL (ref 0.1–1)
MONOCYTES NFR BLD AUTO: 9.1 %
NEUTROPHILS # BLD AUTO: 2.97 X10*3/UL (ref 1.2–7.7)
NEUTROPHILS NFR BLD AUTO: 64.3 %
NRBC BLD-RTO: 0 /100 WBCS (ref 0–0)
PLATELET # BLD AUTO: 217 X10*3/UL (ref 150–450)
POTASSIUM SERPL-SCNC: 4.6 MMOL/L (ref 3.5–5.3)
PROT SERPL-MCNC: 7.2 G/DL (ref 6.4–8.2)
PROTHROMBIN TIME: 11.4 SECONDS (ref 9.8–12.4)
RBC # BLD AUTO: 4.72 X10*6/UL (ref 4.5–5.9)
SODIUM SERPL-SCNC: 133 MMOL/L (ref 136–145)
WBC # BLD AUTO: 4.6 X10*3/UL (ref 4.4–11.3)

## 2025-06-21 PROCEDURE — 93971 EXTREMITY STUDY: CPT | Performed by: RADIOLOGY

## 2025-06-21 PROCEDURE — 36415 COLL VENOUS BLD VENIPUNCTURE: CPT

## 2025-06-21 PROCEDURE — 2500000004 HC RX 250 GENERAL PHARMACY W/ HCPCS (ALT 636 FOR OP/ED): Mod: JZ

## 2025-06-21 PROCEDURE — 85730 THROMBOPLASTIN TIME PARTIAL: CPT

## 2025-06-21 PROCEDURE — 2500000001 HC RX 250 WO HCPCS SELF ADMINISTERED DRUGS (ALT 637 FOR MEDICARE OP)

## 2025-06-21 PROCEDURE — 85025 COMPLETE CBC W/AUTO DIFF WBC: CPT

## 2025-06-21 PROCEDURE — 80053 COMPREHEN METABOLIC PANEL: CPT

## 2025-06-21 PROCEDURE — 83735 ASSAY OF MAGNESIUM: CPT

## 2025-06-21 RX ORDER — KETOROLAC TROMETHAMINE 10 MG/1
10 TABLET, FILM COATED ORAL EVERY 6 HOURS PRN
Qty: 12 TABLET | Refills: 0 | Status: SHIPPED | OUTPATIENT
Start: 2025-06-21 | End: 2025-06-24

## 2025-06-21 RX ADMIN — HYDROCODONE BITARTRATE AND ACETAMINOPHEN 1 TABLET: 5; 325 TABLET ORAL at 00:38

## 2025-06-21 RX ADMIN — KETOROLAC TROMETHAMINE 30 MG: 30 INJECTION, SOLUTION INTRAMUSCULAR at 00:38

## 2025-06-21 ASSESSMENT — PAIN DESCRIPTION - PAIN TYPE: TYPE: ACUTE PAIN

## 2025-06-21 ASSESSMENT — PAIN DESCRIPTION - LOCATION: LOCATION: LEG

## 2025-06-21 ASSESSMENT — PAIN DESCRIPTION - ORIENTATION: ORIENTATION: LEFT

## 2025-06-21 ASSESSMENT — PAIN - FUNCTIONAL ASSESSMENT: PAIN_FUNCTIONAL_ASSESSMENT: 0-10

## 2025-06-21 ASSESSMENT — PAIN SCALES - GENERAL: PAINLEVEL_OUTOF10: 6

## 2025-06-21 NOTE — ED PROVIDER NOTES
"HPI   Chief Complaint   Patient presents with    Leg Pain     Left leg pain for months, getting worse. Denies injury.       History provided by: Patient    Limitations to history: None    CC: Leg pain    HPI: 39-year-old male with a history of hypertension, hyperlipidemia, diabetes, CHF presents the emergency department to be evaluated for leg pain.  Patient states that for the last few weeks has been having pain in his left thigh.  He is characterized the pain as \"sharp \"and the pain is there constantly and worse with certain movements and palpation.  Denies any injury.  Denies taking anything for his pain prior to arrival.  Denies low back pain, saddle anesthesia, urinary tension, stool incontinence.  Denies numbness tingling or weakness in the extremity.  He denies any swelling redness warmth or bruising.  Denies history of DVTs or PEs, patient states that he was taking Xarelto until a few weeks ago.  Denies chest pain or shortness of breath.  Denies pleuritic pain and hemoptysis.  Denies weakness and fatigue.  Denies all GI and  complaints.  Denies all other systemic symptoms.    ROS: Negative unless mentioned in HPI    Medical Hx: Allergies reviewed.  Immunizations are up-to-date.    Physical exam:    Constitutional: Sitting comfortably in the room and in no distress.  Oriented to person, place, time, and situation.    HEENT: Head is normocephalic, atraumatic. Patient's airway is patent.  Tympanic membranes are clear bilaterally.  Nasal mucosa clear.  Mouth with normal mucosa.  Throat is not erythematous and there are no oropharyngeal exudates, uvula is midline.  No obvious facial deformities.    Eyes: Clear bilaterally.  Pupils are equal round and reactive to light and accommodation.  Extraocular movements intact.      Cardiac: Regular rate, regular rhythm.  Heart sounds S1, S2.  No murmurs, rubs, or gallops.  PMI nondisplaced.  No JVD.    Respiratory: Regular respiratory rate and effort.  Breath sounds are " clear and equal bilaterally, no adventitious lung sounds.  Patient is speaking in full sentences and is in no apparent respiratory distress. No use of accessory muscles.      Gastrointestinal: Abdomen is soft, nondistended, and nontender.  There are no obvious deformities.  No rebound tenderness or guarding.  Bowel sounds are normal active.    Genitourinary: No CVA or flank tenderness.    Musculoskeletal: Reproducible muscular tenderness in the left thigh.  No swelling, redness, warmth, or bruising.  Extremities with appropriate temperature and color.  No obvious skin or bony deformities.  Patient has equal range of motion in all extremities and no strength deficiencies.  No back or neck tenderness.  Capillary refill less than 3 seconds.  Strong peripheral pulses.  No sensory deficits.    Neurological: Patient is alert and oriented.  No focal deficits.  5/5 strength in all extremities.  Cranial nerves II through XII intact. GCS15.     Skin: Skin is normal color for race and is warm, dry, and intact.  No evidence of trauma.  No lesions, rashes, bruising, jaundice, or masses.    Psych: Appropriate mood and affect.  No apparent risk to self or others.    Heme/lymph: No adenopathy, lymphadenopathy, or splenomegaly    Physical exam is otherwise negative unless stated above or in history of present illness.              Patient History   Medical History[1]  Surgical History[2]  Family History[3]  Social History[4]    Physical Exam   ED Triage Vitals [06/20/25 2337]   Temperature Heart Rate Respirations BP   36 °C (96.8 °F) 97 16 127/85      Pulse Ox Temp Source Heart Rate Source Patient Position   98 % Temporal Monitor Sitting      BP Location FiO2 (%)     Right arm --       Physical Exam      ED Course & MDM   Diagnoses as of 06/21/25 0101   Left leg pain     Patient updated on plan for lab testing, IV insertion, radiology imaging, and medications to be administered while in the ER (if indicated). Patient updated on  expected wait times for testing and results. Patient provided my name and told to ask any staff member for questions or concerns if they should arise. Electronic medical record reviewed.     MDM    Patient presented to the emergency department with the chief complaint leg pain. Reproducible muscular tenderness in the left thigh.  No swelling, redness, warmth, or bruising.  Extremities with appropriate temperature and color.  No obvious skin or bony deformities.  Patient has equal range of motion in all extremities and no strength deficiencies.  No back or neck tenderness.  Capillary refill less than 3 seconds.  Strong peripheral pulses.  No sensory deficits. On arrival to the emergency department, vital signs were within normal limits    Will obtain basic blood work, coagulation screen, magnesium, and an ultrasound evaluate for DVT.  Low suspicion for severe PAD or arterial occlusion.      Will give the patient Toradol and oral Norco for his discomfort.    Magnesium is 1.79.  CBC reveals no leukocytosis or anemia.  CMP reveals hyperglycemia 369 with hyponatremia 133.  Coagulation screens within normal limits.  Ultrasound reveals no DVT.  I discussed differentials with the patient.  Patient will be discharged with prescription for oral Toradol.  He will follow-up with his primary care provider and orthopedics.  Patient verbalized understanding and agreement with the treatment plan and he remained hemodynamically stable in the ER.    This note was dictated using a speech recognition program.  While an attempt was made at proof-reading to minimize errors, minor errors in transcription may be present            No data recorded     [place] Coma Scale Score: 15 (06/20/25 2987 : Dayanara Madsen RN)                           Medical Decision Making      Procedure  Procedures       [1]   Past Medical History:  Diagnosis Date    CHF (congestive heart failure)     Diabetes mellitus (Multi)     Heart disease      Hypertension     Substance abuse    [2]   Past Surgical History:  Procedure Laterality Date    CARDIAC CATHETERIZATION N/A 7/18/2024    Procedure: Right Heart Cath;  Surgeon: Delroy Jurado MD;  Location: [facility] Cardiac Cath Lab;  Service: Cardiovascular;  Laterality: N/A;    MR HEAD ANGIO WO IV CONTRAST  12/29/2022    MR HEAD ANGIO WO IV CONTRAST 12/29/2022 DOCTOR OFFICE LEGACY    OTHER SURGICAL HISTORY  07/21/2022    Cyst excision    OTHER SURGICAL HISTORY  10/25/2022    Cardiac catheterization   [3]   Family History  Adopted: Yes   [4]   Social History  Tobacco Use    Smoking status: Every Day     Types: Cigarettes    Smokeless tobacco: Not on file   Vaping Use    Vaping status: Unknown   Substance Use Topics    Alcohol use: Yes     Comment: occasionaly - 1-2 times a month    Drug use: Not Currently     Types: Valerio Roblero PA-C  06/21/25 0102

## 2025-06-23 ENCOUNTER — PATIENT OUTREACH (OUTPATIENT)
Dept: CARE COORDINATION | Facility: CLINIC | Age: 40
End: 2025-06-23
Payer: MEDICARE

## 2025-06-23 NOTE — PROGRESS NOTES
Outreach to the patient following their recent visit to the ED to assess their needs and provide any necessary follow-up support. The attempt to reach the patient was unsuccessful, unable to make contact at this time.     Era Garcia RN, [facility] [place]  Phone [phone]

## 2025-06-26 ENCOUNTER — HOSPITAL ENCOUNTER (OUTPATIENT)
Dept: RADIOLOGY | Facility: CLINIC | Age: 40
Discharge: HOME | End: 2025-06-26
Payer: MEDICARE

## 2025-06-26 ENCOUNTER — OFFICE VISIT (OUTPATIENT)
Dept: ORTHOPEDIC SURGERY | Facility: CLINIC | Age: 40
End: 2025-06-26
Payer: MEDICARE

## 2025-06-26 DIAGNOSIS — R29.898 WEAKNESS OF LEFT LEG: ICD-10-CM

## 2025-06-26 DIAGNOSIS — M54.50 LUMBAR PAIN: ICD-10-CM

## 2025-06-26 DIAGNOSIS — M54.50 LOW BACK PAIN, UNSPECIFIED BACK PAIN LATERALITY, UNSPECIFIED CHRONICITY, UNSPECIFIED WHETHER SCIATICA PRESENT: ICD-10-CM

## 2025-06-26 DIAGNOSIS — G57.02 PIRIFORMIS SYNDROME, LEFT: ICD-10-CM

## 2025-06-26 DIAGNOSIS — M54.10 RADICULOPATHY, UNSPECIFIED SPINAL REGION: Primary | ICD-10-CM

## 2025-06-26 PROCEDURE — 99212 OFFICE O/P EST SF 10 MIN: CPT | Performed by: FAMILY MEDICINE

## 2025-06-26 PROCEDURE — 72100 X-RAY EXAM L-S SPINE 2/3 VWS: CPT | Performed by: FAMILY MEDICINE

## 2025-06-26 PROCEDURE — 72100 X-RAY EXAM L-S SPINE 2/3 VWS: CPT

## 2025-06-26 RX ORDER — CYCLOBENZAPRINE HCL 10 MG
10 TABLET ORAL NIGHTLY PRN
Qty: 14 TABLET | Refills: 0 | Status: SHIPPED | OUTPATIENT
Start: 2025-06-26 | End: 2025-07-10

## 2025-06-26 RX ORDER — CELECOXIB 200 MG/1
200 CAPSULE ORAL DAILY
Qty: 30 CAPSULE | Refills: 0 | Status: SHIPPED | OUTPATIENT
Start: 2025-06-26 | End: 2025-07-26

## 2025-06-26 RX ORDER — HYDROCODONE BITARTRATE AND ACETAMINOPHEN 5; 325 MG/1; MG/1
1 TABLET ORAL EVERY 12 HOURS PRN
Qty: 10 TABLET | Refills: 0 | Status: SHIPPED | OUTPATIENT
Start: 2025-06-26 | End: 2025-07-01

## 2025-06-27 NOTE — PROGRESS NOTES
Acute Injury New Patient Visit  Assessment & Plan  Low back pain with lumbar radiculopathy  Chronic low back pain with radicular symptoms down the left leg for 1.5 months, severe, affecting daily activities and sleep. Differential includes sciatic nerve issues, herniated disc, or bulging disc. X-rays show no fracture.  - Order lumbar MRI for further evaluation.  - Prescribe Celebrex 200 mg for inflammation and pain.  - Prescribe Norco (10 tablets) for severe pain.  - Prescribe Flexeril for nighttime muscle relaxation.  - Recommend physical therapy for stretching and traction.  - Advise use of topical muscle rubs and pain patches.  - Suggest sleeping with a pillow between the knees to level the pelvis.  - Provide prescription for a cane to assist with mobility.    Piriformis syndrome  Significant tenderness over the piriformis muscle, contributing to buttock pain and possibly affecting the sciatic nerve.    Left lower extremity weakness  Weakness in left lower extremity, particularly in hip flexion, abduction, and adduction, likely related to lumbar radiculopathy and piriformis syndrome.    Diabetes, uncontrolled  Diabetes is not well controlled. Steroids are not recommended due to risk of exacerbating hyperglycemia.    Orders Placed This Encounter    XR lumbar spine 2-3 views    MR lumbar spine wo IV contrast    Referral to Physical Therapy    HYDROcodone-acetaminophen (Norco) 5-325 mg tablet    cyclobenzaprine (Flexeril) 10 mg tablet    celecoxib (CeleBREX) 200 mg capsule     Procedures   At the conclusion of the visit there were no further questions by the patient/family regarding their plan of care.  Patient was instructed to call or return with any issues, questions, or concerns regarding their injury and/or treatment plan described above.    PHYSICAL EXAM:  General:  Patient is awake, alert, and oriented to person place and time.  Patient appears well nourished and well kept.  Affect Calm, Not Acutely  Distressed.  Heent:  Normocephalic, Atraumatic, EOMI  Cardiovascular:  Hemodynamically stable.  Respiratory:  Normal respirations with unlabored breathing.  Neuro: Gross sensation intact to the lower extremities bilaterally.  Extremity: Low back and left hip exam:  Physical Exam  MUSCULOSKELETAL: 4/5 strength with resisted left hip flexion, abduction, and adduction. 5/5 strength to the right lower extremity. Patellar reflexes equal and symmetric. Bilateral calves soft and non-tender. Significant tenderness over the greater trochanter bursa and piriformis. Mild left sacroiliac pain. No midline tenderness. Positive straight leg raise on the left, negative on the right.    IMAGING:   Results  RADIOLOGY  Lumbar spine X-ray: No evidence of fracture  XR lumbar spine 2-3 views  Narrative: Interpreted By:  Budinsky, Cole,   STUDY:  XR LUMBAR SPINE 2-3 VIEWS; ;  6/26/2025 2:37 pm      INDICATION:  Signs/Symptoms:Pain.      ACCESSION NUMBER(S):  [id number]      ORDERING CLINICIAN:  COLE BUDINSKY      Impression: Two views lumbar spine demonstrate no presence for fracture or  listhesis. No significant degenerative changes. Loss of the lumbar  lordotic curve likely secondary to spasm and strain.          Signed by: Cole Budinsky 6/26/2025 7:43 PM  Dictation workstation:   [id number]      Patient ID: Arthur Carranza is a 39 y.o. male who presents for Pain of the Left Leg (Numbness and Pain in Left Leg for Mths - X-Rays of Lumbar Spine Today - No Trauma).  History of Present Illness  Arthur Carranza is a 39 year old male with poorly controlled diabetes who presents with severe right buttock pain radiating to the leg.    He has been experiencing severe pain in his right buttock that radiates down his leg, which started suddenly about a month and a half ago without any preceding trauma or injury. The pain began after waking up one morning and has progressively worsened. No recent falls, car accidents, or work-related injuries. He  experiences numbness in the area and occasional 'little spurts of pain' down his leg.    He was evaluated at the hospital for blood clots, which were ruled out. X-rays of his back were performed and were unremarkable. He received an anti-inflammatory shot at the hospital, which provided temporary relief. He has also used a massage device on his back and buttock, which he finds somewhat helpful. He has taken some anti-inflammatory pills that provided temporary relief but has not been using muscle relaxers.    He has been dealing with housing instability, leading to changes in his sleeping arrangements. He has been sleeping on his mother's couch, which he suspects may have contributed to his pain. He has since moved to a bed, but it remains uncomfortable. He has tried various sleeping positions and using a pillow between his knees to alleviate the pain, but with limited success.    He reports significant sleep disruption over the past three weeks due to the pain, stating, 'I haven't had no sleep.' He is considering using a cane for support, especially when navigating stairs, as he finds it difficult to move without leaning on something.        This medical note was created with the assistance of artificial intelligence (AI) for documentation purposes. The content has been reviewed and confirmed by the healthcare provider for accuracy and completeness. Patient consented to the use of audio recording and use of AI during their visit.   06/27/25 at 5:22 PM - Cole C Budinsky, MD  Office:  [phone]

## 2025-06-30 ENCOUNTER — HOSPITAL ENCOUNTER (INPATIENT)
Facility: HOSPITAL | Age: 40
LOS: 3 days | Discharge: HOME | End: 2025-07-03
Attending: EMERGENCY MEDICINE | Admitting: STUDENT IN AN ORGANIZED HEALTH CARE EDUCATION/TRAINING PROGRAM
Payer: MEDICARE

## 2025-06-30 DIAGNOSIS — E11.40 TYPE 2 DIABETES MELLITUS WITH DIABETIC NEUROPATHY, WITH LONG-TERM CURRENT USE OF INSULIN: ICD-10-CM

## 2025-06-30 DIAGNOSIS — F31.9 BIPOLAR 1 DISORDER (MULTI): ICD-10-CM

## 2025-06-30 DIAGNOSIS — I44.7 LEFT BUNDLE BRANCH BLOCK: ICD-10-CM

## 2025-06-30 DIAGNOSIS — I10 PRIMARY HYPERTENSION: ICD-10-CM

## 2025-06-30 DIAGNOSIS — M79.605 LEFT LEG PAIN: ICD-10-CM

## 2025-06-30 DIAGNOSIS — Z86.73 HISTORY OF CVA (CEREBROVASCULAR ACCIDENT): ICD-10-CM

## 2025-06-30 DIAGNOSIS — E11.65: Primary | ICD-10-CM

## 2025-06-30 DIAGNOSIS — E87.1 HYPONATREMIA: ICD-10-CM

## 2025-06-30 DIAGNOSIS — I50.43 ACUTE ON CHRONIC COMBINED SYSTOLIC AND DIASTOLIC CONGESTIVE HEART FAILURE: ICD-10-CM

## 2025-06-30 DIAGNOSIS — I42.8 NONISCHEMIC CARDIOMYOPATHY (MULTI): ICD-10-CM

## 2025-06-30 DIAGNOSIS — I50.9 ACUTE DECOMPENSATED HEART FAILURE: ICD-10-CM

## 2025-06-30 DIAGNOSIS — Z79.4 TYPE 2 DIABETES MELLITUS WITH DIABETIC NEUROPATHY, WITH LONG-TERM CURRENT USE OF INSULIN: ICD-10-CM

## 2025-06-30 DIAGNOSIS — M79.652 LEFT THIGH PAIN: ICD-10-CM

## 2025-06-30 DIAGNOSIS — R73.9 HYPERGLYCEMIA: ICD-10-CM

## 2025-06-30 DIAGNOSIS — R79.89 PSEUDOHYPONATREMIA: ICD-10-CM

## 2025-06-30 DIAGNOSIS — I50.20 HFREF (HEART FAILURE WITH REDUCED EJECTION FRACTION): ICD-10-CM

## 2025-06-30 LAB
ALBUMIN SERPL BCP-MCNC: 4.2 G/DL (ref 3.4–5)
ALP SERPL-CCNC: 59 U/L (ref 33–120)
ALT SERPL W P-5'-P-CCNC: 28 U/L (ref 10–52)
ANION GAP SERPL CALC-SCNC: 12 MMOL/L (ref 10–20)
AST SERPL W P-5'-P-CCNC: 27 U/L (ref 9–39)
B-OH-BUTYR SERPL-SCNC: 0.1 MMOL/L (ref 0.02–0.27)
BASE EXCESS BLDV CALC-SCNC: 2.1 MMOL/L (ref -2–3)
BASOPHILS # BLD AUTO: 0.02 X10*3/UL (ref 0–0.1)
BASOPHILS NFR BLD AUTO: 0.4 %
BILIRUB SERPL-MCNC: 0.6 MG/DL (ref 0–1.2)
BODY TEMPERATURE: ABNORMAL
BUN SERPL-MCNC: 28 MG/DL (ref 6–23)
CALCIUM SERPL-MCNC: 8.6 MG/DL (ref 8.6–10.3)
CHLORIDE SERPL-SCNC: 97 MMOL/L (ref 98–107)
CK SERPL-CCNC: 230 U/L (ref 0–325)
CO2 SERPL-SCNC: 25 MMOL/L (ref 21–32)
CREAT SERPL-MCNC: 1.98 MG/DL (ref 0.5–1.3)
EGFRCR SERPLBLD CKD-EPI 2021: 44 ML/MIN/1.73M*2
EOSINOPHIL # BLD AUTO: 0.1 X10*3/UL (ref 0–0.7)
EOSINOPHIL NFR BLD AUTO: 1.9 %
ERYTHROCYTE [DISTWIDTH] IN BLOOD BY AUTOMATED COUNT: 12.8 % (ref 11.5–14.5)
GLUCOSE BLD MANUAL STRIP-MCNC: 348 MG/DL (ref 74–99)
GLUCOSE BLD MANUAL STRIP-MCNC: 353 MG/DL (ref 74–99)
GLUCOSE BLD MANUAL STRIP-MCNC: 548 MG/DL (ref 74–99)
GLUCOSE SERPL-MCNC: 607 MG/DL (ref 74–99)
HCO3 BLDV-SCNC: 28.7 MMOL/L (ref 22–26)
HCT VFR BLD AUTO: 41.2 % (ref 41–52)
HGB BLD-MCNC: 14.1 G/DL (ref 13.5–17.5)
IMM GRANULOCYTES # BLD AUTO: 0.01 X10*3/UL (ref 0–0.7)
IMM GRANULOCYTES NFR BLD AUTO: 0.2 % (ref 0–0.9)
INHALED O2 CONCENTRATION: 21 %
LACTATE SERPL-SCNC: 1.6 MMOL/L (ref 0.4–2)
LYMPHOCYTES # BLD AUTO: 1.16 X10*3/UL (ref 1.2–4.8)
LYMPHOCYTES NFR BLD AUTO: 22.2 %
MCH RBC QN AUTO: 32.3 PG (ref 26–34)
MCHC RBC AUTO-ENTMCNC: 34.2 G/DL (ref 32–36)
MCV RBC AUTO: 94 FL (ref 80–100)
MONOCYTES # BLD AUTO: 0.55 X10*3/UL (ref 0.1–1)
MONOCYTES NFR BLD AUTO: 10.5 %
NEUTROPHILS # BLD AUTO: 3.39 X10*3/UL (ref 1.2–7.7)
NEUTROPHILS NFR BLD AUTO: 64.8 %
NRBC BLD-RTO: 0 /100 WBCS (ref 0–0)
OXYHGB MFR BLDV: 60 % (ref 45–75)
PCO2 BLDV: 52 MM HG (ref 41–51)
PH BLDV: 7.35 PH (ref 7.33–7.43)
PLATELET # BLD AUTO: 234 X10*3/UL (ref 150–450)
PO2 BLDV: 37 MM HG (ref 35–45)
POTASSIUM SERPL-SCNC: 5.1 MMOL/L (ref 3.5–5.3)
PROT SERPL-MCNC: 7.5 G/DL (ref 6.4–8.2)
RBC # BLD AUTO: 4.37 X10*6/UL (ref 4.5–5.9)
SAO2 % BLDV: 61 % (ref 45–75)
SODIUM SERPL-SCNC: 129 MMOL/L (ref 136–145)
WBC # BLD AUTO: 5.2 X10*3/UL (ref 4.4–11.3)

## 2025-06-30 PROCEDURE — 82947 ASSAY GLUCOSE BLOOD QUANT: CPT

## 2025-06-30 PROCEDURE — 82550 ASSAY OF CK (CPK): CPT | Performed by: EMERGENCY MEDICINE

## 2025-06-30 PROCEDURE — 2500000002 HC RX 250 W HCPCS SELF ADMINISTERED DRUGS (ALT 637 FOR MEDICARE OP, ALT 636 FOR OP/ED): Performed by: EMERGENCY MEDICINE

## 2025-06-30 PROCEDURE — 2500000001 HC RX 250 WO HCPCS SELF ADMINISTERED DRUGS (ALT 637 FOR MEDICARE OP): Performed by: EMERGENCY MEDICINE

## 2025-06-30 PROCEDURE — 1210000001 HC SEMI-PRIVATE ROOM DAILY

## 2025-06-30 PROCEDURE — 99291 CRITICAL CARE FIRST HOUR: CPT | Mod: 25 | Performed by: EMERGENCY MEDICINE

## 2025-06-30 PROCEDURE — 96375 TX/PRO/DX INJ NEW DRUG ADDON: CPT

## 2025-06-30 PROCEDURE — 83036 HEMOGLOBIN GLYCOSYLATED A1C: CPT | Mod: ELYLAB | Performed by: STUDENT IN AN ORGANIZED HEALTH CARE EDUCATION/TRAINING PROGRAM

## 2025-06-30 PROCEDURE — 83605 ASSAY OF LACTIC ACID: CPT | Performed by: EMERGENCY MEDICINE

## 2025-06-30 PROCEDURE — 82805 BLOOD GASES W/O2 SATURATION: CPT | Performed by: EMERGENCY MEDICINE

## 2025-06-30 PROCEDURE — 36415 COLL VENOUS BLD VENIPUNCTURE: CPT | Performed by: EMERGENCY MEDICINE

## 2025-06-30 PROCEDURE — 96374 THER/PROPH/DIAG INJ IV PUSH: CPT

## 2025-06-30 PROCEDURE — 85025 COMPLETE CBC W/AUTO DIFF WBC: CPT | Performed by: EMERGENCY MEDICINE

## 2025-06-30 PROCEDURE — 2500000004 HC RX 250 GENERAL PHARMACY W/ HCPCS (ALT 636 FOR OP/ED): Performed by: EMERGENCY MEDICINE

## 2025-06-30 PROCEDURE — 99223 1ST HOSP IP/OBS HIGH 75: CPT | Performed by: STUDENT IN AN ORGANIZED HEALTH CARE EDUCATION/TRAINING PROGRAM

## 2025-06-30 PROCEDURE — 82010 KETONE BODYS QUAN: CPT | Performed by: EMERGENCY MEDICINE

## 2025-06-30 PROCEDURE — 80053 COMPREHEN METABOLIC PANEL: CPT | Performed by: EMERGENCY MEDICINE

## 2025-06-30 PROCEDURE — 82810 BLOOD GASES O2 SAT ONLY: CPT | Performed by: EMERGENCY MEDICINE

## 2025-06-30 RX ORDER — HYDROMORPHONE HYDROCHLORIDE 1 MG/ML
1 INJECTION, SOLUTION INTRAMUSCULAR; INTRAVENOUS; SUBCUTANEOUS ONCE
Status: COMPLETED | OUTPATIENT
Start: 2025-06-30 | End: 2025-06-30

## 2025-06-30 RX ORDER — ACETAMINOPHEN 325 MG/1
975 TABLET ORAL ONCE
Status: COMPLETED | OUTPATIENT
Start: 2025-06-30 | End: 2025-06-30

## 2025-06-30 RX ORDER — KETOROLAC TROMETHAMINE 30 MG/ML
15 INJECTION, SOLUTION INTRAMUSCULAR; INTRAVENOUS ONCE
Status: COMPLETED | OUTPATIENT
Start: 2025-06-30 | End: 2025-06-30

## 2025-06-30 RX ADMIN — SODIUM CHLORIDE 1000 ML: 0.9 INJECTION, SOLUTION INTRAVENOUS at 21:03

## 2025-06-30 RX ADMIN — KETOROLAC TROMETHAMINE 15 MG: 30 INJECTION, SOLUTION INTRAMUSCULAR at 21:04

## 2025-06-30 RX ADMIN — ACETAMINOPHEN 975 MG: 325 TABLET ORAL at 21:04

## 2025-06-30 RX ADMIN — INSULIN HUMAN 10 UNITS: 100 INJECTION, SOLUTION PARENTERAL at 21:02

## 2025-06-30 RX ADMIN — HYDROMORPHONE HYDROCHLORIDE 1 MG: 1 INJECTION, SOLUTION INTRAMUSCULAR; INTRAVENOUS; SUBCUTANEOUS at 21:04

## 2025-06-30 RX ADMIN — SODIUM CHLORIDE 2000 ML: 0.9 INJECTION, SOLUTION INTRAVENOUS at 22:11

## 2025-06-30 ASSESSMENT — PAIN SCALES - GENERAL: PAINLEVEL_OUTOF10: 8

## 2025-06-30 ASSESSMENT — PAIN - FUNCTIONAL ASSESSMENT: PAIN_FUNCTIONAL_ASSESSMENT: 0-10

## 2025-06-30 ASSESSMENT — PAIN DESCRIPTION - LOCATION: LOCATION: LEG

## 2025-06-30 ASSESSMENT — PAIN DESCRIPTION - ORIENTATION: ORIENTATION: LEFT;UPPER

## 2025-06-30 ASSESSMENT — PAIN DESCRIPTION - FREQUENCY: FREQUENCY: CONSTANT/CONTINUOUS

## 2025-06-30 ASSESSMENT — PAIN DESCRIPTION - PAIN TYPE: TYPE: ACUTE PAIN

## 2025-06-30 ASSESSMENT — PAIN DESCRIPTION - DESCRIPTORS: DESCRIPTORS: SHARP

## 2025-07-01 PROBLEM — Z86.73 HISTORY OF CVA (CEREBROVASCULAR ACCIDENT): Status: ACTIVE | Noted: 2025-07-01

## 2025-07-01 PROBLEM — M79.605 LEFT LEG PAIN: Status: ACTIVE | Noted: 2025-07-01

## 2025-07-01 PROBLEM — R79.89 PSEUDOHYPONATREMIA: Status: ACTIVE | Noted: 2025-07-01

## 2025-07-01 PROBLEM — N17.9 ACUTE KIDNEY INJURY SUPERIMPOSED ON STAGE 2 CHRONIC KIDNEY DISEASE: Status: ACTIVE | Noted: 2025-07-01

## 2025-07-01 PROBLEM — I50.22 CHRONIC SYSTOLIC HEART FAILURE: Status: ACTIVE | Noted: 2025-07-01

## 2025-07-01 PROBLEM — N18.2 ACUTE KIDNEY INJURY SUPERIMPOSED ON STAGE 2 CHRONIC KIDNEY DISEASE: Status: ACTIVE | Noted: 2025-07-01

## 2025-07-01 LAB
ANION GAP SERPL CALC-SCNC: 12 MMOL/L (ref 10–20)
BUN SERPL-MCNC: 32 MG/DL (ref 6–23)
CALCIUM SERPL-MCNC: 8.4 MG/DL (ref 8.6–10.3)
CHLORIDE SERPL-SCNC: 99 MMOL/L (ref 98–107)
CO2 SERPL-SCNC: 23 MMOL/L (ref 21–32)
CREAT SERPL-MCNC: 1.57 MG/DL (ref 0.5–1.3)
EGFRCR SERPLBLD CKD-EPI 2021: 57 ML/MIN/1.73M*2
ERYTHROCYTE [DISTWIDTH] IN BLOOD BY AUTOMATED COUNT: 12.8 % (ref 11.5–14.5)
EST. AVERAGE GLUCOSE BLD GHB EST-MCNC: 421 MG/DL
GLUCOSE BLD MANUAL STRIP-MCNC: 181 MG/DL (ref 74–99)
GLUCOSE BLD MANUAL STRIP-MCNC: 220 MG/DL (ref 74–99)
GLUCOSE BLD MANUAL STRIP-MCNC: 357 MG/DL (ref 74–99)
GLUCOSE BLD MANUAL STRIP-MCNC: 433 MG/DL (ref 74–99)
GLUCOSE SERPL-MCNC: 461 MG/DL (ref 74–99)
HBA1C MFR BLD: 16.3 % (ref ?–5.7)
HCT VFR BLD AUTO: 41.3 % (ref 41–52)
HGB BLD-MCNC: 13.8 G/DL (ref 13.5–17.5)
HOLD SPECIMEN: NORMAL
MCH RBC QN AUTO: 31.7 PG (ref 26–34)
MCHC RBC AUTO-ENTMCNC: 33.4 G/DL (ref 32–36)
MCV RBC AUTO: 95 FL (ref 80–100)
NRBC BLD-RTO: 0 /100 WBCS (ref 0–0)
PLATELET # BLD AUTO: 236 X10*3/UL (ref 150–450)
POTASSIUM SERPL-SCNC: 5.4 MMOL/L (ref 3.5–5.3)
RBC # BLD AUTO: 4.35 X10*6/UL (ref 4.5–5.9)
SODIUM SERPL-SCNC: 129 MMOL/L (ref 136–145)
WBC # BLD AUTO: 5.7 X10*3/UL (ref 4.4–11.3)

## 2025-07-01 PROCEDURE — 2500000004 HC RX 250 GENERAL PHARMACY W/ HCPCS (ALT 636 FOR OP/ED): Performed by: STUDENT IN AN ORGANIZED HEALTH CARE EDUCATION/TRAINING PROGRAM

## 2025-07-01 PROCEDURE — 1210000001 HC SEMI-PRIVATE ROOM DAILY

## 2025-07-01 PROCEDURE — 85027 COMPLETE CBC AUTOMATED: CPT | Performed by: STUDENT IN AN ORGANIZED HEALTH CARE EDUCATION/TRAINING PROGRAM

## 2025-07-01 PROCEDURE — 80048 BASIC METABOLIC PNL TOTAL CA: CPT | Performed by: STUDENT IN AN ORGANIZED HEALTH CARE EDUCATION/TRAINING PROGRAM

## 2025-07-01 PROCEDURE — 82947 ASSAY GLUCOSE BLOOD QUANT: CPT

## 2025-07-01 PROCEDURE — 2500000002 HC RX 250 W HCPCS SELF ADMINISTERED DRUGS (ALT 637 FOR MEDICARE OP, ALT 636 FOR OP/ED): Performed by: STUDENT IN AN ORGANIZED HEALTH CARE EDUCATION/TRAINING PROGRAM

## 2025-07-01 PROCEDURE — 2500000005 HC RX 250 GENERAL PHARMACY W/O HCPCS: Performed by: STUDENT IN AN ORGANIZED HEALTH CARE EDUCATION/TRAINING PROGRAM

## 2025-07-01 PROCEDURE — 36415 COLL VENOUS BLD VENIPUNCTURE: CPT | Performed by: STUDENT IN AN ORGANIZED HEALTH CARE EDUCATION/TRAINING PROGRAM

## 2025-07-01 PROCEDURE — 99232 SBSQ HOSP IP/OBS MODERATE 35: CPT | Performed by: STUDENT IN AN ORGANIZED HEALTH CARE EDUCATION/TRAINING PROGRAM

## 2025-07-01 PROCEDURE — 82374 ASSAY BLOOD CARBON DIOXIDE: CPT | Performed by: STUDENT IN AN ORGANIZED HEALTH CARE EDUCATION/TRAINING PROGRAM

## 2025-07-01 PROCEDURE — 2500000001 HC RX 250 WO HCPCS SELF ADMINISTERED DRUGS (ALT 637 FOR MEDICARE OP): Performed by: STUDENT IN AN ORGANIZED HEALTH CARE EDUCATION/TRAINING PROGRAM

## 2025-07-01 RX ORDER — OXYCODONE AND ACETAMINOPHEN 5; 325 MG/1; MG/1
1 TABLET ORAL EVERY 6 HOURS PRN
Status: DISCONTINUED | OUTPATIENT
Start: 2025-07-01 | End: 2025-07-03 | Stop reason: HOSPADM

## 2025-07-01 RX ORDER — ONDANSETRON 4 MG/1
4 TABLET, ORALLY DISINTEGRATING ORAL EVERY 8 HOURS PRN
COMMUNITY
Start: 2025-03-25

## 2025-07-01 RX ORDER — ISOSORBIDE DINITRATE 20 MG/1
20 TABLET ORAL
Status: DISCONTINUED | OUTPATIENT
Start: 2025-07-01 | End: 2025-07-03 | Stop reason: HOSPADM

## 2025-07-01 RX ORDER — INSULIN GLARGINE 100 [IU]/ML
55 INJECTION, SOLUTION SUBCUTANEOUS NIGHTLY
Status: DISCONTINUED | OUTPATIENT
Start: 2025-07-01 | End: 2025-07-01

## 2025-07-01 RX ORDER — METFORMIN HYDROCHLORIDE 500 MG/1
1000 TABLET ORAL
Status: DISCONTINUED | OUTPATIENT
Start: 2025-07-01 | End: 2025-07-02

## 2025-07-01 RX ORDER — INSULIN LISPRO 100 [IU]/ML
10 INJECTION, SOLUTION INTRAVENOUS; SUBCUTANEOUS
Status: DISCONTINUED | OUTPATIENT
Start: 2025-07-01 | End: 2025-07-01

## 2025-07-01 RX ORDER — TORSEMIDE 20 MG/1
60 TABLET ORAL 2 TIMES DAILY
Status: DISCONTINUED | OUTPATIENT
Start: 2025-07-01 | End: 2025-07-02

## 2025-07-01 RX ORDER — BUPROPION HYDROCHLORIDE 150 MG/1
150 TABLET ORAL DAILY
Status: DISCONTINUED | OUTPATIENT
Start: 2025-07-01 | End: 2025-07-03 | Stop reason: HOSPADM

## 2025-07-01 RX ORDER — ACETAMINOPHEN 650 MG/1
650 SUPPOSITORY RECTAL EVERY 4 HOURS PRN
Status: DISCONTINUED | OUTPATIENT
Start: 2025-07-01 | End: 2025-07-02

## 2025-07-01 RX ORDER — ACETAMINOPHEN 325 MG/1
650 TABLET ORAL EVERY 4 HOURS PRN
Status: DISCONTINUED | OUTPATIENT
Start: 2025-07-01 | End: 2025-07-03 | Stop reason: HOSPADM

## 2025-07-01 RX ORDER — ATORVASTATIN CALCIUM 20 MG/1
40 TABLET, FILM COATED ORAL NIGHTLY
Status: DISCONTINUED | OUTPATIENT
Start: 2025-07-01 | End: 2025-07-03 | Stop reason: HOSPADM

## 2025-07-01 RX ORDER — DEXTROSE 50 % IN WATER (D50W) INTRAVENOUS SYRINGE
12.5
Status: DISCONTINUED | OUTPATIENT
Start: 2025-07-01 | End: 2025-07-03 | Stop reason: HOSPADM

## 2025-07-01 RX ORDER — SERTRALINE HYDROCHLORIDE 50 MG/1
100 TABLET, FILM COATED ORAL DAILY
Status: DISCONTINUED | OUTPATIENT
Start: 2025-07-01 | End: 2025-07-03 | Stop reason: HOSPADM

## 2025-07-01 RX ORDER — DEXTROSE 50 % IN WATER (D50W) INTRAVENOUS SYRINGE
25
Status: DISCONTINUED | OUTPATIENT
Start: 2025-07-01 | End: 2025-07-03 | Stop reason: HOSPADM

## 2025-07-01 RX ORDER — TRAZODONE HYDROCHLORIDE 50 MG/1
50 TABLET ORAL NIGHTLY PRN
Status: DISCONTINUED | OUTPATIENT
Start: 2025-07-01 | End: 2025-07-03 | Stop reason: HOSPADM

## 2025-07-01 RX ORDER — INSULIN LISPRO 100 [IU]/ML
15 INJECTION, SOLUTION INTRAVENOUS; SUBCUTANEOUS
Status: DISCONTINUED | OUTPATIENT
Start: 2025-07-02 | End: 2025-07-02

## 2025-07-01 RX ORDER — SPIRONOLACTONE 25 MG/1
25 TABLET ORAL DAILY
Status: DISCONTINUED | OUTPATIENT
Start: 2025-07-01 | End: 2025-07-03 | Stop reason: HOSPADM

## 2025-07-01 RX ORDER — POLYETHYLENE GLYCOL 3350 17 G/17G
17 POWDER, FOR SOLUTION ORAL DAILY
Status: DISCONTINUED | OUTPATIENT
Start: 2025-07-01 | End: 2025-07-03 | Stop reason: HOSPADM

## 2025-07-01 RX ORDER — TALC
3 POWDER (GRAM) TOPICAL NIGHTLY PRN
Status: DISCONTINUED | OUTPATIENT
Start: 2025-07-01 | End: 2025-07-03 | Stop reason: HOSPADM

## 2025-07-01 RX ORDER — ACETAMINOPHEN 160 MG/5ML
650 SOLUTION ORAL EVERY 4 HOURS PRN
Status: DISCONTINUED | OUTPATIENT
Start: 2025-07-01 | End: 2025-07-02

## 2025-07-01 RX ORDER — ONDANSETRON HYDROCHLORIDE 2 MG/ML
4 INJECTION, SOLUTION INTRAVENOUS EVERY 8 HOURS PRN
Status: DISCONTINUED | OUTPATIENT
Start: 2025-07-01 | End: 2025-07-03 | Stop reason: HOSPADM

## 2025-07-01 RX ORDER — INSULIN LISPRO 100 [IU]/ML
0-5 INJECTION, SOLUTION INTRAVENOUS; SUBCUTANEOUS
Status: DISCONTINUED | OUTPATIENT
Start: 2025-07-01 | End: 2025-07-03 | Stop reason: HOSPADM

## 2025-07-01 RX ORDER — INSULIN GLARGINE 100 [IU]/ML
65 INJECTION, SOLUTION SUBCUTANEOUS NIGHTLY
Status: DISCONTINUED | OUTPATIENT
Start: 2025-07-01 | End: 2025-07-02

## 2025-07-01 RX ORDER — DAPAGLIFLOZIN 5 MG/1
10 TABLET, FILM COATED ORAL DAILY
Status: DISCONTINUED | OUTPATIENT
Start: 2025-07-01 | End: 2025-07-03 | Stop reason: HOSPADM

## 2025-07-01 RX ORDER — CARVEDILOL 6.25 MG/1
6.25 TABLET ORAL
Status: DISCONTINUED | OUTPATIENT
Start: 2025-07-01 | End: 2025-07-03 | Stop reason: HOSPADM

## 2025-07-01 RX ORDER — HYDRALAZINE HYDROCHLORIDE 25 MG/1
25 TABLET, FILM COATED ORAL 3 TIMES DAILY
Status: DISCONTINUED | OUTPATIENT
Start: 2025-07-01 | End: 2025-07-02

## 2025-07-01 RX ORDER — ONDANSETRON 4 MG/1
4 TABLET, FILM COATED ORAL EVERY 8 HOURS PRN
Status: DISCONTINUED | OUTPATIENT
Start: 2025-07-01 | End: 2025-07-03 | Stop reason: HOSPADM

## 2025-07-01 RX ADMIN — OXYCODONE HYDROCHLORIDE AND ACETAMINOPHEN 1 TABLET: 5; 325 TABLET ORAL at 01:26

## 2025-07-01 RX ADMIN — ATORVASTATIN CALCIUM 40 MG: 20 TABLET, FILM COATED ORAL at 01:26

## 2025-07-01 RX ADMIN — INSULIN GLARGINE 55 UNITS: 100 INJECTION, SOLUTION SUBCUTANEOUS at 01:26

## 2025-07-01 RX ADMIN — SERTRALINE HYDROCHLORIDE 100 MG: 50 TABLET, FILM COATED ORAL at 08:12

## 2025-07-01 RX ADMIN — POLYETHYLENE GLYCOL 3350 17 G: 17 POWDER, FOR SOLUTION ORAL at 08:14

## 2025-07-01 RX ADMIN — INSULIN LISPRO 10 UNITS: 100 INJECTION, SOLUTION INTRAVENOUS; SUBCUTANEOUS at 08:12

## 2025-07-01 RX ADMIN — SACUBITRIL AND VALSARTAN 1 TABLET: 97; 103 TABLET, FILM COATED ORAL at 08:12

## 2025-07-01 RX ADMIN — TRAZODONE HYDROCHLORIDE 50 MG: 50 TABLET ORAL at 20:26

## 2025-07-01 RX ADMIN — INSULIN LISPRO 5 UNITS: 100 INJECTION, SOLUTION INTRAVENOUS; SUBCUTANEOUS at 11:35

## 2025-07-01 RX ADMIN — TORSEMIDE 60 MG: 20 TABLET ORAL at 08:12

## 2025-07-01 RX ADMIN — INSULIN LISPRO 10 UNITS: 100 INJECTION, SOLUTION INTRAVENOUS; SUBCUTANEOUS at 11:35

## 2025-07-01 RX ADMIN — INSULIN LISPRO 5 UNITS: 100 INJECTION, SOLUTION INTRAVENOUS; SUBCUTANEOUS at 08:11

## 2025-07-01 RX ADMIN — DAPAGLIFLOZIN 10 MG: 5 TABLET, FILM COATED ORAL at 08:12

## 2025-07-01 RX ADMIN — CARVEDILOL 6.25 MG: 6.25 TABLET, FILM COATED ORAL at 16:58

## 2025-07-01 RX ADMIN — INSULIN LISPRO 10 UNITS: 100 INJECTION, SOLUTION INTRAVENOUS; SUBCUTANEOUS at 16:57

## 2025-07-01 RX ADMIN — HYDRALAZINE HYDROCHLORIDE 25 MG: 25 TABLET ORAL at 13:35

## 2025-07-01 RX ADMIN — ISOSORBIDE DINITRATE 20 MG: 20 TABLET ORAL at 08:12

## 2025-07-01 RX ADMIN — Medication 3 MG: at 01:26

## 2025-07-01 RX ADMIN — ONDANSETRON 4 MG: 4 TABLET, FILM COATED ORAL at 17:07

## 2025-07-01 RX ADMIN — METFORMIN HYDROCHLORIDE 1000 MG: 500 TABLET, FILM COATED ORAL at 18:54

## 2025-07-01 RX ADMIN — HYDRALAZINE HYDROCHLORIDE 25 MG: 25 TABLET ORAL at 08:12

## 2025-07-01 RX ADMIN — ATORVASTATIN CALCIUM 40 MG: 20 TABLET, FILM COATED ORAL at 20:21

## 2025-07-01 RX ADMIN — ISOSORBIDE DINITRATE 20 MG: 20 TABLET ORAL at 13:35

## 2025-07-01 RX ADMIN — HYDRALAZINE HYDROCHLORIDE 25 MG: 25 TABLET ORAL at 20:21

## 2025-07-01 RX ADMIN — TORSEMIDE 60 MG: 20 TABLET ORAL at 20:21

## 2025-07-01 RX ADMIN — OXYCODONE HYDROCHLORIDE AND ACETAMINOPHEN 1 TABLET: 5; 325 TABLET ORAL at 20:26

## 2025-07-01 RX ADMIN — SPIRONOLACTONE 25 MG: 25 TABLET ORAL at 08:12

## 2025-07-01 RX ADMIN — BUPROPION HYDROCHLORIDE 150 MG: 150 TABLET, EXTENDED RELEASE ORAL at 08:12

## 2025-07-01 RX ADMIN — OXYCODONE HYDROCHLORIDE AND ACETAMINOPHEN 1 TABLET: 5; 325 TABLET ORAL at 08:15

## 2025-07-01 RX ADMIN — SACUBITRIL AND VALSARTAN 1 TABLET: 97; 103 TABLET, FILM COATED ORAL at 20:21

## 2025-07-01 RX ADMIN — RIVAROXABAN 20 MG: 20 TABLET, FILM COATED ORAL at 16:58

## 2025-07-01 RX ADMIN — ISOSORBIDE DINITRATE 20 MG: 20 TABLET ORAL at 20:21

## 2025-07-01 RX ADMIN — INSULIN GLARGINE 65 UNITS: 100 INJECTION, SOLUTION SUBCUTANEOUS at 20:22

## 2025-07-01 RX ADMIN — CARVEDILOL 6.25 MG: 6.25 TABLET, FILM COATED ORAL at 08:12

## 2025-07-01 RX ADMIN — SACUBITRIL AND VALSARTAN 1 TABLET: 97; 103 TABLET, FILM COATED ORAL at 02:17

## 2025-07-01 RX ADMIN — TORSEMIDE 60 MG: 20 TABLET ORAL at 02:19

## 2025-07-01 RX ADMIN — INSULIN LISPRO 2 UNITS: 100 INJECTION, SOLUTION INTRAVENOUS; SUBCUTANEOUS at 16:57

## 2025-07-01 SDOH — HEALTH STABILITY: MENTAL HEALTH
DO YOU FEEL STRESS - TENSE, RESTLESS, NERVOUS, OR ANXIOUS, OR UNABLE TO SLEEP AT NIGHT BECAUSE YOUR MIND IS TROUBLED ALL THE TIME - THESE DAYS?: TO SOME EXTENT

## 2025-07-01 SDOH — HEALTH STABILITY: MENTAL HEALTH: HOW OFTEN DO YOU HAVE SIX OR MORE DRINKS ON ONE OCCASION?: NEVER

## 2025-07-01 SDOH — HEALTH STABILITY: MENTAL HEALTH: HOW MANY DRINKS CONTAINING ALCOHOL DO YOU HAVE ON A TYPICAL DAY WHEN YOU ARE DRINKING?: 1 OR 2

## 2025-07-01 SDOH — SOCIAL STABILITY: SOCIAL INSECURITY: WITHIN THE LAST YEAR, HAVE YOU BEEN AFRAID OF YOUR PARTNER OR EX-PARTNER?: NO

## 2025-07-01 SDOH — ECONOMIC STABILITY: HOUSING INSECURITY: IN THE PAST 12 MONTHS, HOW MANY TIMES HAVE YOU MOVED WHERE YOU WERE LIVING?: 1

## 2025-07-01 SDOH — ECONOMIC STABILITY: HOUSING INSECURITY: AT ANY TIME IN THE PAST 12 MONTHS, WERE YOU HOMELESS OR LIVING IN A SHELTER (INCLUDING NOW)?: YES

## 2025-07-01 SDOH — ECONOMIC STABILITY: FOOD INSECURITY: WITHIN THE PAST 12 MONTHS, THE FOOD YOU BOUGHT JUST DIDN'T LAST AND YOU DIDN'T HAVE MONEY TO GET MORE.: OFTEN TRUE

## 2025-07-01 SDOH — SOCIAL STABILITY: SOCIAL INSECURITY: WITHIN THE LAST YEAR, HAVE YOU BEEN HUMILIATED OR EMOTIONALLY ABUSED IN OTHER WAYS BY YOUR PARTNER OR EX-PARTNER?: NO

## 2025-07-01 SDOH — HEALTH STABILITY: MENTAL HEALTH: HOW OFTEN DO YOU HAVE A DRINK CONTAINING ALCOHOL?: MONTHLY OR LESS

## 2025-07-01 SDOH — SOCIAL STABILITY: SOCIAL NETWORK: HOW OFTEN DO YOU ATTEND MEETINGS OF THE CLUBS OR ORGANIZATIONS YOU BELONG TO?: NEVER

## 2025-07-01 SDOH — HEALTH STABILITY: PHYSICAL HEALTH: ON AVERAGE, HOW MANY DAYS PER WEEK DO YOU ENGAGE IN MODERATE TO STRENUOUS EXERCISE (LIKE A BRISK WALK)?: 2 DAYS

## 2025-07-01 SDOH — SOCIAL STABILITY: SOCIAL NETWORK: IN A TYPICAL WEEK, HOW MANY TIMES DO YOU TALK ON THE PHONE WITH FAMILY, FRIENDS, OR NEIGHBORS?: NEVER

## 2025-07-01 SDOH — ECONOMIC STABILITY: FOOD INSECURITY: HOW HARD IS IT FOR YOU TO PAY FOR THE VERY BASICS LIKE FOOD, HOUSING, MEDICAL CARE, AND HEATING?: HARD

## 2025-07-01 SDOH — ECONOMIC STABILITY: HOUSING INSECURITY: IN THE LAST 12 MONTHS, WAS THERE A TIME WHEN YOU WERE NOT ABLE TO PAY THE MORTGAGE OR RENT ON TIME?: YES

## 2025-07-01 SDOH — SOCIAL STABILITY: SOCIAL INSECURITY
WITHIN THE LAST YEAR, HAVE YOU BEEN RAPED OR FORCED TO HAVE ANY KIND OF SEXUAL ACTIVITY BY YOUR PARTNER OR EX-PARTNER?: NO

## 2025-07-01 SDOH — SOCIAL STABILITY: SOCIAL NETWORK: HOW OFTEN DO YOU GET TOGETHER WITH FRIENDS OR RELATIVES?: NEVER

## 2025-07-01 SDOH — SOCIAL STABILITY: SOCIAL NETWORK: HOW OFTEN DO YOU ATTEND CHURCH OR RELIGIOUS SERVICES?: NEVER

## 2025-07-01 SDOH — SOCIAL STABILITY: SOCIAL INSECURITY: ARE YOU MARRIED, WIDOWED, DIVORCED, SEPARATED, NEVER MARRIED, OR LIVING WITH A PARTNER?: MARRIED

## 2025-07-01 SDOH — ECONOMIC STABILITY: FOOD INSECURITY: WITHIN THE PAST 12 MONTHS, YOU WORRIED THAT YOUR FOOD WOULD RUN OUT BEFORE YOU GOT THE MONEY TO BUY MORE.: OFTEN TRUE

## 2025-07-01 SDOH — SOCIAL STABILITY: SOCIAL NETWORK
DO YOU BELONG TO ANY CLUBS OR ORGANIZATIONS SUCH AS CHURCH GROUPS, UNIONS, FRATERNAL OR ATHLETIC GROUPS, OR SCHOOL GROUPS?: NO

## 2025-07-01 SDOH — HEALTH STABILITY: PHYSICAL HEALTH: ON AVERAGE, HOW MANY MINUTES DO YOU ENGAGE IN EXERCISE AT THIS LEVEL?: 10 MIN

## 2025-07-01 SDOH — ECONOMIC STABILITY: INCOME INSECURITY: IN THE PAST 12 MONTHS HAS THE ELECTRIC, GAS, OIL, OR WATER COMPANY THREATENED TO SHUT OFF SERVICES IN YOUR HOME?: NO

## 2025-07-01 SDOH — ECONOMIC STABILITY: TRANSPORTATION INSECURITY: IN THE PAST 12 MONTHS, HAS LACK OF TRANSPORTATION KEPT YOU FROM MEDICAL APPOINTMENTS OR FROM GETTING MEDICATIONS?: NO

## 2025-07-01 ASSESSMENT — COGNITIVE AND FUNCTIONAL STATUS - GENERAL
DAILY ACTIVITIY SCORE: 24
MOBILITY SCORE: 24
MOBILITY SCORE: 24
PATIENT BASELINE BEDBOUND: NO
DAILY ACTIVITIY SCORE: 24

## 2025-07-01 ASSESSMENT — ACTIVITIES OF DAILY LIVING (ADL)
WALKS IN HOME: INDEPENDENT
HEARING - LEFT EAR: FUNCTIONAL
HEARING - RIGHT EAR: FUNCTIONAL
JUDGMENT_ADEQUATE_SAFELY_COMPLETE_DAILY_ACTIVITIES: YES
FEEDING YOURSELF: INDEPENDENT
TOILETING: INDEPENDENT
DRESSING YOURSELF: INDEPENDENT
GROOMING: INDEPENDENT
LACK_OF_TRANSPORTATION: NO
PATIENT'S MEMORY ADEQUATE TO SAFELY COMPLETE DAILY ACTIVITIES?: YES
ADEQUATE_TO_COMPLETE_ADL: YES
LACK_OF_TRANSPORTATION: NO
BATHING: INDEPENDENT
EFFECT OF PAIN ON DAILY ACTIVITIES: YES

## 2025-07-01 ASSESSMENT — PAIN - FUNCTIONAL ASSESSMENT
PAIN_FUNCTIONAL_ASSESSMENT: 0-10

## 2025-07-01 ASSESSMENT — LIFESTYLE VARIABLES
AUDIT-C TOTAL SCORE: 1
SKIP TO QUESTIONS 9-10: 1

## 2025-07-01 ASSESSMENT — PAIN DESCRIPTION - DESCRIPTORS
DESCRIPTORS: SHARP
DESCRIPTORS: SHARP

## 2025-07-01 ASSESSMENT — PAIN SCALES - GENERAL
PAINLEVEL_OUTOF10: 8
PAINLEVEL_OUTOF10: 7
PAINLEVEL_OUTOF10: 7

## 2025-07-01 ASSESSMENT — PAIN DESCRIPTION - LOCATION: LOCATION: LEG

## 2025-07-01 NOTE — PROGRESS NOTES
Medical Group Progress Note  ASSESSMENT & PLAN:   Arthur Carranza is a 39 y.o. male admitted to [facility] for management of:      Hyperglycemia due to type 2 diabetes  - Presented markedly hyperglycemic without evidence of DKA in the setting of running out of medications a few days ago  - Increased home Lantus to 65 units nightly, lispro to 15 units with meals, Farxiga  - Resume metformin 1000 mg BID  - SSI, Accu-Cheks, hypoglycemia protocol  - Repeat A1c 16.3%  - Endocrine consulted, recommendations pending     #.  MICHAELA on CKD stage II  - Likely pre-renal in the setting of poor PO intake and severe hyperglycemia  - Received 3L IVF in ER, repeat labs in the morning     #.  Left leg pain  - Follows with sports medicine as outpatient with plans for upcoming MRI  - Pain control     #.  Chronic systolic heart failure / NICM  #.  Essential hypertension  - Clinically appears euvolemic  - Continuing home GDMT and antihypertensives     #.  History of CVA  - Continuing home atorvastatin, Xarelto     #.  Pseudohyponatremia  - Corrected Na 137  - Management of hyperglycemia as above     VTE Prophylaxis: Xarelto    Disposition: ADOD 1-2 days    Level of MDM:  Moderate   Risk: Moderate   Data Reviewed and/or Analyzed:   Included: Prior external notes from at least 1 unique source, Results, including laboratory findings and imaging reports, listed above, Orders and notes from all consultants involved, and Notes for this encounter.  I personally reviewed the tests referenced above.    The patient/family had opportunity to ask questions. All questions were answered to the best of my ability.    Steven Morales, DO  Hospital Medicine    SUBJECTIVE     Patient having leg pain, but this is well controlled. He is worried about how high his glucose has been.    OBJECTIVE:     Last Recorded Vitals:  Vitals:    06/30/25 2214 06/30/25 2345 07/01/25 0743 07/01/25 1506   BP: 106/71 119/86 121/73 103/62   BP Location: Right arm Left arm Left  arm    Patient Position: Sitting Sitting Lying    Pulse: 89 94 88 92   Resp: 16 19 16 16   Temp:  36.1 °C (97 °F) 35.9 °C (96.6 °F) 36.2 °C (97.2 °F)   TempSrc:  Temporal Temporal    SpO2: 95% 98% 96% 97%   Weight:       Height:         Last I/O:  I/O last 3 completed shifts:  In: 240 (1.8 mL/kg) [P.O.:240]  Out: - (0 mL/kg)   Weight: 136.1 kg     Physical Exam   Gen: NAD, appears stated age  HEENT: EOM, MMM  CV: RRR, no murmurs rubs or gallops  Resp: Clear to auscultation bilaterally, normal effort  Abdomen: soft, NT,+BS  LE: No edema, no deformity  Neuro: A&Ox4, moving all extremities       Inpatient Medications:  Scheduled Medications[1]PRN Medications  PRN Medications[2]  Continuous Medications:  Continuous Medications[3]  LABS AND IMAGING:     Labs:  Results from last 7 days   Lab Units 07/01/25  0446 06/30/25 2050   WBC AUTO x10*3/uL 5.7 5.2   RBC AUTO x10*6/uL 4.35* 4.37*   HEMOGLOBIN g/dL 13.8 14.1   HEMATOCRIT % 41.3 41.2   MCV fL 95 94   MCH pg 31.7 32.3   MCHC g/dL 33.4 34.2   RDW % 12.8 12.8   PLATELETS AUTO x10*3/uL 236 234     Results from last 7 days   Lab Units 07/01/25  0446 06/30/25 2050   SODIUM mmol/L 129* 129*   POTASSIUM mmol/L 5.4* 5.1   CHLORIDE mmol/L 99 97*   CO2 mmol/L 23 25   BUN mg/dL 32* 28*   CREATININE mg/dL 1.57* 1.98*   GLUCOSE mg/dL 461* 607*   PROTEIN TOTAL g/dL  --  7.5   CALCIUM mg/dL 8.4* 8.6   BILIRUBIN TOTAL mg/dL  --  0.6   ALK PHOS U/L  --  59   AST U/L  --  27   ALT U/L  --  28             Imaging:  XR lumbar spine 2-3 views  Narrative: Interpreted By:  Budinsky, Cole,   STUDY:  XR LUMBAR SPINE 2-3 VIEWS; ;  6/26/2025 2:37 pm      INDICATION:  Signs/Symptoms:Pain.      ACCESSION NUMBER(S):  [id number]      ORDERING CLINICIAN:  COLE BUDINSKY      Impression: Two views lumbar spine demonstrate no presence for fracture or  listhesis. No significant degenerative changes. Loss of the lumbar  lordotic curve likely secondary to spasm and strain.          Signed by: Marciano  Budinsky 6/26/2025 7:43 PM  Dictation workstation:   [id number]           [1] atorvastatin, 40 mg, oral, Nightly  buPROPion XL, 150 mg, oral, Daily  carvedilol, 6.25 mg, oral, BID  dapagliflozin propanediol, 10 mg, oral, Daily  hydrALAZINE, 25 mg, oral, TID  insulin glargine, 65 Units, subcutaneous, Nightly  insulin lispro, 0-5 Units, subcutaneous, TID AC  [START ON 7/2/2025] insulin lispro, 15 Units, subcutaneous, TID AC  isosorbide dinitrate, 20 mg, oral, TID  metFORMIN, 1,000 mg, oral, BID  polyethylene glycol, 17 g, oral, Daily  rivaroxaban, 20 mg, oral, Daily with evening meal  sacubitriL-valsartan, 1 tablet, oral, BID  sertraline, 100 mg, oral, Daily  spironolactone, 25 mg, oral, Daily  torsemide, 60 mg, oral, BID  [2] PRN medications: acetaminophen **OR** acetaminophen **OR** acetaminophen, dextrose, dextrose, glucagon, glucagon, melatonin, ondansetron **OR** ondansetron, oxyCODONE-acetaminophen, traZODone  [3]

## 2025-07-01 NOTE — PROGRESS NOTES
[facility] has insulin at home but does not have pen needles or Freestyle Nadir 3 plus sensors. [facility] to [facility]. Patient reports he has been having difficulty with sensors falling off which could be a placement issue. [facility] sensor prior to discharge.

## 2025-07-01 NOTE — ED PROVIDER NOTES
HPI   Chief Complaint   Patient presents with    Leg Pain     I contacted a neurologist like you guys said, I got nothing for pain, I get a MRI on the 30th of July, and I get money tomorrow to pay for stuff but I need something for this bad pain. And check my sugar cause I ran out of supplies.         History provided by:  Patient    Chief Complaint   Patient presents with    Leg Pain     I contacted a neurologist like you guys said, I got nothing for pain, I get a MRI on the 30th of July, and I get money tomorrow to pay for stuff but I need something for this bad pain. And check my sugar cause I ran out of supplies.       History of Present Illness:  Arthur Carranza is a 39 y.o. male presents with left thigh pain for the past 1 to 2 weeks.  He was initially evaluated in the emergency department.  He was referred to orthopedics.  He is scheduled for an MRI of his spine at the end of the month.  He has been unable to fill prescriptions for his muscle relaxers and other medications due to finances.  He gets paid tomorrow so he will be able to get his medications tomorrow.  He also states that his blood sugar has been running high because he ran out of his insulin syringes a couple of days ago and has not been able to give himself his insulin.  No fever or chills.  No nausea or vomiting.  No abdominal pain.  No back or flank pain.  No loss of motor or sensory function.  No loss of bladder or bowel function.  No sick contacts.  No trauma or travel.      PMFSH:   As per HPI, otherwise nurses notes reviewed in EMR.    Past Medical History: Medical History[1]   Past Surgical History: Surgical History[2]   Family History: Family History[3]   Social History:  Social History[4]  Allergies: Allergies[5]  Current Outpatient Medications   Medication Instructions    atorvastatin (LIPITOR) 40 mg, oral, Nightly    Basaglar KwikPen U-100 Insulin 55 Units, subcutaneous, Nightly, Take as directed per insulin instructions.     "blood-glucose meter,continuous (FreeStyle Nadir 3 [place]) [facility] [place] Use as instructed to test blood glucose throughout the day    blood-glucose sensor (FreeStyle Nadir 3 Plus Sensor) device Use as directed. Change sensor every 15 days    buPROPion XL (WELLBUTRIN XL) 150 mg, Daily    carvedilol (COREG) 6.25 mg, oral, 2 times daily (morning and late afternoon)    celecoxib (CELEBREX) 200 mg, oral, Daily    cyclobenzaprine (FLEXERIL) 10 mg, oral, Nightly PRN    dapagliflozin propanediol (FARXIGA) 10 mg, oral, Daily    gentamicin (Garamycin) 0.1 % cream     hydrALAZINE (APRESOLINE) 25 mg, oral, 3 times daily    HYDROcodone-acetaminophen (Norco) 5-325 mg tablet 1 tablet, oral, Every 12 hours PRN    insulin lispro (HumaLOG) 100 unit/mL pen Inject 12 units plus sliding scale three times daily with meals:  = 0u, 151-200 = 2u, 201-250 = 4u, 251-300 = 6u, 301-350 = 8u, 351-400+ = 10u, use up to 60u daily    insulin lispro (HUMALOG) 12 Units, subcutaneous, 3 times daily (morning, midday, late afternoon), Take as directed per insulin instructions.    isosorbide dinitrate (ISORDIL) 20 mg, oral, 3 times daily (0900,1400,1900)    metFORMIN (GLUCOPHAGE) 1,000 mg, oral, 2 times daily    pen needle, diabetic 32 gauge x 5/32\" needle 1 each, miscellaneous, 4 times daily    rivaroxaban (XARELTO) 20 mg, oral, Daily with evening meal, Take with food.    sacubitriL-valsartan (Entresto)  mg tablet 1 tablet, oral, 2 times daily    sertraline (ZOLOFT) 100 mg, Daily    spironolactone (ALDACTONE) 25 mg, oral, Daily    torsemide (DEMADEX) 60 mg, oral, 2 times daily    traZODone (DESYREL) 50 mg, Nightly PRN    Trulicity 1.5 mg, subcutaneous, Once Weekly            Patient History   Medical History[6]  Surgical History[7]  Family History[8]  Social History[9]    Physical Exam   ED Triage Vitals [06/30/25 2015]   Temperature Heart Rate Respirations BP   36.4 °C (97.5 °F) 99 15 (!) 127/93      Pulse Ox Temp Source Heart Rate Source Patient " "Position   97 % Temporal Monitor Sitting      BP Location FiO2 (%)     Left arm --       Physical Exam  Physical Exam:    ED Triage Vitals [06/30/25 2015]   Temperature Heart Rate Respirations BP   36.4 °C (97.5 °F) 99 15 (!) 127/93      Pulse Ox Temp Source Heart Rate Source Patient Position   97 % Temporal Monitor Sitting      BP Location FiO2 (%)     Left arm --         Patient Vitals for the past 24 hrs:   BP Temp Temp src Pulse Resp SpO2 Height Weight   06/30/25 2115 (!) 143/96 -- -- 90 18 97 % -- --   06/30/25 2015 (!) 127/93 36.4 °C (97.5 °F) Temporal 99 15 97 % 1.753 m (5' 9\") 136 kg (300 lb)       Constitutional: Vital signs per nursing notes.  Well developed, well nourished.  Mild acute distress.    Psychiatric: alert and oriented to person, place, and time; no abnormalities of mood or affect; memory intact  Eyes: PERRL; conjunctivae and lids normal; EOMI  ENT: otoscopic exam of external canal and TM´s normal; nasal mucosa, turbinates, and septum normal; mouth, tongue, and pharynx normal; pharynx without edema, exudate, or injection  Respiratory: normal respiratory effort and excursion; no rales, rhonchi, or wheezes; equal air entry  Cardiovascular: regular rate and rhythm; no murmurs, rubs or gallops; symmetric pulses; no edema; normal capillary refill; distal pulses present  Neurological: normal speech; CN II-XII grossly intact; normal motor and sensory function; no nystagmus  GI: no masses, tenderness, rebound or guarding; no palpable, pulsatile mass; no organomegaly; no hernia; normal bowel sounds; (-) Sultana´s sign; (-) McBurney´s sign; (-) CVA tenderness  Lymphatic: no adenopathy of neck, groin  Musculoskeletal: normal digits and nails; no gross tendon or ligament injury; normal to palpation; normal strength/tone; neurovascular status intact; (-) Junior´s sign; (-) straight leg raise; no palpable cords; calf circumference equal bilaterally; except left thigh with tenderness to palpation  Skin: normal " to inspection; normal to palpation; no rash  GCS: 15      ED Course & MDM   Diagnoses as of 06/30/25 2135   Left thigh pain   Hyperglycemia   Pseudohyponatremia   Hyponatremia                 No data recorded     [place] Coma Scale Score: 15 (06/30/25 2015 : Dayanara Madsen, SAULO)                           Medical Decision Making  Medical Decision Making:    EKG:    Labs:   Labs Reviewed   CBC WITH AUTO DIFFERENTIAL - Abnormal       Result Value    WBC 5.2      nRBC 0.0      RBC 4.37 (*)     Hemoglobin 14.1      Hematocrit 41.2      MCV 94      MCH 32.3      MCHC 34.2      RDW 12.8      Platelets 234      Neutrophils % 64.8      Immature Granulocytes %, Automated 0.2      Lymphocytes % 22.2      Monocytes % 10.5      Eosinophils % 1.9      Basophils % 0.4      Neutrophils Absolute 3.39      Immature Granulocytes Absolute, Automated 0.01      Lymphocytes Absolute 1.16 (*)     Monocytes Absolute 0.55      Eosinophils Absolute 0.10      Basophils Absolute 0.02     COMPREHENSIVE METABOLIC PANEL - Abnormal    Glucose 607 (*)     Sodium 129 (*)     Potassium 5.1      Chloride 97 (*)     Bicarbonate 25      Anion Gap 12      Urea Nitrogen 28 (*)     Creatinine 1.98 (*)     eGFR 44 (*)     Calcium 8.6      Albumin 4.2      Alkaline Phosphatase 59      Total Protein 7.5      AST 27      Bilirubin, Total 0.6      ALT 28     BLOOD GAS VENOUS - Abnormal    POCT pH, Venous 7.35      POCT pCO2, Venous 52 (*)     POCT pO2, Venous 37      POCT SO2, Venous 61      POCT Oxy Hemoglobin, Venous 60.0      POCT Base Excess, Venous 2.1      POCT HCO3 Calculated, Venous 28.7 (*)     Patient Temperature        FiO2 21     POCT GLUCOSE - Abnormal    POCT Glucose 548 (*)    LACTATE - Normal    Lactate 1.6      Narrative:     Venipuncture immediately after or during the administration of Metamizole may lead to falsely low results. Testing should be performed immediately prior to Metamizole dosing.   BETA HYDROXYBUTYRATE - Normal     Beta-Hydroxybutyrate 0.10      Narrative:     The beta-hydroxybutyrate test performance characteristics have been validated by  [facility]. This test has not been approved by the FDA; however,such approval is not necessary.     CREATINE KINASE - Normal    Creatine Kinase 230         Diagnostic Imaging:   No orders to display       ED Medication Administration:   Medications   sodium chloride 0.9 % bolus 1,000 mL (1,000 mL intravenous New Bag 6/30/25 2103)   sodium chloride 0.9 % bolus 2,000 mL (has no administration in time range)   insulin regular (HumuLIN R,NovoLIN R) injection 10 Units (10 Units intravenous Given 6/30/25 2102)   HYDROmorphone (Dilaudid) injection 1 mg (1 mg intravenous Given 6/30/25 2104)   ketorolac (Toradol) injection 15 mg (15 mg intravenous Given 6/30/25 2104)   acetaminophen (Tylenol) tablet 975 mg (975 mg oral Given 6/30/25 2104)       ED Course:     Arthur Carranza is a 39 y.o. male presents with left thigh pain   .    Differential Diagnoses Considered:  Muscle strain, neuropathy, DVT; hyperglycemia, DKA    Patient presents with acute left thigh pain.    Considered X-rays of the left thigh, but not indicated as I considered but do not suspect fracture/dislocation.      Lab work to evaluate for evidence of anemia, significant electrolyte abnormality including hypokalemia, hyperkalemia, hyponatremia, hypernatremia, hyperglycemia or hypoglycemia, renal function.     Considered CT scan Extremity, but not indicated as I considered but do not suspect vascular injury.     Considered duplex ultrasound, but not indicated as I considered but do not suspect DVT.  Also, the patient had a duplex ultrasound performed of the left lower extremity recently.         Review of recent and relevant records:     I reviewed and interpreted prior non-ED medical record specialty: On 6/21/2025 patient's blood sugar was 369.  Anion gap was normal.  Renal function was normal.   INR is 1.0.  CBC shows normal white blood cell count no evidence of anemia.  Ultrasound of the left lower extremity showed no sonographic evidence of acute DVT in the left lower extremity.  On 6/26/2025 the patient had x-ray of the lumbar spine which showed no presence for fracture or listhesis.  No significant degenerative changes.  Loss of the lumbar lordotic curve likely secondary to spasm and strain.        Diagnoses as of 06/30/25 2135   Left thigh pain   Hyperglycemia   Pseudohyponatremia   Hyponatremia       Abnormal Labs Reviewed   CBC WITH AUTO DIFFERENTIAL - Abnormal; Notable for the following components:       Result Value    RBC 4.37 (*)     Lymphocytes Absolute 1.16 (*)     All other components within normal limits   COMPREHENSIVE METABOLIC PANEL - Abnormal; Notable for the following components:    Glucose 607 (*)     Sodium 129 (*)     Chloride 97 (*)     Urea Nitrogen 28 (*)     Creatinine 1.98 (*)     eGFR 44 (*)     All other components within normal limits   BLOOD GAS VENOUS - Abnormal; Notable for the following components:    POCT pCO2, Venous 52 (*)     POCT HCO3 Calculated, Venous 28.7 (*)     All other components within normal limits   POCT GLUCOSE - Abnormal; Notable for the following components:    POCT Glucose 548 (*)     All other components within normal limits       BP (!) 143/96 (06/30/25 2115)    Temp      Pulse 90 (06/30/25 2115)   Resp 18 (06/30/25 2115)    SpO2 97 % (06/30/25 2115)          Diagnostic evaluation was completed.  CK is in the normal range so do not suspect rhabdomyolysis.  Venous pH was 7.35.  CBC shows normal white blood cell count with no evidence of anemia.  Platelets are in the normal range.  Point-of-care glucose was elevated at 548.  Metabolic panel shows a elevated glucose at 607.  Sodium is low at 129.  This is likely pseudohyponatremia due to the elevated glucose.  Potassium is in the normal range.  BUN/creatinine are elevated at 28 and 1.98.  Liver function  are in the normal range.  Lactate is in the normal range so do not suspect sepsis.  Beta-hydroxybutyrate is in the normal range so do not suspect DKA.    Re-evaluation: Repeat evaluation at 2123 shows the patient is feeling slightly better.  Vital signs are stable.      Patient was treated with IV Dilaudid, Toradol, oral acetaminophen, normal saline and IV insulin.    Medications administered improved the patient's condition.    The patient has significant hyperglycemia.  However there is no evidence of DKA.  Patient will be treated with a dose of IV insulin and IV normal saline.  In addition he has acute kidney injury which also requires treatment with IV fluids.  There is no evidence of sepsis or ischemia.  Patient require hospitalization for further workup and evaluation.    The patient's condition requires ongoing treatment and evaluation necessitating hospital admission.  I have reviewed the patient's history, physical exam, and test information with the admitting physician,     Dr. Gaston              , who agrees to hospitalize the patient.     I discussed the results and plan for hospitalization with the patient and/or family/friend if present.  Questions were addressed.  Patient and/or family/friend expressed understanding.    Shared decision making made with patient, and/or family, who agrees with plan.        Diagnosis:   1. Left thigh pain    2. Hyperglycemia    3. Pseudohyponatremia    4. Hyponatremia                     Procedure  Procedures       Gabriel COATS MD  06/30/25 2133         [1]   Past Medical History:  Diagnosis Date    CHF (congestive heart failure)     Diabetes mellitus (Multi)     Heart disease     Hypertension     Substance abuse    [2]   Past Surgical History:  Procedure Laterality Date    CARDIAC CATHETERIZATION N/A 7/18/2024    Procedure: Right Heart Cath;  Surgeon: Delroy Jurado MD;  Location: [facility] Cardiac Cath Lab;  Service: Cardiovascular;  Laterality: N/A;      HEAD ANGIO WO IV CONTRAST  12/29/2022    MR HEAD ANGIO WO IV CONTRAST 12/29/2022 DOCTOR OFFICE [facility]GAC[facility]    OTHER SURGICAL HISTORY  07/21/2022    Cyst excision    OTHER SURGICAL HISTORY  10/25/2022    Cardiac catheterization   [3]   Family History  Adopted: Yes   [4]   Social History  Tobacco Use    Smoking status: Every Day     Types: Cigarettes   Vaping Use    Vaping status: Unknown   Substance Use Topics    Alcohol use: Yes     Comment: occasionaly - 1-2 times a month    Drug use: Not Currently     Types: Codeine   [5]   Allergies  Allergen Reactions    Shellfish Containing Products Itching, Rash and GI Upset   [6]   Past Medical History:  Diagnosis Date    CHF (congestive heart failure)     Diabetes mellitus (Multi)     Heart disease     Hypertension     Substance abuse    [7]   Past Surgical History:  Procedure Laterality Date    CARDIAC CATHETERIZATION N/A 7/18/2024    Procedure: Right Heart Cath;  Surgeon: Delroy Jurado MD;  Location: [facility] Cardiac Cath Lab;  Service: Cardiovascular;  Laterality: N/A;    MR HEAD ANGIO WO IV CONTRAST  12/29/2022    MR HEAD ANGIO WO IV CONTRAST 12/29/2022 DOCTOR OFFICE LEGACY    OTHER SURGICAL HISTORY  07/21/2022    Cyst excision    OTHER SURGICAL HISTORY  10/25/2022    Cardiac catheterization   [8]   Family History  Adopted: Yes   [9]   Social History  Tobacco Use    Smoking status: Every Day     Types: Cigarettes    Smokeless tobacco: Not on file   Vaping Use    Vaping status: Unknown   Substance Use Topics    Alcohol use: Yes     Comment: occasionaly - 1-2 times a month    Drug use: Not Currently     Types: Codeine        Gabriel COATS MD  06/30/25 0476

## 2025-07-01 NOTE — CARE PLAN
The patient's goals for the shift include less pain and sleep    The clinical goals for the shift include patient will remain HDS and comfotable    Over the shift, the patient did not make progress toward the following goals. Barriers to progression include uncontrolled diabetes and unknown source of pain. Recommendations to address these barriers include give diabetic medications as directed and patient education  Problem: Chronic Conditions and Co-morbidities  Goal: Patient's chronic conditions and co-morbidity symptoms are monitored and maintained or improved  Outcome: Progressing     Problem: Nutrition  Goal: Nutrient intake appropriate for maintaining nutritional needs  Outcome: Progressing     Problem: Pain  Goal: Takes deep breaths with improved pain control throughout the shift  Outcome: Progressing  Goal: Turns in bed with improved pain control throughout the shift  Outcome: Progressing  Goal: Walks with improved pain control throughout the shift  Outcome: Progressing  Goal: Performs ADL's with improved pain control throughout shift  Outcome: Progressing  Goal: Participates in PT with improved pain control throughout the shift  Outcome: Progressing  Goal: Free from opioid side effects throughout the shift  Outcome: Progressing  Goal: Free from acute confusion related to pain meds throughout the shift  Outcome: Progressing     Problem: Diabetes  Goal: Achieve decreasing blood glucose levels by end of shift  Outcome: Progressing  Goal: Increase stability of blood glucose readings by end of shift  Outcome: Progressing  Goal: Decrease in ketones present in urine by end of shift  Outcome: Progressing  Goal: Maintain electrolyte levels within acceptable range throughout shift  Outcome: Progressing  Goal: Maintain glucose levels >70mg/dl to <250mg/dl throughout shift  Outcome: Progressing  Goal: No changes in neurological exam by end of shift  Outcome: Progressing  Goal: Learn about and adhere to nutrition  recommendations by end of shift  Outcome: Progressing  Goal: Vital signs within normal range for age by end of shift  Outcome: Progressing  Goal: Increase self care and/or family involovement by end of shift  Outcome: Progressing  Goal: Receive DSME education by end of shift  Outcome: Progressing    .

## 2025-07-01 NOTE — ED PROCEDURE NOTE
Procedure  Critical Care    Performed by: Gabriel COATS MD  Authorized by: Gabriel COATS MD    Critical care provider statement:     Critical care time (minutes):  35    Critical care time was exclusive of:  Separately billable procedures and treating other patients    Critical care was necessary to treat or prevent imminent or life-threatening deterioration of the following conditions:  Endocrine crisis and renal failure    Critical care was time spent personally by me on the following activities:  Blood draw for specimens, discussions with primary provider, evaluation of patient's response to treatment, development of treatment plan with patient or surrogate, examination of patient, obtaining history from patient or surrogate, ordering and performing treatments and interventions, ordering and review of laboratory studies, ordering and review of radiographic studies, re-evaluation of patient's condition, pulse oximetry and review of old charts    Care discussed with: admitting provider                 Gabriel COATS MD  06/30/25 3258

## 2025-07-01 NOTE — H&P
Medical Group History and Physical      ASSESSMENT & PLAN:     39 y.o. male with a history of poorly controlled type 2 diabetes, NICM, chronic systolic heart failure, morbid obesity, prior CVA presenting with severe hyperglycemia.    #.  Hyperglycemia due to type 2 diabetes  - Presented markedly hyperglycemic without evidence of DKA in the setting of running out of medications a few days ago  - Continuing home Lantus 55 units nightly, lispro 10 units with meals, Farxiga  - SSI, Accu-Cheks, hypoglycemia protocol  - Holding home metformin  - Repeat A1c  - Endocrine consult    #.  MICHAELA on CKD stage II  - Likely pre-renal in the setting of poor PO intake and severe hyperglycemia  - Received 3L IVF in ER, repeat labs in the morning    #.  Left leg pain  - Follows with sports medicine as outpatient with plans for upcoming MRI  - Pain control    #.  Chronic systolic heart failure / NICM  #.  Essential hypertension  - Clinically appears euvolemic  - Continuing home GDMT and antihypertensives    #.  History of CVA  - Continuing home atorvastatin, Xarelto    #.  Pseudohyponatremia  - Corrected Na 137  - Management of hyperglycemia as above    VTE PPX: Robertorellux Gaston MD    --Of note, this documentation is completed using the Dragon Dictation system (voice recognition software). There may be spelling and/or grammatical errors that were not corrected prior to final submission.--    HISTORY OF PRESENT ILLNESS:   Chief Complaint:     rAthur Carranza is a 39 y.o. male with a history of poorly controlled type 2 diabetes, NICM, chronic systolic heart failure, morbid obesity, prior CVA presenting with high blood sugar.  Patient states that he ran out of his insulin syringes a couple days ago.  He was concerned that his sugar was very high.  He denies any specific symptoms.  He does state that he continues to have left thigh pain.  He states that he follows with sports medicine in order MRI for later this month.      In the ER vital signs are stable.  Labs notable for marked hyperglycemia without evidence of DKA, hyponatremia, elevated creatinine and BUN.  Patient was given IV fluids, pain control, and insulin in the ER.    ROS  10 point review of systems negative except per HPI     PAST HISTORIES:     Past Medical History  He has a past medical history of CHF (congestive heart failure), Diabetes mellitus (Multi), Heart disease, Hypertension, and Substance abuse.    Surgical History  He has a past surgical history that includes Other surgical history (07/21/2022); Other surgical history (10/25/2022); MR angio head wo IV contrast (12/29/2022); and Cardiac catheterization (N/A, 7/18/2024).     Social History  He reports that he has been smoking cigarettes. He does not have any smokeless tobacco history on file. He reports current alcohol use. He reports that he does not currently use drugs after having used the following drugs: Codeine.    Family History  Family History[1]    Allergies:  Shellfish containing products      OBJECTIVE:      Last Recorded Vitals  BP (!) 143/96   Pulse 90   Temp 36.4 °C (97.5 °F) (Temporal)   Resp 18   Wt 136 kg (300 lb)   SpO2 97%     Last I/O:  No intake/output data recorded.    Physical Exam   Gen: NAD, appears stated age  HEENT: EOM, MMM  CV: RRR, no murmurs rubs or gallops  Resp: Clear to auscultation bilaterally, normal effort  Abdomen: soft, NT,+BS  LE: No edema, no deformity  Neuro: A&Ox4, moving all extremities    LABS AND IMAGING:       Relevant Results  Labs Reviewed   CBC WITH AUTO DIFFERENTIAL - Abnormal       Result Value    WBC 5.2      nRBC 0.0      RBC 4.37 (*)     Hemoglobin 14.1      Hematocrit 41.2      MCV 94      MCH 32.3      MCHC 34.2      RDW 12.8      Platelets 234      Neutrophils % 64.8      Immature Granulocytes %, Automated 0.2      Lymphocytes % 22.2      Monocytes % 10.5      Eosinophils % 1.9      Basophils % 0.4      Neutrophils Absolute 3.39      Immature  Granulocytes Absolute, Automated 0.01      Lymphocytes Absolute 1.16 (*)     Monocytes Absolute 0.55      Eosinophils Absolute 0.10      Basophils Absolute 0.02     COMPREHENSIVE METABOLIC PANEL - Abnormal    Glucose 607 (*)     Sodium 129 (*)     Potassium 5.1      Chloride 97 (*)     Bicarbonate 25      Anion Gap 12      Urea Nitrogen 28 (*)     Creatinine 1.98 (*)     eGFR 44 (*)     Calcium 8.6      Albumin 4.2      Alkaline Phosphatase 59      Total Protein 7.5      AST 27      Bilirubin, Total 0.6      ALT 28     BLOOD GAS VENOUS - Abnormal    POCT pH, Venous 7.35      POCT pCO2, Venous 52 (*)     POCT pO2, Venous 37      POCT SO2, Venous 61      POCT Oxy Hemoglobin, Venous 60.0      POCT Base Excess, Venous 2.1      POCT HCO3 Calculated, Venous 28.7 (*)     Patient Temperature        FiO2 21     POCT GLUCOSE - Abnormal    POCT Glucose 548 (*)    LACTATE - Normal    Lactate 1.6      Narrative:     Venipuncture immediately after or during the administration of Metamizole may lead to falsely low results. Testing should be performed immediately prior to Metamizole dosing.   BETA HYDROXYBUTYRATE - Normal    Beta-Hydroxybutyrate 0.10      Narrative:     The beta-hydroxybutyrate test performance characteristics have been validated by  [facility]. This test has not been approved by the FDA; however,such approval is not necessary.     CREATINE KINASE - Normal    Creatine Kinase 230     HEMOGLOBIN A1C     No orders to display              [1]   Family History  Adopted: Yes

## 2025-07-01 NOTE — PROGRESS NOTES
07/01/25 1310   Patient Choice   Patient / Family choosing to utilize agency / facility established prior to hospitalization No   Stroke Family Assessment   Stroke Family Assessment Needed No   Intensity of Service   Intensity of Service 0-30 min     Patient admitted from home with hyperglycemic episode. Patient states he has not been able to  prescriptions, however he does get paid tomorrow so he will be able to get them. Patient lives at home, is independent. Plan will be to return home, no needs identified. CT team will continue to follow.

## 2025-07-02 ENCOUNTER — PHARMACY VISIT (OUTPATIENT)
Dept: PHARMACY | Facility: CLINIC | Age: 40
End: 2025-07-02
Payer: COMMERCIAL

## 2025-07-02 ENCOUNTER — APPOINTMENT (OUTPATIENT)
Dept: RADIOLOGY | Facility: HOSPITAL | Age: 40
End: 2025-07-02
Payer: MEDICARE

## 2025-07-02 PROBLEM — R79.89 PSEUDOHYPONATREMIA: Status: RESOLVED | Noted: 2025-07-01 | Resolved: 2025-07-02

## 2025-07-02 PROBLEM — M79.605 LEFT LEG PAIN: Status: RESOLVED | Noted: 2025-07-01 | Resolved: 2025-07-02

## 2025-07-02 LAB
ALBUMIN SERPL BCP-MCNC: 3.8 G/DL (ref 3.4–5)
ANION GAP SERPL CALC-SCNC: 14 MMOL/L (ref 10–20)
BUN SERPL-MCNC: 35 MG/DL (ref 6–23)
CALCIUM SERPL-MCNC: 8.8 MG/DL (ref 8.6–10.3)
CHLORIDE SERPL-SCNC: 97 MMOL/L (ref 98–107)
CO2 SERPL-SCNC: 30 MMOL/L (ref 21–32)
CREAT SERPL-MCNC: 1.86 MG/DL (ref 0.5–1.3)
EGFRCR SERPLBLD CKD-EPI 2021: 47 ML/MIN/1.73M*2
GLUCOSE BLD MANUAL STRIP-MCNC: 166 MG/DL (ref 74–99)
GLUCOSE BLD MANUAL STRIP-MCNC: 170 MG/DL (ref 74–99)
GLUCOSE BLD MANUAL STRIP-MCNC: 173 MG/DL (ref 74–99)
GLUCOSE BLD MANUAL STRIP-MCNC: 180 MG/DL (ref 74–99)
GLUCOSE BLD MANUAL STRIP-MCNC: 199 MG/DL (ref 74–99)
GLUCOSE BLD MANUAL STRIP-MCNC: 98 MG/DL (ref 74–99)
GLUCOSE SERPL-MCNC: 200 MG/DL (ref 74–99)
HOLD SPECIMEN: NORMAL
HOLD SPECIMEN: NORMAL
MAGNESIUM SERPL-MCNC: 1.74 MG/DL (ref 1.6–2.4)
PHOSPHATE SERPL-MCNC: 5.9 MG/DL (ref 2.5–4.9)
POTASSIUM SERPL-SCNC: 4 MMOL/L (ref 3.5–5.3)
SODIUM SERPL-SCNC: 137 MMOL/L (ref 136–145)

## 2025-07-02 PROCEDURE — 2500000002 HC RX 250 W HCPCS SELF ADMINISTERED DRUGS (ALT 637 FOR MEDICARE OP, ALT 636 FOR OP/ED): Performed by: STUDENT IN AN ORGANIZED HEALTH CARE EDUCATION/TRAINING PROGRAM

## 2025-07-02 PROCEDURE — 2500000004 HC RX 250 GENERAL PHARMACY W/ HCPCS (ALT 636 FOR OP/ED): Performed by: STUDENT IN AN ORGANIZED HEALTH CARE EDUCATION/TRAINING PROGRAM

## 2025-07-02 PROCEDURE — 83735 ASSAY OF MAGNESIUM: CPT | Performed by: STUDENT IN AN ORGANIZED HEALTH CARE EDUCATION/TRAINING PROGRAM

## 2025-07-02 PROCEDURE — 80069 RENAL FUNCTION PANEL: CPT | Performed by: STUDENT IN AN ORGANIZED HEALTH CARE EDUCATION/TRAINING PROGRAM

## 2025-07-02 PROCEDURE — 71045 X-RAY EXAM CHEST 1 VIEW: CPT | Performed by: RADIOLOGY

## 2025-07-02 PROCEDURE — 2500000005 HC RX 250 GENERAL PHARMACY W/O HCPCS: Performed by: STUDENT IN AN ORGANIZED HEALTH CARE EDUCATION/TRAINING PROGRAM

## 2025-07-02 PROCEDURE — 82947 ASSAY GLUCOSE BLOOD QUANT: CPT

## 2025-07-02 PROCEDURE — 36415 COLL VENOUS BLD VENIPUNCTURE: CPT | Performed by: STUDENT IN AN ORGANIZED HEALTH CARE EDUCATION/TRAINING PROGRAM

## 2025-07-02 PROCEDURE — 99232 SBSQ HOSP IP/OBS MODERATE 35: CPT | Performed by: STUDENT IN AN ORGANIZED HEALTH CARE EDUCATION/TRAINING PROGRAM

## 2025-07-02 PROCEDURE — 71045 X-RAY EXAM CHEST 1 VIEW: CPT

## 2025-07-02 PROCEDURE — 2500000001 HC RX 250 WO HCPCS SELF ADMINISTERED DRUGS (ALT 637 FOR MEDICARE OP): Performed by: STUDENT IN AN ORGANIZED HEALTH CARE EDUCATION/TRAINING PROGRAM

## 2025-07-02 PROCEDURE — RXMED WILLOW AMBULATORY MEDICATION CHARGE

## 2025-07-02 PROCEDURE — 1210000001 HC SEMI-PRIVATE ROOM DAILY

## 2025-07-02 RX ORDER — INSULIN GLARGINE 100 [IU]/ML
70 INJECTION, SOLUTION SUBCUTANEOUS NIGHTLY
Qty: 15 ML | Refills: 3 | Status: SHIPPED | OUTPATIENT
Start: 2025-07-02 | End: 2025-10-30

## 2025-07-02 RX ORDER — PEN NEEDLE, DIABETIC 30 GX3/16"
NEEDLE, DISPOSABLE MISCELLANEOUS
Qty: 100 EACH | Refills: 11 | Status: SHIPPED | OUTPATIENT
Start: 2025-07-02 | End: 2026-07-02

## 2025-07-02 RX ORDER — HYDRALAZINE HYDROCHLORIDE 25 MG/1
25 TABLET, FILM COATED ORAL 3 TIMES DAILY
Qty: 90 TABLET | Refills: 3 | Status: SHIPPED | OUTPATIENT
Start: 2025-07-02 | End: 2025-07-03 | Stop reason: HOSPADM

## 2025-07-02 RX ORDER — BLOOD-GLUCOSE SENSOR
EACH MISCELLANEOUS
Qty: 2 EACH | Refills: 3 | Status: SHIPPED | OUTPATIENT
Start: 2025-07-02

## 2025-07-02 RX ORDER — INSULIN LISPRO 100 [IU]/ML
16 INJECTION, SOLUTION INTRAVENOUS; SUBCUTANEOUS
Status: DISCONTINUED | OUTPATIENT
Start: 2025-07-02 | End: 2025-07-03

## 2025-07-02 RX ORDER — INSULIN GLARGINE 100 [IU]/ML
70 INJECTION, SOLUTION SUBCUTANEOUS NIGHTLY
Status: DISCONTINUED | OUTPATIENT
Start: 2025-07-02 | End: 2025-07-03 | Stop reason: HOSPADM

## 2025-07-02 RX ORDER — INSULIN LISPRO 100 [IU]/ML
17 INJECTION, SOLUTION INTRAVENOUS; SUBCUTANEOUS
Qty: 15 ML | Refills: 3 | Status: SHIPPED | OUTPATIENT
Start: 2025-07-02 | End: 2025-07-03

## 2025-07-02 RX ORDER — SERTRALINE HYDROCHLORIDE 100 MG/1
100 TABLET, FILM COATED ORAL DAILY
Qty: 30 TABLET | Refills: 3 | Status: SHIPPED | OUTPATIENT
Start: 2025-07-02 | End: 2025-10-30

## 2025-07-02 RX ORDER — TRAZODONE HYDROCHLORIDE 50 MG/1
50 TABLET ORAL NIGHTLY PRN
Qty: 30 TABLET | Refills: 0 | Status: SHIPPED | OUTPATIENT
Start: 2025-07-02 | End: 2025-08-01

## 2025-07-02 RX ORDER — METFORMIN HYDROCHLORIDE 1000 MG/1
1000 TABLET ORAL 2 TIMES DAILY
Qty: 60 TABLET | Refills: 3 | Status: SHIPPED | OUTPATIENT
Start: 2025-07-02 | End: 2025-07-03 | Stop reason: HOSPADM

## 2025-07-02 RX ORDER — OXYCODONE AND ACETAMINOPHEN 5; 325 MG/1; MG/1
1 TABLET ORAL EVERY 6 HOURS PRN
Qty: 16 TABLET | Refills: 0 | Status: SHIPPED | OUTPATIENT
Start: 2025-07-02

## 2025-07-02 RX ORDER — BUPROPION HYDROCHLORIDE 150 MG/1
150 TABLET ORAL DAILY
Qty: 30 TABLET | Refills: 0 | Status: SHIPPED | OUTPATIENT
Start: 2025-07-02 | End: 2025-08-01

## 2025-07-02 RX ORDER — TORSEMIDE 20 MG/1
30 TABLET ORAL 2 TIMES DAILY
Status: DISCONTINUED | OUTPATIENT
Start: 2025-07-02 | End: 2025-07-03 | Stop reason: HOSPADM

## 2025-07-02 RX ORDER — METFORMIN HYDROCHLORIDE 500 MG/1
1000 TABLET, EXTENDED RELEASE ORAL
Status: DISCONTINUED | OUTPATIENT
Start: 2025-07-02 | End: 2025-07-03 | Stop reason: HOSPADM

## 2025-07-02 RX ORDER — POLYETHYLENE GLYCOL 3350 17 G/17G
17 POWDER, FOR SOLUTION ORAL DAILY
Qty: 510 G | Refills: 3 | Status: SHIPPED | OUTPATIENT
Start: 2025-07-03

## 2025-07-02 RX ADMIN — ISOSORBIDE DINITRATE 20 MG: 20 TABLET ORAL at 14:33

## 2025-07-02 RX ADMIN — ONDANSETRON 4 MG: 4 TABLET, FILM COATED ORAL at 11:37

## 2025-07-02 RX ADMIN — SERTRALINE HYDROCHLORIDE 100 MG: 50 TABLET, FILM COATED ORAL at 08:17

## 2025-07-02 RX ADMIN — SPIRONOLACTONE 25 MG: 25 TABLET ORAL at 14:33

## 2025-07-02 RX ADMIN — METFORMIN HYDROCHLORIDE 1000 MG: 500 TABLET, FILM COATED ORAL at 08:17

## 2025-07-02 RX ADMIN — RIVAROXABAN 20 MG: 20 TABLET, FILM COATED ORAL at 17:07

## 2025-07-02 RX ADMIN — TRAZODONE HYDROCHLORIDE 50 MG: 50 TABLET ORAL at 19:43

## 2025-07-02 RX ADMIN — CARVEDILOL 6.25 MG: 6.25 TABLET, FILM COATED ORAL at 08:17

## 2025-07-02 RX ADMIN — OXYCODONE HYDROCHLORIDE AND ACETAMINOPHEN 1 TABLET: 5; 325 TABLET ORAL at 11:32

## 2025-07-02 RX ADMIN — DAPAGLIFLOZIN 10 MG: 5 TABLET, FILM COATED ORAL at 08:16

## 2025-07-02 RX ADMIN — ATORVASTATIN CALCIUM 40 MG: 20 TABLET, FILM COATED ORAL at 19:44

## 2025-07-02 RX ADMIN — INSULIN LISPRO 15 UNITS: 100 INJECTION, SOLUTION INTRAVENOUS; SUBCUTANEOUS at 07:56

## 2025-07-02 RX ADMIN — TORSEMIDE 20 MG: 20 TABLET ORAL at 08:16

## 2025-07-02 RX ADMIN — INSULIN LISPRO 16 UNITS: 100 INJECTION, SOLUTION INTRAVENOUS; SUBCUTANEOUS at 11:34

## 2025-07-02 RX ADMIN — INSULIN LISPRO 1 UNITS: 100 INJECTION, SOLUTION INTRAVENOUS; SUBCUTANEOUS at 07:57

## 2025-07-02 RX ADMIN — OXYCODONE HYDROCHLORIDE AND ACETAMINOPHEN 1 TABLET: 5; 325 TABLET ORAL at 19:43

## 2025-07-02 RX ADMIN — BUPROPION HYDROCHLORIDE 150 MG: 150 TABLET, EXTENDED RELEASE ORAL at 08:18

## 2025-07-02 RX ADMIN — SACUBITRIL AND VALSARTAN 1 TABLET: 97; 103 TABLET, FILM COATED ORAL at 08:21

## 2025-07-02 RX ADMIN — Medication 3 MG: at 19:43

## 2025-07-02 RX ADMIN — SODIUM CHLORIDE, SODIUM LACTATE, POTASSIUM CHLORIDE, AND CALCIUM CHLORIDE 500 ML: .6; .31; .03; .02 INJECTION, SOLUTION INTRAVENOUS at 00:35

## 2025-07-02 RX ADMIN — SODIUM CHLORIDE, SODIUM LACTATE, POTASSIUM CHLORIDE, AND CALCIUM CHLORIDE 1000 ML: .6; .31; .03; .02 INJECTION, SOLUTION INTRAVENOUS at 16:11

## 2025-07-02 RX ADMIN — INSULIN LISPRO 1 UNITS: 100 INJECTION, SOLUTION INTRAVENOUS; SUBCUTANEOUS at 11:34

## 2025-07-02 ASSESSMENT — COGNITIVE AND FUNCTIONAL STATUS - GENERAL
MOBILITY SCORE: 24
DAILY ACTIVITIY SCORE: 24

## 2025-07-02 ASSESSMENT — PAIN SCALES - GENERAL
PAINLEVEL_OUTOF10: 4
PAINLEVEL_OUTOF10: 7

## 2025-07-02 ASSESSMENT — PAIN DESCRIPTION - ORIENTATION: ORIENTATION: UPPER

## 2025-07-02 ASSESSMENT — PAIN - FUNCTIONAL ASSESSMENT
PAIN_FUNCTIONAL_ASSESSMENT: 0-10
PAIN_FUNCTIONAL_ASSESSMENT: 0-10

## 2025-07-02 ASSESSMENT — PAIN DESCRIPTION - DESCRIPTORS: DESCRIPTORS: ACHING

## 2025-07-02 ASSESSMENT — PAIN DESCRIPTION - LOCATION: LOCATION: LEG

## 2025-07-02 NOTE — PROGRESS NOTES
Medical Group Progress Note  ASSESSMENT & PLAN:   Arthur Carranza is a 39 y.o. male admitted to [facility] for management of:    Hyperglycemia 2/2 uncontrolled type 2 diabetes mellitus  - Presented markedly hyperglycemic without evidence of DKA in the setting of running out of medications a few days ago  - Increased home Lantus to 70 units nightly, lispro to 17 units with meals, Farxiga  - Metformin formulation switched to XR 1000 mg due to N/V   - SSI, Accu-Cheks, hypoglycemia protocol  - Repeat A1c 16.3%  - Endocrine consult discontinued, hyperglycemia is much improved after yesterdays changes     CKD stage II  - renal function at baseline (~sCr 1.7-1.9)     Left leg pain  - Follows with sports medicine as outpatient with plans for upcoming MRI  - Pain control     Chronic systolic heart failure / NICM  Essential hypertension  - Clinically appears euvolemic  - Continuing home GDMT and antihypertensives  - Entresto dose reduced today due to hypotension, hydralazine was stopped     History of CVA  - Continuing home atorvastatin, Xarelto     VTE Prophylaxis: Xarelto    Disposition: Anticipate discharge home tomorrow    Level of MDM:  Moderate   Risk: Moderate   Data Reviewed and/or Analyzed:   Included: Prior external notes from at least 1 unique source, Results, including laboratory findings and imaging reports, listed above, Orders and notes from all consultants involved, and Notes for this encounter.  I personally reviewed the tests referenced above.    The patient/family had opportunity to ask questions. All questions were answered to the best of my ability.    Steven Morales, DO  Hospital Medicine    SUBJECTIVE     Patient feels nauseous today    OBJECTIVE:     Last Recorded Vitals:  Vitals:    07/02/25 0124 07/02/25 0400 07/02/25 0756 07/02/25 1113   BP:  97/59 102/67 117/71   BP Location:  Left arm Left arm Left arm   Patient Position:  Lying Lying Sitting   Pulse: 74 79 82 89   Resp:  17 16 16   Temp:  36.2  °C (97.2 °F) 35.4 °C (95.7 °F) 35.7 °C (96.3 °F)   TempSrc:  Temporal Temporal Temporal   SpO2: 93% 98% 96% 96%   Weight:       Height:         Last I/O:  I/O last 3 completed shifts:  In: 240 (1.8 mL/kg) [P.O.:240]  Out: - (0 mL/kg)   Weight: 136.1 kg     Physical Exam   Gen: NAD, appears stated age  HEENT: EOM, MMM  CV: RRR, no murmurs rubs or gallops  Resp: Clear to auscultation bilaterally, normal effort  Abdomen: soft, NT,+BS  LE: No edema, no deformity  Neuro: A&Ox4, moving all extremities       Inpatient Medications:  Scheduled Medications[1]PRN Medications  PRN Medications[2]  Continuous Medications:  Continuous Medications[3]  LABS AND IMAGING:     Labs:  Results from last 7 days   Lab Units 07/01/25 0446 06/30/25 2050   WBC AUTO x10*3/uL 5.7 5.2   RBC AUTO x10*6/uL 4.35* 4.37*   HEMOGLOBIN g/dL 13.8 14.1   HEMATOCRIT % 41.3 41.2   MCV fL 95 94   MCH pg 31.7 32.3   MCHC g/dL 33.4 34.2   RDW % 12.8 12.8   PLATELETS AUTO x10*3/uL 236 234     Results from last 7 days   Lab Units 07/02/25  0436 07/01/25 0446 06/30/25 2050   SODIUM mmol/L 137 129* 129*   POTASSIUM mmol/L 4.0 5.4* 5.1   CHLORIDE mmol/L 97* 99 97*   CO2 mmol/L 30 23 25   BUN mg/dL 35* 32* 28*   CREATININE mg/dL 1.86* 1.57* 1.98*   GLUCOSE mg/dL 200* 461* 607*   PROTEIN TOTAL g/dL  --   --  7.5   CALCIUM mg/dL 8.8 8.4* 8.6   BILIRUBIN TOTAL mg/dL  --   --  0.6   ALK PHOS U/L  --   --  59   AST U/L  --   --  27   ALT U/L  --   --  28     Results from last 7 days   Lab Units 07/02/25  0436   MAGNESIUM mg/dL 1.74         Imaging:  XR chest 1 view  Narrative: Interpreted By:  Osmani Frederick,   STUDY:  XR CHEST 1 VIEW;  7/2/2025 12:31 am      INDICATION:  Signs/Symptoms:Hypoxia.      COMPARISON:  2/14/2025      ACCESSION NUMBER(S):  [id number]      ORDERING CLINICIAN:  MANI SANFORD      FINDINGS:  There is stable cardiomegaly. Mild left basilar atelectasis. No focal  airspace consolidation or pleural effusion. No pneumothorax.      Impression:  Cardiomegaly and mild left basilar atelectasis.      MACRO:  None      Signed by: Osmani Frederick 7/2/2025 2:12 AM  Dictation workstation:   [id number]             [1] atorvastatin, 40 mg, oral, Nightly  buPROPion XL, 150 mg, oral, Daily  carvedilol, 6.25 mg, oral, BID  dapagliflozin propanediol, 10 mg, oral, Daily  insulin glargine, 70 Units, subcutaneous, Nightly  insulin lispro, 0-5 Units, subcutaneous, TID AC  insulin lispro, 16 Units, subcutaneous, TID AC  isosorbide dinitrate, 20 mg, oral, TID  metFORMIN, 1,000 mg, oral, BID  polyethylene glycol, 17 g, oral, Daily  rivaroxaban, 20 mg, oral, Daily with evening meal  sacubitriL-valsartan, 1 tablet, oral, BID  sertraline, 100 mg, oral, Daily  spironolactone, 25 mg, oral, Daily  torsemide, 60 mg, oral, BID     [2] PRN medications: acetaminophen **OR** [DISCONTINUED] acetaminophen **OR** [DISCONTINUED] acetaminophen, dextrose, dextrose, glucagon, glucagon, melatonin, ondansetron **OR** ondansetron, oxyCODONE-acetaminophen, traZODone  [3]

## 2025-07-02 NOTE — CARE PLAN
The patient's goals for the shift include less pain and sleep    The clinical goals for the shift include patient will remain HDS      Problem: Chronic Conditions and Co-morbidities  Goal: Patient's chronic conditions and co-morbidity symptoms are monitored and maintained or improved  Outcome: Progressing     Problem: Nutrition  Goal: Nutrient intake appropriate for maintaining nutritional needs  Outcome: Progressing     Problem: Pain  Goal: Takes deep breaths with improved pain control throughout the shift  Outcome: Progressing  Goal: Turns in bed with improved pain control throughout the shift  Outcome: Progressing  Goal: Walks with improved pain control throughout the shift  Outcome: Progressing  Goal: Performs ADL's with improved pain control throughout shift  Outcome: Progressing  Goal: Participates in PT with improved pain control throughout the shift  Outcome: Progressing  Goal: Free from opioid side effects throughout the shift  Outcome: Progressing  Goal: Free from acute confusion related to pain meds throughout the shift  Outcome: Progressing     Problem: Diabetes  Goal: Achieve decreasing blood glucose levels by end of shift  Outcome: Progressing  Goal: Increase stability of blood glucose readings by end of shift  Outcome: Progressing  Goal: Decrease in ketones present in urine by end of shift  Outcome: Progressing  Goal: Maintain electrolyte levels within acceptable range throughout shift  Outcome: Progressing  Goal: Maintain glucose levels >70mg/dl to <250mg/dl throughout shift  Outcome: Progressing  Goal: No changes in neurological exam by end of shift  Outcome: Progressing  Goal: Learn about and adhere to nutrition recommendations by end of shift  Outcome: Progressing  Goal: Vital signs within normal range for age by end of shift  Outcome: Progressing  Goal: Increase self care and/or family involovement by end of shift  Outcome: Progressing  Goal: Receive DSME education by end of shift  Outcome:  Progressing

## 2025-07-02 NOTE — CARE PLAN
The patient's goals for the shift include less pain and sleep    The clinical goals for the shift include safety      Problem: Chronic Conditions and Co-morbidities  Goal: Patient's chronic conditions and co-morbidity symptoms are monitored and maintained or improved  Outcome: Progressing  Flowsheets (Taken 7/1/2025 2130)  Care Plan - Patient's Chronic Conditions and Co-Morbidity Symptoms are Monitored and Maintained or Improved: Monitor and assess patient's chronic conditions and comorbid symptoms for stability, deterioration, or improvement     Problem: Pain  Goal: Takes deep breaths with improved pain control throughout the shift  Outcome: Progressing     Problem: Fall/Injury  Goal: Not fall by end of shift  Outcome: Progressing

## 2025-07-02 NOTE — NURSING NOTE
Pen needles, Freestlye Nadir 3 Plus sensor (on voucher), Hydralazine, Miralax, Percocet, Trazadone, Zoloft, metformin, Lispro, Lantus, and Wellbutrin XL. Purpose and dosing instructions provided. Lispro 17 units 3 times a day and Lantus 70 units at bedtime. Patient'ds discharge to be held today d/t low blood pressure. Will deliver medications and supplies to bedside 07/03.

## 2025-07-03 ENCOUNTER — APPOINTMENT (OUTPATIENT)
Dept: CARDIOLOGY | Facility: HOSPITAL | Age: 40
End: 2025-07-03
Payer: MEDICARE

## 2025-07-03 ENCOUNTER — PHARMACY VISIT (OUTPATIENT)
Dept: PHARMACY | Facility: CLINIC | Age: 40
End: 2025-07-03
Payer: COMMERCIAL

## 2025-07-03 VITALS
HEART RATE: 91 BPM | SYSTOLIC BLOOD PRESSURE: 87 MMHG | BODY MASS INDEX: 44.43 KG/M2 | HEIGHT: 69 IN | WEIGHT: 300 LBS | RESPIRATION RATE: 18 BRPM | DIASTOLIC BLOOD PRESSURE: 55 MMHG | OXYGEN SATURATION: 99 % | TEMPERATURE: 97.7 F

## 2025-07-03 LAB
ALBUMIN SERPL BCP-MCNC: 3.6 G/DL (ref 3.4–5)
ANION GAP SERPL CALC-SCNC: 13 MMOL/L (ref 10–20)
ATRIAL RATE: 86 BPM
BUN SERPL-MCNC: 36 MG/DL (ref 6–23)
CALCIUM SERPL-MCNC: 8.9 MG/DL (ref 8.6–10.3)
CHLORIDE SERPL-SCNC: 98 MMOL/L (ref 98–107)
CO2 SERPL-SCNC: 31 MMOL/L (ref 21–32)
CREAT SERPL-MCNC: 2.02 MG/DL (ref 0.5–1.3)
EGFRCR SERPLBLD CKD-EPI 2021: 42 ML/MIN/1.73M*2
GLUCOSE BLD MANUAL STRIP-MCNC: 152 MG/DL (ref 74–99)
GLUCOSE BLD MANUAL STRIP-MCNC: 201 MG/DL (ref 74–99)
GLUCOSE SERPL-MCNC: 152 MG/DL (ref 74–99)
HOLD SPECIMEN: NORMAL
HOLD SPECIMEN: NORMAL
MAGNESIUM SERPL-MCNC: 1.82 MG/DL (ref 1.6–2.4)
P AXIS: 52 DEGREES
P OFFSET: 172 MS
P ONSET: 106 MS
PHOSPHATE SERPL-MCNC: 5.5 MG/DL (ref 2.5–4.9)
POTASSIUM SERPL-SCNC: 3.9 MMOL/L (ref 3.5–5.3)
PR INTERVAL: 212 MS
Q ONSET: 212 MS
QRS COUNT: 14 BEATS
QRS DURATION: 156 MS
QT INTERVAL: 442 MS
QTC CALCULATION(BAZETT): 528 MS
QTC FREDERICIA: 498 MS
R AXIS: -56 DEGREES
SODIUM SERPL-SCNC: 138 MMOL/L (ref 136–145)
T AXIS: 103 DEGREES
T OFFSET: 433 MS
VENTRICULAR RATE: 86 BPM

## 2025-07-03 PROCEDURE — 2500000002 HC RX 250 W HCPCS SELF ADMINISTERED DRUGS (ALT 637 FOR MEDICARE OP, ALT 636 FOR OP/ED): Performed by: STUDENT IN AN ORGANIZED HEALTH CARE EDUCATION/TRAINING PROGRAM

## 2025-07-03 PROCEDURE — 93005 ELECTROCARDIOGRAM TRACING: CPT

## 2025-07-03 PROCEDURE — 99239 HOSP IP/OBS DSCHRG MGMT >30: CPT | Performed by: STUDENT IN AN ORGANIZED HEALTH CARE EDUCATION/TRAINING PROGRAM

## 2025-07-03 PROCEDURE — 82947 ASSAY GLUCOSE BLOOD QUANT: CPT

## 2025-07-03 PROCEDURE — 99221 1ST HOSP IP/OBS SF/LOW 40: CPT | Performed by: NURSE PRACTITIONER

## 2025-07-03 PROCEDURE — 80069 RENAL FUNCTION PANEL: CPT | Performed by: STUDENT IN AN ORGANIZED HEALTH CARE EDUCATION/TRAINING PROGRAM

## 2025-07-03 PROCEDURE — 36415 COLL VENOUS BLD VENIPUNCTURE: CPT | Performed by: STUDENT IN AN ORGANIZED HEALTH CARE EDUCATION/TRAINING PROGRAM

## 2025-07-03 PROCEDURE — 93010 ELECTROCARDIOGRAM REPORT: CPT | Performed by: INTERNAL MEDICINE

## 2025-07-03 PROCEDURE — 2500000001 HC RX 250 WO HCPCS SELF ADMINISTERED DRUGS (ALT 637 FOR MEDICARE OP): Performed by: STUDENT IN AN ORGANIZED HEALTH CARE EDUCATION/TRAINING PROGRAM

## 2025-07-03 PROCEDURE — RXMED WILLOW AMBULATORY MEDICATION CHARGE

## 2025-07-03 PROCEDURE — 83735 ASSAY OF MAGNESIUM: CPT | Performed by: STUDENT IN AN ORGANIZED HEALTH CARE EDUCATION/TRAINING PROGRAM

## 2025-07-03 RX ORDER — INSULIN LISPRO 100 [IU]/ML
18 INJECTION, SOLUTION INTRAVENOUS; SUBCUTANEOUS
Qty: 15 ML | Refills: 3 | Status: SHIPPED | OUTPATIENT
Start: 2025-07-03 | End: 2025-10-31

## 2025-07-03 RX ORDER — METFORMIN HYDROCHLORIDE 500 MG/1
1000 TABLET, EXTENDED RELEASE ORAL
Qty: 60 TABLET | Refills: 3 | Status: SHIPPED | OUTPATIENT
Start: 2025-07-03

## 2025-07-03 RX ORDER — INSULIN LISPRO 100 [IU]/ML
18 INJECTION, SOLUTION INTRAVENOUS; SUBCUTANEOUS
Status: DISCONTINUED | OUTPATIENT
Start: 2025-07-03 | End: 2025-07-03 | Stop reason: HOSPADM

## 2025-07-03 RX ORDER — TORSEMIDE 10 MG/1
30 TABLET ORAL 2 TIMES DAILY
Qty: 180 TABLET | Refills: 0 | Status: SHIPPED | OUTPATIENT
Start: 2025-07-03 | End: 2025-08-02

## 2025-07-03 RX ADMIN — DAPAGLIFLOZIN 10 MG: 5 TABLET, FILM COATED ORAL at 09:12

## 2025-07-03 RX ADMIN — SERTRALINE HYDROCHLORIDE 100 MG: 50 TABLET, FILM COATED ORAL at 09:12

## 2025-07-03 RX ADMIN — INSULIN LISPRO 1 UNITS: 100 INJECTION, SOLUTION INTRAVENOUS; SUBCUTANEOUS at 12:22

## 2025-07-03 RX ADMIN — INSULIN LISPRO 18 UNITS: 100 INJECTION, SOLUTION INTRAVENOUS; SUBCUTANEOUS at 12:24

## 2025-07-03 RX ADMIN — OXYCODONE HYDROCHLORIDE AND ACETAMINOPHEN 1 TABLET: 5; 325 TABLET ORAL at 04:59

## 2025-07-03 RX ADMIN — INSULIN LISPRO 16 UNITS: 100 INJECTION, SOLUTION INTRAVENOUS; SUBCUTANEOUS at 08:11

## 2025-07-03 RX ADMIN — INSULIN LISPRO 2 UNITS: 100 INJECTION, SOLUTION INTRAVENOUS; SUBCUTANEOUS at 08:11

## 2025-07-03 RX ADMIN — BUPROPION HYDROCHLORIDE 150 MG: 150 TABLET, EXTENDED RELEASE ORAL at 09:12

## 2025-07-03 ASSESSMENT — PAIN SCALES - GENERAL
PAINLEVEL_OUTOF10: 0 - NO PAIN
PAINLEVEL_OUTOF10: 8

## 2025-07-03 ASSESSMENT — PAIN DESCRIPTION - ORIENTATION: ORIENTATION: LEFT

## 2025-07-03 ASSESSMENT — PAIN DESCRIPTION - LOCATION: LOCATION: LEG

## 2025-07-03 ASSESSMENT — PAIN - FUNCTIONAL ASSESSMENT: PAIN_FUNCTIONAL_ASSESSMENT: 0-10

## 2025-07-03 NOTE — CARE PLAN
Problem: Diabetes  Goal: Achieve decreasing blood glucose levels by end of shift  Outcome: Progressing  Goal: Increase stability of blood glucose readings by end of shift  Outcome: Progressing  Goal: Decrease in ketones present in urine by end of shift  Outcome: Progressing  Goal: Maintain electrolyte levels within acceptable range throughout shift  Outcome: Progressing  Goal: Maintain glucose levels >70mg/dl to <250mg/dl throughout shift  Outcome: Progressing  Goal: Vital signs within normal range for age by end of shift  Outcome: Progressing     Problem: Nutrition  Goal: Nutrient intake appropriate for maintaining nutritional needs  Outcome: Progressing     Problem: Chronic Conditions and Co-morbidities  Goal: Patient's chronic conditions and co-morbidity symptoms are monitored and maintained or improved  Outcome: Progressing   The patient's goals for the shift include less pain and sleep    The clinical goals for the shift include patient will remain HDS    Over the shift, the patient did not make progress toward the following goals. Barriers to progression include . Recommendations to address these barriers include .

## 2025-07-03 NOTE — PROGRESS NOTES
[facility]. Counseling was provided on proper storage of insulin. Pen in use can stay out of the refrigerator for 28 days and other pens in box are to remain in the refrigerator until use. Patient reports not storing properly prior to admission. Patient was also not priming with 2 units before each injection. With current doses and priming one box of Lantus will last 20 days and Lispro 25 days. Improper storage most likely contributed to patient’s pen malfunction. One Freestyle Nadir 3 Plus sensor provided via free trial voucher. Dima unable to bill insurance as DM supplies must be billed through Medicare Part B. Metformin XR, Torsemide, Pen needles, bupropion xl, Percocet,Miralax, Setraline and Trazadone delivered to bedside. Purpose and dosing instructions provided. Patient verbalized understanding and gratitude.

## 2025-07-03 NOTE — CONSULTS
Cardiology Consult Note      Date:   7/3/2025  Patient name:  Arthur Carranza  Date of admission:  6/30/2025  8:24 PM  MRN:   [id number]  YOB: 1985  Time of Consult:  1:50 PM    Consulting Cardiologist:    ARIAN Caban, CNP  Primary Cardiologist:  Dr Jurado    Referring Provider: Dr Morales      Admission Diagnosis:     Hyperglycemia due to type 2 diabetes mellitus (Multi)      History of Present Illness:      Arthur Carranza is a 39 y.o.  male patient who is being at the request of Dr. Morales for inpatient consultation of [facility] cardiology care in [place]. He was admitted on 6/30/2025.  Previous [facility] and [facility] records have been reviewed in detail.    Nonischemic cardiomyopathy EF 10%, hypertension, dyslipidemia, diabetes, CVA, CKD  Patient states he came into the emergency department because he had ran out of his diabetic medicines for the last couple days he was found to have high blood sugars so they admitted him to the 10th floor.  He states that he normally lives in [place] and he follows up with Dr. Jurado that he was diagnosed with an ejection fraction of 10% they thought it was nonischemic cardiomyopathy.  Approximately 1 year ago they did offer him an AICD and at that time he absolutely refused.  I spoke with him at length since he lives in [place] and he now wants to establish he has family here in [place] he wants to get a cardiologist here in [place] and that is why they put this on consult due to his systolic heart failure and the nonischemic cardiomyopathy so we can follow someone here.  Patient states he absolutely does not want any cardiac testing now he wants to be discharged home he has agreed that he would get an echo as an outpatient and he will follow-up with Dr. Yuan for possible cardiac MRI and he states he will think about possible AICD in the future.  He states that back in 2022 he started having these symptoms with severe LV dysfunction he  had a normal heart catheterization with a low ejection fraction.  Last year he had a right heart catheterization at that time they still was offering him the AICD but he was refusing at that time.  He denies chest pain, shortness of breath, nausea, vomiting, PND, orthopnea, claudications    EKG is normal sinus rhythm with a first-degree AV block left bundle branch block no acute changes      Cardiac  history  7/24  echo  CONCLUSIONS:   1. Poorly visualized anatomical structures due to suboptimal image quality.   2. Left ventricular ejection fraction is severely decreased, by visual estimate at 10%.   3. Spectral Doppler shows a pseudonormal pattern of left ventricular diastolic filling.   4. Left ventricular cavity size is severely dilated.   5. There is normal right ventricular global systolic function.     QUANTITATIVE DATA SUMMARY:  7/24 right heart cath  ____________________________________________________________________________________  CONCLUSIONS:   1. Access: RIJ 7Fr [facility]   2. Hemo: RA 24, RVSP 63, PA 64/40 (48), W 41, /90 (101) mmHg.   3. CO/CI [Evelia]: 4.4 / 1.7.   4. CO/CI [TD]: 3.47 / 1.4.   5. SVR: 1775 cgs, PVR: 2.0 COLON.   6. Markedly elevated biventricular filling pressures with reduced cardiac index by direct and indirect measurements.     ICD 10 Codes:    10/22 heart cath  CONCLUSIONS:   1. The entire Left Main: 0% stenosis.   2. Entire LAD Lesion: The percent stenosis is 0%.   3. Entire CX Lesion: The percent stenosis is 0%.   4. The entire RCA Lesion: The percent stenosis is 0%.         Allergies:     Allergies[1]      Past Medical History:     Medical History[2]    Past Surgical History:     Surgical History[3]    Family History:     Family History[4]    Social History:     Social History[5]    CURRENT INPATIENT MEDICATIONS    Scheduled Medications[6]  Continuous Medications[7]  Current Outpatient Medications   Medication Instructions    atorvastatin  "(LIPITOR) 40 mg, oral, Nightly    blood-glucose meter,continuous (FreeStyle Nadir 3 [place]) [facility] Use as instructed to test blood glucose throughout the day    blood-glucose sensor (FreeStyle Nadir 3 Plus Sensor) device Use as directed. Change sensor every 15 days    blood-glucose sensor (FreeStyle Nadir 3 Plus Sensor) device Use as directed to check blood sugar. Change every 15 days.    buPROPion XL (WELLBUTRIN XL) 150 mg, oral, Daily    carvedilol (COREG) 6.25 mg, oral, 2 times daily (morning and late afternoon)    celecoxib (CELEBREX) 200 mg, oral, Daily    cyclobenzaprine (FLEXERIL) 10 mg, oral, Nightly PRN    dapagliflozin propanediol (FARXIGA) 10 mg, oral, Daily    gentamicin (Garamycin) 0.1 % cream Apply 1 Application topically once daily. groin    hydrALAZINE (APRESOLINE) 25 mg, oral, 3 times daily    insulin lispro (HumaLOG) 100 unit/mL pen Inject 12 units plus sliding scale three times daily with meals:  = 0u, 151-200 = 2u, 201-250 = 4u, 251-300 = 6u, 301-350 = 8u, 351-400+ = 10u, use up to 60u daily    insulin lispro (HUMALOG) 18 Units, subcutaneous, 3 times daily (morning, midday, late afternoon), Take as directed per insulin instructions.    isosorbide dinitrate (ISORDIL) 20 mg, oral, 3 times daily (0900,1400,1900)    Lantus Solostar U-100 Insulin 70 Units, subcutaneous, Nightly, Take as directed per insulin instructions.    metFORMIN XR (GLUCOPHAGE-XR) 1,000 mg, oral, Daily with evening meal, Do not crush, chew, or split.    ondansetron ODT (ZOFRAN-ODT) 4 mg, oral, Every 8 hours PRN    oxyCODONE-acetaminophen (Percocet) 5-325 mg tablet 1 tablet, oral, Every 6 hours PRN    pen needle, diabetic 31 gauge x 5/16\" needle Use to inject insulin 1-4 times daily as directed.    pen needle, diabetic 32 gauge x 5/32\" needle 1 each, miscellaneous, 4 times daily    polyethylene glycol (Glycolax, Miralax) 17 gram/dose powder Mix 1 capful in 8 ounces of preferred drink and take by mouth once daily. Do not start " before July 3, 2025.    rivaroxaban (XARELTO) 20 mg, oral, Daily with evening meal, Take with food.    sacubitriL-valsartan (Entresto)  mg tablet 1 tablet, oral, 2 times daily    sertraline (ZOLOFT) 100 mg, oral, Daily    spironolactone (ALDACTONE) 25 mg, oral, Daily    torsemide (DEMADEX) 60 mg, oral, 2 times daily    traZODone (DESYREL) 50 mg, oral, Nightly PRN    Trulicity 1.5 mg, subcutaneous, Once Weekly        Review of Systems:      12 point review of systems was obtained in detail and is negative other than that detailed above.    Vital Signs:     Vitals:    07/02/25 1935 07/02/25 2347 07/03/25 0406 07/03/25 0742   BP: 96/52 96/52 94/56 93/64   BP Location: Left arm Left arm Left arm Left arm   Patient Position: Lying Lying Lying Lying   Pulse: 84 83 85 91   Resp: 18 18 18 16   Temp: 36.1 °C (97 °F) 36 °C (96.8 °F) 36.3 °C (97.3 °F) 35.8 °C (96.4 °F)   TempSrc: Temporal Temporal Temporal Temporal   SpO2: 96% 93% 96% 94%   Weight:       Height:           Intake/Output Summary (Last 24 hours) at 7/3/2025 1350  Last data filed at 7/2/2025 1655  Gross per 24 hour   Intake 732.6 ml   Output --   Net 732.6 ml       Wt Readings from Last 4 Encounters:   06/30/25 136 kg (300 lb)   06/20/25 136 kg (300 lb)   05/15/25 139 kg (307 lb)   02/14/25 137 kg (301 lb)       Physical Examination:     GENERAL APPEARANCE: Well developed, well nourished, in no acute distress.  CHEST: Symmetric and non-tender.  INTEGUMENT: Skin warm and dry, without gross excoriationis or lesions.  HEENT: No gross abnormalities of conjunctiva, teeth, gums, oral mucosa  NECK: Supple, no JVD, no bruit. Thyroid not palpable. Carotid upstrokes normal.  NEURO/PSHCY: Alert and oriented x3; appropriate behavior and responses and responses, grossly normal cerebellar function with normal balance and coordination  LUNGS: Clear to auscultation bilaterally; normal respiratory effort.  HEART: S1, S2 regular with 1 out of 6 systolic murmur  ABDOMEN: Soft,  nontender, no palpable hepatosplenomegaly, no mases, no bruits. Abdominal aorta not noted to be enlarged.  MUSCULOSKELETAL: Ambulatory with normal tandem gait.  EXTREMITIES: Warm with good color, no clubbing or cyanois. There is no edema noted.  PERIPHERAL VASCULAR: Pulses present and equally palpable; 2+ throughout. No femoral bruits.      Lab:     CBC:   Results from last 7 days   Lab Units 07/01/25 0446 06/30/25 2050   WBC AUTO x10*3/uL 5.7 5.2   RBC AUTO x10*6/uL 4.35* 4.37*   HEMOGLOBIN g/dL 13.8 14.1   HEMATOCRIT % 41.3 41.2   MCV fL 95 94   MCH pg 31.7 32.3   MCHC g/dL 33.4 34.2   RDW % 12.8 12.8   PLATELETS AUTO x10*3/uL 236 234     CMP:    Results from last 7 days   Lab Units 07/03/25 0439 07/02/25 0436 07/01/25 0446 06/30/25 2050   SODIUM mmol/L 138 137 129* 129*   POTASSIUM mmol/L 3.9 4.0 5.4* 5.1   CHLORIDE mmol/L 98 97* 99 97*   CO2 mmol/L 31 30 23 25   BUN mg/dL 36* 35* 32* 28*   CREATININE mg/dL 2.02* 1.86* 1.57* 1.98*   GLUCOSE mg/dL 152* 200* 461* 607*   PROTEIN TOTAL g/dL  --   --   --  7.5   CALCIUM mg/dL 8.9 8.8 8.4* 8.6   BILIRUBIN TOTAL mg/dL  --   --   --  0.6   ALK PHOS U/L  --   --   --  59   AST U/L  --   --   --  27   ALT U/L  --   --   --  28     BMP:    Results from last 7 days   Lab Units 07/03/25 0439 07/02/25 0436 07/01/25 0446   SODIUM mmol/L 138 137 129*   POTASSIUM mmol/L 3.9 4.0 5.4*   CHLORIDE mmol/L 98 97* 99   CO2 mmol/L 31 30 23   BUN mg/dL 36* 35* 32*   CREATININE mg/dL 2.02* 1.86* 1.57*   CALCIUM mg/dL 8.9 8.8 8.4*   GLUCOSE mg/dL 152* 200* 461*     Magnesium:  Results from last 7 days   Lab Units 07/03/25  0439 07/02/25  0436   MAGNESIUM mg/dL 1.82 1.74       Diagnostic Studies:       Radiology:     XR chest 1 view   Final Result   Cardiomegaly and mild left basilar atelectasis.        MACRO:   None        Signed by: Osmani Frederick 7/2/2025 2:12 AM   Dictation workstation:   [id number]      Transthoracic echo (TTE) complete    (Results Pending)       Problem List:      Problem List[8]    Assessment:   Diabetes  Nonischemic cardiomyopathy EF 10%  Chronic systolic heart failure  Hypertension  History of CVA  LBBB  Dyslipidemia  Plan:   Tele monitoring  I spoke at length with patient he does not want to have any inpatient testing he states he has been dealing with cardiomyopathy for greater than 3 years he wants to establish cardiology care here in [place] I did tell him that we will get an echo as an outpatient and he is going to follow-up with Dr. Yuan at that time he has agreed that he would talk with Dr. Yuan regarding possible AICD in the future.  I answered all of his medications I did recommend that they stop the hydralazine  Lipitor 40 mg daily  Coreg 6.25 mg p.o. twice daily  Farxiga 10 mg daily  Isordil 20 mg p.o. 3 times daily  Xarelto 20 mg daily  Aldactone 25 mg daily  Demadex 30 mg p.o. twice daily    Kavon Orozco CNP  [facility]      Of note, this documentation is completed using the Dragon Dictation system (voice recognition software). There may be spelling and/or grammatical errors that were not corrected prior to final submission.      Electronically signed by GEE Galaviz, on 7/3/2025 at 1:50 PM         [1]   Allergies  Allergen Reactions    Shellfish Containing Products Itching, Rash and GI Upset   [2]   Past Medical History:  Diagnosis Date    CHF (congestive heart failure)     Diabetes mellitus (Multi)     Heart disease     Hypertension     Substance abuse    [3]   Past Surgical History:  Procedure Laterality Date    CARDIAC CATHETERIZATION N/A 7/18/2024    Procedure: Right Heart Cath;  Surgeon: Delroy Jurado MD;  Location: [facility] Cardiac Cath Lab;  Service: Cardiovascular;  Laterality: N/A;    MR HEAD ANGIO WO IV CONTRAST  12/29/2022    MR HEAD ANGIO WO IV CONTRAST 12/29/2022 DOCTOR OFFICE LEGACY    OTHER SURGICAL HISTORY  07/21/2022    Cyst excision    OTHER SURGICAL HISTORY   10/25/2022    Cardiac catheterization   [4]   Family History  Adopted: Yes   [5]   Social History  Tobacco Use    Smoking status: Every Day     Types: Cigarettes   Vaping Use    Vaping status: Unknown   Substance Use Topics    Alcohol use: Yes     Comment: occasionaly - 1-2 times a month    Drug use: Not Currently     Types: Codeine   [6] atorvastatin, 40 mg, oral, Nightly  buPROPion XL, 150 mg, oral, Daily  carvedilol, 6.25 mg, oral, BID  dapagliflozin propanediol, 10 mg, oral, Daily  insulin glargine, 70 Units, subcutaneous, Nightly  insulin lispro, 0-5 Units, subcutaneous, TID AC  insulin lispro, 18 Units, subcutaneous, TID AC  isosorbide dinitrate, 20 mg, oral, TID  metFORMIN (MOD), 1,000 mg, oral, Daily with evening meal  polyethylene glycol, 17 g, oral, Daily  rivaroxaban, 20 mg, oral, Daily with evening meal  sacubitriL-valsartan, 1 tablet, oral, BID  sertraline, 100 mg, oral, Daily  spironolactone, 25 mg, oral, Daily  torsemide, 30 mg, oral, BID    [7]    [8]   Patient Active Problem List  Diagnosis    Current every day smoker    Type 2 diabetes mellitus with neurologic complication, with long-term current use of insulin    Edema    Neck abscess    Nonischemic cardiomyopathy (Multi)    Perirectal abscess    Essential hypertension    Ventricular bigeminy seen on cardiac monitor    Bipolar 1 disorder (Multi)    Acute ischemic left MCA stroke (Multi)    Cocaine use    Major depression, recurrent    PTSD (post-traumatic stress disorder)    Acute on chronic combined systolic and diastolic CHF (congestive heart failure)    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    Abdominal swelling    Bipolar affective disorder, current episode depressed (Multi)    Osteomyelitis    Pain of finger    Palpitations    Chronic pulmonary edema    Sleep-related breathing disorder    Insomnia    Anxiety and depression    Tobacco abuse    Cocaine use  disorder, mild    HFrEF (heart failure with reduced ejection fraction)    Acute on chronic combined systolic and diastolic congestive heart failure    Left bundle branch block    PHOEBE (obstructive sleep apnea)    Type 2 diabetes mellitus with hyperglycemia, with long-term current use of insulin    Type 2 diabetes mellitus, without long-term current use of insulin (Multi)    Hyperglycemia due to type 2 diabetes mellitus (Multi)    History of CVA (cerebrovascular accident)    Chronic systolic heart failure    Acute kidney injury superimposed on stage 2 chronic kidney disease    Left leg pain

## 2025-07-03 NOTE — DISCHARGE SUMMARY
Discharge Diagnosis  Hyperglycemia due to type 2 diabetes mellitus (Multi)       Issues Requiring Follow-Up  Outpatient cardiology and PCP Follow-up  - TTE    Discharge Meds     Medication List      PAUSE taking these medications     Entresto  mg tablet; Wait to take this until your doctor or other   care provider tells you to start again.; Until instructed by your   cardiology team ; Generic drug: sacubitriL-valsartan; Take 1 tablet by   mouth 2 times a day.     START taking these medications     metFORMIN  mg 24 hr tablet; Commonly known as: Glucophage-XR; Take   2 tablets (1,000 mg) by mouth once daily in the evening. Take with meals.   Do not crush, chew, or split.; Replaces: metFORMIN 1,000 mg tablet   oxyCODONE-acetaminophen 5-325 mg tablet; Commonly known as: Percocet;   Take 1 tablet by mouth every 6 hours if needed for severe pain (7 - 10).   polyethylene glycol 17 gram/dose powder; Commonly known as: Glycolax,   Miralax; Mix 1 capful in 8 ounces of preferred drink and take by mouth   once daily. Do not start before July 3, 2025.     CHANGE how you take these medications     * FreeStyle Nadir 3 Plus Sensor device; Generic drug: blood-glucose   sensor; Use as directed. Change sensor every 15 days; What changed:   Another medication with the same name was added. Make sure you understand   how and when to take each.   * FreeStyle Nadir 3 Plus Sensor device; Generic drug: blood-glucose   sensor; Use as directed to check blood sugar. Change every 15 days.; What   changed: You were already taking a medication with the same name, and this   prescription was added. Make sure you understand how and when to take   each.   * insulin lispro 100 unit/mL pen; Commonly known as: HumaLOG; Inject 12   units plus sliding scale three times daily with meals:  = 0u,   151-200 = 2u, 201-250 = 4u, 251-300 = 6u, 301-350 = 8u, 351-400+ = 10u,   use up to 60u daily; What changed: Another medication with the same  "name   was changed. Make sure you understand how and when to take each.   * insulin lispro 100 unit/mL pen; Commonly known as: HumaLOG; Inject 18   Units under the skin 3 times daily (morning, midday, late afternoon). Take   as directed per insulin instructions.; What changed: how much to take   Lantus Solostar U-100 Insulin 100 unit/mL (3 mL) pen; Generic drug:   insulin glargine; Inject 70 Units under the skin once daily at bedtime.   Take as directed per insulin instructions.; What changed: how much to take   torsemide 10 mg tablet; Commonly known as: Demadex; Take 3 tablets (30   mg) by mouth 2 times a day.; What changed: medication strength, how much   to take   * TRUEplus Pen Needle 32 gauge x 5/32\" needle; Generic drug: pen needle,   diabetic; 1 each 4 times a day.; What changed: Another medication with the   same name was added. Make sure you understand how and when to take each.   * BD Ultra-Fine Short Pen Needle 31 gauge x 5/16\" needle; Generic drug:   pen needle, diabetic; Use to inject insulin 1-4 times daily as directed.;   What changed: You were already taking a medication with the same name, and   this prescription was added. Make sure you understand how and when to take   each.  * This list has 6 medication(s) that are the same as other medications   prescribed for you. Read the directions carefully, and ask your doctor or   other care provider to review them with you.     CONTINUE taking these medications     atorvastatin 40 mg tablet; Commonly known as: Lipitor; Take 1 tablet (40   mg) by mouth once daily at bedtime.   buPROPion  mg 24 hr tablet; Commonly known as: Wellbutrin XL; Take   1 tablet (150 mg) by mouth once daily.   carvedilol 6.25 mg tablet; Commonly known as: Coreg; Take 1 tablet (6.25   mg) by mouth 2 times daily (morning and late afternoon).   celecoxib 200 mg capsule; Commonly known as: CeleBREX; Take 1 capsule   (200 mg) by mouth once daily.   cyclobenzaprine 10 mg tablet; " Commonly known as: Flexeril; Take 1 tablet   (10 mg) by mouth as needed at bedtime for muscle spasms for up to 14 days.   dapagliflozin propanediol 10 mg tablet; Commonly known as: Farxiga; Take   1 tablet (10 mg) by mouth once daily.   FreeStyle Nadir 3 [place] misc; Generic drug:   blood-glucose,,cont; Use as instructed to test blood glucose   throughout the day   gentamicin 0.1 % cream; Commonly known as: Garamycin   isosorbide dinitrate 20 mg tablet; Commonly known as: Isordil; Take 1   tablet (20 mg) by mouth 3 times a day.   ondansetron ODT 4 mg disintegrating tablet; Commonly known as:   Zofran-ODT   rivaroxaban 20 mg tablet; Commonly known as: Xarelto; Take 1 tablet (20   mg) by mouth once daily in the evening. Take with meals. Take with food.   sertraline 100 mg tablet; Commonly known as: Zoloft; Take 1 tablet (100   mg) by mouth once daily.   spironolactone 25 mg tablet; Commonly known as: Aldactone; Take 1 tablet   (25 mg) by mouth once daily.   traZODone 50 mg tablet; Commonly known as: Desyrel; Take 1 tablet (50   mg) by mouth as needed at bedtime for sleep.     STOP taking these medications     hydrALAZINE 25 mg tablet; Commonly known as: Apresoline   HYDROcodone-acetaminophen 5-325 mg tablet; Commonly known as: Norco   ketorolac 10 mg tablet; Commonly known as: Toradol   metFORMIN 1,000 mg tablet; Commonly known as: Glucophage; Replaced by:   metFORMIN  mg 24 hr tablet   Trulicity 1.5 mg/0.5 mL pen injector injection; Generic drug:   dulaglutide       Test Results Pending At Discharge  Pending Labs       No current pending labs.            Hospital Course  Arthur Carranza is a 39 y.o. male with a history of poorly controlled type 2 diabetes, NICM, chronic systolic heart failure, morbid obesity, prior CVA presenting with high blood sugar.        Hyperglycemia 2/2 uncontrolled type 2 diabetes mellitus  - Presented markedly hyperglycemic without evidence of DKA in the setting of running out of  medications a few days ago  - Increased home Lantus to 70 units nightly, lispro to 18 units with meals, Farxiga  - Metformin formulation switched to XR 1000 mg due to N/V   - SSI, Accu-Cheks, hypoglycemia protocol  - Repeat A1c 16.3%    CKD stage II  - renal function at baseline (~sCr 1.7-1.9)     Left leg pain  - Follows with sports medicine as outpatient with plans for upcoming MRI  - Pain control     Chronic systolic heart failure / NICM  Essential hypertension  - Clinically appears euvolemic  - Continuing home GDMT and antihypertensives  - Entresto will be held due to hypotension during this hospitalization. Hydralazine discontinued. Torsemide dose reduced to 30 mg BID  - Outpatient cardiology follow-up & TTE     History of CVA  - Continuing home atorvastatin, Xarelto      > 30min in discharge coordination of care which includes: discharge planning, medication reconciliation, arranging appropriate follow up and discussion of discharge instructions with the patient.      Pertinent Physical Exam At Time of Discharge  Gen: NAD, appears stated age  HEENT: EOM, MMM  CV: RRR, no murmurs rubs or gallops  Resp: Clear to auscultation bilaterally, normal effort  Abdomen: soft, NT,+BS  LE: No edema, no deformity  Neuro: A&Ox4, moving all extremities       Outpatient Follow-Up  Future Appointments   Date Time Provider Department Center   7/30/2025  9:30 AM Hieu Blackmon PA-C [id number] [place]   8/7/2025  8:15 AM MATT [id number] ECHO/VASC 3 [id number] [facility] [place]   8/14/2025  9:15 AM Yon Yuan DO [id number] [place]         Steven Morales DO

## 2025-07-03 NOTE — CARE PLAN
The patient's goals for the shift include less pain and sleep    The clinical goals for the shift include no complications from blood glucose fluctuations.

## 2025-07-07 LAB
ATRIAL RATE: 86 BPM
P AXIS: 52 DEGREES
P OFFSET: 172 MS
P ONSET: 106 MS
PR INTERVAL: 212 MS
Q ONSET: 212 MS
QRS COUNT: 14 BEATS
QRS DURATION: 156 MS
QT INTERVAL: 442 MS
QTC CALCULATION(BAZETT): 528 MS
QTC FREDERICIA: 498 MS
R AXIS: -56 DEGREES
T AXIS: 103 DEGREES
T OFFSET: 433 MS
VENTRICULAR RATE: 86 BPM

## 2025-08-07 ENCOUNTER — APPOINTMENT (OUTPATIENT)
Dept: CARDIOLOGY | Facility: CLINIC | Age: 40
End: 2025-08-07
Payer: MEDICARE

## 2025-08-07 ENCOUNTER — TELEPHONE (OUTPATIENT)
Dept: CARDIOLOGY | Facility: CLINIC | Age: 40
End: 2025-08-07
Payer: MEDICARE

## 2025-08-07 NOTE — TELEPHONE ENCOUNTER
Called patient about echo because he no showed today. No answer and voicemail not set up. Cancel echo and Dr. Yuan follow up for echo.

## 2025-08-14 ENCOUNTER — APPOINTMENT (OUTPATIENT)
Dept: CARDIOLOGY | Facility: CLINIC | Age: 40
End: 2025-08-14
Payer: MEDICARE

## 2025-08-25 ENCOUNTER — APPOINTMENT (OUTPATIENT)
Dept: CARDIOLOGY | Facility: CLINIC | Age: 40
End: 2025-08-25
Payer: MEDICARE

## 2025-09-23 ENCOUNTER — APPOINTMENT (OUTPATIENT)
Dept: CARDIOLOGY | Facility: HOSPITAL | Age: 40
End: 2025-09-23
Payer: MEDICARE

## (undated) DEVICE — Device

## (undated) DEVICE — INTRODUCER KIT, HEMOSTASIS, VALVE/SIDEPORT, W/GUIDEWIRE, 0.035 IN, 8.5 FR, 10 CM, LF

## (undated) DEVICE — ACCESS KIT, S-MAK MINI, 4FR 10CM 0.018IN 40CM, NT/PT, ECHO ENHANCE NEEDLE

## (undated) DEVICE — CATHETER, THERMODILUTION, SWAN GANZ, VIP, 5 LUMEN, 7.5 FR, 110 CM

## (undated) DEVICE — SHEATH, PINNACLE, 10 CM,  7FR INTRODUCER, 7FR DIA, 2.5 CM DIALATOR